# Patient Record
Sex: FEMALE | Race: WHITE | NOT HISPANIC OR LATINO | Employment: OTHER | URBAN - METROPOLITAN AREA
[De-identification: names, ages, dates, MRNs, and addresses within clinical notes are randomized per-mention and may not be internally consistent; named-entity substitution may affect disease eponyms.]

---

## 2017-01-19 ENCOUNTER — HOSPITAL ENCOUNTER (OUTPATIENT)
Dept: RADIOLOGY | Facility: MEDICAL CENTER | Age: 72
Discharge: HOME | End: 2017-01-19
Payer: MEDICARE

## 2017-01-19 ENCOUNTER — ALLSCRIPTS OFFICE VISIT (OUTPATIENT)
Dept: OTHER | Facility: OTHER | Age: 72
End: 2017-01-19

## 2017-01-19 ENCOUNTER — TRANSCRIBE ORDERS (OUTPATIENT)
Dept: ADMINISTRATIVE | Facility: HOSPITAL | Age: 72
End: 2017-01-19

## 2017-01-19 DIAGNOSIS — M25.519 PAIN IN SHOULDER: ICD-10-CM

## 2017-01-19 DIAGNOSIS — R20.2 PARESTHESIA: Primary | ICD-10-CM

## 2017-01-19 DIAGNOSIS — M25.511 PAIN IN RIGHT SHOULDER: ICD-10-CM

## 2017-01-19 DIAGNOSIS — R20.2 PARESTHESIA OF SKIN: ICD-10-CM

## 2017-01-19 PROCEDURE — 73030 X-RAY EXAM OF SHOULDER: CPT

## 2017-01-19 PROCEDURE — 72040 X-RAY EXAM NECK SPINE 2-3 VW: CPT

## 2017-01-24 ENCOUNTER — APPOINTMENT (OUTPATIENT)
Dept: PHYSICAL THERAPY | Facility: MEDICAL CENTER | Age: 72
End: 2017-01-24
Payer: MEDICARE

## 2017-01-24 DIAGNOSIS — R20.2 PARESTHESIA OF SKIN: ICD-10-CM

## 2017-01-24 DIAGNOSIS — M25.511 PAIN IN RIGHT SHOULDER: ICD-10-CM

## 2017-01-24 PROCEDURE — G8991 OTHER PT/OT GOAL STATUS: HCPCS

## 2017-01-24 PROCEDURE — 97110 THERAPEUTIC EXERCISES: CPT

## 2017-01-24 PROCEDURE — G8990 OTHER PT/OT CURRENT STATUS: HCPCS

## 2017-01-24 PROCEDURE — 97162 PT EVAL MOD COMPLEX 30 MIN: CPT

## 2017-01-30 ENCOUNTER — GENERIC CONVERSION - ENCOUNTER (OUTPATIENT)
Dept: OTHER | Facility: OTHER | Age: 72
End: 2017-01-30

## 2017-01-31 ENCOUNTER — APPOINTMENT (OUTPATIENT)
Dept: PHYSICAL THERAPY | Facility: MEDICAL CENTER | Age: 72
End: 2017-01-31
Payer: MEDICARE

## 2017-01-31 PROCEDURE — 97110 THERAPEUTIC EXERCISES: CPT

## 2017-01-31 PROCEDURE — 97140 MANUAL THERAPY 1/> REGIONS: CPT

## 2017-02-01 ENCOUNTER — OFFICE VISIT (OUTPATIENT)
Dept: RADIOLOGY | Facility: CLINIC | Age: 72
End: 2017-02-01
Payer: MEDICARE

## 2017-02-01 DIAGNOSIS — R20.2 PARESTHESIA: ICD-10-CM

## 2017-02-01 PROCEDURE — 95908 NRV CNDJ TST 3-4 STUDIES: CPT

## 2017-02-01 PROCEDURE — 95886 MUSC TEST DONE W/N TEST COMP: CPT

## 2017-02-02 ENCOUNTER — ALLSCRIPTS OFFICE VISIT (OUTPATIENT)
Dept: OTHER | Facility: OTHER | Age: 72
End: 2017-02-02

## 2017-02-03 ENCOUNTER — APPOINTMENT (OUTPATIENT)
Dept: PHYSICAL THERAPY | Facility: MEDICAL CENTER | Age: 72
End: 2017-02-03
Payer: MEDICARE

## 2017-02-03 PROCEDURE — 97110 THERAPEUTIC EXERCISES: CPT

## 2017-02-03 PROCEDURE — 97140 MANUAL THERAPY 1/> REGIONS: CPT

## 2017-02-06 ENCOUNTER — APPOINTMENT (OUTPATIENT)
Dept: PHYSICAL THERAPY | Facility: MEDICAL CENTER | Age: 72
End: 2017-02-06
Payer: MEDICARE

## 2017-02-06 PROCEDURE — 97140 MANUAL THERAPY 1/> REGIONS: CPT

## 2017-02-06 PROCEDURE — 97110 THERAPEUTIC EXERCISES: CPT

## 2017-02-08 ENCOUNTER — APPOINTMENT (OUTPATIENT)
Dept: PHYSICAL THERAPY | Facility: MEDICAL CENTER | Age: 72
End: 2017-02-08
Payer: MEDICARE

## 2017-02-08 PROCEDURE — 97110 THERAPEUTIC EXERCISES: CPT

## 2017-02-08 PROCEDURE — 97140 MANUAL THERAPY 1/> REGIONS: CPT

## 2017-02-14 ENCOUNTER — APPOINTMENT (OUTPATIENT)
Dept: PHYSICAL THERAPY | Facility: MEDICAL CENTER | Age: 72
End: 2017-02-14
Payer: MEDICARE

## 2017-02-14 ENCOUNTER — ALLSCRIPTS OFFICE VISIT (OUTPATIENT)
Dept: OTHER | Facility: OTHER | Age: 72
End: 2017-02-14

## 2017-02-14 PROCEDURE — 97110 THERAPEUTIC EXERCISES: CPT

## 2017-02-14 PROCEDURE — 97140 MANUAL THERAPY 1/> REGIONS: CPT

## 2017-02-17 ENCOUNTER — APPOINTMENT (OUTPATIENT)
Dept: PHYSICAL THERAPY | Facility: MEDICAL CENTER | Age: 72
End: 2017-02-17
Payer: MEDICARE

## 2017-02-20 ENCOUNTER — APPOINTMENT (OUTPATIENT)
Dept: PHYSICAL THERAPY | Facility: MEDICAL CENTER | Age: 72
End: 2017-02-20
Payer: MEDICARE

## 2017-02-20 PROCEDURE — 97110 THERAPEUTIC EXERCISES: CPT

## 2017-02-20 PROCEDURE — 97140 MANUAL THERAPY 1/> REGIONS: CPT

## 2017-02-20 PROCEDURE — G8991 OTHER PT/OT GOAL STATUS: HCPCS

## 2017-02-20 PROCEDURE — G8990 OTHER PT/OT CURRENT STATUS: HCPCS

## 2017-02-28 ENCOUNTER — APPOINTMENT (OUTPATIENT)
Dept: PHYSICAL THERAPY | Facility: MEDICAL CENTER | Age: 72
End: 2017-02-28
Payer: MEDICARE

## 2017-02-28 PROCEDURE — 97110 THERAPEUTIC EXERCISES: CPT

## 2017-02-28 PROCEDURE — 97140 MANUAL THERAPY 1/> REGIONS: CPT

## 2017-03-03 ENCOUNTER — APPOINTMENT (OUTPATIENT)
Dept: PHYSICAL THERAPY | Facility: MEDICAL CENTER | Age: 72
End: 2017-03-03
Payer: MEDICARE

## 2017-03-03 PROCEDURE — 97110 THERAPEUTIC EXERCISES: CPT

## 2017-03-03 PROCEDURE — 97140 MANUAL THERAPY 1/> REGIONS: CPT

## 2017-03-07 ENCOUNTER — APPOINTMENT (OUTPATIENT)
Dept: PHYSICAL THERAPY | Facility: MEDICAL CENTER | Age: 72
End: 2017-03-07
Payer: MEDICARE

## 2017-03-07 PROCEDURE — 97140 MANUAL THERAPY 1/> REGIONS: CPT

## 2017-03-07 PROCEDURE — 97110 THERAPEUTIC EXERCISES: CPT

## 2017-03-14 ENCOUNTER — APPOINTMENT (OUTPATIENT)
Dept: PHYSICAL THERAPY | Facility: MEDICAL CENTER | Age: 72
End: 2017-03-14
Payer: MEDICARE

## 2017-03-15 ENCOUNTER — APPOINTMENT (OUTPATIENT)
Dept: PHYSICAL THERAPY | Facility: MEDICAL CENTER | Age: 72
End: 2017-03-15
Payer: MEDICARE

## 2017-03-15 PROCEDURE — 97140 MANUAL THERAPY 1/> REGIONS: CPT

## 2017-03-15 PROCEDURE — 97110 THERAPEUTIC EXERCISES: CPT

## 2017-03-16 ENCOUNTER — APPOINTMENT (OUTPATIENT)
Dept: PHYSICAL THERAPY | Facility: MEDICAL CENTER | Age: 72
End: 2017-03-16
Payer: MEDICARE

## 2017-03-17 ENCOUNTER — APPOINTMENT (OUTPATIENT)
Dept: PHYSICAL THERAPY | Facility: MEDICAL CENTER | Age: 72
End: 2017-03-17
Payer: MEDICARE

## 2017-03-17 PROCEDURE — 97110 THERAPEUTIC EXERCISES: CPT

## 2017-03-17 PROCEDURE — 97140 MANUAL THERAPY 1/> REGIONS: CPT

## 2017-03-21 ENCOUNTER — APPOINTMENT (OUTPATIENT)
Dept: PHYSICAL THERAPY | Facility: MEDICAL CENTER | Age: 72
End: 2017-03-21
Payer: MEDICARE

## 2017-03-21 PROCEDURE — G8991 OTHER PT/OT GOAL STATUS: HCPCS

## 2017-03-21 PROCEDURE — 97140 MANUAL THERAPY 1/> REGIONS: CPT

## 2017-03-21 PROCEDURE — G8990 OTHER PT/OT CURRENT STATUS: HCPCS

## 2017-03-21 PROCEDURE — 97110 THERAPEUTIC EXERCISES: CPT

## 2017-03-22 ENCOUNTER — GENERIC CONVERSION - ENCOUNTER (OUTPATIENT)
Dept: OTHER | Facility: OTHER | Age: 72
End: 2017-03-22

## 2017-03-24 ENCOUNTER — APPOINTMENT (OUTPATIENT)
Dept: PHYSICAL THERAPY | Facility: MEDICAL CENTER | Age: 72
End: 2017-03-24
Payer: MEDICARE

## 2017-03-24 PROCEDURE — 97140 MANUAL THERAPY 1/> REGIONS: CPT

## 2017-03-24 PROCEDURE — 97110 THERAPEUTIC EXERCISES: CPT

## 2017-04-18 ENCOUNTER — ALLSCRIPTS OFFICE VISIT (OUTPATIENT)
Dept: OTHER | Facility: OTHER | Age: 72
End: 2017-04-18

## 2017-04-18 ENCOUNTER — HOSPITAL ENCOUNTER (OUTPATIENT)
Dept: RADIOLOGY | Facility: MEDICAL CENTER | Age: 72
Discharge: HOME | End: 2017-04-18
Payer: MEDICARE

## 2017-04-18 DIAGNOSIS — Z87.442 PERSONAL HISTORY OF URINARY CALCULI: ICD-10-CM

## 2017-04-18 DIAGNOSIS — R10.9 ABDOMINAL PAIN: ICD-10-CM

## 2017-04-18 DIAGNOSIS — R39.9 UNSPECIFIED SYMPTOMS AND SIGNS INVOLVING THE GENITOURINARY SYSTEM: ICD-10-CM

## 2017-04-18 LAB
BILIRUB UR QL STRIP: ABNORMAL
CLARITY UR: ABNORMAL
COLOR UR: YELLOW
GLUCOSE (HISTORICAL): ABNORMAL
HGB UR QL STRIP.AUTO: ABNORMAL
KETONES UR STRIP-MCNC: ABNORMAL MG/DL
LEUKOCYTE ESTERASE UR QL STRIP: ABNORMAL
NITRITE UR QL STRIP: ABNORMAL
PH UR STRIP.AUTO: 6.5 [PH]
PROT UR STRIP-MCNC: ABNORMAL MG/DL
SP GR UR STRIP.AUTO: 1.01
UROBILINOGEN UR QL STRIP.AUTO: 3.5

## 2017-04-18 PROCEDURE — 74176 CT ABD & PELVIS W/O CONTRAST: CPT

## 2017-05-03 ENCOUNTER — APPOINTMENT (OUTPATIENT)
Dept: LAB | Facility: MEDICAL CENTER | Age: 72
End: 2017-05-03
Payer: MEDICARE

## 2017-05-03 DIAGNOSIS — R39.9 UNSPECIFIED SYMPTOMS AND SIGNS INVOLVING THE GENITOURINARY SYSTEM: ICD-10-CM

## 2017-05-03 LAB
BACTERIA UR QL AUTO: NORMAL /HPF
BILIRUB UR QL STRIP: NEGATIVE
CLARITY UR: CLEAR
COLOR UR: YELLOW
GLUCOSE UR STRIP-MCNC: NEGATIVE MG/DL
HGB UR QL STRIP.AUTO: NEGATIVE
HYALINE CASTS #/AREA URNS LPF: NORMAL /LPF
KETONES UR STRIP-MCNC: NEGATIVE MG/DL
LEUKOCYTE ESTERASE UR QL STRIP: NEGATIVE
NITRITE UR QL STRIP: NEGATIVE
NON-SQ EPI CELLS URNS QL MICRO: NORMAL /HPF
PH UR STRIP.AUTO: 6.5 [PH] (ref 4.5–8)
PROT UR STRIP-MCNC: NEGATIVE MG/DL
RBC #/AREA URNS AUTO: NORMAL /HPF
SP GR UR STRIP.AUTO: 1.02 (ref 1–1.03)
UROBILINOGEN UR QL STRIP.AUTO: 0.2 E.U./DL
WBC #/AREA URNS AUTO: NORMAL /HPF

## 2017-05-03 PROCEDURE — 87086 URINE CULTURE/COLONY COUNT: CPT

## 2017-05-03 PROCEDURE — 81001 URINALYSIS AUTO W/SCOPE: CPT

## 2017-05-04 LAB — BACTERIA UR CULT: NORMAL

## 2017-06-21 ENCOUNTER — APPOINTMENT (OUTPATIENT)
Dept: RADIOLOGY | Facility: MEDICAL CENTER | Age: 72
End: 2017-06-21
Payer: MEDICARE

## 2017-06-21 ENCOUNTER — ALLSCRIPTS OFFICE VISIT (OUTPATIENT)
Dept: OTHER | Facility: OTHER | Age: 72
End: 2017-06-21

## 2017-06-21 DIAGNOSIS — M25.562 PAIN IN LEFT KNEE: ICD-10-CM

## 2017-06-21 PROCEDURE — 73560 X-RAY EXAM OF KNEE 1 OR 2: CPT

## 2017-07-02 ENCOUNTER — APPOINTMENT (EMERGENCY)
Dept: RADIOLOGY | Facility: HOSPITAL | Age: 72
End: 2017-07-02
Payer: MEDICARE

## 2017-07-02 ENCOUNTER — HOSPITAL ENCOUNTER (EMERGENCY)
Facility: HOSPITAL | Age: 72
Discharge: HOME | End: 2017-07-02
Attending: EMERGENCY MEDICINE | Admitting: EMERGENCY MEDICINE
Payer: MEDICARE

## 2017-07-02 ENCOUNTER — APPOINTMENT (EMERGENCY)
Dept: ULTRASOUND IMAGING | Facility: HOSPITAL | Age: 72
End: 2017-07-02
Payer: MEDICARE

## 2017-07-02 VITALS
OXYGEN SATURATION: 99 % | SYSTOLIC BLOOD PRESSURE: 166 MMHG | WEIGHT: 143 LBS | HEART RATE: 84 BPM | HEIGHT: 59 IN | TEMPERATURE: 98.1 F | DIASTOLIC BLOOD PRESSURE: 73 MMHG | BODY MASS INDEX: 28.83 KG/M2 | RESPIRATION RATE: 18 BRPM

## 2017-07-02 DIAGNOSIS — S83.90XA KNEE SPRAIN: Primary | ICD-10-CM

## 2017-07-02 PROCEDURE — 93971 EXTREMITY STUDY: CPT

## 2017-07-02 PROCEDURE — 99284 EMERGENCY DEPT VISIT MOD MDM: CPT

## 2017-07-02 PROCEDURE — 73564 X-RAY EXAM KNEE 4 OR MORE: CPT

## 2017-08-02 ENCOUNTER — ALLSCRIPTS OFFICE VISIT (OUTPATIENT)
Dept: OTHER | Facility: OTHER | Age: 72
End: 2017-08-02

## 2017-08-02 ENCOUNTER — TRANSCRIBE ORDERS (OUTPATIENT)
Dept: ADMINISTRATIVE | Facility: HOSPITAL | Age: 72
End: 2017-08-02

## 2017-08-02 DIAGNOSIS — M25.561 PAIN IN RIGHT KNEE: ICD-10-CM

## 2017-08-02 DIAGNOSIS — M25.561 RIGHT KNEE PAIN, UNSPECIFIED CHRONICITY: Primary | ICD-10-CM

## 2017-08-08 ENCOUNTER — HOSPITAL ENCOUNTER (OUTPATIENT)
Dept: RADIOLOGY | Age: 72
Discharge: HOME | End: 2017-08-08
Payer: MEDICARE

## 2017-08-08 DIAGNOSIS — M25.561 PAIN IN RIGHT KNEE: ICD-10-CM

## 2017-08-08 PROCEDURE — 73721 MRI JNT OF LWR EXTRE W/O DYE: CPT

## 2017-08-23 ENCOUNTER — GENERIC CONVERSION - ENCOUNTER (OUTPATIENT)
Dept: OTHER | Facility: OTHER | Age: 72
End: 2017-08-23

## 2017-10-10 ENCOUNTER — ALLSCRIPTS OFFICE VISIT (OUTPATIENT)
Dept: OTHER | Facility: OTHER | Age: 72
End: 2017-10-10

## 2017-10-10 DIAGNOSIS — G47.00 INSOMNIA: ICD-10-CM

## 2017-10-10 DIAGNOSIS — I10 ESSENTIAL (PRIMARY) HYPERTENSION: ICD-10-CM

## 2017-10-10 DIAGNOSIS — E55.9 VITAMIN D DEFICIENCY: ICD-10-CM

## 2017-10-10 DIAGNOSIS — M81.0 AGE-RELATED OSTEOPOROSIS WITHOUT CURRENT PATHOLOGICAL FRACTURE: ICD-10-CM

## 2017-10-11 NOTE — PROGRESS NOTES
Assessment  1  Benign essential hypertension (401 1) (I10)   2  Arthritis of knee (716 96) (M17 10)   3  Osteoporosis (733 00) (M81 0)    Plan  Benign essential hypertension    · (1) CBC/PLT/DIFF; Status:Active - Retrospective Authorization; Requested for:10Oct2017;    · (1) COMPREHENSIVE METABOLIC PANEL; Status:Active - Retrospective Authorization; Requested for:10Oct2017;    · (1) LIPID PANEL, FASTING; Status:Active - Retrospective Authorization; Requested  for:10Oct2017;   Benign essential hypertension, Insomnia    · (1) TSH; Status:Active - Retrospective Authorization; Requested for:10Oct2017;   Benign essential hypertension, Osteoporosis, Vitamin D deficiency    · (1) VITAMIN D 25-HYDROXY; Status:Active - Retrospective Authorization; Requested  for:10Oct2017;   Encounter for screening mammogram for malignant neoplasm of breast    · * MAMMO SCREENING BILATERAL W CAD; Status:Active - Retrospective By Protocol  Authorization; Requested for:10Oct2017; Health Maintenance    · Begin or continue regular aerobic exercise  Gradually work up to at least 3 sessions of  30 minutes of exercise a week ; Status:Complete;   Done: 37HYX1019    Check BW, mammogram  Recheck 3 months  Discussion/Summary    1  HTN-BP is normal at the visit today and she is getting mostly normal readings at home  She prefers not to treat with any meds at this time  May improve as her knee gets better and she is more active again  Seh will continue to monitor at home  Cuff wsa previously checked for accuracyMedial mensical tear right knee-improvingHM-needs BW, mammgram  Declines immunizations,  Osteoporosis-has declined treatment  Discussed adequate calcium supplementation  Chief Complaint  The patient is here today for a follow up  History of Present Illness  Eunice Mosquera has been having problems with her right knee  Has medial meniscal tear  Seeing Dr Tyrell Leach  her lisinopril in July  Says it was making her nauseated and lightheaded  Mae Sinks Tried different times of day  Symptoms resolved after stoppig it  Taking her BP at home and it is good  However it does go up with knee pain  Getting 130's /70's  Has gotten up to 150 with pain  her eye check up this year  2016 2016occ zolpidemis good  Eats a lot of broccoli, kale, spinach  Cheese, almond milk  Review of Systems    Preventive Quality 65 and Older: Falls Risk: The patient fell 0 times in the past 12 months  The patient is currently asymptomatic Symptoms Include: Active Problems  1  Abdominal pain (789 00) (R10 9)   2  Acute bronchitis (466 0) (J20 9)   3  Arthritis (716 90) (M19 90)   4  Arthritis of knee (716 96) (M17 10)   5  Benign essential hypertension (401 1) (I10)   6  Bilateral shoulder pain (719 41) (M25 511,M25 512)   7  Blood pressure elevated (401 9) (I10)   8  Carpal tunnel syndrome (354 0) (G56 00)   9  Carpal tunnel syndrome, right (354 0) (G56 01)   10  Chest pain (786 50) (R07 9)   11  Chest pressure (786 59) (R07 89)   12  Chest wall pain (786 52) (R07 89)   13  Chills (780 64) (R68 83)   14  Colon cancer screening (V76 51) (Z12 11)   15  Ear pain (388 70) (H92 09)   16  Encounter for gynecological examination with Papanicolaou smear of cervix (V72 31)    (Z01 419)   17  Encounter for screening for malignant neoplasm of colon (V76 51) (Z12 11)   18  Encounter for screening mammogram for malignant neoplasm of breast (V76 12)    (Z12 31)   19  Esophagitis, reflux (530 11) (K21 0)   20  Fatigue (780 79) (R53 83)   21  Generalized pain (780 96) (R52)   22  History of colon polyps (V12 72) (Z86 010)   23  Insomnia (780 52) (G47 00)   24  Left knee pain (719 46) (M25 562)   25  Lower back pain (724 2) (M54 5)   26  Nausea (787 02) (R11 0)   27  Oral thrush (112 0) (B37 0)   28  Osteoporosis (733 00) (M81 0)   29  Otitis media of left ear (382 9) (H68 92)   30  Pain in joint of right hip (719 45) (M21 341)   31  Pes anserine bursitis (726 61) (M93 50)   32   Right flank pain (789 09) (R10 9)   33  Right knee pain (719 46) (M25 561)   34  Screening breast examination (V76 10) (Z12 31)   35  Shoulder pain (719 41) (M25 519)   36  Shoulder pain, right (719 41) (M25 511)   37  Tingling of right upper extremity (782 0) (R20 2)   38  Urinary symptom or sign (788 99) (R39 9)   39  Vitamin D deficiency (268 9) (E55 9)    Past Medical History  1  Denied: History of Denial Of Any Significant Medical History   2  H/O renal calculi (V13 01) (Z87 442)   3  History of hypertension (V12 59) (Z86 79)   4  History of Screening for hyperlipidemia (V77 91) (Z13 220)   5  History of Screening for thyroid disorder (V77 0) (Z13 29)    Surgical History  1  History of Foot Surgery   2  History of Knee Surgery   3  History of Shoulder Surgery    Family History  Mother    1  Family history of cardiac disorder (V17 49) (Z82 49)   2  Family history of respiratory failure (V17 6) (Z83 6)  Father    3  Family history of respiratory failure (V17 6) (Z83 6)  Family History    4  Family history of Father  At Age 70   5  Family history of Mother  At Age [de-identified]    Social History   · Caffeine Use   · Never a smoker   · Never Drank Alcohol    Current Meds   1  CVS Acidophilus Probiotic TABS Recorded   2  Diclofenac Sodium 1 % Transdermal Gel; APPLY TO LOWER EXTREMITIES, 4 GM OF   GEL TO AFFECTED AREA 4 TIMES DAILY  DO NOT APPLY MORE THAN 16 GM   DAILY TO ANY ONE AFFECTED JOINT; Therapy: 42WDD4837 to (Last Rx:81Miu2742)  Requested for: 51Ofp6707 Ordered   3  Ibuprofen 800 MG Oral Tablet; TAKE 1 TABLET 3 TIMES DAILY WITH FOOD AS NEEDED; Therapy: 91Uzx1579 to (Evaluate:60Qaw1371)  Requested for: 56Dvf6588; Last   Rx:10Ujj1176 Ordered   4  [place] Gum; Therapy: (Recorded:37Oom4528) to Recorded   5  Probiotic CAPS; Therapy: (Recorded:76Hpl2495) to Recorded   6  Saccharomyces boulardii 250 MG CAPS; Therapy: (Darrell Price) to Recorded   7   Zolpidem Tartrate 10 MG Oral Tablet; take 1 tablet by mouth at bedtime if needed; Therapy: 70ZPH6334 to (23 819 )  Requested for: 37Mqb2998; Last   Rx:83Ovv2050 Ordered    Allergies  1  Ciprofloxacin-Ciproflox HCl ER TB24   2  Ancef SOLR   3  Aspirin Adult Low Strength TBEC   4  Penicillin V Potassium SOLR   5  PredniSONE (Cecilio) TABS   6  Vancomycin HCl SOLR    Vitals  Vital Signs    Recorded: 32ILP3461 12:51AM Recorded: 83RLZ7254 10:43AM   Heart Rate  68   Respiration  16   Systolic 659 mm Hg,  mm Hg   Diastolic 82 mm Hg, LUE 78 mm Hg   Weight  148 lb 6 oz   BMI Calculated  29 97 kg/m2   BSA Calculated  1 62 m2     Physical Exam    Constitutional   General appearance: No acute distress, well appearing and well nourished  Neck   Neck: Supple, symmetric, trachea midline, no masses  Thyroid: Normal, no thyromegaly  Pulmonary   Respiratory effort: No increased work of breathing or signs of respiratory distress  Auscultation of lungs: Clear to auscultation  Cardiovascular   Auscultation of heart: Normal rate and rhythm, normal S1 and S2, no murmurs  Carotid pulses: 2+ bilaterally  Abdominal aorta: Normal     Femoral pulses: 2+ bilaterally  Pedal pulses: 2+ bilaterally  Peripheral vascular exam: Normal     Examination of extremities for edema and/or varicosities: Normal     Abdomen   Abdomen: Non-tender, no masses  Liver and spleen: No hepatomegaly or splenomegaly  Lymphatic   Palpation of lymph nodes in neck: No lymphadenopathy  Palpation of lymph nodes in groin: No lymphadenopathy  Musculoskeletal   Gait and station: Normal     Psychiatric   Mood and affect: Normal        Health Management  History of colon polyps   COLONOSCOPY (GI, SURG); every 5 years; Last 28Jun2016; Next Due: 26OAY7013; Active    Future Appointments    Date/Time Provider Specialty Site   01/10/2018 11:30 AM HEMA Sullivan  Family Medicine [facility]   11/22/2017 10:20 AM HEMA Blair   Orthopedic Surgery [facility] [place]     Signatures   Electronically signed by : HEMA Corral ; Oct 11 2017 12:55AM EST                       (Author)

## 2017-10-27 ENCOUNTER — APPOINTMENT (OUTPATIENT)
Dept: LAB | Facility: MEDICAL CENTER | Age: 72
End: 2017-10-27
Payer: MEDICARE

## 2017-10-27 DIAGNOSIS — M81.0 AGE-RELATED OSTEOPOROSIS WITHOUT CURRENT PATHOLOGICAL FRACTURE: ICD-10-CM

## 2017-10-27 DIAGNOSIS — G47.00 INSOMNIA: ICD-10-CM

## 2017-10-27 DIAGNOSIS — E55.9 VITAMIN D DEFICIENCY: ICD-10-CM

## 2017-10-27 DIAGNOSIS — I10 ESSENTIAL (PRIMARY) HYPERTENSION: ICD-10-CM

## 2017-10-27 LAB
25(OH)D3 SERPL-MCNC: 26.6 NG/ML (ref 30–100)
ALBUMIN SERPL BCP-MCNC: 4.1 G/DL (ref 3.5–5)
ALP SERPL-CCNC: 85 U/L (ref 46–116)
ALT SERPL W P-5'-P-CCNC: 26 U/L (ref 12–78)
ANION GAP SERPL CALCULATED.3IONS-SCNC: 7 MMOL/L (ref 4–13)
AST SERPL W P-5'-P-CCNC: 21 U/L (ref 5–45)
BASOPHILS # BLD AUTO: 0.03 THOUSANDS/ΜL (ref 0–0.1)
BASOPHILS NFR BLD AUTO: 0 % (ref 0–1)
BILIRUB SERPL-MCNC: 1.06 MG/DL (ref 0.2–1)
BUN SERPL-MCNC: 13 MG/DL (ref 5–25)
CALCIUM SERPL-MCNC: 9.1 MG/DL (ref 8.3–10.1)
CHLORIDE SERPL-SCNC: 103 MMOL/L (ref 100–108)
CHOLEST SERPL-MCNC: 195 MG/DL (ref 50–200)
CO2 SERPL-SCNC: 27 MMOL/L (ref 21–32)
CREAT SERPL-MCNC: 0.66 MG/DL (ref 0.6–1.3)
EOSINOPHIL # BLD AUTO: 0.17 THOUSAND/ΜL (ref 0–0.61)
EOSINOPHIL NFR BLD AUTO: 3 % (ref 0–6)
ERYTHROCYTE [DISTWIDTH] IN BLOOD BY AUTOMATED COUNT: 13.5 % (ref 11.6–15.1)
GFR SERPL CREATININE-BSD FRML MDRD: 88 ML/MIN/1.73SQ M
GLUCOSE P FAST SERPL-MCNC: 96 MG/DL (ref 65–99)
HCT VFR BLD AUTO: 42.7 % (ref 34.8–46.1)
HDLC SERPL-MCNC: 66 MG/DL (ref 40–60)
HGB BLD-MCNC: 14.4 G/DL (ref 11.5–15.4)
LDLC SERPL CALC-MCNC: 106 MG/DL (ref 0–100)
LYMPHOCYTES # BLD AUTO: 1.35 THOUSANDS/ΜL (ref 0.6–4.47)
LYMPHOCYTES NFR BLD AUTO: 20 % (ref 14–44)
MCH RBC QN AUTO: 30 PG (ref 26.8–34.3)
MCHC RBC AUTO-ENTMCNC: 33.7 G/DL (ref 31.4–37.4)
MCV RBC AUTO: 89 FL (ref 82–98)
MONOCYTES # BLD AUTO: 0.51 THOUSAND/ΜL (ref 0.17–1.22)
MONOCYTES NFR BLD AUTO: 8 % (ref 4–12)
NEUTROPHILS # BLD AUTO: 4.73 THOUSANDS/ΜL (ref 1.85–7.62)
NEUTS SEG NFR BLD AUTO: 69 % (ref 43–75)
NRBC BLD AUTO-RTO: 0 /100 WBCS
PLATELET # BLD AUTO: 246 THOUSANDS/UL (ref 149–390)
PMV BLD AUTO: 12.2 FL (ref 8.9–12.7)
POTASSIUM SERPL-SCNC: 3.8 MMOL/L (ref 3.5–5.3)
PROT SERPL-MCNC: 8.4 G/DL (ref 6.4–8.2)
RBC # BLD AUTO: 4.8 MILLION/UL (ref 3.81–5.12)
SODIUM SERPL-SCNC: 137 MMOL/L (ref 136–145)
TRIGL SERPL-MCNC: 115 MG/DL
TSH SERPL DL<=0.05 MIU/L-ACNC: 1.72 UIU/ML (ref 0.36–3.74)
WBC # BLD AUTO: 6.81 THOUSAND/UL (ref 4.31–10.16)

## 2017-10-27 PROCEDURE — 85025 COMPLETE CBC W/AUTO DIFF WBC: CPT

## 2017-10-27 PROCEDURE — 80061 LIPID PANEL: CPT

## 2017-10-27 PROCEDURE — 82306 VITAMIN D 25 HYDROXY: CPT

## 2017-10-27 PROCEDURE — 80053 COMPREHEN METABOLIC PANEL: CPT

## 2017-10-27 PROCEDURE — 84443 ASSAY THYROID STIM HORMONE: CPT

## 2017-10-27 PROCEDURE — 36415 COLL VENOUS BLD VENIPUNCTURE: CPT

## 2017-11-22 ENCOUNTER — APPOINTMENT (OUTPATIENT)
Dept: RADIOLOGY | Facility: MEDICAL CENTER | Age: 72
End: 2017-11-22
Payer: MEDICARE

## 2017-11-22 ENCOUNTER — GENERIC CONVERSION - ENCOUNTER (OUTPATIENT)
Dept: OTHER | Facility: OTHER | Age: 72
End: 2017-11-22

## 2017-11-22 DIAGNOSIS — M17.10 PRIMARY OSTEOARTHRITIS OF ONE KNEE: ICD-10-CM

## 2017-11-22 PROCEDURE — 73562 X-RAY EXAM OF KNEE 3: CPT

## 2017-12-13 ENCOUNTER — ALLSCRIPTS OFFICE VISIT (OUTPATIENT)
Dept: OTHER | Facility: OTHER | Age: 72
End: 2017-12-13

## 2017-12-15 NOTE — CONSULTS
Assessment  Assessed    1  Left knee pain (389 46) (M25 562)    Plan   Complete risks and benefits of the procedure were reviewed including bleeding, infection, tissue reaction, allergic reaction and nerve injury with verbal and written consent being obtained  Discussion/Summary    This is a 67 y o female with right knee osteoarthritis  X-ray of the right knee demonstrates degenerative osteoarthritis  She states that she has done PT and continues to exercise weekly w/o significant pain relief  She recently had a right Synvisc injection which provided moderate pain relief  Patient reports occasional achiness along the medial and lateral aspect of the right knee  Patient is interested in a pain intervention  Today, I discussed with patient regarding a genicular nerve block trial Ã2  If this helps relieve her right knee pain temporarily, I will proceed forward and schedule her for a radiofrequency ablation  Risks include bleeding, infection, nerve injury and allergic reaction to medications  Patient verbalizes understanding  Consent was signed  Patient was given samples of Pennsaid 2% cream to be applied to her knee as it has helped her in the past    The patient has the current Goals: Continue exercises  The patent has the current Barriers: None  Patient is able to Self-Care  Possible side effects of new medications were reviewed with the patient/guardian today  The treatment plan was reviewed with the patient/guardian  The patient/guardian understands and agrees with the treatment plan   The patient was counseled regarding diagnostic results,-- instructions for management,-- risk factor reductions,-- patient and family education,-- impressions,-- risks and benefits of treatment options-- and-- importance of compliance with treatment  Chief Complaint  Chief Complaints    1  Pain    History of Present Illness  Mrs Ekta Cortez is a 67 y o female who presents today for initial consultation for management of ongoing right knee pain  She states that she has done Pt in the past which did not help much  She states thatâs he has been seeing Dr Kyrie Henriquez for her pain  She had a right knee synvisc injection which she states helped  She has had prior left knee replacement which helped  She states that she does not want any medicine for pain  She states that she would like other interventions  She was referred to me for left genicular knee Nerve block and RFA  Pain is rated 7/10  Patient walks 2 5 miles a week  She uses OTC analgesics prn  Referring physician is  Dr Linda Anderson presents with complaints of gradual onset of constant episodes of mild right knee pain, described as sharp, burning and throbbing  On a scale of 1 to 10, the patient rates the pain as 2  Review of Systems   Constitutional: recent weight gain, but-- no fever-- and-- no recent weight loss  Eyes: no double vision-- and-- no blurry vision  Cardiovascular: chest pain, but-- no palpitations-- and-- no lower extremity edema  Respiratory: no complaints of shortness of breath-- and-- no wheezing  Musculoskeletal: difficulty walking,-- joint swelling -- and-- pain in extremity , but-- no muscle weakness,-- no joint stiffness,-- no limb swelling-- and-- no decreased range of motion  Neurological: no dizziness,-- no difficulty swallowing,-- no memory loss,-- no loss of consciousness-- and-- no seizures  Gastrointestinal: no nausea,-- no vomiting,-- no constipation-- and-- no diarrhea  Genitourinary: no difficulty initiating urine stream,-- no genital pain-- and-- no frequent urination  Integumentary: no complaints of skin rash  Psychiatric: no depression  Endocrine: excessive thirst, but-- no adrenal disease,-- no hypothyroidism-- and-- no hyperthyroidism  Hematologic/Lymphatic: no tendency for easy bruising-- and-- no tendency for easy bleeding  ROS reviewed  Active Problems  Problems    1   Abdominal pain (789 00) (R10 9)   2  Acute bronchitis (466 0) (J20 9)   3  Arthritis (716 90) (M19 90)   4  Arthritis of knee (716 96) (M17 10)   5  Benign essential hypertension (401 1) (I10)   6  Bilateral shoulder pain (719 41) (M25 511,M25 512)   7  Blood pressure elevated (401 9) (I10)   8  Carpal tunnel syndrome (354 0) (G56 00)   9  Carpal tunnel syndrome, right (354 0) (G56 01)   10  Chest pain (786 50) (R07 9)   11  Chest pressure (786 59) (R07 89)   12  Chest wall pain (786 52) (R07 89)   13  Chills (780 64) (R68 83)   14  Colon cancer screening (V76 51) (Z12 11)   15  Ear pain (388 70) (H92 09)   16  Encounter for gynecological examination with Papanicolaou smear of cervix (V72 31)  (Z01 419)   17  Encounter for screening for malignant neoplasm of colon (V76 51) (Z12 11)   18  Encounter for screening mammogram for malignant neoplasm of breast (V76 12)  (Z12 31)   19  Esophagitis, reflux (530 11) (K21 0)   20  Fatigue (780 79) (R53 83)   21  Generalized pain (780 96) (R52)   22  History of colon polyps (V12 72) (Z86 010)   23  Insomnia (780 52) (G47 00)   24  Left knee pain (719 46) (M25 562)   25  Lower back pain (724 2) (M54 5)   26  Nausea (787 02) (R11 0)   27  Oral thrush (112 0) (B37 0)   28  Osteoporosis (733 00) (M81 0)   29  Otitis media of left ear (382 9) (H66 92)   30  Pain in joint of right hip (719 45) (M25 551)   31  Pes anserine bursitis (726 61) (M70 50)   32  Primary osteoarthritis of right knee (715 16) (M17 11)   33  Right flank pain (789 09) (R10 9)   34  Right knee pain (719 46) (M25 561)   35  Screening breast examination (V76 10) (Z12 31)   36  Shoulder pain (719 41) (M25 519)   37  Shoulder pain, right (719 41) (M25 511)   38  Tingling of right upper extremity (782 0) (R20 2)   39  Urinary symptom or sign (788 99) (R39 9)   40  Vitamin D deficiency (268 9) (E55 9)    Past Medical History  Problems    1  Denied: History of Denial Of Any Significant Medical History   2   H/O renal calculi (V13 01) (Z87 442)   3  History of hypertension (V12 59) (Z86 79)   4  History of Screening for hyperlipidemia (V77 91) (Z13 220)   5  History of Screening for thyroid disorder (V77 0) (Z13 29)    Surgical History  Problems    1  History of Foot Surgery   2  History of Knee Surgery   3  History of Shoulder Surgery    Family History  Mother    1  Family history of cardiac disorder (V17 49) (Z82 49)   2  Family history of respiratory failure (V17 6) (Z83 6)  Father    3  Family history of respiratory failure (V17 6) (Z83 6)  Family History    4  Family history of Father  At Age 70   5  Family history of Mother  At Age [de-identified]    Social History  Problems    · Caffeine Use   · Never a smoker   · Never Drank Alcohol  The social history was reviewed and updated today  Current Meds   1  CVS Acidophilus Probiotic TABS Recorded   2  Diclofenac Sodium 1 % Transdermal Gel; APPLY TO LOWER EXTREMITIES, 4 GM OF GEL TO AFFECTED AREA 4 TIMES DAILY  DO NOT APPLY MORE THAN 16 GM DAILY TO ANY ONE AFFECTED JOINT; Therapy: 68CBP2869 to (Last Rx:72Jdj7570)  Requested for: 03Hod9783 Ordered   3  Ibuprofen 800 MG Oral Tablet; TAKE 1 TABLET 3 TIMES DAILY WITH FOOD AS NEEDED; Therapy: 63Dux0038 to (Evaluate:39Vdn9365)  Requested for: 74Wyw7678; Last Rx:34Jhy0422 Ordered   4  [place] Gum; Therapy: (Recorded:66Tvu3677) to Recorded   5  Probiotic CAPS; Therapy: (Recorded:74Cit6044) to Recorded   6  Saccharomyces boulardii 250 MG CAPS; Therapy: (Es Shivers) to Recorded   7  Zolpidem Tartrate 10 MG Oral Tablet; take 1 tablet by mouth at bedtime if needed; Therapy: 71EXT2671 to ( 74 30 53)  Requested for: 11Dws5135; Last Rx:14Efm5228 Ordered    The medication list was reviewed and updated today  Allergies  Medication    1  Ciprofloxacin-Ciproflox HCl ER TB24   2  Ancef SOLR   3  Aspirin Adult Low Strength TBEC   4  Penicillin V Potassium SOLR   5  PredniSONE (Cecilio) TABS   6   Vancomycin HCl SOLR    Vitals  Vital Signs Recorded: 41Jnx4173 01:53PM   Temperature 98 6 F   Heart Rate 88   Systolic 356   Diastolic 88   Height 4 ft 11 in   Weight 145 lb    BMI Calculated 29 29   BSA Calculated 1 61   Pain Scale 2     Physical Exam   Constitutional  General appearance: Well developed, well nourished, alert, in no distress, non-toxic and no overt pain behavior  Eyes  Sclera: anicteric  HEENT  Hearing grossly intact  Neck  Neck: Supple, symmetric, trachea midline, no masses  Pulmonary  Respiratory effort: Even and unlabored  Cardiovascular  Examination of extremities: No edema or pitting edema present  Skin  Skin and subcutaneous tissue: Normal without rashes or lesions, well hydrated  Psychiatric  Mood and affect: Mood and affect appropriate  Neurologic  Cranial nerves: Cranial nerves II-XII grossly intact  the muscle tone was normal  Musculoskeletal Tandem Gait: Intact      Results/Data    Results    Radiology:  * XR KNEE 3 VW RIGHT NON INJURY FinalNo Documents AttachedTW Order Number: [id number] Performing Comments: rm 2-------------------------------------------------------------------------------- RIGHT KNEE INDICATION: Right knee pain  COMPARISON: 7/2/2017 VIEWS: 3 IMAGES: 3 FINDINGS: There is no acute fracture or dislocation  There is a very small joint effusion Right knee arthritis noted  The medial joint compartment is mildly to moderately narrowed, and there is also involvement of the patellofemoral joint  No lytic or blastic lesions are seen  There is a left knee prosthesis IMPRESSION: Right knee arthritis with very small joint effusion  No acute osseous abnormality Workstation performed: [id number] Signed by: Zeus Javier MD 11/28/17Ordered by: Ashlyn Ellison Collected/Examined: 48FVZ3198 10:43AMVerification Required Stage: Final Resulted: 00SFG1123 03:47PM Last Updated: 09IPT9758 03:48PM Accession: [id number]  MRI:   * MRI KNEE RIGHT WO CONTRAST FinalNo Documents AttachedTW Order Number: [id number] Performing Comments: R knee pain rule out insufficiency fracture, medial meniscus tear - Patient Instructions: NANDINI Kylah Driscoll [email or web address] This is a summary report  The complete report is available in the patient's medical record  If you cannot access the medical record, please contact the sending organization for a detailed fax or copy  MRI RIGHT KNEE INDICATION: M25 561: Pain in right knee  History taken directly from the electronic ordering system  Medial right knee pain  Rule out insufficiency fracture or medial meniscus tear  COMPARISON: Plain film dated 7/2/2017 TECHNIQUE: MR sequences were obtained of the right knee including: Localizer, axial T2 fat sat, coronal T1/T2 fat sat, sagittal PD/T2 fat sat  Images were acquired on a 1 5 Anel unit  Gadolinium was not used  FINDINGS: SUBCUTANEOUS TISSUES: Normal JOINT EFFUSION: Moderate effusion  BAKER'S CYST: Moderate lobulated Baker's cyst  MENISCI: Complex tear identified through the posterior horn of the medial meniscus exhibiting moderate extrusion without flipped fragment  The lateral meniscus remains intact  CRUCIATE LIGAMENTS: Intact  EXTENSOR APPARATUS: Intact  COLLATERAL LIGAMENTS: Intact  ARTICULAR SURFACES: Tricompartmental degenerative change with full-thickness lateral trochlear cartilage loss noted  There is also grade 3 cartilage loss at the lateral patellar facet  BONE MARROW: Normal  MUSCULATURE: Intact  IMPRESSION: 1  Complex tear through the posterior horn medial meniscus exhibiting moderate extrusion without flipped fragment  2  Degenerative change with cartilage loss as noted above and associated joint effusion   3  Baker's cyst  Workstation performed: [id number] Signed by: Melly Ambrocio MD 8/8/17Ordered by: Malia Salomon Collected/Examined: 68JUX5441 01:40PMVerification Required Stage: Final Resulted: 41DML4450 03:43PM Last Updated: 47LXE2928 03:44PM Accession: [id number]  Future Appointments    Date/Time Provider Specialty Site   12/28/2017 01:30 PM Tisha Jalloh MD Pain Management [address] MRI OUTPATIENT   01/10/2018 11:30 AM HEMA Vargas  [address] [facility]   03/07/2018 10:20 AM HEMA Jane   Orthopedic [address]     Signatures   Electronically signed by : Sangeeta Nolen MD; Dec 13 2017  2:51PM EST                       (Author)

## 2017-12-28 ENCOUNTER — HOSPITAL ENCOUNTER (OUTPATIENT)
Dept: RADIOLOGY | Facility: CLINIC | Age: 72
Discharge: HOME | End: 2018-01-03
Attending: ANESTHESIOLOGY | Admitting: ANESTHESIOLOGY
Payer: MEDICARE

## 2018-01-10 ENCOUNTER — ALLSCRIPTS OFFICE VISIT (OUTPATIENT)
Dept: OTHER | Facility: OTHER | Age: 73
End: 2018-01-10

## 2018-01-10 NOTE — RESULT NOTES
Message   Please inform the patient that one polyp was a tubular adenoma  No high-grade dysplasia and no cancer  The remaining polyps were hyperplastic polyps  She needs a repeat colonoscopy in 5 years  Please have her call with any questions or concerns  Verified Results  (1) TISSUE EXAM 28Jun2016 09:36AM J Luis Colindres     Test Name Result Flag Reference   LAB AP CASE REPORT (Report)     Surgical Pathology Report             Case: [id number]                   Authorizing Provider: Rakesh Cook MD      Collected:      06/28/2016 0936        Ordering Location:   [facility]    Received:      06/28/2016 [address] [place] [address] Endoscopy                               Pathologist:      Lamine Lane MD                                 Specimens:  A) - Large Intestine, Right/Ascending Colon, Bx ascending polyp                    B) - Rectum, BX rectal polyps   LAB AP FINAL DIAGNOSIS      A  Ascending colon polyp (biopsy):  - Tubular adenoma  - No high grade dysplasia     B  Rectum polyps (biopsy):  - Fragments of hyperplastic polyp(S)  - Negative for dysplasia   Electronically signed by Lamine Lane MD on 6/30/2016 at 2:10 PM   LAB AP SURGICAL ADDITIONAL INFORMATION (Report)     These tests were developed and their performance characteristics   determined by [facility] or [facility]  They may not be cleared or approved by the [place]  Food and   Drug Administration  The FDA has determined that such clearance or   approval is not necessary  These tests are used for clinical purposes  They should not be regarded as investigational or for research  This   laboratory has been approved by CLIA 88, designated as a high-complexity   laboratory and is qualified to perform these tests  LAB AP GROSS DESCRIPTION (Report)     A   The specimen is received in formalin, labeled with the patient's name   and medical record number, and is designated ascending colon polyp biopsy  The specimen consists of 2 tan-pink soft tissue fragments   measuring 0 2 cm and 0 3 cm in greatest dimension  Entirely submitted  One   cassette  B  The specimen is received in formalin, labeled with the patient's name   and medical record number, and is designated rectal polyps biopsy  The   specimen consists of 5 tan-pink soft tissue fragments ranging from 0 2 cm   to 0 4 cm in greatest dimension  Entirely submitted  One cassette  Note: The estimated total formalin fixation time based upon information   provided by the submitting clinician and the standard processing schedule   is 28 25 hours  MAS       Discussion/Summary   A tubular adenoma is a precancerous polyp  These polyps, if left alone, have a small risk of developing a cancer over 5-10 years  Your polyp has been completely removed  Hyperplastic polyps are benign polyps which have no risk of developing a cancer

## 2018-01-12 VITALS
BODY MASS INDEX: 28.83 KG/M2 | TEMPERATURE: 98.7 F | DIASTOLIC BLOOD PRESSURE: 80 MMHG | SYSTOLIC BLOOD PRESSURE: 160 MMHG | WEIGHT: 143 LBS | RESPIRATION RATE: 16 BRPM | HEART RATE: 70 BPM | HEIGHT: 59 IN

## 2018-01-12 VITALS
HEIGHT: 59 IN | HEART RATE: 81 BPM | BODY MASS INDEX: 28.63 KG/M2 | SYSTOLIC BLOOD PRESSURE: 154 MMHG | DIASTOLIC BLOOD PRESSURE: 80 MMHG | WEIGHT: 142 LBS

## 2018-01-12 NOTE — PROGRESS NOTES
Assessment   1  Acute upper respiratory infection (465 9) (J06 9)   2  Benign essential hypertension (401 1) (I10)    Plan   Acute upper respiratory infection    · GuaiFENesin -10 MG/5ML Oral Syrup; TAKE 5 - 10 ML EVERY 4 TO 6    HOURS AS NEEDED FOR COUGH      As above  REcheck 4 months  Discussion/Summary      URI-advsied nasal decongestants, cough med, warm compressees, humidifier    Needs to avoid oral decongestants due to HTN  HTN-declines meds  Will continue to monitor  D deficiency-previously advised supplement  Chief Complaint   The patient is here today for a follow up  History of Present Illness   Arnold Mejía has nasal congestion and cough for the past several days  Has a post nasal drip  Ears bothering her  Fever initially  She took  her BP at home  Getting 149/72, 148/63, 147/79  Active Problems   1  Abdominal pain (789 00) (R10 9)   2  Acute bronchitis (466 0) (J20 9)   3  Arthritis (716 90) (M19 90)   4  Arthritis of knee (716 96) (M17 10)   5  Benign essential hypertension (401 1) (I10)   6  Bilateral shoulder pain (719 41) (M25 511,M25 512)   7  Blood pressure elevated (401 9) (I10)   8  Carpal tunnel syndrome (354 0) (G56 00)   9  Carpal tunnel syndrome, right (354 0) (G56 01)   10  Chest pain (786 50) (R07 9)   11  Chest pressure (786 59) (R07 89)   12  Chest wall pain (786 52) (R07 89)   13  Chills (780 64) (R68 83)   14  Colon cancer screening (V76 51) (Z12 11)   15  Ear pain (388 70) (H92 09)   16  Encounter for gynecological examination with Papanicolaou smear of cervix (V72 31)      (Z01 419)   17  Encounter for screening for malignant neoplasm of colon (V76 51) (Z12 11)   18  Encounter for screening mammogram for malignant neoplasm of breast (V76 12)      (Z12 31)   19  Esophagitis, reflux (530 11) (K21 0)   20  Fatigue (780 79) (R53 83)   21  Generalized pain (780 96) (R52)   22  History of colon polyps (V12 72) (Z86 010)   23  Insomnia (780 52) (G47 00)   24   Left knee pain (719 46) (M25 562)   25  Lower back pain (724 2) (M54 5)   26  Nausea (787 02) (R11 0)   27  Oral thrush (112 0) (B37 0)   28  Osteoporosis (733 00) (M81 0)   29  Otitis media of left ear (382 9) (H66 92)   30  Pain in joint of right hip (719 45) (M25 551)   31  Pes anserine bursitis (726 61) (M70 50)   32  Primary osteoarthritis of right knee (715 16) (M17 11)   33  Right flank pain (789 09) (R10 9)   34  Right knee pain (719 46) (M25 561)   35  Screening breast examination (V76 10) (Z12 31)   36  Shoulder pain (719 41) (M25 519)   37  Shoulder pain, right (719 41) (M25 511)   38  Tingling of right upper extremity (782 0) (R20 2)   39  Urinary symptom or sign (788 99) (R39 9)   40  Vitamin D deficiency (268 9) (E55 9)    Past Medical History   1  Denied: History of Denial Of Any Significant Medical History   2  H/O renal calculi (V13 01) (Z87 442)   3  History of hypertension (V12 59) (Z86 79)   4  History of Screening for hyperlipidemia (V77 91) (Z13 220)   5  History of Screening for thyroid disorder (V77 0) (Z13 29)    Surgical History   1  History of Foot Surgery   2  History of Knee Surgery   3  History of Shoulder Surgery    Family History   Mother    1  Family history of cardiac disorder (V17 49) (Z82 49)   2  Family history of respiratory failure (V17 6) (Z83 6)  Father    3  Family history of respiratory failure (V17 6) (Z83 6)  Family History    4  Family history of Father  At Age 70   5  Family history of Mother  At Age [de-identified]    Social History    · Caffeine Use   · Never a smoker   · Never Drank Alcohol    Current Meds    1  CVS Acidophilus Probiotic TABS Recorded   2  Diclofenac Sodium 1 % Transdermal Gel; APPLY TO LOWER EXTREMITIES, 4 GM OF     GEL TO AFFECTED AREA 4 TIMES DAILY  DO NOT APPLY MORE THAN 16 GM     DAILY TO ANY ONE AFFECTED JOINT; Therapy: 59JTG9262 to (Last Rx:2017)  Requested for: 32Aru0620 Ordered   3   Ibuprofen 800 MG Oral Tablet; TAKE 1 TABLET 3 TIMES DAILY WITH FOOD AS NEEDED; Therapy: 02Aug2017 to (Evaluate:94Sbo0934)  Requested for: 04Poa0655; Last     Rx:02Aug2017 Ordered   4  Luzerne Gum; Therapy: (Recorded:27May2016) to Recorded   5  Probiotic CAPS; Therapy: (Recorded:27May2016) to Recorded   6  Saccharomyces boulardii 250 MG CAPS; Therapy: (Kita Gillis) to Recorded   7  Zolpidem Tartrate 10 MG Oral Tablet; take 1 tablet by mouth at bedtime if needed; Therapy: 57CER2419 to Marlise Pass)  Requested for: 67BTD5225; Last     Rx:01Jan2018 Ordered    Allergies   1  Ciprofloxacin-Ciproflox HCl ER TB24   2  Ancef SOLR   3  Aspirin Adult Low Strength TBEC   4  Penicillin V Potassium SOLR   5  PredniSONE (Cecilio) TABS   6  Vancomycin HCl SOLR    Vitals   Vital Signs    Recorded: 72MIK9616 11:18AM   Temperature 97 2 F   Heart Rate 80   Respiration 20   Systolic 679   Diastolic 64   Weight 892 lb 4 oz   BMI Calculated 29 74   BSA Calculated 1 62     Physical Exam        Constitutional      General appearance: No acute distress, well appearing and well nourished  Head and Face      Head and face: Normal        Palpation of the face and sinuses: No sinus tenderness  Eyes      Conjunctiva and lids: No swelling, erythema or discharge  Ears, Nose, Mouth, and Throat      Otoscopic examination: Tympanic membranes translucent with normal light reflex  Canals patent without erythema  -- Very cobgested turbinates  Oropharynx: Normal with no erythema, edema, exudate or lesions  Neck      Neck: Supple, symmetric, trachea midline, no masses  Thyroid: Normal, no thyromegaly  Pulmonary      Respiratory effort: No increased work of breathing or signs of respiratory distress  Auscultation of lungs: Clear to auscultation  Cardiovascular      Carotid pulses: 2+ bilaterally  Femoral pulses: 2+ bilaterally         Peripheral vascular exam: Normal        Examination of extremities for edema and/or varicosities: Normal        Lymphatic      Palpation of lymph nodes in neck: No lymphadenopathy  Results/Data   (1) CBC/PLT/DIFF 27Oct2017 09:11AM Janett Dietz Order Number: [id number]      Test Name Result Flag Reference   WBC COUNT 6 81 Thousand/uL  4 31-10 16   RBC COUNT 4 80 Million/uL  3 81-5 12   HEMOGLOBIN 14 4 g/dL  11 5-15 4   HEMATOCRIT 42 7 %  34 8-46  1   MCV 89 fL  82-98   MCH 30 0 pg  26 8-34 3   MCHC 33 7 g/dL  31 4-37 4   RDW 13 5 %  11 6-15 1   MPV 12 2 fL  8 9-12 7   PLATELET COUNT 192 Thousands/uL  149-390   nRBC AUTOMATED 0 /100 WBCs     NEUTROPHILS RELATIVE PERCENT 69 %  43-75   LYMPHOCYTES RELATIVE PERCENT 20 %  14-44   MONOCYTES RELATIVE PERCENT 8 %  4-12   EOSINOPHILS RELATIVE PERCENT 3 %  0-6   BASOPHILS RELATIVE PERCENT 0 %  0-1   NEUTROPHILS ABSOLUTE COUNT 4 73 Thousands/? ??L  1 85-7 62   LYMPHOCYTES ABSOLUTE COUNT 1 35 Thousands/? ??L  0 60-4 47   MONOCYTES ABSOLUTE COUNT 0 51 Thousand/? ??L  0 17-1 22   EOSINOPHILS ABSOLUTE COUNT 0 17 Thousand/? ??L  0 00-0 61   BASOPHILS ABSOLUTE COUNT 0 03 Thousands/? ??L  0 00-0 10      (1) COMPREHENSIVE METABOLIC PANEL 45ZJF7610 17:47JZ Janett Dietz Order Number: [id number]      Test Name Result Flag Reference   SODIUM 137 mmol/L  136-145   POTASSIUM 3 8 mmol/L  3 5-5 3   CHLORIDE 103 mmol/L  100-108   CARBON DIOXIDE 27 mmol/L  21-32   ANION GAP (CALC) 7 mmol/L  4-13   BLOOD UREA NITROGEN 13 mg/dL  5-25   CREATININE 0 66 mg/dL  0 60-1 30   Standardized to IDMS reference method   CALCIUM 9 1 mg/dL  8 3-10 1   BILI, TOTAL 1 06 mg/dL H 0 20-1 00   ALK PHOSPHATAS 85 U/L     ALT (SGPT) 26 U/L  12-78   Specimen collection should occur prior to Sulfasalazine and/or Sulfapyridine administration due to the potential for falsely depressed results  AST(SGOT) 21 U/L  5-45   Specimen collection should occur prior to Sulfasalazine administration due to the potential for falsely depressed results     ALBUMIN 4 1 g/dL 3 5-5 0   TOTAL PROTEIN 8 4 g/dL H 6 4-8 2   eGFR 88 ml/min/1 73sq m     National Kidney Disease Education Program recommendations are as follows:     GFR calculation is accurate only with a steady state creatinine     Chronic Kidney disease less than 60 ml/min/1 73 sq  meters     Kidney failure less than 15 ml/min/1 73 sq  meters  GLUCOSE FASTING 96 mg/dL  65-99   Specimen collection should occur prior to Sulfasalazine administration due to the potential for falsely depressed results  Specimen collection should occur prior to Sulfapyridine administration due to the potential for falsely elevated results  (1) LIPID PANEL, FASTING 53WFV9523 09:11AM [facility] Order Number: [id number]      Test Name Result Flag Reference   CHOLESTEROL 195 mg/dL     HDL,DIRECT 66 mg/dL H 40-60   Specimen collection should occur prior to Metamizole administration due to the potential for falsley depressed results  LDL CHOLESTEROL CALCULATED 106 mg/dL H 0-100   Triglyceride:           Normal <150 mg/dl      Borderline High 150-199 mg/dl      High 200-499 mg/dl      Very High >499 mg/dl               Cholesterol:          Desirable <200 mg/dl       Borderline High 200-239 mg/dl       High >239 mg/dl               HDL Cholesterol:          High>59 mg/dL       Low <41 mg/dL               This screening LDL is a calculated result  It does not have the accuracy of the Direct Measured LDL in the monitoring of patients with hyperlipidemia and/or statin therapy  Direct Measure LDL (EBZ816) must be ordered separately in these patients  TRIGLYCERIDES 115 mg/dL  <=150   Specimen collection should occur prior to N-Acetylcysteine or Metamizole administration due to the potential for falsely depressed results        (1) TSH 27Oct2017 09:11AM [facility] Order Number: [id number]      Test Name Result Flag Reference   TSH 1 720 uIU/mL  0 358-3 740   Patients undergoing fluorescein dye angiography may retain small amounts of fluorescein in the body for 48-72 hours post procedure  Samples containing fluorescein can produce falsely depressed TSH values  If the patient had this procedure,a specimen should be resubmitted post fluorescein clearance  The recommended reference ranges for TSH during pregnancy are as follows:     First trimester 0 1 to 2 5 uIU/mL     Second trimester  0 2 to 3 0 uIU/mL     Third trimester 0 3 to 3 0 uIU/m      (1) VITAMIN D 25-HYDROXY 27Oct2017 09:11AM Chaya Kanner Order Number: [id number]      Test Name Result Flag Reference   VIT D 25-HYDROX 26 6 ng/mL L 30 0-100 0   This assay is a certified procedure of the CDC Vitamin D Standardization Certification Program (VDSCP)           Deficiency <20ng/ml      Insufficiency 20-30ng/ml      Sufficient  ng/ml           *Patients undergoing fluorescein dye angiography may retain small amounts of fluorescein in the body for 48-72 hours post procedure  Samples containing fluorescein can produce falsely elevated Vitamin D values  If the patient had this procedure, a specimen should be resubmitted post fluorescein clearance  Health Management   History of colon polyps   COLONOSCOPY (GI, SURG); every 5 years; Last 28Jun2016; Next Due: 60MAF4529; Active    Future Appointments      Date/Time Provider Specialty Site   03/07/2018 10:20 AM HEMA Berger   [address] SURGICAL [facility]     Signatures    Electronically signed by : HEMA Evans ; Nahum 10 2018  9:34PM EST                       (Author)

## 2018-01-13 VITALS
WEIGHT: 144.5 LBS | SYSTOLIC BLOOD PRESSURE: 160 MMHG | DIASTOLIC BLOOD PRESSURE: 80 MMHG | BODY MASS INDEX: 29.13 KG/M2 | HEIGHT: 59 IN | HEART RATE: 84 BPM

## 2018-01-13 VITALS
TEMPERATURE: 97.8 F | SYSTOLIC BLOOD PRESSURE: 140 MMHG | DIASTOLIC BLOOD PRESSURE: 74 MMHG | BODY MASS INDEX: 29.19 KG/M2 | HEART RATE: 76 BPM | RESPIRATION RATE: 16 BRPM | WEIGHT: 144.5 LBS

## 2018-01-13 VITALS
BODY MASS INDEX: 29.39 KG/M2 | RESPIRATION RATE: 18 BRPM | DIASTOLIC BLOOD PRESSURE: 79 MMHG | WEIGHT: 145.5 LBS | HEART RATE: 81 BPM | SYSTOLIC BLOOD PRESSURE: 180 MMHG

## 2018-01-14 VITALS
DIASTOLIC BLOOD PRESSURE: 81 MMHG | RESPIRATION RATE: 18 BRPM | BODY MASS INDEX: 29.11 KG/M2 | SYSTOLIC BLOOD PRESSURE: 156 MMHG | WEIGHT: 144.13 LBS | HEART RATE: 83 BPM

## 2018-01-14 VITALS
RESPIRATION RATE: 16 BRPM | BODY MASS INDEX: 29.97 KG/M2 | HEART RATE: 68 BPM | DIASTOLIC BLOOD PRESSURE: 82 MMHG | WEIGHT: 148.38 LBS | SYSTOLIC BLOOD PRESSURE: 142 MMHG

## 2018-01-18 NOTE — RESULT NOTES
Message  The biopsies from you recent upper endoscopy were normal  No evidence of H pylori gastrtitis  Please follow up in the office for your reflux symptoms, Please call with any quesitons        Signatures   Electronically signed by : HEMA Chaney ; May 24 2016  2:15PM EST                       (Author)

## 2018-01-22 VITALS
DIASTOLIC BLOOD PRESSURE: 73 MMHG | BODY MASS INDEX: 29.36 KG/M2 | HEART RATE: 90 BPM | SYSTOLIC BLOOD PRESSURE: 183 MMHG | WEIGHT: 145.38 LBS | RESPIRATION RATE: 18 BRPM

## 2018-01-22 VITALS — HEART RATE: 75 BPM | RESPIRATION RATE: 18 BRPM | SYSTOLIC BLOOD PRESSURE: 150 MMHG | DIASTOLIC BLOOD PRESSURE: 79 MMHG

## 2018-01-22 VITALS
BODY MASS INDEX: 29.23 KG/M2 | TEMPERATURE: 98.6 F | SYSTOLIC BLOOD PRESSURE: 130 MMHG | HEIGHT: 59 IN | HEART RATE: 88 BPM | DIASTOLIC BLOOD PRESSURE: 88 MMHG | WEIGHT: 145 LBS

## 2018-01-23 VITALS
TEMPERATURE: 97.2 F | WEIGHT: 147.25 LBS | BODY MASS INDEX: 29.74 KG/M2 | DIASTOLIC BLOOD PRESSURE: 64 MMHG | HEART RATE: 80 BPM | SYSTOLIC BLOOD PRESSURE: 132 MMHG | RESPIRATION RATE: 20 BRPM

## 2018-01-23 NOTE — MISCELLANEOUS
Assessment   1  Benign essential hypertension (401 1) (I10)1   2  Esophagitis, reflux (530 11) (K21 0)1      1 Amended By: Rhiannon Robertson; May 10 2016 11:07 PM EST    Plan   AS above  Recheck 2 weeks  1       1 Amended By: Enoc Oseguera; May 10 2016 10:58 PM EST    Discussion/Summary  Discussion Summary:   1  HTNB-new onset-reviewed at length with son and Barbara importance of taking her med at least for now    Since she is showing a response will continue lisinopril 10 mg  She will call if BP elevated further  May need to increase dose  2  Reflux esophagitis-should confirm famotidine dose with GI    1        1 Amended By: Rhiannon Robertson; May 10 2016 11:08 PM EST    History of Present Illness  TCM Communication [facility]: The patient is being contacted for follow-up after hospitalization  She was hospitalized at and [address] [place] [address]  The date of admission: 5/3/16, date of discharge: 5/6/16  Diagnosis: Chest pressure, GERD, accelerated hypertension, and sleep deprivation  She was discharged to home  She scheduled a follow up appointment  Counseling was provided to patient's family  Son, Juancho Alvarado  Communication performed and completed by   HPI: Dea Habermann 1  is accompanied by son Juancho Alvarado today  Erin 2  was hospitalized for elevated BP and chest pain  She had not been feeling well for weeks prior with fatigue and pain in the back of her head and epigastric pain  She was placed on Zantac 150 twice daily  Saw Dr Balbuena  1  EGD done  Has hiatal hernia, duodenitis, esophagitis  She says she was put on Zantac but her prescriptin bottle she shows me today says famotidine  She talked to Dr Crista Bryant today tell him she was having side effects  He advised her to take 1/2 bid  2    1  She had a cardiac w/u in the hospital   She says she is feeling a little better  Her BP's are improving  2  1,3  However she does not understand why she has HTN since she says she has good diet ad exercise habits   She would prefer not to take medication  3        1 Amended By: Rachel Liu; May 09 2016 4:36 PM EST   2 Amended By: Rachel Liu; May 10 2016 10:58 PM EST   3 Amended By: Rachel Liu; May 10 2016 11:10 PM EST    Active Problems   1  Acute bronchitis (466 0) (J20 9)  2  Arthritis (716 90) (M19 90)  3  Bilateral shoulder pain (719 41) (M25 511,M25 512)  4  Blood pressure elevated (401 9) (I10)  5  Carpal tunnel syndrome (354 0) (G56 00)  6  Chest pain (786 50) (R07 9)  7  Chest pressure (786 59) (R07 89)  8  Chest wall pain (786 52) (R07 89)  9  Encounter for gynecological examination with Papanicolaou smear of cervix   (V72 31,V76 2) (Z01 419,Z12 4)  10  Encounter for screening for malignant neoplasm of colon (V76 51) (Z12 11)  11  Encounter for screening mammogram for malignant neoplasm of breast (V76 12)    (Z12 31)  12  Esophagitis, reflux (530 11) (K21 0)  13  Fatigue (780 79) (R53 83)  14  Generalized pain (780 96) (R52)  15  Insomnia (780 52) (G47 00)  16  Left knee pain (719 46) (M25 562)  17  Lower back pain (724 2) (M54 5)  18  Osteoporosis (733 00) (M81 0)  19  Pain in joint of right hip (719 45) (M25 551)  20  Shoulder pain (719 41) (M25 519)  21  Vitamin D deficiency (268 9) (E55 9)    Past Medical History   1  Denied: History of Denial Of Any Significant Medical History  2  History of hypertension (V12 59) (Z86 79)  3  History of Screening for hyperlipidemia (V77 91) (Z13 220)  4  History of Screening for thyroid disorder (V77 0) (Z13 29)    Surgical History   1  History of Foot Surgery  2  History of Knee Surgery  3  History of Shoulder Surgery    Family History  Mother   1  Family history of cardiac disorder (V17 49) (Z82 49)  2  Family history of respiratory failure (V17 6) (Z83 6)  Father   3  Family history of respiratory failure (V17 6) (Z83 6)  Family History   4  Family history of Father  At Age 68  8   Family history of Mother  At Age [de-identified]    Social History    · Caffeine Use   · Never A Smoker   · Never Drank Alcohol    Current Meds  1  Bioflex TABS; Take 1 tablet daily Recorded  2  CharcoCaps Homeopathic CAPS Recorded  3  CVS Acidophilus Probiotic TABS Recorded  4  Ibuprofen 600 MG Oral Tablet; Take one tablet every 8 hours as needed for pain; Therapy: 40QSK1818 to (Last Rx:24Nov2015)  Requested for: 12KLD6962 Ordered  5  Zolpidem Tartrate 10 MG Oral Tablet; TAKE 1 TABLET AT  BEDTIME AS NEEDED FOR   INSOMNIA; Last Rx:30Vmi9005 Ordered    Allergies   1  Ciprofloxacin-Ciproflox HCl ER TB24  2  Ancef SOLR  3  Aspirin Adult Low Strength TBEC  4  Penicillin V Potassium SOLR  5  PredniSONE (Cecilio) TABS  6  Vancomycin HCl SOLR    Physical Exam    Constitutional1    General appearance: No acute distress, well appearing and well nourished  1    Neck1    Neck: Supple, symmetric, trachea midline, no masses  1    Thyroid: Normal, no thyromegaly  1    Pulmonary   Respiratory effort: No increased work of breathing or signs of respiratory distress  1    Auscultation of lungs: Clear to auscultation  1    Cardiovascular1    Auscultation of heart: Normal rate and rhythm, normal S1 and S2, no murmurs  1    Carotid pulses: 2+ bilaterally  1    Abdominal aorta: Normal 1    Femoral pulses: 2+ bilaterally  1    Pedal pulses: 2+ bilaterally  1    Peripheral vascular exam: Normal 1    Examination of extremities for edema and/or varicosities: Normal 1    Abdomen1    Abdomen: Non-tender, no masses  1    Liver and spleen: No hepatomegaly or splenomegaly  1        1 Amended By: Dara Bolton; May 10 2016 11:03 PM EST    Message   Recorded as Task   Date: 05/06/2016 01:06 PM, Created By: System   Task Name: [facility]   Assigned To:  Tenisha Pinto   Regarding Patient: Gi Feritas, Status: Active   Comment:   System - 06 May 2016 1:06 PM    Patient discharged from hospital   Patient Name: Jasmina Virgen  Patient YOB: 1945  Discharge Date: 5/6/2016  [facility] App[facility]    Date/Time Provider Specialty Site 05/09/2016 03:45 PM HEMA Garvey  [address]   06/06/2016 01:45 PM HEMA Garvey   [address]     Signatures   Electronically signed by : HEMA Alejo ; May  8 2016  9:46AM EST                          Electronically signed by : HEMA Alejo ; May 10 2016  9:49PM EST                          Electronically signed by : HEMA Masterson ; May 10 2016 11:12PM EST                       (Author)

## 2018-01-25 ENCOUNTER — HOSPITAL ENCOUNTER (OUTPATIENT)
Dept: RADIOLOGY | Facility: CLINIC | Age: 73
Discharge: HOME | End: 2018-01-25
Attending: ANESTHESIOLOGY
Payer: MEDICARE

## 2018-01-25 VITALS
DIASTOLIC BLOOD PRESSURE: 83 MMHG | OXYGEN SATURATION: 96 % | SYSTOLIC BLOOD PRESSURE: 143 MMHG | HEART RATE: 76 BPM | TEMPERATURE: 97.6 F | RESPIRATION RATE: 18 BRPM

## 2018-01-25 DIAGNOSIS — M17.11 ARTHRITIS OF RIGHT KNEE: ICD-10-CM

## 2018-01-25 PROBLEM — M17.10 ARTHRITIS OF KNEE: Status: ACTIVE | Noted: 2018-01-25

## 2018-01-25 PROBLEM — M54.50 LOWER BACK PAIN: Status: ACTIVE | Noted: 2018-01-25

## 2018-01-25 PROCEDURE — 64450 NJX AA&/STRD OTHER PN/BRANCH: CPT | Performed by: ANESTHESIOLOGY

## 2018-01-25 RX ORDER — BUPIVACAINE HYDROCHLORIDE 2.5 MG/ML
10 INJECTION, SOLUTION EPIDURAL; INFILTRATION; INTRACAUDAL ONCE
Status: COMPLETED | OUTPATIENT
Start: 2018-01-25 | End: 2018-01-25

## 2018-01-25 RX ADMIN — BUPIVACAINE HYDROCHLORIDE 10 ML: 2.5 INJECTION, SOLUTION EPIDURAL; INFILTRATION; INTRACAUDAL; PERINEURAL at 13:36

## 2018-01-25 NOTE — DISCHARGE INSTRUCTIONS

## 2018-01-25 NOTE — H&P
History of Present Illness: The patient is a 67 y o  female who presents with complaints of Right knee pain  She is here for 2nd right genicular nerve block  The first injection provided her with greater than 1 week of pain relief  Pain score is 6/10  Patient Active Problem List   Diagnosis    Chest pressure    GERD (gastroesophageal reflux disease)    Sleep deprivation    Accelerated hypertension    Arthritis of knee    Left knee pain    Lower back pain    Primary osteoarthritis of right knee       Past Medical History:   Diagnosis Date    Arthritis     Carpal tunnel syndrome     GERD (gastroesophageal reflux disease)     Hiatal hernia     Hypertension     Insomnia     Sleep deprivation     chronically    Vitamin D deficiency     Vitamin D toxicity 2011    with leaky gut syndrome       Past Surgical History:   Procedure Laterality Date    BUNIONECTOMY Bilateral     CARPAL TUNNEL RELEASE Right 2015    ESOPHAGOGASTRODUODENOSCOPY N/A 5/5/2016    Procedure: ESOPHAGOGASTRODUODENOSCOPY (EGD); Surgeon: Anisa Adams MD;  [facility]; Service:     DC COLONOSCOPY FLX DX W/COLLJ SPEC WHEN PFRMD N/A 6/28/2016    Procedure: COLONOSCOPY;  Surgeon: Anisa Adams MD;  [facility]; Service: Gastroenterology    ROTATOR CUFF REPAIR Bilateral     TOE SURGERY Left     left 5th toe shaved down due to repeated fx    TOTAL KNEE ARTHROPLASTY Left          Current Outpatient Prescriptions:     Bioflavonoid Products (BIOFLEX) TABS, Take 3 tablets by mouth daily  FlexNow , Disp: , Rfl:     Charcoal Activated (CHARCOCAPS) 260 MG CAPS, Take 1 tablet by mouth daily  , Disp: , Rfl:     Lactobacillus (ACIDOPHILUS) 100 MG CAPS, Take 1 capsule by mouth daily  , Disp: , Rfl:     lisinopril (ZESTRIL) 5 mg tablet, Take 5 mg by mouth daily  , Disp: , Rfl:     zolpidem (AMBIEN) 10 mg tablet, Take 10 mg by mouth daily at bedtime as needed for sleep , Disp: , Rfl:     Allergies   Allergen Reactions    Ancef [Cefazolin] Hives and GI Intolerance    Aspirin GI Intolerance     Even low dose     Ciprofloxacin Hives    Other      STEROIDS- GI INTOLERANCE    Penicillins Hives    Prednisone Hives and GI Intolerance    Vancomycin Hives and GI Intolerance       Physical Exam:   Vitals:    01/25/18 1256   BP: 159/85   Pulse: 77   Resp: 18   Temp: 97 6 °F (36 4 °C)   SpO2: 96%     General: Awake, Alert, Oriented x 3, Mood and affect appropriate  Respiratory: Respirations even and unlabored  Cardiovascular: Peripheral pulses intact; no edema  Musculoskeletal Exam: Limited ROM of the right knee joint  ASA Score: 2    Assessment:   1  Arthritis of right knee        Plan: Right Knee Genicular Nerve Block #2

## 2018-02-08 ENCOUNTER — TELEPHONE (OUTPATIENT)
Dept: RADIOLOGY | Facility: CLINIC | Age: 73
End: 2018-02-08

## 2018-02-08 NOTE — TELEPHONE ENCOUNTER
S/p R genicular nerve block #2  Pain diary faxed but have not received yet  Pt states even now she is feeling the benefit from MBB  States pain was reduced at least 50% the day of procedure and if she does not overdo activity she still feels an improvement from preprocedure pain  Schedule RFA?

## 2018-03-01 ENCOUNTER — HOSPITAL ENCOUNTER (OUTPATIENT)
Dept: RADIOLOGY | Facility: CLINIC | Age: 73
Discharge: HOME | End: 2018-03-01
Attending: ANESTHESIOLOGY | Admitting: ANESTHESIOLOGY
Payer: MEDICARE

## 2018-03-01 VITALS
SYSTOLIC BLOOD PRESSURE: 155 MMHG | TEMPERATURE: 98.3 F | DIASTOLIC BLOOD PRESSURE: 78 MMHG | OXYGEN SATURATION: 97 % | RESPIRATION RATE: 18 BRPM | HEART RATE: 83 BPM

## 2018-03-01 DIAGNOSIS — M17.11 ARTHRITIS OF RIGHT KNEE: ICD-10-CM

## 2018-03-01 PROCEDURE — 64640 INJECTION TREATMENT OF NERVE: CPT | Performed by: ANESTHESIOLOGY

## 2018-03-01 RX ORDER — BUPIVACAINE HCL/PF 2.5 MG/ML
10 VIAL (ML) INJECTION ONCE
Status: COMPLETED | OUTPATIENT
Start: 2018-03-01 | End: 2018-03-01

## 2018-03-01 RX ORDER — LIDOCAINE HYDROCHLORIDE 10 MG/ML
30 INJECTION, SOLUTION EPIDURAL; INFILTRATION; INTRACAUDAL; PERINEURAL ONCE
Status: COMPLETED | OUTPATIENT
Start: 2018-03-01 | End: 2018-03-01

## 2018-03-01 RX ADMIN — LIDOCAINE HYDROCHLORIDE 30 ML: 10 INJECTION, SOLUTION EPIDURAL; INFILTRATION; INTRACAUDAL; PERINEURAL at 10:49

## 2018-03-01 RX ADMIN — BUPIVACAINE HYDROCHLORIDE 10 ML: 2.5 INJECTION, SOLUTION EPIDURAL; INFILTRATION; INTRACAUDAL at 11:03

## 2018-03-01 NOTE — DISCHARGE INSTRUCTIONS

## 2018-03-01 NOTE — H&P
History of Present Illness: The patient is a 67 y o  female who presents with complaints of Right Knee Pain  Patient is here for a right genicular nerve RFA  She had 2 prior genicular nerve blocks with greater than 50% pain relief  Pain score is 7/10  Patient Active Problem List   Diagnosis    Chest pressure    GERD (gastroesophageal reflux disease)    Sleep deprivation    Accelerated hypertension    Arthritis of knee    Left knee pain    Lower back pain    Primary osteoarthritis of right knee       Past Medical History:   Diagnosis Date    Arthritis     Carpal tunnel syndrome     GERD (gastroesophageal reflux disease)     Hiatal hernia     Hypertension     Insomnia     Sleep deprivation     chronically    Vitamin D deficiency     Vitamin D toxicity 2011    with leaky gut syndrome       Past Surgical History:   Procedure Laterality Date    BUNIONECTOMY Bilateral     CARPAL TUNNEL RELEASE Right 2015    ESOPHAGOGASTRODUODENOSCOPY N/A 5/5/2016    Procedure: ESOPHAGOGASTRODUODENOSCOPY (EGD); Surgeon: Carrol Mccarthy MD;  [facility]; Service:     [place] COLONOSCOPY FLX DX W/COLLJ SPEC WHEN PFRMD N/A 6/28/2016    Procedure: COLONOSCOPY;  Surgeon: Carrol Mccarthy MD;  [facility]; Service: Gastroenterology    ROTATOR CUFF REPAIR Bilateral     TOE SURGERY Left     left 5th toe shaved down due to repeated fx    TOTAL KNEE ARTHROPLASTY Left          Current Outpatient Prescriptions:     diclofenac sodium (VOLTAREN) 1 %, Place on the skin, Disp: , Rfl:     Bioflavonoid Products (BIOFLEX) TABS, Take 3 tablets by mouth daily  FlexNow , Disp: , Rfl:     Charcoal Activated (CHARCOCAPS) 260 MG CAPS, Take 1 tablet by mouth daily  , Disp: , Rfl:     Lactobacillus (ACIDOPHILUS) 100 MG CAPS, Take 1 capsule by mouth daily  , Disp: , Rfl:     lisinopril (ZESTRIL) 5 mg tablet, Take 5 mg by mouth daily  , Disp: , Rfl:     zolpidem (AMBIEN) 10 mg tablet, Take 10 mg by mouth daily at bedtime as needed for sleep , Disp: , Rfl:     Allergies   Allergen Reactions    Ancef [Cefazolin] Hives and GI Intolerance    Aspirin GI Intolerance     Even low dose     Ciprofloxacin Hives    Other      STEROIDS- GI INTOLERANCE    Penicillins Hives    Prednisone Hives and GI Intolerance    Vancomycin Hives and GI Intolerance       Physical Exam:   Vitals:    03/01/18 1014   BP: 161/79   Pulse: 79   Resp: 18   Temp: 98 3 °F (36 8 °C)   SpO2: 97%     General: Awake, Alert, Oriented x 3, Mood and affect appropriate  Respiratory: Respirations even and unlabored  Cardiovascular: Peripheral pulses intact; no edema  Musculoskeletal Exam: tenderness to palpation at the right knee; limitation with ROM    ASA Score: 2    Assessment:   1   Arthritis of right knee        Plan: RIGHT GENICULAR NERVE RFA

## 2018-03-02 ENCOUNTER — TELEPHONE (OUTPATIENT)
Dept: RADIOLOGY | Facility: CLINIC | Age: 73
End: 2018-03-02

## 2018-03-02 NOTE — TELEPHONE ENCOUNTER
Patient called and left message stating she was returning call from [place]   She can be reached at [phone]

## 2018-03-05 NOTE — TELEPHONE ENCOUNTER
S/w patient  States "doing good " Denies any s/s of infection or sunburn like sensation  Advised may take 4-6 weeks to get full benefit from procedure

## 2018-03-07 NOTE — PROCEDURES
Procedure      Preoperative Diagnosis: Knee osteoarthritis, mononeuritis  Postoperative Diagnosis: Knee osteoarthritis, mononeuritis  Type of Anesthesia: Local anesthetic  Physician: Arnaud Agrawal MD  Blood Loss: None    Procedure Note: Right Knee Genicular Nerve Block  After informed consent discussing all risks and benefits of the procedures as well as alternative to care, the patient was brought into the fluoroscopy suite and placed supine on the x-ray table  AP and lateral fluoroscopic visualization was utilized to identify the femoral and tibial epicondylar to shaft junction  Following this, sterile prep and drape was used, and a 27 gauge 1 5 inch needle was used to raise a skin wheal using total of 1cc 1% plain lidocaine over the fluoroscopic locations of the femoral shaft and epicondylar ridge both medially and laterally as well as the tibial shaft and medial tibial epicondylar ridge  A 25 gauge 3 5â spinal needle was then inserted deeper to the mid-body of the femoral and tibial shafts  This was noted to rest on the periosteum  The injectate at each level contained 0 5 cc of 2% lidocaine  A total of 1 5 cc's of 2% Lidocaine was injected in divided doses  Needles were removed and a band-aid was applied  The patient tolerated the procedure well and there were no complications  Immediate post-operative pain relief was significant, greater than 50% pain relief          Signatures   Electronically signed by : Arnaud Agrawal MD; Dec 28 2017  4:22PM EST                       (Author)

## 2018-03-08 ENCOUNTER — APPOINTMENT (OUTPATIENT)
Dept: RADIOLOGY | Facility: MEDICAL CENTER | Age: 73
End: 2018-03-08
Payer: MEDICARE

## 2018-03-08 ENCOUNTER — OFFICE VISIT (OUTPATIENT)
Dept: FAMILY MEDICINE CLINIC | Facility: MEDICAL CENTER | Age: 73
End: 2018-03-08
Payer: MEDICARE

## 2018-03-08 VITALS
SYSTOLIC BLOOD PRESSURE: 138 MMHG | BODY MASS INDEX: 30.46 KG/M2 | TEMPERATURE: 98.4 F | DIASTOLIC BLOOD PRESSURE: 78 MMHG | OXYGEN SATURATION: 98 % | WEIGHT: 150.8 LBS | HEART RATE: 80 BPM | RESPIRATION RATE: 20 BRPM

## 2018-03-08 DIAGNOSIS — R05.9 COUGH: Primary | ICD-10-CM

## 2018-03-08 DIAGNOSIS — R05.9 COUGH: ICD-10-CM

## 2018-03-08 PROCEDURE — 71046 X-RAY EXAM CHEST 2 VIEWS: CPT

## 2018-03-08 PROCEDURE — 99213 OFFICE O/P EST LOW 20 MIN: CPT | Performed by: FAMILY MEDICINE

## 2018-03-08 RX ORDER — CODEINE PHOSPHATE AND GUAIFENESIN 10; 100 MG/5ML; MG/5ML
SOLUTION ORAL
Refills: 0 | COMMUNITY
Start: 2018-01-10 | End: 2018-09-17 | Stop reason: ALTCHOICE

## 2018-03-08 RX ORDER — ALBUTEROL SULFATE 90 UG/1
2 AEROSOL, METERED RESPIRATORY (INHALATION) EVERY 6 HOURS PRN
Qty: 1 INHALER | Refills: 0 | Status: SHIPPED | OUTPATIENT
Start: 2018-03-08 | End: 2019-05-23

## 2018-03-08 RX ORDER — AZITHROMYCIN 250 MG/1
TABLET, FILM COATED ORAL
Qty: 6 TABLET | Refills: 0 | Status: SHIPPED | OUTPATIENT
Start: 2018-03-08 | End: 2018-03-12

## 2018-03-08 NOTE — PROGRESS NOTES
Diane Blocker started with a sore throat 5 day sago followed by cough  Cough is productive  Now with shortness of breath and chest tightness yesterday  Has chills  No fever  Left ear hurts  Cough bad at night  Got in the shower today which helped her cough  No prior history of asthma  O: /78 (BP Location: Left arm, Patient Position: Sitting, Cuff Size: Large)   Pulse 80   Temp 98 4 °F (36 9 °C) (Oral)   Resp 20   Wt 68 4 kg (150 lb 12 8 oz)   SpO2 98%   BMI 30 46 kg/m²   HEENT-sinuses nontender  TMs normal   Pharynx without erythema  Neck no adenopathy  Chest slightly decreased breath sounds but otherwise clear  Cardiac regular rate rhythm   CXR no acute disease    Assessment  Bronchitis    Plan  Rx for Zithromax, Albuterol inhaler  Will use her previous cough med

## 2018-03-27 NOTE — PROGRESS NOTES
Assessment:  1  Primary osteoarthritis of right knee     2  Arthritis of knee         Plan: This is a 67 y o female who present for follow up of right knee pain s/p right genicular nerve RFA  Patient is doing well  She will follow up prn or sooner if her pain worsens  Regarding right shoulder pain, she will continue using asper cream prn and if it gets worse, she should let us know  She will follow up with Dr Selwyn Centeno  My impressions and treatment recommendations were discussed in detail with the patient who verbalized understanding and had no further questions  Discharge instructions were provided  I personally saw and examined the patient and I agree with the above discussed plan of care  History of Present Illness:  Marya Holland is a 67 y o  female who presents for a follow up office visit in regards to right knee pain s/p right genicular nerve RFA  Patient states that she had greater than 90% pain relief  She states that she is doing a lot better and is moving better  She does not take any medications for pain  Pain score is 2/10  She endorses to mild right shoulder pain which is relieved with asper cream      I have personally reviewed and/or updated the patient's past medical history, past surgical history, family history, social history, current medications, allergies, and vital signs today  Review of Systems   Respiratory: Negative for shortness of breath  Cardiovascular: Negative for chest pain  Gastrointestinal: Negative for constipation, diarrhea, nausea and vomiting  Musculoskeletal: Positive for gait problem  Negative for arthralgias, joint swelling and myalgias  Skin: Negative for rash  Neurological: Negative for dizziness, seizures and weakness  All other systems reviewed and are negative        Patient Active Problem List   Diagnosis    Chest pressure    GERD (gastroesophageal reflux disease)    Sleep deprivation    Accelerated hypertension    Arthritis of knee    Left knee pain    Lower back pain    Primary osteoarthritis of right knee       Past Medical History:   Diagnosis Date    Arthritis     Carpal tunnel syndrome     GERD (gastroesophageal reflux disease)     Hiatal hernia     Hypertension     Insomnia     Sleep deprivation     chronically    Vitamin D deficiency     Vitamin D toxicity 2011    with leaky gut syndrome       Past Surgical History:   Procedure Laterality Date    BUNIONECTOMY Bilateral     CARPAL TUNNEL RELEASE Right 2015    ESOPHAGOGASTRODUODENOSCOPY N/A 5/5/2016    Procedure: ESOPHAGOGASTRODUODENOSCOPY (EGD); Surgeon: Kylee Rucker MD;  [facility]; Service:     [place] COLONOSCOPY FLX DX W/COLLJ SPEC WHEN PFRMD N/A 6/28/2016    Procedure: COLONOSCOPY;  Surgeon: Kylee Rucker MD;  [facility]; Service: Gastroenterology    ROTATOR CUFF REPAIR Bilateral     TOE SURGERY Left     left 5th toe shaved down due to repeated fx    TOTAL KNEE ARTHROPLASTY Left        Family History   Problem Relation Age of Onset    Heart failure Mother     Heart failure Father        Social History     Occupational History    Not on file  Social History Main Topics    Smoking status: Former Smoker    Smokeless tobacco: Never Used    Alcohol use No    Drug use: No    Sexual activity: Not on file       Current Outpatient Prescriptions on File Prior to Visit   Medication Sig    albuterol (PROVENTIL HFA,VENTOLIN HFA) 90 mcg/act inhaler Inhale 2 puffs every 6 (six) hours as needed for wheezing    Bioflavonoid Products (BIOFLEX) TABS Take 3 tablets by mouth daily  FlexNow     Charcoal Activated (CHARCOCAPS) 260 MG CAPS Take 1 tablet by mouth daily   diclofenac sodium (VOLTAREN) 1 % Place on the skin    guaiFENesin-codeine (ROBITUSSIN AC) 100-10 mg/5 mL oral solution take 5 to 10 milliliters by mouth every 4 to 6 hours if needed for cough    Lactobacillus (ACIDOPHILUS) 100 MG CAPS Take 1 capsule by mouth daily      lisinopril (ZESTRIL) 5 mg tablet Take 5 mg by mouth daily   zolpidem (AMBIEN) 10 mg tablet Take 10 mg by mouth daily at bedtime as needed for sleep  No current facility-administered medications on file prior to visit  Allergies   Allergen Reactions    Ancef [Cefazolin] Hives and GI Intolerance    Aspirin GI Intolerance     Even low dose     Ciprofloxacin Hives    Other      STEROIDS- GI INTOLERANCE    Penicillins Hives    Prednisone Hives and GI Intolerance    Vancomycin Hives and GI Intolerance       Physical Exam:    There were no vitals taken for this visit  Constitutional:normal, well developed, well nourished, alert, in no distress and non-toxic and no overt pain behavior    Eyes:anicteric  HEENT:grossly intact  Neck:supple, symmetric, trachea midline and no masses   Pulmonary:even and unlabored  Cardiovascular:No edema or pitting edema present  Skin:Normal without rashes or lesions and well hydrated  Psychiatric:Mood and affect appropriate  Neurologic:Cranial Nerves II-XII grossly intact  Musculoskeletal: limitation of right knee ROM; FROM Rt shoulder    Imaging

## 2018-03-28 ENCOUNTER — OFFICE VISIT (OUTPATIENT)
Dept: PAIN MEDICINE | Facility: CLINIC | Age: 73
End: 2018-03-28
Payer: MEDICARE

## 2018-03-28 VITALS
WEIGHT: 150 LBS | BODY MASS INDEX: 30.24 KG/M2 | DIASTOLIC BLOOD PRESSURE: 86 MMHG | HEART RATE: 82 BPM | RESPIRATION RATE: 16 BRPM | HEIGHT: 59 IN | SYSTOLIC BLOOD PRESSURE: 130 MMHG

## 2018-03-28 DIAGNOSIS — M17.11 PRIMARY OSTEOARTHRITIS OF RIGHT KNEE: Primary | ICD-10-CM

## 2018-03-28 DIAGNOSIS — M17.10 ARTHRITIS OF KNEE: ICD-10-CM

## 2018-03-28 PROCEDURE — 99213 OFFICE O/P EST LOW 20 MIN: CPT | Performed by: ANESTHESIOLOGY

## 2018-04-04 ENCOUNTER — OFFICE VISIT (OUTPATIENT)
Dept: OBGYN CLINIC | Facility: MEDICAL CENTER | Age: 73
End: 2018-04-04
Payer: MEDICARE

## 2018-04-04 VITALS
BODY MASS INDEX: 30.22 KG/M2 | RESPIRATION RATE: 18 BRPM | HEART RATE: 85 BPM | DIASTOLIC BLOOD PRESSURE: 80 MMHG | SYSTOLIC BLOOD PRESSURE: 158 MMHG | WEIGHT: 149.6 LBS

## 2018-04-04 DIAGNOSIS — M17.11 PRIMARY OSTEOARTHRITIS OF RIGHT KNEE: Primary | ICD-10-CM

## 2018-04-04 PROCEDURE — 99213 OFFICE O/P EST LOW 20 MIN: CPT | Performed by: ORTHOPAEDIC SURGERY

## 2018-04-04 NOTE — PROGRESS NOTES
72 y o female who presents at this time status post geniculate nerve blocks to her right knee  Patient states that she is 90% better  She does have some pain over the medial insertion site of the hamstrings  No numbness tingling fevers chills    Review of Systems  Review of systems negative unless otherwise specified in HPI    Past Medical History  Past Medical History:   Diagnosis Date    Arthritis     Carpal tunnel syndrome     GERD (gastroesophageal reflux disease)     Hiatal hernia     Hypertension     Insomnia     Sleep deprivation     chronically    Vitamin D deficiency     Vitamin D toxicity 2011    with leaky gut syndrome       Past Surgical History  Past Surgical History:   Procedure Laterality Date    BUNIONECTOMY Bilateral     CARPAL TUNNEL RELEASE Right 2015    ESOPHAGOGASTRODUODENOSCOPY N/A 5/5/2016    Procedure: ESOPHAGOGASTRODUODENOSCOPY (EGD); Surgeon: Cristofer Jauregui MD;  [facility]; Service:     [place] COLONOSCOPY FLX DX W/COLLJ SPEC WHEN PFRMD N/A 6/28/2016    Procedure: COLONOSCOPY;  Surgeon: Cristofer Jauregui MD;  [facility]; Service: Gastroenterology    ROTATOR CUFF REPAIR Bilateral     TOE SURGERY Left     left 5th toe shaved down due to repeated fx    TOTAL KNEE ARTHROPLASTY Left        Current Medications  Current Outpatient Prescriptions on File Prior to Visit   Medication Sig Dispense Refill    albuterol (PROVENTIL HFA,VENTOLIN HFA) 90 mcg/act inhaler Inhale 2 puffs every 6 (six) hours as needed for wheezing 1 Inhaler 0    Bioflavonoid Products (BIOFLEX) TABS Take 3 tablets by mouth daily  FlexNow       Charcoal Activated (CHARCOCAPS) 260 MG CAPS Take 1 tablet by mouth daily   diclofenac sodium (VOLTAREN) 1 % Place on the skin      guaiFENesin-codeine (ROBITUSSIN AC) 100-10 mg/5 mL oral solution take 5 to 10 milliliters by mouth every 4 to 6 hours if needed for cough  0    Lactobacillus (ACIDOPHILUS) 100 MG CAPS Take 1 capsule by mouth daily        lisinopril (ZESTRIL) 5 mg tablet Take 5 mg by mouth daily   zolpidem (AMBIEN) 10 mg tablet Take 10 mg by mouth daily at bedtime as needed for sleep  No current facility-administered medications on file prior to visit  Recent Labs [facility])    0  Lab Value Date/Time   HCT 42 7 10/27/2017 0911   HCT 42 6 09/18/2015 0803   HGB 14 4 10/27/2017 0911   HGB 14 2 09/18/2015 0803   WBC 6 81 10/27/2017 0911   WBC 6 42 09/18/2015 0803   INR 0 99 05/03/2016 2017   ESR 14 05/03/2016 1932   GLUCOSE 86 09/13/2016 0801   GLUCOSE 84 09/18/2015 0803   HGBA1C 5 6 05/03/2016 2224         Physical exam  General:  Alert and oriented x3  HEENT:  Atraumatic  Chest:  No audible wheezing  CV:  S1-S2  Right knee:  Mild tenderness palpation over the insertion of the hamstrings but I can not and this same symptoms with flexion of knee against resistance  The minimal medial lateral joint line tenderness  Neurologically and vascularly intact distally    Imaging      Procedure      Assessment/Plan:   67 y  o female with right knee DJD  Patient would like to refrain from any surgical intervention therefore geniculate nerve blocks were conducted  These were successful  Plan is as follows:    Weightbearing as tolerated    Hamstring stretching exercises as well as warm compress and was sized to affected region    Follow-up p r n  Discussed treatment plan with patient and she is in agreement treatment plan    Thank you

## 2018-05-21 ENCOUNTER — OFFICE VISIT (OUTPATIENT)
Dept: FAMILY MEDICINE CLINIC | Facility: MEDICAL CENTER | Age: 73
End: 2018-05-21
Payer: MEDICARE

## 2018-05-21 VITALS
SYSTOLIC BLOOD PRESSURE: 140 MMHG | RESPIRATION RATE: 18 BRPM | DIASTOLIC BLOOD PRESSURE: 84 MMHG | WEIGHT: 152.2 LBS | HEART RATE: 76 BPM | BODY MASS INDEX: 30.74 KG/M2

## 2018-05-21 DIAGNOSIS — K21.9 GASTROESOPHAGEAL REFLUX DISEASE, ESOPHAGITIS PRESENCE NOT SPECIFIED: Primary | Chronic | ICD-10-CM

## 2018-05-21 DIAGNOSIS — R09.89 LABILE BLOOD PRESSURE: ICD-10-CM

## 2018-05-21 DIAGNOSIS — J30.9 ALLERGIC RHINITIS, UNSPECIFIED SEASONALITY, UNSPECIFIED TRIGGER: ICD-10-CM

## 2018-05-21 PROCEDURE — 99214 OFFICE O/P EST MOD 30 MIN: CPT | Performed by: FAMILY MEDICINE

## 2018-05-21 NOTE — PROGRESS NOTES
Parris Cortes says she has had a persistent throat symptoms  No javi ges in her voice  clears her throat a lot  Thinks she ahs allergies  No chest tightness  Hear No facial  Pain  Left ear hurts  Can be bad in the morning  Otherwise doing well  BP has  been good  Takes at home 136/72  She  has a history of reflux  Controls with diet and bed elevation  No other complaints    O:/84 (BP Location: Left arm, Patient Position: Sitting, Cuff Size: Adult)   Pulse 76   Resp 18   Wt 69 kg (152 lb 3 2 oz)   BMI 30 74 kg/m²   BP by me 138/78  ENT-TMs normal   Pharynx without erythema  Some posterior cobblestoning  Eyes clear  Neck no adenopathy thyromegaly  Chest clear  Cardiac regular rate without murmur    Assessment  1  Elevated blood pressure-has been normal without treatment  She prefers to continue to monitor at home  2   Postnasal drip-probably rhinitis  Suggested saline nasal rinses and a daily Claritin  3  History of gastroesophageal reflux-managed conservatively  May be contributing to the above  Plan  As above  Recheck 6 months   Prefers blood work ordered at that time

## 2018-07-23 ENCOUNTER — OFFICE VISIT (OUTPATIENT)
Dept: FAMILY MEDICINE CLINIC | Facility: MEDICAL CENTER | Age: 73
End: 2018-07-23
Payer: MEDICARE

## 2018-07-23 VITALS
RESPIRATION RATE: 16 BRPM | SYSTOLIC BLOOD PRESSURE: 160 MMHG | HEART RATE: 80 BPM | WEIGHT: 153.2 LBS | DIASTOLIC BLOOD PRESSURE: 80 MMHG | BODY MASS INDEX: 30.94 KG/M2

## 2018-07-23 DIAGNOSIS — H68.102 OBSTRUCTION OF LEFT EUSTACHIAN TUBE: Primary | ICD-10-CM

## 2018-07-23 DIAGNOSIS — L98.9 FACIAL LESION: ICD-10-CM

## 2018-07-23 DIAGNOSIS — E66.3 OVERWEIGHT: ICD-10-CM

## 2018-07-23 PROCEDURE — 99214 OFFICE O/P EST MOD 30 MIN: CPT | Performed by: FAMILY MEDICINE

## 2018-07-23 RX ORDER — IPRATROPIUM BROMIDE 42 UG/1
2 SPRAY, METERED NASAL 4 TIMES DAILY
Qty: 15 ML | Refills: 0 | OUTPATIENT
Start: 2018-07-23 | End: 2019-05-23

## 2018-07-23 RX ORDER — AZITHROMYCIN 250 MG/1
TABLET, FILM COATED ORAL
Qty: 6 TABLET | Refills: 0 | OUTPATIENT
Start: 2018-07-23 | End: 2018-07-27

## 2018-07-23 NOTE — PROGRESS NOTES
See notes in may visit  Bethany Belcher says she  still has a postnasal drip  Clears her throat a lot  Some soreness  No facial pressure  Occ cough  No runny nose  No hoarseness or voice changes  Left ear discomfort  Hearing seems ok  She occasionally hears some wheeziness in her throat  She walks 3-4 times a week  Walks about 2 miles  She tries to watch her diet  She can understand why she can't lose weight  O: /80 (BP Location: Left arm, Patient Position: Sitting, Cuff Size: Adult)   Pulse 80   Resp 16   Wt 69 5 kg (153 lb 3 2 oz)   BMI 30 94 kg/m²   HEENT-sinuses nontender  Pharynx reddened posteriorly  Right TM normal   Left TM is slightly retracted  Neck no adenopathy  Chest is clear  Cardiac regular rate without murmur  Skin below the right eye there is a dark tiny papular lesion but it does appear to have an umbilicated center    Tympanogram-normal on the right  On the left there is widening with reduced amplitude    Assessment  1  Rhinitis  2  Middle ear effusion  3  Facial lesion  4  Overweight-discussed diet and exercise    Plan  Advised daily antihistamine and nasal spray  Will use Atrovent in light of her prednisone allergy  Rx for Augmentin  Derm referral   Phone call follow-up in 2-3 weeks    May need ENT referral

## 2018-07-24 ENCOUNTER — TELEPHONE (OUTPATIENT)
Dept: FAMILY MEDICINE CLINIC | Facility: MEDICAL CENTER | Age: 73
End: 2018-07-24

## 2018-07-24 NOTE — TELEPHONE ENCOUNTER
----- Message from Jose Perez MD sent at 7/23/2018  9:36 PM EDT -----  Need to  get patient to Dermatology  Has a lesion on her right cheek    May need to call [facility]

## 2018-07-24 NOTE — TELEPHONE ENCOUNTER
I spoke with [facility], they were able to squeeze her in on 8/30 at 9:30am  [place] office at [address] [place] [address] in [place], patient aware and agreeable to that appt

## 2018-08-21 DIAGNOSIS — F51.01 PRIMARY INSOMNIA: Primary | ICD-10-CM

## 2018-08-22 RX ORDER — ZOLPIDEM TARTRATE 10 MG/1
10 TABLET ORAL
Qty: 30 TABLET | Refills: 1 | OUTPATIENT
Start: 2018-08-22 | End: 2019-04-09 | Stop reason: SDUPTHER

## 2018-09-11 ENCOUNTER — OFFICE VISIT (OUTPATIENT)
Dept: GASTROENTEROLOGY | Facility: AMBULARY SURGERY CENTER | Age: 73
End: 2018-09-11
Payer: MEDICARE

## 2018-09-11 VITALS
SYSTOLIC BLOOD PRESSURE: 120 MMHG | TEMPERATURE: 99.1 F | RESPIRATION RATE: 18 BRPM | HEIGHT: 59 IN | BODY MASS INDEX: 30.6 KG/M2 | WEIGHT: 151.8 LBS | DIASTOLIC BLOOD PRESSURE: 80 MMHG | HEART RATE: 75 BPM

## 2018-09-11 DIAGNOSIS — R10.11 RUQ PAIN: ICD-10-CM

## 2018-09-11 DIAGNOSIS — K21.9 GASTROESOPHAGEAL REFLUX DISEASE WITHOUT ESOPHAGITIS: Primary | Chronic | ICD-10-CM

## 2018-09-11 DIAGNOSIS — R09.89 GLOBUS SENSATION: ICD-10-CM

## 2018-09-11 PROBLEM — R09.A2 GLOBUS SENSATION: Status: ACTIVE | Noted: 2018-09-11

## 2018-09-11 PROCEDURE — 99213 OFFICE O/P EST LOW 20 MIN: CPT | Performed by: INTERNAL MEDICINE

## 2018-09-11 RX ORDER — RANITIDINE 300 MG/1
300 CAPSULE ORAL EVERY EVENING
Qty: 30 CAPSULE | Refills: 2 | Status: SHIPPED | OUTPATIENT
Start: 2018-09-11 | End: 2018-10-23

## 2018-09-11 RX ORDER — PANTOPRAZOLE SODIUM 40 MG/1
40 TABLET, DELAYED RELEASE ORAL DAILY
Qty: 30 TABLET | Refills: 0 | Status: SHIPPED | OUTPATIENT
Start: 2018-09-11 | End: 2019-02-25

## 2018-09-11 NOTE — LETTER
September 11, 2018     Riki Flores MD  [address], [address] [facility],Third [address]    Patient: Adarsh Jaramillo   YOB: 1945   Date of Visit: 9/11/2018       Dear Dr Zara Koyanagi: Thank you for referring Adarsh Jaramillo to me for evaluation  Below are my notes for this consultation  If you have questions, please do not hesitate to call me  I look forward to following your patient along with you  Sincerely,        Selvin Cabrera MD        CC: No Recipients  Selvin Cabrera MD  9/11/2018  3:37 PM  Sign at close encounter  [address] [facility]  Adarsh Jaramillo 67 y o  female MRN: [id number]            Assessment & Plan:    Pleasant 34-year-old female with about 4 months of retrosternal discomfort, globus sensation, persisting clearing of her throat usually occurring at night  Also postprandial epigastric and right upper quadrant abdominal pain  1   Retrosternal chest discomfort and globus sensation:  Symptoms are consistent with reflux given her history  -we will start her empirically on PPI therapy in the morning, H2 blockers in the evening  -we will proceed with an upper endoscopy to re-evaluate given that it has been several years since her last endoscopy as well as given her postprandial epigastric abdominal pain    2  Postprandial epigastric right upper quadrant abdominal pain:  -proceed with an upper endoscopy to evaluate for peptic ulcer disease  -we will also check a right upper quadrant ultrasound    Would recommend possible cardiac evaluation if symptoms persist or do not improve       _____________________________________________________________        CC: Atypical chest discomfort, globus, reflux, epigastric and right upper quadrant abdominal pain    HPI:  Adarsh Jaramillo is a 67 y  o female who is here for nocturnal reflux and chest discomfort  This is a pleasant 34-year-old female, she has a history of reflux esophagitis in the past, has not been on PPI therapy    She reports that over the past 4 months she has had increasing retrosternal discomfort usually when limit lying down at night with persistent cough, clearing her throat, globus sensation  She has been tolerating her food, no nausea vomiting  Her weight has been stable, bowel movements have been regular, 2 to 3 formed stools daily which occur postprandial     She also notes that recently she has had postprandial epigastric right upper quadrant discomfort, not associated with any nausea or vomiting  Denies any melena or rectal bleeding  She has tried some anti-gas measures without any improvement in her symptoms  She had a colonoscopy and upper endoscopy 2016  She reports some increasing shortness of breath with activity as well  ROS:  The remainder of the ROS was negative except for the pertinent positives mentioned in HPI  Allergies: Ancef [cefazolin]; Aspirin; Ciprofloxacin; Other; Penicillins; Prednisone; and Vancomycin    Medications:   Current Outpatient Prescriptions:     Bioflavonoid Products (BIOFLEX) TABS, Take 3 tablets by mouth daily  FlexNow , Disp: , Rfl:     Charcoal Activated (CHARCOCAPS) 260 MG CAPS, Take 1 tablet by mouth daily  , Disp: , Rfl:     diclofenac sodium (VOLTAREN) 1 %, Place on the skin, Disp: , Rfl:     ipratropium (ATROVENT) 0 06 % nasal spray, 2 sprays into each nostril 4 (four) times a day, Disp: 15 mL, Rfl: 0    Lactobacillus (ACIDOPHILUS) 100 MG CAPS, Take 1 capsule by mouth daily  , Disp: , Rfl:     zolpidem (AMBIEN) 10 mg tablet, Take 1 tablet (10 mg total) by mouth daily at bedtime as needed for sleep, Disp: 30 tablet, Rfl: 1    albuterol (PROVENTIL HFA,VENTOLIN HFA) 90 mcg/act inhaler, Inhale 2 puffs every 6 (six) hours as needed for wheezing (Patient not taking: Reported on 9/11/2018 ), Disp: 1 Inhaler, Rfl: 0    guaiFENesin-codeine (ROBITUSSIN AC) 100-10 mg/5 mL oral solution, take 5 to 10 milliliters by mouth every 4 to 6 hours if needed for cough, Disp: , Rfl: 0    lisinopril (ZESTRIL) 5 mg tablet, Take 5 mg by mouth daily  , Disp: , Rfl:     pantoprazole (PROTONIX) 40 mg tablet, Take 1 tablet (40 mg total) by mouth daily, Disp: 30 tablet, Rfl: 0    ranitidine (ZANTAC) 300 MG capsule, Take 1 capsule (300 mg total) by mouth every evening, Disp: 30 capsule, Rfl: 2    Past Medical History:   Diagnosis Date    Arthritis     Carpal tunnel syndrome     GERD (gastroesophageal reflux disease)     Hiatal hernia     Hypertension     Insomnia     Renal calculi     Sleep deprivation     chronically    Vitamin D deficiency     Vitamin D toxicity 2011    with leaky gut syndrome       Past Surgical History:   Procedure Laterality Date    BUNIONECTOMY Bilateral     CARPAL TUNNEL RELEASE Right 2015    ESOPHAGOGASTRODUODENOSCOPY N/A 5/5/2016    Procedure: ESOPHAGOGASTRODUODENOSCOPY (EGD); Surgeon: Chinyere Curry MD;  [facility]; Service:     [place] COLONOSCOPY FLX DX W/COLLJ SPEC WHEN PFRMD N/A 6/28/2016    Procedure: COLONOSCOPY;  Surgeon: Chinyere Curry MD;  [facility]; Service: Gastroenterology    ROTATOR CUFF REPAIR Bilateral     TOE SURGERY Left     left 5th toe shaved down due to repeated fx    TOTAL KNEE ARTHROPLASTY Left        Family History   Problem Relation Age of Onset    Heart failure Mother     Other Mother         respiratory failure    Heart failure Father         respiratory failure        reports that she has quit smoking  She has never used smokeless tobacco  She reports that she does not drink alcohol or use drugs        Physical Exam:    /80 (BP Location: Right arm, Patient Position: Sitting, Cuff Size: Standard)   Pulse 75   Temp 99 1 °F (37 3 °C) (Tympanic)   Resp 18   Ht 4' 11" (1 499 m)   Wt 68 9 kg (151 lb 12 8 oz)   BMI 30 66 kg/m²      Gen: wn/wd, NAD  HEENT: anicteric, MMM, no cervical LAD  CVS: RRR, no m/r/g  CHEST: CTA b/l  ABD: +BS, soft, reproducible epigastric tenderness, no rebound or guarding, no hepatosplenomegaly  EXT:  Trace edema  NEURO: aaox3  SKIN: NO rashes

## 2018-09-11 NOTE — PROGRESS NOTES
[facility]) [facility]  Eligha Cheadle 67 y o  female MRN: [id number]            Assessment & Plan:    Pleasant 69-year-old female with about 4 months of retrosternal discomfort, globus sensation, persisting clearing of her throat usually occurring at night  Also postprandial epigastric and right upper quadrant abdominal pain  1   Retrosternal chest discomfort and globus sensation:  Symptoms are consistent with reflux given her history  -we will start her empirically on PPI therapy in the morning, H2 blockers in the evening  -we will proceed with an upper endoscopy to re-evaluate given that it has been several years since her last endoscopy as well as given her postprandial epigastric abdominal pain    2  Postprandial epigastric right upper quadrant abdominal pain:  -proceed with an upper endoscopy to evaluate for peptic ulcer disease  -we will also check a right upper quadrant ultrasound    Would recommend possible cardiac evaluation if symptoms persist or do not improve       _____________________________________________________________        CC: Atypical chest discomfort, globus, reflux, epigastric and right upper quadrant abdominal pain    HPI:  Eligha Cheadle is a 67 y  o female who is here for nocturnal reflux and chest discomfort  This is a pleasant 69-year-old female, she has a history of reflux esophagitis in the past, has not been on PPI therapy  She reports that over the past 4 months she has had increasing retrosternal discomfort usually when limit lying down at night with persistent cough, clearing her throat, globus sensation  She has been tolerating her food, no nausea vomiting  Her weight has been stable, bowel movements have been regular, 2 to 3 formed stools daily which occur postprandial     She also notes that recently she has had postprandial epigastric right upper quadrant discomfort, not associated with any nausea or vomiting  Denies any melena or rectal bleeding    She has tried some anti-gas measures without any improvement in her symptoms  She had a colonoscopy and upper endoscopy 2016  She reports some increasing shortness of breath with activity as well  ROS:  The remainder of the ROS was negative except for the pertinent positives mentioned in HPI  Allergies: Ancef [cefazolin]; Aspirin; Ciprofloxacin; Other; Penicillins; Prednisone; and Vancomycin    Medications:   Current Outpatient Prescriptions:     Bioflavonoid Products (BIOFLEX) TABS, Take 3 tablets by mouth daily  FlexNow , Disp: , Rfl:     Charcoal Activated (CHARCOCAPS) 260 MG CAPS, Take 1 tablet by mouth daily  , Disp: , Rfl:     diclofenac sodium (VOLTAREN) 1 %, Place on the skin, Disp: , Rfl:     ipratropium (ATROVENT) 0 06 % nasal spray, 2 sprays into each nostril 4 (four) times a day, Disp: 15 mL, Rfl: 0    Lactobacillus (ACIDOPHILUS) 100 MG CAPS, Take 1 capsule by mouth daily  , Disp: , Rfl:     zolpidem (AMBIEN) 10 mg tablet, Take 1 tablet (10 mg total) by mouth daily at bedtime as needed for sleep, Disp: 30 tablet, Rfl: 1    albuterol (PROVENTIL HFA,VENTOLIN HFA) 90 mcg/act inhaler, Inhale 2 puffs every 6 (six) hours as needed for wheezing (Patient not taking: Reported on 9/11/2018 ), Disp: 1 Inhaler, Rfl: 0    guaiFENesin-codeine (ROBITUSSIN AC) 100-10 mg/5 mL oral solution, take 5 to 10 milliliters by mouth every 4 to 6 hours if needed for cough, Disp: , Rfl: 0    lisinopril (ZESTRIL) 5 mg tablet, Take 5 mg by mouth daily  , Disp: , Rfl:     pantoprazole (PROTONIX) 40 mg tablet, Take 1 tablet (40 mg total) by mouth daily, Disp: 30 tablet, Rfl: 0    ranitidine (ZANTAC) 300 MG capsule, Take 1 capsule (300 mg total) by mouth every evening, Disp: 30 capsule, Rfl: 2    Past Medical History:   Diagnosis Date    Arthritis     Carpal tunnel syndrome     GERD (gastroesophageal reflux disease)     Hiatal hernia     Hypertension     Insomnia     Renal calculi     Sleep deprivation     chronically  Vitamin D deficiency     Vitamin D toxicity 2011    with leaky gut syndrome       Past Surgical History:   Procedure Laterality Date    BUNIONECTOMY Bilateral     CARPAL TUNNEL RELEASE Right 2015    ESOPHAGOGASTRODUODENOSCOPY N/A 5/5/2016    Procedure: ESOPHAGOGASTRODUODENOSCOPY (EGD); Surgeon: Stanton Watts MD;  [facility]; Service:     NM COLONOSCOPY FLX DX W/COLLJ SPEC WHEN PFRMD N/A 6/28/2016    Procedure: COLONOSCOPY;  Surgeon: Stanton Watts MD;  [facility]; Service: Gastroenterology    ROTATOR CUFF REPAIR Bilateral     TOE SURGERY Left     left 5th toe shaved down due to repeated fx    TOTAL KNEE ARTHROPLASTY Left        Family History   Problem Relation Age of Onset    Heart failure Mother     Other Mother         respiratory failure    Heart failure Father         respiratory failure        reports that she has quit smoking  She has never used smokeless tobacco  She reports that she does not drink alcohol or use drugs        Physical Exam:    /80 (BP Location: Right arm, Patient Position: Sitting, Cuff Size: Standard)   Pulse 75   Temp 99 1 °F (37 3 °C) (Tympanic)   Resp 18   Ht 4' 11" (1 499 m)   Wt 68 9 kg (151 lb 12 8 oz)   BMI 30 66 kg/m²     Gen: wn/wd, NAD  HEENT: anicteric, MMM, no cervical LAD  CVS: RRR, no m/r/g  CHEST: CTA b/l  ABD: +BS, soft, reproducible epigastric tenderness, no rebound or guarding, no hepatosplenomegaly  EXT:  Trace edema  NEURO: aaox3  SKIN: NO rashes

## 2018-09-17 ENCOUNTER — OFFICE VISIT (OUTPATIENT)
Dept: FAMILY MEDICINE CLINIC | Facility: MEDICAL CENTER | Age: 73
End: 2018-09-17
Payer: MEDICARE

## 2018-09-17 VITALS
WEIGHT: 151.6 LBS | SYSTOLIC BLOOD PRESSURE: 142 MMHG | DIASTOLIC BLOOD PRESSURE: 84 MMHG | HEART RATE: 86 BPM | BODY MASS INDEX: 30.62 KG/M2 | OXYGEN SATURATION: 99 % | RESPIRATION RATE: 16 BRPM

## 2018-09-17 DIAGNOSIS — K21.9 GASTROESOPHAGEAL REFLUX DISEASE, ESOPHAGITIS PRESENCE NOT SPECIFIED: ICD-10-CM

## 2018-09-17 DIAGNOSIS — R07.9 CHEST PAIN, UNSPECIFIED TYPE: Primary | ICD-10-CM

## 2018-09-17 DIAGNOSIS — I10 ESSENTIAL HYPERTENSION: ICD-10-CM

## 2018-09-17 PROCEDURE — 93000 ELECTROCARDIOGRAM COMPLETE: CPT | Performed by: FAMILY MEDICINE

## 2018-09-17 PROCEDURE — 99214 OFFICE O/P EST MOD 30 MIN: CPT | Performed by: FAMILY MEDICINE

## 2018-09-17 NOTE — PROGRESS NOTES
Leah Schofield is here for evaluation of chest pain as requested by her gastroenterologist Dr Sree Schofield says she "feels like an elephant sitting on my chest"  Occurs at rest but also occurs up and down stairs  Pain go go to her back but not neck, arms  Feels better if she lays down  Symptoms don't wake from sleep  Took Motrin or Advil which relieved the pain  Associated lightheaded  Not sshortness  of breath  Started about 4 months ago  No pain with walking 1 mile and a half  Can get wheezing sound at night  In her neck and chest   Feels something stuck in her throat  Clearing throat  She saw GI who feels that her symptoms are likely secondary to esophageal reflux  She is scheduled for an EGD in a few weeks  He started her on a PPI and H2 blocker  This was recently  She is not sure if it is helping it  She has no known history of heart disease  She never had a cardiac workup  O: /84 (BP Location: Left arm, Patient Position: Sitting, Cuff Size: Adult)   Pulse 86   Resp 16   Wt 68 8 kg (151 lb 9 6 oz)   SpO2 99%   BMI 30 62 kg/m²   No distress  Neck no adenopathy thyromegaly bruits  Chest clear with good breath sounds  Chest wall does show tenderness over the costochondral junction bilaterally especially on the left  Cardiac regular rate without murmur  Abdomen nontender    EKG no acute changes; appears normal    Assessment  Chest pain-likely due to esophageal reflux and/or chest wall pain  However in light of exertional nature do agree she should have a cardiac workup  Patient prefers referral to Cardiology  Plan  Refer Cardiology as above

## 2018-09-18 ENCOUNTER — TELEPHONE (OUTPATIENT)
Dept: FAMILY MEDICINE CLINIC | Facility: MEDICAL CENTER | Age: 73
End: 2018-09-18

## 2018-09-18 NOTE — TELEPHONE ENCOUNTER
----- Message from Tomasz Abarca MD sent at 9/17/2018 10:16 PM EDT -----  Need to refer patient to Cardiology for chest pain  I printed out a referral for Belle Saez but it can be any of the cardiologists at the [facility] office

## 2018-09-19 ENCOUNTER — HOSPITAL ENCOUNTER (OUTPATIENT)
Dept: RADIOLOGY | Facility: MEDICAL CENTER | Age: 73
Discharge: HOME | End: 2018-09-19
Payer: MEDICARE

## 2018-09-19 ENCOUNTER — OFFICE VISIT (OUTPATIENT)
Dept: OBGYN CLINIC | Facility: MEDICAL CENTER | Age: 73
End: 2018-09-19
Payer: MEDICARE

## 2018-09-19 VITALS
BODY MASS INDEX: 30.18 KG/M2 | HEART RATE: 80 BPM | WEIGHT: 149.4 LBS | SYSTOLIC BLOOD PRESSURE: 155 MMHG | DIASTOLIC BLOOD PRESSURE: 82 MMHG

## 2018-09-19 DIAGNOSIS — R10.11 RUQ PAIN: ICD-10-CM

## 2018-09-19 DIAGNOSIS — M17.11 PRIMARY OSTEOARTHRITIS OF RIGHT KNEE: Primary | ICD-10-CM

## 2018-09-19 PROCEDURE — 99213 OFFICE O/P EST LOW 20 MIN: CPT | Performed by: ORTHOPAEDIC SURGERY

## 2018-09-19 PROCEDURE — 76705 ECHO EXAM OF ABDOMEN: CPT

## 2018-09-19 NOTE — PROGRESS NOTES
Assessment/Plan:   Assessment/Plan   Problem List Items Addressed This Visit     Primary osteoarthritis of right knee - Primary      Right knee osteoarthritis with improvements after the geniculate nerve blocks  Right hamstring strain  Right knee instability    · WB as tolerated   · Advised patient to wear compression stockings for the swelling  · Provided patient for hamstring and quadriceps strengthening exercises  · Follow up PRN  · Discussed treatment plan with patient she is in agreement treatment plan  Thank you    Subjective:   Patient ID: Barth Apley is a 67 y o  female  HPI    Patient in today for follow up right knee  Patient states she is having swelling in the right knee and right ankle  She is also having right medial knee constant ache  She also notes she had 2 episodes of right knee buckling  Pain is worse when going up steps  Patient is taking Tylenol prn for pain  Denies any numbness or tingling  The following portions of the patient's history were reviewed and updated as appropriate: allergies and current medications  Review of Systems   Constitutional: Negative for chills, fever and unexpected weight change  HENT: Negative for hearing loss, nosebleeds and postnasal drip  Eyes: Negative for pain, redness and visual disturbance  Respiratory: Negative for cough, shortness of breath and wheezing  Cardiovascular: Negative for chest pain, palpitations and leg swelling  Gastrointestinal: Negative for abdominal pain, nausea and vomiting  Endocrine: Negative for polydipsia and polyuria  Genitourinary: Negative for dysuria  Musculoskeletal: Positive for arthralgias (Right knee )  Negative for joint swelling and myalgias  Skin: Negative for rash and wound  Neurological: Negative for dizziness, numbness and headaches  Psychiatric/Behavioral: Negative for decreased concentration and suicidal ideas  The patient is not nervous/anxious          Objective:  Past Medical History: Diagnosis Date    Arthritis     Carpal tunnel syndrome     GERD (gastroesophageal reflux disease)     Hiatal hernia     Hypertension     Insomnia     Renal calculi     Sleep deprivation     chronically    Vitamin D deficiency     Vitamin D toxicity 2011    with leaky gut syndrome     Past Surgical History:   Procedure Laterality Date    BUNIONECTOMY Bilateral     CARPAL TUNNEL RELEASE Right 2015    ESOPHAGOGASTRODUODENOSCOPY N/A 5/5/2016    Procedure: ESOPHAGOGASTRODUODENOSCOPY (EGD); Surgeon: Yoni Alarcon MD;  [facility]; Service:     [place] COLONOSCOPY FLX DX W/COLLJ SPEC WHEN PFRMD N/A 6/28/2016    Procedure: COLONOSCOPY;  Surgeon: Yoni Alarcon MD;  [facility]; Service: Gastroenterology    ROTATOR CUFF REPAIR Bilateral     TOE SURGERY Left     left 5th toe shaved down due to repeated fx    TOTAL KNEE ARTHROPLASTY Left      Family History   Problem Relation Age of Onset    Heart failure Mother     Other Mother         respiratory failure    Heart failure Father         respiratory failure     Social History     Social History    Marital status: Single     Spouse name: N/A    Number of children: N/A    Years of education: N/A     Social History Main Topics    Smoking status: Former Smoker    Smokeless tobacco: Never Used    Alcohol use No    Drug use: No    Sexual activity: Not Asked     Other Topics Concern    None     Social History Narrative    None     Current Outpatient Prescriptions:     albuterol (PROVENTIL HFA,VENTOLIN HFA) 90 mcg/act inhaler, Inhale 2 puffs every 6 (six) hours as needed for wheezing, Disp: 1 Inhaler, Rfl: 0    Bioflavonoid Products (BIOFLEX) TABS, Take 3 tablets by mouth daily  FlexNow , Disp: , Rfl:     Charcoal Activated (CHARCOCAPS) 260 MG CAPS, Take 1 tablet by mouth daily  , Disp: , Rfl:     diclofenac sodium (VOLTAREN) 1 %, Place on the skin, Disp: , Rfl:     ipratropium (ATROVENT) 0 06 % nasal spray, 2 sprays into each nostril 4 (four) times a day, Disp: 15 mL, Rfl: 0    Lactobacillus (ACIDOPHILUS) 100 MG CAPS, Take 1 capsule by mouth daily  , Disp: , Rfl:     lisinopril (ZESTRIL) 5 mg tablet, Take 5 mg by mouth daily  , Disp: , Rfl:     pantoprazole (PROTONIX) 40 mg tablet, Take 1 tablet (40 mg total) by mouth daily, Disp: 30 tablet, Rfl: 0    ranitidine (ZANTAC) 300 MG capsule, Take 1 capsule (300 mg total) by mouth every evening, Disp: 30 capsule, Rfl: 2    zolpidem (AMBIEN) 10 mg tablet, Take 1 tablet (10 mg total) by mouth daily at bedtime as needed for sleep, Disp: 30 tablet, Rfl: 1   Allergies   Allergen Reactions    Ancef [Cefazolin] Hives and GI Intolerance    Aspirin GI Intolerance     Even low dose     Ciprofloxacin Hives    Other      STEROIDS- GI INTOLERANCE    Penicillins Hives    Prednisone Hives and GI Intolerance    Vancomycin Hives and GI Intolerance     Ortho Exam   Right knee:  · No lacerations,no abrasions  · Tenderness to palpation medial knee recreate with passive extension  · Decrease tenderness to palpation over medial lateral joint line  · Quadriceps 4+/5 strength  · NVID    Physical Exam   Constitutional: She is oriented to person, place, and time  She appears well-developed and well-nourished  HENT:   Right Ear: External ear normal    Left Ear: External ear normal    Eyes: Conjunctivae and EOM are normal  Pupils are equal, round, and reactive to light  Neck: Normal range of motion  Cardiovascular: Normal rate and regular rhythm  Pulmonary/Chest: Effort normal    Neurological: She is alert and oriented to person, place, and time  Skin: Skin is warm and dry  Psychiatric: She has a normal mood and affect   Her behavior is normal  Thought content normal            Scribe Attestation    I,:   Jose Joshi am acting as a scribe while in the presence of the attending physician :        I,:   Luiza Fatiam MD personally performed the services described in this documentation    as scribed in my presence :

## 2018-09-20 ENCOUNTER — OFFICE VISIT (OUTPATIENT)
Dept: OBGYN CLINIC | Facility: MEDICAL CENTER | Age: 73
End: 2018-09-20
Payer: MEDICARE

## 2018-09-20 ENCOUNTER — APPOINTMENT (OUTPATIENT)
Dept: RADIOLOGY | Facility: MEDICAL CENTER | Age: 73
End: 2018-09-20
Payer: MEDICARE

## 2018-09-20 VITALS — HEIGHT: 59 IN | BODY MASS INDEX: 30.04 KG/M2 | WEIGHT: 149 LBS

## 2018-09-20 DIAGNOSIS — M25.512 LEFT SHOULDER PAIN, UNSPECIFIED CHRONICITY: Primary | ICD-10-CM

## 2018-09-20 DIAGNOSIS — M25.512 LEFT SHOULDER PAIN, UNSPECIFIED CHRONICITY: ICD-10-CM

## 2018-09-20 DIAGNOSIS — M25.511 RIGHT SHOULDER PAIN, UNSPECIFIED CHRONICITY: ICD-10-CM

## 2018-09-20 DIAGNOSIS — M67.912 TENDINOPATHY OF LEFT ROTATOR CUFF: ICD-10-CM

## 2018-09-20 DIAGNOSIS — M67.911 TENDINOPATHY OF RIGHT ROTATOR CUFF: ICD-10-CM

## 2018-09-20 PROCEDURE — 73030 X-RAY EXAM OF SHOULDER: CPT

## 2018-09-20 PROCEDURE — 99214 OFFICE O/P EST MOD 30 MIN: CPT | Performed by: ORTHOPAEDIC SURGERY

## 2018-09-20 NOTE — PROGRESS NOTES
Assessment/Plan:  1  Left shoulder pain, unspecified chronicity  XR shoulder 2+ vw left    Ambulatory referral to Physical Therapy    diclofenac sodium (VOLTAREN) 1 %    Ambulatory referral to Pain Management   2  Right shoulder pain, unspecified chronicity  Ambulatory referral to Physical Therapy    diclofenac sodium (VOLTAREN) 1 %    Ambulatory referral to Pain Management     Patient Active Problem List   Diagnosis    Chest pressure    GERD (gastroesophageal reflux disease)    Sleep deprivation    Accelerated hypertension    Arthritis of knee    Left knee pain    Lower back pain    Primary osteoarthritis of right knee    Globus sensation    RUQ pain    Gastroesophageal reflux disease without esophagitis       67year old female with bilateral shoulder pain secondary to rotator cuff tendiopathy  Advised avoiding over activity particularly at or above shoulder height  As pt reports she cannot take oral corticosteroids, NSAIDs, or have cortisone injections  Unable to take oral NSAIDs due to risk of GI ulcers, only alternative for anti-inflammatory pain relief is topical NSAID, so voltaren will be prescribed instead  She will also be referred to pain management for consideration of peripheral nerve ablation for the shoulders  F/u in 4-6 weeks, if still having significant pain will consider MRI and based on results, due to her history of past rotator cuff repair in bilateral shoulders the only surgical treatment option may be total shoulder arthroplasty  The patient was seen and examined by Dr Karly Blanco  The assessment and plan were formulated by Dr Karly Blanco and I assisted in carrying it out  Subjective:   Patient ID: Cameron Richardson is a 67 y o  female   HPI      Patient presents to office  Complaining of left shoulder pain and weakness    She reports  As she did have rotator cuff  Repair with Dr Karly Blanco in the bilateral shoulders in the past    She has been having pain and weakness in the shoulder for over 10 months at least, the past 2 months has been worsening  She reports sharp pain that is constant, 410 at best, 10 in 10 at worst, in arm and posterior shoulder primarily  Denies any radiation of the pain down past the elbow or radiation of pain from the neck  Pain is worse with reaching behind the back as well as use of the arm in flexion and overhead activity  It is slightly better with rest   She denies any numbness or tingling in the shoulder or arm, some in the left hand that is chronic and not associated with this  Denies any injuries that were associated with onset or worsening of this current pain  She has noted some ecchymosis in the left arm after vacuuming and heavy use of the arm  Reports that she is unable to take  NSAIDs and is also unable to have cortisone injections  The following portions of the patient's history were reviewed and updated as appropriate: allergies, current medications, past family history, past social history, past surgical history and problem list     Social History     Social History    Marital status: Single     Spouse name: N/A    Number of children: N/A    Years of education: N/A     Occupational History    Not on file       Social History Main Topics    Smoking status: Former Smoker    Smokeless tobacco: Never Used    Alcohol use No    Drug use: No    Sexual activity: Not on file     Other Topics Concern    Not on file     Social History Narrative    No narrative on file     Past Medical History:   Diagnosis Date    Arthritis     Carpal tunnel syndrome     GERD (gastroesophageal reflux disease)     Hiatal hernia     Hypertension     Insomnia     Renal calculi     Sleep deprivation     chronically    Vitamin D deficiency     Vitamin D toxicity 2011    with leaky gut syndrome     Past Surgical History:   Procedure Laterality Date    BUNIONECTOMY Bilateral     CARPAL TUNNEL RELEASE Right 2015    ESOPHAGOGASTRODUODENOSCOPY N/A 5/5/2016    Procedure: ESOPHAGOGASTRODUODENOSCOPY (EGD); Surgeon: Rakesh Cook MD;  [facility]; Service:     [place] COLONOSCOPY FLX DX W/COLLJ SPEC WHEN PFRMD N/A 6/28/2016    Procedure: COLONOSCOPY;  Surgeon: Rakesh Cook MD;  [facility]; Service: Gastroenterology    ROTATOR CUFF REPAIR Bilateral     TOE SURGERY Left     left 5th toe shaved down due to repeated fx    TOTAL KNEE ARTHROPLASTY Left      Allergies   Allergen Reactions    Ancef [Cefazolin] Hives and GI Intolerance    Aspirin GI Intolerance     Even low dose     Ciprofloxacin Hives    Other      STEROIDS- GI INTOLERANCE    Penicillins Hives    Prednisone Hives and GI Intolerance    Vancomycin Hives and GI Intolerance     Current Outpatient Prescriptions on File Prior to Visit   Medication Sig Dispense Refill    albuterol (PROVENTIL HFA,VENTOLIN HFA) 90 mcg/act inhaler Inhale 2 puffs every 6 (six) hours as needed for wheezing 1 Inhaler 0    Bioflavonoid Products (BIOFLEX) TABS Take 3 tablets by mouth daily  FlexNow       Charcoal Activated (CHARCOCAPS) 260 MG CAPS Take 1 tablet by mouth daily   diclofenac sodium (VOLTAREN) 1 % Place on the skin      ipratropium (ATROVENT) 0 06 % nasal spray 2 sprays into each nostril 4 (four) times a day 15 mL 0    Lactobacillus (ACIDOPHILUS) 100 MG CAPS Take 1 capsule by mouth daily   lisinopril (ZESTRIL) 5 mg tablet Take 5 mg by mouth daily   pantoprazole (PROTONIX) 40 mg tablet Take 1 tablet (40 mg total) by mouth daily 30 tablet 0    ranitidine (ZANTAC) 300 MG capsule Take 1 capsule (300 mg total) by mouth every evening 30 capsule 2    zolpidem (AMBIEN) 10 mg tablet Take 1 tablet (10 mg total) by mouth daily at bedtime as needed for sleep 30 tablet 1     No current facility-administered medications on file prior to visit  Review of Systems   Constitutional: Negative for chills, fever and unexpected weight change  HENT: Negative for hearing loss, nosebleeds and sore throat      Eyes: Negative for pain, redness and visual disturbance  Respiratory: Positive for cough  Negative for shortness of breath and wheezing  Cardiovascular: Negative for chest pain, palpitations and leg swelling  Gastrointestinal: Positive for abdominal pain (none currently)  Negative for nausea and vomiting  Endocrine: Negative for polydipsia and polyuria  Genitourinary: Negative for dysuria and hematuria  Musculoskeletal:         As noted in HPI   Skin: Negative for rash and wound  Neurological: Positive for numbness  Negative for dizziness and headaches  Psychiatric/Behavioral: Negative for decreased concentration, dysphoric mood and suicidal ideas  The patient is not nervous/anxious  Objective: There were no vitals filed for this visit  Physical Exam   Constitutional: She is oriented to person, place, and time  She appears well-developed and well-nourished  HENT:   Head: Normocephalic and atraumatic  Eyes: Conjunctivae are normal  No scleral icterus  Neck: Neck supple  Pulmonary/Chest: Effort normal  No stridor  No respiratory distress  Abdominal: She exhibits no distension  Neurological: She is alert and oriented to person, place, and time  Skin: Skin is warm and dry  No erythema  Psychiatric: She has a normal mood and affect  Her behavior is normal        Right Shoulder Exam     Muscle Strength   Abduction: 3/5   Internal Rotation: 4/5   External Rotation: 4/5   Supraspinatus: 3/5   Subscapularis: 4/5   Biceps: 4/5     Tests   Drop Arm: positive    Comments:  Positive empty can      Left Shoulder Exam     Tenderness   The patient is experiencing tenderness in the acromioclavicular joint and biceps tendon      Range of Motion   Active Abduction:  100 abnormal   Passive Abduction:  120 (limited due to pain) abnormal   Extension: normal   Forward Flexion:  160 abnormal   External Rotation:  80 abnormal   Internal Rotation 90 degrees:  70 abnormal     Muscle Strength Abduction: 3/5   Internal Rotation: 4/5   External Rotation: 4/5   Supraspinatus: 4/5   Subscapularis: 3/5   Biceps: 4/5     Tests   Cross Arm: positive  Drop Arm: positive  Hawkin's test: positive  Impingement: positive  Sulcus: absent    Other   Erythema: absent  Scars: absent  Sensation: normal  Pulse: present     Comments:    Positive speed's test   Positive empty can  positive [place]'s test with pain worsen supination and pronation    No ecchymosis the around the proximal arm or shoulder            I have personally reviewed pertinent films in PACS and my interpretation is  x-ray of left shoulder demonstrates mild glenohumeral arthritis, anchor consistent with history of rotator cuff repair is present, also noted is distal segment clavicle absent consistent with history of claviculectomy  Procedures  No Procedures performed today    Portions of the record may have been created with voice recognition software   Occasional wrong word or "sound a like" substitutions may have occurred due to the inherent limitations of voice recognition software   Read the chart carefully and recognize, using context, where substitutions have occurred

## 2018-10-01 ENCOUNTER — EVALUATION (OUTPATIENT)
Dept: PHYSICAL THERAPY | Facility: MEDICAL CENTER | Age: 73
End: 2018-10-01
Payer: MEDICARE

## 2018-10-01 DIAGNOSIS — M25.511 RIGHT SHOULDER PAIN, UNSPECIFIED CHRONICITY: ICD-10-CM

## 2018-10-01 DIAGNOSIS — M25.512 LEFT SHOULDER PAIN, UNSPECIFIED CHRONICITY: ICD-10-CM

## 2018-10-01 PROCEDURE — 97110 THERAPEUTIC EXERCISES: CPT | Performed by: PHYSICAL THERAPIST

## 2018-10-01 PROCEDURE — G8990 OTHER PT/OT CURRENT STATUS: HCPCS | Performed by: PHYSICAL THERAPIST

## 2018-10-01 PROCEDURE — 97161 PT EVAL LOW COMPLEX 20 MIN: CPT | Performed by: PHYSICAL THERAPIST

## 2018-10-01 PROCEDURE — G8991 OTHER PT/OT GOAL STATUS: HCPCS | Performed by: PHYSICAL THERAPIST

## 2018-10-01 NOTE — PROGRESS NOTES
PT Evaluation     Today's date: 10/1/2018  Patient name: Michelle Mohan  : 1945  MRN: [id number]  Referring provider: Kelli Mosley  Dx:   Encounter Diagnosis     ICD-10-CM    1  Left shoulder pain, unspecified chronicity M25 512 Ambulatory referral to Physical Therapy   2  Right shoulder pain, unspecified chronicity M25 511 Ambulatory referral to Physical Therapy                  Assessment  Impairments: abnormal or restricted ROM, abnormal movement, activity intolerance, impaired physical strength, lacks appropriate home exercise program and pain with function    Assessment details: Michelle Mohan is a 75 y o  female who presents with Left shoulder pain, unspecified chronicity  Right shoulder pain, unspecified chronicity  Patient presents alert and oriented with the above impairments  Jason Overcast will benefit from PT to addres deficits in order to maximize and return to prior level of function  No further referral appears necessary at this time based upon examination results  Prognosis is good given HEP compliance  Please contact me if you have any questions or recommendations  Understanding of Dx/Px/POC: good   Prognosis: good    Goals  Short Term Goals:   1  Pain decreased 2 ratings in 4 weeks  2  ROM increased 10* in 4 weeks  3  Strength increased 1/2 grade in 4 weeks    Long Term Goals:   1  ADLS/IADLS in related activities improved to maximal level in 8 weeks  2  Reaching is improved to maximal level in 8 weeks  3   Recreational activities are improved to maximal level in 8 weeks  4   [place] with HEP in 8 weeks      Plan  Patient would benefit from: skilled physical therapy  Planned modality interventions: cryotherapy  Planned therapy interventions: flexibility, therapeutic exercise, stretching, strengthening, patient education, postural training, neuromuscular re-education, manual therapy, home exercise program and functional ROM exercises  Frequency: 2x week  Duration in weeks: 8  Treatment plan discussed with: patient        Subjective Evaluation    History of Present Illness  Mechanism of injury: Pt reports history of bilateral shoulder surgery several years ago  She reports onset of B upper arm pain 8-9 months ago, L worse than R  She made an appt w/ Dr Jozef Wood and was sent for xrays which revealed mild arthritis  She was referred to pain management as well as PT  She presents today w/ reports of constant pain in bilateral shoulders  Pain is exacerbated w/ lifting, vacuuming, cleaning, reaching overhead, reaching behind body  She does c/o sleep disturbance  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 [employer]  Pain  At best pain ratin  At worst pain ratin  Location: B shoulders    Patient Goals  Patient goals for therapy: decreased pain, increased motion, increased strength and independence with ADLs/IADLs          Objective     Static Posture     Comments  Seated slouched posture; intermittent UT elevation noted w/ movement      Active Range of Motion   Left Shoulder   Flexion: 135 degrees   Abduction: 115 degrees     Right Shoulder   Flexion: 145 degrees   Abduction: 120 degrees     Passive Range of Motion   Left Shoulder   Flexion: 150 degrees   Abduction: 145 degrees   External rotation 45°: 75 degrees   Internal rotation 45°: 60 degrees     Right Shoulder   Flexion: 150 degrees   Abduction: 160 degrees   External rotation 45°: 85 degrees   Internal rotation 45°: 45 degrees     Strength/Myotome Testing     Left Shoulder     Planes of Motion   Flexion: 3-   Abduction: 3-   External rotation at 90°: 4+   Internal rotation at 90°: 4+     Right Shoulder     Planes of Motion   Flexion: 3-   Abduction: 3-   External rotation at 90°: 4+   Internal rotation at 90°: 4+       Flowsheet Rows      Most Recent Value   PT/OT G-Codes   Current Score  57   Projected Score  63   FOTO information reviewed  Yes   Assessment Type  Evaluation   G code set  Other PT/OT Primary   Other PT Primary Current Status ()  CK   Other PT Primary Goal Status ()  CJ          Precautions: GERD, hypertension, asthma    Daily Treatment Diary     Manual                                                                                   Exercise Diary  10/1            Table slides flex 10"x 10            Table slides scaption 10"x 10            Table slides ER 10" x10            pulleys nv            Wand flexion nv            Wall slides nv            Wand scaption nv            Wand IR nv            scap squeezes nv                                                                                                                                                               Modalities

## 2018-10-08 ENCOUNTER — TELEPHONE (OUTPATIENT)
Dept: CARDIOLOGY CLINIC | Facility: MEDICAL CENTER | Age: 73
End: 2018-10-08

## 2018-10-09 ENCOUNTER — OFFICE VISIT (OUTPATIENT)
Dept: PHYSICAL THERAPY | Facility: MEDICAL CENTER | Age: 73
End: 2018-10-09
Payer: MEDICARE

## 2018-10-09 ENCOUNTER — ANESTHESIA EVENT (OUTPATIENT)
Dept: PERIOP | Facility: AMBULARY SURGERY CENTER | Age: 73
End: 2018-10-09
Payer: MEDICARE

## 2018-10-09 DIAGNOSIS — M25.512 LEFT SHOULDER PAIN, UNSPECIFIED CHRONICITY: ICD-10-CM

## 2018-10-09 DIAGNOSIS — M25.511 RIGHT SHOULDER PAIN, UNSPECIFIED CHRONICITY: Primary | ICD-10-CM

## 2018-10-09 PROCEDURE — 97110 THERAPEUTIC EXERCISES: CPT

## 2018-10-09 NOTE — PROGRESS NOTES
Daily Note     Today's date: 10/9/2018  Patient name: Reji Quintero  : 1945  MRN: [id number]  Referring provider: Jacquie Abrams  Dx:   Encounter Diagnosis     ICD-10-CM    1  Right shoulder pain, unspecified chronicity M25 511    2  Left shoulder pain, unspecified chronicity M25 512                   Subjective: Pt reports Saturday she had pain in R UE after doing her exercises  She states she felt her R arm "cramp up" and "spasm" and she has sig pain (11/10) for 45 mins and residual soreness that lasted about 3 days after  She complained of pain in R side of neck that shot down her arm into 2-4th digits and causes numbness  Objective: See treatment diary below    Precautions: GERD, hypertension, asthma    Daily Treatment Diary     Manual                                                                                   Exercise Diary  10/1 10/9           Table slides flex 10"x 10 10"x10           Table slides scaption 10"x 10 10"x10           Table slides ER 10" x10 10"x10           pulleys nv 5 mins           Wand flexion nv NP           Wall slides nv 10"x10           Wand scaption nv 10"x10 (np on R)           Wand IR nv 10"x10           scap squeezes nv 5"x20           RUT Stretch  30"x3                                                                                                                                                 Modalities  10/9            CP post 10 min                                          Assessment: Tolerated treatment fair  Patient demonstrated fatigue post treatment and would benefit from continued PT   Pt required constant cues to avoid UT compensation w/ ex's  She was unable to perform wand flexion due to pain in R UT region  Instructed pt on UT stretch and educated pt on being aware of UT compensation while at home w/ ADL's and HEP  Plan: Continue per plan of care

## 2018-10-12 ENCOUNTER — OFFICE VISIT (OUTPATIENT)
Dept: PHYSICAL THERAPY | Facility: MEDICAL CENTER | Age: 73
End: 2018-10-12
Payer: MEDICARE

## 2018-10-12 DIAGNOSIS — M25.512 LEFT SHOULDER PAIN, UNSPECIFIED CHRONICITY: ICD-10-CM

## 2018-10-12 DIAGNOSIS — M25.511 RIGHT SHOULDER PAIN, UNSPECIFIED CHRONICITY: Primary | ICD-10-CM

## 2018-10-12 PROCEDURE — 97110 THERAPEUTIC EXERCISES: CPT

## 2018-10-12 NOTE — PROGRESS NOTES
Daily Note     Today's date: 10/12/2018  Patient name: Lenora Bray  : 1945  MRN: [id number]  Referring provider: Tino Eastman  Dx:   Encounter Diagnosis     ICD-10-CM    1  Right shoulder pain, unspecified chronicity M25 511    2  Left shoulder pain, unspecified chronicity M25 512                   Subjective: Pt reports that she is experiencing pain in her right shoulder on the outside 5/10  Notes that she has been performing her exercises at home daily  Objective: See treatment diary below    Precautions: GERD, hypertension, asthma    Daily Treatment Diary     Manual                                                                                   Exercise Diary  10/1 10/9 10/12          Table slides flex 10"x 10 10"x10 10" x10          Table slides scaption 10"x 10 10"x10 10"x 10          Table slides ER 10" x10 10"x10 10"x 10          pulleys nv 5 mins 5 min          Wand flexion nv NP NP          Wall slides nv 10"x10 10" x10          Wand scaption nv 10"x10 (np on R) 10"x 10          Wand IR nv 10"x10 10"x10          scap squeezes nv 5"x20 5"x20          RUT Stretch  30"x3 30"x3                                                                                                                                                Modalities  10/9            CP post 10 min                                          Assessment: Tolerated treatment fair having no increased pain throughout session showing improved range post AAROM exercises  Pt was competent and compliant with all TE performed today noting good functional form  Patient demonstrated fatigue post treatment and would benefit from continued PT   Plan: Continue per plan of care

## 2018-10-15 ENCOUNTER — APPOINTMENT (OUTPATIENT)
Dept: PHYSICAL THERAPY | Facility: MEDICAL CENTER | Age: 73
End: 2018-10-15
Payer: MEDICARE

## 2018-10-18 ENCOUNTER — APPOINTMENT (OUTPATIENT)
Dept: PHYSICAL THERAPY | Facility: MEDICAL CENTER | Age: 73
End: 2018-10-18
Payer: MEDICARE

## 2018-10-19 ENCOUNTER — ANESTHESIA (OUTPATIENT)
Dept: PERIOP | Facility: AMBULARY SURGERY CENTER | Age: 73
End: 2018-10-19
Payer: MEDICARE

## 2018-10-19 ENCOUNTER — HOSPITAL ENCOUNTER (OUTPATIENT)
Facility: AMBULARY SURGERY CENTER | Age: 73
Setting detail: OUTPATIENT SURGERY
Discharge: HOME | End: 2018-10-19
Attending: INTERNAL MEDICINE | Admitting: INTERNAL MEDICINE
Payer: MEDICARE

## 2018-10-19 VITALS
SYSTOLIC BLOOD PRESSURE: 155 MMHG | OXYGEN SATURATION: 99 % | HEIGHT: 59 IN | DIASTOLIC BLOOD PRESSURE: 68 MMHG | RESPIRATION RATE: 16 BRPM | WEIGHT: 148 LBS | TEMPERATURE: 98.2 F | HEART RATE: 68 BPM | BODY MASS INDEX: 29.84 KG/M2

## 2018-10-19 DIAGNOSIS — R09.89 GLOBUS SENSATION: ICD-10-CM

## 2018-10-19 DIAGNOSIS — R10.11 RUQ PAIN: ICD-10-CM

## 2018-10-19 DIAGNOSIS — K21.9 GASTROESOPHAGEAL REFLUX DISEASE WITHOUT ESOPHAGITIS: ICD-10-CM

## 2018-10-19 PROCEDURE — 88305 TISSUE EXAM BY PATHOLOGIST: CPT | Performed by: PATHOLOGY

## 2018-10-19 PROCEDURE — 88342 IMHCHEM/IMCYTCHM 1ST ANTB: CPT | Performed by: PATHOLOGY

## 2018-10-19 PROCEDURE — 43239 EGD BIOPSY SINGLE/MULTIPLE: CPT | Performed by: INTERNAL MEDICINE

## 2018-10-19 PROCEDURE — 88341 IMHCHEM/IMCYTCHM EA ADD ANTB: CPT | Performed by: PATHOLOGY

## 2018-10-19 RX ORDER — PROPOFOL 10 MG/ML
INJECTION, EMULSION INTRAVENOUS AS NEEDED
Status: DISCONTINUED | OUTPATIENT
Start: 2018-10-19 | End: 2018-10-19 | Stop reason: SURG

## 2018-10-19 RX ORDER — SODIUM CHLORIDE 9 MG/ML
100 INJECTION, SOLUTION INTRAVENOUS CONTINUOUS
Status: DISCONTINUED | OUTPATIENT
Start: 2018-10-19 | End: 2018-10-19 | Stop reason: HOSPADM

## 2018-10-19 RX ORDER — LIDOCAINE HYDROCHLORIDE 10 MG/ML
INJECTION, SOLUTION INFILTRATION; PERINEURAL AS NEEDED
Status: DISCONTINUED | OUTPATIENT
Start: 2018-10-19 | End: 2018-10-19 | Stop reason: SURG

## 2018-10-19 RX ADMIN — PROPOFOL 100 MG: 10 INJECTION, EMULSION INTRAVENOUS at 10:32

## 2018-10-19 RX ADMIN — SODIUM CHLORIDE 100 ML/HR: 0.9 INJECTION, SOLUTION INTRAVENOUS at 09:33

## 2018-10-19 RX ADMIN — PROPOFOL 50 MG: 10 INJECTION, EMULSION INTRAVENOUS at 10:34

## 2018-10-19 RX ADMIN — SODIUM CHLORIDE: 0.9 INJECTION, SOLUTION INTRAVENOUS at 10:08

## 2018-10-19 RX ADMIN — LIDOCAINE HYDROCHLORIDE 50 MG: 10 INJECTION, SOLUTION INFILTRATION; PERINEURAL at 10:32

## 2018-10-19 NOTE — ANESTHESIA POSTPROCEDURE EVALUATION
Post-Op Assessment Note      CV Status:  Stable    Mental Status:  Alert and awake    Hydration Status:  Euvolemic    PONV Controlled:  Controlled    Airway Patency:  Patent    Post Op Vitals Reviewed: Yes          Staff: CRNA           /70 (10/19/18 1037)    Temp     Pulse 87 (10/19/18 1037)   Resp 17 (10/19/18 1037)    SpO2 96 % (10/19/18 1037)

## 2018-10-19 NOTE — DISCHARGE INSTR - AVS FIRST PAGE
ESOPHAGOGASTRODUODENOSCOPY    PROCEDURE: EGD    SEDATION: Monitored anesthesia care, check anesthesia records    ASA Class: 2    INDICATIONS:  GERD and globus sensation    CONSENT:  Informed consent was obtained for the procedure, including sedation after explaining the risks and benefits of the procedure  Risks including but not limited to bleeding, perforation, infection, and missed lesion  PREPARATION:   Telemetry, pulse oximetry, blood pressure were monitored throughout the procedure  Patient was identified by myself both verbally and by visual inspection of ID band  DESCRIPTION:   Patient was placed in the left lateral decubitus position and was sedated with the above medication  The gastroscope was introduced in to the oropharynx and the esophagus was intubated under direct visualization  Scope was passed down the esophagus up to 2nd part of the duodenum  A careful inspection was made as the gastroscope was withdrawn, including a retroflexed view of the stomach; findings and interventions are described below  FINDINGS:    #1  Esophagus- grade a esophagitis    #2  Stomach- gastritis, biopsies taken for H pylori  Small hiatal hernia on retroflexion    #3  Duodenum- normal duodenum         IMPRESSIONS:      Normal duodenum  Mild gastritis biopsied  Small hiatal hernia retroflexion  Mild esophagitis    RECOMMENDATIONS:     Discharge home  Resume regular diet  Ranitidine 150 mg twice daily, call if any worsening symptoms, given side effects with pantoprazole can try alternative PPI therapy  Follow up biopsy results  Call with any abdominal pain, bleeding, fevers    COMPLICATIONS:  None; patient tolerated the procedure well            DISPOSITION: PACU           CONDITION: Stable

## 2018-10-19 NOTE — ANESTHESIA PREPROCEDURE EVALUATION
Review of Systems/Medical History  Patient summary reviewed  Chart reviewed  History of anesthetic complications (reports PONV with both her endoscopies with propofol, also received zofran last time   ) PONV    Cardiovascular  Exercise comment: Exercises 20 min daily on treadmill Hypertension (no meds) ,    Pulmonary  Negative pulmonary ROS        GI/Hepatic    GERD ,  Hiatal hernia,   Comment: Reports episodes of chest discomfort that have prompted GI evaluation; will also be seeing cardiology     Kidney stones,        Endo/Other  Negative endo/other ROS      GYN  Negative gynecology ROS          Hematology  Negative hematology ROS      Musculoskeletal    Arthritis     Neurology  Negative neurology ROS      Psychology   Negative psychology ROS            Physical Exam    Airway    Mallampati score: II  TM Distance: >3 FB  Neck ROM: full     Dental   No notable dental hx     Cardiovascular      Pulmonary      Other Findings      Anesthesia Plan  ASA Score- 2     Anesthesia Type- IV sedation with anesthesia with ASA Monitors  Additional Monitors:   Airway Plan:     Comment: Will give zofran today  Plan Factors-    Induction- intravenous  Postoperative Plan-     Informed Consent- Anesthetic plan and risks discussed with patient  I personally reviewed this patient with the CRNA  Discussed and agreed on the Anesthesia Plan with the CRNA  Dara Soulier

## 2018-10-19 NOTE — H&P
History and Physical - [facility]  Nela Gold 75 y o  female MRN: [id number]    HPI: Nela Gold is a 75y o  year old female who presents with globus, chronic GERD  Review of Systems    Historical Information   Past Medical History:   Diagnosis Date    Arthritis     Carpal tunnel syndrome     GERD (gastroesophageal reflux disease)     Hiatal hernia     Hypertension     Insomnia     Pneumonia     Renal calculi     Sleep deprivation     chronically    Vitamin D deficiency     Vitamin D toxicity 2011    with leaky gut syndrome     Past Surgical History:   Procedure Laterality Date    BUNIONECTOMY Bilateral     CARPAL TUNNEL RELEASE Right 2015    ESOPHAGOGASTRODUODENOSCOPY N/A 5/5/2016    Procedure: ESOPHAGOGASTRODUODENOSCOPY (EGD); Surgeon: Cody Cerrato MD;  [facility]; Service:     TX COLONOSCOPY FLX DX W/COLLJ SPEC WHEN PFRMD N/A 6/28/2016    Procedure: COLONOSCOPY;  Surgeon: Cody Cerrato MD;  [facility];   Service: Gastroenterology    ROTATOR CUFF REPAIR Bilateral     TOE SURGERY Left     left 5th toe shaved down due to repeated fx    TOTAL KNEE ARTHROPLASTY Left      Social History   History   Alcohol Use No     History   Drug Use No     History   Smoking Status    Former Smoker   Smokeless Tobacco    Never Used     Family History   Problem Relation Age of Onset    Heart failure Mother     Other Mother         respiratory failure    Heart failure Father         respiratory failure       Meds/Allergies     Prescriptions Prior to Admission   Medication    Bioflavonoid Products (BIOFLEX) TABS    Charcoal Activated (CHARCOCAPS) 260 MG CAPS    ipratropium (ATROVENT) 0 06 % nasal spray    Lactobacillus (ACIDOPHILUS) 100 MG CAPS    zolpidem (AMBIEN) 10 mg tablet    albuterol (PROVENTIL HFA,VENTOLIN HFA) 90 mcg/act inhaler    diclofenac sodium (VOLTAREN) 1 %    lisinopril (ZESTRIL) 5 mg tablet    pantoprazole (PROTONIX) 40 mg tablet    ranitidine (ZANTAC) 300 MG capsule       Allergies   Allergen Reactions    Ancef [Cefazolin] Hives and GI Intolerance    Aspirin GI Intolerance     Even low dose     Ciprofloxacin Hives    Other      STEROIDS- GI INTOLERANCE    Penicillins Hives    Prednisone Hives and GI Intolerance    Vancomycin Hives and GI Intolerance       Objective     Blood pressure 155/72, pulse 77, temperature (!) 97 4 °F (36 3 °C), temperature source Temporal, resp  rate 18, height 4' 11" (1 499 m), weight 67 1 kg (148 lb), SpO2 99 %  PHYSICAL EXAM    Gen: NAD  CV: RRR  CHEST: Clear  ABD: soft, NT/ND  EXT: no edema  Neuro: AAO      ASSESSMENT/PLAN:  This is a 75y o  year old female here for globus, chronic GERD       PLAN:   Procedure: Caity Haley

## 2018-10-22 ENCOUNTER — TELEPHONE (OUTPATIENT)
Dept: GASTROENTEROLOGY | Facility: AMBULARY SURGERY CENTER | Age: 73
End: 2018-10-22

## 2018-10-22 ENCOUNTER — APPOINTMENT (OUTPATIENT)
Dept: PHYSICAL THERAPY | Facility: MEDICAL CENTER | Age: 73
End: 2018-10-22
Payer: MEDICARE

## 2018-10-22 NOTE — TELEPHONE ENCOUNTER
Dr Balbuena's pt called stating that she was prescribed ranitidine (ZANTAC) 300 MG and the doctor had advised her to cut it in half however it is a capsule and she is unable to do so   Please call pt to discuss

## 2018-10-23 ENCOUNTER — OFFICE VISIT (OUTPATIENT)
Dept: CARDIOLOGY CLINIC | Facility: MEDICAL CENTER | Age: 73
End: 2018-10-23
Payer: MEDICARE

## 2018-10-23 VITALS
SYSTOLIC BLOOD PRESSURE: 124 MMHG | DIASTOLIC BLOOD PRESSURE: 70 MMHG | OXYGEN SATURATION: 95 % | HEART RATE: 76 BPM | WEIGHT: 150.3 LBS | BODY MASS INDEX: 29.51 KG/M2 | HEIGHT: 60 IN

## 2018-10-23 DIAGNOSIS — R07.89 CHEST PRESSURE: ICD-10-CM

## 2018-10-23 DIAGNOSIS — K21.9 GASTROESOPHAGEAL REFLUX DISEASE WITHOUT ESOPHAGITIS: Primary | ICD-10-CM

## 2018-10-23 DIAGNOSIS — R07.9 CHEST PAIN, UNSPECIFIED TYPE: ICD-10-CM

## 2018-10-23 DIAGNOSIS — I10 ESSENTIAL HYPERTENSION: Primary | ICD-10-CM

## 2018-10-23 PROCEDURE — 93000 ELECTROCARDIOGRAM COMPLETE: CPT | Performed by: INTERNAL MEDICINE

## 2018-10-23 PROCEDURE — 99204 OFFICE O/P NEW MOD 45 MIN: CPT | Performed by: INTERNAL MEDICINE

## 2018-10-23 RX ORDER — RANITIDINE 150 MG/1
150 TABLET ORAL 2 TIMES DAILY
Qty: 60 TABLET | Refills: 5 | Status: SHIPPED | OUTPATIENT
Start: 2018-10-23 | End: 2019-02-25

## 2018-10-23 NOTE — PROGRESS NOTES
Consultation - Cardiology     [facility] Session 75 y o  female MRN: [id number]    Assessment/Plan:    1  CP  Will order stress EKG for risk stratification, as well as echo to assess LV function  EKG in office 10/23/18 within normal limits  Symptoms somewhat atypical, more likely to occur at night, but also sometimes occur with activity  2  HTN  Prescribed Lisinopril 5, but reports she is not taking it  She reports she does not like taking medications because she has a lot of side effects  Only likes taking probiotics  1  Essential hypertension  POCT ECG    Stress test only, exercise    Echo complete with contrast if indicated   2  Chest pressure  POCT ECG    Stress test only, exercise    Echo complete with contrast if indicated   3  Chest pain, unspecified type  Ambulatory referral to Cardiology    POCT ECG    Stress test only, exercise       HPI: 75 y o  female with a history of HTN who is referred by Dr Jaida Andersen for evaluation of chest pain  She reports she has been having chest pain since around 5/18, worse at night, when she is lying down, she reports she uses 2 pillows to sleep, but also limited by back pain as well  She does also occasionally get chest pain with walking around  She tries walking on treadmill for about 20 minute at a time about 3 times a week, but has issues with arthritis in her knees  No orthopnea, but feels somewhat wheezy when she lies down  No LE edema  No palpitations  EGD showed small hiatal hernia and esophagitis  She was told by Dr Harini Bermudez that she has silent reflux  She is prescribed Zantac, has difficulty taking this because it makes her lightheaded  She was also prescribed Pantoprazole, but stopped taking it due to nausea  She reports she has had PNA/bronchitis about 4 times since 2010  She was admitted to [facility] 2016 with elevated BP       Review of Systems:    Review of Systems   Constitution: Negative for chills, decreased appetite, diaphoresis, fever, weakness, malaise/fatigue, night sweats, weight gain and weight loss  HENT: Negative for ear pain, hearing loss, hoarse voice, nosebleeds, sore throat and tinnitus  Eyes: Negative for blurred vision and pain  Cardiovascular: Positive for chest pain  Negative for claudication, cyanosis, dyspnea on exertion, irregular heartbeat, leg swelling, near-syncope, orthopnea, palpitations, paroxysmal nocturnal dyspnea and syncope  Respiratory: Negative for cough, hemoptysis, shortness of breath, sleep disturbances due to breathing, snoring, sputum production and wheezing  Hematologic/Lymphatic: Negative for adenopathy and bleeding problem  Does not bruise/bleed easily  Skin: Negative for color change, dry skin, flushing, itching, poor wound healing and rash  Musculoskeletal: Positive for arthritis and joint pain  Negative for back pain, falls, muscle cramps, muscle weakness, myalgias and neck pain  Gastrointestinal: Positive for nausea  Negative for abdominal pain, constipation, diarrhea, dysphagia, heartburn, hematemesis, hematochezia, melena and vomiting  Genitourinary: Negative for dysuria, frequency, hematuria, hesitancy, non-menstrual bleeding and urgency  Neurological: Positive for headaches and light-headedness  Negative for excessive daytime sleepiness, dizziness, focal weakness, loss of balance, numbness, paresthesias, tremors and vertigo  Psychiatric/Behavioral: Negative for altered mental status, depression and memory loss  The patient has insomnia  The patient is not nervous/anxious  Allergic/Immunologic: Negative for environmental allergies and persistent infections       Active Problems:     Patient Active Problem List   Diagnosis    Chest pressure    GERD (gastroesophageal reflux disease)    Sleep deprivation    Essential hypertension    Arthritis of knee    Left knee pain    Lower back pain    Primary osteoarthritis of right knee    Globus sensation    RUQ pain    Gastroesophageal reflux disease without esophagitis    Left shoulder pain    Right shoulder pain    Tendinopathy of left rotator cuff    Tendinopathy of right rotator cuff       Historical Information   Past Medical History:   Diagnosis Date    Arthritis     Carpal tunnel syndrome     GERD (gastroesophageal reflux disease)     Hiatal hernia     Hypertension     Insomnia     Pneumonia     Renal calculi     Sleep deprivation     chronically    Vitamin D deficiency     Vitamin D toxicity 2011    with leaky gut syndrome     Past Surgical History:   Procedure Laterality Date    BUNIONECTOMY Bilateral     CARPAL TUNNEL RELEASE Right 2015    ESOPHAGOGASTRODUODENOSCOPY N/A 5/5/2016    Procedure: ESOPHAGOGASTRODUODENOSCOPY (EGD); Surgeon: Charmaine Edwards MD;  [facility]; Service:     NERVE BLOCK       R Knee    CO COLONOSCOPY FLX DX W/COLLJ SPEC WHEN PFRMD N/A 6/28/2016    Procedure: COLONOSCOPY;  Surgeon: Charmaine Edwards MD;  [facility]; Service: Gastroenterology    CO ESOPHAGOGASTRODUODENOSCOPY TRANSORAL DIAGNOSTIC N/A 10/19/2018    Procedure: ESOPHAGOGASTRODUODENOSCOPY (EGD); Surgeon: Charmaine Edwards MD;  [facility];   Service: Gastroenterology    ROTATOR CUFF REPAIR Bilateral     TOE SURGERY Left     left 5th toe shaved down due to repeated fx    TOTAL KNEE ARTHROPLASTY Left      History   Alcohol Use No     History   Drug Use No     History   Smoking Status    Former Smoker   Smokeless Tobacco    Never Used       Family History:   Family History   Problem Relation Age of Onset    Heart failure Mother     Other Mother         respiratory failure    Heart failure Father         respiratory failure    Hypertension Daughter     Arrhythmia Neg Hx     Clotting disorder Neg Hx     Fainting Neg Hx     Anuerysm Neg Hx     Stroke Neg Hx     Hyperlipidemia Neg Hx     Asthma Neg Hx        Allergies   Allergen Reactions    Ancef [Cefazolin] Hives and GI Intolerance    Aspirin GI Intolerance     Even low dose     Ciprofloxacin Hives    Other      STEROIDS- GI INTOLERANCE    Penicillins Hives    Prednisone Hives and GI Intolerance    Vancomycin Hives and GI Intolerance         Current Outpatient Prescriptions:     albuterol (PROVENTIL HFA,VENTOLIN HFA) 90 mcg/act inhaler, Inhale 2 puffs every 6 (six) hours as needed for wheezing, Disp: 1 Inhaler, Rfl: 0    Bioflavonoid Products (BIOFLEX) TABS, Take 3 tablets by mouth daily  FlexNow , Disp: , Rfl:     Charcoal Activated (CHARCOCAPS) 260 MG CAPS, Take 1 tablet by mouth daily  , Disp: , Rfl:     diclofenac sodium (VOLTAREN) 1 %, Place on the skin, Disp: , Rfl:     ipratropium (ATROVENT) 0 06 % nasal spray, 2 sprays into each nostril 4 (four) times a day, Disp: 15 mL, Rfl: 0    Lactobacillus (ACIDOPHILUS) 100 MG CAPS, Take 1 capsule by mouth daily  , Disp: , Rfl:     lisinopril (ZESTRIL) 5 mg tablet, Take 5 mg by mouth daily  , Disp: , Rfl:     ranitidine (ZANTAC) 150 mg tablet, Take 1 tablet (150 mg total) by mouth 2 (two) times a day, Disp: 60 tablet, Rfl: 5    zolpidem (AMBIEN) 10 mg tablet, Take 1 tablet (10 mg total) by mouth daily at bedtime as needed for sleep, Disp: 30 tablet, Rfl: 1    pantoprazole (PROTONIX) 40 mg tablet, Take 1 tablet (40 mg total) by mouth daily (Patient not taking: Reported on 10/23/2018 ), Disp: 30 tablet, Rfl: 0    Objective   Vitals:   Vitals:    10/23/18 1432   BP: 124/70   BP Location: Left arm   Patient Position: Sitting   Cuff Size: Large   Pulse: 76   SpO2: 95%   Weight: 68 2 kg (150 lb 4 8 oz)   Height: 4' 11 5" (1 511 m)          Physical Exam:     GEN: Alert and oriented x 3, in no acute distress  Well appearing and well nourished  HEENT: Sclera anicteric, conjunctivae pink, mucous membranes moist  Oropharynx clear  NECK: Supple, no carotid bruits, no significant JVD  Trachea midline, no thyromegaly  HEART: Regular rhythm, normal S1 and S2, no murmurs, clicks, gallops or rubs   PMI nondisplaced, no thrills  LUNGS: Clear to auscultation bilaterally; no wheezes, rales, or rhonchi  No increased work of breathing or signs of respiratory distress  ABDOMEN: Soft, nontender, nondistended, normoactive bowel sounds  EXTREMITIES: Skin warm and well perfused, no clubbing, cyanosis, or edema  NEURO: No focal findings  Normal gait  Normal speech  Mood and affect normal    SKIN: Normal without suspicious lesions on exposed skin      Lab Results:     Lab Results   Component Value Date    HGBA1C 5 6 05/03/2016     Lab Results   Component Value Date    CHOL 193 09/18/2015    CHOL 179 08/11/2014    CHOL 191 08/30/2013     Lab Results   Component Value Date    HDL 66 (H) 10/27/2017    HDL 54 09/13/2016    HDL 56 05/04/2016     Lab Results   Component Value Date    LDLCALC 106 (H) 10/27/2017    LDLCALC 107 (H) 09/13/2016    LDLCALC 99 05/04/2016     Lab Results   Component Value Date    TRIG 115 10/27/2017    TRIG 174 (H) 09/13/2016    TRIG 106 05/04/2016     No components found for: CHOLHDL

## 2018-10-23 NOTE — TELEPHONE ENCOUNTER
Called and notified pt  Pt stated she is trying to find a new pharmacy, not to send it to anywhere yet  She will update us on the pharmacy when she finds it

## 2018-10-23 NOTE — TELEPHONE ENCOUNTER
Please advise patient that she can use the 300 mg tablets once daily to complete the 1 month script she just picked up (either in the morning OR in the evening)  I figured she would not want to waste the new script she just picked up  I will reorder the medication in tablet form in 150 mg twice daily strength to her pharmacy so that she can start this once the 30 days of 300 mg runs out

## 2018-10-24 ENCOUNTER — OFFICE VISIT (OUTPATIENT)
Dept: PAIN MEDICINE | Facility: CLINIC | Age: 73
End: 2018-10-24
Payer: MEDICARE

## 2018-10-24 VITALS
WEIGHT: 150 LBS | HEART RATE: 78 BPM | HEIGHT: 60 IN | BODY MASS INDEX: 29.45 KG/M2 | SYSTOLIC BLOOD PRESSURE: 126 MMHG | DIASTOLIC BLOOD PRESSURE: 74 MMHG | RESPIRATION RATE: 16 BRPM

## 2018-10-24 DIAGNOSIS — M25.511 RIGHT SHOULDER PAIN, UNSPECIFIED CHRONICITY: ICD-10-CM

## 2018-10-24 DIAGNOSIS — M54.12 CERVICAL RADICULITIS: Primary | ICD-10-CM

## 2018-10-24 DIAGNOSIS — M25.512 LEFT SHOULDER PAIN, UNSPECIFIED CHRONICITY: ICD-10-CM

## 2018-10-24 PROCEDURE — 99213 OFFICE O/P EST LOW 20 MIN: CPT | Performed by: ANESTHESIOLOGY

## 2018-10-24 NOTE — PROGRESS NOTES
Assessment:  1  Cervical radiculitis  MRI cervical spine without contrast    EMG 1 Limb   2  Left shoulder pain, unspecified chronicity  Ambulatory referral to Pain Management   3  Right shoulder pain, unspecified chronicity  Ambulatory referral to Pain Management       Plan: This is a 78-year-old female who presents today for follow-up office visit for management of bilateral shoulder arthritis and neck pain with right UE radicular symptoms  Patient continues to use topical cream for her shoulder pain  She does home exercises for her neck pain and has completed PT for her shoulder  Regarding her neck pain and right arm pain, I will obtain an MRI of the C-spine w/o contrast  Upon completion, I will call her with the results and we will consider a pain intervention  I will also order an EMG of the right UE - r/o c-radiculitis vs carpal; tunnel syndrome  She has a h/o Right Carpal tunnel release in the past      Patient verbalized understanding  My impressions and treatment recommendations were discussed in detail with the patient who verbalized understanding and had no further questions  Discharge instructions were provided  I personally saw and examined the patient and I agree with the above discussed plan of care  History of Present Illness:  Jesse James is a 75 y o  female who presents for a follow up office visit in regards to Shoulder Pain and Arm Pain  The patients current symptoms include bilateral shoulder pain  Patient continues to use topical Aspercreme p r n  Gerry Castro Patient reports her pain remains unchanged from prior  She doses to intermittent pain  She reports numbness in her right finger tips and the right  She reports recent fall which aggravated her symptoms  She continues to perform home exercises and has completed PT for her neck and bilateral shoulder  Pain score is rated 6/10  She follows up with Dr Theresa Green       Right knee pain has subsided significantly following right genicular nerve RFA  I have personally reviewed and/or updated the patient's past medical history, past surgical history, family history, social history, current medications, allergies, and vital signs today  Review of Systems   Respiratory: Positive for shortness of breath  Cardiovascular: Positive for chest pain  Gastrointestinal: Negative for constipation, diarrhea, nausea and vomiting  Musculoskeletal: Negative for arthralgias, gait problem, joint swelling and myalgias  Decreased ROM, Joint stiffness   Skin: Negative for rash  Neurological: Positive for dizziness and weakness  Negative for seizures  All other systems reviewed and are negative  Patient Active Problem List   Diagnosis    Chest pressure    GERD (gastroesophageal reflux disease)    Sleep deprivation    Essential hypertension    Arthritis of knee    Left knee pain    Lower back pain    Primary osteoarthritis of right knee    Globus sensation    RUQ pain    Gastroesophageal reflux disease without esophagitis    Left shoulder pain    Right shoulder pain    Tendinopathy of left rotator cuff    Tendinopathy of right rotator cuff       Past Medical History:   Diagnosis Date    Arthritis     Carpal tunnel syndrome     GERD (gastroesophageal reflux disease)     Hiatal hernia     Hypertension     Insomnia     Pneumonia     Renal calculi     Sleep deprivation     chronically    Vitamin D deficiency     Vitamin D toxicity 2011    with leaky gut syndrome       Past Surgical History:   Procedure Laterality Date    BUNIONECTOMY Bilateral     CARPAL TUNNEL RELEASE Right 2015    ESOPHAGOGASTRODUODENOSCOPY N/A 5/5/2016    Procedure: ESOPHAGOGASTRODUODENOSCOPY (EGD); Surgeon: Lucinda Stark MD;  [facility]; Service:     NERVE BLOCK       R Knee    MI COLONOSCOPY FLX DX W/COLLJ SPEC WHEN PFRMD N/A 6/28/2016    Procedure: COLONOSCOPY;  Surgeon: Lucinda Stark MD;  [facility];   Service: Gastroenterology    OH ESOPHAGOGASTRODUODENOSCOPY TRANSORAL DIAGNOSTIC N/A 10/19/2018    Procedure: ESOPHAGOGASTRODUODENOSCOPY (EGD); Surgeon: Mariella Fontanez MD;  [facility]; Service: Gastroenterology    ROTATOR CUFF REPAIR Bilateral     TOE SURGERY Left     left 5th toe shaved down due to repeated fx    TOTAL KNEE ARTHROPLASTY Left        Family History   Problem Relation Age of Onset    Heart failure Mother     Other Mother         respiratory failure    Heart failure Father         respiratory failure    Hypertension Daughter     Arrhythmia Neg Hx     Clotting disorder Neg Hx     Fainting Neg Hx     Anuerysm Neg Hx     Stroke Neg Hx     Hyperlipidemia Neg Hx     Asthma Neg Hx        Social History     Occupational History    Not on file  Social History Main Topics    Smoking status: Former Smoker    Smokeless tobacco: Never Used    Alcohol use No    Drug use: No    Sexual activity: Not on file       Current Outpatient Prescriptions on File Prior to Visit   Medication Sig    albuterol (PROVENTIL HFA,VENTOLIN HFA) 90 mcg/act inhaler Inhale 2 puffs every 6 (six) hours as needed for wheezing    Bioflavonoid Products (BIOFLEX) TABS Take 3 tablets by mouth daily  FlexNow     Charcoal Activated (CHARCOCAPS) 260 MG CAPS Take 1 tablet by mouth daily   diclofenac sodium (VOLTAREN) 1 % Place on the skin    ipratropium (ATROVENT) 0 06 % nasal spray 2 sprays into each nostril 4 (four) times a day    Lactobacillus (ACIDOPHILUS) 100 MG CAPS Take 1 capsule by mouth daily   lisinopril (ZESTRIL) 5 mg tablet Take 5 mg by mouth daily      pantoprazole (PROTONIX) 40 mg tablet Take 1 tablet (40 mg total) by mouth daily    ranitidine (ZANTAC) 150 mg tablet Take 1 tablet (150 mg total) by mouth 2 (two) times a day    zolpidem (AMBIEN) 10 mg tablet Take 1 tablet (10 mg total) by mouth daily at bedtime as needed for sleep     No current facility-administered medications on file prior to visit  Allergies   Allergen Reactions    Ancef [Cefazolin] Hives and GI Intolerance    Aspirin GI Intolerance     Even low dose     Ciprofloxacin Hives    Other      STEROIDS- GI INTOLERANCE    Penicillins Hives    Prednisone Hives and GI Intolerance    Vancomycin Hives and GI Intolerance       Physical Exam:    /74   Pulse 78   Resp 16   Ht 4' 11 5" (1 511 m)   Wt 68 kg (150 lb)   BMI 29 79 kg/m²     Constitutional:normal, well developed, well nourished, alert, in no distress and non-toxic and no overt pain behavior    Eyes:anicteric  HEENT:grossly intact  Neck:supple, symmetric, trachea midline and no masses   Pulmonary:even and unlabored  Cardiovascular:No edema or pitting edema present  Skin:Normal without rashes or lesions and well hydrated  Psychiatric:Mood and affect appropriate  Neurologic:Cranial Nerves II-XII grossly intact  Musculoskeletal:normal    Cervical Spine Exam    Appearance:  Normal lordosis  Palpation/Tenderness:  left cervical paraspinal tenderness  right cervical paraspinal tenderness  Sensory:  diminished pin prick sensation, location: right lateral forearm  Range of Motion:  Extension:  Minimally limited  with pain  Motor Strength:  Left    4/5  Right   5/5  Reflexes:  Left Triceps:  2+   Right Triceps:  2+     Imaging

## 2018-10-25 ENCOUNTER — APPOINTMENT (OUTPATIENT)
Dept: PHYSICAL THERAPY | Facility: MEDICAL CENTER | Age: 73
End: 2018-10-25
Payer: MEDICARE

## 2018-10-25 ENCOUNTER — TELEPHONE (OUTPATIENT)
Dept: GASTROENTEROLOGY | Facility: AMBULARY SURGERY CENTER | Age: 73
End: 2018-10-25

## 2018-10-25 NOTE — TELEPHONE ENCOUNTER
veronica    Spoke with patient and gave results  Recall set for 3 month follow up  Patient stated that the zantac is giving her diarrhea, however she will continue to take it for now because she is changing pharmacies as of January 1 and if you send the new medication over then she will not have to pay a copay    So please wait to change the medication until then

## 2018-10-25 NOTE — TELEPHONE ENCOUNTER
----- Message from Mandy Beard MD sent at 10/25/2018 10:44 AM EDT -----  Please inform the patient that biopsies from her upper endoscopy were negative for H pylori, no evidence of cancer  Please have her follow up with the PA is in 2 to 3 months in the office, have her call with any questions or concerns  If the ranitidine is not work we can try an alternative PPI medication given that she had side effects with original prescription

## 2018-10-29 ENCOUNTER — APPOINTMENT (OUTPATIENT)
Dept: PHYSICAL THERAPY | Facility: MEDICAL CENTER | Age: 73
End: 2018-10-29
Payer: MEDICARE

## 2018-11-01 ENCOUNTER — OFFICE VISIT (OUTPATIENT)
Dept: OBGYN CLINIC | Facility: MEDICAL CENTER | Age: 73
End: 2018-11-01
Payer: MEDICARE

## 2018-11-01 VITALS
WEIGHT: 150 LBS | HEART RATE: 71 BPM | HEIGHT: 60 IN | BODY MASS INDEX: 29.45 KG/M2 | SYSTOLIC BLOOD PRESSURE: 152 MMHG | DIASTOLIC BLOOD PRESSURE: 79 MMHG

## 2018-11-01 DIAGNOSIS — M67.912 TENDINOPATHY OF LEFT ROTATOR CUFF: Primary | ICD-10-CM

## 2018-11-01 DIAGNOSIS — M67.911 TENDINOPATHY OF RIGHT ROTATOR CUFF: ICD-10-CM

## 2018-11-01 PROCEDURE — 99212 OFFICE O/P EST SF 10 MIN: CPT | Performed by: ORTHOPAEDIC SURGERY

## 2018-11-01 NOTE — PROGRESS NOTES
Assessment:  1  Tendinopathy of left rotator cuff     2  Tendinopathy of right rotator cuff       Patient Active Problem List   Diagnosis    Chest pressure    GERD (gastroesophageal reflux disease)    Sleep deprivation    Essential hypertension    Arthritis of knee    Left knee pain    Lower back pain    Primary osteoarthritis of right knee    Globus sensation    RUQ pain    Gastroesophageal reflux disease without esophagitis    Left shoulder pain    Right shoulder pain    Tendinopathy of left rotator cuff    Tendinopathy of right rotator cuff    Cervical radiculitis           Plan    Activity as tolerated  Follow up on a p r n  basis  Subjective:     Patient ID:    Chief Abby Vides 75 y o  female      HPI    Patient comes in for routine follow-up regarding bilateral shoulder pain  She was sent to pain management because she cannot have cortisone injections  She has rotator cuff repairs bilateral shoulders but was sent to pain management for managing her pain  An EMG and MRI has been ordered for her upper extremities as well as cervical spine  The following portions of the patient's history were reviewed and updated as appropriate: allergies, current medications, past family history, past social history, past surgical history and problem list     All organ systems normal    Social History     Social History    Marital status: Single     Spouse name: N/A    Number of children: N/A    Years of education: N/A     Occupational History    Not on file       Social History Main Topics    Smoking status: Former Smoker    Smokeless tobacco: Never Used    Alcohol use No    Drug use: No    Sexual activity: Not on file     Other Topics Concern    Not on file     Social History Narrative    No narrative on file     Past Medical History:   Diagnosis Date    Arthritis     Carpal tunnel syndrome     GERD (gastroesophageal reflux disease)     Hiatal hernia     Hypertension     Insomnia     Pneumonia     Renal calculi     Sleep deprivation     chronically    Vitamin D deficiency     Vitamin D toxicity 2011    with leaky gut syndrome     Past Surgical History:   Procedure Laterality Date    BUNIONECTOMY Bilateral     CARPAL TUNNEL RELEASE Right 2015    ESOPHAGOGASTRODUODENOSCOPY N/A 5/5/2016    Procedure: ESOPHAGOGASTRODUODENOSCOPY (EGD); Surgeon: Shawn Rapp MD;  [facility]; Service:     NERVE BLOCK       R Knee    MD COLONOSCOPY FLX DX W/COLLJ SPEC WHEN PFRMD N/A 6/28/2016    Procedure: COLONOSCOPY;  Surgeon: Shawn Rapp MD;  [facility]; Service: Gastroenterology    MD ESOPHAGOGASTRODUODENOSCOPY TRANSORAL DIAGNOSTIC N/A 10/19/2018    Procedure: ESOPHAGOGASTRODUODENOSCOPY (EGD); Surgeon: Shawn Rapp MD;  [facility]; Service: Gastroenterology    ROTATOR CUFF REPAIR Bilateral     TOE SURGERY Left     left 5th toe shaved down due to repeated fx    TOTAL KNEE ARTHROPLASTY Left      Allergies   Allergen Reactions    Ancef [Cefazolin] Hives and GI Intolerance    Aspirin GI Intolerance     Even low dose     Ciprofloxacin Hives    Other      STEROIDS- GI INTOLERANCE    Penicillins Hives    Prednisone Hives and GI Intolerance    Vancomycin Hives and GI Intolerance     Current Outpatient Prescriptions on File Prior to Visit   Medication Sig Dispense Refill    albuterol (PROVENTIL HFA,VENTOLIN HFA) 90 mcg/act inhaler Inhale 2 puffs every 6 (six) hours as needed for wheezing 1 Inhaler 0    Bioflavonoid Products (BIOFLEX) TABS Take 3 tablets by mouth daily  FlexNow       Charcoal Activated (CHARCOCAPS) 260 MG CAPS Take 1 tablet by mouth daily   diclofenac sodium (VOLTAREN) 1 % Place on the skin      ipratropium (ATROVENT) 0 06 % nasal spray 2 sprays into each nostril 4 (four) times a day 15 mL 0    Lactobacillus (ACIDOPHILUS) 100 MG CAPS Take 1 capsule by mouth daily        lisinopril (ZESTRIL) 5 mg tablet Take 5 mg by mouth daily   pantoprazole (PROTONIX) 40 mg tablet Take 1 tablet (40 mg total) by mouth daily 30 tablet 0    ranitidine (ZANTAC) 150 mg tablet Take 1 tablet (150 mg total) by mouth 2 (two) times a day 60 tablet 5    zolpidem (AMBIEN) 10 mg tablet Take 1 tablet (10 mg total) by mouth daily at bedtime as needed for sleep 30 tablet 1     No current facility-administered medications on file prior to visit  Objective:        Ortho Exam  No change in exam    Portions of the record may have been created with voice recognition software   Occasional wrong word or "sound a like" substitutions may have occurred due to the inherent limitations of voice recognition software   Read the chart carefully and recognize, using context, where substitutions have occurred

## 2018-11-06 ENCOUNTER — OFFICE VISIT (OUTPATIENT)
Dept: FAMILY MEDICINE CLINIC | Facility: MEDICAL CENTER | Age: 73
End: 2018-11-06
Payer: MEDICARE

## 2018-11-06 VITALS
RESPIRATION RATE: 18 BRPM | DIASTOLIC BLOOD PRESSURE: 80 MMHG | WEIGHT: 151.6 LBS | SYSTOLIC BLOOD PRESSURE: 146 MMHG | HEART RATE: 78 BPM | BODY MASS INDEX: 30.11 KG/M2

## 2018-11-06 DIAGNOSIS — E66.3 OVERWEIGHT: ICD-10-CM

## 2018-11-06 DIAGNOSIS — Z12.39 SCREENING FOR BREAST CANCER: ICD-10-CM

## 2018-11-06 DIAGNOSIS — R09.89 LABILE BLOOD PRESSURE: ICD-10-CM

## 2018-11-06 DIAGNOSIS — K21.9 GASTROESOPHAGEAL REFLUX DISEASE WITHOUT ESOPHAGITIS: Primary | Chronic | ICD-10-CM

## 2018-11-06 PROCEDURE — 99214 OFFICE O/P EST MOD 30 MIN: CPT | Performed by: FAMILY MEDICINE

## 2018-11-06 NOTE — PROGRESS NOTES
Jewelindy Jeanrani is here for follow-up of her blood pressure  Cassidy Sinha is taking her BP at home  Getting 142/72  Systolic is under 407  Diastolic moslty 92'C  She saw Cardiology recently for evaluation of her chest pain  She saw Dr Daily Cooper who recommended stress test, echocardiogram    She still gets the pressure and pain in her upper chest     She saw Orthopedics who feels that she may also have a pinched nerve  She said she is getting an MRI of the cervical spine from pain management     She otherwise feels well    O: /80 (BP Location: Left arm, Patient Position: Sitting, Cuff Size: Adult)   Pulse 78   Resp 18   Wt 68 8 kg (151 lb 9 6 oz)   BMI 30 11 kg/m²   BP by me 130/82  Neck no adenopathy, thyromegaly, bruits  Chest clear  Cardiac regular rate without murmur    Assessment  1  Hypertension-patient declines medication  We did discuss diet and weight loss  Advised to continue to monitor at home  2  Health maintenance-declines immunizations  Due for mammogram     Plan  Check blood work  Mammogram   As above  Recheck 3 months

## 2018-11-13 ENCOUNTER — CLINICAL SUPPORT (OUTPATIENT)
Dept: FAMILY MEDICINE CLINIC | Facility: MEDICAL CENTER | Age: 73
End: 2018-11-13
Payer: MEDICARE

## 2018-11-13 ENCOUNTER — APPOINTMENT (OUTPATIENT)
Dept: LAB | Facility: MEDICAL CENTER | Age: 73
End: 2018-11-13
Payer: MEDICARE

## 2018-11-13 VITALS — DIASTOLIC BLOOD PRESSURE: 82 MMHG | HEART RATE: 76 BPM | SYSTOLIC BLOOD PRESSURE: 146 MMHG

## 2018-11-13 DIAGNOSIS — I10 ESSENTIAL HYPERTENSION: Primary | ICD-10-CM

## 2018-11-13 DIAGNOSIS — E66.3 OVERWEIGHT: ICD-10-CM

## 2018-11-13 DIAGNOSIS — K21.9 GASTROESOPHAGEAL REFLUX DISEASE WITHOUT ESOPHAGITIS: Chronic | ICD-10-CM

## 2018-11-13 DIAGNOSIS — Z12.39 SCREENING FOR BREAST CANCER: ICD-10-CM

## 2018-11-13 DIAGNOSIS — R09.89 LABILE BLOOD PRESSURE: ICD-10-CM

## 2018-11-13 LAB
ALBUMIN SERPL BCP-MCNC: 3.8 G/DL (ref 3.5–5)
ALP SERPL-CCNC: 85 U/L (ref 46–116)
ALT SERPL W P-5'-P-CCNC: 26 U/L (ref 12–78)
ANION GAP SERPL CALCULATED.3IONS-SCNC: 7 MMOL/L (ref 4–13)
AST SERPL W P-5'-P-CCNC: 21 U/L (ref 5–45)
BILIRUB SERPL-MCNC: 0.83 MG/DL (ref 0.2–1)
BUN SERPL-MCNC: 17 MG/DL (ref 5–25)
CALCIUM SERPL-MCNC: 8.4 MG/DL (ref 8.3–10.1)
CHLORIDE SERPL-SCNC: 102 MMOL/L (ref 100–108)
CHOLEST SERPL-MCNC: 171 MG/DL (ref 50–200)
CO2 SERPL-SCNC: 26 MMOL/L (ref 21–32)
CREAT SERPL-MCNC: 0.75 MG/DL (ref 0.6–1.3)
ERYTHROCYTE [DISTWIDTH] IN BLOOD BY AUTOMATED COUNT: 13.4 % (ref 11.6–15.1)
EST. AVERAGE GLUCOSE BLD GHB EST-MCNC: 120 MG/DL
GFR SERPL CREATININE-BSD FRML MDRD: 79 ML/MIN/1.73SQ M
GLUCOSE P FAST SERPL-MCNC: 84 MG/DL (ref 65–99)
HBA1C MFR BLD: 5.8 % (ref 4.2–6.3)
HCT VFR BLD AUTO: 42.4 % (ref 34.8–46.1)
HDLC SERPL-MCNC: 54 MG/DL (ref 40–60)
HGB BLD-MCNC: 13.5 G/DL (ref 11.5–15.4)
LDLC SERPL CALC-MCNC: 89 MG/DL (ref 0–100)
MCH RBC QN AUTO: 30 PG (ref 26.8–34.3)
MCHC RBC AUTO-ENTMCNC: 31.8 G/DL (ref 31.4–37.4)
MCV RBC AUTO: 94 FL (ref 82–98)
NONHDLC SERPL-MCNC: 117 MG/DL
PLATELET # BLD AUTO: 218 THOUSANDS/UL (ref 149–390)
PMV BLD AUTO: 12.4 FL (ref 8.9–12.7)
POTASSIUM SERPL-SCNC: 3.9 MMOL/L (ref 3.5–5.3)
PROT SERPL-MCNC: 7.4 G/DL (ref 6.4–8.2)
RBC # BLD AUTO: 4.5 MILLION/UL (ref 3.81–5.12)
SODIUM SERPL-SCNC: 135 MMOL/L (ref 136–145)
TRIGL SERPL-MCNC: 142 MG/DL
TSH SERPL DL<=0.05 MIU/L-ACNC: 1.73 UIU/ML (ref 0.36–3.74)
WBC # BLD AUTO: 6.01 THOUSAND/UL (ref 4.31–10.16)

## 2018-11-13 PROCEDURE — 80061 LIPID PANEL: CPT

## 2018-11-13 PROCEDURE — 84443 ASSAY THYROID STIM HORMONE: CPT

## 2018-11-13 PROCEDURE — 36415 COLL VENOUS BLD VENIPUNCTURE: CPT

## 2018-11-13 PROCEDURE — 80053 COMPREHEN METABOLIC PANEL: CPT

## 2018-11-13 PROCEDURE — 85027 COMPLETE CBC AUTOMATED: CPT

## 2018-11-13 PROCEDURE — 83036 HEMOGLOBIN GLYCOSYLATED A1C: CPT

## 2018-11-13 PROCEDURE — 99211 OFF/OP EST MAY X REQ PHY/QHP: CPT

## 2018-11-19 ENCOUNTER — TELEPHONE (OUTPATIENT)
Dept: FAMILY MEDICINE CLINIC | Facility: MEDICAL CENTER | Age: 73
End: 2018-11-19

## 2018-11-19 NOTE — TELEPHONE ENCOUNTER
----- Message from Lotus Mar MD sent at 11/18/2018 10:27 PM EST -----  Regarding: RE: NV  She needs to continue to monitor her blood pressure at home  Regina is less than 140 over 80  Advised she continue to exercise and lose some weight which may help this happen   ----- Message -----  From: Luisa Miranda RN  Sent: 11/13/2018  10:14 AM  To: Lotus Mar MD  Subject: NV                                               FYI: Nina Jefferson came in for a NV to check her BP cuff  It appears quite accurate  BP by me was 146/82 and 10 mins  later her cuff registered 142/86  She is feeling fine

## 2018-11-27 ENCOUNTER — HOSPITAL ENCOUNTER (OUTPATIENT)
Dept: MRI IMAGING | Facility: HOSPITAL | Age: 73
Discharge: HOME | End: 2018-11-27
Attending: ANESTHESIOLOGY
Payer: MEDICARE

## 2018-11-27 DIAGNOSIS — M54.12 CERVICAL RADICULITIS: ICD-10-CM

## 2018-11-27 PROCEDURE — 72141 MRI NECK SPINE W/O DYE: CPT

## 2018-11-27 NOTE — PROGRESS NOTES
I Called patient and reviewed her CT scan results with her in detail  She states that she is doing home exercises and it is helping  She states that she would like to continue with this for now   She will call us if she gets worse

## 2018-11-29 ENCOUNTER — HOSPITAL ENCOUNTER (OUTPATIENT)
Dept: NON INVASIVE DIAGNOSTICS | Facility: CLINIC | Age: 73
Discharge: HOME | End: 2018-11-29
Payer: MEDICARE

## 2018-11-29 DIAGNOSIS — I10 ESSENTIAL HYPERTENSION: ICD-10-CM

## 2018-11-29 DIAGNOSIS — R07.9 CHEST PAIN, UNSPECIFIED TYPE: ICD-10-CM

## 2018-11-29 DIAGNOSIS — R07.89 CHEST PRESSURE: ICD-10-CM

## 2018-11-29 LAB
MAX DIASTOLIC BP: 82 MMHG
MAX HEART RATE: 134 BPM
MAX PREDICTED HEART RATE: 146 BPM
MAX. SYSTOLIC BP: 190 MMHG
PROTOCOL NAME: NORMAL
TARGET HR FORMULA: NORMAL
TEST INDICATION: NORMAL
TIME IN EXERCISE PHASE: NORMAL

## 2018-11-29 PROCEDURE — 93018 CV STRESS TEST I&R ONLY: CPT | Performed by: INTERNAL MEDICINE

## 2018-11-29 PROCEDURE — 93017 CV STRESS TEST TRACING ONLY: CPT

## 2018-11-29 PROCEDURE — 93306 TTE W/DOPPLER COMPLETE: CPT | Performed by: INTERNAL MEDICINE

## 2018-11-29 PROCEDURE — 93016 CV STRESS TEST SUPVJ ONLY: CPT | Performed by: INTERNAL MEDICINE

## 2018-11-29 PROCEDURE — 93306 TTE W/DOPPLER COMPLETE: CPT

## 2018-12-05 NOTE — PROGRESS NOTES
Office Progress Note - Cardiology     Michelle Mohan 75 y o  female MRN: [id number]    Assessment/Plan:    1  CP  Symptoms somewhat atypical, more likely to occur at night  Echo 11/18 showed normal LV size and function, EF 60%, no RWMA, grade I diastolic dysfunction  Normal RV size and function  Trace MR  She underwent stress EKG 11/29/18, she was able to exercise for 6 minutes and 16 seconds of the Bret protocol, achieving 92% of MPHR  Stress EKG was negative for inducible ischemia  Isolated PVCs were seen  She reported some chest pain on inspiration 5 minutes into recovery, but not while exercising  Likely chest pain noncardiac, she does report being told of a pinched nerve in her neck which may be contributing to her chest discomfort  2  HTN  Prescribed Lisinopril 5, but reports she is not taking it  She reports she does not like taking medications because she has a lot of side effects  Only likes taking probiotics  BP in office today currently controlled without Lisinopril  1  Essential hypertension     2  Chest pressure         HPI: 75 y o  female with a history of HTN who is here for evaluation of chest pain  She reports she has been having chest pain since around 5/18, worse at night, when she is lying down, she reports she uses 2 pillows to sleep, but also limited by back pain as well  She does also occasionally get chest pain with walking around  She tries walking on treadmill for about 20 minute at a time about 3 times a week, but has issues with arthritis in her knees  No orthopnea, but feels somewhat wheezy when she lies down  No LE edema  No palpitations  EGD showed small hiatal hernia and esophagitis  She was told by Dr Macy Torrez that she has silent reflux  She is prescribed Zantac, has difficulty taking this because it makes her lightheaded  She was also prescribed Pantoprazole, but stopped taking it due to nausea  She reports she has had PNA/bronchitis about 4 times since 2010   She was admitted to [facility] 2016 with elevated BP  Echo 11/18 showed normal LV size and function, EF 60%, no RWMA, grade I diastolic dysfunction  Normal RV size and function  Trace MR  She underwent stress EKG 11/29/18, she was able to exercise for 6 minutes and 16 seconds of the Bret protocol, achieving 92% of MPHR  Stress EKG was negative for inducible ischemia  Isolated PVCs were seen  She reported some chest pain on inspiration 5 minutes into recovery, but not while exercising  She reports she was told she has a pinched nerve in her neck, which may be causing the pain in her chest      Review of Systems:    Review of Systems   Constitution: Negative for chills, decreased appetite, diaphoresis, fever, weakness, malaise/fatigue, night sweats, weight gain and weight loss  HENT: Negative for ear pain, hearing loss, hoarse voice, nosebleeds, sore throat and tinnitus  Eyes: Negative for blurred vision and pain  Cardiovascular: Positive for chest pain  Negative for claudication, cyanosis, dyspnea on exertion, irregular heartbeat, leg swelling, near-syncope, orthopnea, palpitations, paroxysmal nocturnal dyspnea and syncope  Respiratory: Positive for wheezing  Negative for cough, hemoptysis, shortness of breath, sleep disturbances due to breathing, snoring and sputum production  Hematologic/Lymphatic: Negative for adenopathy and bleeding problem  Does not bruise/bleed easily  Skin: Negative for color change, dry skin, flushing, itching, poor wound healing and rash  Musculoskeletal: Positive for arthritis and joint pain  Negative for back pain, falls, muscle cramps, muscle weakness, myalgias and neck pain  Gastrointestinal: Positive for nausea  Negative for abdominal pain, constipation, diarrhea, dysphagia, heartburn, hematemesis, hematochezia, melena and vomiting  Genitourinary: Negative for dysuria, frequency, hematuria, hesitancy, non-menstrual bleeding and urgency     Neurological: Positive for headaches and light-headedness  Negative for excessive daytime sleepiness, dizziness, focal weakness, loss of balance, numbness, paresthesias, tremors and vertigo  Psychiatric/Behavioral: Negative for altered mental status, depression and memory loss  The patient has insomnia  The patient is not nervous/anxious  Allergic/Immunologic: Negative for environmental allergies and persistent infections  Active Problems:     Patient Active Problem List   Diagnosis    Chest pressure    GERD (gastroesophageal reflux disease)    Sleep deprivation    Essential hypertension    Arthritis of knee    Left knee pain    Lower back pain    Primary osteoarthritis of right knee    Globus sensation    RUQ pain    Gastroesophageal reflux disease without esophagitis    Left shoulder pain    Right shoulder pain    Tendinopathy of left rotator cuff    Tendinopathy of right rotator cuff    Cervical radiculitis       Historical Information   Past Medical History:   Diagnosis Date    Arthritis     Carpal tunnel syndrome     GERD (gastroesophageal reflux disease)     Hiatal hernia     Hypertension     Insomnia     Pneumonia     Renal calculi     Sleep deprivation     chronically    Vitamin D deficiency     Vitamin D toxicity 2011    with leaky gut syndrome     Past Surgical History:   Procedure Laterality Date    BUNIONECTOMY Bilateral     CARPAL TUNNEL RELEASE Right 2015    ESOPHAGOGASTRODUODENOSCOPY N/A 5/5/2016    Procedure: ESOPHAGOGASTRODUODENOSCOPY (EGD); Surgeon: Orlando Ly MD;  [facility]; Service:     NERVE BLOCK       R Knee    MN COLONOSCOPY FLX DX W/COLLJ SPEC WHEN PFRMD N/A 6/28/2016    Procedure: COLONOSCOPY;  Surgeon: Orlando Ly MD;  [facility]; Service: Gastroenterology    MN ESOPHAGOGASTRODUODENOSCOPY TRANSORAL DIAGNOSTIC N/A 10/19/2018    Procedure: ESOPHAGOGASTRODUODENOSCOPY (EGD); Surgeon: Orlando Ly MD;  [facility];   Service: Gastroenterology    ROTATOR CUFF REPAIR Bilateral     TOE SURGERY Left     left 5th toe shaved down due to repeated fx    TOTAL KNEE ARTHROPLASTY Left      History   Alcohol Use No     History   Drug Use No     History   Smoking Status    Former Smoker   Smokeless Tobacco    Never Used       Family History:   Family History   Problem Relation Age of Onset    Heart failure Mother     Other Mother         respiratory failure    Heart failure Father         respiratory failure    Hypertension Daughter     Arrhythmia Neg Hx     Clotting disorder Neg Hx     Fainting Neg Hx     Anuerysm Neg Hx     Stroke Neg Hx     Hyperlipidemia Neg Hx     Asthma Neg Hx        Allergies   Allergen Reactions    Ancef [Cefazolin] Hives and GI Intolerance    Aspirin GI Intolerance     Even low dose     Ciprofloxacin Hives    Other      STEROIDS- GI INTOLERANCE    Penicillins Hives    Prednisone Hives and GI Intolerance    Vancomycin Hives and GI Intolerance         Current Outpatient Prescriptions:     albuterol (PROVENTIL HFA,VENTOLIN HFA) 90 mcg/act inhaler, Inhale 2 puffs every 6 (six) hours as needed for wheezing, Disp: 1 Inhaler, Rfl: 0    Charcoal Activated (CHARCOCAPS) 260 MG CAPS, Take 1 tablet by mouth daily  , Disp: , Rfl:     diclofenac sodium (VOLTAREN) 1 %, Place on the skin, Disp: , Rfl:     ipratropium (ATROVENT) 0 06 % nasal spray, 2 sprays into each nostril 4 (four) times a day, Disp: 15 mL, Rfl: 0    Lactobacillus (ACIDOPHILUS) 100 MG CAPS, Take 1 capsule by mouth daily  , Disp: , Rfl:     lisinopril (ZESTRIL) 5 mg tablet, Take 5 mg by mouth daily  , Disp: , Rfl:     pantoprazole (PROTONIX) 40 mg tablet, Take 1 tablet (40 mg total) by mouth daily, Disp: 30 tablet, Rfl: 0    zolpidem (AMBIEN) 10 mg tablet, Take 1 tablet (10 mg total) by mouth daily at bedtime as needed for sleep, Disp: 30 tablet, Rfl: 1    ranitidine (ZANTAC) 150 mg tablet, Take 1 tablet (150 mg total) by mouth 2 (two) times a day (Patient not taking: Reported on 12/6/2018 ), Disp: 60 tablet, Rfl: 5    Objective   Vitals:   Vitals:    12/06/18 1454   BP: 120/60   BP Location: Left arm   Patient Position: Sitting   Cuff Size: Large   Pulse: 89   SpO2: 98%   Weight: 68 kg (150 lb)   Height: 4' 11" (1 499 m)          Physical Exam:     GEN: Alert and oriented x 3, in no acute distress  Well appearing and well nourished  HEENT: Sclera anicteric, conjunctivae pink, mucous membranes moist  Oropharynx clear  NECK: Supple, no carotid bruits, no significant JVD  Trachea midline, no thyromegaly  HEART: Regular rhythm, normal S1 and S2, no murmurs, clicks, gallops or rubs  PMI nondisplaced, no thrills  LUNGS: Clear to auscultation bilaterally; no wheezes, rales, or rhonchi  No increased work of breathing or signs of respiratory distress  ABDOMEN: Soft, nontender, nondistended, normoactive bowel sounds  EXTREMITIES: Skin warm and well perfused, no clubbing, cyanosis, or edema  NEURO: No focal findings  Normal gait  Normal speech  Mood and affect normal    SKIN: Normal without suspicious lesions on exposed skin      Lab Results:     Lab Results   Component Value Date    HGBA1C 5 8 11/13/2018    HGBA1C 5 6 05/03/2016     Lab Results   Component Value Date    CHOL 193 09/18/2015    CHOL 179 08/11/2014    CHOL 191 08/30/2013     Lab Results   Component Value Date    HDL 54 11/13/2018    HDL 66 (H) 10/27/2017    HDL 54 09/13/2016     Lab Results   Component Value Date    LDLCALC 89 11/13/2018    LDLCALC 106 (H) 10/27/2017    LDLCALC 107 (H) 09/13/2016     Lab Results   Component Value Date    TRIG 142 11/13/2018    TRIG 115 10/27/2017    TRIG 174 (H) 09/13/2016     No results found for: CHOLHDL

## 2018-12-06 ENCOUNTER — OFFICE VISIT (OUTPATIENT)
Dept: CARDIOLOGY CLINIC | Facility: MEDICAL CENTER | Age: 73
End: 2018-12-06
Payer: MEDICARE

## 2018-12-06 VITALS
DIASTOLIC BLOOD PRESSURE: 60 MMHG | HEIGHT: 59 IN | SYSTOLIC BLOOD PRESSURE: 120 MMHG | HEART RATE: 89 BPM | WEIGHT: 150 LBS | BODY MASS INDEX: 30.24 KG/M2 | OXYGEN SATURATION: 98 %

## 2018-12-06 DIAGNOSIS — I10 ESSENTIAL HYPERTENSION: Primary | ICD-10-CM

## 2018-12-06 DIAGNOSIS — R07.89 CHEST PRESSURE: ICD-10-CM

## 2018-12-06 PROCEDURE — 99214 OFFICE O/P EST MOD 30 MIN: CPT | Performed by: INTERNAL MEDICINE

## 2019-01-25 ENCOUNTER — TELEPHONE (OUTPATIENT)
Dept: PAIN MEDICINE | Facility: CLINIC | Age: 74
End: 2019-01-25

## 2019-01-25 NOTE — TELEPHONE ENCOUNTER
Patient  [phone]  Dr Luciano Lesch    Patient is calling in to ask if she needs to make a follow up appt with you  She is going in for the Neuro EMG test that you are sending her for on 1/31/19  Please have someone reach out to patient thank you

## 2019-01-31 ENCOUNTER — HOSPITAL ENCOUNTER (OUTPATIENT)
Dept: NEUROLOGY | Facility: AMBULATORY SURGERY CENTER | Age: 74
Discharge: HOME | End: 2019-01-31
Payer: MEDICARE

## 2019-01-31 DIAGNOSIS — M54.12 CERVICAL RADICULITIS: ICD-10-CM

## 2019-01-31 PROCEDURE — 95909 NRV CNDJ TST 5-6 STUDIES: CPT | Performed by: PSYCHIATRY & NEUROLOGY

## 2019-01-31 PROCEDURE — 95886 MUSC TEST DONE W/N TEST COMP: CPT | Performed by: PSYCHIATRY & NEUROLOGY

## 2019-02-11 ENCOUNTER — OFFICE VISIT (OUTPATIENT)
Dept: FAMILY MEDICINE CLINIC | Facility: MEDICAL CENTER | Age: 74
End: 2019-02-11
Payer: MEDICARE

## 2019-02-11 ENCOUNTER — TELEPHONE (OUTPATIENT)
Dept: FAMILY MEDICINE CLINIC | Facility: MEDICAL CENTER | Age: 74
End: 2019-02-11

## 2019-02-11 VITALS — DIASTOLIC BLOOD PRESSURE: 70 MMHG | HEART RATE: 84 BPM | SYSTOLIC BLOOD PRESSURE: 110 MMHG | TEMPERATURE: 98.4 F

## 2019-02-11 DIAGNOSIS — R31.0 GROSS HEMATURIA: ICD-10-CM

## 2019-02-11 DIAGNOSIS — R39.9 URINARY SYMPTOM OR SIGN: Primary | ICD-10-CM

## 2019-02-11 LAB
SL AMB  POCT GLUCOSE, UA: ABNORMAL
SL AMB LEUKOCYTE ESTERASE,UA: ABNORMAL
SL AMB POCT BILIRUBIN,UA: ABNORMAL
SL AMB POCT BLOOD,UA: ABNORMAL
SL AMB POCT CLARITY,UA: ABNORMAL
SL AMB POCT COLOR,UA: YELLOW
SL AMB POCT KETONES,UA: ABNORMAL
SL AMB POCT NITRITE,UA: ABNORMAL
SL AMB POCT PH,UA: 6
SL AMB POCT SPECIFIC GRAVITY,UA: 1.01
SL AMB POCT URINE PROTEIN: ABNORMAL
SL AMB POCT UROBILINOGEN: 0.2

## 2019-02-11 PROCEDURE — 87186 SC STD MICRODIL/AGAR DIL: CPT | Performed by: FAMILY MEDICINE

## 2019-02-11 PROCEDURE — 81002 URINALYSIS NONAUTO W/O SCOPE: CPT | Performed by: FAMILY MEDICINE

## 2019-02-11 PROCEDURE — 99213 OFFICE O/P EST LOW 20 MIN: CPT | Performed by: FAMILY MEDICINE

## 2019-02-11 PROCEDURE — 87086 URINE CULTURE/COLONY COUNT: CPT | Performed by: FAMILY MEDICINE

## 2019-02-11 PROCEDURE — 87077 CULTURE AEROBIC IDENTIFY: CPT | Performed by: FAMILY MEDICINE

## 2019-02-11 RX ORDER — SULFAMETHOXAZOLE AND TRIMETHOPRIM 800; 160 MG/1; MG/1
1 TABLET ORAL EVERY 12 HOURS SCHEDULED
Qty: 10 TABLET | Refills: 0 | Status: SHIPPED | OUTPATIENT
Start: 2019-02-11 | End: 2019-02-16

## 2019-02-11 RX ORDER — HYDROCODONE BITARTRATE AND ACETAMINOPHEN 5; 325 MG/1; MG/1
1 TABLET ORAL EVERY 6 HOURS PRN
Qty: 6 TABLET | Refills: 0 | Status: SHIPPED | OUTPATIENT
Start: 2019-02-11 | End: 2019-02-19 | Stop reason: ALTCHOICE

## 2019-02-11 NOTE — TELEPHONE ENCOUNTER
China Jackson started with low back and abdominal pain on Friday  Was voiding and noted blood in her urine  She has had kidney stones in the past and this episode felt the same  This has continued since then but she no longer sees blood in her urine  She has pain with voiding and her lower back as yet today  Why she waited until the end of the day to call I am not sure she didn't have an answer when I inquired  She is aware she will probably need to go to the ED for evaluation this evening  Due to bad weather tomorrow I didn't want to place he in the schedule or make her suffer any longer  Aware I will call her back with instructions  Please address?

## 2019-02-11 NOTE — PROGRESS NOTES
Moises Damian started with lower abdominal pain 3 days agol  Right lower abdomen and back  Started with blood in the urine then next day  None since  Gets burning  with urination   Has urinary Frequency  Urinates less amounts  No fever or chills  No diarrhea, vomiting or loss of appetite  She had a similar episode in April 2017  CT scan did not show any stones and urine culture was positive  Symptoms resolved  O: /70 (Cuff Size: Large)   Pulse 84   Temp 98 4 °F (36 9 °C)   Appears only mildly uncomfortable  No CVA tenderness  Slight right lower quadrant and suprapubic tenderness    UA positive for RBCs and WBCs    Assessment  Urinary symptoms-likely UTI but also need to consider stones    Plan  Check urine C&S  Rx for Bactrim  Push fluids  Rx for Vicodin if needed  She will call to call with progress  If still with pain will get a CT scan

## 2019-02-13 ENCOUNTER — APPOINTMENT (EMERGENCY)
Dept: CT IMAGING | Facility: HOSPITAL | Age: 74
End: 2019-02-13
Payer: MEDICARE

## 2019-02-13 ENCOUNTER — TELEPHONE (OUTPATIENT)
Dept: FAMILY MEDICINE CLINIC | Facility: MEDICAL CENTER | Age: 74
End: 2019-02-13

## 2019-02-13 ENCOUNTER — HOSPITAL ENCOUNTER (EMERGENCY)
Facility: HOSPITAL | Age: 74
Discharge: HOME | End: 2019-02-13
Attending: EMERGENCY MEDICINE
Payer: MEDICARE

## 2019-02-13 VITALS
SYSTOLIC BLOOD PRESSURE: 150 MMHG | HEART RATE: 76 BPM | OXYGEN SATURATION: 96 % | RESPIRATION RATE: 17 BRPM | DIASTOLIC BLOOD PRESSURE: 72 MMHG | BODY MASS INDEX: 30.7 KG/M2 | WEIGHT: 152 LBS | TEMPERATURE: 97.9 F

## 2019-02-13 DIAGNOSIS — M54.9 MID BACK PAIN ON RIGHT SIDE: Primary | ICD-10-CM

## 2019-02-13 LAB
ALBUMIN SERPL BCP-MCNC: 4 G/DL (ref 3.5–5)
ALP SERPL-CCNC: 94 U/L (ref 46–116)
ALT SERPL W P-5'-P-CCNC: 27 U/L (ref 12–78)
ANION GAP SERPL CALCULATED.3IONS-SCNC: 12 MMOL/L (ref 4–13)
AST SERPL W P-5'-P-CCNC: 33 U/L (ref 5–45)
BASOPHILS # BLD AUTO: 0.04 THOUSANDS/ΜL (ref 0–0.1)
BASOPHILS NFR BLD AUTO: 1 % (ref 0–1)
BILIRUB SERPL-MCNC: 0.5 MG/DL (ref 0.2–1)
BILIRUB UR QL STRIP: NEGATIVE
BUN SERPL-MCNC: 15 MG/DL (ref 5–25)
CALCIUM SERPL-MCNC: 9.3 MG/DL (ref 8.3–10.1)
CHLORIDE SERPL-SCNC: 103 MMOL/L (ref 100–108)
CLARITY UR: CLEAR
CO2 SERPL-SCNC: 23 MMOL/L (ref 21–32)
COLOR UR: YELLOW
CREAT SERPL-MCNC: 0.91 MG/DL (ref 0.6–1.3)
EOSINOPHIL # BLD AUTO: 0.11 THOUSAND/ΜL (ref 0–0.61)
EOSINOPHIL NFR BLD AUTO: 1 % (ref 0–6)
ERYTHROCYTE [DISTWIDTH] IN BLOOD BY AUTOMATED COUNT: 13.4 % (ref 11.6–15.1)
GFR SERPL CREATININE-BSD FRML MDRD: 62 ML/MIN/1.73SQ M
GLUCOSE SERPL-MCNC: 83 MG/DL (ref 65–140)
GLUCOSE UR STRIP-MCNC: NEGATIVE MG/DL
HCT VFR BLD AUTO: 43.5 % (ref 34.8–46.1)
HGB BLD-MCNC: 14.2 G/DL (ref 11.5–15.4)
HGB UR QL STRIP.AUTO: NEGATIVE
IMM GRANULOCYTES # BLD AUTO: 0.04 THOUSAND/UL (ref 0–0.2)
IMM GRANULOCYTES NFR BLD AUTO: 1 % (ref 0–2)
KETONES UR STRIP-MCNC: NEGATIVE MG/DL
LEUKOCYTE ESTERASE UR QL STRIP: NEGATIVE
LIPASE SERPL-CCNC: 87 U/L (ref 73–393)
LYMPHOCYTES # BLD AUTO: 1.79 THOUSANDS/ΜL (ref 0.6–4.47)
LYMPHOCYTES NFR BLD AUTO: 22 % (ref 14–44)
MCH RBC QN AUTO: 29.9 PG (ref 26.8–34.3)
MCHC RBC AUTO-ENTMCNC: 32.6 G/DL (ref 31.4–37.4)
MCV RBC AUTO: 92 FL (ref 82–98)
MONOCYTES # BLD AUTO: 0.48 THOUSAND/ΜL (ref 0.17–1.22)
MONOCYTES NFR BLD AUTO: 6 % (ref 4–12)
NEUTROPHILS # BLD AUTO: 5.62 THOUSANDS/ΜL (ref 1.85–7.62)
NEUTS SEG NFR BLD AUTO: 69 % (ref 43–75)
NITRITE UR QL STRIP: NEGATIVE
NRBC BLD AUTO-RTO: 0 /100 WBCS
PH UR STRIP.AUTO: 7 [PH] (ref 4.5–8)
PLATELET # BLD AUTO: 232 THOUSANDS/UL (ref 149–390)
PMV BLD AUTO: 11.6 FL (ref 8.9–12.7)
POTASSIUM SERPL-SCNC: 4.7 MMOL/L (ref 3.5–5.3)
PROT SERPL-MCNC: 8.1 G/DL (ref 6.4–8.2)
PROT UR STRIP-MCNC: NEGATIVE MG/DL
RBC # BLD AUTO: 4.75 MILLION/UL (ref 3.81–5.12)
SODIUM SERPL-SCNC: 138 MMOL/L (ref 136–145)
SP GR UR STRIP.AUTO: 1.01 (ref 1–1.03)
UROBILINOGEN UR QL STRIP.AUTO: 0.2 E.U./DL
WBC # BLD AUTO: 8.08 THOUSAND/UL (ref 4.31–10.16)

## 2019-02-13 PROCEDURE — 80053 COMPREHEN METABOLIC PANEL: CPT | Performed by: EMERGENCY MEDICINE

## 2019-02-13 PROCEDURE — 74176 CT ABD & PELVIS W/O CONTRAST: CPT

## 2019-02-13 PROCEDURE — 81003 URINALYSIS AUTO W/O SCOPE: CPT

## 2019-02-13 PROCEDURE — 36415 COLL VENOUS BLD VENIPUNCTURE: CPT | Performed by: EMERGENCY MEDICINE

## 2019-02-13 PROCEDURE — 99284 EMERGENCY DEPT VISIT MOD MDM: CPT

## 2019-02-13 PROCEDURE — 85025 COMPLETE CBC W/AUTO DIFF WBC: CPT | Performed by: EMERGENCY MEDICINE

## 2019-02-13 PROCEDURE — 83690 ASSAY OF LIPASE: CPT | Performed by: EMERGENCY MEDICINE

## 2019-02-13 NOTE — TELEPHONE ENCOUNTER
Moises Bring called today with complaints of worsening back pain, she said it is not tolerable  Worse than when she was in the office  Does not want to take the pain medication because it makes her nauseous  I advised that if she is in that much pain, she should be seen at the ER  She said her son is home and can bring her to the ER as she cannot drive with the pain   EMILIANO

## 2019-02-13 NOTE — TELEPHONE ENCOUNTER
Patient called the office just got back home from the ED and was advised to give our office a call  Patient wants to speak with a nurse about the visit

## 2019-02-13 NOTE — ED PROVIDER NOTES
History  Chief Complaint   Patient presents with    Back Pain     Pt is having right sided back pain  Pt states that she was just diagnosed with a UTI and blood in her urine on Monday  Pt is still having a lot of back pain  75 yr female-- states earlier in week had mild dysuria/ hematuria- dx with uti - placed on bactrim - pain meds- recently with r mid back pain - no fevers- no n /v/d- no gyn comps- no rash in area- no lumbar radiculopathy type pain       History provided by:  Relative and patient   used: No        Prior to Admission Medications   Prescriptions Last Dose Informant Patient Reported? Taking? Charcoal Activated (CHARCOCAPS) 260 MG CAPS  Self Yes No   Sig: Take 1 tablet by mouth daily  HYDROcodone-acetaminophen (NORCO) 5-325 mg per tablet   No No   Sig: Take 1 tablet by mouth every 6 (six) hours as needed for painMax Daily Amount: 4 tablets   Lactobacillus (ACIDOPHILUS) 100 MG CAPS  Self Yes No   Sig: Take 1 capsule by mouth daily  albuterol (PROVENTIL HFA,VENTOLIN HFA) 90 mcg/act inhaler  Self No No   Sig: Inhale 2 puffs every 6 (six) hours as needed for wheezing   diclofenac sodium (VOLTAREN) 1 %  Self Yes No   Sig: Place on the skin   ipratropium (ATROVENT) 0 06 % nasal spray  Self No No   Si sprays into each nostril 4 (four) times a day   lisinopril (ZESTRIL) 5 mg tablet  Self Yes No   Sig: Take 5 mg by mouth daily     pantoprazole (PROTONIX) 40 mg tablet  Self No No   Sig: Take 1 tablet (40 mg total) by mouth daily   ranitidine (ZANTAC) 150 mg tablet  Self No No   Sig: Take 1 tablet (150 mg total) by mouth 2 (two) times a day   sulfamethoxazole-trimethoprim (BACTRIM DS) 800-160 mg per tablet   No No   Sig: Take 1 tablet by mouth every 12 (twelve) hours for 5 days   zolpidem (AMBIEN) 10 mg tablet  Self No No   Sig: Take 1 tablet (10 mg total) by mouth daily at bedtime as needed for sleep      Facility-Administered Medications: None       Past Medical History: Diagnosis Date    Arthritis     Carpal tunnel syndrome     GERD (gastroesophageal reflux disease)     Hiatal hernia     Hypertension     Insomnia     Pneumonia     Renal calculi     Sleep deprivation     chronically    Vitamin D deficiency     Vitamin D toxicity 2011    with leaky gut syndrome       Past Surgical History:   Procedure Laterality Date    BUNIONECTOMY Bilateral     CARPAL TUNNEL RELEASE Right 2015    ESOPHAGOGASTRODUODENOSCOPY N/A 5/5/2016    Procedure: ESOPHAGOGASTRODUODENOSCOPY (EGD); Surgeon: Vonda Campbell MD;  [facility]; Service:     NERVE BLOCK       R Knee    IN COLONOSCOPY FLX DX W/COLLJ SPEC WHEN PFRMD N/A 6/28/2016    Procedure: COLONOSCOPY;  Surgeon: Vonda Campbell MD;  [facility]; Service: Gastroenterology    IN ESOPHAGOGASTRODUODENOSCOPY TRANSORAL DIAGNOSTIC N/A 10/19/2018    Procedure: ESOPHAGOGASTRODUODENOSCOPY (EGD); Surgeon: Vonda Campbell MD;  [facility]; Service: Gastroenterology    ROTATOR CUFF REPAIR Bilateral     TOE SURGERY Left     left 5th toe shaved down due to repeated fx    TOTAL KNEE ARTHROPLASTY Left        Family History   Problem Relation Age of Onset    Heart failure Mother     Other Mother         respiratory failure    Heart failure Father         respiratory failure    Hypertension Daughter     Arrhythmia Neg Hx     Clotting disorder Neg Hx     Fainting Neg Hx     Anuerysm Neg Hx     Stroke Neg Hx     Hyperlipidemia Neg Hx     Asthma Neg Hx      I have reviewed and agree with the history as documented  Social History     Tobacco Use    Smoking status: Former Smoker    Smokeless tobacco: Never Used   Substance Use Topics    Alcohol use: No    Drug use: No        Review of Systems   Constitutional: Negative  HENT: Negative  Eyes: Negative  Respiratory: Negative  Cardiovascular: Negative  Gastrointestinal: Negative  Endocrine: Negative      Genitourinary: Positive for flank pain, frequency and hematuria  Negative for decreased urine volume, difficulty urinating, dyspareunia, dysuria, enuresis, genital sores, menstrual problem, pelvic pain, urgency, vaginal bleeding, vaginal discharge and vaginal pain  Musculoskeletal: Positive for back pain  Negative for arthralgias, gait problem, joint swelling, myalgias and neck pain  Skin: Negative  Allergic/Immunologic: Negative  Neurological: Negative  Hematological: Negative  Psychiatric/Behavioral: Negative  Physical Exam  Physical Exam   Constitutional: She is oriented to person, place, and time  She appears well-developed and well-nourished  No distress  avss-- pulse ox 96 % on ra- interpretation is normal- no intervention    HENT:   Head: Normocephalic and atraumatic  Eyes: Pupils are equal, round, and reactive to light  Conjunctivae and EOM are normal  Right eye exhibits no discharge  Left eye exhibits no discharge  No scleral icterus  Neck: Normal range of motion  Neck supple  No JVD present  No tracheal deviation present  No thyromegaly present  Cardiovascular: Normal rate, regular rhythm, normal heart sounds and intact distal pulses  Exam reveals no gallop and no friction rub  No murmur heard  Pulmonary/Chest: Effort normal and breath sounds normal  No stridor  No respiratory distress  She has no wheezes  She has no rales  She exhibits no tenderness  Abdominal: Soft  Bowel sounds are normal  She exhibits no distension and no mass  There is tenderness  There is no rebound and no guarding  No hernia  Mild r cva/ r mid flank tenderness to palpation -- no peritoneal signs-- no pulsatiel abd mass/bruit   Musculoskeletal: Normal range of motion  She exhibits no edema, tenderness or deformity  Equal bilateral radial/dp pulses- no ble edema/calf tenderness/assym/ erythema-- no pmt c/t/l/s spine- neg r slrt    Lymphadenopathy:     She has no cervical adenopathy     Neurological: She is alert and oriented to person, place, and time  No cranial nerve deficit or sensory deficit  She exhibits normal muscle tone  Coordination normal    Skin: Skin is warm  Capillary refill takes less than 2 seconds  No rash noted  She is not diaphoretic  No erythema  No pallor  Psychiatric: She has a normal mood and affect  Her behavior is normal  Judgment and thought content normal    Nursing note and vitals reviewed  Vital Signs  ED Triage Vitals [02/13/19 1142]   Temperature Pulse Respirations Blood Pressure SpO2   97 9 °F (36 6 °C) 76 16 (!) 198/81 99 %      Temp Source Heart Rate Source Patient Position - Orthostatic VS BP Location FiO2 (%)   Oral Monitor Sitting Left arm --      Pain Score       7           Vitals:    02/13/19 1142 02/13/19 1421 02/13/19 1515   BP: (!) 198/81 146/68 150/72   Pulse: 76 79 76   Patient Position - Orthostatic VS: Sitting  Lying       Visual Acuity      ED Medications  Medications - No data to display    Diagnostic Studies  Results Reviewed     Procedure Component Value Units Date/Time    Comprehensive metabolic panel [[id number]] Collected:  02/13/19 1427    Lab Status:  Final result Specimen:  Blood from Arm, Left Updated:  02/13/19 1511     Sodium 138 mmol/L      Potassium 4 7 mmol/L      Chloride 103 mmol/L      CO2 23 mmol/L      ANION GAP 12 mmol/L      BUN 15 mg/dL      Creatinine 0 91 mg/dL      Glucose 83 mg/dL      Calcium 9 3 mg/dL      AST 33 U/L      ALT 27 U/L      Alkaline Phosphatase 94 U/L      Total Protein 8 1 g/dL      Albumin 4 0 g/dL      Total Bilirubin 0 50 mg/dL      eGFR 62 ml/min/1 73sq m     Narrative:       National Kidney Disease Education Program recommendations are as follows:  GFR calculation is accurate only with a steady state creatinine  Chronic Kidney disease less than 60 ml/min/1 73 sq  meters  Kidney failure less than 15 ml/min/1 73 sq  meters      Lipase [[id number]]  (Normal) Collected:  02/13/19 1427    Lab Status:  Final result Specimen:  Blood from Arm, Left Updated: 02/13/19 1510     Lipase 87 u/L     CBC and differential [[id number]] Collected:  02/13/19 1427    Lab Status:  Final result Specimen:  Blood from Arm, Left Updated:  02/13/19 1436     WBC 8 08 Thousand/uL      RBC 4 75 Million/uL      Hemoglobin 14 2 g/dL      Hematocrit 43 5 %      MCV 92 fL      MCH 29 9 pg      MCHC 32 6 g/dL      RDW 13 4 %      MPV 11 6 fL      Platelets 944 Thousands/uL      nRBC 0 /100 WBCs      Neutrophils Relative 69 %      Immat GRANS % 1 %      Lymphocytes Relative 22 %      Monocytes Relative 6 %      Eosinophils Relative 1 %      Basophils Relative 1 %      Neutrophils Absolute 5 62 Thousands/µL      Immature Grans Absolute 0 04 Thousand/uL      Lymphocytes Absolute 1 79 Thousands/µL      Monocytes Absolute 0 48 Thousand/µL      Eosinophils Absolute 0 11 Thousand/µL      Basophils Absolute 0 04 Thousands/µL     ED Urine Macroscopic [[id number]] Collected:  02/13/19 1241    Lab Status:  Final result Specimen:  Urine Updated:  02/13/19 1239     Color, UA Yellow     Clarity, UA Clear     pH, UA 7 0     Leukocytes, UA Negative     Nitrite, UA Negative     Protein, UA Negative mg/dl      Glucose, UA Negative mg/dl      Ketones, UA Negative mg/dl      Urobilinogen, UA 0 2 E U /dl      Bilirubin, UA Negative     Blood, UA Negative     Specific Gravity, UA 1 015    Narrative:       [facility] RESULT                 CT renal stone study abdomen pelvis without contrast   Final Result by Caridad Kraft MD (02/13 1502)      There is no evidence of urinary tract stone disease  Colonic diverticulosis without diverticulitis  Fatty liver change               Workstation performed: [id number]                    Procedures  Procedures       Phone Contacts  ED Phone Contact    ED Course  ED Course as of Feb 13 1540 Wed Feb 13, 2019   1406 - er md note- pt offered pain medication- refuses at this point      1409 Er md note- 2/11/19 uc report reviewed by er md -- 9/18 ruq u/s report reviewed by er md                                   MDM    Disposition  Final diagnoses:   Mid back pain on right side     Time reflects when diagnosis was documented in both MDM as applicable and the Disposition within this note     Time User Action Codes Description Comment    2/13/2019  3:27 PM Gilles Frederick Add [M54 9] Mid back pain on right side       ED Disposition     ED Disposition Condition Date/Time Comment    Discharge Stable Wed Feb 13, 2019  3:26 PM Fox Zafar discharge to home/self care  Follow-up Information    None         Patient's Medications   Discharge Prescriptions    No medications on file     No discharge procedures on file      ED Provider  Electronically Signed by           Wing Roberta MD  02/19/19 7236

## 2019-02-13 NOTE — DISCHARGE INSTRUCTIONS
Diagnosis ; right mid back pain -- possible right kidney infection- pyelonephritis     - activity as tolerated     - please finish  the antibiotics and use your pain medication  as needed     - please call your primary doctor tomorrow to schedule an appointment for a recheck within 1 week --     - your urine did grow out bacteria      - please return to  the er for any fevers- temp > 100 4/ any worsening/ intractable pain / any persistent vomiting  or any new/ worsening/concerning symptoms to you

## 2019-02-14 LAB — BACTERIA UR CULT: ABNORMAL

## 2019-02-15 ENCOUNTER — TELEPHONE (OUTPATIENT)
Dept: FAMILY MEDICINE CLINIC | Facility: MEDICAL CENTER | Age: 74
End: 2019-02-15

## 2019-02-15 NOTE — TELEPHONE ENCOUNTER
----- Message from Aracelis Chino MD sent at 2/15/2019 12:44 AM EST -----  Notify urine culture was positive  She should be finishing out 5 days of  the sulfa antibiotic I gave her  How is she feeling? ED report showed no stone  Did that advised her anything else?

## 2019-02-15 NOTE — TELEPHONE ENCOUNTER
Pt aware - feeling better today, flank pain is at a 3 where Wednesday was a 9   Finishes ABT tomorrow

## 2019-02-19 ENCOUNTER — OFFICE VISIT (OUTPATIENT)
Dept: FAMILY MEDICINE CLINIC | Facility: MEDICAL CENTER | Age: 74
End: 2019-02-19
Payer: MEDICARE

## 2019-02-19 VITALS
SYSTOLIC BLOOD PRESSURE: 122 MMHG | RESPIRATION RATE: 16 BRPM | BODY MASS INDEX: 29.91 KG/M2 | TEMPERATURE: 97 F | WEIGHT: 148.38 LBS | HEIGHT: 59 IN | DIASTOLIC BLOOD PRESSURE: 76 MMHG | HEART RATE: 70 BPM

## 2019-02-19 DIAGNOSIS — R39.9 URINARY SYMPTOM OR SIGN: Primary | ICD-10-CM

## 2019-02-19 LAB
SL AMB  POCT GLUCOSE, UA: NORMAL
SL AMB LEUKOCYTE ESTERASE,UA: NORMAL
SL AMB POCT BILIRUBIN,UA: NORMAL
SL AMB POCT BLOOD,UA: NORMAL
SL AMB POCT CLARITY,UA: CLEAR
SL AMB POCT COLOR,UA: YELLOW
SL AMB POCT KETONES,UA: NORMAL
SL AMB POCT NITRITE,UA: NORMAL
SL AMB POCT PH,UA: 6
SL AMB POCT SPECIFIC GRAVITY,UA: 1.01
SL AMB POCT URINE PROTEIN: NORMAL
SL AMB POCT UROBILINOGEN: NORMAL

## 2019-02-19 PROCEDURE — 99213 OFFICE O/P EST LOW 20 MIN: CPT | Performed by: FAMILY MEDICINE

## 2019-02-19 PROCEDURE — 81002 URINALYSIS NONAUTO W/O SCOPE: CPT | Performed by: FAMILY MEDICINE

## 2019-02-20 NOTE — PROGRESS NOTES
Arsh sweet was seen here last week for a possible kidney stone  She had right-sided low back pain along with dysuria  Urinalysis was  positive for blood  She was started on antibiotics  Urine culture was positive for E coli and she was advised to continue her treatment with Bactrim  However her pain got worse over the next several days and she was seen in the emergency department  CT scan of the abdomen pelvis was unremarkable  No stones seen  Patient finished her Bactrim for 5 days  She notes that the pain is now about a 3/10 is on the his right mid back as before  In addition she continues to have urinary discomfort especially had the end of urination  She otherwise feels fine with no fever or chills  No hematuria  O: /76   Pulse 70   Temp (!) 97 °F (36 1 °C)   Resp 16   Ht 4' 11" (1 499 m)   Wt 67 3 kg (148 lb 6 oz)   BMI 29 97 kg/m²   Some tenderness is noted over the right low back  No CVA tenderness  Abdomen benign with no suprapubic tenderness  Urine culture   02/11/2019 50,0000 -[id number] colonies of E coli    Assessment    Urinary tract infection-has finished antibiotics  However still symptomatic with dysuria  No evidence for stones on imaging  Plan  Will refer to Urology  Encouraged hydration

## 2019-02-25 ENCOUNTER — TELEPHONE (OUTPATIENT)
Dept: UROLOGY | Facility: CLINIC | Age: 74
End: 2019-02-25

## 2019-02-25 ENCOUNTER — OFFICE VISIT (OUTPATIENT)
Dept: UROLOGY | Facility: CLINIC | Age: 74
End: 2019-02-25
Payer: MEDICARE

## 2019-02-25 VITALS
SYSTOLIC BLOOD PRESSURE: 148 MMHG | DIASTOLIC BLOOD PRESSURE: 100 MMHG | HEART RATE: 70 BPM | HEIGHT: 59 IN | BODY MASS INDEX: 30.08 KG/M2 | WEIGHT: 149.2 LBS

## 2019-02-25 DIAGNOSIS — R39.9 URINARY SYMPTOM OR SIGN: ICD-10-CM

## 2019-02-25 DIAGNOSIS — R10.11 RUQ PAIN: ICD-10-CM

## 2019-02-25 DIAGNOSIS — R31.0 GROSS HEMATURIA: Primary | ICD-10-CM

## 2019-02-25 PROCEDURE — 99204 OFFICE O/P NEW MOD 45 MIN: CPT | Performed by: UROLOGY

## 2019-02-25 PROCEDURE — 51798 US URINE CAPACITY MEASURE: CPT | Performed by: UROLOGY

## 2019-02-25 NOTE — LETTER
February 25, 2019     Beena Coburn MD  [facility] [address]    Patient: Long Crenshaw   YOB: 1945   Date of Visit: 2/25/2019       Dear Dr Maria Isabel Asher: Thank you for referring Long Crenshaw to me for evaluation  Below are my notes for this consultation  If you have questions, please do not hesitate to call me  I look forward to following your patient along with you  Sincerely,        Janel Baptiste MD        CC: No Recipients  Janel Baptiste MD  2/25/2019 11:13 AM  Sign at close encounter       Problem List Items Addressed This Visit        Genitourinary    Gross hematuria - Primary    Relevant Orders    Basic metabolic panel    CT abdomen pelvis w wo contrast    Cystoscopy    Cytology, urine       Other    RUQ pain      Other Visit Diagnoses     Urinary symptom or sign                Discussion:  I talked with the patient about the differential diagnosis of gross hematuria and her remote smoking history  It does appear that clinically the differential diagnosis includes recently passed kidney stone given her history of nephrolithiasis (despite negative imaging workup at the time of max symptoms), pyelonephritis, urinary tract infection, or potential ureteral lesion  We will proceed to cystoscopy and contrast enhanced imaging by way of CT urogram   I have also ordered a urine cytology  PVR today is 35 milliliters indicating complete bladder emptying  Assessment and plan:       Please see problem oriented charting for the assessment plan of today's urological complaints    Janel Baptiste MD      Chief Complaint     Chief Complaint   Patient presents with    Urinary Frequency    Gross hematuria      History of Present Illness     Long Crenshaw is a 75 y o  Detailed Urologic History     - please refer to HPI    Review of Systems     Review of Systems   Constitutional: Negative  HENT: Negative  Eyes: Negative  Respiratory: Negative  Cardiovascular: Negative  Gastrointestinal: Positive for abdominal pain  Endocrine: Negative  Genitourinary: Positive for flank pain  Skin: Negative  Allergic/Immunologic: Negative  Neurological: Negative  Hematological: Negative  Psychiatric/Behavioral: Negative  Allergies     Allergies   Allergen Reactions    Ancef [Cefazolin] Hives and GI Intolerance    Aspirin GI Intolerance     Even low dose     Ciprofloxacin Hives    Other      STEROIDS- GI INTOLERANCE    Penicillins Hives    Prednisone Hives and GI Intolerance    Vancomycin Hives and GI Intolerance       Physical Exam     Physical Exam   Constitutional: She is oriented to person, place, and time  She appears well-developed and well-nourished  No distress  Pleasant disposition, appears nontoxic   HENT:   Head: Normocephalic and atraumatic  Right Ear: External ear normal    Left Ear: External ear normal    Nose: Nose normal    Eyes: Pupils are equal, round, and reactive to light  Conjunctivae and EOM are normal  Right eye exhibits no discharge  Left eye exhibits no discharge  No scleral icterus  Neck: Normal range of motion  Neck supple  No tracheal deviation present  No thyromegaly present  Cardiovascular: Normal rate, regular rhythm and intact distal pulses  Pulmonary/Chest: Effort normal  No stridor  No respiratory distress  She has no wheezes  Abdominal: Soft  Bowel sounds are normal  She exhibits no distension and no mass  There is no tenderness  There is no rebound and no guarding  No hernia  No suprapubic or CVA tenderness   Genitourinary:   Genitourinary Comments: Bladder is not palpable, PVR is 35 milliliters   Musculoskeletal: She exhibits no edema, tenderness or deformity  Neurological: She is alert and oriented to person, place, and time  No cranial nerve deficit  Coordination normal    Skin: Skin is warm and dry  No rash noted  She is not diaphoretic  No erythema  No pallor     Psychiatric: She has a normal mood and affect  Her behavior is normal  Judgment and thought content normal    Nursing note and vitals reviewed  Vital Signs  Vitals:    02/25/19 1018   BP: 148/100   BP Location: Left arm   Patient Position: Sitting   Cuff Size: Adult   Pulse: 70   Weight: 67 7 kg (149 lb 3 2 oz)   Height: 4' 11" (1 499 m)         Current Medications       Current Outpatient Medications:     albuterol (PROVENTIL HFA,VENTOLIN HFA) 90 mcg/act inhaler, Inhale 2 puffs every 6 (six) hours as needed for wheezing, Disp: 1 Inhaler, Rfl: 0    Charcoal Activated (CHARCOCAPS) 260 MG CAPS, Take 1 tablet by mouth daily  , Disp: , Rfl:     ipratropium (ATROVENT) 0 06 % nasal spray, 2 sprays into each nostril 4 (four) times a day, Disp: 15 mL, Rfl: 0    zolpidem (AMBIEN) 10 mg tablet, Take 1 tablet (10 mg total) by mouth daily at bedtime as needed for sleep, Disp: 30 tablet, Rfl: 1    Lactobacillus (ACIDOPHILUS) 100 MG CAPS, Take 1 capsule by mouth daily  , Disp: , Rfl:     lisinopril (ZESTRIL) 5 mg tablet, Take 5 mg by mouth daily  , Disp: , Rfl:       Active Problems     Patient Active Problem List   Diagnosis    Chest pressure    GERD (gastroesophageal reflux disease)    Sleep deprivation    Essential hypertension    Arthritis of knee    Left knee pain    Lower back pain    Primary osteoarthritis of right knee    Globus sensation    RUQ pain    Gastroesophageal reflux disease without esophagitis    Left shoulder pain    Right shoulder pain    Tendinopathy of left rotator cuff    Tendinopathy of right rotator cuff    Cervical radiculitis    Carpal tunnel syndrome of right wrist    Gross hematuria         Past Medical History     Past Medical History:   Diagnosis Date    Arthritis     Carpal tunnel syndrome     GERD (gastroesophageal reflux disease)     Hiatal hernia     Hypertension     Insomnia     Pneumonia     Renal calculi     Sleep deprivation     chronically    Vitamin D deficiency  Vitamin D toxicity 2011    with leaky gut syndrome         Surgical History     Past Surgical History:   Procedure Laterality Date    BUNIONECTOMY Bilateral     CARPAL TUNNEL RELEASE Right 2015    ESOPHAGOGASTRODUODENOSCOPY N/A 5/5/2016    Procedure: ESOPHAGOGASTRODUODENOSCOPY (EGD); Surgeon: Ofelia Leslie MD;  [facility]; Service:     NERVE BLOCK       R Knee    RI COLONOSCOPY FLX DX W/COLLJ SPEC WHEN PFRMD N/A 6/28/2016    Procedure: COLONOSCOPY;  Surgeon: Ofelia Leslie MD;  [facility]; Service: Gastroenterology    RI ESOPHAGOGASTRODUODENOSCOPY TRANSORAL DIAGNOSTIC N/A 10/19/2018    Procedure: ESOPHAGOGASTRODUODENOSCOPY (EGD); Surgeon: Ofelia Leslie MD;  [facility]; Service: Gastroenterology    ROTATOR CUFF REPAIR Bilateral     TOE SURGERY Left     left 5th toe shaved down due to repeated fx    TOTAL KNEE ARTHROPLASTY Left          Family History     Family History   Problem Relation Age of Onset    Heart failure Mother     Other Mother         respiratory failure    Heart failure Father         respiratory failure    Hypertension Daughter     Arrhythmia Neg Hx     Clotting disorder Neg Hx     Fainting Neg Hx     Anuerysm Neg Hx     Stroke Neg Hx     Hyperlipidemia Neg Hx     Asthma Neg Hx          Social History     Social History     Social History     Tobacco Use   Smoking Status Former Smoker   Smokeless Tobacco Never Used         Pertinent Lab Values     Lab Results   Component Value Date    CREATININE 0 91 02/13/2019               Pertinent Imaging       The patient's images were reviewed by me personally and also in real time with them in the examination room using our PACS imaging system  The imaging findings are significant for no stone disease in the right kidney, there is some christina ureteral inflammation on the noncontrast images      I did discuss with the patient the need for contrast enhanced imaging given gross hematuria and she agrees to this imaging workup

## 2019-02-25 NOTE — PATIENT INSTRUCTIONS
Urinary Traction Infection in Older Adults   WHAT YOU NEED TO KNOW:   What is a urinary tract infection? A urinary tract infection (UTI) is caused by bacteria that get inside your urinary tract  Your urinary tract includes your kidneys, ureters, bladder, and urethra  Urine is made in your kidneys, and it flows from the ureters to the bladder  Urine leaves the bladder through the urethra  A UTI is more common in your lower urinary tract, which includes your bladder and urethra  What increases my risk for a UTI? · A UTI within the last 3 months    · Menopause in women    · Enlarged prostate in men    · Medicines that affect urination    · Urinary incontinence (you cannot control your bladder)     · Sexual intercourse    · Medical conditions, such as diabetes or obesity    · Urinary tract problems, such as a narrowing, kidney stones, or inability to empty your bladder completely  What are the signs and symptoms of a UTI? · Fever and chills    · Pain or burning when you urinate    · Urine that smells bad or looks cloudy, or blood in your urine    · Urinating more often or waking from sleep to urinate    · Sudden, strong need to urinate    · Pain or pressure in your lower abdomen     · Leaking urine    · Confusion or agitation    · Fatigue, shakiness, and weakness  How is a UTI diagnosed? Your healthcare provider will ask about your symptoms  He or she may also examine you  You may need any of the following:  · A urinalysis  will give information about your urinary tract and overall health  · A urine culture  may show the type of germ causing the infection  How is a UTI treated? Medicines treat the bacterial infection or decrease pain and burning when you urinate  You may also need medicines to decrease the urge to urinate often  Your healthcare provider may recommend cranberry juice or cranberry supplements to help decrease your symptoms  How can I manage my symptoms? · Urinate when you feel the urge  Do not hold your urine because bacteria can grow in the bladder if urine stays in the bladder too long  It may be helpful to urinate at least every 3 to 4 hours  · Drink liquids as directed  Liquids can help flush bacteria from your urinary tract  Ask how much liquid to drink each day and which liquids are best for you  You may need to drink more liquids than usual to help flush out the bacteria  Do not drink alcohol, caffeine, and citrus juices  These can irritate your bladder and increase your symptoms  · Apply heat  on your abdomen for 20 to 30 minutes every 2 hours for as many days as directed  Heat helps decrease discomfort and pressure in your bladder  How can I help prevent a UTI? · Women should wipe front to back  after urinating or having a bowel movement  This may prevent germs from getting into the urinary tract  · Urinate after you have sex  to flush away bacteria that can enter your urinary tract during sex  · Wear cotton underwear and clothes that fit loose  Tight pants and nylon underwear can trap moisture and cause bacteria to grow  When should I seek immediate care? · You are urinating very little or not at all  · You are vomiting  · You have a high fever with shaking chills  · You have side or back pain that gets worse  When should I contact my healthcare provider? · You have a fever  · You are a woman and you have increased white or yellow discharge from your vagina  · You do not feel better after 2 days of taking antibiotics  · You have questions or concerns about your condition or care  CARE AGREEMENT:   You have the right to help plan your care  Learn about your health condition and how it may be treated  Discuss treatment options with your caregivers to decide what care you want to receive  You always have the right to refuse treatment  The above information is an  only   It is not intended as medical advice for individual conditions or treatments  Talk to your doctor, nurse or pharmacist before following any medical regimen to see if it is safe and effective for you  © 2017 [employer] Information is for End User's use only and may not be sold, redistributed or otherwise used for commercial purposes  All illustrations and images included in CareNotes® are the copyrighted property of [employer]  or [employer]

## 2019-02-25 NOTE — PROGRESS NOTES
Problem List Items Addressed This Visit        Genitourinary    Gross hematuria - Primary    Relevant Orders    Basic metabolic panel    CT abdomen pelvis w wo contrast    Cystoscopy    Cytology, urine       Other    RUQ pain      Other Visit Diagnoses     Urinary symptom or sign                Discussion:  I talked with the patient about the differential diagnosis of gross hematuria and her remote smoking history  It does appear that clinically the differential diagnosis includes recently passed kidney stone given her history of nephrolithiasis (despite negative imaging workup at the time of max symptoms), pyelonephritis, urinary tract infection, or potential ureteral lesion  We will proceed to cystoscopy and contrast enhanced imaging by way of CT urogram   I have also ordered a urine cytology  PVR today is 35 milliliters indicating complete bladder emptying  Assessment and plan:       Please see problem oriented charting for the assessment plan of today's urological complaints    Regis Sun MD      Chief Complaint     Chief Complaint   Patient presents with    Urinary Frequency    Gross hematuria      History of Present Illness     Fox Zafar is a 75 y o  Detailed Urologic History     - please refer to HPI    Review of Systems     Review of Systems   Constitutional: Negative  HENT: Negative  Eyes: Negative  Respiratory: Negative  Cardiovascular: Negative  Gastrointestinal: Positive for abdominal pain  Endocrine: Negative  Genitourinary: Positive for flank pain  Skin: Negative  Allergic/Immunologic: Negative  Neurological: Negative  Hematological: Negative  Psychiatric/Behavioral: Negative                Allergies     Allergies   Allergen Reactions    Ancef [Cefazolin] Hives and GI Intolerance    Aspirin GI Intolerance     Even low dose     Ciprofloxacin Hives    Other      STEROIDS- GI INTOLERANCE    Penicillins Hives    Prednisone Hives and GI Intolerance    Vancomycin Hives and GI Intolerance       Physical Exam     Physical Exam   Constitutional: She is oriented to person, place, and time  She appears well-developed and well-nourished  No distress  Pleasant disposition, appears nontoxic   HENT:   Head: Normocephalic and atraumatic  Right Ear: External ear normal    Left Ear: External ear normal    Nose: Nose normal    Eyes: Pupils are equal, round, and reactive to light  Conjunctivae and EOM are normal  Right eye exhibits no discharge  Left eye exhibits no discharge  No scleral icterus  Neck: Normal range of motion  Neck supple  No tracheal deviation present  No thyromegaly present  Cardiovascular: Normal rate, regular rhythm and intact distal pulses  Pulmonary/Chest: Effort normal  No stridor  No respiratory distress  She has no wheezes  Abdominal: Soft  Bowel sounds are normal  She exhibits no distension and no mass  There is no tenderness  There is no rebound and no guarding  No hernia  No suprapubic or CVA tenderness   Genitourinary:   Genitourinary Comments: Bladder is not palpable, PVR is 35 milliliters   Musculoskeletal: She exhibits no edema, tenderness or deformity  Neurological: She is alert and oriented to person, place, and time  No cranial nerve deficit  Coordination normal    Skin: Skin is warm and dry  No rash noted  She is not diaphoretic  No erythema  No pallor  Psychiatric: She has a normal mood and affect  Her behavior is normal  Judgment and thought content normal    Nursing note and vitals reviewed            Vital Signs  Vitals:    02/25/19 1018   BP: 148/100   BP Location: Left arm   Patient Position: Sitting   Cuff Size: Adult   Pulse: 70   Weight: 67 7 kg (149 lb 3 2 oz)   Height: 4' 11" (1 499 m)         Current Medications       Current Outpatient Medications:     albuterol (PROVENTIL HFA,VENTOLIN HFA) 90 mcg/act inhaler, Inhale 2 puffs every 6 (six) hours as needed for wheezing, Disp: 1 Inhaler, Rfl: 0   Charcoal Activated (CHARCOCAPS) 260 MG CAPS, Take 1 tablet by mouth daily  , Disp: , Rfl:     ipratropium (ATROVENT) 0 06 % nasal spray, 2 sprays into each nostril 4 (four) times a day, Disp: 15 mL, Rfl: 0    zolpidem (AMBIEN) 10 mg tablet, Take 1 tablet (10 mg total) by mouth daily at bedtime as needed for sleep, Disp: 30 tablet, Rfl: 1    Lactobacillus (ACIDOPHILUS) 100 MG CAPS, Take 1 capsule by mouth daily  , Disp: , Rfl:     lisinopril (ZESTRIL) 5 mg tablet, Take 5 mg by mouth daily  , Disp: , Rfl:       Active Problems     Patient Active Problem List   Diagnosis    Chest pressure    GERD (gastroesophageal reflux disease)    Sleep deprivation    Essential hypertension    Arthritis of knee    Left knee pain    Lower back pain    Primary osteoarthritis of right knee    Globus sensation    RUQ pain    Gastroesophageal reflux disease without esophagitis    Left shoulder pain    Right shoulder pain    Tendinopathy of left rotator cuff    Tendinopathy of right rotator cuff    Cervical radiculitis    Carpal tunnel syndrome of right wrist    Gross hematuria         Past Medical History     Past Medical History:   Diagnosis Date    Arthritis     Carpal tunnel syndrome     GERD (gastroesophageal reflux disease)     Hiatal hernia     Hypertension     Insomnia     Pneumonia     Renal calculi     Sleep deprivation     chronically    Vitamin D deficiency     Vitamin D toxicity 2011    with leaky gut syndrome         Surgical History     Past Surgical History:   Procedure Laterality Date    BUNIONECTOMY Bilateral     CARPAL TUNNEL RELEASE Right 2015    ESOPHAGOGASTRODUODENOSCOPY N/A 5/5/2016    Procedure: ESOPHAGOGASTRODUODENOSCOPY (EGD); Surgeon: Mandy Beard MD;  [facility]; Service:     NERVE BLOCK       R Knee    RI COLONOSCOPY FLX DX W/COLLJ SPEC WHEN PFRMD N/A 6/28/2016    Procedure: COLONOSCOPY;  Surgeon: Mandy Beard MD;  [facility];   Service: Gastroenterology   Kimmy AR ESOPHAGOGASTRODUODENOSCOPY TRANSORAL DIAGNOSTIC N/A 10/19/2018    Procedure: ESOPHAGOGASTRODUODENOSCOPY (EGD); Surgeon: Yue Agrawal MD;  [facility]; Service: Gastroenterology    ROTATOR CUFF REPAIR Bilateral     TOE SURGERY Left     left 5th toe shaved down due to repeated fx    TOTAL KNEE ARTHROPLASTY Left          Family History     Family History   Problem Relation Age of Onset    Heart failure Mother     Other Mother         respiratory failure    Heart failure Father         respiratory failure    Hypertension Daughter     Arrhythmia Neg Hx     Clotting disorder Neg Hx     Fainting Neg Hx     Anuerysm Neg Hx     Stroke Neg Hx     Hyperlipidemia Neg Hx     Asthma Neg Hx          Social History     Social History     Social History     Tobacco Use   Smoking Status Former Smoker   Smokeless Tobacco Never Used         Pertinent Lab Values     Lab Results   Component Value Date    CREATININE 0 91 02/13/2019               Pertinent Imaging       The patient's images were reviewed by me personally and also in real time with them in the examination room using our PACS imaging system  The imaging findings are significant for no stone disease in the right kidney, there is some christina ureteral inflammation on the noncontrast images      I did discuss with the patient the need for contrast enhanced imaging given gross hematuria and she agrees to this imaging workup

## 2019-02-25 NOTE — TELEPHONE ENCOUNTER
Per Dr Enedina Lugo, Return in about 1 month (around 3/25/2019) for for cystoscopy after CT urogram at [facility]  Pt has Ct scheduled for 3/4  Please assist with scheduling

## 2019-02-27 ENCOUNTER — OFFICE VISIT (OUTPATIENT)
Dept: OBGYN CLINIC | Facility: MEDICAL CENTER | Age: 74
End: 2019-02-27
Payer: MEDICARE

## 2019-02-27 ENCOUNTER — APPOINTMENT (OUTPATIENT)
Dept: LAB | Facility: MEDICAL CENTER | Age: 74
End: 2019-02-27
Payer: MEDICARE

## 2019-02-27 VITALS
HEART RATE: 69 BPM | SYSTOLIC BLOOD PRESSURE: 156 MMHG | WEIGHT: 147.4 LBS | DIASTOLIC BLOOD PRESSURE: 83 MMHG | BODY MASS INDEX: 29.77 KG/M2

## 2019-02-27 DIAGNOSIS — M70.50 PES ANSERINE BURSITIS: Primary | ICD-10-CM

## 2019-02-27 DIAGNOSIS — R31.0 GROSS HEMATURIA: ICD-10-CM

## 2019-02-27 DIAGNOSIS — M17.11 PRIMARY OSTEOARTHRITIS OF RIGHT KNEE: ICD-10-CM

## 2019-02-27 LAB
ANION GAP SERPL CALCULATED.3IONS-SCNC: 4 MMOL/L (ref 4–13)
BUN SERPL-MCNC: 18 MG/DL (ref 5–25)
CALCIUM SERPL-MCNC: 8.8 MG/DL (ref 8.3–10.1)
CHLORIDE SERPL-SCNC: 106 MMOL/L (ref 100–108)
CO2 SERPL-SCNC: 28 MMOL/L (ref 21–32)
CREAT SERPL-MCNC: 0.68 MG/DL (ref 0.6–1.3)
GFR SERPL CREATININE-BSD FRML MDRD: 86 ML/MIN/1.73SQ M
GLUCOSE P FAST SERPL-MCNC: 89 MG/DL (ref 65–99)
POTASSIUM SERPL-SCNC: 4 MMOL/L (ref 3.5–5.3)
SODIUM SERPL-SCNC: 138 MMOL/L (ref 136–145)

## 2019-02-27 PROCEDURE — 36415 COLL VENOUS BLD VENIPUNCTURE: CPT

## 2019-02-27 PROCEDURE — 20610 DRAIN/INJ JOINT/BURSA W/O US: CPT | Performed by: ORTHOPAEDIC SURGERY

## 2019-02-27 PROCEDURE — 99213 OFFICE O/P EST LOW 20 MIN: CPT | Performed by: ORTHOPAEDIC SURGERY

## 2019-02-27 PROCEDURE — 80048 BASIC METABOLIC PNL TOTAL CA: CPT

## 2019-02-27 PROCEDURE — 88112 CYTOPATH CELL ENHANCE TECH: CPT | Performed by: PATHOLOGY

## 2019-02-27 RX ORDER — BUPIVACAINE HYDROCHLORIDE 2.5 MG/ML
2 INJECTION, SOLUTION INFILTRATION; PERINEURAL
Status: COMPLETED | OUTPATIENT
Start: 2019-02-27 | End: 2019-02-27

## 2019-02-27 RX ADMIN — BUPIVACAINE HYDROCHLORIDE 2 ML: 2.5 INJECTION, SOLUTION INFILTRATION; PERINEURAL at 11:28

## 2019-02-27 NOTE — PROGRESS NOTES
Assessment:  1  Pes anserine bursitis  Large joint arthrocentesis: R pes anserine bursa   2  Primary osteoarthritis of right knee         Plan:     Weight Bearing  as Tolerated   Patient received right pes anserine bursitis injection with Toradol    Advised patient to massage the area with heat    Follow up 3 months           The above stated was discussed in layman's terms and the patient expressed understanding  All questions were answered to the patient's satisfaction  Subjective:   Sandee Cooks is a 75 y o  female who presents right knee pain  Patient states she is having inswelling in the lower medial knee and radiating pain to the mid shin area  She notes she is also having right lower leg cramping for about three months  Pain is worse at night  Some discomfort with walking and going up and down steps  She is taking Tylenol prn for pain   Review of systems negative unless otherwise specified in HPI    Past Medical History:   Diagnosis Date    Arthritis     Carpal tunnel syndrome     GERD (gastroesophageal reflux disease)     Hiatal hernia     Hypertension     Insomnia     Pneumonia     Renal calculi     Sleep deprivation     chronically    Vitamin D deficiency     Vitamin D toxicity 2011    with leaky gut syndrome       Past Surgical History:   Procedure Laterality Date    BUNIONECTOMY Bilateral     CARPAL TUNNEL RELEASE Right 2015    ESOPHAGOGASTRODUODENOSCOPY N/A 5/5/2016    Procedure: ESOPHAGOGASTRODUODENOSCOPY (EGD); Surgeon: Michelle Flores MD;  [facility]; Service:     NERVE BLOCK       R Knee    MN COLONOSCOPY FLX DX W/COLLJ SPEC WHEN PFRMD N/A 6/28/2016    Procedure: COLONOSCOPY;  Surgeon: Michelle Flores MD;  [facility]; Service: Gastroenterology    MN ESOPHAGOGASTRODUODENOSCOPY TRANSORAL DIAGNOSTIC N/A 10/19/2018    Procedure: ESOPHAGOGASTRODUODENOSCOPY (EGD); Surgeon: Michelle Flores MD;  [facility];   Service: Gastroenterology   Chikis Blackwell CUFF REPAIR Bilateral     TOE SURGERY Left     left 5th toe shaved down due to repeated fx    TOTAL KNEE ARTHROPLASTY Left        Family History   Problem Relation Age of Onset    Heart failure Mother     Other Mother         respiratory failure    Heart failure Father         respiratory failure    Hypertension Daughter     Arrhythmia Neg Hx     Clotting disorder Neg Hx     Fainting Neg Hx     Anuerysm Neg Hx     Stroke Neg Hx     Hyperlipidemia Neg Hx     Asthma Neg Hx        Social History     Occupational History    Not on file   Tobacco Use    Smoking status: Former Smoker    Smokeless tobacco: Never Used   Substance and Sexual Activity    Alcohol use: No    Drug use: No    Sexual activity: Not on file         Current Outpatient Medications:     albuterol (PROVENTIL HFA,VENTOLIN HFA) 90 mcg/act inhaler, Inhale 2 puffs every 6 (six) hours as needed for wheezing, Disp: 1 Inhaler, Rfl: 0    Charcoal Activated (CHARCOCAPS) 260 MG CAPS, Take 1 tablet by mouth daily  , Disp: , Rfl:     ipratropium (ATROVENT) 0 06 % nasal spray, 2 sprays into each nostril 4 (four) times a day, Disp: 15 mL, Rfl: 0    Lactobacillus (ACIDOPHILUS) 100 MG CAPS, Take 1 capsule by mouth daily  , Disp: , Rfl:     lisinopril (ZESTRIL) 5 mg tablet, Take 5 mg by mouth daily  , Disp: , Rfl:     zolpidem (AMBIEN) 10 mg tablet, Take 1 tablet (10 mg total) by mouth daily at bedtime as needed for sleep, Disp: 30 tablet, Rfl: 1    Allergies   Allergen Reactions    Ancef [Cefazolin] Hives and GI Intolerance    Aspirin GI Intolerance     Even low dose     Ciprofloxacin Hives    Other      STEROIDS- GI INTOLERANCE    Penicillins Hives    Prednisone Hives and GI Intolerance    Vancomycin Hives and GI Intolerance            Vitals:    02/27/19 1053   BP: 156/83   Pulse: 69       Objective:            Physical Exam  · General: Awake, Alert, Oriented  · Eyes: Pupils equal, round and reactive to light  · Heart: regular rate and rhythm  · Lungs: No audible wheezing  · Abdomen: soft                    Ortho Exam    Right knee    No lacerations, no open wounds, no abrasions  Varicose vein dispersed throughout the right lower extremity  Tenderness to palpation over the pes anserine bursa  Tenderness to palpation over the medial joint line  Neurovascularly Intact Distally     Diagnostics, reviewed and taken today if performed as documented:    None performed      Procedures, if performed today:    Large joint arthrocentesis: R pes anserine bursa  Date/Time: 2/27/2019 11:28 AM  Consent given by: patient  Site marked: site marked  Timeout: Immediately prior to procedure a time out was called to verify the correct patient, procedure, equipment, support staff and site/side marked as required   Supporting Documentation  Indications: pain   Procedure Details  Location: knee - R pes anserine bursa  Preparation: Patient was prepped and draped in the usual sterile fashion  Needle size: 22 G  Approach: anterolateral  Medications administered: 2 mL bupivacaine 0 25 % (2 mL  Ketorolac Tromethamine )    Patient tolerance: patient tolerated the procedure well with no immediate complications  Dressing:  Sterile dressing applied              Scribe Attestation    I,:   Geo Lutz am acting as a scribe while in the presence of the attending physician :        I,:   Smith Rincon MD personally performed the services described in this documentation    as scribed in my presence :              Portions of the record may have been created with voice recognition software  Occasional wrong word or "sound a like" substitutions may have occurred due to the inherent limitations of voice recognition software  Read the chart carefully and recognize, using context, where substitutions have occurred

## 2019-03-04 ENCOUNTER — HOSPITAL ENCOUNTER (OUTPATIENT)
Dept: RADIOLOGY | Facility: MEDICAL CENTER | Age: 74
Discharge: HOME | End: 2019-03-04
Payer: MEDICARE

## 2019-03-04 DIAGNOSIS — R31.0 GROSS HEMATURIA: ICD-10-CM

## 2019-03-04 PROCEDURE — 74178 CT ABD&PLV WO CNTR FLWD CNTR: CPT

## 2019-03-04 RX ADMIN — IOHEXOL 100 ML: 350 INJECTION, SOLUTION INTRAVENOUS at 10:38

## 2019-03-06 ENCOUNTER — TELEPHONE (OUTPATIENT)
Dept: UROLOGY | Facility: CLINIC | Age: 74
End: 2019-03-06

## 2019-03-06 ENCOUNTER — TELEPHONE (OUTPATIENT)
Dept: UROLOGY | Facility: MEDICAL CENTER | Age: 74
End: 2019-03-06

## 2019-03-06 NOTE — TELEPHONE ENCOUNTER
Patient's films reviewed, no urologic issues, she does appear to have an endometrial polyp or growth, as such we will refer her to her GYN physician for further workup and potential endometrial biopsy if deemed appropriate after their evaluation

## 2019-03-07 NOTE — TELEPHONE ENCOUNTER
I called and spoke to patient, informed patient that Dr Cata Barrera reviewed her CT scan results and that there was thickening of her endometrium which will need further evaluation by OB/GYN and recommend she schedule an appointment for evaluation and that DR Salas will review her CT scan results in detail at her next appointment  Patient states she is not established with an OB/GYN and she will call to schedule with Julian Krabbe  Advised her that her imaging results are in the [employer] computer  Verbal understanding given

## 2019-03-12 ENCOUNTER — APPOINTMENT (OUTPATIENT)
Dept: RADIOLOGY | Facility: MEDICAL CENTER | Age: 74
End: 2019-03-12
Payer: MEDICARE

## 2019-03-12 ENCOUNTER — OFFICE VISIT (OUTPATIENT)
Dept: FAMILY MEDICINE CLINIC | Facility: MEDICAL CENTER | Age: 74
End: 2019-03-12
Payer: MEDICARE

## 2019-03-12 VITALS
WEIGHT: 149.5 LBS | BODY MASS INDEX: 30.14 KG/M2 | HEART RATE: 80 BPM | SYSTOLIC BLOOD PRESSURE: 168 MMHG | RESPIRATION RATE: 16 BRPM | HEIGHT: 59 IN | DIASTOLIC BLOOD PRESSURE: 86 MMHG

## 2019-03-12 DIAGNOSIS — R10.9 ACUTE RIGHT FLANK PAIN: Primary | ICD-10-CM

## 2019-03-12 DIAGNOSIS — I10 ESSENTIAL HYPERTENSION: ICD-10-CM

## 2019-03-12 DIAGNOSIS — R10.9 ACUTE RIGHT FLANK PAIN: ICD-10-CM

## 2019-03-12 DIAGNOSIS — R31.0 GROSS HEMATURIA: ICD-10-CM

## 2019-03-12 PROCEDURE — 71101 X-RAY EXAM UNILAT RIBS/CHEST: CPT

## 2019-03-12 PROCEDURE — 72072 X-RAY EXAM THORAC SPINE 3VWS: CPT

## 2019-03-12 PROCEDURE — 99214 OFFICE O/P EST MOD 30 MIN: CPT | Performed by: FAMILY MEDICINE

## 2019-03-12 RX ORDER — AMLODIPINE BESYLATE 5 MG/1
5 TABLET ORAL DAILY
Qty: 30 TABLET | Refills: 5 | Status: SHIPPED | OUTPATIENT
Start: 2019-03-12 | End: 2019-03-22 | Stop reason: ALTCHOICE

## 2019-03-12 NOTE — PROGRESS NOTES
Alec Knife is here for f/u  She saw Urology for evaluation of her hematuria and flank pain  CT the abdomen and pelvis showed some dilation of the right renal pelvis  No stones  It also showed endometrial thickening in as well as a polypoid lesion in the uterus  She was referred to gyn and has an appointment soon  She will be getting further evaluation by Urology with cystoscopy soon  She says she still has pain in her right side  Constant dull ache  Not pleuritic   No cough or shortness of breath  Fatigued  Wakes from sleep  No back pain  No history of  disc disease  She stopped her lisinopril year ago due to nausea  Her blood pressure had been well controlled  O: /86 (BP Location: Left arm, Patient Position: Sitting, Cuff Size: Standard)   Pulse 80   Resp 16   Ht 4' 11" (1 499 m)   Wt 67 8 kg (149 lb 8 oz)   BMI 30 20 kg/m²   BP by me 160/86  Physical Exam   Constitutional: She appears well-developed and well-nourished  No distress  Neck: Carotid bruit is not present  No thyromegaly present  Cardiovascular: Normal rate, regular rhythm, normal heart sounds and intact distal pulses  Pulmonary/Chest: Effort normal and breath sounds normal    Abdominal: Soft  Bowel sounds are normal  She exhibits no mass  There is no hepatomegaly  There is no tenderness  Musculoskeletal: She exhibits no edema  Lymphadenopathy:     She has no cervical adenopathy  Does show significant tenderness over the right far lateral ribs   and slightly at the far lateral costal margin    Assessment  1  Hematuria/flank pain-further urology workup  2  Right flank pain-due to point tenderness will check x-rays of the ribs  3  Endometrial thickening/uterine lesion-follow up with gyn  4  Hypertension-should be back on antihypertensive  Due to side effects with lisinopril will start amlodipine  Side effects reviewed  Plan  Rx for amlodipine 5 mg  X-rays right ribs  Follow-up 1 month

## 2019-03-18 ENCOUNTER — TELEPHONE (OUTPATIENT)
Dept: FAMILY MEDICINE CLINIC | Facility: MEDICAL CENTER | Age: 74
End: 2019-03-18

## 2019-03-18 NOTE — TELEPHONE ENCOUNTER
----- Message from Beena Coburn MD sent at 3/17/2019  8:39 PM EDT -----  Notify patient that thoracic spine x-ray does show degenerative changes with arthritis and probably disc disease in the thoracic spine  She also has a curve of the spine to the right  This could be the cause of her pain  Will await her urologic workup and then if this is not contributing to the pain and then may need to refer her to Orthopedics  She should call me when she is done with Urology

## 2019-03-21 ENCOUNTER — TELEPHONE (OUTPATIENT)
Dept: FAMILY MEDICINE CLINIC | Facility: MEDICAL CENTER | Age: 74
End: 2019-03-21

## 2019-03-21 NOTE — TELEPHONE ENCOUNTER
Pt doesn't like her new bp med, she said it has her getting up every 2 hours at night to urinate  She's wondering if you can change it?   Pt uses [facility]

## 2019-03-21 NOTE — TELEPHONE ENCOUNTER
Would recommend we try losartan  Diuretic another option but if urinating at night may want to avoid  If ok will send to her pharmacy

## 2019-03-22 DIAGNOSIS — I10 ESSENTIAL HYPERTENSION: Primary | ICD-10-CM

## 2019-03-22 RX ORDER — LOSARTAN POTASSIUM 25 MG/1
25 TABLET ORAL DAILY
Qty: 30 TABLET | Refills: 5 | Status: SHIPPED | OUTPATIENT
Start: 2019-03-22 | End: 2019-09-06 | Stop reason: SDUPTHER

## 2019-03-22 NOTE — TELEPHONE ENCOUNTER
Rj Delgado is agreeable to the change  Send to the [facility]   The other medication had her awake every 2 hours during the night

## 2019-03-25 ENCOUNTER — OFFICE VISIT (OUTPATIENT)
Dept: OBGYN CLINIC | Facility: MEDICAL CENTER | Age: 74
End: 2019-03-25
Payer: MEDICARE

## 2019-03-25 VITALS
WEIGHT: 145 LBS | SYSTOLIC BLOOD PRESSURE: 130 MMHG | HEIGHT: 57 IN | BODY MASS INDEX: 31.28 KG/M2 | DIASTOLIC BLOOD PRESSURE: 70 MMHG

## 2019-03-25 DIAGNOSIS — R93.89 THICKENED ENDOMETRIUM: Primary | ICD-10-CM

## 2019-03-25 PROCEDURE — 99202 OFFICE O/P NEW SF 15 MIN: CPT | Performed by: PHYSICIAN ASSISTANT

## 2019-03-25 PROCEDURE — 88305 TISSUE EXAM BY PATHOLOGIST: CPT | Performed by: PATHOLOGY

## 2019-03-25 PROCEDURE — 58100 BIOPSY OF UTERUS LINING: CPT | Performed by: PHYSICIAN ASSISTANT

## 2019-03-25 NOTE — PROGRESS NOTES
Assessment/Plan  Problem List Items Addressed This Visit        Other    Thickened endometrium - Primary     Endometrial biopsy performed today, may have some bleeding and/or cramping  Will call patient with results  rx pelvic US to access lining and possible polyp better  Call or RTO if any heavy bleeding, severe abdominal pain or fever > 101         Relevant Orders    Tissue Exam    US pelvis complete w transvaginal        Subjective   Neeru Hernandez is a 75 y o  female who presents to our office as a new patient referred for thickened endometrium  Patient was having hematuria from kidney stone and had a CT abdomen/pelvis which incidentally found a endometrial thickness of 14mm on CT appeared to be polypoidal   She denies any postmenopausal bleeding  Patient states she had a endometrial biopsy "years ago in [facility] due to pelvic pain" per patient it was normal     Menstrual History:  OB History        3    Para   3    Term                AB        Living   2       SAB        TAB        Ectopic        Multiple        Live Births   2                No LMP recorded  Patient is postmenopausal        Past Medical History:   Diagnosis Date    Arthritis     Carpal tunnel syndrome     GERD (gastroesophageal reflux disease)     Hiatal hernia     Hypertension     Insomnia     Pneumonia     Renal calculi     Sleep deprivation     chronically    Vitamin D deficiency     Vitamin D toxicity     with leaky gut syndrome     Past Surgical History:   Procedure Laterality Date    BUNIONECTOMY Bilateral     CARPAL TUNNEL RELEASE Right     ESOPHAGOGASTRODUODENOSCOPY N/A 2016    Procedure: ESOPHAGOGASTRODUODENOSCOPY (EGD); Surgeon: Charmaine Edwards MD;  [facility]; Service:     NERVE BLOCK       R Knee    OOPHORECTOMY Left     NH COLONOSCOPY FLX DX W/COLLJ SPEC WHEN PFRMD N/A 2016    Procedure: COLONOSCOPY;  Surgeon: Charmaine Edwards MD;  [facility];   Service: Gastroenterology  AR ESOPHAGOGASTRODUODENOSCOPY TRANSORAL DIAGNOSTIC N/A 10/19/2018    Procedure: ESOPHAGOGASTRODUODENOSCOPY (EGD); Surgeon: Mabel eJwell MD;  [facility]; Service: Gastroenterology    ROTATOR CUFF REPAIR Bilateral     TOE SURGERY Left     left 5th toe shaved down due to repeated fx    TOTAL KNEE ARTHROPLASTY Left      Family History   Problem Relation Age of Onset    Heart failure Mother     Other Mother         respiratory failure    Heart failure Father         respiratory failure    Hypertension Daughter     Breast cancer Sister     Kidney failure Brother     Alcohol abuse Brother     No Known Problems Sister     No Known Problems Brother     Diabetes Brother     Arrhythmia Neg Hx     Clotting disorder Neg Hx     Fainting Neg Hx     Anuerysm Neg Hx     Stroke Neg Hx     Hyperlipidemia Neg Hx     Asthma Neg Hx        Review of Systems  Review of Systems   Constitutional: Negative  Gastrointestinal: Negative  Genitourinary: Negative for vaginal bleeding, vaginal discharge and vaginal pain  Objective   /70 (BP Location: Left arm, Patient Position: Sitting, Cuff Size: Large)   Ht 4' 9" (1 448 m)   Wt 65 8 kg (145 lb)   BMI 31 38 kg/m²       Physical Exam   Constitutional: She is oriented to person, place, and time  She appears well-developed and well-nourished  No distress  Abdominal: Soft  She exhibits no distension  There is no tenderness  Neurological: She is alert and oriented to person, place, and time  Psychiatric: She has a normal mood and affect       Endometrial biopsy  Date/Time: 3/25/2019 1:26 PM  Performed by: Maggie Valles PA-C  Authorized by: Maggie Valles PA-C     Consent:     Consent obtained:  Written    Consent given by:  Patient    Procedural risks discussed:  Bleeding, damage to other organs, infection and repeat procedure    Patient questions answered: yes      Patient agrees, verbalizes understanding, and wants to proceed: yes Instructions and paperwork completed: yes    Indication:     Indications comment:   Thickened endometrium on CT  Procedure:     A bivalve speculum was placed in the vagina: yes      Cervix cleaned and prepped: yes      Uterus sounded: yes      Uterus sound depth (cm):  7    Curettes used:  1    Specimen collected: specimen collected and sent to pathology      Patient tolerated procedure well with no complications: yes

## 2019-03-25 NOTE — ASSESSMENT & PLAN NOTE
Endometrial biopsy performed today, may have some bleeding and/or cramping    Will call patient with results  rx pelvic US to access lining and possible polyp better  Call or RTO if any heavy bleeding, severe abdominal pain or fever > 101

## 2019-03-27 ENCOUNTER — OFFICE VISIT (OUTPATIENT)
Dept: PAIN MEDICINE | Facility: CLINIC | Age: 74
End: 2019-03-27
Payer: MEDICARE

## 2019-03-27 VITALS
WEIGHT: 145 LBS | RESPIRATION RATE: 16 BRPM | DIASTOLIC BLOOD PRESSURE: 84 MMHG | SYSTOLIC BLOOD PRESSURE: 124 MMHG | HEART RATE: 78 BPM | HEIGHT: 57 IN | BODY MASS INDEX: 31.28 KG/M2

## 2019-03-27 DIAGNOSIS — G56.01 CARPAL TUNNEL SYNDROME OF RIGHT WRIST: ICD-10-CM

## 2019-03-27 DIAGNOSIS — M54.12 CERVICAL RADICULITIS: ICD-10-CM

## 2019-03-27 DIAGNOSIS — M17.11 PRIMARY OSTEOARTHRITIS OF RIGHT KNEE: Primary | ICD-10-CM

## 2019-03-27 PROCEDURE — 99213 OFFICE O/P EST LOW 20 MIN: CPT | Performed by: ANESTHESIOLOGY

## 2019-03-27 NOTE — PROGRESS NOTES
Assessment:  1  Primary osteoarthritis of right knee  Ambulatory referral to Physical Therapy   2  Cervical radiculitis     3  Carpal tunnel syndrome of right wrist       Plan: This is a 50-year-old female who presents with right upper extremity and arm pain and right knee pain  Patient had prior right knee radiofrequency ablation with ongoing pain relief  She reports numbness in her fingertips which is resolving  EMG was positive for carpal tunnel  MRI of the cervical spine demonstrates mild degenerative changes  Continue home exercises and will send her for PT for her right knee  Follow-up as needed  My impressions and treatment recommendations were discussed in detail with the patient who verbalized understanding and had no further questions  Discharge instructions were provided  I personally saw and examined the patient and I agree with the above discussed plan of care  History of Present Illness:  Sridhar Pal is a 75 y o  female who presents for a follow up office visit in regards to Knee Pain  The patients current symptoms include intermittent dull achy right knee pain  Patient has had a genicular nerve radiofrequency ablation in the past   She reports ongoing pain relief  She reports numbness in her finger tips  Patient had an EMG of the right upper extremity which was positive for carpal tunnel syndrome  Pain score is rated 4/10  No recent changes in health  I have personally reviewed and/or updated the patient's past medical history, past surgical history, family history, social history, current medications, allergies, and vital signs today  Review of Systems   Respiratory: Negative for shortness of breath  Cardiovascular: Negative for chest pain  Gastrointestinal: Negative for constipation, diarrhea, nausea and vomiting  Musculoskeletal: Positive for gait problem  Negative for arthralgias, joint swelling and myalgias  Skin: Negative for rash     Neurological: Negative for dizziness, seizures and weakness  All other systems reviewed and are negative  Patient Active Problem List   Diagnosis    Chest pressure    GERD (gastroesophageal reflux disease)    Sleep deprivation    Essential hypertension    Arthritis of knee    Left knee pain    Lower back pain    Primary osteoarthritis of right knee    Globus sensation    RUQ pain    Gastroesophageal reflux disease without esophagitis    Left shoulder pain    Right shoulder pain    Tendinopathy of left rotator cuff    Tendinopathy of right rotator cuff    Cervical radiculitis    Carpal tunnel syndrome of right wrist    Gross hematuria    Thickened endometrium       Past Medical History:   Diagnosis Date    Arthritis     Carpal tunnel syndrome     GERD (gastroesophageal reflux disease)     Hiatal hernia     Hypertension     Insomnia     Pneumonia     Renal calculi     Sleep deprivation     chronically    Vitamin D deficiency     Vitamin D toxicity 2011    with leaky gut syndrome       Past Surgical History:   Procedure Laterality Date    BUNIONECTOMY Bilateral     CARPAL TUNNEL RELEASE Right 2015    ESOPHAGOGASTRODUODENOSCOPY N/A 5/5/2016    Procedure: ESOPHAGOGASTRODUODENOSCOPY (EGD); Surgeon: Antonio Zamudio MD;  [facility]; Service:     NERVE BLOCK       R Knee    OOPHORECTOMY Left     NH COLONOSCOPY FLX DX W/COLLJ SPEC WHEN PFRMD N/A 6/28/2016    Procedure: COLONOSCOPY;  Surgeon: Antonio Zamudio MD;  [facility]; Service: Gastroenterology    NH ESOPHAGOGASTRODUODENOSCOPY TRANSORAL DIAGNOSTIC N/A 10/19/2018    Procedure: ESOPHAGOGASTRODUODENOSCOPY (EGD); Surgeon: Antonio Zamudio MD;  [facility];   Service: Gastroenterology    ROTATOR CUFF REPAIR Bilateral     TOE SURGERY Left     left 5th toe shaved down due to repeated fx    TOTAL KNEE ARTHROPLASTY Left        Family History   Problem Relation Age of Onset    Heart failure Mother    [facility] Other Mother respiratory failure    Heart failure Father         respiratory failure    Hypertension Daughter     Breast cancer Sister     Kidney failure Brother     Alcohol abuse Brother     No Known Problems Sister     No Known Problems Brother     Diabetes Brother     Arrhythmia Neg Hx     Clotting disorder Neg Hx     Fainting Neg Hx     Anuerysm Neg Hx     Stroke Neg Hx     Hyperlipidemia Neg Hx     Asthma Neg Hx        Social History     Occupational History    Not on file   Tobacco Use    Smoking status: Former Smoker    Smokeless tobacco: Never Used    Tobacco comment: Quit   Substance and Sexual Activity    Alcohol use: No    Drug use: No    Sexual activity: Not Currently     Partners: Male     Birth control/protection: Post-menopausal     Comment: no partner       Current Outpatient Medications on File Prior to Visit   Medication Sig    Acetaminophen 500 MG Take 650 mg by mouth every 6 (six) hours as needed for mild pain    albuterol (PROVENTIL HFA,VENTOLIN HFA) 90 mcg/act inhaler Inhale 2 puffs every 6 (six) hours as needed for wheezing    Charcoal Activated (CHARCOCAPS) 260 MG CAPS Take 1 tablet by mouth daily   ipratropium (ATROVENT) 0 06 % nasal spray 2 sprays into each nostril 4 (four) times a day    Lactobacillus (ACIDOPHILUS) 100 MG CAPS Take 1 capsule by mouth daily   losartan (COZAAR) 25 mg tablet Take 1 tablet (25 mg total) by mouth daily    zolpidem (AMBIEN) 10 mg tablet Take 1 tablet (10 mg total) by mouth daily at bedtime as needed for sleep     No current facility-administered medications on file prior to visit          Allergies   Allergen Reactions    Ancef [Cefazolin] Hives and GI Intolerance    Aspirin GI Intolerance     Even low dose     Ciprofloxacin Hives    Cortisone Syncope    Other      STEROIDS- GI INTOLERANCE    Penicillins Hives    Prednisone Hives and GI Intolerance    Vancomycin Hives and GI Intolerance    Oxycodone Rash and Vomiting       Physical Exam:    /84   Pulse 78   Resp 16   Ht 4' 9" (1 448 m)   Wt 65 8 kg (145 lb)   BMI 31 38 kg/m²     Constitutional:normal, well developed, well nourished, alert, in no distress and non-toxic and no overt pain behavior    Eyes:anicteric  HEENT:grossly intact  Neck:supple, symmetric, trachea midline and no masses   Pulmonary:even and unlabored  Cardiovascular:No edema or pitting edema present  Skin:Normal without rashes or lesions and well hydrated  Psychiatric:Mood and affect appropriate  Neurologic:Cranial Nerves II-XII grossly intact  Musculoskeletal:normal    Imaging

## 2019-03-28 ENCOUNTER — TELEPHONE (OUTPATIENT)
Dept: OBGYN CLINIC | Facility: CLINIC | Age: 74
End: 2019-03-28

## 2019-04-01 ENCOUNTER — TELEPHONE (OUTPATIENT)
Dept: FAMILY MEDICINE CLINIC | Facility: MEDICAL CENTER | Age: 74
End: 2019-04-01

## 2019-04-02 NOTE — TELEPHONE ENCOUNTER
Spoke with patient benign biopsy but scant will see if US consistent with polyp or if lining still thick, may need D&C

## 2019-04-03 ENCOUNTER — PROCEDURE VISIT (OUTPATIENT)
Dept: UROLOGY | Facility: CLINIC | Age: 74
End: 2019-04-03
Payer: MEDICARE

## 2019-04-03 VITALS
SYSTOLIC BLOOD PRESSURE: 150 MMHG | HEART RATE: 82 BPM | WEIGHT: 146 LBS | HEIGHT: 57 IN | DIASTOLIC BLOOD PRESSURE: 80 MMHG | BODY MASS INDEX: 31.5 KG/M2

## 2019-04-03 DIAGNOSIS — R31.0 GROSS HEMATURIA: Primary | ICD-10-CM

## 2019-04-03 DIAGNOSIS — R93.89 THICKENED ENDOMETRIUM: ICD-10-CM

## 2019-04-03 LAB
SL AMB LEUKOCYTE ESTERASE,UA: NORMAL
SL AMB POCT BILIRUBIN,UA: NORMAL
SL AMB POCT BLOOD,UA: NORMAL
SL AMB POCT CLARITY,UA: CLEAR
SL AMB POCT COLOR,UA: NORMAL
SL AMB POCT KETONES,UA: NORMAL
SL AMB POCT NITRITE,UA: NORMAL
SL AMB POCT PH,UA: 5
SL AMB POCT SPECIFIC GRAVITY,UA: 1
SL AMB POCT URINE PROTEIN: NORMAL
SL AMB POCT UROBILINOGEN: NORMAL

## 2019-04-03 PROCEDURE — 99214 OFFICE O/P EST MOD 30 MIN: CPT | Performed by: UROLOGY

## 2019-04-03 PROCEDURE — 52000 CYSTOURETHROSCOPY: CPT | Performed by: UROLOGY

## 2019-04-03 PROCEDURE — 81002 URINALYSIS NONAUTO W/O SCOPE: CPT | Performed by: UROLOGY

## 2019-04-09 ENCOUNTER — OFFICE VISIT (OUTPATIENT)
Dept: FAMILY MEDICINE CLINIC | Facility: MEDICAL CENTER | Age: 74
End: 2019-04-09
Payer: MEDICARE

## 2019-04-09 VITALS
DIASTOLIC BLOOD PRESSURE: 82 MMHG | WEIGHT: 143 LBS | BODY MASS INDEX: 30.94 KG/M2 | HEART RATE: 64 BPM | SYSTOLIC BLOOD PRESSURE: 138 MMHG | TEMPERATURE: 97 F | RESPIRATION RATE: 16 BRPM

## 2019-04-09 DIAGNOSIS — R31.0 GROSS HEMATURIA: ICD-10-CM

## 2019-04-09 DIAGNOSIS — J30.2 SEASONAL ALLERGIC RHINITIS, UNSPECIFIED TRIGGER: Primary | ICD-10-CM

## 2019-04-09 DIAGNOSIS — R05.9 COUGH: ICD-10-CM

## 2019-04-09 DIAGNOSIS — M54.14 THORACIC RADICULOPATHY: ICD-10-CM

## 2019-04-09 DIAGNOSIS — I10 ESSENTIAL HYPERTENSION: ICD-10-CM

## 2019-04-09 DIAGNOSIS — F51.01 PRIMARY INSOMNIA: ICD-10-CM

## 2019-04-09 PROCEDURE — 99214 OFFICE O/P EST MOD 30 MIN: CPT | Performed by: FAMILY MEDICINE

## 2019-04-09 RX ORDER — GUAIFENESIN AND CODEINE PHOSPHATE 100; 10 MG/5ML; MG/5ML
5-10 SOLUTION ORAL 4 TIMES DAILY PRN
Qty: 120 ML | Refills: 0 | Status: SHIPPED | OUTPATIENT
Start: 2019-04-09 | End: 2019-05-23

## 2019-04-09 RX ORDER — ZOLPIDEM TARTRATE 10 MG/1
5 TABLET ORAL
Qty: 30 TABLET | Refills: 0 | Status: SHIPPED | OUTPATIENT
Start: 2019-04-09 | End: 2019-07-18 | Stop reason: SDUPTHER

## 2019-04-09 RX ORDER — LIDOCAINE 50 MG/G
1 PATCH TOPICAL DAILY
Qty: 30 PATCH | Refills: 0 | Status: SHIPPED | OUTPATIENT
Start: 2019-04-09 | End: 2019-05-23

## 2019-04-10 ENCOUNTER — HOSPITAL ENCOUNTER (OUTPATIENT)
Dept: RADIOLOGY | Facility: MEDICAL CENTER | Age: 74
Discharge: HOME | End: 2019-04-10
Payer: MEDICARE

## 2019-04-10 DIAGNOSIS — R93.89 THICKENED ENDOMETRIUM: ICD-10-CM

## 2019-04-10 PROCEDURE — 76856 US EXAM PELVIC COMPLETE: CPT

## 2019-04-10 PROCEDURE — 76830 TRANSVAGINAL US NON-OB: CPT

## 2019-04-11 ENCOUNTER — TELEPHONE (OUTPATIENT)
Dept: OBGYN CLINIC | Facility: CLINIC | Age: 74
End: 2019-04-11

## 2019-04-26 ENCOUNTER — EVALUATION (OUTPATIENT)
Dept: PHYSICAL THERAPY | Facility: MEDICAL CENTER | Age: 74
End: 2019-04-26
Payer: MEDICARE

## 2019-04-26 DIAGNOSIS — M17.11 PRIMARY OSTEOARTHRITIS OF RIGHT KNEE: ICD-10-CM

## 2019-04-26 PROCEDURE — G8991 OTHER PT/OT GOAL STATUS: HCPCS | Performed by: PHYSICAL THERAPIST

## 2019-04-26 PROCEDURE — 97110 THERAPEUTIC EXERCISES: CPT | Performed by: PHYSICAL THERAPIST

## 2019-04-26 PROCEDURE — 97161 PT EVAL LOW COMPLEX 20 MIN: CPT | Performed by: PHYSICAL THERAPIST

## 2019-04-26 PROCEDURE — G8990 OTHER PT/OT CURRENT STATUS: HCPCS | Performed by: PHYSICAL THERAPIST

## 2019-04-29 ENCOUNTER — CONSULT (OUTPATIENT)
Dept: GYNECOLOGIC ONCOLOGY | Facility: CLINIC | Age: 74
End: 2019-04-29
Payer: MEDICARE

## 2019-04-29 VITALS
BODY MASS INDEX: 30.83 KG/M2 | SYSTOLIC BLOOD PRESSURE: 144 MMHG | WEIGHT: 146.9 LBS | HEART RATE: 80 BPM | RESPIRATION RATE: 16 BRPM | HEIGHT: 58 IN | DIASTOLIC BLOOD PRESSURE: 78 MMHG

## 2019-04-29 DIAGNOSIS — N95.0 PMB (POSTMENOPAUSAL BLEEDING): Primary | ICD-10-CM

## 2019-04-29 DIAGNOSIS — R93.89 THICKENED ENDOMETRIUM: ICD-10-CM

## 2019-04-29 DIAGNOSIS — Z12.39 BREAST CANCER SCREENING: ICD-10-CM

## 2019-04-29 PROCEDURE — 99205 OFFICE O/P NEW HI 60 MIN: CPT | Performed by: OBSTETRICS & GYNECOLOGY

## 2019-04-29 PROCEDURE — 1123F ACP DISCUSS/DSCN MKR DOCD: CPT | Performed by: PHYSICIAN ASSISTANT

## 2019-04-29 RX ORDER — CLINDAMYCIN PHOSPHATE 900 MG/50ML
900 INJECTION INTRAVENOUS ONCE
Status: CANCELLED | OUTPATIENT
Start: 2019-06-06 | End: 2019-04-29

## 2019-04-29 RX ORDER — GABAPENTIN 100 MG/1
100 CAPSULE ORAL ONCE
Status: CANCELLED | OUTPATIENT
Start: 2019-04-29 | End: 2019-04-29

## 2019-04-29 RX ORDER — ACETAMINOPHEN 325 MG/1
975 TABLET ORAL ONCE
Status: CANCELLED | OUTPATIENT
Start: 2019-04-29 | End: 2019-04-29

## 2019-04-29 RX ORDER — PANTOPRAZOLE SODIUM 40 MG/1
40 TABLET, DELAYED RELEASE ORAL DAILY
COMMUNITY
End: 2019-05-14 | Stop reason: SDUPTHER

## 2019-04-29 RX ORDER — AMLODIPINE BESYLATE 5 MG/1
5 TABLET ORAL DAILY
Refills: 5 | COMMUNITY
Start: 2019-04-09 | End: 2019-05-23

## 2019-04-29 RX ORDER — HEPARIN SODIUM 5000 [USP'U]/ML
5000 INJECTION, SOLUTION INTRAVENOUS; SUBCUTANEOUS
Status: CANCELLED | OUTPATIENT
Start: 2019-04-29 | End: 2019-04-30

## 2019-04-30 ENCOUNTER — HOSPITAL ENCOUNTER (OUTPATIENT)
Dept: RADIOLOGY | Facility: MEDICAL CENTER | Age: 74
Discharge: HOME | End: 2019-04-30
Payer: MEDICARE

## 2019-04-30 VITALS — HEIGHT: 58 IN | WEIGHT: 146 LBS | BODY MASS INDEX: 30.64 KG/M2

## 2019-04-30 DIAGNOSIS — Z12.39 BREAST CANCER SCREENING: ICD-10-CM

## 2019-04-30 DIAGNOSIS — N95.0 PMB (POSTMENOPAUSAL BLEEDING): ICD-10-CM

## 2019-04-30 PROCEDURE — 77067 SCR MAMMO BI INCL CAD: CPT

## 2019-05-01 ENCOUNTER — TELEPHONE (OUTPATIENT)
Dept: PREADMISSION TESTING | Facility: HOSPITAL | Age: 74
End: 2019-05-01

## 2019-05-02 ENCOUNTER — OFFICE VISIT (OUTPATIENT)
Dept: PHYSICAL THERAPY | Facility: MEDICAL CENTER | Age: 74
End: 2019-05-02
Payer: MEDICARE

## 2019-05-02 DIAGNOSIS — M17.11 PRIMARY OSTEOARTHRITIS OF RIGHT KNEE: Primary | ICD-10-CM

## 2019-05-02 PROCEDURE — 97110 THERAPEUTIC EXERCISES: CPT

## 2019-05-06 ENCOUNTER — OFFICE VISIT (OUTPATIENT)
Dept: PHYSICAL THERAPY | Facility: MEDICAL CENTER | Age: 74
End: 2019-05-06
Payer: MEDICARE

## 2019-05-06 DIAGNOSIS — M17.11 PRIMARY OSTEOARTHRITIS OF RIGHT KNEE: Primary | ICD-10-CM

## 2019-05-06 PROCEDURE — 97110 THERAPEUTIC EXERCISES: CPT

## 2019-05-08 ENCOUNTER — OFFICE VISIT (OUTPATIENT)
Dept: PHYSICAL THERAPY | Facility: MEDICAL CENTER | Age: 74
End: 2019-05-08
Payer: MEDICARE

## 2019-05-08 DIAGNOSIS — M17.11 PRIMARY OSTEOARTHRITIS OF RIGHT KNEE: Primary | ICD-10-CM

## 2019-05-08 PROCEDURE — 97110 THERAPEUTIC EXERCISES: CPT

## 2019-05-09 ENCOUNTER — APPOINTMENT (OUTPATIENT)
Dept: PHYSICAL THERAPY | Facility: MEDICAL CENTER | Age: 74
End: 2019-05-09
Payer: MEDICARE

## 2019-05-14 ENCOUNTER — OFFICE VISIT (OUTPATIENT)
Dept: GASTROENTEROLOGY | Facility: AMBULARY SURGERY CENTER | Age: 74
End: 2019-05-14
Payer: MEDICARE

## 2019-05-14 VITALS
WEIGHT: 149 LBS | HEIGHT: 58 IN | BODY MASS INDEX: 31.28 KG/M2 | SYSTOLIC BLOOD PRESSURE: 122 MMHG | HEART RATE: 80 BPM | RESPIRATION RATE: 16 BRPM | DIASTOLIC BLOOD PRESSURE: 68 MMHG | TEMPERATURE: 97.8 F

## 2019-05-14 DIAGNOSIS — K21.9 GASTROESOPHAGEAL REFLUX DISEASE WITHOUT ESOPHAGITIS: Primary | ICD-10-CM

## 2019-05-14 PROCEDURE — 99213 OFFICE O/P EST LOW 20 MIN: CPT | Performed by: INTERNAL MEDICINE

## 2019-05-14 RX ORDER — PANTOPRAZOLE SODIUM 40 MG/1
40 TABLET, DELAYED RELEASE ORAL DAILY
Qty: 30 TABLET | Refills: 3 | Status: SHIPPED | OUTPATIENT
Start: 2019-05-14 | End: 2019-07-31 | Stop reason: CLARIF

## 2019-05-15 ENCOUNTER — OFFICE VISIT (OUTPATIENT)
Dept: PHYSICAL THERAPY | Facility: MEDICAL CENTER | Age: 74
End: 2019-05-15
Payer: MEDICARE

## 2019-05-15 DIAGNOSIS — M17.11 PRIMARY OSTEOARTHRITIS OF RIGHT KNEE: Primary | ICD-10-CM

## 2019-05-15 PROCEDURE — 97110 THERAPEUTIC EXERCISES: CPT

## 2019-05-15 PROCEDURE — 97140 MANUAL THERAPY 1/> REGIONS: CPT

## 2019-05-17 ENCOUNTER — APPOINTMENT (OUTPATIENT)
Dept: PHYSICAL THERAPY | Facility: MEDICAL CENTER | Age: 74
End: 2019-05-17
Payer: MEDICARE

## 2019-05-20 ENCOUNTER — APPOINTMENT (OUTPATIENT)
Dept: RADIOLOGY | Facility: MEDICAL CENTER | Age: 74
End: 2019-05-20
Payer: MEDICARE

## 2019-05-20 ENCOUNTER — APPOINTMENT (OUTPATIENT)
Dept: URGENT CARE | Facility: MEDICAL CENTER | Age: 74
End: 2019-05-20
Payer: MEDICARE

## 2019-05-20 ENCOUNTER — OFFICE VISIT (OUTPATIENT)
Dept: PHYSICAL THERAPY | Facility: MEDICAL CENTER | Age: 74
End: 2019-05-20
Payer: MEDICARE

## 2019-05-20 ENCOUNTER — APPOINTMENT (OUTPATIENT)
Dept: LAB | Facility: MEDICAL CENTER | Age: 74
End: 2019-05-20
Payer: MEDICARE

## 2019-05-20 DIAGNOSIS — R93.89 THICKENED ENDOMETRIUM: ICD-10-CM

## 2019-05-20 DIAGNOSIS — N95.0 PMB (POSTMENOPAUSAL BLEEDING): ICD-10-CM

## 2019-05-20 DIAGNOSIS — M17.11 PRIMARY OSTEOARTHRITIS OF RIGHT KNEE: Primary | ICD-10-CM

## 2019-05-20 LAB
ABO GROUP BLD: NORMAL
ATRIAL RATE: 65 BPM
BLD GP AB SCN SERPL QL: NEGATIVE
ERYTHROCYTE [DISTWIDTH] IN BLOOD BY AUTOMATED COUNT: 13.6 % (ref 11.6–15.1)
EST. AVERAGE GLUCOSE BLD GHB EST-MCNC: 120 MG/DL
HBA1C MFR BLD: 5.8 % (ref 4.2–6.3)
HCT VFR BLD AUTO: 41.7 % (ref 34.8–46.1)
HGB BLD-MCNC: 13.3 G/DL (ref 11.5–15.4)
MCH RBC QN AUTO: 30.4 PG (ref 26.8–34.3)
MCHC RBC AUTO-ENTMCNC: 31.9 G/DL (ref 31.4–37.4)
MCV RBC AUTO: 95 FL (ref 82–98)
P AXIS: 41 DEGREES
PLATELET # BLD AUTO: 204 THOUSANDS/UL (ref 149–390)
PMV BLD AUTO: 12.4 FL (ref 8.9–12.7)
PR INTERVAL: 168 MS
QRS AXIS: -5 DEGREES
QRSD INTERVAL: 90 MS
QT INTERVAL: 446 MS
QTC INTERVAL: 463 MS
RBC # BLD AUTO: 4.38 MILLION/UL (ref 3.81–5.12)
RH BLD: NEGATIVE
SPECIMEN EXPIRATION DATE: NORMAL
T WAVE AXIS: 29 DEGREES
VENTRICULAR RATE: 65 BPM
WBC # BLD AUTO: 5.46 THOUSAND/UL (ref 4.31–10.16)

## 2019-05-20 PROCEDURE — 97110 THERAPEUTIC EXERCISES: CPT

## 2019-05-20 PROCEDURE — 71046 X-RAY EXAM CHEST 2 VIEWS: CPT

## 2019-05-20 PROCEDURE — 86900 BLOOD TYPING SEROLOGIC ABO: CPT

## 2019-05-20 PROCEDURE — 93010 ELECTROCARDIOGRAM REPORT: CPT | Performed by: INTERNAL MEDICINE

## 2019-05-20 PROCEDURE — 85027 COMPLETE CBC AUTOMATED: CPT

## 2019-05-20 PROCEDURE — 83036 HEMOGLOBIN GLYCOSYLATED A1C: CPT

## 2019-05-20 PROCEDURE — 86901 BLOOD TYPING SEROLOGIC RH(D): CPT

## 2019-05-20 PROCEDURE — 36415 COLL VENOUS BLD VENIPUNCTURE: CPT

## 2019-05-20 PROCEDURE — 86850 RBC ANTIBODY SCREEN: CPT

## 2019-05-22 ENCOUNTER — OFFICE VISIT (OUTPATIENT)
Dept: PHYSICAL THERAPY | Facility: MEDICAL CENTER | Age: 74
End: 2019-05-22
Payer: MEDICARE

## 2019-05-22 DIAGNOSIS — M17.11 PRIMARY OSTEOARTHRITIS OF RIGHT KNEE: Primary | ICD-10-CM

## 2019-05-22 PROCEDURE — 97110 THERAPEUTIC EXERCISES: CPT

## 2019-05-23 ENCOUNTER — ANESTHESIA EVENT (OUTPATIENT)
Dept: PERIOP | Facility: HOSPITAL | Age: 74
End: 2019-05-23
Payer: MEDICARE

## 2019-05-23 RX ORDER — SACCHAROMYCES BOULARDII 250 MG
250 CAPSULE ORAL 2 TIMES DAILY
COMMUNITY

## 2019-05-28 ENCOUNTER — OFFICE VISIT (OUTPATIENT)
Dept: PHYSICAL THERAPY | Facility: MEDICAL CENTER | Age: 74
End: 2019-05-28
Payer: MEDICARE

## 2019-05-28 DIAGNOSIS — M17.11 PRIMARY OSTEOARTHRITIS OF RIGHT KNEE: Primary | ICD-10-CM

## 2019-05-28 PROCEDURE — 97110 THERAPEUTIC EXERCISES: CPT

## 2019-05-29 ENCOUNTER — OFFICE VISIT (OUTPATIENT)
Dept: OBGYN CLINIC | Facility: MEDICAL CENTER | Age: 74
End: 2019-05-29
Payer: MEDICARE

## 2019-05-29 VITALS
BODY MASS INDEX: 31.43 KG/M2 | HEART RATE: 87 BPM | SYSTOLIC BLOOD PRESSURE: 137 MMHG | WEIGHT: 147.8 LBS | DIASTOLIC BLOOD PRESSURE: 76 MMHG

## 2019-05-29 DIAGNOSIS — M17.11 PRIMARY OSTEOARTHRITIS OF RIGHT KNEE: Primary | ICD-10-CM

## 2019-05-29 PROCEDURE — 99213 OFFICE O/P EST LOW 20 MIN: CPT | Performed by: ORTHOPAEDIC SURGERY

## 2019-05-29 PROCEDURE — 20610 DRAIN/INJ JOINT/BURSA W/O US: CPT | Performed by: PHYSICIAN ASSISTANT

## 2019-05-29 RX ORDER — BUPIVACAINE HYDROCHLORIDE 2.5 MG/ML
2 INJECTION, SOLUTION INFILTRATION; PERINEURAL
Status: COMPLETED | OUTPATIENT
Start: 2019-05-29 | End: 2019-05-29

## 2019-05-29 RX ADMIN — BUPIVACAINE HYDROCHLORIDE 2 ML: 2.5 INJECTION, SOLUTION INFILTRATION; PERINEURAL at 10:00

## 2019-05-30 ENCOUNTER — OFFICE VISIT (OUTPATIENT)
Dept: PHYSICAL THERAPY | Facility: MEDICAL CENTER | Age: 74
End: 2019-05-30
Payer: MEDICARE

## 2019-05-30 DIAGNOSIS — M17.11 PRIMARY OSTEOARTHRITIS OF RIGHT KNEE: Primary | ICD-10-CM

## 2019-05-30 PROCEDURE — 97110 THERAPEUTIC EXERCISES: CPT | Performed by: PHYSICAL THERAPIST

## 2019-06-03 ENCOUNTER — EVALUATION (OUTPATIENT)
Dept: PHYSICAL THERAPY | Facility: MEDICAL CENTER | Age: 74
End: 2019-06-03
Payer: MEDICARE

## 2019-06-03 DIAGNOSIS — M17.11 PRIMARY OSTEOARTHRITIS OF RIGHT KNEE: Primary | ICD-10-CM

## 2019-06-03 PROCEDURE — 97110 THERAPEUTIC EXERCISES: CPT | Performed by: PHYSICAL THERAPIST

## 2019-06-03 PROCEDURE — G8991 OTHER PT/OT GOAL STATUS: HCPCS | Performed by: PHYSICAL THERAPIST

## 2019-06-03 PROCEDURE — G8990 OTHER PT/OT CURRENT STATUS: HCPCS | Performed by: PHYSICAL THERAPIST

## 2019-06-06 ENCOUNTER — HOSPITAL ENCOUNTER (OUTPATIENT)
Facility: HOSPITAL | Age: 74
Setting detail: OUTPATIENT SURGERY
Discharge: HOME | End: 2019-06-06
Attending: OBSTETRICS & GYNECOLOGY | Admitting: OBSTETRICS & GYNECOLOGY
Payer: MEDICARE

## 2019-06-06 ENCOUNTER — ANESTHESIA (OUTPATIENT)
Dept: PERIOP | Facility: HOSPITAL | Age: 74
End: 2019-06-06
Payer: MEDICARE

## 2019-06-06 VITALS
OXYGEN SATURATION: 97 % | SYSTOLIC BLOOD PRESSURE: 160 MMHG | DIASTOLIC BLOOD PRESSURE: 67 MMHG | HEART RATE: 85 BPM | WEIGHT: 146 LBS | TEMPERATURE: 100.5 F | BODY MASS INDEX: 29.43 KG/M2 | RESPIRATION RATE: 16 BRPM | HEIGHT: 59 IN

## 2019-06-06 DIAGNOSIS — G89.18 POSTOPERATIVE PAIN: Primary | ICD-10-CM

## 2019-06-06 DIAGNOSIS — R93.89 THICKENED ENDOMETRIUM: ICD-10-CM

## 2019-06-06 DIAGNOSIS — N95.0 PMB (POSTMENOPAUSAL BLEEDING): ICD-10-CM

## 2019-06-06 LAB
ABO GROUP BLD: NORMAL
ANION GAP SERPL CALCULATED.3IONS-SCNC: 5 MMOL/L (ref 4–13)
BLD GP AB SCN SERPL QL: NEGATIVE
BUN SERPL-MCNC: 20 MG/DL (ref 5–25)
CALCIUM SERPL-MCNC: 8.4 MG/DL (ref 8.3–10.1)
CHLORIDE SERPL-SCNC: 107 MMOL/L (ref 100–108)
CO2 SERPL-SCNC: 26 MMOL/L (ref 21–32)
CREAT SERPL-MCNC: 0.77 MG/DL (ref 0.6–1.3)
GFR SERPL CREATININE-BSD FRML MDRD: 76 ML/MIN/1.73SQ M
GLUCOSE SERPL-MCNC: 125 MG/DL (ref 65–140)
GLUCOSE SERPL-MCNC: 162 MG/DL (ref 65–140)
POTASSIUM SERPL-SCNC: 3.6 MMOL/L (ref 3.5–5.3)
RH BLD: NEGATIVE
SODIUM SERPL-SCNC: 138 MMOL/L (ref 136–145)
SPECIMEN EXPIRATION DATE: NORMAL

## 2019-06-06 PROCEDURE — 80048 BASIC METABOLIC PNL TOTAL CA: CPT | Performed by: ANESTHESIOLOGY

## 2019-06-06 PROCEDURE — 86850 RBC ANTIBODY SCREEN: CPT | Performed by: OBSTETRICS & GYNECOLOGY

## 2019-06-06 PROCEDURE — 82948 REAGENT STRIP/BLOOD GLUCOSE: CPT

## 2019-06-06 PROCEDURE — 88307 TISSUE EXAM BY PATHOLOGIST: CPT | Performed by: PATHOLOGY

## 2019-06-06 PROCEDURE — NC001 PR NO CHARGE: Performed by: PHYSICIAN ASSISTANT

## 2019-06-06 PROCEDURE — 58571 TLH W/T/O 250 G OR LESS: CPT | Performed by: OBSTETRICS & GYNECOLOGY

## 2019-06-06 PROCEDURE — 86900 BLOOD TYPING SEROLOGIC ABO: CPT | Performed by: OBSTETRICS & GYNECOLOGY

## 2019-06-06 PROCEDURE — NC001 PR NO CHARGE: Performed by: OBSTETRICS & GYNECOLOGY

## 2019-06-06 PROCEDURE — 86901 BLOOD TYPING SEROLOGIC RH(D): CPT | Performed by: OBSTETRICS & GYNECOLOGY

## 2019-06-06 RX ORDER — ACETAMINOPHEN 325 MG/1
975 TABLET ORAL ONCE
Status: DISCONTINUED | OUTPATIENT
Start: 2019-06-06 | End: 2019-06-06 | Stop reason: SDUPTHER

## 2019-06-06 RX ORDER — DIPHENHYDRAMINE HYDROCHLORIDE 50 MG/ML
12.5 INJECTION INTRAMUSCULAR; INTRAVENOUS ONCE AS NEEDED
Status: DISCONTINUED | OUTPATIENT
Start: 2019-06-06 | End: 2019-06-06 | Stop reason: HOSPADM

## 2019-06-06 RX ORDER — GLYCOPYRROLATE 0.2 MG/ML
INJECTION INTRAMUSCULAR; INTRAVENOUS AS NEEDED
Status: DISCONTINUED | OUTPATIENT
Start: 2019-06-06 | End: 2019-06-06 | Stop reason: SURG

## 2019-06-06 RX ORDER — ONDANSETRON 2 MG/ML
4 INJECTION INTRAMUSCULAR; INTRAVENOUS ONCE AS NEEDED
Status: DISCONTINUED | OUTPATIENT
Start: 2019-06-06 | End: 2019-06-06 | Stop reason: HOSPADM

## 2019-06-06 RX ORDER — MAGNESIUM HYDROXIDE 1200 MG/15ML
LIQUID ORAL AS NEEDED
Status: DISCONTINUED | OUTPATIENT
Start: 2019-06-06 | End: 2019-06-06 | Stop reason: HOSPADM

## 2019-06-06 RX ORDER — METOCLOPRAMIDE HYDROCHLORIDE 5 MG/ML
10 INJECTION INTRAMUSCULAR; INTRAVENOUS ONCE AS NEEDED
Status: DISCONTINUED | OUTPATIENT
Start: 2019-06-06 | End: 2019-06-06 | Stop reason: HOSPADM

## 2019-06-06 RX ORDER — PROPOFOL 10 MG/ML
INJECTION, EMULSION INTRAVENOUS AS NEEDED
Status: DISCONTINUED | OUTPATIENT
Start: 2019-06-06 | End: 2019-06-06 | Stop reason: SURG

## 2019-06-06 RX ORDER — NEOSTIGMINE METHYLSULFATE 1 MG/ML
INJECTION INTRAVENOUS AS NEEDED
Status: DISCONTINUED | OUTPATIENT
Start: 2019-06-06 | End: 2019-06-06 | Stop reason: SURG

## 2019-06-06 RX ORDER — HYDROCODONE BITARTRATE AND ACETAMINOPHEN 5; 325 MG/1; MG/1
1 TABLET ORAL EVERY 6 HOURS PRN
Status: DISCONTINUED | OUTPATIENT
Start: 2019-06-06 | End: 2019-06-06 | Stop reason: HOSPADM

## 2019-06-06 RX ORDER — HEPARIN SODIUM 5000 [USP'U]/ML
5000 INJECTION, SOLUTION INTRAVENOUS; SUBCUTANEOUS
Status: COMPLETED | OUTPATIENT
Start: 2019-06-06 | End: 2019-06-06

## 2019-06-06 RX ORDER — HYDROMORPHONE HYDROCHLORIDE 2 MG/1
2 TABLET ORAL ONCE
Status: COMPLETED | OUTPATIENT
Start: 2019-06-06 | End: 2019-06-06

## 2019-06-06 RX ORDER — GABAPENTIN 100 MG/1
100 CAPSULE ORAL ONCE
Status: DISCONTINUED | OUTPATIENT
Start: 2019-06-06 | End: 2019-06-06 | Stop reason: SDUPTHER

## 2019-06-06 RX ORDER — GENTAMICIN SULFATE 40 MG/ML
INJECTION, SOLUTION INTRAMUSCULAR; INTRAVENOUS AS NEEDED
Status: DISCONTINUED | OUTPATIENT
Start: 2019-06-06 | End: 2019-06-06 | Stop reason: SURG

## 2019-06-06 RX ORDER — CLINDAMYCIN PHOSPHATE 900 MG/50ML
900 INJECTION INTRAVENOUS ONCE
Status: DISCONTINUED | OUTPATIENT
Start: 2019-06-06 | End: 2019-06-06 | Stop reason: HOSPADM

## 2019-06-06 RX ORDER — SODIUM CHLORIDE, SODIUM LACTATE, POTASSIUM CHLORIDE, CALCIUM CHLORIDE 600; 310; 30; 20 MG/100ML; MG/100ML; MG/100ML; MG/100ML
125 INJECTION, SOLUTION INTRAVENOUS CONTINUOUS
Status: DISCONTINUED | OUTPATIENT
Start: 2019-06-06 | End: 2019-06-06 | Stop reason: HOSPADM

## 2019-06-06 RX ORDER — METOCLOPRAMIDE HYDROCHLORIDE 5 MG/ML
INJECTION INTRAMUSCULAR; INTRAVENOUS AS NEEDED
Status: DISCONTINUED | OUTPATIENT
Start: 2019-06-06 | End: 2019-06-06 | Stop reason: SURG

## 2019-06-06 RX ORDER — LIDOCAINE HYDROCHLORIDE 10 MG/ML
0.5 INJECTION, SOLUTION EPIDURAL; INFILTRATION; INTRACAUDAL; PERINEURAL ONCE AS NEEDED
Status: DISCONTINUED | OUTPATIENT
Start: 2019-06-06 | End: 2019-06-06 | Stop reason: HOSPADM

## 2019-06-06 RX ORDER — ROCURONIUM BROMIDE 10 MG/ML
INJECTION, SOLUTION INTRAVENOUS AS NEEDED
Status: DISCONTINUED | OUTPATIENT
Start: 2019-06-06 | End: 2019-06-06 | Stop reason: SURG

## 2019-06-06 RX ORDER — IBUPROFEN 600 MG/1
600 TABLET ORAL EVERY 6 HOURS PRN
Status: DISCONTINUED | OUTPATIENT
Start: 2019-06-06 | End: 2019-06-06 | Stop reason: HOSPADM

## 2019-06-06 RX ORDER — SODIUM CHLORIDE 9 MG/ML
INJECTION, SOLUTION INTRAVENOUS CONTINUOUS PRN
Status: DISCONTINUED | OUTPATIENT
Start: 2019-06-06 | End: 2019-06-06 | Stop reason: SURG

## 2019-06-06 RX ORDER — FENTANYL CITRATE 50 UG/ML
INJECTION, SOLUTION INTRAMUSCULAR; INTRAVENOUS AS NEEDED
Status: DISCONTINUED | OUTPATIENT
Start: 2019-06-06 | End: 2019-06-06 | Stop reason: SURG

## 2019-06-06 RX ORDER — ONDANSETRON 2 MG/ML
4 INJECTION INTRAMUSCULAR; INTRAVENOUS EVERY 6 HOURS PRN
Status: DISCONTINUED | OUTPATIENT
Start: 2019-06-06 | End: 2019-06-06 | Stop reason: HOSPADM

## 2019-06-06 RX ORDER — GABAPENTIN 300 MG/1
300 CAPSULE ORAL ONCE
Status: COMPLETED | OUTPATIENT
Start: 2019-06-06 | End: 2019-06-06

## 2019-06-06 RX ORDER — FENTANYL CITRATE/PF 50 MCG/ML
50 SYRINGE (ML) INJECTION
Status: DISCONTINUED | OUTPATIENT
Start: 2019-06-06 | End: 2019-06-06 | Stop reason: HOSPADM

## 2019-06-06 RX ORDER — ACETAMINOPHEN 325 MG/1
975 TABLET ORAL ONCE
Status: COMPLETED | OUTPATIENT
Start: 2019-06-06 | End: 2019-06-06

## 2019-06-06 RX ORDER — SODIUM CHLORIDE, SODIUM LACTATE, POTASSIUM CHLORIDE, CALCIUM CHLORIDE 600; 310; 30; 20 MG/100ML; MG/100ML; MG/100ML; MG/100ML
100 INJECTION, SOLUTION INTRAVENOUS CONTINUOUS
Status: DISCONTINUED | OUTPATIENT
Start: 2019-06-06 | End: 2019-06-06 | Stop reason: HOSPADM

## 2019-06-06 RX ORDER — HYDROCODONE BITARTRATE AND ACETAMINOPHEN 5; 325 MG/1; MG/1
1 TABLET ORAL EVERY 6 HOURS PRN
Qty: 12 TABLET | Refills: 0 | Status: SHIPPED | OUTPATIENT
Start: 2019-06-06 | End: 2019-06-16

## 2019-06-06 RX ORDER — HYDROMORPHONE HYDROCHLORIDE 2 MG/ML
INJECTION, SOLUTION INTRAMUSCULAR; INTRAVENOUS; SUBCUTANEOUS AS NEEDED
Status: DISCONTINUED | OUTPATIENT
Start: 2019-06-06 | End: 2019-06-06 | Stop reason: SURG

## 2019-06-06 RX ORDER — MIDAZOLAM HYDROCHLORIDE 1 MG/ML
INJECTION INTRAMUSCULAR; INTRAVENOUS AS NEEDED
Status: DISCONTINUED | OUTPATIENT
Start: 2019-06-06 | End: 2019-06-06 | Stop reason: SURG

## 2019-06-06 RX ORDER — CLINDAMYCIN PHOSPHATE 900 MG/50ML
INJECTION INTRAVENOUS AS NEEDED
Status: DISCONTINUED | OUTPATIENT
Start: 2019-06-06 | End: 2019-06-06 | Stop reason: SURG

## 2019-06-06 RX ORDER — SODIUM CHLORIDE, SODIUM LACTATE, POTASSIUM CHLORIDE, CALCIUM CHLORIDE 600; 310; 30; 20 MG/100ML; MG/100ML; MG/100ML; MG/100ML
INJECTION, SOLUTION INTRAVENOUS CONTINUOUS PRN
Status: DISCONTINUED | OUTPATIENT
Start: 2019-06-06 | End: 2019-06-06

## 2019-06-06 RX ORDER — HYDROCODONE BITARTRATE AND ACETAMINOPHEN 5; 325 MG/1; MG/1
2 TABLET ORAL EVERY 6 HOURS PRN
Status: DISCONTINUED | OUTPATIENT
Start: 2019-06-06 | End: 2019-06-06 | Stop reason: HOSPADM

## 2019-06-06 RX ORDER — BUPIVACAINE HYDROCHLORIDE 2.5 MG/ML
INJECTION, SOLUTION INFILTRATION; PERINEURAL AS NEEDED
Status: DISCONTINUED | OUTPATIENT
Start: 2019-06-06 | End: 2019-06-06 | Stop reason: HOSPADM

## 2019-06-06 RX ORDER — SCOLOPAMINE TRANSDERMAL SYSTEM 1 MG/1
1 PATCH, EXTENDED RELEASE TRANSDERMAL
Status: DISCONTINUED | OUTPATIENT
Start: 2019-06-06 | End: 2019-06-06 | Stop reason: HOSPADM

## 2019-06-06 RX ORDER — ONDANSETRON 2 MG/ML
INJECTION INTRAMUSCULAR; INTRAVENOUS AS NEEDED
Status: DISCONTINUED | OUTPATIENT
Start: 2019-06-06 | End: 2019-06-06 | Stop reason: SURG

## 2019-06-06 RX ORDER — HYDROMORPHONE HCL/PF 1 MG/ML
0.5 SYRINGE (ML) INJECTION
Status: DISCONTINUED | OUTPATIENT
Start: 2019-06-06 | End: 2019-06-06 | Stop reason: HOSPADM

## 2019-06-06 RX ORDER — HYDROMORPHONE HCL/PF 1 MG/ML
0.2 SYRINGE (ML) INJECTION
Status: DISCONTINUED | OUTPATIENT
Start: 2019-06-06 | End: 2019-06-06 | Stop reason: HOSPADM

## 2019-06-06 RX ADMIN — PROPOFOL 150 MG: 10 INJECTION, EMULSION INTRAVENOUS at 07:55

## 2019-06-06 RX ADMIN — SODIUM CHLORIDE, SODIUM LACTATE, POTASSIUM CHLORIDE, AND CALCIUM CHLORIDE: .6; .31; .03; .02 INJECTION, SOLUTION INTRAVENOUS at 07:18

## 2019-06-06 RX ADMIN — GLYCOPYRROLATE 0.4 MG: 0.2 INJECTION, SOLUTION INTRAMUSCULAR; INTRAVENOUS at 09:59

## 2019-06-06 RX ADMIN — ROCURONIUM BROMIDE 50 MG: 10 INJECTION, SOLUTION INTRAVENOUS at 07:55

## 2019-06-06 RX ADMIN — HYDROMORPHONE HYDROCHLORIDE 0.5 MG: 2 INJECTION, SOLUTION INTRAMUSCULAR; INTRAVENOUS; SUBCUTANEOUS at 08:37

## 2019-06-06 RX ADMIN — GABAPENTIN 300 MG: 300 CAPSULE ORAL at 06:36

## 2019-06-06 RX ADMIN — PHENYLEPHRINE HYDROCHLORIDE 100 MCG: 10 INJECTION INTRAVENOUS at 08:58

## 2019-06-06 RX ADMIN — IBUPROFEN 600 MG: 600 TABLET ORAL at 15:26

## 2019-06-06 RX ADMIN — FENTANYL CITRATE 100 MCG: 50 INJECTION, SOLUTION INTRAMUSCULAR; INTRAVENOUS at 07:55

## 2019-06-06 RX ADMIN — PHENYLEPHRINE HYDROCHLORIDE 100 MCG: 10 INJECTION INTRAVENOUS at 08:05

## 2019-06-06 RX ADMIN — HEPARIN SODIUM 5000 UNITS: 5000 INJECTION INTRAVENOUS; SUBCUTANEOUS at 07:19

## 2019-06-06 RX ADMIN — SCOPALAMINE 1 PATCH: 1 PATCH, EXTENDED RELEASE TRANSDERMAL at 06:35

## 2019-06-06 RX ADMIN — PHENYLEPHRINE HYDROCHLORIDE 100 MCG: 10 INJECTION INTRAVENOUS at 08:31

## 2019-06-06 RX ADMIN — FENTANYL CITRATE 50 MCG: 50 INJECTION, SOLUTION INTRAMUSCULAR; INTRAVENOUS at 08:26

## 2019-06-06 RX ADMIN — PHENYLEPHRINE HYDROCHLORIDE 20 MCG/MIN: 10 INJECTION INTRAVENOUS at 08:50

## 2019-06-06 RX ADMIN — HYDROMORPHONE HYDROCHLORIDE 2 MG: 2 TABLET ORAL at 06:39

## 2019-06-06 RX ADMIN — NEOSTIGMINE METHYLSULFATE 3 MG: 1 INJECTION, SOLUTION INTRAVENOUS at 09:59

## 2019-06-06 RX ADMIN — ACETAMINOPHEN 975 MG: 325 TABLET ORAL at 06:35

## 2019-06-06 RX ADMIN — SODIUM CHLORIDE: 9 INJECTION, SOLUTION INTRAVENOUS at 08:04

## 2019-06-06 RX ADMIN — METOCLOPRAMIDE 10 MG: 5 INJECTION, SOLUTION INTRAMUSCULAR; INTRAVENOUS at 07:52

## 2019-06-06 RX ADMIN — PHENYLEPHRINE HYDROCHLORIDE 100 MCG: 10 INJECTION INTRAVENOUS at 08:54

## 2019-06-06 RX ADMIN — ONDANSETRON 4 MG: 2 INJECTION INTRAMUSCULAR; INTRAVENOUS at 07:33

## 2019-06-06 RX ADMIN — CLINDAMYCIN PHOSPHATE 900 MG: 900 INJECTION, SOLUTION INTRAVENOUS at 07:51

## 2019-06-06 RX ADMIN — MIDAZOLAM 2 MG: 1 INJECTION INTRAMUSCULAR; INTRAVENOUS at 07:47

## 2019-06-06 RX ADMIN — SUGAMMADEX 200 MG: 100 INJECTION, SOLUTION INTRAVENOUS at 10:18

## 2019-06-06 RX ADMIN — SODIUM CHLORIDE, SODIUM LACTATE, POTASSIUM CHLORIDE, AND CALCIUM CHLORIDE: .6; .31; .03; .02 INJECTION, SOLUTION INTRAVENOUS at 08:32

## 2019-06-06 RX ADMIN — PHENYLEPHRINE HYDROCHLORIDE 100 MCG: 10 INJECTION INTRAVENOUS at 08:49

## 2019-06-06 RX ADMIN — ROCURONIUM BROMIDE 20 MG: 10 INJECTION, SOLUTION INTRAVENOUS at 08:45

## 2019-06-06 RX ADMIN — GENTAMICIN SULFATE 70 MG: 40 INJECTION, SOLUTION INTRAMUSCULAR; INTRAVENOUS at 08:01

## 2019-06-10 ENCOUNTER — TELEPHONE (OUTPATIENT)
Dept: GYNECOLOGIC ONCOLOGY | Facility: CLINIC | Age: 74
End: 2019-06-10

## 2019-07-02 ENCOUNTER — TELEPHONE (OUTPATIENT)
Dept: GASTROENTEROLOGY | Facility: AMBULARY SURGERY CENTER | Age: 74
End: 2019-07-02

## 2019-07-02 DIAGNOSIS — K21.00 GASTROESOPHAGEAL REFLUX DISEASE WITH ESOPHAGITIS: Primary | ICD-10-CM

## 2019-07-02 RX ORDER — RANITIDINE 150 MG/1
150 TABLET ORAL DAILY PRN
Qty: 30 TABLET | Refills: 5 | Status: SHIPPED | OUTPATIENT
Start: 2019-07-02 | End: 2019-11-27 | Stop reason: ALTCHOICE

## 2019-07-02 NOTE — TELEPHONE ENCOUNTER
yusra patient          The pantoprazole is making her sick, nausea, pain, upset stomach---would like advise        Call back #   [phone]

## 2019-07-02 NOTE — TELEPHONE ENCOUNTER
If she is asymptomatic she can take zantac PRN   Please let me know whether she will get this OTC or would like a script, thanks

## 2019-07-02 NOTE — TELEPHONE ENCOUNTER
Patient has hx of GERD, chronic cough  EGD showed mild esophagitis  She called to report that she stopped her pantoprazole 40 mg daily x 5 days ago due to nausea  Since stopping, nausea has resolved  She denies GERD symptoms/chronic cough  Do you want her to try pantoprazole 20 mg or ranitidine? Thank you

## 2019-07-03 ENCOUNTER — OFFICE VISIT (OUTPATIENT)
Dept: GYNECOLOGIC ONCOLOGY | Facility: CLINIC | Age: 74
End: 2019-07-03

## 2019-07-03 VITALS
SYSTOLIC BLOOD PRESSURE: 146 MMHG | TEMPERATURE: 97.9 F | BODY MASS INDEX: 29.84 KG/M2 | WEIGHT: 148 LBS | DIASTOLIC BLOOD PRESSURE: 84 MMHG | HEART RATE: 83 BPM | HEIGHT: 59 IN

## 2019-07-03 DIAGNOSIS — Z90.710 STATUS POST HYSTERECTOMY: Primary | ICD-10-CM

## 2019-07-03 PROBLEM — R93.89 THICKENED ENDOMETRIUM: Status: RESOLVED | Noted: 2019-03-25 | Resolved: 2019-07-03

## 2019-07-03 PROBLEM — N95.0 PMB (POSTMENOPAUSAL BLEEDING): Status: RESOLVED | Noted: 2019-02-25 | Resolved: 2019-07-03

## 2019-07-03 PROCEDURE — 99024 POSTOP FOLLOW-UP VISIT: CPT | Performed by: OBSTETRICS & GYNECOLOGY

## 2019-07-03 NOTE — PROGRESS NOTES
Assessment/Plan:    Problem List Items Addressed This Visit        Other    Status post hysterectomy - Primary     Patient has recovered well from surgery  Final pathology demonstrated endometrial polyps, no evidence of hyperplasia or malignancy  Her vaginal cuff is appropriately healed so far  She is not sexually active  I encouraged her to continue with strict pelvic rest and no heavy lifting  She will contact us only if she has any bleeding beyond 6 weeks or if she develops any new symptoms  Otherwise, she can resume routine healthcare maintenance  CHIEF COMPLAINT:     Postoperative follow-up  Patient ID: Monique Llamas is a 75 y o  female  HPI  Here for postoperative follow-up  On June 6th underwent robotic hysterectomy, salpingo-oophorectomy, extensive adhesiolysis for thickened endometrium and postmenopausal bleeding  Remote contralateral salpingo-oophorectomy  Final pathology from recent operation benign  She has recovered uneventfully  Denies vaginal bleeding, drainage or discharge  Reports normal bowel and bladder function  Denies pain  Incisions healing well  The following portions of the patient's history were reviewed and updated as appropriate: allergies, current medications, past family history, past medical history, past social history, past surgical history and problem list     Review of Systems  As above  Twelve point review of systems is unremarkable      Current Outpatient Medications:     Acetaminophen 500 MG, Take 650 mg by mouth every 6 (six) hours as needed for mild pain, Disp: , Rfl:     losartan (COZAAR) 25 mg tablet, Take 1 tablet (25 mg total) by mouth daily, Disp: 30 tablet, Rfl: 5    Nattokinase 100 MG CAPS, Take by mouth, Disp: , Rfl:     pantoprazole (PROTONIX) 40 mg tablet, Take 1 tablet (40 mg total) by mouth daily (Patient taking differently: Take 40 mg by mouth as needed ), Disp: 30 tablet, Rfl: 3    Probiotic Product (MVW COMPLETE PROBIOTIC PO), Take by mouth, Disp: , Rfl:     ranitidine (ZANTAC) 150 mg tablet, Take 1 tablet (150 mg total) by mouth daily as needed for heartburn, Disp: 30 tablet, Rfl: 5    saccharomyces boulardii (FLORASTOR) 250 mg capsule, Take 250 mg by mouth 2 (two) times a day, Disp: , Rfl:     zolpidem (AMBIEN) 10 mg tablet, Take 0 5 tablets (5 mg total) by mouth daily at bedtime as needed for sleep, Disp: 30 tablet, Rfl: 0    Objective:    Blood pressure 146/84, pulse 83, temperature 97 9 °F (36 6 °C), temperature source Oral, height 4' 11" (1 499 m), weight 67 1 kg (148 lb)  Body mass index is 29 89 kg/m²  Body surface area is 1 62 meters squared  Physical Exam    Abdomen:  Incisions well-healed  No dehiscence, no hernias  Pelvic:  Normal external genitalia  Vulva vagina with no lesions  Vaginal cuff well healed, no granulation tissue, no blood, no dehiscence  Case Report   Surgical Pathology Report                         Case: [id number]                                    Authorizing Provider: Darris Sandhoff, MD      Collected:           06/06/2019 0931               Ordering Location:     [facility]      Received:            06/06/2019 [facility]                                      [facility] Operating Room                                                       Pathologist:           Charmaine Hinojosa MD                                                               Specimen:    Uterus, Uterus, cervix, Right tube and Right Ovary                                         Final Diagnosis   A  Uterus, Uterus, cervix, Right tube and Right Ovary, Hysterectomy with right salpingo-oophorectomy:    Uterus              (32 gm)  Cervix:                mild chronic cervicitis  Nabothian cysts    Endometrium:     endometrial polyp, 1 8 x 1 8 x 0 9 cm                             atrophic pattern endometrium:   Myometrium:      single intramural leiomyoma, 0 3 cm                             superficial adenomyosis  Serosal surface:  unremarkable  Right ovary:         Serous cyst  Right fallopian tube: unremarkable  No evidence of malignancy seen          Electronically signed by Michael Vargas MD on 6/10/2019 at 12:00 PM   Note    Interpretation performed at [facility], [address] [facility] [address]   Additional Information    All controls performed with the immunohistochemical stains reported above reacted appropriately  These tests were developed and their performance characteristics determined by [facility] or [facility]  They may not be cleared or approved by the [place]  Food and Drug Administration  The FDA has determined that such clearance or approval is not necessary  These tests are used for clinical purposes  They should not be regarded as investigational or for research  This laboratory has been approved by [place] 88, designated as a high-complexity laboratory and is qualified to perform these tests       Gross Description    A  The specimen is received in formalin, labeled with the patient's name and hospital number, and is designated "uterus, cervix, right tube and right ovary  The specimen consists of a 32 g uterus with attached right adnexa  The posterior serosa is shaggy and ragged, and the anterior serosa is tan to tan-pink purple and smooth  The ectocervix is white tan-pink to focally hemorrhagic and smooth  A patent slit shape cervical os is identified measuring 0 5 cm in greatest dimension  The uterus measures 5 6 cm superior to inferior by 2 2 cm cornu to cornu by 3 0 cm anterior to posterior  Photographs are taken  The anterior half the specimen is inked blue, the posterior half the specimen is inked black  Due to the ragged nature of the posterior serosa, definitive smooth parametria is not identified  The specimen is opened revealing an unremarkable cervix    The endometrium exhibits a triangular shaped cavity, which averages 0 2 cm in thickness, measures 2 2 cm in width at fundus, and is distorted by a white tan-pink polypoid lesion which measures 1 8 x 1 8 x 0 9 cm  This polypoid lesion comes within 1 4 cm of nearest parametrial margin of resection, 2 2 cm from nearest cervical margin of resection, and appears to be confined to the endometrium  The myometrium exhibits 1 white tan palpable dense intramural nodule measuring 0 3 cm in greatest dimension  This nodule has a homogeneous, white tan, whorled cut surface, with no identifiable area of necrosis or hemorrhage  The right ovary exhibits a white tan smooth outer surface, and measures 1 8 x 1 5 x 0 6 cm and is bisected revealing unremarkable cut surfaces  The right fallopian tube with partial fimbriated end has a length of 5 3 cm and ranges from 0 3 cm to 0 4 cm in diameter and is unremarkable  Representative sections  Fourteen cassettes, including entire polyp, entire posterior endometrium, entire nodule, and entire right ovary       1:  Anterior cervix/lower uterine segment  2:  Posterior cervix/lower uterine segment    3-5:  Anterior endomyometrium with serosa, complete section in each cassette, with entire nodule bisected in cassettes 3-4  5:  Additional section of anterior endometrium  6-9:  Posterior endomyometrium with serosa and polypoid lesion, complete section in cassettes 6, 9, and complete section in cassettes 7-8  10:  Remaining polypoid lesion  11:  Posterior endomyometrium, (remaining endometrium), with serosa  12, 13:  Right ovary, entire  14:  Right fallopian tube     Note: The estimated total formalin fixation time based upon information provided by the submitting clinician and the standard processing schedule is under 72 hours    MSequino          Clinical Information    (Note for Pathologist - the Specimen was cut into, by Dr Abhijit Walker in the OR, prior to sending to Pathology )     Juan Miguel Pablo MD, [facility] 132  7/3/2019  3:48 PM

## 2019-07-03 NOTE — ASSESSMENT & PLAN NOTE
Patient has recovered well from surgery  Final pathology demonstrated endometrial polyps, no evidence of hyperplasia or malignancy  Her vaginal cuff is appropriately healed so far  She is not sexually active  I encouraged her to continue with strict pelvic rest and no heavy lifting  She will contact us only if she has any bleeding beyond 6 weeks or if she develops any new symptoms  Otherwise, she can resume routine healthcare maintenance

## 2019-07-03 NOTE — LETTER
July 3, 2019     Marielos Ayala PA-C  [address] [facility] [address]    Patient: Hero Herron   YOB: 1945   Date of Visit: 7/3/2019       Dear Dr Marei Melendez:    Thank you for referring Hero Herron to me for evaluation  Below are my notes for this consultation  If you have questions, please do not hesitate to call me  I look forward to following your patient along with you  Sincerely,        Maryan Grullon MD        CC: MD Maryan Martins MD  7/3/2019  3:48 PM  Sign at close encounter    Assessment/Plan:    Problem List Items Addressed This Visit        Other    Status post hysterectomy - Primary     Patient has recovered well from surgery  Final pathology demonstrated endometrial polyps, no evidence of hyperplasia or malignancy  Her vaginal cuff is appropriately healed so far  She is not sexually active  I encouraged her to continue with strict pelvic rest and no heavy lifting  She will contact us only if she has any bleeding beyond 6 weeks or if she develops any new symptoms  Otherwise, she can resume routine healthcare maintenance  CHIEF COMPLAINT:     Postoperative follow-up  Patient ID: Hero Herron is a 75 y o  female  HPI  Here for postoperative follow-up  On June 6th underwent robotic hysterectomy, salpingo-oophorectomy, extensive adhesiolysis for thickened endometrium and postmenopausal bleeding  Remote contralateral salpingo-oophorectomy  Final pathology from recent operation benign  She has recovered uneventfully  Denies vaginal bleeding, drainage or discharge  Reports normal bowel and bladder function  Denies pain  Incisions healing well  The following portions of the patient's history were reviewed and updated as appropriate: allergies, current medications, past family history, past medical history, past social history, past surgical history and problem list     Review of Systems  As above    Twelve point review of systems is unremarkable  Current Outpatient Medications:     Acetaminophen 500 MG, Take 650 mg by mouth every 6 (six) hours as needed for mild pain, Disp: , Rfl:     losartan (COZAAR) 25 mg tablet, Take 1 tablet (25 mg total) by mouth daily, Disp: 30 tablet, Rfl: 5    Nattokinase 100 MG CAPS, Take by mouth, Disp: , Rfl:     pantoprazole (PROTONIX) 40 mg tablet, Take 1 tablet (40 mg total) by mouth daily (Patient taking differently: Take 40 mg by mouth as needed ), Disp: 30 tablet, Rfl: 3    Probiotic Product (MVW COMPLETE PROBIOTIC PO), Take by mouth, Disp: , Rfl:     ranitidine (ZANTAC) 150 mg tablet, Take 1 tablet (150 mg total) by mouth daily as needed for heartburn, Disp: 30 tablet, Rfl: 5    saccharomyces boulardii (FLORASTOR) 250 mg capsule, Take 250 mg by mouth 2 (two) times a day, Disp: , Rfl:     zolpidem (AMBIEN) 10 mg tablet, Take 0 5 tablets (5 mg total) by mouth daily at bedtime as needed for sleep, Disp: 30 tablet, Rfl: 0    Objective:    Blood pressure 146/84, pulse 83, temperature 97 9 °F (36 6 °C), temperature source Oral, height 4' 11" (1 499 m), weight 67 1 kg (148 lb)  Body mass index is 29 89 kg/m²  Body surface area is 1 62 meters squared  Physical Exam    Abdomen:  Incisions well-healed  No dehiscence, no hernias  Pelvic:  Normal external genitalia  Vulva vagina with no lesions  Vaginal cuff well healed, no granulation tissue, no blood, no dehiscence      Case Report   Surgical Pathology Report                         Case: [id number]                                    Authorizing Provider: Cliff Bae MD      Collected:           06/06/2019 0931               Ordering Location:     [address]      Received:            06/06/2019 [address] [place] [address] Operating Room                                                       Pathologist:           Yolette Ly MD                                                               Specimen:    Uterus, Uterus, cervix, Right tube and Right Ovary                                         Final Diagnosis   A  Uterus, Uterus, cervix, Right tube and Right Ovary, Hysterectomy with right salpingo-oophorectomy:    Uterus              (32 gm)  Cervix:                mild chronic cervicitis  Nabothian cysts  Endometrium:     endometrial polyp, 1 8 x 1 8 x 0 9 cm                             atrophic pattern endometrium:   Myometrium:      single intramural leiomyoma, 0 3 cm                             superficial adenomyosis  Serosal surface:  unremarkable  Right ovary:         Serous cyst  Right fallopian tube: unremarkable  No evidence of malignancy seen          Electronically signed by Jordin Salmeron MD on 6/10/2019 at 12:00 PM   Note    Interpretation performed at [facility], [address] [facility] [address]   Additional Information    All controls performed with the immunohistochemical stains reported above reacted appropriately  These tests were developed and their performance characteristics determined by [facility] or [facility]  They may not be cleared or approved by the [place]  Food and Drug Administration  The FDA has determined that such clearance or approval is not necessary  These tests are used for clinical purposes  They should not be regarded as investigational or for research  This laboratory has been approved by CLIA 88, designated as a high-complexity laboratory and is qualified to perform these tests       Gross Description    A  The specimen is received in formalin, labeled with the patient's name and hospital number, and is designated "uterus, cervix, right tube and right ovary  The specimen consists of a 32 g uterus with attached right adnexa  The posterior serosa is shaggy and ragged, and the anterior serosa is tan to tan-pink purple and smooth  The ectocervix is white tan-pink to focally hemorrhagic and smooth    A patent slit shape cervical os is identified measuring 0 5 cm in greatest dimension  The uterus measures 5 6 cm superior to inferior by 2 2 cm cornu to cornu by 3 0 cm anterior to posterior  Photographs are taken  The anterior half the specimen is inked blue, the posterior half the specimen is inked black  Due to the ragged nature of the posterior serosa, definitive smooth parametria is not identified  The specimen is opened revealing an unremarkable cervix  The endometrium exhibits a triangular shaped cavity, which averages 0 2 cm in thickness, measures 2 2 cm in width at fundus, and is distorted by a white tan-pink polypoid lesion which measures 1 8 x 1 8 x 0 9 cm  This polypoid lesion comes within 1 4 cm of nearest parametrial margin of resection, 2 2 cm from nearest cervical margin of resection, and appears to be confined to the endometrium  The myometrium exhibits 1 white tan palpable dense intramural nodule measuring 0 3 cm in greatest dimension  This nodule has a homogeneous, white tan, whorled cut surface, with no identifiable area of necrosis or hemorrhage  The right ovary exhibits a white tan smooth outer surface, and measures 1 8 x 1 5 x 0 6 cm and is bisected revealing unremarkable cut surfaces  The right fallopian tube with partial fimbriated end has a length of 5 3 cm and ranges from 0 3 cm to 0 4 cm in diameter and is unremarkable  Representative sections   Fourteen cassettes, including entire polyp, entire posterior endometrium, entire nodule, and entire right ovary       1:  Anterior cervix/lower uterine segment  2:  Posterior cervix/lower uterine segment    3-5:  Anterior endomyometrium with serosa, complete section in each cassette, with entire nodule bisected in cassettes 3-4  5:  Additional section of anterior endometrium  6-9:  Posterior endomyometrium with serosa and polypoid lesion, complete section in cassettes 6, 9, and complete section in cassettes 7-8  10:  Remaining polypoid lesion  11: Posterior endomyometrium, (remaining endometrium), with serosa  12, 13:  Right ovary, entire  14:  Right fallopian tube     Note: The estimated total formalin fixation time based upon information provided by the submitting clinician and the standard processing schedule is under 72 hours    MSequino          Clinical Information    (Note for Pathologist - the Specimen was cut into, by Dr Gardenia Simms in the OR, prior to sending to Pathology )     Jay Junior MD, [facility]  7/3/2019  3:48 PM

## 2019-07-18 DIAGNOSIS — F51.01 PRIMARY INSOMNIA: ICD-10-CM

## 2019-07-18 NOTE — TELEPHONE ENCOUNTER
Pt is at her daughters, and needs a refill of her Ambien to [facility] in [facility], [address]

## 2019-07-19 RX ORDER — ZOLPIDEM TARTRATE 10 MG/1
5 TABLET ORAL
Qty: 30 TABLET | Refills: 0 | OUTPATIENT
Start: 2019-07-19 | End: 2019-10-26 | Stop reason: SDUPTHER

## 2019-07-19 NOTE — TELEPHONE ENCOUNTER
Pt called to say Rx was not rec'd by the [facility], [address]  Please resend - phone number is in original message  Thanks!

## 2019-07-29 ENCOUNTER — TELEPHONE (OUTPATIENT)
Dept: FAMILY MEDICINE CLINIC | Facility: MEDICAL CENTER | Age: 74
End: 2019-07-29

## 2019-07-29 NOTE — TELEPHONE ENCOUNTER
EMILIANO: Spoke with Nancy Bishop and advised warm compresses three times a day and tylenol for the discomfort and inflammation  She says it is red and painful  Has not had a boil in the past  She is in the schedule with you on Wednesday 7/31

## 2019-07-29 NOTE — TELEPHONE ENCOUNTER
Patient stated for 3 days noticed a cyst/boil on her chest and is requesting to see Dr Elfreda Boeck today  Routed to Clinical to advise

## 2019-07-31 ENCOUNTER — OFFICE VISIT (OUTPATIENT)
Dept: FAMILY MEDICINE CLINIC | Facility: MEDICAL CENTER | Age: 74
End: 2019-07-31
Payer: MEDICARE

## 2019-07-31 VITALS
TEMPERATURE: 98.3 F | RESPIRATION RATE: 16 BRPM | SYSTOLIC BLOOD PRESSURE: 148 MMHG | DIASTOLIC BLOOD PRESSURE: 82 MMHG | BODY MASS INDEX: 30.24 KG/M2 | HEIGHT: 59 IN | HEART RATE: 70 BPM | WEIGHT: 150 LBS

## 2019-07-31 DIAGNOSIS — L72.3 INFECTED SEBACEOUS CYST OF SKIN: Primary | ICD-10-CM

## 2019-07-31 DIAGNOSIS — L08.9 INFECTED SEBACEOUS CYST OF SKIN: Primary | ICD-10-CM

## 2019-07-31 PROCEDURE — 99213 OFFICE O/P EST LOW 20 MIN: CPT | Performed by: FAMILY MEDICINE

## 2019-07-31 PROCEDURE — 1123F ACP DISCUSS/DSCN MKR DOCD: CPT | Performed by: FAMILY MEDICINE

## 2019-07-31 NOTE — PROGRESS NOTES
Patient states she has had a boil on her chest for the last several days  She was advised to use warm soaks however it is not helped  Getting painful  No fever or chills  She does not recall a lesion in the area previously  O: /82   Pulse 70   Temp 98 3 °F (36 8 °C) (Oral)   Resp 16   Ht 4' 11" (1 499 m)   Wt 68 kg (150 lb)   BMI 30 30 kg/m²   2 5 cm area of erythema upper left chest with central tender 1 cm mass  No drainage  Assessment  Abscess-suspect infected sebaceous cyst    Plan  Surgery will see her today

## 2019-08-07 ENCOUNTER — OFFICE VISIT (OUTPATIENT)
Dept: FAMILY MEDICINE CLINIC | Facility: MEDICAL CENTER | Age: 74
End: 2019-08-07
Payer: MEDICARE

## 2019-08-07 VITALS
OXYGEN SATURATION: 100 % | WEIGHT: 149 LBS | BODY MASS INDEX: 30.09 KG/M2 | DIASTOLIC BLOOD PRESSURE: 90 MMHG | SYSTOLIC BLOOD PRESSURE: 132 MMHG | HEART RATE: 65 BPM

## 2019-08-07 DIAGNOSIS — I10 ESSENTIAL HYPERTENSION: ICD-10-CM

## 2019-08-07 DIAGNOSIS — K21.9 GASTROESOPHAGEAL REFLUX DISEASE WITHOUT ESOPHAGITIS: Primary | ICD-10-CM

## 2019-08-07 PROCEDURE — 99214 OFFICE O/P EST MOD 30 MIN: CPT | Performed by: FAMILY MEDICINE

## 2019-08-07 NOTE — PROGRESS NOTES
Jenna Issa is here for follow-up  She has been undergoing evaluation for gross hematuria  Urology evaluation with cystoscopy and imaging did not reveal any source  Has been no further bleeding  She was evaluated by gyn for abnormal endometrial stripe which was found during the above workup  She recently had a hysterectomy which showed no pathology  She also saw general surgery Dr Ledesma for evaluation of abscess on her chest   This was incised and drained it is healing well  However she has had rash due to the adhesive from the bandage  She is otherwise doing well with no complaints    O: /90 (BP Location: Left arm, Patient Position: Sitting, Cuff Size: Adult)   Pulse 65   Wt 67 6 kg (149 lb)   SpO2 100%   BMI 30 09 kg/m²   BP by me 130/86 patient states she did not take her losartan today  Neck no adenopathy thyromegaly bruits  Chest clear with good breath sounds  Healing wound center chest  Cardiac regular without murmur    Assessment  1  Status post hysterectomy for abnormal endometrial stripe-doing well  2  Hematuria-thorough workup completed  3  Abscess left chest wall-healing  Advised not to cover with dressing a; this time due to allergic reaction to the bandage; does not require coverage anyway    Plan  Check blood work and recheck in 3 months

## 2019-09-06 DIAGNOSIS — I10 ESSENTIAL HYPERTENSION: ICD-10-CM

## 2019-09-09 RX ORDER — LOSARTAN POTASSIUM 25 MG/1
TABLET ORAL
Qty: 30 TABLET | Refills: 5 | Status: SHIPPED | OUTPATIENT
Start: 2019-09-09 | End: 2020-01-07

## 2019-09-10 ENCOUNTER — APPOINTMENT (OUTPATIENT)
Dept: LAB | Facility: MEDICAL CENTER | Age: 74
End: 2019-09-10
Payer: MEDICARE

## 2019-09-10 ENCOUNTER — TELEPHONE (OUTPATIENT)
Dept: FAMILY MEDICINE CLINIC | Facility: MEDICAL CENTER | Age: 74
End: 2019-09-10

## 2019-09-10 DIAGNOSIS — R30.0 DYSURIA: Primary | ICD-10-CM

## 2019-09-10 DIAGNOSIS — R30.0 DYSURIA: ICD-10-CM

## 2019-09-10 PROCEDURE — 87086 URINE CULTURE/COLONY COUNT: CPT

## 2019-09-10 RX ORDER — NITROFURANTOIN 25; 75 MG/1; MG/1
100 CAPSULE ORAL 2 TIMES DAILY
Qty: 10 CAPSULE | Refills: 0 | Status: SHIPPED | OUTPATIENT
Start: 2019-09-10 | End: 2019-09-15

## 2019-09-10 NOTE — TELEPHONE ENCOUNTER
Pt called asking to speak with a nurse  At first she said it was regarding a medication, but then she said "a situation" she was having      Routed to Clinical

## 2019-09-10 NOTE — TELEPHONE ENCOUNTER
S/w pt  C/o UTI symptoms , started AZO yesterday  Didn't take it this morning  Advised to not take the AZO this morning and go to the [facility] , needs to be assessed and treated   Aware Dr Ad Light is out of the office this week and Dr Gerard Owen is completely booked    Understands instructions

## 2019-09-11 ENCOUNTER — TELEPHONE (OUTPATIENT)
Dept: GYNECOLOGIC ONCOLOGY | Facility: CLINIC | Age: 74
End: 2019-09-11

## 2019-09-11 LAB — BACTERIA UR CULT: NORMAL

## 2019-09-11 NOTE — TELEPHONE ENCOUNTER
Patient informed that urine culture showed no growth after 24 hours  Instructed to stop Macrobid  Reports improved but still present pain with urination   Recommended patient make an appt with her urologist

## 2019-09-13 NOTE — DISCHARGE INSTRUCTIONS
Discharge home  Resume regular diet  Ranitidine 150 mg twice daily, call if any worsening symptoms, given side effects with pantoprazole can try alternative PPI therapy  Follow up biopsy results  Call with any abdominal pain, bleeding, fevers detailed exam

## 2019-10-11 ENCOUNTER — OFFICE VISIT (OUTPATIENT)
Dept: OBGYN CLINIC | Facility: CLINIC | Age: 74
End: 2019-10-11
Payer: MEDICARE

## 2019-10-11 VITALS — BODY MASS INDEX: 29.45 KG/M2 | WEIGHT: 150 LBS | HEIGHT: 60 IN

## 2019-10-11 DIAGNOSIS — M67.911 TENDINOPATHY OF RIGHT ROTATOR CUFF: Primary | ICD-10-CM

## 2019-10-11 PROCEDURE — 99213 OFFICE O/P EST LOW 20 MIN: CPT | Performed by: PHYSICIAN ASSISTANT

## 2019-10-11 PROCEDURE — 20610 DRAIN/INJ JOINT/BURSA W/O US: CPT | Performed by: PHYSICIAN ASSISTANT

## 2019-10-11 RX ORDER — KETOROLAC TROMETHAMINE 30 MG/ML
60 INJECTION, SOLUTION INTRAMUSCULAR; INTRAVENOUS
Status: COMPLETED | OUTPATIENT
Start: 2019-10-11 | End: 2019-10-11

## 2019-10-11 RX ORDER — LIDOCAINE HYDROCHLORIDE 10 MG/ML
2 INJECTION, SOLUTION INFILTRATION; PERINEURAL
Status: COMPLETED | OUTPATIENT
Start: 2019-10-11 | End: 2019-10-11

## 2019-10-11 RX ADMIN — LIDOCAINE HYDROCHLORIDE 2 ML: 10 INJECTION, SOLUTION INFILTRATION; PERINEURAL at 09:24

## 2019-10-11 RX ADMIN — KETOROLAC TROMETHAMINE 60 MG: 30 INJECTION, SOLUTION INTRAMUSCULAR; INTRAVENOUS at 09:24

## 2019-10-11 NOTE — PROGRESS NOTES
Patient Name:  Tiburcio Santoyo  MRN:  [id number]    Assessment & Plan     Right shoulder rotator cuff tendinitis/bursitis  1  Toradol injection performed today into the subacromial space  Patient cannot receive his cortisone injections  2  Activities as tolerated modification to avoid pain  3  Continue follow-up with pain management with regards to cervical spine issues  4  Follow-up as needed  Briefly discussed obtaining MRI right shoulder pain persists after injection  Subjective     65-year-old female returns to the office today for follow-up regarding her right shoulder  She notes worsening pain over the past three months  She denies any injury or trauma  She states the pain is localized to the lateral aspect of the shoulder  Pain is worse with lifting and overhead activities  She notes weakness secondary to pain  She also notes pain in the cervical spine and trapezius with radiates distally to the digits with associated numbness and tingling  She does see pain management for this issue  She denies fevers or chills  General ROS:  Negative for fever or chills  Neurological ROS:  Negative for numbness or tingling  Objective     Ht 4' 11 5" (1 511 m)   Wt 68 kg (150 lb)   BMI 29 79 kg/m²     Right shoulder:  No gross deformity  No tenderness to palpation  Passive range of motion includes 160° of forward flexion, 90° of external rotation-abduction, 60° of internal rotation-abduction  Positive Santana impingement test   Pain but no weakness with empty can test   Positive speed's test   Negative cross-body adduction test   Sensation intact axillary nerve  Sensation intact but diminished in the median ulnar and radial nerves  2+ radial pulse  Psychiatric: Mood and affect are appropriate        Large joint arthrocentesis: R subacromial bursa  Date/Time: 10/11/2019 9:24 AM  Procedure Details  Location: shoulder - R subacromial bursa  Needle size: 22 G  Ultrasound guidance: no  Approach: posterior  Medications administered: 2 mL lidocaine 1 %; 60 mg ketorolac 60 mg/2 mL    Patient tolerance: patient tolerated the procedure well with no immediate complications  Dressing:  Sterile dressing applied              Social History     Tobacco Use    Smoking status: Former Smoker    Smokeless tobacco: Never Used    Tobacco comment: Quit > 30 years ago    Substance Use Topics    Alcohol use: No    Drug use: No       Scribe Attestation    I,:   Gita Mccarthy PA-C am acting as a scribe while in the presence of the attending physician :        I,:   Cameron Lui DO personally performed the services described in this documentation    as scribed in my presence :

## 2019-10-22 DIAGNOSIS — F51.01 PRIMARY INSOMNIA: ICD-10-CM

## 2019-10-23 ENCOUNTER — OFFICE VISIT (OUTPATIENT)
Dept: OBGYN CLINIC | Facility: MEDICAL CENTER | Age: 74
End: 2019-10-23
Payer: MEDICARE

## 2019-10-23 ENCOUNTER — TELEPHONE (OUTPATIENT)
Dept: PAIN MEDICINE | Facility: MEDICAL CENTER | Age: 74
End: 2019-10-23

## 2019-10-23 VITALS
DIASTOLIC BLOOD PRESSURE: 74 MMHG | BODY MASS INDEX: 29.45 KG/M2 | WEIGHT: 150 LBS | HEIGHT: 60 IN | SYSTOLIC BLOOD PRESSURE: 160 MMHG | HEART RATE: 91 BPM

## 2019-10-23 DIAGNOSIS — M17.11 PRIMARY OSTEOARTHRITIS OF RIGHT KNEE: Primary | ICD-10-CM

## 2019-10-23 PROCEDURE — 99213 OFFICE O/P EST LOW 20 MIN: CPT | Performed by: ORTHOPAEDIC SURGERY

## 2019-10-23 NOTE — TELEPHONE ENCOUNTER
Pt called stating that she would like to have a nerve block on the right knee  Pt states she would like to see if something can be done for her right arm as well   Pt states she has gone to PT    Pt states she went to doctor Cristian today and had a Toradol injection on her right knee     Pt can be reached at [phone]

## 2019-10-23 NOTE — PROGRESS NOTES
Assessment:  1  Primary osteoarthritis of right knee         Plan:     Weight Bearing  as Tolerated   Patient had severe Right knee OA    Patient would like to continue with conservative treatments which include geniculate nerve blocks which have helped patient's symptoms   Patient had Right knee Toradol injection in the office    Patient will make an appointment with Dr Campos Davis for right knee genicular RFA   Continue with ROM exercises    Follow up PRN           The above stated was discussed in layman's terms and the patient expressed understanding  All questions were answered to the patient's satisfaction  Subjective:   Susana Angel is a 75 y o  female who presents right knee OA  Patient states she has right knee Toradol injection on and had relief for almost 5 months  She notes she started to get pain a month ago  She notes pain over anteromedial Right knee  Pain worse with going up and down steps and walking  Patient is taking Tylenol prn pain  Review of systems negative unless otherwise specified in HPI    Past Medical History:   Diagnosis Date    Arthritis     Carpal tunnel syndrome     GERD (gastroesophageal reflux disease)     Hiatal hernia     Hypertension     Insomnia     Pneumonia     PONV (postoperative nausea and vomiting)     Renal calculi     Sleep deprivation     chronically    Vitamin D deficiency     Vitamin D toxicity 2011    with leaky gut syndrome       Past Surgical History:   Procedure Laterality Date    BUNIONECTOMY Bilateral     CARPAL TUNNEL RELEASE Right 2015    COLONOSCOPY      CYSTOSCOPY N/A 6/6/2019    Procedure: CYSTOSCOPY;  Surgeon: Catalina Goyal MD;  [facility];  Service: Gynecology Oncology    ESOPHAGOGASTRODUODENOSCOPY N/A 5/5/2016    Procedure: ESOPHAGOGASTRODUODENOSCOPY (EGD); Surgeon: Jasen Liu MD;  [facility];   Service:     FRACTURE SURGERY      L arm     NERVE BLOCK       R Knee    OOPHORECTOMY Left     [place] COLONOSCOPY FLX DX W/COLLJ SPEC WHEN PFRMD N/A 6/28/2016    Procedure: COLONOSCOPY;  Surgeon: Zackery Gonzáles MD;  [facility]; Service: Gastroenterology    AZ ESOPHAGOGASTRODUODENOSCOPY TRANSORAL DIAGNOSTIC N/A 10/19/2018    Procedure: ESOPHAGOGASTRODUODENOSCOPY (EGD); Surgeon: Zackery Gonzáles MD;  [facility];   Service: Gastroenterology    [place] LAPAROSCOPY W TOT HYSTERECTUTERUS <=[address] TUBE/OVARY N/A 6/6/2019    Procedure: ROBOTIC TOTAL LAPAROSCOPIC HYSTERECTOMY, RIGHT SALPINGO-OOPHORECTOMY, EXTENSIVE ADHESIOLYSIS, APPROXIMATELY 39 MIN;  Surgeon: Marcy Lunsford MD;  [facility];  Service: Gynecology Oncology    ROTATOR CUFF REPAIR Bilateral     TOE SURGERY Left     left 5th toe shaved down due to repeated fx    TOTAL KNEE ARTHROPLASTY Left        Family History   Problem Relation Age of Onset    Heart failure Mother     Other Mother         respiratory failure    Heart failure Father         respiratory failure    Hypertension Daughter     Breast cancer Sister     Kidney failure Brother     Alcohol abuse Brother     No Known Problems Sister     No Known Problems Brother     Diabetes Brother     Arrhythmia Neg Hx     Clotting disorder Neg Hx     Fainting Neg Hx     Anuerysm Neg Hx     Stroke Neg Hx     Hyperlipidemia Neg Hx     Asthma Neg Hx        Social History     Occupational History    Not on file   Tobacco Use    Smoking status: Former Smoker    Smokeless tobacco: Never Used    Tobacco comment: Quit > 30 years ago    Substance and Sexual Activity    Alcohol use: No    Drug use: No    Sexual activity: Not Currently     Partners: Male     Birth control/protection: Post-menopausal     Comment: no partner         Current Outpatient Medications:     Acetaminophen 500 MG, Take 650 mg by mouth every 6 (six) hours as needed for mild pain, Disp: , Rfl:     losartan (COZAAR) 25 mg tablet, TAKE 1 TABLET BY MOUTH EVERY DAY, Disp: 30 tablet, Rfl: 5   Nattokinase 100 MG CAPS, Take by mouth, Disp: , Rfl:     Probiotic Product (MVW COMPLETE PROBIOTIC PO), Take by mouth, Disp: , Rfl:     ranitidine (ZANTAC) 150 mg tablet, Take 1 tablet (150 mg total) by mouth daily as needed for heartburn, Disp: 30 tablet, Rfl: 5    saccharomyces boulardii (FLORASTOR) 250 mg capsule, Take 250 mg by mouth 2 (two) times a day, Disp: , Rfl:     zolpidem (AMBIEN) 10 mg tablet, Take 0 5 tablets (5 mg total) by mouth daily at bedtime as needed for sleep, Disp: 30 tablet, Rfl: 0    Allergies   Allergen Reactions    Ancef [Cefazolin] Hives and GI Intolerance    Aspirin GI Intolerance     Even low dose     Penicillins Hives    Prednisone Hives and GI Intolerance    Vancomycin Hives and GI Intolerance    Adhesive [Medical Tape]     Ciprofloxacin Hives    Cortisone Syncope    Other      STEROIDS- GI INTOLERANCE    Oxycodone Rash and Vomiting            Vitals:    10/23/19 1001   BP: 160/74   Pulse: 91       Objective:            Physical Exam  · General: Awake, Alert, Oriented  · Eyes: Pupils equal, round and reactive to light  · Heart: regular rate and rhythm  · Lungs: No audible wheezing  · Abdomen: soft                    Ortho Exam  Right knee  No lacerations,  no abrasions, no open wound  No erythema   No swelling   TTP over medial joint line   No TTP lateral joint line   Stable to valgus and varus stress  Neurovascularly Intact Distally     Diagnostics, reviewed and taken today if performed as documented:    None performed       Procedures, if performed today:    Procedures    None performed      Scribe Attestation    I,:    am acting as a scribe while in the presence of the attending physician :        I,:    personally performed the services described in this documentation    as scribed in my presence :              Portions of the record may have been created with voice recognition software    Occasional wrong word or "sound a like" substitutions may have occurred due to the inherent limitations of voice recognition software  Read the chart carefully and recognize, using context, where substitutions have occurred

## 2019-10-26 DIAGNOSIS — F51.01 PRIMARY INSOMNIA: ICD-10-CM

## 2019-10-26 RX ORDER — ZOLPIDEM TARTRATE 10 MG/1
TABLET ORAL
Qty: 30 TABLET | Refills: 0 | OUTPATIENT
Start: 2019-10-26 | End: 2019-10-26 | Stop reason: SDUPTHER

## 2019-10-26 RX ORDER — ZOLPIDEM TARTRATE 10 MG/1
TABLET ORAL
Qty: 30 TABLET | Refills: 0 | Status: SHIPPED | OUTPATIENT
Start: 2019-10-26 | End: 2020-04-02 | Stop reason: SDUPTHER

## 2019-10-28 RX ORDER — ZOLPIDEM TARTRATE 10 MG/1
TABLET ORAL
Qty: 30 TABLET | Refills: 0 | OUTPATIENT
Start: 2019-10-28

## 2019-11-08 ENCOUNTER — LAB (OUTPATIENT)
Dept: LAB | Facility: MEDICAL CENTER | Age: 74
End: 2019-11-08
Payer: MEDICARE

## 2019-11-08 DIAGNOSIS — K21.9 GASTROESOPHAGEAL REFLUX DISEASE WITHOUT ESOPHAGITIS: ICD-10-CM

## 2019-11-08 DIAGNOSIS — I10 ESSENTIAL HYPERTENSION: ICD-10-CM

## 2019-11-08 LAB
ALBUMIN SERPL BCP-MCNC: 4.5 G/DL (ref 3.5–5)
ALP SERPL-CCNC: 100 U/L (ref 46–116)
ALT SERPL W P-5'-P-CCNC: 27 U/L (ref 12–78)
ANION GAP SERPL CALCULATED.3IONS-SCNC: 8 MMOL/L (ref 4–13)
AST SERPL W P-5'-P-CCNC: 20 U/L (ref 5–45)
BASOPHILS # BLD AUTO: 0.03 THOUSANDS/ΜL (ref 0–0.1)
BASOPHILS NFR BLD AUTO: 0 % (ref 0–1)
BILIRUB SERPL-MCNC: 0.66 MG/DL (ref 0.2–1)
BUN SERPL-MCNC: 13 MG/DL (ref 5–25)
CALCIUM SERPL-MCNC: 9.4 MG/DL (ref 8.3–10.1)
CHLORIDE SERPL-SCNC: 106 MMOL/L (ref 100–108)
CHOLEST SERPL-MCNC: 195 MG/DL (ref 50–200)
CO2 SERPL-SCNC: 25 MMOL/L (ref 21–32)
CREAT SERPL-MCNC: 0.69 MG/DL (ref 0.6–1.3)
EOSINOPHIL # BLD AUTO: 0.12 THOUSAND/ΜL (ref 0–0.61)
EOSINOPHIL NFR BLD AUTO: 2 % (ref 0–6)
ERYTHROCYTE [DISTWIDTH] IN BLOOD BY AUTOMATED COUNT: 13.2 % (ref 11.6–15.1)
EST. AVERAGE GLUCOSE BLD GHB EST-MCNC: 114 MG/DL
GFR SERPL CREATININE-BSD FRML MDRD: 85 ML/MIN/1.73SQ M
GLUCOSE P FAST SERPL-MCNC: 86 MG/DL (ref 65–99)
HBA1C MFR BLD: 5.6 % (ref 4.2–6.3)
HCT VFR BLD AUTO: 43.8 % (ref 34.8–46.1)
HDLC SERPL-MCNC: 54 MG/DL
HGB BLD-MCNC: 13.8 G/DL (ref 11.5–15.4)
IMM GRANULOCYTES # BLD AUTO: 0.02 THOUSAND/UL (ref 0–0.2)
IMM GRANULOCYTES NFR BLD AUTO: 0 % (ref 0–2)
LDLC SERPL CALC-MCNC: 104 MG/DL (ref 0–100)
LYMPHOCYTES # BLD AUTO: 1.19 THOUSANDS/ΜL (ref 0.6–4.47)
LYMPHOCYTES NFR BLD AUTO: 17 % (ref 14–44)
MCH RBC QN AUTO: 29.8 PG (ref 26.8–34.3)
MCHC RBC AUTO-ENTMCNC: 31.5 G/DL (ref 31.4–37.4)
MCV RBC AUTO: 95 FL (ref 82–98)
MONOCYTES # BLD AUTO: 0.46 THOUSAND/ΜL (ref 0.17–1.22)
MONOCYTES NFR BLD AUTO: 7 % (ref 4–12)
NEUTROPHILS # BLD AUTO: 5.18 THOUSANDS/ΜL (ref 1.85–7.62)
NEUTS SEG NFR BLD AUTO: 74 % (ref 43–75)
NONHDLC SERPL-MCNC: 141 MG/DL
NRBC BLD AUTO-RTO: 0 /100 WBCS
PLATELET # BLD AUTO: 240 THOUSANDS/UL (ref 149–390)
PMV BLD AUTO: 12.5 FL (ref 8.9–12.7)
POTASSIUM SERPL-SCNC: 4.2 MMOL/L (ref 3.5–5.3)
PROT SERPL-MCNC: 8.3 G/DL (ref 6.4–8.2)
RBC # BLD AUTO: 4.63 MILLION/UL (ref 3.81–5.12)
SODIUM SERPL-SCNC: 139 MMOL/L (ref 136–145)
TRIGL SERPL-MCNC: 186 MG/DL
TSH SERPL DL<=0.05 MIU/L-ACNC: 1.37 UIU/ML (ref 0.36–3.74)
WBC # BLD AUTO: 7 THOUSAND/UL (ref 4.31–10.16)

## 2019-11-08 PROCEDURE — 83036 HEMOGLOBIN GLYCOSYLATED A1C: CPT

## 2019-11-08 PROCEDURE — 80061 LIPID PANEL: CPT

## 2019-11-08 PROCEDURE — 80053 COMPREHEN METABOLIC PANEL: CPT

## 2019-11-08 PROCEDURE — 84443 ASSAY THYROID STIM HORMONE: CPT

## 2019-11-08 PROCEDURE — 85025 COMPLETE CBC W/AUTO DIFF WBC: CPT

## 2019-11-08 PROCEDURE — 36415 COLL VENOUS BLD VENIPUNCTURE: CPT

## 2019-11-14 ENCOUNTER — TELEPHONE (OUTPATIENT)
Dept: OBGYN CLINIC | Facility: HOSPITAL | Age: 74
End: 2019-11-14

## 2019-11-14 ENCOUNTER — HOSPITAL ENCOUNTER (EMERGENCY)
Facility: HOSPITAL | Age: 74
Discharge: HOME | End: 2019-11-14
Attending: EMERGENCY MEDICINE | Admitting: EMERGENCY MEDICINE
Payer: MEDICARE

## 2019-11-14 ENCOUNTER — APPOINTMENT (EMERGENCY)
Dept: RADIOLOGY | Facility: HOSPITAL | Age: 74
End: 2019-11-14
Payer: MEDICARE

## 2019-11-14 VITALS
OXYGEN SATURATION: 99 % | BODY MASS INDEX: 29.29 KG/M2 | DIASTOLIC BLOOD PRESSURE: 84 MMHG | SYSTOLIC BLOOD PRESSURE: 193 MMHG | RESPIRATION RATE: 18 BRPM | HEART RATE: 85 BPM | WEIGHT: 147.49 LBS | TEMPERATURE: 97.5 F

## 2019-11-14 DIAGNOSIS — G89.29 CHRONIC KNEE PAIN: Primary | ICD-10-CM

## 2019-11-14 DIAGNOSIS — M54.9 BACK PAIN: ICD-10-CM

## 2019-11-14 DIAGNOSIS — R07.9 CHEST PAIN: ICD-10-CM

## 2019-11-14 DIAGNOSIS — M25.569 CHRONIC KNEE PAIN: Primary | ICD-10-CM

## 2019-11-14 LAB
ANION GAP SERPL CALCULATED.3IONS-SCNC: 10 MMOL/L (ref 4–13)
APTT PPP: 36 SECONDS (ref 23–37)
BASOPHILS # BLD AUTO: 0.02 THOUSANDS/ΜL (ref 0–0.1)
BASOPHILS NFR BLD AUTO: 0 % (ref 0–1)
BUN SERPL-MCNC: 14 MG/DL (ref 5–25)
CALCIUM SERPL-MCNC: 9 MG/DL (ref 8.3–10.1)
CHLORIDE SERPL-SCNC: 104 MMOL/L (ref 100–108)
CO2 SERPL-SCNC: 26 MMOL/L (ref 21–32)
CREAT SERPL-MCNC: 0.61 MG/DL (ref 0.6–1.3)
EOSINOPHIL # BLD AUTO: 0.11 THOUSAND/ΜL (ref 0–0.61)
EOSINOPHIL NFR BLD AUTO: 2 % (ref 0–6)
ERYTHROCYTE [DISTWIDTH] IN BLOOD BY AUTOMATED COUNT: 13.2 % (ref 11.6–15.1)
GFR SERPL CREATININE-BSD FRML MDRD: 89 ML/MIN/1.73SQ M
GLUCOSE SERPL-MCNC: 96 MG/DL (ref 65–140)
HCT VFR BLD AUTO: 41.2 % (ref 34.8–46.1)
HGB BLD-MCNC: 13.2 G/DL (ref 11.5–15.4)
IMM GRANULOCYTES # BLD AUTO: 0.03 THOUSAND/UL (ref 0–0.2)
IMM GRANULOCYTES NFR BLD AUTO: 0 % (ref 0–2)
INR PPP: 0.93 (ref 0.84–1.19)
LYMPHOCYTES # BLD AUTO: 1.27 THOUSANDS/ΜL (ref 0.6–4.47)
LYMPHOCYTES NFR BLD AUTO: 17 % (ref 14–44)
MCH RBC QN AUTO: 30.1 PG (ref 26.8–34.3)
MCHC RBC AUTO-ENTMCNC: 32 G/DL (ref 31.4–37.4)
MCV RBC AUTO: 94 FL (ref 82–98)
MONOCYTES # BLD AUTO: 0.51 THOUSAND/ΜL (ref 0.17–1.22)
MONOCYTES NFR BLD AUTO: 7 % (ref 4–12)
NEUTROPHILS # BLD AUTO: 5.61 THOUSANDS/ΜL (ref 1.85–7.62)
NEUTS SEG NFR BLD AUTO: 74 % (ref 43–75)
NRBC BLD AUTO-RTO: 0 /100 WBCS
PLATELET # BLD AUTO: 232 THOUSANDS/UL (ref 149–390)
PMV BLD AUTO: 11.4 FL (ref 8.9–12.7)
POTASSIUM SERPL-SCNC: 4.1 MMOL/L (ref 3.5–5.3)
PROTHROMBIN TIME: 11.9 SECONDS (ref 11.6–14.5)
RBC # BLD AUTO: 4.38 MILLION/UL (ref 3.81–5.12)
SODIUM SERPL-SCNC: 140 MMOL/L (ref 136–145)
TROPONIN I SERPL-MCNC: <0.02 NG/ML
WBC # BLD AUTO: 7.55 THOUSAND/UL (ref 4.31–10.16)

## 2019-11-14 PROCEDURE — 99284 EMERGENCY DEPT VISIT MOD MDM: CPT

## 2019-11-14 PROCEDURE — 85610 PROTHROMBIN TIME: CPT | Performed by: EMERGENCY MEDICINE

## 2019-11-14 PROCEDURE — 85730 THROMBOPLASTIN TIME PARTIAL: CPT | Performed by: EMERGENCY MEDICINE

## 2019-11-14 PROCEDURE — 36415 COLL VENOUS BLD VENIPUNCTURE: CPT | Performed by: EMERGENCY MEDICINE

## 2019-11-14 PROCEDURE — 80048 BASIC METABOLIC PNL TOTAL CA: CPT | Performed by: EMERGENCY MEDICINE

## 2019-11-14 PROCEDURE — 84484 ASSAY OF TROPONIN QUANT: CPT | Performed by: EMERGENCY MEDICINE

## 2019-11-14 PROCEDURE — 72100 X-RAY EXAM L-S SPINE 2/3 VWS: CPT

## 2019-11-14 PROCEDURE — 73502 X-RAY EXAM HIP UNI 2-3 VIEWS: CPT

## 2019-11-14 PROCEDURE — 93005 ELECTROCARDIOGRAM TRACING: CPT

## 2019-11-14 PROCEDURE — 85025 COMPLETE CBC W/AUTO DIFF WBC: CPT | Performed by: EMERGENCY MEDICINE

## 2019-11-14 PROCEDURE — 99284 EMERGENCY DEPT VISIT MOD MDM: CPT | Performed by: EMERGENCY MEDICINE

## 2019-11-14 PROCEDURE — 73564 X-RAY EXAM KNEE 4 OR MORE: CPT

## 2019-11-14 RX ORDER — CYCLOBENZAPRINE HCL 10 MG
10 TABLET ORAL 3 TIMES DAILY
Qty: 30 TABLET | Refills: 0 | Status: SHIPPED | OUTPATIENT
Start: 2019-11-14 | End: 2019-11-27 | Stop reason: ALTCHOICE

## 2019-11-14 RX ORDER — ACETAMINOPHEN 325 MG/1
975 TABLET ORAL ONCE
Status: COMPLETED | OUTPATIENT
Start: 2019-11-14 | End: 2019-11-14

## 2019-11-14 RX ORDER — CYCLOBENZAPRINE HCL 10 MG
10 TABLET ORAL ONCE
Status: COMPLETED | OUTPATIENT
Start: 2019-11-14 | End: 2019-11-14

## 2019-11-14 RX ADMIN — ACETAMINOPHEN 975 MG: 325 TABLET, FILM COATED ORAL at 11:10

## 2019-11-14 RX ADMIN — CYCLOBENZAPRINE HYDROCHLORIDE 10 MG: 10 TABLET, FILM COATED ORAL at 11:53

## 2019-11-14 NOTE — ED NOTES
Pt rang call bell and informed RN starting with chest pain  Mid chest denies radiating pain  6/10 "feel like I have an elephant sitting on my chest" described as tightness       Hua Lucas RN  11/14/19 1716

## 2019-11-14 NOTE — TELEPHONE ENCOUNTER
Patient may be put on schedule today if she would like a toradol injection, it looks like we sent her to pain management regarding this, does she have an appointment with them?

## 2019-11-14 NOTE — ED PROVIDER NOTES
History  Chief Complaint   Patient presents with    Leg Pain     Pt  reports right knee pain that radiates to right back/buttock/hip area  Started Sunday     Patient presents to the emergency department for evaluation of right knee pain that is chronic in nature with radiation to her hip and back  This is been going on for the past week  She is having trouble ambulating and weight-bearing although she was able to walk from her house to the car  While in the emergency department she states she developed chest pain  She thinks it is from the stress of the knee pain  She denies fall or trauma  Denies numbness in the extremity  Denies fever chills or rash  Denies abdominal pain or urinary symptoms  Denies bowel or bladder incontinence or saddle anesthesia  Prior to Admission Medications   Prescriptions Last Dose Informant Patient Reported? Taking?    Acetaminophen 500 MG  Self Yes No   Sig: Take 650 mg by mouth every 6 (six) hours as needed for mild pain   Nattokinase 100 MG CAPS   Yes No   Sig: Take by mouth   Probiotic Product (MVW COMPLETE PROBIOTIC PO)   Yes No   Sig: Take by mouth   losartan (COZAAR) 25 mg tablet   No No   Sig: TAKE 1 TABLET BY MOUTH EVERY DAY   ranitidine (ZANTAC) 150 mg tablet   No No   Sig: Take 1 tablet (150 mg total) by mouth daily as needed for heartburn   saccharomyces boulardii (FLORASTOR) 250 mg capsule   Yes No   Sig: Take 250 mg by mouth 2 (two) times a day   zolpidem (AMBIEN) 10 mg tablet   No No   Sig: Take 1/2 tab at hs prn      Facility-Administered Medications: None       Past Medical History:   Diagnosis Date    Arthritis     Carpal tunnel syndrome     GERD (gastroesophageal reflux disease)     Hiatal hernia     Hypertension     Insomnia     Pneumonia     PONV (postoperative nausea and vomiting)     Renal calculi     Sleep deprivation     chronically    Vitamin D deficiency     Vitamin D toxicity 2011    with leaky gut syndrome       Past Surgical History:   Procedure Laterality Date    BUNIONECTOMY Bilateral     CARPAL TUNNEL RELEASE Right 2015    COLONOSCOPY      CYSTOSCOPY N/A 6/6/2019    Procedure: CYSTOSCOPY;  Surgeon: Krissy Doan MD;  [facility];  Service: Gynecology Oncology    ESOPHAGOGASTRODUODENOSCOPY N/A 5/5/2016    Procedure: ESOPHAGOGASTRODUODENOSCOPY (EGD); Surgeon: Karthik Brown MD;  [facility]; Service:     FRACTURE SURGERY      L arm     NERVE BLOCK       R Knee    OOPHORECTOMY Left     AR COLONOSCOPY FLX DX W/COLLJ SPEC WHEN PFRMD N/A 6/28/2016    Procedure: COLONOSCOPY;  Surgeon: Karthik Brown MD;  [facility]; Service: Gastroenterology    AR ESOPHAGOGASTRODUODENOSCOPY TRANSORAL DIAGNOSTIC N/A 10/19/2018    Procedure: ESOPHAGOGASTRODUODENOSCOPY (EGD); Surgeon: Karthik Brown MD;  [facility]; Service: Gastroenterology    AR LAPAROSCOPY W TOT HYSTERECTUTERUS <=250 GRAM  W TUBE/OVARY N/A 6/6/2019    Procedure: ROBOTIC TOTAL LAPAROSCOPIC HYSTERECTOMY, RIGHT SALPINGO-OOPHORECTOMY, EXTENSIVE ADHESIOLYSIS, APPROXIMATELY 39 MIN;  Surgeon: Krissy Doan MD;  [facility];  Service: Gynecology Oncology    ROTATOR CUFF REPAIR Bilateral     TOE SURGERY Left     left 5th toe shaved down due to repeated fx    TOTAL KNEE ARTHROPLASTY Left        Family History   Problem Relation Age of Onset    Heart failure Mother     Other Mother         respiratory failure    Heart failure Father         respiratory failure    Hypertension Daughter     Breast cancer Sister     Kidney failure Brother     Alcohol abuse Brother     No Known Problems Sister     No Known Problems Brother     Diabetes Brother     Arrhythmia Neg Hx     Clotting disorder Neg Hx     Fainting Neg Hx     Anuerysm Neg Hx     Stroke Neg Hx     Hyperlipidemia Neg Hx     Asthma Neg Hx      I have reviewed and agree with the history as documented      Social History     Tobacco Use    Smoking status: Former Smoker    Smokeless tobacco: Never Used    Tobacco comment: Quit > 30 years ago    Substance Use Topics    Alcohol use: No    Drug use: No        Review of Systems   Constitutional: Negative  Negative for activity change, appetite change, chills, diaphoresis, fatigue and fever  HENT: Negative  Negative for congestion, drooling, rhinorrhea, sinus pain, sore throat, tinnitus and voice change  Eyes: Negative  Negative for photophobia and visual disturbance  Respiratory: Negative  Negative for cough, chest tightness, shortness of breath, wheezing and stridor  Cardiovascular: Positive for chest pain  Negative for palpitations and leg swelling  Gastrointestinal: Negative  Negative for abdominal distention, abdominal pain, blood in stool, diarrhea, nausea and vomiting  Endocrine: Negative  Genitourinary: Negative  Negative for difficulty urinating, dysuria, flank pain, frequency, urgency, vaginal bleeding, vaginal discharge and vaginal pain  Musculoskeletal: Positive for arthralgias, back pain and gait problem  Negative for neck pain and neck stiffness  Skin: Negative  Negative for rash and wound  Allergic/Immunologic: Negative  Neurological: Negative for dizziness, tremors, seizures, syncope, facial asymmetry, speech difficulty, weakness, light-headedness, numbness and headaches  Hematological: Negative  Does not bruise/bleed easily  Psychiatric/Behavioral: Negative  Negative for confusion  Physical Exam  Physical Exam   Constitutional: She is oriented to person, place, and time  She appears well-developed and well-nourished  No distress  Nontoxic appearance  Anxious affect  No respiratory distress  Patient does not appear to be in pain  HENT:   Head: Normocephalic and atraumatic  Right Ear: External ear normal    Left Ear: External ear normal    Mouth/Throat: Oropharynx is clear and moist    Eyes: Pupils are equal, round, and reactive to light   Conjunctivae and EOM are normal    Neck: Normal range of motion  Neck supple  Cardiovascular: Normal rate, regular rhythm, normal heart sounds and intact distal pulses  Pulmonary/Chest: Effort normal and breath sounds normal  No stridor  No respiratory distress  She has no wheezes  She has no rales  She exhibits no tenderness  Abdominal: Soft  Bowel sounds are normal  She exhibits no distension and no mass  There is no tenderness  There is no rebound and no guarding  No hernia  Musculoskeletal: Normal range of motion  She exhibits no edema, tenderness or deformity  Neurological: She is alert and oriented to person, place, and time  She has normal reflexes  She displays normal reflexes  No cranial nerve deficit or sensory deficit  She exhibits normal muscle tone  Coordination normal    Skin: Skin is warm and dry  She is not diaphoretic  Psychiatric: Her behavior is normal  Judgment and thought content normal    Anxious affect   Nursing note and vitals reviewed        Vital Signs  ED Triage Vitals   Temperature Pulse Respirations Blood Pressure SpO2   11/14/19 0954 11/14/19 0953 11/14/19 0953 11/14/19 0953 11/14/19 0953   97 5 °F (36 4 °C) 85 18 (!) 193/84 99 %      Temp Source Heart Rate Source Patient Position - Orthostatic VS BP Location FiO2 (%)   11/14/19 0953 11/14/19 0953 11/14/19 0953 11/14/19 0953 --   Oral Monitor Lying Right arm       Pain Score       11/14/19 0950       8           Vitals:    11/14/19 0953   BP: (!) 193/84   Pulse: 85   Patient Position - Orthostatic VS: Lying         Visual Acuity      ED Medications  Medications   cyclobenzaprine (FLEXERIL) tablet 10 mg (has no administration in time range)   acetaminophen (TYLENOL) tablet 975 mg (975 mg Oral Given 11/14/19 1110)       Diagnostic Studies  Results Reviewed     Procedure Component Value Units Date/Time    Troponin I [[id number]]  (Normal) Collected:  11/14/19 1101    Lab Status:  Final result Specimen:  Blood from Arm, Right Updated:  11/14/19 1126     Troponin I <0 02 ng/mL     Basic metabolic panel [[id number]] Collected:  11/14/19 1101    Lab Status:  Final result Specimen:  Blood from Arm, Right Updated:  11/14/19 1118     Sodium 140 mmol/L      Potassium 4 1 mmol/L      Chloride 104 mmol/L      CO2 26 mmol/L      ANION GAP 10 mmol/L      BUN 14 mg/dL      Creatinine 0 61 mg/dL      Glucose 96 mg/dL      Calcium 9 0 mg/dL      eGFR 89 ml/min/1 73sq m     Narrative:       [place] guidelines for Chronic Kidney Disease (CKD):     Stage 1 with normal or high GFR (GFR > 90 mL/min/1 73 square meters)    Stage 2 Mild CKD (GFR = 60-89 mL/min/1 73 square meters)    Stage 3A Moderate CKD (GFR = 45-59 mL/min/1 73 square meters)    Stage 3B Moderate CKD (GFR = 30-44 mL/min/1 73 square meters)    Stage 4 Severe CKD (GFR = 15-29 mL/min/1 73 square meters)    Stage 5 End Stage CKD (GFR <15 mL/min/1 73 square meters)  Note: GFR calculation is accurate only with a steady state creatinine    Protime-INR [[id number]]  (Normal) Collected:  11/14/19 1101    Lab Status:  Final result Specimen:  Blood from Arm, Right Updated:  11/14/19 1116     Protime 11 9 seconds      INR 0 93    APTT [[id number]]  (Normal) Collected:  11/14/19 1101    Lab Status:  Final result Specimen:  Blood from Arm, Right Updated:  11/14/19 1116     PTT 36 seconds     CBC and differential [[id number]] Collected:  11/14/19 1101    Lab Status:  Final result Specimen:  Blood from Arm, Right Updated:  11/14/19 1108     WBC 7 55 Thousand/uL      RBC 4 38 Million/uL      Hemoglobin 13 2 g/dL      Hematocrit 41 2 %      MCV 94 fL      MCH 30 1 pg      MCHC 32 0 g/dL      RDW 13 2 %      MPV 11 4 fL      Platelets 528 Thousands/uL      nRBC 0 /100 WBCs      Neutrophils Relative 74 %      Immat GRANS % 0 %      Lymphocytes Relative 17 %      Monocytes Relative 7 %      Eosinophils Relative 2 %      Basophils Relative 0 %      Neutrophils Absolute 5 61 Thousands/µL      Immature Grans Absolute 0 03 Thousand/uL      Lymphocytes Absolute 1 27 Thousands/µL      Monocytes Absolute 0 51 Thousand/µL      Eosinophils Absolute 0 11 Thousand/µL      Basophils Absolute 0 02 Thousands/µL                  XR hip/pelv 2-3 vws right   ED Interpretation by Hossein Lockhart MD (11/14 1132)   No acute disease      XR lumbar spine 2 or 3 views   ED Interpretation by Hossein Lockhart MD (11/14 1132)   No acute disease      XR knee 4+ vw right injury   ED Interpretation by Hossein Lockhart MD (11/14 1131)   No acute disease                 Procedures  ECG 12 Lead Documentation Only  Date/Time: 11/14/2019 10:51 AM  Performed by: Hossein Lockhart MD  Authorized by: Hossein Lockhart MD     ECG reviewed by me, the ED Provider: yes    Patient location:  ED  Previous ECG:     Previous ECG:  Compared to current    Similarity:  No change  Interpretation:     Interpretation: abnormal    Rate:     ECG rate:  75    ECG rate assessment: normal    Rhythm:     Rhythm: sinus rhythm    Ectopy:     Ectopy: none    QRS:     QRS axis:  Normal    QRS intervals:  Normal  Conduction:     Conduction: normal    ST segments:     ST segments:  Non-specific  T waves:     T waves: non-specific             ED Course  ED Course as of Nov 14 1147   Thu Nov 14, 2019   1144 Patient is stable for discharge  Labs EKG unremarkable along with unremarkable x-rays  Tylenol and Flexeril given    Patient will follow pain management and her private orthopedist             HEART Risk Score      Most Recent Value   History  0 Filed at: 11/14/2019 1144   ECG  1 Filed at: 11/14/2019 1144   Age  2 Filed at: 11/14/2019 1144   Risk Factors  1 Filed at: 11/14/2019 1144   Troponin  0 Filed at: 11/14/2019 1144   Heart Score Risk Calculator   History  0 Filed at: 11/14/2019 1144   ECG  1 Filed at: 11/14/2019 1144   Age  2 Filed at: 11/14/2019 1144   Risk Factors  1 Filed at: 11/14/2019 1144   Troponin  0 Filed at: 11/14/2019 1144   HEART Score  4 Filed at: 11/14/2019 1144   HEART Score  4 Filed at: 11/14/2019 1144                            MDM    Disposition  Final diagnoses:   Chronic knee pain   Back pain   Chest pain     Time reflects when diagnosis was documented in both MDM as applicable and the Disposition within this note     Time User Action Codes Description Comment    11/14/2019 11:45 AM Fredonia Punch Add [M25 569,  G89 29] Chronic knee pain     11/14/2019 11:45 AM [place] Punch Add [M54 9] Back pain     11/14/2019 11:45 AM Luiza Punch Add [R07 9] Chest pain       ED Disposition     ED Disposition Condition Date/Time Comment    Discharge Stable Thu Nov 14, 2019 11:45 AM Ludwin Lewis discharge to home/self care  Follow-up Information     Follow up With Specialties Details Why Contact Info    Gino Freire MD Family Medicine  Also follow up private orthopedist and pain management physician [address] [place] [address] Close  [phone]            Patient's Medications   Discharge Prescriptions    CYCLOBENZAPRINE (FLEXERIL) 10 MG TABLET    Take 1 tablet (10 mg total) by mouth 3 (three) times a day       Start Date: 11/14/2019End Date: --       Order Dose: 10 mg       Quantity: 30 tablet    Refills: 0     No discharge procedures on file      ED Provider  Electronically Signed by           Graham Pruitt MD  11/14/19 Saeid Edouard MD  11/14/19 6932

## 2019-11-14 NOTE — TELEPHONE ENCOUNTER
Patient is calling stating that her knee is bothering her a lot and she is in a lot of pain  She was instructed to go to the er if she is in that much pain because the doctor had nothing available today  Patient said that she would want to hear that from the doctor

## 2019-11-15 ENCOUNTER — TELEPHONE (OUTPATIENT)
Dept: PAIN MEDICINE | Facility: MEDICAL CENTER | Age: 74
End: 2019-11-15

## 2019-11-15 ENCOUNTER — TELEPHONE (OUTPATIENT)
Dept: FAMILY MEDICINE CLINIC | Facility: MEDICAL CENTER | Age: 74
End: 2019-11-15

## 2019-11-15 LAB
ATRIAL RATE: 75 BPM
P AXIS: 65 DEGREES
PR INTERVAL: 174 MS
QRS AXIS: 0 DEGREES
QRSD INTERVAL: 84 MS
QT INTERVAL: 386 MS
QTC INTERVAL: 431 MS
T WAVE AXIS: 33 DEGREES
VENTRICULAR RATE: 75 BPM

## 2019-11-15 PROCEDURE — 93010 ELECTROCARDIOGRAM REPORT: CPT | Performed by: INTERNAL MEDICINE

## 2019-11-15 NOTE — TELEPHONE ENCOUNTER
Pt called to ask if Dr Ruddy Cortes would be able to call in something for pain for her  She was in the ER yesterday for knee pain  They gave her 900 mg Tylenol and something for the inflammation while she was there, but no Rx for later  She has appt with ortho, but not until 11/20/19  She needs something for pain until then  OTC Tylenol is not helping  If Dr Ruddy Cortes would send in something for pain, but NO codeine she said, please send to [facility] in [place]  She also asked Dr Kiki Gonzalez office for something and will let us know if he calls something in for her       Routed to Clinical to make aware in case Dr Ruddy Cortes touches base today

## 2019-11-15 NOTE — TELEPHONE ENCOUNTER
mary  S/w patient - reviewed ER note- she was prescribed flexeril  She hasn't picked it up yet   Also she is being treated by ortho for her knee pain  Has a call out to them  Told her she needs to address her pain with them  Has an appt with them Wednesday as well  States the tylenol does help  Suggest she try the flexeril also

## 2019-11-15 NOTE — TELEPHONE ENCOUNTER
Pt called stating that she went to the ED and they gave her a stronger dose on tylenol, pt states that she would like to know if Erin Abrams would be able to give her a strict for a stronger tylenol that is not codeine     Pt can be reached at [phone]

## 2019-11-15 NOTE — TELEPHONE ENCOUNTER
S/w pt and advised SI out of office til Monday  Per ED notes, pt was given 925mg tylenol and she states it was effective for her and would like a script  Advised pt can take up to 3000mg a day and may take OTC tylenol  Pt would still like a script    Please advise

## 2019-11-18 DIAGNOSIS — M54.12 CERVICAL RADICULITIS: Primary | ICD-10-CM

## 2019-11-20 ENCOUNTER — TELEPHONE (OUTPATIENT)
Dept: OBGYN CLINIC | Facility: HOSPITAL | Age: 74
End: 2019-11-20

## 2019-11-20 ENCOUNTER — OFFICE VISIT (OUTPATIENT)
Dept: OBGYN CLINIC | Facility: MEDICAL CENTER | Age: 74
End: 2019-11-20
Payer: MEDICARE

## 2019-11-20 VITALS
SYSTOLIC BLOOD PRESSURE: 129 MMHG | HEART RATE: 96 BPM | BODY MASS INDEX: 29.39 KG/M2 | WEIGHT: 148 LBS | DIASTOLIC BLOOD PRESSURE: 77 MMHG

## 2019-11-20 DIAGNOSIS — M25.561 ACUTE PAIN OF RIGHT KNEE: ICD-10-CM

## 2019-11-20 DIAGNOSIS — M17.11 PRIMARY OSTEOARTHRITIS OF RIGHT KNEE: Primary | ICD-10-CM

## 2019-11-20 DIAGNOSIS — M76.31 IT BAND SYNDROME, RIGHT: Primary | ICD-10-CM

## 2019-11-20 PROCEDURE — 99213 OFFICE O/P EST LOW 20 MIN: CPT | Performed by: ORTHOPAEDIC SURGERY

## 2019-11-20 RX ORDER — MELOXICAM 15 MG/1
7.5 TABLET ORAL DAILY
Qty: 30 TABLET | Refills: 0 | Status: SHIPPED | OUTPATIENT
Start: 2019-11-20 | End: 2019-11-27 | Stop reason: ALTCHOICE

## 2019-11-20 NOTE — TELEPHONE ENCOUNTER
Dr Foster   #: [phone]           Patient is calling in stating she would like medication to be sent to [facility] pharmacy in [address]  Please advise, thank you         Address: [address] [place], [address] [place] [address] Avenue

## 2019-11-20 NOTE — PROGRESS NOTES
75 y o female presenting for evaluation of acute on chronic right knee pain  Patient was seen in the office few weeks ago where she was offered and administered a Toradol injection of the knee  Previous Toradol injections have been successful  This injection provided a few weeks of pain relief, but then wore off  She reports a week ago she had a busy day on her feet and developed worsening right knee pain  Over the next few days did not get better so she presented to the emergency department  There they did new x-rays which did not reveal any acute fracture or dislocation  She continues to have pain, so she presents today for repeat examination  She says the pain is worse in the medial side of the knee, and sometimes radiates up to the hip/back, no radiation into the toes  No numbness or tingling  She is able to bear weight using a cane, but this makes the pain worse  Pain is alleviated with rest   She has previously been referred to Pain Management for genicular block, which she is scheduled for next week  Review of Systems  Review of systems negative unless otherwise specified in HPI    Past Medical History  Past Medical History:   Diagnosis Date    Arthritis     Carpal tunnel syndrome     GERD (gastroesophageal reflux disease)     Hiatal hernia     Hypertension     Insomnia     Pneumonia     PONV (postoperative nausea and vomiting)     Renal calculi     Sleep deprivation     chronically    Vitamin D deficiency     Vitamin D toxicity 2011    with leaky gut syndrome       Past Surgical History  Past Surgical History:   Procedure Laterality Date    BUNIONECTOMY Bilateral     CARPAL TUNNEL RELEASE Right 2015    COLONOSCOPY      CYSTOSCOPY N/A 6/6/2019    Procedure: CYSTOSCOPY;  Surgeon: Janneth Parker MD;  [facility];  Service: Gynecology Oncology    ESOPHAGOGASTRODUODENOSCOPY N/A 5/5/2016    Procedure: ESOPHAGOGASTRODUODENOSCOPY (EGD);   Surgeon: Aura Almonte MD; [facility]; Service:     FRACTURE SURGERY      L arm     NERVE BLOCK       R Knee    OOPHORECTOMY Left     CT COLONOSCOPY FLX DX W/COLLJ SPEC WHEN PFRMD N/A 6/28/2016    Procedure: COLONOSCOPY;  Surgeon: Sammi Diaz MD;  [facility]; Service: Gastroenterology    CT ESOPHAGOGASTRODUODENOSCOPY TRANSORAL DIAGNOSTIC N/A 10/19/2018    Procedure: ESOPHAGOGASTRODUODENOSCOPY (EGD); Surgeon: Sammi Diaz MD;  [facility]; Service: Gastroenterology    CT LAPAROSCOPY W TOT HYSTERECTUTERUS <=250 Brittany Peek TUBE/OVARY N/A 6/6/2019    Procedure: ROBOTIC TOTAL LAPAROSCOPIC HYSTERECTOMY, RIGHT SALPINGO-OOPHORECTOMY, EXTENSIVE ADHESIOLYSIS, APPROXIMATELY 39 MIN;  Surgeon: Dominique Varghese MD;  [facility];  Service: Gynecology Oncology    ROTATOR CUFF REPAIR Bilateral     TOE SURGERY Left     left 5th toe shaved down due to repeated fx    TOTAL KNEE ARTHROPLASTY Left        Current Medications  Current Outpatient Medications on File Prior to Visit   Medication Sig Dispense Refill    Acetaminophen 500 MG TAKE 2 CAPSULES TWICE A  capsule 3    cyclobenzaprine (FLEXERIL) 10 mg tablet Take 1 tablet (10 mg total) by mouth 3 (three) times a day 30 tablet 0    losartan (COZAAR) 25 mg tablet TAKE 1 TABLET BY MOUTH EVERY DAY 30 tablet 5    Nattokinase 100 MG CAPS Take by mouth      Probiotic Product (MVW COMPLETE PROBIOTIC PO) Take by mouth      ranitidine (ZANTAC) 150 mg tablet Take 1 tablet (150 mg total) by mouth daily as needed for heartburn 30 tablet 5    saccharomyces boulardii (FLORASTOR) 250 mg capsule Take 250 mg by mouth 2 (two) times a day      zolpidem (AMBIEN) 10 mg tablet Take 1/2 tab at hs prn 30 tablet 0     No current facility-administered medications on file prior to visit          Recent Labs [facility])  0   Lab Value Date/Time    HCT 41 2 11/14/2019 1101    HCT 42 6 09/18/2015 0803    HGB 13 2 11/14/2019 1101    HGB 14 2 09/18/2015 0803 WBC 7 55 11/14/2019 1101    WBC 6 42 09/18/2015 0803    INR 0 93 11/14/2019 1101    ESR 14 05/03/2016 1932    GLUCOSE 84 09/18/2015 0803    HGBA1C 5 6 11/08/2019 6217         Physical exam  · General: Awake, Alert, Oriented  · Eyes: Pupils equal, round and reactive to light  · Heart: regular rate and rhythm  · Lungs: No audible wheezing  · Abdomen: soft  MSK right knee  -skin intact over the knee, no erythema or ecchymosis, no swelling  -trace effusion  -range of motion 0-100 degrees  -stable to valgus/varus, Lachman's, posterior drawer  -motor intact 5/5 hip flexion, knee flexion/extension, ankle dorsiflexion/plantar flexion, EHL/FHL bilaterally  -sensation intact L3-S1  -foot warm and well perfused    Imaging  Imaging reviewed with the patient  No acute fracture or dislocation    Procedure  Non indicated    Assessment/Plan:   75 y  o female with strain to the right knee, as well as IT band syndrome  Would recommend that she follows up with pain management as previously scheduled for the geniculate block  Additionally we will prescribe the patient with a course of physical therapy with goals of strengthening of the musculature around the knee, and in particular the IT band  Discussed plan with patient and patient's son and all in agreement treatment plan    Thank you

## 2019-11-27 ENCOUNTER — OFFICE VISIT (OUTPATIENT)
Dept: FAMILY MEDICINE CLINIC | Facility: MEDICAL CENTER | Age: 74
End: 2019-11-27
Payer: MEDICARE

## 2019-11-27 ENCOUNTER — OFFICE VISIT (OUTPATIENT)
Dept: PAIN MEDICINE | Facility: CLINIC | Age: 74
End: 2019-11-27
Payer: MEDICARE

## 2019-11-27 VITALS
RESPIRATION RATE: 16 BRPM | HEART RATE: 88 BPM | BODY MASS INDEX: 29.71 KG/M2 | SYSTOLIC BLOOD PRESSURE: 160 MMHG | DIASTOLIC BLOOD PRESSURE: 82 MMHG | WEIGHT: 149.6 LBS

## 2019-11-27 VITALS
SYSTOLIC BLOOD PRESSURE: 166 MMHG | DIASTOLIC BLOOD PRESSURE: 87 MMHG | HEART RATE: 89 BPM | WEIGHT: 146.6 LBS | RESPIRATION RATE: 20 BRPM | BODY MASS INDEX: 28.78 KG/M2 | HEIGHT: 60 IN

## 2019-11-27 DIAGNOSIS — Z00.00 MEDICARE ANNUAL WELLNESS VISIT, SUBSEQUENT: ICD-10-CM

## 2019-11-27 DIAGNOSIS — R09.89 LABILE BLOOD PRESSURE: ICD-10-CM

## 2019-11-27 DIAGNOSIS — M17.11 PRIMARY OSTEOARTHRITIS OF RIGHT KNEE: ICD-10-CM

## 2019-11-27 DIAGNOSIS — I10 ESSENTIAL HYPERTENSION: ICD-10-CM

## 2019-11-27 DIAGNOSIS — M17.11 PRIMARY OSTEOARTHRITIS OF RIGHT KNEE: Primary | ICD-10-CM

## 2019-11-27 DIAGNOSIS — M81.0 OSTEOPOROSIS, UNSPECIFIED OSTEOPOROSIS TYPE, UNSPECIFIED PATHOLOGICAL FRACTURE PRESENCE: Primary | ICD-10-CM

## 2019-11-27 DIAGNOSIS — K21.9 GASTROESOPHAGEAL REFLUX DISEASE WITHOUT ESOPHAGITIS: ICD-10-CM

## 2019-11-27 DIAGNOSIS — J30.2 SEASONAL ALLERGIC RHINITIS, UNSPECIFIED TRIGGER: ICD-10-CM

## 2019-11-27 DIAGNOSIS — G89.4 CHRONIC PAIN SYNDROME: ICD-10-CM

## 2019-11-27 PROCEDURE — 99214 OFFICE O/P EST MOD 30 MIN: CPT | Performed by: FAMILY MEDICINE

## 2019-11-27 PROCEDURE — 99214 OFFICE O/P EST MOD 30 MIN: CPT | Performed by: ANESTHESIOLOGY

## 2019-11-27 PROCEDURE — G0439 PPPS, SUBSEQ VISIT: HCPCS | Performed by: FAMILY MEDICINE

## 2019-11-27 RX ORDER — TRAMADOL HYDROCHLORIDE 50 MG/1
50 TABLET ORAL EVERY 8 HOURS PRN
Qty: 15 TABLET | Refills: 0 | Status: SHIPPED | OUTPATIENT
Start: 2019-11-27 | End: 2019-12-02

## 2019-11-27 NOTE — PROGRESS NOTES
Assessment:  1  Primary osteoarthritis of right knee  FL spine and pain procedure   2  Chronic pain syndrome - Right  traMADol (ULTRAM) 50 mg tablet       Plan:  67-year-old female with right knee osteoarthritis which is moderate to severe in nature  Patient is status post right genicular nerve radiofrequency ablation with excellent pain relief for over a year  Right knee pain has gradually returned  She would like to schedule repeat injection  Complete risks and benefits including bleeding, infection, tissue reaction, nerve injury and allergic reaction were discussed  The approach was demonstrated using models and literature was provided  Verbal and written consent was obtained  I will sent to her pharmacy a short-term prescription for tramadol 50 mg p o  T i d  X5 days supply  My impressions and treatment recommendations were discussed in detail with the patient who verbalized understanding and had no further questions  Discharge instructions were provided  I personally saw and examined the patient and I agree with the above discussed plan of care  History of Present Illness:  Shelby Abdullahi is a 75 y o  female who presents for a follow up office visit in regards to right knee pain  The patients current symptoms include sharp dull achy right knee pain  Patient states that she had right knee toradol injection with the orthopedic doctor w/o any relief  In the past, she has had right genicular nerve block and radiofrequency ablation with excellent pain relief  Right knee pain has gradually returned over time  Her pain is rated 10/10  I have personally reviewed and/or updated the patient's past medical history, past surgical history, family history, social history, current medications, allergies, and vital signs today  Review of Systems   Respiratory: Negative for shortness of breath  Cardiovascular: Negative for chest pain     Gastrointestinal: Negative for constipation, diarrhea, nausea and vomiting  Musculoskeletal: Positive for gait problem and joint swelling  Negative for arthralgias and myalgias  Skin: Negative for rash  Neurological: Negative for dizziness, seizures and weakness  All other systems reviewed and are negative  Patient Active Problem List   Diagnosis    Chest pressure    GERD (gastroesophageal reflux disease)    Sleep deprivation    Essential hypertension    Arthritis of knee    Left knee pain    Lower back pain    Primary osteoarthritis of right knee    Globus sensation    RUQ pain    Gastroesophageal reflux disease without esophagitis    Left shoulder pain    Right shoulder pain    Tendinopathy of left rotator cuff    Tendinopathy of right rotator cuff    Cervical radiculitis    Carpal tunnel syndrome of right wrist    Breast cancer screening    Status post hysterectomy    Osteoporosis       Past Medical History:   Diagnosis Date    Arthritis     Carpal tunnel syndrome     GERD (gastroesophageal reflux disease)     Hiatal hernia     Hypertension     Insomnia     Pneumonia     PONV (postoperative nausea and vomiting)     Renal calculi     Sleep deprivation     chronically    Vitamin D deficiency     Vitamin D toxicity 2011    with leaky gut syndrome       Past Surgical History:   Procedure Laterality Date    BUNIONECTOMY Bilateral     CARPAL TUNNEL RELEASE Right 2015    COLONOSCOPY      CYSTOSCOPY N/A 6/6/2019    Procedure: CYSTOSCOPY;  Surgeon: Nakia Delgado MD;  [facility];  Service: Gynecology Oncology    ESOPHAGOGASTRODUODENOSCOPY N/A 5/5/2016    Procedure: ESOPHAGOGASTRODUODENOSCOPY (EGD); Surgeon: Cj Olivarez MD;  [facility]; Service:     FRACTURE SURGERY      L arm     NERVE BLOCK       R Knee    OOPHORECTOMY Left     IA COLONOSCOPY FLX DX W/COLLJ SPEC WHEN PFRMD N/A 6/28/2016    Procedure: COLONOSCOPY;  Surgeon: Cj Olivarez MD;  [facility];   Service: Gastroenterology    IA ESOPHAGOGASTRODUODENOSCOPY TRANSORAL DIAGNOSTIC N/A 10/19/2018    Procedure: ESOPHAGOGASTRODUODENOSCOPY (EGD); Surgeon: Christiana Carmona MD;  [facility];   Service: Gastroenterology    LA LAPAROSCOPY W TOT HYSTERECTUTERUS <=250 Lita Prayer TUBE/OVARY N/A 6/6/2019    Procedure: ROBOTIC TOTAL LAPAROSCOPIC HYSTERECTOMY, RIGHT SALPINGO-OOPHORECTOMY, EXTENSIVE ADHESIOLYSIS, APPROXIMATELY 39 MIN;  Surgeon: Vilma Emanuel MD;  Location:  MAIN OR;  Service: Gynecology Oncology    ROTATOR CUFF REPAIR Bilateral     TOE SURGERY Left     left 5th toe shaved down due to repeated fx    TOTAL KNEE ARTHROPLASTY Left        Family History   Problem Relation Age of Onset    Heart failure Mother     Other Mother         respiratory failure    Heart failure Father         respiratory failure    Hypertension Daughter     Breast cancer Sister     Kidney failure Brother     Alcohol abuse Brother     No Known Problems Sister     No Known Problems Brother     Diabetes Brother     Arrhythmia Neg Hx     Clotting disorder Neg Hx     Fainting Neg Hx     Anuerysm Neg Hx     Stroke Neg Hx     Hyperlipidemia Neg Hx     Asthma Neg Hx        Social History     Occupational History    Not on file   Tobacco Use    Smoking status: Former Smoker    Smokeless tobacco: Never Used    Tobacco comment: Quit > 30 years ago    Substance and Sexual Activity    Alcohol use: No    Drug use: No    Sexual activity: Not on file     Comment: no partner       Current Outpatient Medications on File Prior to Visit   Medication Sig    Acetaminophen 500 MG TAKE 2 CAPSULES TWICE A DAY    cyclobenzaprine (FLEXERIL) 10 mg tablet Take 1 tablet (10 mg total) by mouth 3 (three) times a day    losartan (COZAAR) 25 mg tablet TAKE 1 TABLET BY MOUTH EVERY DAY    meloxicam (MOBIC) 15 mg tablet Take 0 5 tablets (7 5 mg total) by mouth daily    Nattokinase 100 MG CAPS Take by mouth    Probiotic Product (MVW COMPLETE PROBIOTIC PO) Take by mouth    ranitidine (ZANTAC) 150 mg tablet Take 1 tablet (150 mg total) by mouth daily as needed for heartburn    saccharomyces boulardii (FLORASTOR) 250 mg capsule Take 250 mg by mouth 2 (two) times a day    zolpidem (AMBIEN) 10 mg tablet Take 1/2 tab at hs prn     No current facility-administered medications on file prior to visit  Allergies   Allergen Reactions    Ancef [Cefazolin] Hives and GI Intolerance    Aspirin GI Intolerance     Even low dose     Penicillins Hives    Prednisone Hives and GI Intolerance    Vancomycin Hives and GI Intolerance    Adhesive [Medical Tape]     Ciprofloxacin Hives    Cortisone Syncope    Other      STEROIDS- GI INTOLERANCE    Oxycodone Rash and Vomiting       Physical Exam:    There were no vitals taken for this visit  Constitutional:normal, well developed, well nourished, alert, in no distress and non-toxic and no overt pain behavior  Eyes:anicteric  HEENT:grossly intact  Neck:supple, symmetric, trachea midline and no masses   Pulmonary:even and unlabored  Cardiovascular:No edema or pitting edema present  Skin:Normal without rashes or lesions and well hydrated  Psychiatric:Mood and affect appropriate  Neurologic:Cranial Nerves II-XII grossly intact  Musculoskeletal:normal    Imaging  LUMBAR SPINE     INDICATION:   Pain      COMPARISON:  8/29/2013     VIEWS:  XR SPINE LUMBAR 2 OR 3 VIEWS INJURY        FINDINGS:     There are 5 non rib bearing lumbar vertebral bodies       There is no evidence of acute fracture or destructive osseous lesion      There is grade 1 L5 on S1 anterolisthesis secondary to facet arthropathy       Lower lumbar degenerative changes noted      The pedicles appear intact      There are atherosclerotic calcifications  Soft tissues are otherwise unremarkable      IMPRESSION:  Lower lumbar degenerative change and grade 1 L5 on S1 anterolisthesis secondary to facet arthropathy  No acute findings      RIGHT HIP     INDICATION: Pain      COMPARISON:  None     VIEWS:  XR HIP/PELV 2-3 VWS RIGHT W PELVIS IF PERFORMED         FINDINGS:     There is no acute fracture or dislocation      Mild right hip osteoarthritis is present      No lytic or blastic osseous lesions      Soft tissues are unremarkable      The visualized lumbar spine is unremarkable      IMPRESSION:     No acute osseous abnormality  RIGHT KNEE     INDICATION:   Chronic pain      COMPARISON:  11/22/2017     VIEWS:  XR KNEE 4+ VW RIGHT INJURY         FINDINGS:     There is no acute fracture or dislocation      There is no joint effusion      Degenerative change including medial compartment joint space narrowing and productive spurring      No lytic or blastic lesions are seen      Soft tissues are unremarkable      IMPRESSION:  Degenerative change with medial compartment joint space narrowing and productive spurring

## 2019-11-27 NOTE — PROGRESS NOTES
Truddie Primrose is here for follow-up  She is also here for Medicare wellness; see other note  She is accompanied by her son and her son-in-law today  She is being seen by Orthopedics as well as pain management for pain in her right knee  She got a right geniculate her nerve radiofrequency ablation with good pain response in the past   She is going to reschedule another injection  In the meantime a prescription for tramadol was given to her  She has been seen by Orthopedics for right shoulder rotator cuff tendinitis  She had an injection of Toradol with some relief  She is going for physical therapy  She has been taking losartan 25 mg daily for her blood pressure  She has not taken her blood pressure at home recently  She complains of postnasal drip and a thickness in her throat and some pressure behind her eyes     She started Flonase yesterday  O: /96 (Cuff Size: Large)   Pulse 88   Resp 16   Wt 67 9 kg (149 lb 9 6 oz)   BMI 29 71 kg/m²    BP by me 160/82  Physical Exam   Constitutional: She is oriented to person, place, and time  She appears well-developed and well-nourished  HENT:   Head: Normocephalic  Right Ear: External ear normal    Left Ear: External ear normal    Nose: Nose normal    Mouth/Throat: Oropharynx is clear and moist    Eyes: Pupils are equal, round, and reactive to light  Conjunctivae and EOM are normal  No scleral icterus  Neck: Normal range of motion  Neck supple  Carotid bruit is not present  No thyroid mass and no thyromegaly present  Cardiovascular: Normal rate, regular rhythm, normal heart sounds and intact distal pulses  Pulses:       Femoral pulses are 2+ on the right side, and 2+ on the left side  Popliteal pulses are 2+ on the right side, and 2+ on the left side  Dorsalis pedis pulses are 2+ on the right side, and 2+ on the left side  Posterior tibial pulses are 2+ on the right side, and 2+ on the left side     Pulmonary/Chest: Effort normal and breath sounds normal  No respiratory distress  She has no wheezes  She has no rales  Abdominal: Soft  Normal aorta and bowel sounds are normal  She exhibits no distension and no mass  There is no hepatosplenomegaly  There is no tenderness  Musculoskeletal: Normal range of motion  She exhibits no edema  Lymphadenopathy:        Head (right side): No submandibular and no occipital adenopathy present  Head (left side): No submandibular and no occipital adenopathy present  She has no cervical adenopathy  Right: No inguinal and no supraclavicular adenopathy present  Left: No inguinal and no supraclavicular adenopathy present  Neurological: She is oriented to person, place, and time  Skin: No lesion and no rash noted  Psychiatric: She has a normal mood and affect  Her behavior is normal      Blood work 11/8/19   CBC CMP lipid profile TSH A1c within normal limits    Assessment  1  Hypertension-blood pressure is elevated today although may be due to recent stress and pain  I have asked her to take her blood pressure at home several times over the next few weeks and to call us with readings  May need to increase her losartan  Blood pressure cuff has been checked here for accuracy  2  Rhinitis-will continue Flonase  3  Esophageal reflux-doing better since on a gluten free diet  Will continue  She will use Tums or Pepcid as needed  4   Osteoarthritis of the knee-will use tramadol for now as needed  Will be scheduling injection with Dr Nancy Bailey  5   Health maintenance-see  wellness note  Plan  Check DEXA scan  As above  Recheck 3 months      Plan

## 2019-11-27 NOTE — PROGRESS NOTES
Assessment and Plan:     Problem List Items Addressed This Visit     None        BMI Counseling: Body mass index is 29 71 kg/m²  The BMI is above normal  Nutrition recommendations include encouraging healthy choices of fruits and vegetables and moderation in carbohydrate intake  No pharmacotherapy was ordered  BMI Counseling: Body mass index is 29 71 kg/m²  Follow-up plan was not completed due to elderly patient (71 years old) where weight reduction/weight gain would complicate underlying health condition such as: illness or physical disability  Preventive health issues were discussed with patient, and age appropriate screening tests were ordered as noted in patient's After Visit Summary  Personalized health advice and appropriate referrals for health education or preventive services given if needed, as noted in patient's After Visit Summary       History of Present Illness:     Patient presents for Medicare Annual Wellness visit    Patient Care Team:  Ángel Navarro MD as PCP - Devoria Bury, MD Jori Sparks, MD Nereida Cogan, MD Luvenia Hight, MD Nereida Cogan, MD as Endoscopist     Problem List:     Patient Active Problem List   Diagnosis    Chest pressure    GERD (gastroesophageal reflux disease)    Sleep deprivation    Essential hypertension    Arthritis of knee    Left knee pain    Lower back pain    Primary osteoarthritis of right knee    Globus sensation    RUQ pain    Gastroesophageal reflux disease without esophagitis    Left shoulder pain    Right shoulder pain    Tendinopathy of left rotator cuff    Tendinopathy of right rotator cuff    Cervical radiculitis    Carpal tunnel syndrome of right wrist    Breast cancer screening    Status post hysterectomy    Osteoporosis    Chronic pain syndrome - Right      Past Medical and Surgical History:     Past Medical History:   Diagnosis Date    Arthritis     Carpal tunnel syndrome     GERD (gastroesophageal reflux disease)     Hiatal hernia     Hypertension     Insomnia     Pneumonia     PONV (postoperative nausea and vomiting)     Renal calculi     Sleep deprivation     chronically    Vitamin D deficiency     Vitamin D toxicity 2011    with leaky gut syndrome     Past Surgical History:   Procedure Laterality Date    BUNIONECTOMY Bilateral     CARPAL TUNNEL RELEASE Right 2015    COLONOSCOPY      CYSTOSCOPY N/A 6/6/2019    Procedure: CYSTOSCOPY;  Surgeon: Stalin Nava MD;  [facility];  Service: Gynecology Oncology    ESOPHAGOGASTRODUODENOSCOPY N/A 5/5/2016    Procedure: ESOPHAGOGASTRODUODENOSCOPY (EGD); Surgeon: Sosa Stark MD;  [facility]; Service:     FRACTURE SURGERY      L arm     NERVE BLOCK       R Knee    OOPHORECTOMY Left     SC COLONOSCOPY FLX DX W/COLLJ SPEC WHEN PFRMD N/A 6/28/2016    Procedure: COLONOSCOPY;  Surgeon: Sosa Stark MD;  [facility]; Service: Gastroenterology    SC ESOPHAGOGASTRODUODENOSCOPY TRANSORAL DIAGNOSTIC N/A 10/19/2018    Procedure: ESOPHAGOGASTRODUODENOSCOPY (EGD); Surgeon: Sosa Stark MD;  [facility];   Service: Gastroenterology    SC LAPAROSCOPY W TOT HYSTERECTUTERUS <=250 GRAM  W TUBE/OVARY N/A 6/6/2019    Procedure: ROBOTIC TOTAL LAPAROSCOPIC HYSTERECTOMY, RIGHT SALPINGO-OOPHORECTOMY, EXTENSIVE ADHESIOLYSIS, APPROXIMATELY 39 MIN;  Surgeon: Stalin Nava MD;  [facility];  Service: Gynecology Oncology    ROTATOR CUFF REPAIR Bilateral     TOE SURGERY Left     left 5th toe shaved down due to repeated fx    TOTAL KNEE ARTHROPLASTY Left       Family History:     Family History   Problem Relation Age of Onset    Heart failure Mother     Other Mother         respiratory failure    Heart failure Father         respiratory failure    Hypertension Daughter     Breast cancer Sister     Kidney failure Brother     Alcohol abuse Brother     No Known Problems Sister     No Known Problems Brother     Diabetes Brother     Arrhythmia Neg Hx     Clotting disorder Neg Hx     Fainting Neg Hx     Anuerysm Neg Hx     Stroke Neg Hx     Hyperlipidemia Neg Hx     Asthma Neg Hx       Social History:     Social History     Socioeconomic History    Marital status: Single     Spouse name: Not on file    Number of children: Not on file    Years of education: Not on file    Highest education level: Not on file   Occupational History    Not on file   Social Needs    Financial resource strain: Not on file    Food insecurity:     Worry: Not on file     Inability: Not on file    Transportation needs:     Medical: Not on file     Non-medical: Not on file   Tobacco Use    Smoking status: Former Smoker    Smokeless tobacco: Never Used    Tobacco comment: Quit > 30 years ago    Substance and Sexual Activity    Alcohol use: No    Drug use: No    Sexual activity: Not on file     Comment: no partner   Lifestyle    Physical activity:     Days per week: Not on file     Minutes per session: Not on file    Stress: Not on file   Relationships    Social connections:     Talks on phone: Not on file     Gets together: Not on file     Attends [employer] service: Not on file     Active member of club or organization: Not on file     Attends meetings of clubs or organizations: Not on file     Relationship status: Not on file    Intimate partner violence:     Fear of current or ex partner: Not on file     Emotionally abused: Not on file     Physically abused: Not on file     Forced sexual activity: Not on file   Other Topics Concern    Not on file   Social History Narrative    Not on file       Medications and Allergies:     Current Outpatient Medications   Medication Sig Dispense Refill    Acetaminophen 500 MG TAKE 2 CAPSULES TWICE A  capsule 3    losartan (COZAAR) 25 mg tablet TAKE 1 TABLET BY MOUTH EVERY DAY 30 tablet 5    Nattokinase 100 MG CAPS Take by mouth      Probiotic Product (MVW COMPLETE PROBIOTIC PO) Take by mouth      saccharomyces boulardii (FLORASTOR) 250 mg capsule Take 250 mg by mouth 2 (two) times a day      traMADol (ULTRAM) 50 mg tablet Take 1 tablet (50 mg total) by mouth every 8 (eight) hours as needed for moderate pain for up to 5 days 15 tablet 0    zolpidem (AMBIEN) 10 mg tablet Take 1/2 tab at hs prn 30 tablet 0     No current facility-administered medications for this visit  Allergies   Allergen Reactions    Ancef [Cefazolin] Hives and GI Intolerance    Aspirin GI Intolerance     Even low dose     Penicillins Hives    Prednisone Hives and GI Intolerance    Vancomycin Hives and GI Intolerance    Adhesive [Medical Tape]     Ciprofloxacin Hives    Cortisone Syncope    Other      STEROIDS- GI INTOLERANCE    Oxycodone Rash and Vomiting      Immunizations:     Immunization History   Administered Date(s) Administered    Tdap 11/25/2013      Health Maintenance:         Topic Date Due    DXA SCAN  11/23/2020    CRC Screening: Colonoscopy  06/28/2026         Topic Date Due    Pneumococcal Vaccine: 65+ Years (1 of 2 - PCV13) 09/27/2009    Influenza Vaccine  07/01/2019      Medicare Health Risk Assessment:     /96 (Cuff Size: Large)   Pulse 88   Resp 16   Wt 67 9 kg (149 lb 9 6 oz)   BMI 29 71 kg/m²      Evelyn Phan is here for her Initial Wellness visit  Health Risk Assessment:   Patient rates overall health as good  Patient feels that their physical health rating is same  Eyesight was rated as same  Hearing was rated as same  Patient feels that their emotional and mental health rating is much better  Pain experienced in the last 7 days has been a lot  Patient's pain rating has been 10/10  Patient states that she has experienced no weight loss or gain in last 6 months  Depression Screening:   PHQ-2 Score: 0      Fall Risk Screening:    In the past year, patient has experienced: no history of falling in past year      Home Safety:  Patient has trouble with stairs inside or outside of their home  Patient has working smoke alarms and has working carbon monoxide detector  Home safety hazards include: none  Nutrition:   Current diet is Other (please comment)  Gluten free    Medications:   Patient is currently taking over-the-counter supplements  OTC medications include: see medication list  Patient is able to manage medications  Activities of Daily Living (ADLs)/Instrumental Activities of Daily Living (IADLs):   Walk and transfer into and out of bed and chair?: Yes  Dress and groom yourself?: Yes    Bathe or shower yourself?: Yes    Feed yourself? Yes  Do your laundry/housekeeping?: Yes  Manage your money, pay your bills and track your expenses?: Yes  Make your own meals?: Yes    Do your own shopping?: Yes    Previous Hospitalizations:   Any hospitalizations or ED visits within the last 12 months?: No      Advance Care Planning:   Living will: Yes    Durable POA for healthcare: Yes    Advanced directive: Yes      Comments: POA Corby Thomas     Cognitive Screening:   Provider or family/friend/caregiver concerned regarding cognition?: No    PREVENTIVE SCREENINGS      Cardiovascular Screening:    General: Screening Current      Diabetes Screening:     General: Screening Current      Colorectal Cancer Screening:     General: Screening Current      Breast Cancer Screening:     General: Screening Current      Cervical Cancer Screening:    General: Screening Not Indicated      Osteoporosis Screening:    General: Screening Not Indicated and History Osteoporosis      Abdominal Aortic Aneurysm (AAA) Screening:        General: Screening Not Indicated      Lung Cancer Screening:     General: Screening Not Indicated      Hepatitis C Screening:    General: Screening Not Indicated      Preventive Screening Comments: Eye checkup  Dr Sharyle Molly   Declines  immunizations  Other Counseling Topics:   Calcium and vitamin D intake and regular weightbearing exercise  Fall prevention discussed    Advised regular exercise although this is difficult due to her current orthopedic problems  O: /96 (Cuff Size: Large)   Pulse 88   Resp 16   Wt 67 9 kg (149 lb 9 6 oz)   BMI 29 71 kg/m²   BP by me 160/82  Physical Exam   Constitutional: She is oriented to person, place, and time  She appears well-developed and well-nourished  HENT:   Head: Normocephalic  Right Ear: External ear normal    Left Ear: External ear normal    Nose: Nose normal    Mouth/Throat: Oropharynx is clear and moist    Eyes: Pupils are equal, round, and reactive to light  Conjunctivae and EOM are normal  No scleral icterus  Neck: Normal range of motion  Neck supple  Carotid bruit is not present  No thyroid mass and no thyromegaly present  Cardiovascular: Normal rate, regular rhythm, normal heart sounds and intact distal pulses  Pulses:       Femoral pulses are 2+ on the right side, and 2+ on the left side  Popliteal pulses are 2+ on the right side, and 2+ on the left side  Dorsalis pedis pulses are 2+ on the right side, and 2+ on the left side  Posterior tibial pulses are 2+ on the right side, and 2+ on the left side  Pulmonary/Chest: Effort normal and breath sounds normal  No respiratory distress  She has no wheezes  She has no rales  Abdominal: Soft  Normal aorta and bowel sounds are normal  She exhibits no distension and no mass  There is no hepatosplenomegaly  There is no tenderness  Musculoskeletal: Normal range of motion  She exhibits no edema  Lymphadenopathy:        Head (right side): No submandibular and no occipital adenopathy present  Head (left side): No submandibular and no occipital adenopathy present  She has no cervical adenopathy  Right: No inguinal and no supraclavicular adenopathy present  Left: No inguinal and no supraclavicular adenopathy present  Neurological: She is oriented to person, place, and time  Skin: No lesion and no rash noted     Psychiatric: She has a normal mood and affect  Her behavior is normal       Assessment  Annual Medicare wellness    Plan  DEXA scan    Recheck 6 months  Marcelo Scanlon MD

## 2019-11-27 NOTE — PATIENT INSTRUCTIONS
Fall Prevention   WHAT YOU NEED TO KNOW:   Fall prevention includes ways to make your home and other areas safer  It also includes ways you can move more carefully to prevent a fall  Health conditions that cause changes in your blood pressure, vision, or muscle strength and coordination may increase your risk for falls  Medicines may also increase your risk for falls if they make you dizzy, weak, or sleepy  DISCHARGE INSTRUCTIONS:   Call 911 or have someone else call if:   · You have fallen and are unconscious  · You have fallen and cannot move part of your body  Contact your healthcare provider if:   · You have fallen and have pain or a headache  · You have questions or concerns about your condition or care  Fall prevention tips:   · Stand or sit up slowly  This may help you keep your balance and prevent falls  · Use assistive devices as directed  Your healthcare provider may suggest that you use a cane or walker to help you keep your balance  You may need to have grab bars put in your bathroom near the toilet or in the shower  · Wear shoes that fit well and have soles that   Wear shoes both inside and outside  Use slippers with good   Do not wear shoes with high heels  · Wear a personal alarm  This is a device that allows you to call 911 if you fall and need help  Ask your healthcare provider for more information  · Stay active  Exercise can help strengthen your muscles and improve your balance  Your healthcare provider may recommend water aerobics or walking  He or she may also recommend physical therapy to improve your coordination  Never start an exercise program without talking to your healthcare provider first      · Manage your medical conditions  Keep all appointments with your healthcare providers  Visit your eye doctor as directed  Home safety tips:   · Add items to prevent falls in the bathroom    Put nonslip strips on your bath or shower floor to prevent you from slipping  Use a bath mat if you do not have carpet in the bathroom  This will prevent you from falling when you step out of the bath or shower  Use a shower seat so you do not need to stand while you shower  Sit on the toilet or a chair in your bathroom to dry yourself and put on clothing  This will prevent you from losing your balance from drying or dressing yourself while you are standing  · Keep paths clear  Remove books, shoes, and other objects from walkways and stairs  Place cords for telephones and lamps out of the way so that you do not need to walk over them  Tape them down if you cannot move them  Remove small rugs  If you cannot remove a rug, secure it with double-sided tape  This will prevent you from tripping  · Install bright lights in your home  Use night lights to help light paths to the bathroom or kitchen  Always turn on the light before you start walking  · Keep items you use often on shelves within reach  Do not use a step stool to help you reach an item  · Paint or place reflective tape on the edges of your stairs  This will help you see the stairs better  Follow up with your healthcare provider as directed:  Write down your questions so you remember to ask them during your visits  © 2017 [employer] Information is for End User's use only and may not be sold, redistributed or otherwise used for commercial purposes  All illustrations and images included in CareNotes® are the copyrighted property of [employer]  or Dimit[employer] Ivy  The above information is an  only  It is not intended as medical advice for individual conditions or treatments  Talk to your doctor, nurse or pharmacist before following any medical regimen to see if it is safe and effective for you

## 2019-12-18 ENCOUNTER — TELEPHONE (OUTPATIENT)
Dept: PAIN MEDICINE | Facility: CLINIC | Age: 74
End: 2019-12-18

## 2019-12-18 NOTE — TELEPHONE ENCOUNTER
Patient called requesting to speak to a nurse about any preparations pertaining to her procedure scheduled tomorrow morning   Please advise, bronson    Call back# [phone]

## 2019-12-19 ENCOUNTER — TELEPHONE (OUTPATIENT)
Dept: RADIOLOGY | Facility: CLINIC | Age: 74
End: 2019-12-19

## 2019-12-19 ENCOUNTER — HOSPITAL ENCOUNTER (OUTPATIENT)
Dept: RADIOLOGY | Facility: CLINIC | Age: 74
Discharge: HOME | End: 2019-12-19
Attending: ANESTHESIOLOGY | Admitting: ANESTHESIOLOGY
Payer: MEDICARE

## 2019-12-19 VITALS
SYSTOLIC BLOOD PRESSURE: 165 MMHG | HEART RATE: 78 BPM | DIASTOLIC BLOOD PRESSURE: 81 MMHG | OXYGEN SATURATION: 99 % | RESPIRATION RATE: 18 BRPM | TEMPERATURE: 98.3 F

## 2019-12-19 DIAGNOSIS — M17.11 PRIMARY OSTEOARTHRITIS OF RIGHT KNEE: ICD-10-CM

## 2019-12-19 PROCEDURE — 64640 INJECTION TREATMENT OF NERVE: CPT | Performed by: ANESTHESIOLOGY

## 2019-12-19 RX ORDER — BUPIVACAINE HCL/PF 2.5 MG/ML
5 VIAL (ML) INJECTION ONCE
Status: COMPLETED | OUTPATIENT
Start: 2019-12-19 | End: 2019-12-19

## 2019-12-19 RX ORDER — LIDOCAINE HYDROCHLORIDE 10 MG/ML
5 INJECTION, SOLUTION EPIDURAL; INFILTRATION; INTRACAUDAL; PERINEURAL ONCE
Status: COMPLETED | OUTPATIENT
Start: 2019-12-19 | End: 2019-12-19

## 2019-12-19 RX ADMIN — Medication 5 ML: at 10:00

## 2019-12-19 RX ADMIN — LIDOCAINE HYDROCHLORIDE 5 ML: 10 INJECTION, SOLUTION EPIDURAL; INFILTRATION; INTRACAUDAL; PERINEURAL at 10:00

## 2019-12-19 NOTE — DISCHARGE INSTR - LAB

## 2019-12-20 NOTE — TELEPHONE ENCOUNTER
FYI    S/w pt  States doing well but still has some pain to knee   Advised it may take 4-6 weeks for full benefit of procedure and increased pain 24-48 hours post procedure is normal  Pt will cb if she needs anything prior to 2/19 f/u ov

## 2019-12-27 ENCOUNTER — EVALUATION (OUTPATIENT)
Dept: PHYSICAL THERAPY | Facility: MEDICAL CENTER | Age: 74
End: 2019-12-27
Payer: MEDICARE

## 2019-12-27 DIAGNOSIS — M25.561 ACUTE PAIN OF RIGHT KNEE: Primary | ICD-10-CM

## 2019-12-27 DIAGNOSIS — M76.31 ILIOTIBIAL BAND SYNDROME OF RIGHT SIDE: ICD-10-CM

## 2019-12-27 DIAGNOSIS — M25.562 ACUTE PAIN OF LEFT KNEE: ICD-10-CM

## 2019-12-27 PROCEDURE — G8991 OTHER PT/OT GOAL STATUS: HCPCS | Performed by: PHYSICAL THERAPIST

## 2019-12-27 PROCEDURE — 97162 PT EVAL MOD COMPLEX 30 MIN: CPT | Performed by: PHYSICAL THERAPIST

## 2019-12-27 PROCEDURE — 97110 THERAPEUTIC EXERCISES: CPT | Performed by: PHYSICAL THERAPIST

## 2019-12-27 PROCEDURE — G8990 OTHER PT/OT CURRENT STATUS: HCPCS | Performed by: PHYSICAL THERAPIST

## 2019-12-27 NOTE — TELEPHONE ENCOUNTER
S/w pt, she said she is still having pain in her knee  RN reminded pt that it takes 4-6 weeks for the full effects of this procedure  RN suggested ice/heat and OTC pain meds  Pt said she started PT today  Pt will give it time

## 2019-12-27 NOTE — TELEPHONE ENCOUNTER
Pt states that he is still not able to stand for a long time  and pain is a 5/10       Pt can be reached at [phone]

## 2019-12-27 NOTE — PROGRESS NOTES
PT Evaluation     Today's date: 2019  Patient name: Mehreen Shetty  : 1945  MRN: [id number]  Referring provider: Chris Henriquez MD  Dx:   Encounter Diagnosis     ICD-10-CM    1  Acute pain of right knee M25 561    2  Iliotibial band syndrome of right side M76 31    3  Acute pain of left knee M25 562                   Assessment  Assessment details: Mehreen Shetty is a 75 y o  female who presents with Acute pain of right knee  (primary encounter diagnosis)  Iliotibial band syndrome of right side  Patient presents alert and oriented with the above impairments  Jay Hilario will benefit from PT to addres deficits in order to maximize and return to prior level of function  No further referral appears necessary at this time based upon examination results  Prognosis is good given HEP compliance  Please contact me if you have any questions or recommendations  Impairments: abnormal gait, abnormal or restricted ROM, abnormal movement, activity intolerance, impaired physical strength, lacks appropriate home exercise program, pain with function and weight-bearing intolerance  Understanding of Dx/Px/POC: good   Prognosis: good    Goals  Short Term Goals:   1  Pain decreased 2 ratings in 4 weeks  2  ROM increased 10* in 4 weeks  3  Strength increased 1/2 grade in 4 weeks    Long Term Goals:   1  ADLS/IADLS in related activities improved to maximal level in 8 weeks  2  Walking tolerance is improved to maximal level in 8 weeks  4  [place] with HEP in 8 weeks      Plan  Patient would benefit from: skilled physical therapy  Planned modality interventions: cryotherapy  Planned therapy interventions: patient education, neuromuscular re-education, manual therapy, balance, therapeutic exercise, stretching, strengthening, flexibility, functional ROM exercises and home exercise program  Frequency: 2x week  Duration in weeks: 8  Treatment plan discussed with: patient        Subjective Evaluation    History of Present Illness  Mechanism of injury: Pt reports in the fall of this year she began to experience R knee pain after raking leaves  She went to ER and underwent imaging that was unremarkable  Upon d/c she scheduled appt w/ Dr Vishnu Villagomez and was diagnosed w/ ITB syndrome and given script for PT  She was also advised to f/u w/ Dr Telma Wagoner who she saw on Dec 19 and had nerve block  Her pain has been somewhat alleviated since then, but she remains challenged w/ stairs  About 3 weeks ago she began to experience L knee pain that is also felt in hip region  She is limited w/ weight bearing due to R thigh pain and L knee pain  Weight bearing tolerance about 10 minutes  She does live alone in split level home  She has been staying w/ her daughter for a few months and just returned home yesterday  She feels very limited w/ daily activity, cooking, and household chores  L knee pain at worst 5/10  Pain  At best pain ratin  At worst pain ratin    Patient Goals  Patient goals for therapy: decreased pain, increased motion, increased strength and independence with ADLs/IADLs          Objective     Palpation     Additional Palpation Details  Tenderness over medial and lateral joint lines bilaterally  Tenderness over B ITB and piriformis    Active Range of Motion   Left Hip   Flexion: 95 degrees   Extension: 5 degrees   Abduction: 35 degrees     Right Hip   Flexion: 95 degrees   Extension: 8 degrees   Abduction: 38 degrees   Left Knee   Flexion: 95 degrees   Extension: 0 degrees     Right Knee   Flexion: 100 degrees   Extension: 0 degrees     Strength/Myotome Testing     Left Hip   Planes of Motion   Flexion: 4-  Extension: 3+  Abduction: 4-    Right Hip   Planes of Motion   Flexion: 4-  Extension: 3+  Abduction: 4    Left Knee   Prone flexion: 4+  Extension: 4+    Right Knee   Prone flexion: 4+  Extension: 4+    Ambulation     Comments   No sig deviations  ; intermittently demonstrates decreased RLE stance time         Flowsheet Rows      Most Recent Value   PT/OT G-Codes   Current Score  41   Projected Score  55   FOTO information reviewed  Yes   Assessment Type  Evaluation   G code set  Other PT/OT Primary   Other PT Primary Current Status ()  CK   Other PT Primary Goal Status ()  CK             Precautions: L TKR, hypertension      Manual                                                                                   Exercise Diary  12/27            Recumbent bike nv            Hamstring strap stretch 30"x3            ITB strap stretch 30"x3            Piriformis stretch 30"x3            Quad sets nv            Hip adductor squeezes nv            Hip abd w/ tband nv                                                                                                                                                                                         Modalities

## 2019-12-31 ENCOUNTER — OFFICE VISIT (OUTPATIENT)
Dept: PHYSICAL THERAPY | Facility: MEDICAL CENTER | Age: 74
End: 2019-12-31
Payer: MEDICARE

## 2019-12-31 DIAGNOSIS — M25.561 ACUTE PAIN OF RIGHT KNEE: Primary | ICD-10-CM

## 2019-12-31 DIAGNOSIS — M76.31 ILIOTIBIAL BAND SYNDROME OF RIGHT SIDE: ICD-10-CM

## 2019-12-31 DIAGNOSIS — M25.562 ACUTE PAIN OF LEFT KNEE: ICD-10-CM

## 2019-12-31 PROCEDURE — 97112 NEUROMUSCULAR REEDUCATION: CPT | Performed by: PHYSICAL THERAPIST

## 2019-12-31 PROCEDURE — 97110 THERAPEUTIC EXERCISES: CPT | Performed by: PHYSICAL THERAPIST

## 2020-01-03 ENCOUNTER — OFFICE VISIT (OUTPATIENT)
Dept: PHYSICAL THERAPY | Facility: MEDICAL CENTER | Age: 75
End: 2020-01-03
Payer: MEDICARE

## 2020-01-03 DIAGNOSIS — M76.31 ILIOTIBIAL BAND SYNDROME OF RIGHT SIDE: ICD-10-CM

## 2020-01-03 DIAGNOSIS — M25.561 ACUTE PAIN OF RIGHT KNEE: Primary | ICD-10-CM

## 2020-01-03 DIAGNOSIS — M25.562 ACUTE PAIN OF LEFT KNEE: ICD-10-CM

## 2020-01-03 PROCEDURE — 97110 THERAPEUTIC EXERCISES: CPT | Performed by: PHYSICAL THERAPIST

## 2020-01-03 PROCEDURE — 97112 NEUROMUSCULAR REEDUCATION: CPT | Performed by: PHYSICAL THERAPIST

## 2020-01-03 NOTE — PROGRESS NOTES
Daily Note     Today's date: 1/3/2020  Patient name: Steve Atkinson  : 1945  MRN: [id number]  Referring provider: Jeyson Blancas MD  Dx:   Encounter Diagnosis     ICD-10-CM    1  Acute pain of right knee M25 561    2  Iliotibial band syndrome of right side M76 31    3  Acute pain of left knee M25 562                   Subjective: Pt reports falling last night in [employer] resulting in soreness along L lateral thigh and R knee  Objective: See treatment diary below  Precautions: L TKR, hypertension      Manual                                                                                   Exercise Diary   1/3          Recumbent bike nv 5 min 5 min          Hamstring strap stretch 30"x3 30"x3 30"x3          ITB strap stretch 30"x3 30"x3 30"x3          Piriformis stretch 30"x3 30"x3 30"x3          Quad sets nv 5" x20 5"x20          Hip adductor squeezes nv 5"x20 5"x20          Hip abd w/ tband nv RTB 5"x20 GTB 5"x20                                                                                                                                                                                       Modalities                                                                Assessment: Tolerated treatment well  Patient demonstrated fatigue post treatment, exhibited good technique with therapeutic exercises and would benefit from continued PT   No increased pain throughout treatment  Plan: Continue per plan of care

## 2020-01-05 DIAGNOSIS — I10 ESSENTIAL HYPERTENSION: ICD-10-CM

## 2020-01-06 ENCOUNTER — OFFICE VISIT (OUTPATIENT)
Dept: PHYSICAL THERAPY | Facility: MEDICAL CENTER | Age: 75
End: 2020-01-06
Payer: MEDICARE

## 2020-01-06 DIAGNOSIS — M25.562 ACUTE PAIN OF LEFT KNEE: ICD-10-CM

## 2020-01-06 DIAGNOSIS — M25.561 ACUTE PAIN OF RIGHT KNEE: Primary | ICD-10-CM

## 2020-01-06 DIAGNOSIS — M76.31 ILIOTIBIAL BAND SYNDROME OF RIGHT SIDE: ICD-10-CM

## 2020-01-06 PROCEDURE — 97112 NEUROMUSCULAR REEDUCATION: CPT | Performed by: PHYSICAL THERAPIST

## 2020-01-06 PROCEDURE — 97110 THERAPEUTIC EXERCISES: CPT | Performed by: PHYSICAL THERAPIST

## 2020-01-06 NOTE — PROGRESS NOTES
Daily Note     Today's date: 2020  Patient name: Helena Driver  : 1945  MRN: [id number]  Referring provider: Traci Page MD  Dx:   Encounter Diagnosis     ICD-10-CM    1  Acute pain of right knee M25 561    2  Iliotibial band syndrome of right side M76 31    3  Acute pain of left knee M25 562                   Subjective: Pt reports decreased soreness compared to last week after fall  Objective: See treatment diary below  Precautions: L TKR, hypertension      Manual                                                                                   Exercise Diary  12/27 12/31 1/3 1/         Recumbent bike nv 5 min 5 min 6 min         Hamstring strap stretch 30"x3 30"x3 30"x3 30  x3         ITB strap stretch 30"x3 30"x3 30"x3 30"x3         Piriformis stretch 30"x3 30"x3 30"x3 30"x3         Quad sets nv 5" x20 5"x20 5"x20         Hip adductor squeezes nv 5"x20 5"x20 5"x20         Hip abd w/ tband nv RTB 5"x20 GTB 5"x20 GTB 5"x20         SLR flex     2x10         SLR abd    x10                                                                                                                                                            Modalities                                                                Assessment: Tolerated treatment well  Patient demonstrated fatigue post treatment, exhibited good technique with therapeutic exercises and would benefit from continued PT   Tolerated addition of SLR flex and abd w/ reports of muscle fatigue only  Plan: Continue per plan of care

## 2020-01-07 RX ORDER — LOSARTAN POTASSIUM 25 MG/1
TABLET ORAL
Qty: 90 TABLET | Refills: 3 | Status: SHIPPED | OUTPATIENT
Start: 2020-01-07 | End: 2022-07-06

## 2020-01-08 ENCOUNTER — OFFICE VISIT (OUTPATIENT)
Dept: PHYSICAL THERAPY | Facility: MEDICAL CENTER | Age: 75
End: 2020-01-08
Payer: MEDICARE

## 2020-01-08 DIAGNOSIS — M76.31 ILIOTIBIAL BAND SYNDROME OF RIGHT SIDE: ICD-10-CM

## 2020-01-08 DIAGNOSIS — M25.562 ACUTE PAIN OF LEFT KNEE: ICD-10-CM

## 2020-01-08 DIAGNOSIS — M25.561 ACUTE PAIN OF RIGHT KNEE: Primary | ICD-10-CM

## 2020-01-08 PROCEDURE — 97112 NEUROMUSCULAR REEDUCATION: CPT | Performed by: PHYSICAL THERAPIST

## 2020-01-08 PROCEDURE — 97110 THERAPEUTIC EXERCISES: CPT | Performed by: PHYSICAL THERAPIST

## 2020-01-08 NOTE — PROGRESS NOTES
Daily Note     Today's date: 2020  Patient name: Cordell Roche  : 1945  MRN: [id number]  Referring provider: Salvador Anne MD  Dx:   Encounter Diagnosis     ICD-10-CM    1  Acute pain of right knee M25 561    2  Iliotibial band syndrome of right side M76 31    3  Acute pain of left knee M25 562                   Subjective: Pt reports tightness in leg muscles this morning  Objective: See treatment diary below  Precautions: L TKR, hypertension      Manual                                                                                   Exercise Diary  12/27 12/31 1/3 1/6 1/8        Recumbent bike nv 5 min 5 min 6 min 10 min        Hamstring strap stretch 30"x3 30"x3 30"x3 30  x3 30"x3        ITB strap stretch 30"x3 30"x3 30"x3 30"x3 30"x3        Piriformis stretch 30"x3 30"x3 30"x3 30"x3 30"x3        Quad sets nv 5" x20 5"x20 5"x20 5"x20        Hip adductor squeezes nv 5"x20 5"x20 5"x20 5"x20        Hip abd w/ tband nv RTB 5"x20 GTB 5"x20 GTB 5"x20 GTB 5"x20        SLR flex     2x10 2x10 ea        SLR abd    x10 x10 ea                                                                                                                                                           Modalities                                                                Assessment: Tolerated treatment well  Patient demonstrated fatigue post treatment, exhibited good technique with therapeutic exercises and would benefit from continued PT   Most challenged w/ SLR Abd  Decreased tightness following warm up on bike  Plan: Continue per plan of care

## 2020-01-13 ENCOUNTER — OFFICE VISIT (OUTPATIENT)
Dept: PHYSICAL THERAPY | Facility: MEDICAL CENTER | Age: 75
End: 2020-01-13
Payer: MEDICARE

## 2020-01-13 DIAGNOSIS — M25.562 ACUTE PAIN OF LEFT KNEE: ICD-10-CM

## 2020-01-13 DIAGNOSIS — M25.561 ACUTE PAIN OF RIGHT KNEE: Primary | ICD-10-CM

## 2020-01-13 DIAGNOSIS — M76.31 ILIOTIBIAL BAND SYNDROME OF RIGHT SIDE: ICD-10-CM

## 2020-01-13 PROCEDURE — 97110 THERAPEUTIC EXERCISES: CPT | Performed by: PHYSICAL THERAPIST

## 2020-01-13 PROCEDURE — 97112 NEUROMUSCULAR REEDUCATION: CPT | Performed by: PHYSICAL THERAPIST

## 2020-01-13 NOTE — PROGRESS NOTES
Daily Note     Today's date: 2020  Patient name: Amelie Braden  : 1945  MRN: [id number]  Referring provider: Micah Downs MD  Dx:   Encounter Diagnosis     ICD-10-CM    1  Acute pain of right knee M25 561    2  Iliotibial band syndrome of right side M76 31    3  Acute pain of left knee M25 562                   Subjective:  Pt reports most pain is present in R knee  She is gradually improving  Objective: See treatment diary below  Precautions: L TKR, hypertension      Manual                                                                                   Exercise Diary  12/27 12/31 1/3 1/6 1/8 1/13       Recumbent bike nv 5 min 5 min 6 min 10 min 10 min       Hamstring strap stretch 30"x3 30"x3 30"x3 30  x3 30"x3 30"x3       ITB strap stretch 30"x3 30"x3 30"x3 30"x3 30"x3 30"x3       Piriformis stretch 30"x3 30"x3 30"x3 30"x3 30"x3 30"x3       Quad sets nv 5" x20 5"x20 5"x20 5"x20 5"x20       Hip adductor squeezes nv 5"x20 5"x20 5"x20 5"x20 5"x20       Hip abd w/ tband nv RTB 5"x20 GTB 5"x20 GTB 5"x20 GTB 5"x20 GTB 5"x29       SLR flex     2x10 2x10 ea 2x10       SLR abd    x10 x10 ea x10 ea       Mini squats      x10       Step ups      4in x10 B                                                                                                                                Modalities                                                                Assessment: Tolerated treatment well  Patient demonstrated fatigue post treatment, exhibited good technique with therapeutic exercises and would benefit from continued PT   Added mini squats and step ups w/ good tolerance  Plan: Continue per plan of care

## 2020-01-15 ENCOUNTER — OFFICE VISIT (OUTPATIENT)
Dept: PHYSICAL THERAPY | Facility: MEDICAL CENTER | Age: 75
End: 2020-01-15
Payer: MEDICARE

## 2020-01-15 DIAGNOSIS — M25.562 ACUTE PAIN OF LEFT KNEE: ICD-10-CM

## 2020-01-15 DIAGNOSIS — M25.561 ACUTE PAIN OF RIGHT KNEE: Primary | ICD-10-CM

## 2020-01-15 DIAGNOSIS — M76.31 ILIOTIBIAL BAND SYNDROME OF RIGHT SIDE: ICD-10-CM

## 2020-01-15 PROCEDURE — 97112 NEUROMUSCULAR REEDUCATION: CPT | Performed by: PHYSICAL THERAPIST

## 2020-01-15 PROCEDURE — 97110 THERAPEUTIC EXERCISES: CPT | Performed by: PHYSICAL THERAPIST

## 2020-01-15 NOTE — PROGRESS NOTES
Daily Note     Today's date: 1/15/2020  Patient name: Cordell Roche  : 1945  MRN: [id number]  Referring provider: Salvador Anne MD  Dx:   Encounter Diagnosis     ICD-10-CM    1  Acute pain of right knee M25 561    2  Acute pain of left knee M25 562    3  Iliotibial band syndrome of right side M76 31                   Subjective:  Pt reports continued improvement  Able to tolerate standing for longer periods of time to cook  Pain most significant w/ descending stairs  Objective: See treatment diary below  Precautions: L TKR, hypertension      Manual                                                                                   Exercise Diary  12/27 12/31 1/3 1/6 1/8 1/13 1/15      Recumbent bike nv 5 min 5 min 6 min 10 min 10 min 10 min      Hamstring strap stretch 30"x3 30"x3 30"x3 30  x3 30"x3 30"x3 30"x3      ITB strap stretch 30"x3 30"x3 30"x3 30"x3 30"x3 30"x3 30"x3      Piriformis stretch 30"x3 30"x3 30"x3 30"x3 30"x3 30"x3 30"x3      Quad sets nv 5" x20 5"x20 5"x20 5"x20 5"x20 5"x20      Hip adductor squeezes nv 5"x20 5"x20 5"x20 5"x20 5"x20 5"x20      Hip abd w/ tband nv RTB 5"x20 GTB 5"x20 GTB 5"x20 GTB 5"x20 GTB 5"x29 GTB 5"x20      SLR flex     2x10 2x10 ea 2x10 2x10      SLR abd    x10 x10 ea x10 ea x10 ea      Mini squats      x10 x10      Step ups      4in x10 B 4in x10 B                                                                                                                               Modalities                                                                Assessment: Tolerated treatment well  Patient demonstrated fatigue post treatment, exhibited good technique with therapeutic exercises and would benefit from continued PT   No complaints throughout treatment  Plan: Continue per plan of care

## 2020-01-20 ENCOUNTER — OFFICE VISIT (OUTPATIENT)
Dept: PHYSICAL THERAPY | Facility: MEDICAL CENTER | Age: 75
End: 2020-01-20
Payer: MEDICARE

## 2020-01-20 DIAGNOSIS — M25.562 ACUTE PAIN OF LEFT KNEE: ICD-10-CM

## 2020-01-20 DIAGNOSIS — M76.31 ILIOTIBIAL BAND SYNDROME OF RIGHT SIDE: ICD-10-CM

## 2020-01-20 DIAGNOSIS — M25.561 ACUTE PAIN OF RIGHT KNEE: Primary | ICD-10-CM

## 2020-01-20 PROCEDURE — 97112 NEUROMUSCULAR REEDUCATION: CPT | Performed by: PHYSICAL THERAPIST

## 2020-01-20 PROCEDURE — 97110 THERAPEUTIC EXERCISES: CPT | Performed by: PHYSICAL THERAPIST

## 2020-01-20 NOTE — PROGRESS NOTES
Daily Note     Today's date: 2020  Patient name: Floyd Ferreira  : 1945  MRN: [id number]  Referring provider: Daniela Osei MD  Dx:   Encounter Diagnosis     ICD-10-CM    1  Acute pain of right knee M25 561    2  Acute pain of left knee M25 562    3  Iliotibial band syndrome of right side M76 31                   Subjective:  Pt reports some increased pain today in R knee and L buttock; possibly due to cleaning over the weekend  Feeling stronger going up stairs at home  Objective: See treatment diary below  Precautions: L TKR, hypertension      Manual                                                                                   Exercise Diary   1/3 1/6 1/8 1/13 1/15 1/20     Recumbent bike nv 5 min 5 min 6 min 10 min 10 min 10 min 10 min     Hamstring strap stretch 30"x3 30"x3 30"x3 30  x3 30"x3 30"x3 30"x3 30"x3     ITB strap stretch 30"x3 30"x3 30"x3 30"x3 30"x3 30"x3 30"x3 30"x3     Piriformis stretch 30"x3 30"x3 30"x3 30"x3 30"x3 30"x3 30"x3 30"x2     Quad sets nv 5" x20 5"x20 5"x20 5"x20 5"x20 5"x20 5"x20     Hip adductor squeezes nv 5"x20 5"x20 5"x20 5"x20 5"x20 5"x20 5"x20     Hip abd w/ tband nv RTB 5"x20 GTB 5"x20 GTB 5"x20 GTB 5"x20 GTB 5"x29 GTB 5"x20 GTB 5"x20     SLR flex     2x10 2x10 ea 2x10 2x10 2x10     SLR abd    x10 x10 ea x10 ea x10 ea x10 ea     Mini squats      x10 x10 x10     Step ups      4in x10 B 4in x10 B 4in x10 B                                                                                                                              Modalities                                                                Assessment: Tolerated treatment well  Patient demonstrated fatigue post treatment, exhibited good technique with therapeutic exercises and would benefit from continued PT       Plan: Continue per plan of care

## 2020-01-22 ENCOUNTER — OFFICE VISIT (OUTPATIENT)
Dept: PHYSICAL THERAPY | Facility: MEDICAL CENTER | Age: 75
End: 2020-01-22
Payer: MEDICARE

## 2020-01-22 DIAGNOSIS — M25.561 ACUTE PAIN OF RIGHT KNEE: Primary | ICD-10-CM

## 2020-01-22 DIAGNOSIS — M76.31 ILIOTIBIAL BAND SYNDROME OF RIGHT SIDE: ICD-10-CM

## 2020-01-22 DIAGNOSIS — M25.562 ACUTE PAIN OF LEFT KNEE: ICD-10-CM

## 2020-01-22 PROCEDURE — 97110 THERAPEUTIC EXERCISES: CPT | Performed by: PHYSICAL THERAPIST

## 2020-01-22 PROCEDURE — 97112 NEUROMUSCULAR REEDUCATION: CPT | Performed by: PHYSICAL THERAPIST

## 2020-01-22 PROCEDURE — 97140 MANUAL THERAPY 1/> REGIONS: CPT | Performed by: PHYSICAL THERAPIST

## 2020-01-22 NOTE — PROGRESS NOTES
Daily Note     Today's date: 2020  Patient name: Dale Zamarripa  : 1945  MRN: [id number]  Referring provider: Esvin Jauregui MD  Dx:   Encounter Diagnosis     ICD-10-CM    1  Acute pain of right knee M25 561    2  Acute pain of left knee M25 562    3  Iliotibial band syndrome of right side M76 31                   Subjective:  Pt reports persistent R knee pain and L hip pain  Left hip/buttock pain most severe w/ ascending stairs  Objective: See treatment diary below  Precautions: L TKR, hypertension      Manual              TPR L piriformis/glute         10 min                                                            Exercise Diary  12/27 12/31 1/3 1/6 1/8 1/13 1/15 1/20 1/22    Recumbent bike nv 5 min 5 min 6 min 10 min 10 min 10 min 10 min 10 min    Hamstring strap stretch 30"x3 30"x3 30"x3 30  x3 30"x3 30"x3 30"x3 30"x3 30"x3    ITB strap stretch 30"x3 30"x3 30"x3 30"x3 30"x3 30"x3 30"x3 30"x3 30"x3    Piriformis stretch 30"x3 30"x3 30"x3 30"x3 30"x3 30"x3 30"x3 30"x3 30"x3    Quad sets nv 5" x20 5"x20 5"x20 5"x20 5"x20 5"x20 5"x20 5"x20    Hip adductor squeezes nv 5"x20 5"x20 5"x20 5"x20 5"x20 5"x20 5"x20 5"x20    Hip abd w/ tband nv RTB 5"x20 GTB 5"x20 GTB 5"x20 GTB 5"x20 GTB 5"x29 GTB 5"x20 GTB 5"x20 GTB 5"x20    SLR flex     2x10 2x10 ea 2x10 2x10 2x10 x10 ea    SLR abd    x10 x10 ea x10 ea x10 ea x10 ea x10 ea    Mini squats      x10 x10 x10 x10     Step ups      4in x10 B 4in x10 B 4in x10 B 4in x10 ea                                                                                                                             Modalities                                                                Assessment: Tolerated treatment well  Patient demonstrated fatigue post treatment, exhibited good technique with therapeutic exercises and would benefit from continued PT   Initiated TPR as charted;  Palpable tightness noted  She remains compliant w/ self stretches at home         Plan: Continue per plan of care

## 2020-01-27 ENCOUNTER — EVALUATION (OUTPATIENT)
Dept: PHYSICAL THERAPY | Facility: MEDICAL CENTER | Age: 75
End: 2020-01-27
Payer: MEDICARE

## 2020-01-27 DIAGNOSIS — M25.561 ACUTE PAIN OF RIGHT KNEE: Primary | ICD-10-CM

## 2020-01-27 DIAGNOSIS — M25.562 ACUTE PAIN OF LEFT KNEE: ICD-10-CM

## 2020-01-27 DIAGNOSIS — M76.31 ILIOTIBIAL BAND SYNDROME OF RIGHT SIDE: ICD-10-CM

## 2020-01-27 PROCEDURE — 97112 NEUROMUSCULAR REEDUCATION: CPT | Performed by: PHYSICAL THERAPIST

## 2020-01-27 PROCEDURE — 97110 THERAPEUTIC EXERCISES: CPT | Performed by: PHYSICAL THERAPIST

## 2020-01-27 NOTE — PROGRESS NOTES
PT Re-Evaluation     Today's date: 2020  Patient name: Shelby Abdullahi  : 1945  MRN: [id number]  Referring provider: Uri Anders MD  Dx:   Encounter Diagnosis     ICD-10-CM    1  Acute pain of right knee M25 561    2  Acute pain of left knee M25 562    3  Iliotibial band syndrome of right side M76 31                   Assessment  Assessment details: Shelby Abdullahi has attended 10 visits since initiating PT and has demonstrated overall improvement  Mobility and strength has improved with decreased reports of pain  Shelby Abdullahi has demonstrated an improvement in function as well  Currently, she has made steady progress towards her goals, but continue to remain limited with prolonged weight bearing or activity, descending stairs  At this time, continued physical therapy is recommended in order to address their remaining impairments in effort to further improve function  Impairments: abnormal gait, abnormal or restricted ROM, abnormal movement, activity intolerance, impaired physical strength, lacks appropriate home exercise program, pain with function and weight-bearing intolerance  Understanding of Dx/Px/POC: good   Prognosis: good    Goals  Short Term Goals:   1  Pain decreased 2 ratings in 4 weeks PARTIALLY MET  2   ROM increased 10* in 4 weeks PARTIALLY MET  3  Strength increased 1/2 grade in 4 weeks  MET    Long Term Goals:   1  ADLS/IADLS in related activities improved to maximal level in 8 weeks PARTIALLY MET  2   Walking tolerance is improved to maximal level in 8 weeks PARTIALLY MET  4  [place] with HEP in 8 weeks   MET    Plan  Patient would benefit from: skilled physical therapy  Planned modality interventions: cryotherapy  Planned therapy interventions: patient education, neuromuscular re-education, manual therapy, balance, therapeutic exercise, stretching, strengthening, flexibility, functional ROM exercises and home exercise program  Frequency: 2x week  Duration in weeks: 8  Treatment plan discussed with: patient        Subjective Evaluation    History of Present Illness  Mechanism of injury: Pt reports improvement since beginning PT  Weight bearing tolerance now about 20 minutes  Less pain and difficulty w/ ascending stairs; Remains challenged descending  She has resumed most housework; at a slower pace taking rest breaks  L knee pain at worst 2/10    Pain  At best pain ratin  At worst pain ratin    Patient Goals  Patient goals for therapy: decreased pain, increased motion, increased strength and independence with ADLs/IADLs          Objective     Palpation     Additional Palpation Details  Tenderness over medial and lateral joint lines on R knee  Tenderness over B ITB and piriformis    Active Range of Motion   Left Hip   Flexion: 95 degrees   Extension: 15 degrees   Abduction: 38 degrees     Right Hip   Flexion: 105 degrees   Extension: 8 degrees   Abduction: 40 degrees   Left Knee   Flexion: 97 degrees   Extension: 0 degrees     Right Knee   Flexion: 104 degrees   Extension: 0 degrees     Strength/Myotome Testing     Left Hip   Planes of Motion   Flexion: 4+  Extension: 4  Abduction: 4+    Right Hip   Planes of Motion   Flexion: 4  Extension: 4  Abduction: 4+    Left Knee   Prone flexion: 4+  Extension: 5    Right Knee   Prone flexion: 4+  Extension: 5    Ambulation     Comments   No sig deviations  ; intermittently demonstrates decreased RLE stance time                Precautions: L TKR, hypertension      Manual                                                                                   Exercise Diary              Recumbent bike 10 MIN            Hamstring strap stretch 30"x3            ITB strap stretch 30"x3            Piriformis stretch 30"x3            Quad sets 5"X20            Hip adductor squeezes 5"X20            Hip abd w/ tband GTB 5"x20            SLR flex x10 ea            SLR abd x10 ea            Mini squats x10            Step ups 4 in x10 ea                                                                                                                                     Modalities

## 2020-01-29 ENCOUNTER — OFFICE VISIT (OUTPATIENT)
Dept: PHYSICAL THERAPY | Facility: MEDICAL CENTER | Age: 75
End: 2020-01-29
Payer: MEDICARE

## 2020-01-29 DIAGNOSIS — M76.31 ILIOTIBIAL BAND SYNDROME OF RIGHT SIDE: ICD-10-CM

## 2020-01-29 DIAGNOSIS — M25.561 ACUTE PAIN OF RIGHT KNEE: Primary | ICD-10-CM

## 2020-01-29 DIAGNOSIS — M25.562 ACUTE PAIN OF LEFT KNEE: ICD-10-CM

## 2020-01-29 PROCEDURE — 97110 THERAPEUTIC EXERCISES: CPT | Performed by: PHYSICAL THERAPIST

## 2020-01-29 PROCEDURE — 97112 NEUROMUSCULAR REEDUCATION: CPT | Performed by: PHYSICAL THERAPIST

## 2020-01-29 NOTE — PROGRESS NOTES
Daily Note     Today's date: 2020  Patient name: Nita Christy  : 1945  MRN: [id number]  Referring provider: Adrián Caldera MD  Dx:   Encounter Diagnosis     ICD-10-CM    1  Acute pain of right knee M25 561    2  Acute pain of left knee M25 562    3  Iliotibial band syndrome of right side M76 31                   Subjective: Pt offers no new complaints today  Objective: See treatment diary below  Precautions: L TKR, hypertension      Manual                                                                                   Exercise Diary             Recumbent bike 10 MIN 10 min           Hamstring strap stretch 30"x3 30"x3           ITB strap stretch 30"x3 30"x3           Piriformis stretch 30"x3 30"x3           Quad sets 5"X20 5"x20           Hip adductor squeezes 5"X20 5"x20           Hip abd w/ tband GTB 5"x20 GTB 5"x20           SLR flex x10 ea x10 ea           SLR abd x10 ea x10 ea           Mini squats x10 x10           Step ups 4 in x10 ea 6in x10 ea                                                                                                                                    Modalities                                                              Assessment: Tolerated treatment well  Patient demonstrated fatigue post treatment and exhibited good technique with therapeutic exercises   Increased step height w/ good tolerance  Plan: Continue per plan of care

## 2020-02-03 ENCOUNTER — OFFICE VISIT (OUTPATIENT)
Dept: PHYSICAL THERAPY | Facility: MEDICAL CENTER | Age: 75
End: 2020-02-03
Payer: MEDICARE

## 2020-02-03 DIAGNOSIS — M25.562 ACUTE PAIN OF LEFT KNEE: ICD-10-CM

## 2020-02-03 DIAGNOSIS — M25.561 ACUTE PAIN OF RIGHT KNEE: Primary | ICD-10-CM

## 2020-02-03 DIAGNOSIS — M76.31 ILIOTIBIAL BAND SYNDROME OF RIGHT SIDE: ICD-10-CM

## 2020-02-03 PROCEDURE — 97110 THERAPEUTIC EXERCISES: CPT | Performed by: PHYSICAL THERAPIST

## 2020-02-03 PROCEDURE — 97112 NEUROMUSCULAR REEDUCATION: CPT | Performed by: PHYSICAL THERAPIST

## 2020-02-03 NOTE — PROGRESS NOTES
Daily Note     Today's date: 2/3/2020  Patient name: Marshall Haro  : 1945  MRN: [id number]  Referring provider: Melissa Bergeron MD  Dx:   Encounter Diagnosis     ICD-10-CM    1  Acute pain of right knee M25 561    2  Acute pain of left knee M25 562    3  Iliotibial band syndrome of right side M76 31                   Subjective: Pt reports feeling much better over the weekend  No reports of significant pain today  Objective: See treatment diary below  Precautions: L TKR, hypertension      Manual                                                                                   Exercise Diary   2/3          Recumbent bike 10 MIN 10 min 10 min          Hamstring strap stretch 30"x3 30"x3 30"x3          ITB strap stretch 30"x3 30"x3 30"x3          Piriformis stretch 30"x3 30"x3 30"x3          Quad sets 5"X20 5"x20 5"x20          Hip adductor squeezes 5"X20 5"x20 5"x20          Hip abd w/ tband GTB 5"x20 GTB 5"x20 GTB 5"x20          SLR flex x10 ea x10 ea x10          SLR abd x10 ea x10 ea x10          Mini squats x10 x10 x10          Step ups 4 in x10 ea 6in x10 ea 6in x10 ea                                                                                                                                   Modalities                                                              Assessment: Tolerated treatment well  Patient demonstrated fatigue post treatment and exhibited good technique with therapeutic exercises   Mild pain reported w/ mini squats  Plan: Continue per plan of care

## 2020-02-05 ENCOUNTER — OFFICE VISIT (OUTPATIENT)
Dept: PHYSICAL THERAPY | Facility: MEDICAL CENTER | Age: 75
End: 2020-02-05
Payer: MEDICARE

## 2020-02-05 DIAGNOSIS — M76.31 ILIOTIBIAL BAND SYNDROME OF RIGHT SIDE: ICD-10-CM

## 2020-02-05 DIAGNOSIS — M25.562 ACUTE PAIN OF LEFT KNEE: ICD-10-CM

## 2020-02-05 DIAGNOSIS — M25.561 ACUTE PAIN OF RIGHT KNEE: Primary | ICD-10-CM

## 2020-02-05 PROCEDURE — 97110 THERAPEUTIC EXERCISES: CPT | Performed by: PHYSICAL THERAPIST

## 2020-02-05 PROCEDURE — 97112 NEUROMUSCULAR REEDUCATION: CPT | Performed by: PHYSICAL THERAPIST

## 2020-02-05 NOTE — PROGRESS NOTES
Daily Note     Today's date: 2020  Patient name: Tiburcio Santoyo  : 1945  MRN: [id number]  Referring provider: Kelly De Luna MD  Dx:   Encounter Diagnosis     ICD-10-CM    1  Acute pain of right knee M25 561    2  Acute pain of left knee M25 562    3  Iliotibial band syndrome of right side M76 31                   Subjective: Pt continues to report improvement  Objective: See treatment diary below  Precautions: L TKR, hypertension      Manual                                                                                   Exercise Diary  1/27 1/29 2/3 2/5         Recumbent bike 10 MIN 10 min 10 min 10 min         Hamstring strap stretch 30"x3 30"x3 30"x3 30"x3         ITB strap stretch 30"x3 30"x3 30"x3 30"x3         Piriformis stretch 30"x3 30"x3 30"x3 30"x3         Quad sets 5"X20 5"x20 5"x20 5"X20         Hip adductor squeezes 5"X20 5"x20 5"x20 5"x20         Hip abd w/ tband GTB 5"x20 GTB 5"x20 GTB 5"x20 GTB 5"x20         SLR flex x10 ea x10 ea x10 x10          SLR abd x10 ea x10 ea x10 x10         Mini squats x10 x10 x10 x10          Step ups 4 in x10 ea 6in x10 ea 6in x10 ea 6in x10 ea                                                                                                                                  Modalities                                                              Assessment: Tolerated treatment well  Patient demonstrated fatigue post treatment and exhibited good technique with therapeutic exercises   No complaints throughout treatment  Plan: Continue per plan of care

## 2020-02-08 ENCOUNTER — OFFICE VISIT (OUTPATIENT)
Dept: URGENT CARE | Facility: MEDICAL CENTER | Age: 75
End: 2020-02-08
Payer: MEDICARE

## 2020-02-08 VITALS
SYSTOLIC BLOOD PRESSURE: 180 MMHG | BODY MASS INDEX: 29.64 KG/M2 | HEART RATE: 78 BPM | DIASTOLIC BLOOD PRESSURE: 84 MMHG | OXYGEN SATURATION: 99 % | RESPIRATION RATE: 18 BRPM | WEIGHT: 151 LBS | HEIGHT: 60 IN | TEMPERATURE: 98.5 F
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
## 2020-02-08 PROCEDURE — 90471 IMMUNIZATION ADMIN: CPT | Performed by: PHYSICIAN ASSISTANT

## 2020-02-08 PROCEDURE — 90715 TDAP VACCINE 7 YRS/> IM: CPT

## 2020-02-08 PROCEDURE — 12005 RPR S/N/A/GEN/TRK12.6-20.0CM: CPT | Performed by: PHYSICIAN ASSISTANT

## 2020-02-08 RX ORDER — SULFAMETHOXAZOLE AND TRIMETHOPRIM 800; 160 MG/1; MG/1
1 TABLET ORAL EVERY 12 HOURS SCHEDULED
Qty: 14 TABLET | Refills: 0 | Status: SHIPPED | OUTPATIENT
Start: 2020-02-08 | End: 2020-02-15

## 2020-02-08 RX ORDER — ACETAMINOPHEN 325 MG/1
650 TABLET ORAL ONCE
Status: COMPLETED | OUTPATIENT
Start: 2020-02-08 | End: 2020-02-08

## 2020-02-08 RX ADMIN — ACETAMINOPHEN 650 MG: 325 TABLET ORAL at 19:52

## 2020-02-09 NOTE — PATIENT INSTRUCTIONS
Dog bite left leg, left hand  Keep wound clean and dry, change dressing twice daily  Follow up with PCP in 3-5 days  Proceed to  ER if symptoms worsen  Animal Bite   WHAT YOU NEED TO KNOW:   Animal bite injuries range from shallow cuts to deep, life-threatening wounds  An animal can cut or puncture the skin when it bites  Your skin may be torn from your body  Your skin may swell or bruise even if the bite does not break the skin  Animal bites occur more often on the hands, arms, legs, and face  Bites from dogs and cats are the most common injuries  DISCHARGE INSTRUCTIONS:   Return to the emergency department if:   · You have a fever  · Your wound is red, swollen, and draining pus  · You see red streaks on the skin around the wound  · You can no longer move the bitten area  · Your heartbeat and breathing are much faster than usual     · You feel dizzy and confused  Contact your healthcare provider if:   · Your pain does not get better, even after you take pain medicine  · You have nightmares or flashbacks about the animal bite  · You have questions or concerns about your condition or care  Medicines: You may need any of the following:  · Antibiotics  prevent or treat a bacterial infection  · Prescription pain medicine  may be given  Ask how to take this medicine safely  · A tetanus vaccine  may be needed to prevent tetanus  Tetanus is a life-threatening bacterial infection that affects the nerves and muscles  The bacteria can be spread through animal bites  · A rabies vaccine  may be needed to prevent rabies  Rabies is a life-threatening viral infection  The virus can be spread through animal bites  · Take your medicine as directed  Contact your healthcare provider if you think your medicine is not helping or if you have side effects  Tell him of her if you are allergic to any medicine  Keep a list of the medicines, vitamins, and herbs you take   Include the amounts, and when and why you take them  Bring the list or the pill bottles to follow-up visits  Carry your medicine list with you in case of an emergency  Follow up with your healthcare provider in 1 to 2 days: You may need to return to have your stitches removed  Write down your questions so you remember to ask them during your visits  Self-care:   · Apply antibiotic ointment as directed  This helps prevent infection in minor skin wounds  It is available without a doctor's order  · Keep the wound clean and covered  Wash the wound every day with soap and water or germ-killing cleanser  Ask your healthcare provider about the kinds of bandages to use  · Apply ice on your wound  Ice helps decrease swelling and pain  Ice may also help prevent tissue damage  Use an ice pack, or put crushed ice in a plastic bag  Cover it with a towel and place it on your wound for 15 to 20 minutes every hour or as directed  · Elevate the wound area  Raise your wound above the level of your heart as often as you can  This will help decrease swelling and pain  Prop your wound on pillows or blankets to keep it elevated comfortably  Prevent another animal bite:   · Learn to recognize the signs of a scared or angry pet  Avoid quick, sudden movements  · Do not step between animals that are fighting  · Do not leave a pet alone with a young child  · Do not disturb an animal while it eats, sleeps, or cares for its young  · Do not approach an animal you do not know, especially one that is tied up or caged  · Stay away from animals that seem sick or act strangely  · Do not feed or capture wild animals  © 2017 [employer]  Information is for End User's use only and may not be sold, redistributed or otherwise used for commercial purposes  All illustrations and images included in CareNotes® are the copyrighted property of [employer] , [employer]  or [employer]  The above information is an  only  It is not intended as medical advice for individual conditions or treatments  Talk to your doctor, nurse or pharmacist before following any medical regimen to see if it is safe and effective for you

## 2020-02-09 NOTE — PROGRESS NOTES
[employer]        NAME: Charlie Polanco is a 75 y o  female  : 1945    MRN: [id number]  DATE: 2020  TIME: 7:45 PM    Assessment and Plan   Dog bite, initial encounter [W54  0XXA]  1  Dog bite, initial encounter  TDAP Vaccine greater than or equal to 6yo    sulfamethoxazole-trimethoprim (BACTRIM DS) 800-160 mg per tablet         Patient Instructions     Dog bite left leg, left hand  Keep wound clean and dry, change dressing twice daily  Follow up with PCP in 3-5 days  Proceed to  ER if symptoms worsen  Chief Complaint     Chief Complaint   Patient presents with    Dog Bite     Pt  with puncture to her posterior left lower extremity left ring finger, and right palm  History of Present Illness       76 y/o female s/p dog bite to left leg and left hand x 1 day  States the dog is known and all vaccinations are up to date  Last tetanus 8 years ago      Review of Systems   Review of Systems   Constitutional: Negative  HENT: Negative  Eyes: Negative  Respiratory: Negative  Negative for cough, chest tightness, shortness of breath, wheezing and stridor  Cardiovascular: Negative  Negative for chest pain, palpitations and leg swelling  Skin: Positive for wound           Current Medications       Current Outpatient Medications:     Acetaminophen 500 MG, TAKE 2 CAPSULES TWICE A DAY, Disp: 120 capsule, Rfl: 3    losartan (COZAAR) 25 mg tablet, TAKE 1 TABLET BY MOUTH EVERY DAY, Disp: 90 tablet, Rfl: 3    Nattokinase 100 MG CAPS, Take by mouth, Disp: , Rfl:     Probiotic Product (MVW COMPLETE PROBIOTIC PO), Take by mouth, Disp: , Rfl:     saccharomyces boulardii (FLORASTOR) 250 mg capsule, Take 250 mg by mouth 2 (two) times a day, Disp: , Rfl:     zolpidem (AMBIEN) 10 mg tablet, Take 1/2 tab at hs prn, Disp: 30 tablet, Rfl: 0    sulfamethoxazole-trimethoprim (BACTRIM DS) 800-160 mg per tablet, Take 1 tablet by mouth every 12 (twelve) hours for 7 days, Disp: 14 tablet, Rfl: 0    Current Allergies     Allergies as of 02/08/2020 - Reviewed 02/08/2020   Allergen Reaction Noted    Ancef [cefazolin] Hives and GI Intolerance 05/03/2016    Aspirin GI Intolerance 05/03/2016    Penicillins Hives 05/03/2016    Prednisone Hives and GI Intolerance 05/03/2016    Vancomycin Hives and GI Intolerance 05/03/2016    Adhesive [medical tape]  08/07/2019    Ciprofloxacin Hives 05/03/2016    Cortisone Syncope 03/25/2019    Other  07/02/2017    Oxycodone Rash and Vomiting 03/25/2019            The following portions of the patient's history were reviewed and updated as appropriate: allergies, current medications, past family history, past medical history, past social history, past surgical history and problem list      Past Medical History:   Diagnosis Date    Arthritis     Carpal tunnel syndrome     GERD (gastroesophageal reflux disease)     Hiatal hernia     Hypertension     Insomnia     Pneumonia     PONV (postoperative nausea and vomiting)     Renal calculi     Sleep deprivation     chronically    Vitamin D deficiency     Vitamin D toxicity 2011    with leaky gut syndrome       Past Surgical History:   Procedure Laterality Date    BUNIONECTOMY Bilateral     CARPAL TUNNEL RELEASE Right 2015    COLONOSCOPY      CYSTOSCOPY N/A 6/6/2019    Procedure: CYSTOSCOPY;  Surgeon: Vilma Emanuel MD;  [facility];  Service: Gynecology Oncology    ESOPHAGOGASTRODUODENOSCOPY N/A 5/5/2016    Procedure: ESOPHAGOGASTRODUODENOSCOPY (EGD); Surgeon: Christiana Carmona MD;  [facility]; Service:     FRACTURE SURGERY      L arm     NERVE BLOCK       R Knee    OOPHORECTOMY Left     VT COLONOSCOPY FLX DX W/COLLJ SPEC WHEN PFRMD N/A 6/28/2016    Procedure: COLONOSCOPY;  Surgeon: Christiana Carmona MD;  [facility]; Service: Gastroenterology    VT ESOPHAGOGASTRODUODENOSCOPY TRANSORAL DIAGNOSTIC N/A 10/19/2018    Procedure: ESOPHAGOGASTRODUODENOSCOPY (EGD);   Surgeon: Alicia Villagran Nia Felder MD;  [facility]; Service: Gastroenterology    FL LAPAROSCOPY W TOT HYSTERECTUTERUS <=250 GRAM  W TUBE/OVARY N/A 6/6/2019    Procedure: ROBOTIC TOTAL LAPAROSCOPIC HYSTERECTOMY, RIGHT SALPINGO-OOPHORECTOMY, EXTENSIVE ADHESIOLYSIS, APPROXIMATELY 39 MIN;  Surgeon: Devi Reddy MD;  Location:  MAIN OR;  Service: Gynecology Oncology    ROTATOR CUFF REPAIR Bilateral     TOE SURGERY Left     left 5th toe shaved down due to repeated fx    TOTAL KNEE ARTHROPLASTY Left        Family History   Problem Relation Age of Onset    Heart failure Mother     Other Mother         respiratory failure    Heart failure Father         respiratory failure    Hypertension Daughter     Breast cancer Sister     Kidney failure Brother     Alcohol abuse Brother     No Known Problems Sister     No Known Problems Brother     Diabetes Brother     Arrhythmia Neg Hx     Clotting disorder Neg Hx     Fainting Neg Hx     Anuerysm Neg Hx     Stroke Neg Hx     Hyperlipidemia Neg Hx     Asthma Neg Hx          Medications have been verified  Objective   BP (!) 180/84   Pulse 78   Temp 98 5 °F (36 9 °C) (Temporal)   Resp 18   Ht 4' 11 5" (1 511 m)   Wt 68 5 kg (151 lb)   SpO2 99%   BMI 29 99 kg/m²        Physical Exam     Physical Exam   Constitutional: She appears well-developed and well-nourished  No distress  Cardiovascular: Normal rate and regular rhythm  Pulmonary/Chest: Effort normal and breath sounds normal  No stridor  No respiratory distress  She has no wheezes  She has no rales  Musculoskeletal:        Hands:       Legs:  Skin: She is not diaphoretic  Laceration repair  Date/Time: 2/8/2020 7:48 PM  Performed by: Orinaa Mills PA-C  Authorized by: Oriana Mills PA-C   Consent: Verbal consent obtained    Risks and benefits: risks, benefits and alternatives were discussed  Consent given by: patient  Patient understanding: patient states understanding of the procedure being performed  Patient identity confirmed: verbally with patient  Time out: Immediately prior to procedure a "time out" was called to verify the correct patient, procedure, equipment, support staff and site/side marked as required    Body area: lower extremity  Location details: left upper leg  Laceration length: 15 cm  Foreign bodies: no foreign bodies  Tendon involvement: none  Nerve involvement: none  Vascular damage: no  Anesthesia: local infiltration    Anesthesia:  Local Anesthetic: lidocaine 1% without epinephrine  Anesthetic total: 4 mL    Sedation:  Patient sedated: no      Wound Dehiscence:  Superficial Wound Dehiscence: simple closure      Procedure Details:  Irrigation solution: saline  Irrigation method: jet lavage  Amount of cleaning: standard  Debridement: none  Degree of undermining: none  Skin closure: 4-0 nylon  Number of sutures: 11  Approximation: close  Approximation difficulty: simple  Dressing: 4x4 sterile gauze and antibiotic ointment  Patient tolerance: Patient tolerated the procedure well with no immediate complications

## 2020-02-10 ENCOUNTER — OFFICE VISIT (OUTPATIENT)
Dept: FAMILY MEDICINE CLINIC | Facility: MEDICAL CENTER | Age: 75
End: 2020-02-10
Payer: MEDICARE

## 2020-02-10 ENCOUNTER — APPOINTMENT (OUTPATIENT)
Dept: PHYSICAL THERAPY | Facility: MEDICAL CENTER | Age: 75
End: 2020-02-10
Payer: MEDICARE

## 2020-02-10 VITALS
HEIGHT: 60 IN | RESPIRATION RATE: 16 BRPM | HEART RATE: 76 BPM | SYSTOLIC BLOOD PRESSURE: 142 MMHG | WEIGHT: 152 LBS | TEMPERATURE: 97.9 F | DIASTOLIC BLOOD PRESSURE: 78 MMHG | BODY MASS INDEX: 29.84 KG/M2

## 2020-02-10 DIAGNOSIS — S61.452D: Primary | ICD-10-CM

## 2020-02-10 PROCEDURE — 99213 OFFICE O/P EST LOW 20 MIN: CPT | Performed by: FAMILY MEDICINE

## 2020-02-10 PROCEDURE — 1036F TOBACCO NON-USER: CPT | Performed by: FAMILY MEDICINE

## 2020-02-10 PROCEDURE — 3008F BODY MASS INDEX DOCD: CPT | Performed by: FAMILY MEDICINE

## 2020-02-10 PROCEDURE — 3078F DIAST BP <80 MM HG: CPT | Performed by: FAMILY MEDICINE

## 2020-02-10 PROCEDURE — 3077F SYST BP >= 140 MM HG: CPT | Performed by: FAMILY MEDICINE

## 2020-02-10 NOTE — PROGRESS NOTES
Jeniferantonio Arredondo is here for f/u of dog bites  She was bitten by her friends dog on 02/08  Apparently the animal was a rescue dog and attacked her while she was visiting her friend  She was bitten on the back of her legs as well as on her hands  See notes from urgent care 2/8   2 Lacerations on the posterior left leg were sutured  See their office note  She said that she feels well  The areas are a little bit sore but she has not had any further pain  She thinks she might had a little fever last night  O: Physical Exam     Looks well afebrile  The left hand has a small puncture wound between the 3rd and 4th digits  The right hand shows some bruising of the palms   Neither is warm or swollen red  Back of the left shot thigh has 2 lacerations with sutures with no erythema swelling discharge or tenderness  Scattered abrasions around  Assessment  Multiple dog wounds-appear to be healing well    Plan  Immunizations were updated; she received Tdap  As above    Recheck 3 days for suture removal

## 2020-02-12 ENCOUNTER — APPOINTMENT (OUTPATIENT)
Dept: PHYSICAL THERAPY | Facility: MEDICAL CENTER | Age: 75
End: 2020-02-12
Payer: MEDICARE

## 2020-02-13 ENCOUNTER — OFFICE VISIT (OUTPATIENT)
Dept: FAMILY MEDICINE CLINIC | Facility: MEDICAL CENTER | Age: 75
End: 2020-02-13
Payer: MEDICARE

## 2020-02-13 VITALS
TEMPERATURE: 98.4 F | DIASTOLIC BLOOD PRESSURE: 80 MMHG | RESPIRATION RATE: 16 BRPM | HEIGHT: 60 IN | BODY MASS INDEX: 29.64 KG/M2 | HEART RATE: 68 BPM | WEIGHT: 151 LBS | SYSTOLIC BLOOD PRESSURE: 130 MMHG

## 2020-02-13 DIAGNOSIS — S81.812S LEG LACERATION, LEFT, SEQUELA: Primary | ICD-10-CM

## 2020-02-13 PROCEDURE — 99212 OFFICE O/P EST SF 10 MIN: CPT | Performed by: FAMILY MEDICINE

## 2020-02-13 NOTE — PROGRESS NOTES
Jaclyn Jones is here for suture removal for 2 lacerations  She was evaluated here on 2/10 for wounds that she sustained from dog bites  She said it is feeling okay  No pain  O: /80 (Cuff Size: Standard)   Pulse 68   Temp 98 4 °F (36 9 °C)   Resp 16   Ht 4' 11 5" (1 511 m)   Wt 68 5 kg (151 lb)   BMI 29 99 kg/m²    Posterior left leg has 2 horizontal lacerations posterior knee  The superior laceration had 10 sutures which were removed without difficulty  The lower laceration had 4 sutures were removed also without difficulty  Wounds appear well healed  Above this her areas of abrasions and bruising all which show healing    Assessment  S/P leg laceration secondary to dog bite; sutures removed without difficulty  Patient tolerated procedure well    Plan  Dry sterile dressing with antibiotic ointment applied due to patient allergy to adhesive  Recheck as needed

## 2020-02-18 ENCOUNTER — APPOINTMENT (OUTPATIENT)
Dept: PHYSICAL THERAPY | Facility: MEDICAL CENTER | Age: 75
End: 2020-02-18
Payer: MEDICARE

## 2020-02-19 ENCOUNTER — OFFICE VISIT (OUTPATIENT)
Dept: PAIN MEDICINE | Facility: CLINIC | Age: 75
End: 2020-02-19
Payer: MEDICARE

## 2020-02-19 VITALS
BODY MASS INDEX: 29.57 KG/M2 | HEIGHT: 60 IN | SYSTOLIC BLOOD PRESSURE: 169 MMHG | DIASTOLIC BLOOD PRESSURE: 81 MMHG | RESPIRATION RATE: 18 BRPM | WEIGHT: 150.6 LBS | HEART RATE: 86 BPM

## 2020-02-19 DIAGNOSIS — M17.11 PRIMARY OSTEOARTHRITIS OF RIGHT KNEE: Primary | ICD-10-CM

## 2020-02-19 PROCEDURE — 3079F DIAST BP 80-89 MM HG: CPT | Performed by: ANESTHESIOLOGY

## 2020-02-19 PROCEDURE — 3077F SYST BP >= 140 MM HG: CPT | Performed by: ANESTHESIOLOGY

## 2020-02-19 PROCEDURE — 1160F RVW MEDS BY RX/DR IN RCRD: CPT | Performed by: ANESTHESIOLOGY

## 2020-02-19 PROCEDURE — 1036F TOBACCO NON-USER: CPT | Performed by: ANESTHESIOLOGY

## 2020-02-19 PROCEDURE — 99213 OFFICE O/P EST LOW 20 MIN: CPT | Performed by: ANESTHESIOLOGY

## 2020-02-19 PROCEDURE — 3008F BODY MASS INDEX DOCD: CPT | Performed by: ANESTHESIOLOGY

## 2020-02-19 NOTE — PROGRESS NOTES
Assessment:  1  Primary osteoarthritis of right knee         Plan: This is a 75year old female who presents today for followup management of right knee pain status post right knee genicular nerve radiofrequency ablation approximately 8 weeks ago  She reports ongoing pain relief  Pain relief is greater than 80%  Recent upright aggravated her symptoms  Other than that, patient is doing well  She continues with therapy as well as stretching exercises  My impressions and treatment recommendations were discussed in detail with the patient who verbalized understanding and had no further questions  Discharge instructions were provided  I personally saw and examined the patient and I agree with the above discussed plan of care  History of Present Illness:  Marshall Haro is a 75 y o  female who presents for a follow up office visit in regards to Knee Pain (RIGHT)  The patients current symptoms include dull achy right knee pain s/p right knee RFA 8 weeks ago  She states that she is doing better with PT  She states that she go bit by a dog which aggravated her symptoms  Pain is rated 0/10  I have personally reviewed and/or updated the patient's past medical history, past surgical history, family history, social history, current medications, allergies, and vital signs today  Review of Systems   Musculoskeletal: Positive for gait problem          Pain in extremity (right knee)       Patient Active Problem List   Diagnosis    Chest pressure    GERD (gastroesophageal reflux disease)    Sleep deprivation    Essential hypertension    Arthritis of knee    Left knee pain    Lower back pain    Primary osteoarthritis of right knee    Globus sensation    RUQ pain    Gastroesophageal reflux disease without esophagitis    Left shoulder pain    Right shoulder pain    Tendinopathy of left rotator cuff    Tendinopathy of right rotator cuff    Cervical radiculitis    Carpal tunnel syndrome of right wrist    Breast cancer screening    Status post hysterectomy    Osteoporosis    Chronic pain syndrome - Right       Past Medical History:   Diagnosis Date    Arthritis     Carpal tunnel syndrome     GERD (gastroesophageal reflux disease)     Hiatal hernia     Hypertension     Insomnia     Pneumonia     PONV (postoperative nausea and vomiting)     Renal calculi     Sleep deprivation     chronically    Vitamin D deficiency     Vitamin D toxicity 2011    with leaky gut syndrome       Past Surgical History:   Procedure Laterality Date    BUNIONECTOMY Bilateral     CARPAL TUNNEL RELEASE Right 2015    COLONOSCOPY      CYSTOSCOPY N/A 6/6/2019    Procedure: CYSTOSCOPY;  Surgeon: Nancy Flanagan MD;  [facility];  Service: Gynecology Oncology    ESOPHAGOGASTRODUODENOSCOPY N/A 5/5/2016    Procedure: ESOPHAGOGASTRODUODENOSCOPY (EGD); Surgeon: Sergio Villagomez MD;  [facility]; Service:     FRACTURE SURGERY      L arm     NERVE BLOCK       R Knee    OOPHORECTOMY Left     KS COLONOSCOPY FLX DX W/COLLJ SPEC WHEN PFRMD N/A 6/28/2016    Procedure: COLONOSCOPY;  Surgeon: Sergio Villagomez MD;  [facility]; Service: Gastroenterology    KS ESOPHAGOGASTRODUODENOSCOPY TRANSORAL DIAGNOSTIC N/A 10/19/2018    Procedure: ESOPHAGOGASTRODUODENOSCOPY (EGD); Surgeon: Sergio Villagomez MD;  [facility];   Service: Gastroenterology    KS LAPAROSCOPY W TOT HYSTERECTUTERUS <=250 GRAM  W TUBE/OVARY N/A 6/6/2019    Procedure: ROBOTIC TOTAL LAPAROSCOPIC HYSTERECTOMY, RIGHT SALPINGO-OOPHORECTOMY, EXTENSIVE ADHESIOLYSIS, APPROXIMATELY 39 MIN;  Surgeon: Nancy Flanagan MD;  [facility];  Service: Gynecology Oncology    ROTATOR CUFF REPAIR Bilateral     TOE SURGERY Left     left 5th toe shaved down due to repeated fx    TOTAL KNEE ARTHROPLASTY Left        Family History   Problem Relation Age of Onset    Heart failure Mother     Other Mother         respiratory failure    Heart failure Father         respiratory failure    Hypertension Daughter     Breast cancer Sister     Kidney failure Brother     Alcohol abuse Brother     No Known Problems Sister     No Known Problems Brother     Diabetes Brother     Arrhythmia Neg Hx     Clotting disorder Neg Hx     Fainting Neg Hx     Anuerysm Neg Hx     Stroke Neg Hx     Hyperlipidemia Neg Hx     Asthma Neg Hx        Social History     Occupational History    Not on file   Tobacco Use    Smoking status: Former Smoker    Smokeless tobacco: Never Used    Tobacco comment: Quit > 30 years ago    Substance and Sexual Activity    Alcohol use: No    Drug use: No    Sexual activity: Not on file     Comment: no partner       Current Outpatient Medications on File Prior to Visit   Medication Sig    Acetaminophen 500 MG TAKE 2 CAPSULES TWICE A DAY    losartan (COZAAR) 25 mg tablet TAKE 1 TABLET BY MOUTH EVERY DAY    Nattokinase 100 MG CAPS Take by mouth    Probiotic Product (MVW COMPLETE PROBIOTIC PO) Take by mouth    saccharomyces boulardii (FLORASTOR) 250 mg capsule Take 250 mg by mouth 2 (two) times a day    zolpidem (AMBIEN) 10 mg tablet Take 1/2 tab at hs prn     No current facility-administered medications on file prior to visit  Allergies   Allergen Reactions    Ancef [Cefazolin] Hives and GI Intolerance    Aspirin GI Intolerance     Even low dose     Penicillins Hives    Prednisone Hives and GI Intolerance    Vancomycin Hives and GI Intolerance    Adhesive [Medical Tape]     Ciprofloxacin Hives    Cortisone Syncope    Other      STEROIDS- GI INTOLERANCE    Oxycodone Rash and Vomiting       Physical Exam:    /81   Pulse 86   Resp 18   Ht 4' 11 5" (1 511 m)   Wt 68 3 kg (150 lb 9 6 oz)   BMI 29 91 kg/m²     Constitutional:normal, well developed, well nourished, alert, in no distress and non-toxic and no overt pain behavior    Eyes:anicteric  HEENT:grossly intact  Neck:supple, symmetric, trachea midline and no masses   Pulmonary:even and unlabored  Cardiovascular:No edema or pitting edema present  Skin:Normal without rashes or lesions and well hydrated  Psychiatric:Mood and affect appropriate  Neurologic:Cranial Nerves II-XII grossly intact  Musculoskeletal:normal    Imaging

## 2020-02-21 ENCOUNTER — OFFICE VISIT (OUTPATIENT)
Dept: PHYSICAL THERAPY | Facility: MEDICAL CENTER | Age: 75
End: 2020-02-21
Payer: MEDICARE

## 2020-02-21 DIAGNOSIS — M25.562 ACUTE PAIN OF LEFT KNEE: Primary | ICD-10-CM

## 2020-02-21 DIAGNOSIS — M25.561 ACUTE PAIN OF RIGHT KNEE: ICD-10-CM

## 2020-02-21 DIAGNOSIS — M76.31 ILIOTIBIAL BAND SYNDROME OF RIGHT SIDE: ICD-10-CM

## 2020-02-21 PROCEDURE — 97112 NEUROMUSCULAR REEDUCATION: CPT | Performed by: PHYSICAL THERAPIST

## 2020-02-21 PROCEDURE — 97110 THERAPEUTIC EXERCISES: CPT | Performed by: PHYSICAL THERAPIST

## 2020-02-21 NOTE — PROGRESS NOTES
Daily Note     Today's date: 2020  Patient name: Papito Anna  : 1945  MRN: [id number]  Referring provider: Lisa Mendez MD  Dx:   Encounter Diagnosis     ICD-10-CM    1  Acute pain of left knee M25 562    2  Acute pain of right knee M25 561    3  Iliotibial band syndrome of right side M76 31                   Subjective: Pt has progressed very well w/ PT; despite setback for the past few weeks after being bit by dog  She did have f/u w/ Dr Cipriano Sandifer this week and reports he is pleased w/ her progress to date  Objective: See treatment diary below  Precautions: L TKR, hypertension      Manual                                                                                   Exercise Diary  1/27 1/29 2/3 2/5 2/21        Recumbent bike 10 MIN 10 min 10 min 10 min 10 min        Hamstring strap stretch 30"x3 30"x3 30"x3 30"x3 30"x3        ITB strap stretch 30"x3 30"x3 30"x3 30"x3 30"x3        Piriformis stretch 30"x3 30"x3 30"x3 30"x3 30"x3        Quad sets 5"X20 5"x20 5"x20 5"X20 5"x20        Hip adductor squeezes 5"X20 5"x20 5"x20 5"x20 5"x20        Hip abd w/ tband GTB 5"x20 GTB 5"x20 GTB 5"x20 GTB 5"x20 GTB 5"x20        SLR flex x10 ea x10 ea x10 x10  x10        SLR abd x10 ea x10 ea x10 x10 x10         Mini squats x10 x10 x10 x10  x10        Step ups 4 in x10 ea 6in x10 ea 6in x10 ea 6in x10 ea 6in x10                                                                                                                                 Modalities                                                              Assessment: Tolerated treatment well  Patient demonstrated fatigue post treatment and exhibited good technique with therapeutic exercises   No complaints throughout treatment  Plan: Continue per plan of care

## 2020-02-24 ENCOUNTER — APPOINTMENT (OUTPATIENT)
Dept: PHYSICAL THERAPY | Facility: MEDICAL CENTER | Age: 75
End: 2020-02-24
Payer: MEDICARE

## 2020-02-25 ENCOUNTER — OFFICE VISIT (OUTPATIENT)
Dept: PHYSICAL THERAPY | Facility: MEDICAL CENTER | Age: 75
End: 2020-02-25
Payer: MEDICARE

## 2020-02-25 DIAGNOSIS — M25.562 ACUTE PAIN OF LEFT KNEE: Primary | ICD-10-CM

## 2020-02-25 DIAGNOSIS — M76.31 ILIOTIBIAL BAND SYNDROME OF RIGHT SIDE: ICD-10-CM

## 2020-02-25 DIAGNOSIS — M25.561 ACUTE PAIN OF RIGHT KNEE: ICD-10-CM

## 2020-02-25 PROCEDURE — 97110 THERAPEUTIC EXERCISES: CPT

## 2020-02-25 NOTE — PROGRESS NOTES
Daily Note     Today's date: 2020  Patient name: Ulanda Simmonds  : 1945  MRN: [id number]  Referring provider: Kimo Vaca MD  Dx:   Encounter Diagnosis     ICD-10-CM    1  Acute pain of left knee M25 562    2  Acute pain of right knee M25 561    3  Iliotibial band syndrome of right side M76 31                   Subjective: Patient is reporting higher levels of pain today, thinks it is due to the weather  Objective: See treatment diary below      Assessment: Tolerated treatment well  Patient requires minimal cueing during exercises, demonstrates good understanding of exercise program  Pt is motivated to improve strength and tolerance to activity  Patient demonstrated fatigue post treatment  Pt would benefit from continued skilled intervention to enhance strength, mobility and overall tolerance to functional activity          Plan: Continue per plan of care          Manual                                                                                                                                                      Exercise Diary  2/3 2/5 2/21  2/25           Recumbent bike 10 min 10 min 10 min  10 min           Hamstring strap stretch 30"x3 30"x3 30"x3  30"x3            ITB strap stretch 30"x3 30"x3 30"x3  30"x3           Piriformis stretch 30"x3 30"x3 30"x3  30"x3           Quad sets 5"x20 5"X20 5"x20 5"x20           Hip adductor squeezes 5"x20 5"x20 5"x20  5"x20           Hip abd w/ tband GTB 5"x20 GTB 5"x20 GTB 5"x20 GTB 5"x20           SLR flex x10 x10  x10  x10           SLR abd x10 x10 x10   x10           Mini squats x10 x10  x10  x10           Step ups 6in x10 ea 6in x10 ea 6in x10  6in x 10                                                                                                                                                                                                     Modalities

## 2020-02-28 ENCOUNTER — OFFICE VISIT (OUTPATIENT)
Dept: PHYSICAL THERAPY | Facility: MEDICAL CENTER | Age: 75
End: 2020-02-28
Payer: MEDICARE

## 2020-02-28 DIAGNOSIS — M25.561 ACUTE PAIN OF RIGHT KNEE: ICD-10-CM

## 2020-02-28 DIAGNOSIS — M25.562 ACUTE PAIN OF LEFT KNEE: Primary | ICD-10-CM

## 2020-02-28 DIAGNOSIS — M76.31 ILIOTIBIAL BAND SYNDROME OF RIGHT SIDE: ICD-10-CM

## 2020-02-28 PROCEDURE — 97110 THERAPEUTIC EXERCISES: CPT | Performed by: PHYSICAL THERAPIST

## 2020-02-28 PROCEDURE — 97112 NEUROMUSCULAR REEDUCATION: CPT | Performed by: PHYSICAL THERAPIST

## 2020-02-28 NOTE — PROGRESS NOTES
Daily Note     Today's date: 2020  Patient name: Floyd Ferreira  : 1945  MRN: [id number]  Referring provider: Daniela Osei MD  Dx:   Encounter Diagnosis     ICD-10-CM    1  Acute pain of left knee M25 562    2  Acute pain of right knee M25 561    3  Iliotibial band syndrome of right side M76 31                   Subjective: Patient c/o R medial knee pain today  Most pain occurring descending stairs  Objective: See treatment diary below     precautions: L TKR, hypertension    Manual                                                                                                                                                      Exercise Diary  2/3 2/5 2/21  2/25  2/28         Recumbent bike 10 min 10 min 10 min  10 min  10  min         Hamstring strap stretch 30"x3 30"x3 30"x3  30"x3   30"x3         ITB strap stretch 30"x3 30"x3 30"x3  30"x3  30"x3         Piriformis stretch 30"x3 30"x3 30"x3  30"x3  30"x3         Quad sets 5"x20 5"X20 5"x20 5"x20  5"x 20         Hip adductor squeezes 5"x20 5"x20 5"x20  5"x20  5"x 20         Hip abd w/ tband GTB 5"x20 GTB 5"x20 GTB 5"x20 GTB 5"x20 GTB 5" x20         SLR flex x10 x10  x10  x10  x10         SLR abd x10 x10 x10   x10  x10         Mini squats x10 x10  x10  x10  x10         Step ups 6in x10 ea 6in x10 ea 6in x10  6in x 10  6in x10                                                                                                                                                                                                   Modalities                         CP post           10 min                                                                       Assessment: Tolerated treatment well  Patient demonstrated fatigue post treatment  Pt would benefit from continued skilled intervention to enhance strength, mobility and overall tolerance to functional activity  Plan: Continue per plan of care

## 2020-03-04 ENCOUNTER — EVALUATION (OUTPATIENT)
Dept: PHYSICAL THERAPY | Facility: MEDICAL CENTER | Age: 75
End: 2020-03-04
Payer: MEDICARE

## 2020-03-04 DIAGNOSIS — M25.561 ACUTE PAIN OF RIGHT KNEE: ICD-10-CM

## 2020-03-04 DIAGNOSIS — M76.31 ILIOTIBIAL BAND SYNDROME OF RIGHT SIDE: ICD-10-CM

## 2020-03-04 DIAGNOSIS — M25.562 ACUTE PAIN OF LEFT KNEE: Primary | ICD-10-CM

## 2020-03-04 PROCEDURE — 97110 THERAPEUTIC EXERCISES: CPT | Performed by: PHYSICAL THERAPIST

## 2020-03-04 PROCEDURE — 97112 NEUROMUSCULAR REEDUCATION: CPT | Performed by: PHYSICAL THERAPIST

## 2020-03-04 NOTE — PROGRESS NOTES
PT Re-Evaluation  and PT Discharge    Today's date: 3/4/2020  Patient name: Marshall Haro  : 1945  MRN: [id number]  Referring provider: Melissa Bergeron MD  Dx:   Encounter Diagnosis     ICD-10-CM    1  Acute pain of left knee M25 562    2  Acute pain of right knee M25 561    3  Iliotibial band syndrome of right side M76 31                   Assessment  Assessment details: Marshall Haro has attended 17 visits since initiating PT and has demonstrated overall improvement  Mobility and strength has improved with decreased reports of pain  Marshall Haro has demonstrated an improvement in function as well  Currently, she has made steady progress towards her goals, but continue to remain limited at times w/ stairs  She will be d/c today w/ updated HEP to continue w/ independently  Impairments: activity intolerance, pain with function and weight-bearing intolerance  Understanding of Dx/Px/POC: good   Prognosis: good    Goals  Short Term Goals:   1  Pain decreased 2 ratings in 4 weeks  MET  2   ROM increased 10* in 4 weeks  MET  3  Strength increased 1/2 grade in 4 weeks  MET    Long Term Goals:   1  ADLS/IADLS in related activities improved to maximal level in 8 weeks PARTIALLY MET  2   Walking tolerance is improved to maximal level in 8 weeks  MET  4  [place] with HEP in 8 weeks  MET    Plan  Patient would benefit from: skilled physical therapy  Planned modality interventions: cryotherapy  Planned therapy interventions: patient education, neuromuscular re-education, manual therapy, balance, therapeutic exercise, stretching, strengthening, flexibility, functional ROM exercises and home exercise program  Frequency: 2x week  Duration in weeks: 1  Treatment plan discussed with: patient        Subjective Evaluation    History of Present Illness  Mechanism of injury: Pt reports improvement since beginning PT  Weight bearing tolerance now about 1 hour  Most pain persisting w/ stairs; mostly descending    She has been able to resume most household chores; taking rest breaks as needed  She does have some increase in soreness recently due to packing up house in preparation for moving  She is compliant w/ HEP  L knee pain abolished    Pain  At best pain ratin  At worst pain ratin    Patient Goals  Patient goals for therapy: decreased pain, increased motion, increased strength and independence with ADLs/IADLs          Objective     Palpation     Additional Palpation Details  Tenderness over medial and lateral joint lines on R knee      Active Range of Motion   Left Hip   Normal active range of motion    Right Hip   Normal active range of motion  Left Knee   Flexion: 100 degrees   Extension: 0 degrees     Right Knee   Flexion: 104 degrees   Extension: 0 degrees     Strength/Myotome Testing     Left Hip   Planes of Motion   Flexion: 5  Extension: 5  Abduction: 5    Right Hip   Planes of Motion   Flexion: 5  Extension: 5  Abduction: 5    Left Knee   Prone flexion: 5  Extension: 5    Right Knee   Prone flexion: 5  Extension: 5    Ambulation     Comments   No sig deviations  ; intermittently demonstrates decreased RLE stance time         Flowsheet Rows      Most Recent Value   PT/OT G-Codes   Current Score  56   Projected Score  54   FOTO information reviewed  Yes   Assessment Type  Re-evaluation   G code set  Other PT/OT Primary   Other PT Primary Current Status ()  CK   Other PT Primary Goal Status ()  CK   Other PT Primary Discharge Status ()  CK             Precautions: L TKR, hypertension      Manual                                                                                   Exercise Diary  3/4            Recumbent bike 10 MIN            Hamstring strap stretch 30"x3            ITB strap stretch 30"x3            Piriformis stretch 30"x3            Quad sets 5"X20            Hip adductor squeezes 5"X20            Hip abd w/ tband GTB 5"x20            SLR flex x20 ea            SLR abd x20 ea Mini squats x10            Step ups 6 in x10 ea                                                                                                                                     Modalities

## 2020-03-06 ENCOUNTER — APPOINTMENT (OUTPATIENT)
Dept: PHYSICAL THERAPY | Facility: MEDICAL CENTER | Age: 75
End: 2020-03-06
Payer: MEDICARE

## 2020-03-11 ENCOUNTER — TELEPHONE (OUTPATIENT)
Dept: FAMILY MEDICINE CLINIC | Facility: MEDICAL CENTER | Age: 75
End: 2020-03-11

## 2020-03-11 NOTE — TELEPHONE ENCOUNTER
Patient called with complaints of laryngitis, cough, sore throat since this morning  NO fever, abdominal issues  No travels or exposures    She feels could be allergies from cleaning, dust

## 2020-03-11 NOTE — TELEPHONE ENCOUNTER
Triaged- viral precautions given   Denies fever, SOB, chest pain etc  Advised tylenol, sore throat lozenges, warm fluids etc  Call id develops any above symptoms

## 2020-03-16 ENCOUNTER — OFFICE VISIT (OUTPATIENT)
Dept: FAMILY MEDICINE CLINIC | Facility: MEDICAL CENTER | Age: 75
End: 2020-03-16
Payer: MEDICARE

## 2020-03-16 ENCOUNTER — NURSE TRIAGE (OUTPATIENT)
Dept: OTHER | Facility: OTHER | Age: 75
End: 2020-03-16

## 2020-03-16 VITALS
SYSTOLIC BLOOD PRESSURE: 140 MMHG | WEIGHT: 149.5 LBS | RESPIRATION RATE: 18 BRPM | DIASTOLIC BLOOD PRESSURE: 76 MMHG | HEIGHT: 60 IN | HEART RATE: 100 BPM | TEMPERATURE: 97.6 F | BODY MASS INDEX: 29.35 KG/M2

## 2020-03-16 DIAGNOSIS — J01.10 ACUTE NON-RECURRENT FRONTAL SINUSITIS: ICD-10-CM

## 2020-03-16 DIAGNOSIS — J45.20 ASTHMA IN ADULT, MILD INTERMITTENT, UNCOMPLICATED: Primary | ICD-10-CM

## 2020-03-16 PROCEDURE — 3008F BODY MASS INDEX DOCD: CPT | Performed by: FAMILY MEDICINE

## 2020-03-16 PROCEDURE — 3077F SYST BP >= 140 MM HG: CPT | Performed by: FAMILY MEDICINE

## 2020-03-16 PROCEDURE — 1036F TOBACCO NON-USER: CPT | Performed by: FAMILY MEDICINE

## 2020-03-16 PROCEDURE — 3078F DIAST BP <80 MM HG: CPT | Performed by: FAMILY MEDICINE

## 2020-03-16 PROCEDURE — 1160F RVW MEDS BY RX/DR IN RCRD: CPT | Performed by: FAMILY MEDICINE

## 2020-03-16 PROCEDURE — 99213 OFFICE O/P EST LOW 20 MIN: CPT | Performed by: FAMILY MEDICINE

## 2020-03-16 RX ORDER — AZITHROMYCIN 250 MG/1
TABLET, FILM COATED ORAL
Qty: 6 TABLET | Refills: 0 | Status: SHIPPED | OUTPATIENT
Start: 2020-03-16 | End: 2020-03-20

## 2020-03-16 RX ORDER — ALBUTEROL SULFATE 90 UG/1
2 AEROSOL, METERED RESPIRATORY (INHALATION) EVERY 6 HOURS PRN
Qty: 1 INHALER | Refills: 5 | Status: SHIPPED | OUTPATIENT
Start: 2020-03-16 | End: 2020-08-05 | Stop reason: SDUPTHER

## 2020-03-16 NOTE — TELEPHONE ENCOUNTER
Looks like an acute appt was scheduled- called patient to see what her symptoms are   She will call me back

## 2020-03-16 NOTE — TELEPHONE ENCOUNTER
Regarding: fever, sore throat, chest congestion  ----- Message from Genet Roberts sent at 3/15/2020  9:00 PM EDT -----  "I have a fever, sore throat, chest congestion "

## 2020-03-16 NOTE — TELEPHONE ENCOUNTER
Reason for Disposition   [1] Continuous (nonstop) coughing interferes with work or school AND [2] no improvement using cough treatment per Care Advice    Answer Assessment - Initial Assessment Questions  1  ONSET: "When did the cough begin?"       Few days   2  SEVERITY: "How bad is the cough today?"       Bad   3  RESPIRATORY DISTRESS: "Describe your breathing "        Okay   4  FEVER: "Do you have a fever?" If so, ask: "What is your temperature, how was it measured, and when did it start?"      97 8 / 100 1  5  SPUTUM: "Describe the color of your sputum" (clear, white, yellow, green)      Green   6  HEMOPTYSIS: "Are you coughing up any blood?" If so ask: "How much?" (flecks, streaks, tablespoons, etc )      No  7  CARDIAC HISTORY: "Do you have any history of heart disease?" (e g , heart attack, congestive heart failure)       No   8  LUNG HISTORY: "Do you have any history of lung disease?"  (e g , pulmonary embolus, asthma, emphysema)      No   9  PE RISK FACTORS: "Do you have a history of blood clots?" (or: recent major surgery, recent prolonged travel, bedridden)      No  10  OTHER SYMPTOMS: "Do you have any other symptoms?" (e g , runny nose, wheezing, chest pain)        Sore throat , chest congestion  11  PREGNANCY: "Is there any chance you are pregnant?" "When was your last menstrual period?"        N/A  12   TRAVEL: "Have you traveled out of the country in the last month?" (e g , travel history, exposures)        No    Protocols used: COUGH - ACUTE PRODUCTIVE-ADULT-AH

## 2020-03-16 NOTE — TELEPHONE ENCOUNTER
fyi- on call nurse scheduled Erin  She is c/o low grade temp of 100 1, sore throat, cough  Called last week with laryngitis   Has been cleaning her house , selling it  Thinks it agravated her asthma  An appt was schedule for this afternoon by the on call service  Is this Ok or should Dr Krissy Diaz screen her?  She has not traveled

## 2020-03-16 NOTE — PROGRESS NOTES
Derrek Petejolene started with laryngitis last week  Then she  got a sore throat "feels like it is on fire"  Then she got post nasal drip  Clear nasal mucous  Frontal headache  Low grade fever  100 1  Has a cough but due to post nasal drip  Yvonne Gonzalezato Her ears feel blocked  No new SOB  She usually gets some shortness of breath when she climbs stairs  She was given an albuterol inhaler in the past but did not like it  She  was cleaning out sons apt last week  Thinks it was related to her   She took Tylenol, tea with lemon, hydrating,     O:S /76   Pulse 100   Temp 97 6 °F (36 4 °C)   Resp 18   Ht 4' 11 5" (1 511 m)   Wt 67 8 kg (149 lb 8 oz)   BMI 29 69 kg/m²   Looks well  HEENT-both TMs retracted right greater than left  Pharynx with erythema no exudate  Eyes clear but slight slight swelling below the eyes  Sinuses nontender  Neck no cervical adenopathy  Chest is clear with good breath sounds  Cardiac regular rate without murmur    Assessment  1  Sinusitis  2  Allergic rhinitis    Plan  Rx for Zithromax  Advised daily Claritin  Will also send L reviewed all inhaler for her to try again in the future

## 2020-03-17 ENCOUNTER — TELEPHONE (OUTPATIENT)
Dept: FAMILY MEDICINE CLINIC | Facility: MEDICAL CENTER | Age: 75
End: 2020-03-17

## 2020-03-28 ENCOUNTER — NURSE TRIAGE (OUTPATIENT)
Dept: OTHER | Facility: OTHER | Age: 75
End: 2020-03-28

## 2020-03-28 NOTE — TELEPHONE ENCOUNTER
Reason for Disposition   [1] Fever > 101 F (38 3 C) AND [2] age > 61    Answer Assessment - Initial Assessment Questions  1  ONSET: "When did the cough begin?"       Started 03/27/2020  2  SEVERITY: "How bad is the cough today?"       "No too bad  It's from the post nasal drip "  3  RESPIRATORY DISTRESS: "Describe your breathing "       Denies  4  FEVER: "Do you have a fever?" If so, ask: "What is your temperature, how was it measured, and when did it start?"     102 / oral  5  HEMOPTYSIS: "Are you coughing up any blood?" If so ask: "How much?" (flecks, streaks, tablespoons, etc )      None  6  TREATMENT: "What have you done so far to treat the cough?" (e g , meds, fluids, humidifier)     None  7  OTHER SYMPTOMS: "Do you have any other symptoms? (e g , runny nose, wheezing, chest pain)        Runny nose, post nasal drip, sore throat, and right ear pain  11  PREGNANCY: "Is there any chance you are pregnant?" "When was your last menstrual period?"        NA  12  TRAVEL: "Have you traveled out of the country in the last month?" (e g , travel history, exposures)       Pt is currently in [place] at [facility]'s house      Protocols used: COUGH - ACUTE NON-PRODUCTIVE-ADULT-AH

## 2020-03-28 NOTE — TELEPHONE ENCOUNTER
Regarding: fever  ----- Message from [facility] sent at 3/28/2020  8:24 AM EDT -----  " I am having temperature of 102 with coughing"

## 2020-03-30 ENCOUNTER — TELEMEDICINE (OUTPATIENT)
Dept: FAMILY MEDICINE CLINIC | Facility: MEDICAL CENTER | Age: 75
End: 2020-03-30
Payer: MEDICARE

## 2020-03-30 ENCOUNTER — TELEPHONE (OUTPATIENT)
Dept: FAMILY MEDICINE CLINIC | Facility: MEDICAL CENTER | Age: 75
End: 2020-03-30

## 2020-03-30 DIAGNOSIS — J06.9 UPPER RESPIRATORY TRACT INFECTION, UNSPECIFIED TYPE: Primary | ICD-10-CM

## 2020-03-30 PROCEDURE — 99213 OFFICE O/P EST LOW 20 MIN: CPT | Performed by: FAMILY MEDICINE

## 2020-03-30 NOTE — PROGRESS NOTES
Virtual Regular Visit    Problem List Items Addressed This Visit     None               Reason for visit is fever and cough  As below  Encounter provider Taye iVllalobos MD    Provider located at [address] [place] [phone]      Recent Visits  No visits were found meeting these conditions  Showing recent visits within past 7 days and meeting all other requirements     Today's Visits  Date Type Provider Dept   03/30/20 Telephone Nick Ramos MA [facility]   Showing today's visits and meeting all other requirements     Future Appointments  Date Type Provider Dept   03/30/20 Telephone Nick Ramos MA [facility]   Showing future appointments within next 150 days and meeting all other requirements        The patient was identified by name and date of birth  Marlen Sun was informed that this is a telemedicine visit and that the visit is being conducted through [facility] [place] and patient was informed that this is not a secure, HIPAA-complaint platform  she agrees to proceed     My office door was closed  No one else was in the room  She acknowledged consent and understanding of privacy and security of the video platform  The patient has agreed to participate and understands they can discontinue the visit at any time  Patient is aware this is a billable service  Subjective  Marlen Sun is a 75 y o  female  who complains of respiratory symptoms for the last several days  She said that she has a sore throat, cough, postnasal drip  She had a fever of 102 3  days ago  She is feeling better  She had no cough this morning  She denies  shortness of breath  She is presently living in [place] with her family  They were all sick with respiratory infections  She was seen at urgent care in [place] on Saturday  They gave her a Z-Cecilio and told her to herself  No testing was recommended     She was seen here for similar symptoms on March 16th and given a prescription for Z-Cecilio and symptoms did resolve  Past Medical History:   Diagnosis Date    Arthritis     Carpal tunnel syndrome     GERD (gastroesophageal reflux disease)     Hiatal hernia     Hypertension     Insomnia     Pneumonia     PONV (postoperative nausea and vomiting)     Renal calculi     Sleep deprivation     chronically    Vitamin D deficiency     Vitamin D toxicity 2011    with leaky gut syndrome       Past Surgical History:   Procedure Laterality Date    BUNIONECTOMY Bilateral     CARPAL TUNNEL RELEASE Right 2015    COLONOSCOPY      CYSTOSCOPY N/A 6/6/2019    Procedure: CYSTOSCOPY;  Surgeon: MD López;  [facility];  Service: Gynecology Oncology    ESOPHAGOGASTRODUODENOSCOPY N/A 5/5/2016    Procedure: ESOPHAGOGASTRODUODENOSCOPY (EGD); Surgeon: Sosa Stark MD;  [facility]; Service:     FRACTURE SURGERY      L arm     NERVE BLOCK       R Knee    OOPHORECTOMY Left     RI COLONOSCOPY FLX DX W/COLLJ SPEC WHEN PFRMD N/A 6/28/2016    Procedure: COLONOSCOPY;  Surgeon: Sosa Stark MD;  [facility]; Service: Gastroenterology    RI ESOPHAGOGASTRODUODENOSCOPY TRANSORAL DIAGNOSTIC N/A 10/19/2018    Procedure: ESOPHAGOGASTRODUODENOSCOPY (EGD); Surgeon: Sosa Stark MD;  [facility];   Service: Gastroenterology    RI LAPAROSCOPY W TOT HYSTERECTUTERUS <=250 GRAM  W TUBE/OVARY N/A 6/6/2019    Procedure: ROBOTIC TOTAL LAPAROSCOPIC HYSTERECTOMY, RIGHT SALPINGO-OOPHORECTOMY, EXTENSIVE ADHESIOLYSIS, APPROXIMATELY 39 MIN;  Surgeon: MD López;  [facility];  Service: Gynecology Oncology    ROTATOR CUFF REPAIR Bilateral     TOE SURGERY Left     left 5th toe shaved down due to repeated fx    TOTAL KNEE ARTHROPLASTY Left        Current Outpatient Medications   Medication Sig Dispense Refill    Acetaminophen 500 MG TAKE 2 CAPSULES TWICE A  capsule 3    albuterol (PROVENTIL HFA,VENTOLIN HFA) 90 mcg/act inhaler Inhale 2 puffs every 6 (six) hours as needed for wheezing or shortness of breath 1 Inhaler 5    losartan (COZAAR) 25 mg tablet TAKE 1 TABLET BY MOUTH EVERY DAY 90 tablet 3    Nattokinase 100 MG CAPS Take by mouth      Probiotic Product (MVW COMPLETE PROBIOTIC PO) Take by mouth      saccharomyces boulardii (FLORASTOR) 250 mg capsule Take 250 mg by mouth 2 (two) times a day      zolpidem (AMBIEN) 10 mg tablet Take 1/2 tab at hs prn 30 tablet 0     No current facility-administered medications for this visit  Allergies   Allergen Reactions    Ancef [Cefazolin] Hives and GI Intolerance    Aspirin GI Intolerance     Even low dose     Penicillins Hives    Prednisone Hives and GI Intolerance    Vancomycin Hives and GI Intolerance    Adhesive [Medical Tape]     Ciprofloxacin Hives    Cortisone Syncope    Other      STEROIDS- GI INTOLERANCE    Oxycodone Rash and Vomiting       Review of Systems   Respiratory: Negative for shortness of breath  Physical Exam   Constitutional: No distress  Pulmonary/Chest: No respiratory distress  Assessment  Upper respiratory infection-does appear to be resolving  I have asked her take her temperature and call us back later today  I have also asked her to find out where she can go for COVID testing in her area if we decide it is  necessary  I spent 15 minutes with the patient during this visit

## 2020-04-02 DIAGNOSIS — F51.01 PRIMARY INSOMNIA: ICD-10-CM

## 2020-04-02 RX ORDER — ZOLPIDEM TARTRATE 10 MG/1
TABLET ORAL
Qty: 30 TABLET | Refills: 0 | Status: SHIPPED | OUTPATIENT
Start: 2020-04-02 | End: 2020-08-05 | Stop reason: SDUPTHER

## 2020-06-25 ENCOUNTER — OFFICE VISIT (OUTPATIENT)
Dept: FAMILY MEDICINE CLINIC | Facility: MEDICAL CENTER | Age: 75
End: 2020-06-25
Payer: MEDICARE

## 2020-06-25 VITALS
HEART RATE: 92 BPM | RESPIRATION RATE: 16 BRPM | TEMPERATURE: 98.9 F | DIASTOLIC BLOOD PRESSURE: 60 MMHG | WEIGHT: 151.8 LBS | HEIGHT: 59 IN | SYSTOLIC BLOOD PRESSURE: 160 MMHG | BODY MASS INDEX: 30.6 KG/M2

## 2020-06-25 DIAGNOSIS — F51.01 PRIMARY INSOMNIA: ICD-10-CM

## 2020-06-25 DIAGNOSIS — I10 ESSENTIAL HYPERTENSION: ICD-10-CM

## 2020-06-25 DIAGNOSIS — J45.20 ASTHMA IN ADULT, MILD INTERMITTENT, UNCOMPLICATED: ICD-10-CM

## 2020-06-25 DIAGNOSIS — R06.02 SOB (SHORTNESS OF BREATH): Primary | ICD-10-CM

## 2020-06-25 PROCEDURE — 99214 OFFICE O/P EST MOD 30 MIN: CPT | Performed by: FAMILY MEDICINE

## 2020-06-25 PROCEDURE — 1160F RVW MEDS BY RX/DR IN RCRD: CPT | Performed by: FAMILY MEDICINE

## 2020-06-25 PROCEDURE — 3077F SYST BP >= 140 MM HG: CPT | Performed by: FAMILY MEDICINE

## 2020-06-25 PROCEDURE — 1036F TOBACCO NON-USER: CPT | Performed by: FAMILY MEDICINE

## 2020-06-25 PROCEDURE — 3008F BODY MASS INDEX DOCD: CPT | Performed by: FAMILY MEDICINE

## 2020-06-25 PROCEDURE — 3078F DIAST BP <80 MM HG: CPT | Performed by: FAMILY MEDICINE

## 2020-07-01 ENCOUNTER — TELEPHONE (OUTPATIENT)
Dept: FAMILY MEDICINE CLINIC | Facility: MEDICAL CENTER | Age: 75
End: 2020-07-01

## 2020-07-01 DIAGNOSIS — Z20.822 ENCOUNTER FOR LABORATORY TESTING FOR COVID-19 VIRUS: Primary | ICD-10-CM

## 2020-07-01 NOTE — TELEPHONE ENCOUNTER
Mikhail scheduled for July 30th at [facility]  She needs a COVID test done after July 23rd  Can you order it for her?

## 2020-07-06 ENCOUNTER — TELEPHONE (OUTPATIENT)
Dept: FAMILY MEDICINE CLINIC | Facility: MEDICAL CENTER | Age: 75
End: 2020-07-06

## 2020-07-06 NOTE — TELEPHONE ENCOUNTER
Pt faxed [address] [facility] for for Dr Gardenia Essex to complete for her  Attached note says to email back to her as soon as possible    Paperwork is in Dr [employer]

## 2020-07-14 NOTE — TELEPHONE ENCOUNTER
Pt called to ask if her form had been sent back to her yet    She doesn't understand why it is taking so long

## 2020-07-22 DIAGNOSIS — Z20.822 ENCOUNTER FOR LABORATORY TESTING FOR COVID-19 VIRUS: ICD-10-CM

## 2020-07-22 PROCEDURE — U0003 INFECTIOUS AGENT DETECTION BY NUCLEIC ACID (DNA OR RNA); SEVERE ACUTE RESPIRATORY SYNDROME CORONAVIRUS 2 (SARS-COV-2) (CORONAVIRUS DISEASE [COVID-19]), AMPLIFIED PROBE TECHNIQUE, MAKING USE OF HIGH THROUGHPUT TECHNOLOGIES AS DESCRIBED BY CMS-2020-01-R: HCPCS | Performed by: OBSTETRICS & GYNECOLOGY

## 2020-07-23 ENCOUNTER — TELEPHONE (OUTPATIENT)
Dept: FAMILY MEDICINE CLINIC | Facility: MEDICAL CENTER | Age: 75
End: 2020-07-23

## 2020-07-23 LAB — SARS-COV-2 RNA SPEC QL NAA+PROBE: NOT DETECTED

## 2020-07-30 ENCOUNTER — HOSPITAL ENCOUNTER (OUTPATIENT)
Dept: PULMONOLOGY | Facility: HOSPITAL | Age: 75
Discharge: HOME | End: 2020-07-30
Payer: MEDICARE

## 2020-07-30 DIAGNOSIS — R06.02 SOB (SHORTNESS OF BREATH): ICD-10-CM

## 2020-07-30 PROCEDURE — 94726 PLETHYSMOGRAPHY LUNG VOLUMES: CPT

## 2020-07-30 PROCEDURE — 94010 BREATHING CAPACITY TEST: CPT

## 2020-07-30 PROCEDURE — 94729 DIFFUSING CAPACITY: CPT | Performed by: INTERNAL MEDICINE

## 2020-07-30 PROCEDURE — 94729 DIFFUSING CAPACITY: CPT

## 2020-07-30 PROCEDURE — 94010 BREATHING CAPACITY TEST: CPT | Performed by: INTERNAL MEDICINE

## 2020-07-30 PROCEDURE — 94726 PLETHYSMOGRAPHY LUNG VOLUMES: CPT | Performed by: INTERNAL MEDICINE

## 2020-07-30 PROCEDURE — 94760 N-INVAS EAR/PLS OXIMETRY 1: CPT

## 2020-08-03 DIAGNOSIS — F51.01 PRIMARY INSOMNIA: ICD-10-CM

## 2020-08-03 DIAGNOSIS — J45.20 ASTHMA IN ADULT, MILD INTERMITTENT, UNCOMPLICATED: ICD-10-CM

## 2020-08-05 NOTE — TELEPHONE ENCOUNTER
Let her know her PFTs look pretty good  How are her symptoms? She described  shortness of breath with going up stairs  Ask if there is anything new? Is she still using her albuterol inhaler?

## 2020-08-05 NOTE — TELEPHONE ENCOUNTER
Pt aware, she said that her sx are about the same  Just gets winded going up the stairs  No new symptoms  She does need refills on the inhaler and also her ambien   Sent to the [facility] on file in [place], [place]

## 2020-08-07 RX ORDER — ALBUTEROL SULFATE 90 UG/1
2 AEROSOL, METERED RESPIRATORY (INHALATION) EVERY 6 HOURS PRN
Qty: 1 INHALER | Refills: 5 | Status: SHIPPED | OUTPATIENT
Start: 2020-08-07

## 2020-08-07 RX ORDER — ZOLPIDEM TARTRATE 10 MG/1
TABLET ORAL
Qty: 30 TABLET | Refills: 0 | Status: SHIPPED | OUTPATIENT
Start: 2020-08-07

## 2020-08-10 ENCOUNTER — TELEPHONE (OUTPATIENT)
Dept: FAMILY MEDICINE CLINIC | Facility: MEDICAL CENTER | Age: 75
End: 2020-08-10

## 2020-08-10 NOTE — TELEPHONE ENCOUNTER
Pt wants to speak with you in regards to they phone call she received about her results    She said she doesn't want to speak with anyone but you when it comes to results, pt said, "you are her doctor, and she wants to speak with you, not office staff "

## 2020-08-11 NOTE — TELEPHONE ENCOUNTER
Left mess to return call   Pt called to make sure Dr Kirby Hoffman got her note from yesterday    She would like Dr Kirby Hoffman to call her

## 2020-08-13 NOTE — TELEPHONE ENCOUNTER
Discussed with patient on 8/12  Notified spirometry is normal with only slight restrictive effect  She said she still gets winded when she climbs stairs or when she walks 2 blocks to her family's  Albuterol inhaler helps only somewhat  She feels a chest tightness and shortness of breath  She can also feel this chest discomfort when she is lying down however  She saw cardiology in 2018 with normal stress test and echo  Was advised follow-up as needed  I advised her to follow-up with cardiology  Need to contact patient with phone number so she can make an appointment  Previously saw Dr Suraj Ba  I told her to call us if she is not able to get an appt soon

## 2020-08-17 ENCOUNTER — TELEPHONE (OUTPATIENT)
Dept: FAMILY MEDICINE CLINIC | Facility: MEDICAL CENTER | Age: 75
End: 2020-08-17

## 2020-08-17 NOTE — TELEPHONE ENCOUNTER
S/w patient   Gave her the SL number    She also may make an appt with her son's cardiologist  aware to call if unable to secure an appt fairly soon

## 2020-08-17 NOTE — TELEPHONE ENCOUNTER
Will be seeing Dr Brittany Pruett in [place] September 2nd  [phone]   Asking to have records sent   Did you want us to forward them or have them reach out to [address] [place] [address]

## 2020-08-25 NOTE — TELEPHONE ENCOUNTER
Send the office notes since last November  Also send any cardiology consult EKGs any cardiac testing  She had PFTs need to send those    Can you also send a snapshot

## 2020-09-14 ENCOUNTER — TELEPHONE (OUTPATIENT)
Dept: FAMILY MEDICINE CLINIC | Facility: MEDICAL CENTER | Age: 75
End: 2020-09-14

## 2020-09-14 NOTE — TELEPHONE ENCOUNTER
----- Message from Daisy Dallas MD sent at 9/12/2020  8:56 PM EDT -----  I did not order this echo  I think it was ordered by the cardiologist she saw in [place]    Make sure it gets to them

## 2020-09-14 NOTE — TELEPHONE ENCOUNTER
Called the facility where the ECHO was done  Told to call the cardiologist office   They are the ones who put the order In the system   Tried the office number , praveen [phone]/ will try again

## 2020-09-14 NOTE — TELEPHONE ENCOUNTER
Called Dr Charli Barraza office  They are the ones who sent it to you   Says you are the " referring doctor" so a copy goes to the PCP

## 2020-10-02 ENCOUNTER — TELEPHONE (OUTPATIENT)
Dept: GASTROENTEROLOGY | Facility: AMBULARY SURGERY CENTER | Age: 75
End: 2020-10-02

## 2020-10-21 ENCOUNTER — TELEPHONE (OUTPATIENT)
Dept: FAMILY MEDICINE CLINIC | Facility: MEDICAL CENTER | Age: 75
End: 2020-10-21

## 2020-10-28 ENCOUNTER — APPOINTMENT (OUTPATIENT)
Dept: RADIOLOGY | Facility: MEDICAL CENTER | Age: 75
End: 2020-10-28
Payer: MEDICARE

## 2020-10-28 ENCOUNTER — OFFICE VISIT (OUTPATIENT)
Dept: OBGYN CLINIC | Facility: MEDICAL CENTER | Age: 75
End: 2020-10-28
Payer: MEDICARE

## 2020-10-28 VITALS
DIASTOLIC BLOOD PRESSURE: 76 MMHG | HEART RATE: 94 BPM | BODY MASS INDEX: 31.19 KG/M2 | WEIGHT: 154.4 LBS | SYSTOLIC BLOOD PRESSURE: 149 MMHG | TEMPERATURE: 96.6 F

## 2020-10-28 DIAGNOSIS — M25.562 ACUTE PAIN OF BOTH KNEES: Primary | ICD-10-CM

## 2020-10-28 DIAGNOSIS — M17.11 PRIMARY OSTEOARTHRITIS OF RIGHT KNEE: ICD-10-CM

## 2020-10-28 DIAGNOSIS — M25.561 ACUTE PAIN OF BOTH KNEES: Primary | ICD-10-CM

## 2020-10-28 DIAGNOSIS — M25.561 ACUTE PAIN OF BOTH KNEES: ICD-10-CM

## 2020-10-28 DIAGNOSIS — M25.562 ACUTE PAIN OF BOTH KNEES: ICD-10-CM

## 2020-10-28 DIAGNOSIS — Z01.818 PREOP TESTING: ICD-10-CM

## 2020-10-28 PROCEDURE — 73562 X-RAY EXAM OF KNEE 3: CPT

## 2020-10-28 PROCEDURE — 99214 OFFICE O/P EST MOD 30 MIN: CPT | Performed by: ORTHOPAEDIC SURGERY

## 2020-10-28 PROCEDURE — 20610 DRAIN/INJ JOINT/BURSA W/O US: CPT | Performed by: ORTHOPAEDIC SURGERY

## 2020-10-28 RX ORDER — CEFAZOLIN SODIUM 2 G/50ML
2000 SOLUTION INTRAVENOUS ONCE
Status: CANCELLED | OUTPATIENT
Start: 2020-10-28 | End: 2020-10-28

## 2020-10-28 RX ORDER — ACETAMINOPHEN 325 MG/1
975 TABLET ORAL ONCE
Status: CANCELLED | OUTPATIENT
Start: 2020-10-28 | End: 2020-10-28

## 2020-10-28 RX ORDER — KETOROLAC TROMETHAMINE 30 MG/ML
60 INJECTION, SOLUTION INTRAMUSCULAR; INTRAVENOUS
Status: COMPLETED | OUTPATIENT
Start: 2020-10-28 | End: 2020-10-28

## 2020-10-28 RX ORDER — CHLORHEXIDINE GLUCONATE 0.12 MG/ML
15 RINSE ORAL ONCE
Status: CANCELLED | OUTPATIENT
Start: 2020-10-28 | End: 2020-10-28

## 2020-10-28 RX ORDER — SODIUM CHLORIDE 9 MG/ML
125 INJECTION, SOLUTION INTRAVENOUS CONTINUOUS
Status: CANCELLED | OUTPATIENT
Start: 2020-10-28

## 2020-10-28 RX ORDER — TRAMADOL HYDROCHLORIDE 50 MG/1
50 TABLET ORAL EVERY 6 HOURS PRN
COMMUNITY

## 2020-10-28 RX ORDER — SACCHAROMYCES BOULARDII 250 MG
250 CAPSULE ORAL
COMMUNITY

## 2020-10-28 RX ORDER — CHLORHEXIDINE GLUCONATE 4 G/100ML
SOLUTION TOPICAL DAILY PRN
Status: CANCELLED | OUTPATIENT
Start: 2020-10-28

## 2020-10-28 RX ORDER — BUPIVACAINE HYDROCHLORIDE 2.5 MG/ML
2 INJECTION, SOLUTION INFILTRATION; PERINEURAL
Status: COMPLETED | OUTPATIENT
Start: 2020-10-28 | End: 2020-10-28

## 2020-10-28 RX ADMIN — KETOROLAC TROMETHAMINE 60 MG: 30 INJECTION, SOLUTION INTRAMUSCULAR; INTRAVENOUS at 16:22

## 2020-10-28 RX ADMIN — BUPIVACAINE HYDROCHLORIDE 2 ML: 2.5 INJECTION, SOLUTION INFILTRATION; PERINEURAL at 16:22

## 2021-01-25 ENCOUNTER — TELEPHONE (OUTPATIENT)
Dept: OBGYN CLINIC | Facility: HOSPITAL | Age: 76
End: 2021-01-25

## 2021-01-25 NOTE — TELEPHONE ENCOUNTER
Patient called stating her son will be picking up the form for her to sign and dropping it off in the office when it's completed

## 2021-01-25 NOTE — TELEPHONE ENCOUNTER
Patient Sees Dr Foster  Patient would please like a medical release form sent to her, she is seeing a new Dr, in [facility] - [phone]

## 2021-02-12 DIAGNOSIS — Z23 ENCOUNTER FOR IMMUNIZATION: ICD-10-CM

## 2021-03-28 DIAGNOSIS — I10 ESSENTIAL HYPERTENSION: ICD-10-CM

## 2021-05-02 RX ORDER — LOSARTAN POTASSIUM 25 MG/1
TABLET ORAL
Qty: 90 TABLET | Refills: 2 | OUTPATIENT
Start: 2021-05-02

## 2021-08-17 ENCOUNTER — TELEPHONE (OUTPATIENT)
Dept: PAIN MEDICINE | Facility: CLINIC | Age: 76
End: 2021-08-17

## 2022-05-27 ENCOUNTER — OFFICE VISIT (OUTPATIENT)
Dept: OBGYN CLINIC | Facility: CLINIC | Age: 77
End: 2022-05-27
Payer: MEDICARE

## 2022-05-27 ENCOUNTER — APPOINTMENT (OUTPATIENT)
Dept: RADIOLOGY | Facility: AMBULARY SURGERY CENTER | Age: 77
End: 2022-05-27
Attending: ORTHOPAEDIC SURGERY
Payer: MEDICARE

## 2022-05-27 VITALS — BODY MASS INDEX: 30 KG/M2 | HEIGHT: 59 IN | WEIGHT: 148.8 LBS

## 2022-05-27 DIAGNOSIS — M25.562 LEFT KNEE PAIN, UNSPECIFIED CHRONICITY: ICD-10-CM

## 2022-05-27 DIAGNOSIS — Z96.652 HISTORY OF REVISION OF TOTAL REPLACEMENT OF LEFT KNEE JOINT: Primary | ICD-10-CM

## 2022-05-27 PROCEDURE — 73562 X-RAY EXAM OF KNEE 3: CPT

## 2022-05-27 PROCEDURE — 99213 OFFICE O/P EST LOW 20 MIN: CPT | Performed by: ORTHOPAEDIC SURGERY

## 2022-05-27 NOTE — PROGRESS NOTES
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  Z96 652 Ambulatory Referral to Physical Therapy   2  Left knee pain, unspecified chronicity  [id number] XR knee 3 vw left non injury       Plan:  · 69-year-old female with right knee osteoarthritis and left revision total knee arthroplasty performed in March of 2021 with an outside provider  · Patient continues have significant relief from her geniculate nerve block of the right knee and will continue new to monitor her right knee for increasing pain  · In regards to her left knee her x-rays do demonstrate stable alignment of prosthesis with no signs of failure  · We discussed that the quadriceps weakness is not uncommon after such a large surgery and I would recommend she performed physical therapy for quadriceps strengthening  · Patient was in good understanding of this and agreeable to the plan  All her questions were answered today in the office  · I will see her back in 3 months for re-evaluation        To do next visit:  Return in about 3 months (around 8/27/2022)  The above stated was discussed in layman's terms and the patient expressed understanding  All questions were answered to the patient's satisfaction  Scribe Attestation    I,:  Sylvia Perdomo am acting as a scribe while in the presence of the attending physician :       I,:  Swati Faria MD personally performed the services described in this documentation    as scribed in my presence :             Subjective:   Alize Flores is a 67 y o  female who presents to the office today for evaluation of her left knee  Patient had a revision total knee arthroplasty in March 2021 performed with an outside provider in [place]  She states her initial surgery was in 2000 with a no other surgeon in [place]  She did require revision due to significant pain she was having about the left knee    She is now recently moved back to the area and is looking to follow-up with Orthopedics closer to her home  Today she denies any significant pain about the left knee  She does have a bit of quadriceps weakness  She does have the feeling of the left knee giving way at times  She has previously done physical therapy and states that this was very helpful to her and she would like to reinitiate physical therapy closer to her new home  Regards to her right knee she has previously been seen by our practice for the right knee osteoarthritis  She states this is overall doing well  She did have a genicular nerve block performed in [place] which seems to be providing her with relief  She is wearing a brace with relief  She denies any new injuries to bilateral knees  She does have a bit of lateral incision numbness on the left knee  Review of systems negative unless otherwise specified in HPI  Review of Systems   Constitutional: Negative for chills, fever and unexpected weight change  HENT: Negative for hearing loss, nosebleeds and sore throat  Eyes: Negative for pain, redness and visual disturbance  Respiratory: Negative for cough, shortness of breath and wheezing  Cardiovascular: Negative for chest pain, palpitations and leg swelling  Gastrointestinal: Negative for abdominal pain, nausea and vomiting  Endocrine: Negative for polydipsia and polyuria  Genitourinary: Negative for dyspareunia and hematuria  Musculoskeletal: Negative for arthralgias, joint swelling and myalgias  Skin: Negative for rash and wound  Neurological: Negative for dizziness, numbness and headaches  Psychiatric/Behavioral: Negative for decreased concentration and suicidal ideas  The patient is not nervous/anxious          Past Medical History:   Diagnosis Date    Arthritis     Carpal tunnel syndrome     GERD (gastroesophageal reflux disease)     Hiatal hernia     Hypertension     Insomnia     Pneumonia     PONV (postoperative nausea and vomiting)     Renal calculi     Sleep deprivation     chronically    Vitamin D deficiency     Vitamin D toxicity 2011    with leaky gut syndrome       Past Surgical History:   Procedure Laterality Date    BUNIONECTOMY Bilateral     CARPAL TUNNEL RELEASE Right 2015    COLONOSCOPY      CYSTOSCOPY N/A 6/6/2019    Procedure: CYSTOSCOPY;  Surgeon: Janneth Pakrer MD;  [facility];  Service: Gynecology Oncology    ESOPHAGOGASTRODUODENOSCOPY N/A 5/5/2016    Procedure: ESOPHAGOGASTRODUODENOSCOPY (EGD); Surgeon: Aura Almonte MD;  [facility]; Service:     FRACTURE SURGERY      L arm     NERVE BLOCK       R Knee    OOPHORECTOMY Left     SC COLONOSCOPY FLX DX W/COLLJ SPEC WHEN PFRMD N/A 6/28/2016    Procedure: COLONOSCOPY;  Surgeon: Aura Almonte MD;  [facility]; Service: Gastroenterology    SC ESOPHAGOGASTRODUODENOSCOPY TRANSORAL DIAGNOSTIC N/A 10/19/2018    Procedure: ESOPHAGOGASTRODUODENOSCOPY (EGD); Surgeon: Aura Almonte MD;  [facility];   Service: Gastroenterology    SC LAPAROSCOPY W TOT HYSTERECTUTERUS <=250 GRAM  W TUBE/OVARY N/A 6/6/2019    Procedure: ROBOTIC TOTAL LAPAROSCOPIC HYSTERECTOMY, RIGHT SALPINGO-OOPHORECTOMY, EXTENSIVE ADHESIOLYSIS, APPROXIMATELY 39 MIN;  Surgeon: Janneth Parker MD;  [facility];  Service: Gynecology Oncology    ROTATOR CUFF REPAIR Bilateral     TOE SURGERY Left     left 5th toe shaved down due to repeated fx    TOTAL KNEE ARTHROPLASTY Left        Family History   Problem Relation Age of Onset    Heart failure Mother     Other Mother         respiratory failure    Heart failure Father         respiratory failure    Hypertension Daughter     Breast cancer Sister     Kidney failure Brother     Alcohol abuse Brother     No Known Problems Sister     No Known Problems Brother     Diabetes Brother     Arrhythmia Neg Hx     Clotting disorder Neg Hx     Fainting Neg Hx     Anuerysm Neg Hx     Stroke Neg Hx     Hyperlipidemia Neg Hx     Asthma Neg Hx        Social History     Occupational History    Not on file   Tobacco Use    Smoking status: Former Smoker    Smokeless tobacco: Never Used    Tobacco comment: Quit > 30 years ago    Vaping Use    Vaping Use: Never used   Substance and Sexual Activity    Alcohol use: No    Drug use: No    Sexual activity: Not on file     Comment: no partner         Current Outpatient Medications:     Acetaminophen 500 MG, TAKE 2 CAPSULES TWICE A DAY, Disp: 120 capsule, Rfl: 3    albuterol (PROVENTIL HFA,VENTOLIN HFA) 90 mcg/act inhaler, Inhale 2 puffs every 6 (six) hours as needed for wheezing or shortness of breath, Disp: 1 Inhaler, Rfl: 5    losartan (COZAAR) 25 mg tablet, TAKE 1 TABLET BY MOUTH EVERY DAY (Patient taking differently: 50 mg), Disp: 90 tablet, Rfl: 3    Nattokinase 100 MG CAPS, Take by mouth, Disp: , Rfl:     Probiotic Product (MVW COMPLETE PROBIOTIC PO), Take by mouth, Disp: , Rfl:     saccharomyces boulardii (FLORASTOR) 250 mg capsule, Take 250 mg by mouth 2 (two) times a day, Disp: , Rfl:     saccharomyces boulardii (FLORASTOR) 250 mg capsule, Take 250 mg by mouth, Disp: , Rfl:     traMADol (ULTRAM) 50 mg tablet, Take 50 mg by mouth every 6 (six) hours as needed, Disp: , Rfl:     zolpidem (AMBIEN) 10 mg tablet, Take 1/2 tab at hs prn, Disp: 30 tablet, Rfl: 0    Allergies   Allergen Reactions    Ancef [Cefazolin] Hives and GI Intolerance    Aspirin GI Intolerance     Even low dose     Penicillins Hives    Prednisone Hives and GI Intolerance    Vancomycin Hives and GI Intolerance    Adhesive [Medical Tape]     Ciprofloxacin Hives    Cortisone Syncope    Other      STEROIDS- GI INTOLERANCE    Oxycodone Rash and Vomiting            Vitals:       Objective:                    Left Knee Exam     Tenderness   The patient is experiencing no tenderness       Range of Motion   Extension: 0   Flexion: 110     Tests   Varus: negative Valgus: negative    Other   Erythema: absent  Scars: present  Pulse: present  Swelling: none  Effusion: no effusion present    Comments:  Knee is stable to stress  Lateral incision numbness present   Quadriceps strength 4/5            Diagnostics, reviewed and taken today if performed as documented: The attending physician has personally reviewed the pertinent films in PACS and interpretation is as follows:    X-rays left knee obtained on 05/27/2020 demonstrate stable alignment of revision prosthesis with no signs of failure    Procedures, if performed today:    Procedures    None performed      Portions of the record may have been created with voice recognition software  Occasional wrong word or "sound a like" substitutions may have occurred due to the inherent limitations of voice recognition software  Read the chart carefully and recognize, using context, where substitutions have occurred

## 2022-06-07 ENCOUNTER — TELEPHONE (OUTPATIENT)
Dept: OBGYN CLINIC | Facility: HOSPITAL | Age: 77
End: 2022-06-07

## 2022-06-07 DIAGNOSIS — Z96.652 HISTORY OF REVISION OF TOTAL REPLACEMENT OF LEFT KNEE JOINT: ICD-10-CM

## 2022-06-07 DIAGNOSIS — M17.11 PRIMARY OSTEOARTHRITIS OF RIGHT KNEE: Primary | ICD-10-CM

## 2022-06-07 RX ORDER — CLINDAMYCIN HYDROCHLORIDE 300 MG/1
CAPSULE ORAL
Qty: 2 CAPSULE | Refills: 2 | Status: SHIPPED | OUTPATIENT
Start: 2022-06-07 | End: 2022-06-08

## 2022-06-08 ENCOUNTER — TELEPHONE (OUTPATIENT)
Dept: OBGYN CLINIC | Facility: HOSPITAL | Age: 77
End: 2022-06-08

## 2022-06-08 ENCOUNTER — EVALUATION (OUTPATIENT)
Dept: PHYSICAL THERAPY | Facility: MEDICAL CENTER | Age: 77
End: 2022-06-08
Payer: MEDICARE

## 2022-06-08 DIAGNOSIS — Z96.652 HISTORY OF REVISION OF TOTAL REPLACEMENT OF LEFT KNEE JOINT: Primary | ICD-10-CM

## 2022-06-08 PROCEDURE — 97110 THERAPEUTIC EXERCISES: CPT | Performed by: PHYSICAL THERAPIST

## 2022-06-08 PROCEDURE — 97161 PT EVAL LOW COMPLEX 20 MIN: CPT | Performed by: PHYSICAL THERAPIST

## 2022-06-08 RX ORDER — AZITHROMYCIN 250 MG/1
TABLET, FILM COATED ORAL
Qty: 2 TABLET | Refills: 2 | Status: SHIPPED | OUTPATIENT
Start: 2022-06-08 | End: 2022-06-09

## 2022-06-08 NOTE — PROGRESS NOTES
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  Z96 652 Ambulatory Referral to Physical Therapy                  Assessment  Assessment details: Patient is a 68 y/o female who presents with complaints of quadriceps weakness following a left total knee revision in 2021  No further referral appears necessary at this time based upon examination results  Patient presents with the following impairments: decreased range of motion, decreased strength and decreased ability to perform functional tasks such as ambulation and stair climbing  Prognosis is good given HEP compliance and PT 2x/wk tapering to 1x/wk over the next 4-6 weeks  Positive prognostic indicators include positive attitude toward recovery  Please contact me if you have any questions or recommendations  Thank you for the opportunity to share in Erin's care  Impairments: abnormal or restricted ROM, activity intolerance, impaired balance, impaired physical strength and lacks appropriate home exercise program    Goals  STG  Decrease pain by 50% in 4 weeks  Increase range of motion by 10 degrees in 4 weeks  Increase strength by half a grade in 4 weeks  LTG  Patient will be independent in hep in 4 weeks  Patient will be able to perform ambulation at plof by D/C  Patient will be able to perform stair climbing at plof by D/C      Plan  Patient would benefit from: skilled physical therapy  Referral necessary: No  Planned therapy interventions: manual therapy, massage, neuromuscular re-education, patient education, strengthening, stretching, therapeutic activities, therapeutic exercise, flexibility, functional ROM exercises, home exercise program and balance  Frequency: 2x week  Duration in weeks: 6  Plan of Care beginning date: 2022  Plan of Care expiration date: 7/20/2022  Treatment plan discussed with: patient        Subjective Evaluation    History of Present Illness  Mechanism of injury: surgery  Mechanism of injury: Patient had left knee revision in March of 2021  Patient had therapy starting after the surgery in [place]- home therapy and then outpatient  Patient continued with outpatient physical therapy until moving back here in March of 2022  Patient saw orthopedic here who recommended coming back to physical therapy here  Patient is primarily complaining of weakness in her left quad  This makes it difficult to walk longer distances and going up/down the stairs  She is also complaining of some numbness/cramping first thing in the morning when getting out of bed  The lateral part of her leg feels like it's asleep for about 5 minutes after waking     Pain  No pain reported  Progression: improved    Patient Goals  Patient goals for therapy: increased strength, independence with ADLs/IADLs and decreased pain  Patient goal: walking normally/ stair climbing normally        Objective     Active Range of Motion   Left Knee   Flexion: 90 degrees   Extension: 0 degrees     Right Knee   Flexion: 110 degrees   Extension: 0 degrees     Strength/Myotome Testing     Left Hip   Planes of Motion   Flexion: 4-  Extension: 4-  Abduction: 4-  Adduction: 4    Right Hip   Planes of Motion   Flexion: 4-  Extension: 4-  Abduction: 4-  Adduction: 4    Left Knee   Flexion: 4-  Extension: 4-    Right Knee   Flexion: 4  Extension: 4    Left Ankle/Foot   Dorsiflexion: 4  Plantar flexion: 4  Inversion: 4  Eversion: 4    Right Ankle/Foot   Dorsiflexion: 4+  Plantar flexion: 4  Inversion: 4  Eversion: 4    Ambulation     Ambulation: Stairs   Pattern: non-reciprocal  Railings: two rails  Pattern: non-reciprocal  Railings: two rails                Precautions L total revision, HTN, osteoporosis       Manuals 6/8                                       Neuro Re-Ed         SAQ LAQ 5"10x       bridge                                        Ther Ex        bike        Heel slides 10"10x       SLR flx/abd 10x               Mini squat                                Ther Activity        Step up        Lat step up        Gait Training                        Modalities

## 2022-06-08 NOTE — TELEPHONE ENCOUNTER
Meghann from [facility] is calling stating that they have an allergy on file for the pt stating that she has an allergy to clindamycin     # [phone]

## 2022-06-08 NOTE — TELEPHONE ENCOUNTER
Spoke to patient  She stated she did get diarrhea and a rash from taking this medication in the past  Pharmacist aware of the reaction as well  Please advise if you'd like to send something else in for her

## 2022-06-13 ENCOUNTER — OFFICE VISIT (OUTPATIENT)
Dept: PHYSICAL THERAPY | Facility: MEDICAL CENTER | Age: 77
End: 2022-06-13
Payer: MEDICARE

## 2022-06-13 DIAGNOSIS — Z96.652 HISTORY OF REVISION OF TOTAL REPLACEMENT OF LEFT KNEE JOINT: Primary | ICD-10-CM

## 2022-06-13 PROCEDURE — 97140 MANUAL THERAPY 1/> REGIONS: CPT | Performed by: PHYSICAL THERAPIST

## 2022-06-13 PROCEDURE — 97112 NEUROMUSCULAR REEDUCATION: CPT | Performed by: PHYSICAL THERAPIST

## 2022-06-13 PROCEDURE — 97110 THERAPEUTIC EXERCISES: CPT | Performed by: PHYSICAL THERAPIST

## 2022-06-13 NOTE — PROGRESS NOTES
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  Z96 652                   Subjective: Patient reports she is having a hard time completing her entire old HEP  Doing 1/2 in morning and 1/2 in afternoon  Also notes SLR flexion painful for her hip  Objective: See treatment diary below      Assessment: Tolerated treatment fair  Patient demonstrated fatigue post treatment, exhibited good technique with therapeutic exercises and would benefit from continued PT      Plan: Continue per plan of care  Progress treatment as tolerated         Precautions L total revision, HTN, osteoporosis       Manuals       PROM flx  AK                              Neuro Re-Ed         SAQ  5" 20x      LAQ 5"10x 5" 2x10      bridge  2x10                                      Ther Ex        bike  6 min      Heel slides 10"10x 10"10x      SL abd 10x 2x10      HSS  30" 4x      Mini squat  2x10                              Ther Activity        Step up        Lat step up        Gait Training                        Modalities

## 2022-06-15 ENCOUNTER — OFFICE VISIT (OUTPATIENT)
Dept: PHYSICAL THERAPY | Facility: MEDICAL CENTER | Age: 77
End: 2022-06-15
Payer: MEDICARE

## 2022-06-15 DIAGNOSIS — Z96.652 HISTORY OF REVISION OF TOTAL REPLACEMENT OF LEFT KNEE JOINT: Primary | ICD-10-CM

## 2022-06-15 PROCEDURE — 97112 NEUROMUSCULAR REEDUCATION: CPT | Performed by: PHYSICAL THERAPIST

## 2022-06-15 PROCEDURE — 97140 MANUAL THERAPY 1/> REGIONS: CPT | Performed by: PHYSICAL THERAPIST

## 2022-06-15 PROCEDURE — 97110 THERAPEUTIC EXERCISES: CPT | Performed by: PHYSICAL THERAPIST

## 2022-06-15 NOTE — PROGRESS NOTES
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  Z96 652                   Subjective: Patient reports she is having less cramping and was able to walk 1 5 miles  Objective: See treatment diary below      Assessment: Tolerated treatment well  Patient demonstrated fatigue post treatment, exhibited good technique with therapeutic exercises and would benefit from continued PT      Plan: Continue per plan of care  Progress treatment as tolerated         Precautions L total revision, HTN, osteoporosis       Manuals 6/8 6/13 6/15     PROM flx  AK AK                             Neuro Re-Ed         SAQ  5" 20x 2 5# 5" 20x     LAQ 5"10x 5" 2x10 2 5# 5" 2x10     bridge  2x10 2x10                                     Ther Ex        bike  6 min 6 min     Heel slides 10"10x 10"10x 10"10x     SL abd 10x 2x10 2 5# 2x10     HSS  30" 4x      Mini squat  2x10 2x10                             Ther Activity        Step up        Lat step up   2x10 ea     Gait Training                        Modalities

## 2022-06-20 ENCOUNTER — OFFICE VISIT (OUTPATIENT)
Dept: PHYSICAL THERAPY | Facility: MEDICAL CENTER | Age: 77
End: 2022-06-20
Payer: MEDICARE

## 2022-06-20 DIAGNOSIS — Z96.652 HISTORY OF REVISION OF TOTAL REPLACEMENT OF LEFT KNEE JOINT: Primary | ICD-10-CM

## 2022-06-20 PROCEDURE — 97110 THERAPEUTIC EXERCISES: CPT | Performed by: PHYSICAL THERAPIST

## 2022-06-20 NOTE — PROGRESS NOTES
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  Z96 652                   Subjective: Patient reports continued improvement  She feels like she is gaining more endurance in her legs  Objective: See treatment diary below      Assessment: Tolerated treatment well  Patient demonstrated fatigue post treatment, exhibited good technique with therapeutic exercises and would benefit from continued PT      Plan: Continue per plan of care  Progress treatment as tolerated         Precautions L total revision, HTN, osteoporosis       Manuals 6/8 6/13 6/15 6/20    PROM flx  AK AK AK                            Neuro Re-Ed         SAQ  5" 20x 2 5# 5" 20x 2 5# 5"20x    LAQ 5"10x 5" 2x10 2 5# 5" 2x10 2 5# 5"20x    bridge  2x10 2x10 2x10                                    Ther Ex        bike  6 min 6 min 7 min    Heel slides 10"10x 10"10x 10"10x 10"10x    SL abd 10x 2x10 2 5# 2x10 2 5# 2x10    HSS  30" 4x      Mini squat  2x10 2x10 2x10                            Ther Activity        Step up        Lat step up   2x10 ea 2x10 ea    Gait Training                        Modalities

## 2022-06-23 ENCOUNTER — OFFICE VISIT (OUTPATIENT)
Dept: PHYSICAL THERAPY | Facility: MEDICAL CENTER | Age: 77
End: 2022-06-23
Payer: MEDICARE

## 2022-06-23 DIAGNOSIS — Z96.652 HISTORY OF REVISION OF TOTAL REPLACEMENT OF LEFT KNEE JOINT: Primary | ICD-10-CM

## 2022-06-23 PROCEDURE — 97112 NEUROMUSCULAR REEDUCATION: CPT | Performed by: PHYSICAL THERAPIST

## 2022-06-23 PROCEDURE — 97110 THERAPEUTIC EXERCISES: CPT | Performed by: PHYSICAL THERAPIST

## 2022-06-23 PROCEDURE — 97530 THERAPEUTIC ACTIVITIES: CPT | Performed by: PHYSICAL THERAPIST

## 2022-06-23 NOTE — PROGRESS NOTES
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  Z96 652                   Subjective: Patient offers no new complaints  Objective: See treatment diary below      Assessment: Tolerated treatment well  Patient demonstrated fatigue post treatment, exhibited good technique with therapeutic exercises and would benefit from continued PT  Patient requiring less rest in between exercises  Plan: Continue per plan of care  Progress treatment as tolerated  Patient will be back in 2 weeks due to being out of town next week       Precautions L total revision, HTN, osteoporosis       Manuals 6/8 6/13 6/15 6/20 6/23   PROM flx  AK AK AK AK                           Neuro Re-Ed         SAQ  5" 20x 2 5# 5" 20x 2 5# 5"20x 2 5# 5"20x   LAQ 5"10x 5" 2x10 2 5# 5" 2x10 2 5# 5"20x 2 5# 5"20x   bridge  2x10 2x10 2x10 2x10                                   Ther Ex        bike  6 min 6 min 7 min 8 min   Heel slides 10"10x 10"10x 10"10x 10"10x 10"10x   SL abd 10x 2x10 2 5# 2x10 2 5# 2x10    HSS  30" 4x      Mini squat  2x10 2x10 2x10 2x10                           Ther Activity        Step up     2x10   Lat step up   2x10 ea 2x10 ea 2x10   Gait Training                        Modalities

## 2022-06-27 ENCOUNTER — APPOINTMENT (OUTPATIENT)
Dept: PHYSICAL THERAPY | Facility: MEDICAL CENTER | Age: 77
End: 2022-06-27
Payer: MEDICARE

## 2022-06-30 ENCOUNTER — APPOINTMENT (OUTPATIENT)
Dept: PHYSICAL THERAPY | Facility: MEDICAL CENTER | Age: 77
End: 2022-06-30
Payer: MEDICARE

## 2022-07-05 ENCOUNTER — OFFICE VISIT (OUTPATIENT)
Dept: PHYSICAL THERAPY | Facility: MEDICAL CENTER | Age: 77
End: 2022-07-05
Payer: MEDICARE

## 2022-07-05 DIAGNOSIS — Z96.652 HISTORY OF REVISION OF TOTAL REPLACEMENT OF LEFT KNEE JOINT: Primary | ICD-10-CM

## 2022-07-05 PROCEDURE — 97530 THERAPEUTIC ACTIVITIES: CPT

## 2022-07-05 PROCEDURE — 97112 NEUROMUSCULAR REEDUCATION: CPT

## 2022-07-05 NOTE — PROGRESS NOTES
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  Z96 652        Start Time: 915  Stop Time: 1000  Total time in clinic (min): 45 minutes    Subjective: Pt denies significant change in status      Objective: See treatment diary below      Assessment: Tolerated treatment well  Patient demonstrated fatigue post treatment, exhibited good technique with therapeutic exercises and would benefit from continued PT      Plan: Continue per plan of care  Progress treatment as tolerated         Precautions L total revision, HTN, osteoporosis     PT 1:1 [id number]  Manuals 2022   6/13 6/15 6/20 6/23   PROM flx CC AK AK AK AK                           Neuro Re-Ed         SAQ 2 5# 5"20x 5" 20x 2 5# 5" 20x 2 5# 5"20x 2 5# 5"20x   LAQ 2 5# 5"20x 5" 2x10 2 5# 5" 2x10 2 5# 5"20x 2 5# 5"20x   bridge 2x10 2x10 2x10 2x10 2x10                                   Ther Ex        bike  6 min 6 min 7 min 8 min   Heel slides 10"10x 10"10x 10"10x 10"10x 10"10x   SL abd  2x10 2 5# 2x10 2 5# 2x10    HSS  30" 4x      Mini squat 2x10 2x10 2x10 2x10 2x10   Stair KF Stretch 10" 10x                       Ther Activity        Step up 2x10    2x10   Lat step up 2x10  2x10 ea 2x10 ea 2x10   Gait Training                        Modalities

## 2022-07-06 ENCOUNTER — OFFICE VISIT (OUTPATIENT)
Dept: FAMILY MEDICINE CLINIC | Facility: CLINIC | Age: 77
End: 2022-07-06
Payer: MEDICARE

## 2022-07-06 VITALS
BODY MASS INDEX: 31.4 KG/M2 | TEMPERATURE: 97.9 F | HEART RATE: 95 BPM | OXYGEN SATURATION: 97 % | HEIGHT: 58 IN | WEIGHT: 149.6 LBS | DIASTOLIC BLOOD PRESSURE: 72 MMHG | SYSTOLIC BLOOD PRESSURE: 146 MMHG

## 2022-07-06 DIAGNOSIS — R53.83 FATIGUE, UNSPECIFIED TYPE: ICD-10-CM

## 2022-07-06 DIAGNOSIS — Z13.220 SCREENING CHOLESTEROL LEVEL: ICD-10-CM

## 2022-07-06 DIAGNOSIS — D64.9 ANEMIA, UNSPECIFIED TYPE: ICD-10-CM

## 2022-07-06 DIAGNOSIS — I10 ESSENTIAL HYPERTENSION: ICD-10-CM

## 2022-07-06 DIAGNOSIS — Z00.00 MEDICARE ANNUAL WELLNESS VISIT, SUBSEQUENT: Primary | ICD-10-CM

## 2022-07-06 DIAGNOSIS — Z13.29 SCREENING FOR THYROID DISORDER: ICD-10-CM

## 2022-07-06 PROBLEM — F11.20 CONTINUOUS OPIOID DEPENDENCE (HCC): Status: ACTIVE | Noted: 2022-07-06

## 2022-07-06 PROCEDURE — 99213 OFFICE O/P EST LOW 20 MIN: CPT | Performed by: INTERNAL MEDICINE

## 2022-07-06 PROCEDURE — G0439 PPPS, SUBSEQ VISIT: HCPCS | Performed by: INTERNAL MEDICINE

## 2022-07-06 PROCEDURE — 1123F ACP DISCUSS/DSCN MKR DOCD: CPT | Performed by: INTERNAL MEDICINE

## 2022-07-06 RX ORDER — LOSARTAN POTASSIUM 50 MG/1
50 TABLET ORAL DAILY
Qty: 90 TABLET | Refills: 1
Start: 2022-07-06 | End: 2022-08-26 | Stop reason: SDUPTHER

## 2022-07-06 NOTE — PROGRESS NOTES
Assessment and Plan:     Problem List Items Addressed This Visit        Cardiovascular and Mediastinum    Essential hypertension    Relevant Medications    losartan (COZAAR) 50 mg tablet       Other    Continuous opioid dependence ([address] )      Other Visit Diagnoses     Medicare annual wellness visit, subsequent    -  Primary    Abnormal thyroid screen (blood)        Anemia, unspecified type        Relevant Orders    CBC    Screening cholesterol level        Relevant Orders    Comprehensive metabolic panel    Lipid panel    Screening for thyroid disorder        Relevant Orders    TSH, 3rd generation with Free T4 reflex    Fatigue, unspecified type        Relevant Orders    TSH, 3rd generation with Free T4 reflex        BMI Counseling: Body mass index is 31 54 kg/m²  The BMI is above normal  Nutrition recommendations include decreasing portion sizes and limiting drinks that contain sugar  Exercise recommendations include moderate physical activity 150 minutes/week and exercising 3-5 times per week  No pharmacotherapy was ordered  Patient referred to PCP  Rationale for BMI follow-up plan is due to patient being overweight or obese  Depression Screening and Follow-up Plan: Patient was screened for depression during today's encounter  They screened negative with a PHQ-2 score of 0  Preventive health issues were discussed with patient, and age appropriate screening tests were ordered as noted in patient's After Visit Summary  Personalized health advice and appropriate referrals for health education or preventive services given if needed, as noted in patient's After Visit Summary  History of Present Illness:     Patient presents for a Medicare Wellness Visit    Pt in for medicare well  Wants to change drs    No new complaints     Patient Care Team:  Cecil Florian DO as PCP - General (Internal Medicine)  MD Jayne Germain Client, MD Jayne Archer Client, MD as Endoscopist     Review of Systems:     Review of Systems   Constitutional: Negative for chills and fever  HENT: Negative  Respiratory: Negative  Cardiovascular: Negative  Problem List:     Patient Active Problem List   Diagnosis    Chest pressure    GERD (gastroesophageal reflux disease)    Sleep deprivation    Essential hypertension    Arthritis of knee    Left knee pain    Lower back pain    Primary osteoarthritis of right knee    Globus sensation    RUQ pain    Gastroesophageal reflux disease without esophagitis    Left shoulder pain    Right shoulder pain    Tendinopathy of left rotator cuff    Tendinopathy of right rotator cuff    Cervical radiculitis    Carpal tunnel syndrome of right wrist    Breast cancer screening    Status post hysterectomy    Osteoporosis    Chronic pain syndrome - Right    Continuous opioid dependence ([address] )      Past Medical and Surgical History:     Past Medical History:   Diagnosis Date    Arthritis     Carpal tunnel syndrome     GERD (gastroesophageal reflux disease)     Hiatal hernia     Hypertension     Insomnia     Pneumonia     PONV (postoperative nausea and vomiting)     Renal calculi     Sleep deprivation     chronically    Vitamin D deficiency     Vitamin D toxicity 2011    with leaky gut syndrome     Past Surgical History:   Procedure Laterality Date    BUNIONECTOMY Bilateral     CARPAL TUNNEL RELEASE Right 2015    COLONOSCOPY      CYSTOSCOPY N/A 6/6/2019    Procedure: CYSTOSCOPY;  Surgeon: Ruthy Crenshaw MD;  [facility];  Service: Gynecology Oncology    ESOPHAGOGASTRODUODENOSCOPY N/A 5/5/2016    Procedure: ESOPHAGOGASTRODUODENOSCOPY (EGD); Surgeon: Arnoldo Mendieta MD;  [facility]; Service:     FRACTURE SURGERY      L arm     NERVE BLOCK       R Knee    OOPHORECTOMY Left     MD COLONOSCOPY FLX DX W/COLLJ SPEC WHEN PFRMD N/A 6/28/2016    Procedure: COLONOSCOPY;  Surgeon: Arnoldo Mendieta MD;  [facility];   Service: Gastroenterology    WA ESOPHAGOGASTRODUODENOSCOPY TRANSORAL DIAGNOSTIC N/A 10/19/2018    Procedure: ESOPHAGOGASTRODUODENOSCOPY (EGD); Surgeon: Maura Vieyra MD;  [facility];   Service: Gastroenterology    WA LAPAROSCOPY W TOT HYSTERECTUTERUS <=250 GRAM  W TUBE/OVARY N/A 6/6/2019    Procedure: ROBOTIC TOTAL LAPAROSCOPIC HYSTERECTOMY, RIGHT SALPINGO-OOPHORECTOMY, EXTENSIVE ADHESIOLYSIS, APPROXIMATELY 39 MIN;  Surgeon: Irene Millard MD;  Location: [facility] OR;  Service: Gynecology Oncology    ROTATOR CUFF REPAIR Bilateral     TOE SURGERY Left     left 5th toe shaved down due to repeated fx    TOTAL KNEE ARTHROPLASTY Left       Family History:     Family History   Problem Relation Age of Onset    Heart failure Mother     Other Mother         respiratory failure    Heart failure Father         respiratory failure    Hypertension Daughter     Breast cancer Sister     Kidney failure Brother     Alcohol abuse Brother     No Known Problems Sister     No Known Problems Brother     Diabetes Brother     Arrhythmia Neg Hx     Clotting disorder Neg Hx     Fainting Neg Hx     Anuerysm Neg Hx     Stroke Neg Hx     Hyperlipidemia Neg Hx     Asthma Neg Hx       Social History:     Social History     Socioeconomic History    Marital status: Single     Spouse name: None    Number of children: None    Years of education: None    Highest education level: None   Occupational History    None   Tobacco Use    Smoking status: Former Smoker    Smokeless tobacco: Never Used    Tobacco comment: Quit > 30 years ago    Vaping Use    Vaping Use: Never used   Substance and Sexual Activity    Alcohol use: No    Drug use: No    Sexual activity: None     Comment: no partner   Other Topics Concern    None   Social History Narrative    None     Social Determinants of Health     Financial Resource Strain: Not on file   Food Insecurity: Not on file   Transportation Needs: Not on file   Physical Activity: Not on file   Stress: Not on file   Social Connections: Not on file   Intimate Partner Violence: Not on file   Housing Stability: Not on file      Medications and Allergies:     Current Outpatient Medications   Medication Sig Dispense Refill    albuterol (PROVENTIL HFA,VENTOLIN HFA) 90 mcg/act inhaler Inhale 2 puffs every 6 (six) hours as needed for wheezing or shortness of breath 1 Inhaler 5    losartan (COZAAR) 50 mg tablet Take 1 tablet (50 mg total) by mouth daily 90 tablet 1    Nattokinase 100 MG CAPS Take by mouth As needed      Probiotic Product (MVW COMPLETE PROBIOTIC PO) Take by mouth      saccharomyces boulardii (FLORASTOR) 250 mg capsule Take 250 mg by mouth 2 (two) times a day      saccharomyces boulardii (FLORASTOR) 250 mg capsule Take 250 mg by mouth      zolpidem (AMBIEN) 10 mg tablet Take 1/2 tab at hs prn 30 tablet 0    Acetaminophen 500 MG TAKE 2 CAPSULES TWICE A DAY (Patient not taking: Reported on 7/6/2022) 120 capsule 3    traMADol (ULTRAM) 50 mg tablet Take 50 mg by mouth every 6 (six) hours as needed (Patient not taking: Reported on 7/6/2022)       No current facility-administered medications for this visit       Allergies   Allergen Reactions    Ancef [Cefazolin] Hives and GI Intolerance    Aspirin GI Intolerance     Even low dose     Penicillins Hives    Prednisone Hives and GI Intolerance    Vancomycin Hives and GI Intolerance    Adhesive [Medical Tape]     Ciprofloxacin Hives    Cortisone Syncope    Other      STEROIDS- GI INTOLERANCE    Oxycodone Rash and Vomiting      Immunizations:     Immunization History   Administered Date(s) Administered    Tdap 11/25/2013, 02/08/2020      Health Maintenance:         Topic Date Due    Hepatitis C Screening  Never done    DXA SCAN  11/23/2020         Topic Date Due    COVID-19 Vaccine (1) Never done    Pneumococcal Vaccine: 65+ Years (1 - PCV) Never done    Influenza Vaccine (1) 09/01/2022      Medicare Screening Tests and Risk Assessments:     Rowan Faustin is here for her Subsequent Wellness visit  Health Risk Assessment:   Patient rates overall health as fair  Patient feels that their physical health rating is same  Patient is satisfied with their life  Eyesight was rated as slightly worse  Hearing was rated as same  Patient feels that their emotional and mental health rating is same  Patients states they are never, rarely angry  Patient states they are sometimes unusually tired/fatigued  Pain experienced in the last 7 days has been some  Patient's pain rating has been 5/10  Patient states that she has experienced no weight loss or gain in last 6 months  Relates needed new glasses    Depression Screening:   PHQ-2 Score: 0      Fall Risk Screening: In the past year, patient has experienced: no history of falling in past year      Urinary Incontinence Screening:   Patient has not leaked urine accidently in the last six months  Home Safety:  Patient does not have trouble with stairs inside or outside of their home  Patient has working smoke alarms and has working carbon monoxide detector  Home safety hazards include: none  Nutrition:   Current diet is Other (please comment)  Gluten free    Medications:   Patient is currently taking over-the-counter supplements  OTC medications include: see medication list  Patient is able to manage medications  Activities of Daily Living (ADLs)/Instrumental Activities of Daily Living (IADLs):   Walk and transfer into and out of bed and chair?: Yes  Dress and groom yourself?: Yes    Bathe or shower yourself?: Yes    Feed yourself? Yes  Do your laundry/housekeeping?: Yes  Manage your money, pay your bills and track your expenses?: Yes  Make your own meals?: Yes    Do your own shopping?: Yes    Advance Care Planning:   Living will: Yes    Durable POA for healthcare:  Yes    Advanced directive: Yes      Comments: Daniel Jennings is her son Brennan Haile 3rd  #  [phone]    Cognitive Screening: Provider or family/friend/caregiver concerned regarding cognition?: No    PREVENTIVE SCREENINGS      Cardiovascular Screening:    General: Screening Current      Cervical Cancer Screening:    General: Screening Not Indicated      Osteoporosis Screening:    General: Screening Not Indicated and History Osteoporosis      Lung Cancer Screening:     General: Screening Not Indicated    Screening, Brief Intervention, and Referral to Treatment (SBIRT)    Screening  Typical number of drinks in a day: 0  Typical number of drinks in a week: 0  Interpretation: Low risk drinking behavior  Single Item Drug Screening:  How often have you used an illegal drug (including marijuana) or a prescription medication for non-medical reasons in the past year? never    Single Item Drug Screen Score: 0  Interpretation: Negative screen for possible drug use disorder    Brief Intervention  Alcohol & drug use screenings were reviewed  No concerns regarding substance use disorder identified  No exam data present     Physical Exam:     /72 (BP Location: Right arm, Patient Position: Sitting, Cuff Size: Standard)   Pulse 95   Temp 97 9 °F (36 6 °C) (Temporal)   Ht 4' 9 75" (1 467 m)   Wt 67 9 kg (149 lb 9 6 oz)   SpO2 97%   BMI 31 54 kg/m²     Physical Exam  Constitutional:       Appearance: She is obese  HENT:      Head: Normocephalic and atraumatic  Right Ear: Tympanic membrane and ear canal normal       Left Ear: Tympanic membrane and ear canal normal    Cardiovascular:      Rate and Rhythm: Normal rate and regular rhythm  Pulmonary:      Effort: Pulmonary effort is normal       Breath sounds: Normal breath sounds  Musculoskeletal:      Cervical back: Neck supple  Lymphadenopathy:      Cervical: No cervical adenopathy  Neurological:      Mental Status: She is alert            Serafin Michelle DO

## 2022-07-06 NOTE — PATIENT INSTRUCTIONS
Medicare Preventive Visit Patient Instructions  Thank you for completing your Welcome to Medicare Visit or Medicare Annual Wellness Visit today  Your next wellness visit will be due in one year (7/7/2023)  The screening/preventive services that you may require over the next 5-10 years are detailed below  Some tests may not apply to you based off risk factors and/or age  Screening tests ordered at today's visit but not completed yet may show as past due  Also, please note that scanned in results may not display below  Preventive Screenings:  Service Recommendations Previous Testing/Comments   Colorectal Cancer Screening  * Colonoscopy    * Fecal Occult Blood Test (FOBT)/Fecal Immunochemical Test (FIT)  * Fecal DNA/Cologuard Test  * Flexible Sigmoidoscopy Age: 54-65 years old   Colonoscopy: every 10 years (may be performed more frequently if at higher risk)  OR  FOBT/FIT: every 1 year  OR  Cologuard: every 3 years  OR  Sigmoidoscopy: every 5 years  Screening may be recommended earlier than age 48 if at higher risk for colorectal cancer  Also, an individualized decision between you and your healthcare provider will decide whether screening between the ages of 74-80 would be appropriate  Colonoscopy: 06/28/2016  FOBT/FIT: Not on file  Cologuard: Not on file  Sigmoidoscopy: Not on file          Breast Cancer Screening Age: 36 years old  Frequency: every 1-2 years  Not required if history of left and right mastectomy Mammogram: 04/30/2019        Cervical Cancer Screening Between the ages of 21-29, pap smear recommended once every 3 years  Between the ages of 33-67, can perform pap smear with HPV co-testing every 5 years     Recommendations may differ for women with a history of total hysterectomy, cervical cancer, or abnormal pap smears in past  Pap Smear: Not on file    Screening Not Indicated   Hepatitis C Screening Once for adults born between [facility]  More frequently in patients at high risk for Hepatitis C Hep C Antibody: Not on file        Diabetes Screening 1-2 times per year if you're at risk for diabetes or have pre-diabetes Fasting glucose: 86 mg/dL   A1C: 5 6 %        Cholesterol Screening Once every 5 years if you don't have a lipid disorder  May order more often based on risk factors  Lipid panel: 11/08/2019    Screening Current     Other Preventive Screenings Covered by Medicare:  1  Abdominal Aortic Aneurysm (AAA) Screening: covered once if your at risk  You're considered to be at risk if you have a family history of AAA  2  Lung Cancer Screening: covers low dose CT scan once per year if you meet all of the following conditions: (1) Age 50-69; (2) No signs or symptoms of lung cancer; (3) Current smoker or have quit smoking within the last 15 years; (4) You have a tobacco smoking history of at least 30 pack years (packs per day multiplied by number of years you smoked); (5) You get a written order from a healthcare provider  3  Glaucoma Screening: covered annually if you're considered high risk: (1) You have diabetes OR (2) Family history of glaucoma OR (3)  aged 48 and older OR (3)  American aged 72 and older  3  Osteoporosis Screening: covered every 2 years if you meet one of the following conditions: (1) You're estrogen deficient and at risk for osteoporosis based off medical history and other findings; (2) Have a vertebral abnormality; (3) On glucocorticoid therapy for more than 3 months; (4) Have primary hyperparathyroidism; (5) On osteoporosis medications and need to assess response to drug therapy  · Last bone density test (DXA Scan): 11/23/2015  5  HIV Screening: covered annually if you're between the age of 12-76  Also covered annually if you are younger than 13 and older than 72 with risk factors for HIV infection  For pregnant patients, it is covered up to 3 times per pregnancy      Immunizations:  Immunization Recommendations   Influenza Vaccine Annual influenza vaccination during flu season is recommended for all persons aged >= 6 months who do not have contraindications   Pneumococcal Vaccine (Prevnar and Pneumovax)  * Prevnar = PCV13  * Pneumovax = PPSV23   Adults 25-60 years old: 1-3 doses may be recommended based on certain risk factors  Adults 72 years old: Prevnar (PCV13) vaccine recommended followed by Pneumovax (PPSV23) vaccine  If already received PPSV23 since turning 65, then PCV13 recommended at least one year after PPSV23 dose  Hepatitis B Vaccine 3 dose series if at intermediate or high risk (ex: diabetes, end stage renal disease, liver disease)   Tetanus (Td) Vaccine - COST NOT COVERED BY MEDICARE PART B Following completion of primary series, a booster dose should be given every 10 years to maintain immunity against tetanus  Td may also be given as tetanus wound prophylaxis  Tdap Vaccine - COST NOT COVERED BY MEDICARE PART B Recommended at least once for all adults  For pregnant patients, recommended with each pregnancy  Shingles Vaccine (Shingrix) - COST NOT COVERED BY MEDICARE PART B  2 shot series recommended in those aged 48 and above     Health Maintenance Due:      Topic Date Due    Hepatitis C Screening  Never done    DXA SCAN  11/23/2020     Immunizations Due:      Topic Date Due    COVID-19 Vaccine (1) Never done    Pneumococcal Vaccine: 65+ Years (1 - PCV) Never done    Influenza Vaccine (1) 09/01/2022     Advance Directives   What are advance directives? Advance directives are legal documents that state your wishes and plans for medical care  These plans are made ahead of time in case you lose your ability to make decisions for yourself  Advance directives can apply to any medical decision, such as the treatments you want, and if you want to donate organs  What are the types of advance directives? There are many types of advance directives, and each state has rules about how to use them   You may choose a combination of any of the following:  · Living will: This is a written record of the treatment you want  You can also choose which treatments you do not want, which to limit, and which to stop at a certain time  This includes surgery, medicine, IV fluid, and tube feedings  · Durable power of  for healthcare [facility]): This is a written record that states who you want to make healthcare choices for you when you are unable to make them for yourself  This person, called a proxy, is usually a family member or a friend  You may choose more than 1 proxy  · Do not resuscitate (DNR) order:  A DNR order is used in case your heart stops beating or you stop breathing  It is a request not to have certain forms of treatment, such as CPR  A DNR order may be included in other types of advance directives  · Medical directive: This covers the care that you want if you are in a coma, near death, or unable to make decisions for yourself  You can list the treatments you want for each condition  Treatment may include pain medicine, surgery, blood transfusions, dialysis, IV or tube feedings, and a ventilator (breathing machine)  · Values history: This document has questions about your views, beliefs, and how you feel and think about life  This information can help others choose the care that you would choose  Why are advance directives important? An advance directive helps you control your care  Although spoken wishes may be used, it is better to have your wishes written down  Spoken wishes can be misunderstood, or not followed  Treatments may be given even if you do not want them  An advance directive may make it easier for your family to make difficult choices about your care  Weight Management   Why it is important to manage your weight:  Being overweight increases your risk of health conditions such as heart disease, high blood pressure, type 2 diabetes, and certain types of cancer   It can also increase your risk for osteoarthritis, sleep apnea, and other respiratory problems  Aim for a slow, steady weight loss  Even a small amount of weight loss can lower your risk of health problems  How to lose weight safely:  A safe and healthy way to lose weight is to eat fewer calories and get regular exercise  You can lose up about 1 pound a week by decreasing the number of calories you eat by 500 calories each day  Healthy meal plan for weight management:  A healthy meal plan includes a variety of foods, contains fewer calories, and helps you stay healthy  A healthy meal plan includes the following:  · Eat whole-grain foods more often  A healthy meal plan should contain fiber  Fiber is the part of grains, fruits, and vegetables that is not broken down by your body  Whole-grain foods are healthy and provide extra fiber in your diet  Some examples of whole-grain foods are whole-wheat breads and pastas, oatmeal, brown rice, and bulgur  · Eat a variety of vegetables every day  Include dark, leafy greens such as spinach, kale, shaw greens, and mustard greens  Eat yellow and orange vegetables such as carrots, sweet potatoes, and winter squash  · Eat a variety of fruits every day  Choose fresh or canned fruit (canned in its own juice or light syrup) instead of juice  Fruit juice has very little or no fiber  · Eat low-fat dairy foods  Drink fat-free (skim) milk or 1% milk  Eat fat-free yogurt and low-fat cottage cheese  Try low-fat cheeses such as mozzarella and other reduced-fat cheeses  · Choose meat and other protein foods that are low in fat  Choose beans or other legumes such as split peas or lentils  Choose fish, skinless poultry (chicken or turkey), or lean cuts of red meat (beef or pork)  Before you cook meat or poultry, cut off any visible fat  · Use less fat and oil  Try baking foods instead of frying them  Add less fat, such as margarine, sour cream, regular salad dressing and mayonnaise to foods  Eat fewer high-fat foods   Some examples of high-fat foods include french fries, doughnuts, ice cream, and cakes  · Eat fewer sweets  Limit foods and drinks that are high in sugar  This includes candy, cookies, regular soda, and sweetened drinks  Exercise:  Exercise at least 30 minutes per day on most days of the week  Some examples of exercise include walking, biking, dancing, and swimming  You can also fit in more physical activity by taking the stairs instead of the elevator or parking farther away from stores  Ask your healthcare provider about the best exercise plan for you  © Copyright [employer] 2018 Information is for End User's use only and may not be sold, redistributed or otherwise used for commercial purposes   All illustrations and images included in CareNotes® are the copyrighted property of [employer]  or [address] [place], [place] [address]cheryl be

## 2022-07-07 ENCOUNTER — OFFICE VISIT (OUTPATIENT)
Dept: PHYSICAL THERAPY | Facility: MEDICAL CENTER | Age: 77
End: 2022-07-07
Payer: MEDICARE

## 2022-07-07 DIAGNOSIS — Z96.652 HISTORY OF REVISION OF TOTAL REPLACEMENT OF LEFT KNEE JOINT: Primary | ICD-10-CM

## 2022-07-07 PROCEDURE — 97530 THERAPEUTIC ACTIVITIES: CPT | Performed by: PHYSICAL THERAPIST

## 2022-07-07 PROCEDURE — 97112 NEUROMUSCULAR REEDUCATION: CPT | Performed by: PHYSICAL THERAPIST

## 2022-07-07 PROCEDURE — 97110 THERAPEUTIC EXERCISES: CPT | Performed by: PHYSICAL THERAPIST

## 2022-07-07 NOTE — PROGRESS NOTES
Depression Screening and Follow-up Plan: Patient was screened for depression during today's encounter  They screened negative with a PHQ-2 score of 0  Assessment/Plan:    No problem-specific Assessment & Plan notes found for this encounter  Diagnoses and all orders for this visit:    Medicare annual wellness visit, subsequent    Essential hypertension  -     losartan (COZAAR) 50 mg tablet; Take 1 tablet (50 mg total) by mouth daily    Anemia, unspecified type  -     CBC; Future    Screening cholesterol level  -     Comprehensive metabolic panel; Future  -     Lipid panel; Future    Screening for thyroid disorder  -     TSH, 3rd generation with Free T4 reflex; Future    Fatigue, unspecified type  Comments:  check lab          Subjective:      Patient ID: Janny Cazares is a 67 y o  female  Pt in for medicare well and needs lab  Feeling fatigued  The following portions of the patient's history were reviewed and updated as appropriate: She  has a past medical history of Arthritis, Carpal tunnel syndrome, GERD (gastroesophageal reflux disease), Hiatal hernia, Hypertension, Insomnia, Pneumonia, PONV (postoperative nausea and vomiting), Renal calculi, Sleep deprivation, Vitamin D deficiency, and Vitamin D toxicity (2011)    She   Patient Active Problem List    Diagnosis Date Noted    Continuous opioid dependence ([facility] [address] ) 07/06/2022    Chronic pain syndrome - Right 11/27/2019    Status post hysterectomy 07/03/2019    Breast cancer screening 04/29/2019    Carpal tunnel syndrome of right wrist     Cervical radiculitis 10/24/2018    Left shoulder pain 09/20/2018    Right shoulder pain 09/20/2018    Tendinopathy of left rotator cuff 09/20/2018    Tendinopathy of right rotator cuff 09/20/2018    Globus sensation 09/11/2018    RUQ pain 09/11/2018    Gastroesophageal reflux disease without esophagitis 09/11/2018    Arthritis of knee 01/25/2018    Lower back pain 01/25/2018    Primary osteoarthritis of right knee 11/22/2017    Chest pressure 05/03/2016    GERD (gastroesophageal reflux disease) 05/03/2016    Sleep deprivation 05/03/2016    Essential hypertension 05/03/2016    Left knee pain 12/16/2015    Osteoporosis 05/01/2012     She  has a past surgical history that includes Total knee arthroplasty (Left); Bunionectomy (Bilateral); Toe Surgery (Left); Carpal tunnel release (Right, 2015); pr colonoscopy flx dx w/collj spec when pfrmd (N/A, 6/28/2016); Esophagogastroduodenoscopy (N/A, 5/5/2016); Rotator cuff repair (Bilateral); pr esophagogastroduodenoscopy transoral diagnostic (N/A, 10/19/2018); NERVE BLOCK; Oophorectomy (Left); Colonoscopy; Fracture surgery; pr laparoscopy w tot hysterectuterus <=250 gram  w tube/ovary (N/A, 6/6/2019); and CYSTOSCOPY (N/A, 6/6/2019)  Her family history includes Alcohol abuse in her brother; Breast cancer in her sister; Diabetes in her brother; Heart failure in her father and mother; Hypertension in her daughter; Kidney failure in her brother; No Known Problems in her brother and sister; Other in her mother  She  reports that she has quit smoking  She has never used smokeless tobacco  She reports that she does not drink alcohol and does not use drugs    Current Outpatient Medications   Medication Sig Dispense Refill    albuterol (PROVENTIL HFA,VENTOLIN HFA) 90 mcg/act inhaler Inhale 2 puffs every 6 (six) hours as needed for wheezing or shortness of breath 1 Inhaler 5    losartan (COZAAR) 50 mg tablet Take 1 tablet (50 mg total) by mouth daily 90 tablet 1    Nattokinase 100 MG CAPS Take by mouth As needed      Probiotic Product (MVW COMPLETE PROBIOTIC PO) Take by mouth      saccharomyces boulardii (FLORASTOR) 250 mg capsule Take 250 mg by mouth 2 (two) times a day      saccharomyces boulardii (FLORASTOR) 250 mg capsule Take 250 mg by mouth      zolpidem (AMBIEN) 10 mg tablet Take 1/2 tab at hs prn 30 tablet 0    Acetaminophen 500 MG TAKE 2 CAPSULES TWICE A DAY (Patient not taking: Reported on 7/6/2022) 120 capsule 3    traMADol (ULTRAM) 50 mg tablet Take 50 mg by mouth every 6 (six) hours as needed (Patient not taking: Reported on 7/6/2022)       No current facility-administered medications for this visit  Current Outpatient Medications on File Prior to Visit   Medication Sig    albuterol (PROVENTIL HFA,VENTOLIN HFA) 90 mcg/act inhaler Inhale 2 puffs every 6 (six) hours as needed for wheezing or shortness of breath    Nattokinase 100 MG CAPS Take by mouth As needed    Probiotic Product (MVW COMPLETE PROBIOTIC PO) Take by mouth    saccharomyces boulardii (FLORASTOR) 250 mg capsule Take 250 mg by mouth 2 (two) times a day    saccharomyces boulardii (FLORASTOR) 250 mg capsule Take 250 mg by mouth    zolpidem (AMBIEN) 10 mg tablet Take 1/2 tab at hs prn    Acetaminophen 500 MG TAKE 2 CAPSULES TWICE A DAY (Patient not taking: Reported on 7/6/2022)    traMADol (ULTRAM) 50 mg tablet Take 50 mg by mouth every 6 (six) hours as needed (Patient not taking: Reported on 7/6/2022)     No current facility-administered medications on file prior to visit  She is allergic to ancef [cefazolin], aspirin, penicillins, prednisone, vancomycin, adhesive [medical tape], ciprofloxacin, cortisone, other, and oxycodone       Review of Systems   Constitutional: Positive for fatigue  HENT: Negative  Respiratory: Negative  Cardiovascular: Negative  Objective:      /72 (BP Location: Right arm, Patient Position: Sitting, Cuff Size: Standard)   Pulse 95   Temp 97 9 °F (36 6 °C) (Temporal)   Ht 4' 9 75" (1 467 m)   Wt 67 9 kg (149 lb 9 6 oz)   SpO2 97%   BMI 31 54 kg/m²          Physical Exam  Constitutional:       Appearance: She is obese  HENT:      Head: Normocephalic and atraumatic        Right Ear: Tympanic membrane and ear canal normal       Left Ear: Tympanic membrane and ear canal normal       Mouth/Throat:      Pharynx: No posterior oropharyngeal erythema  Cardiovascular:      Rate and Rhythm: Normal rate and regular rhythm  Pulmonary:      Effort: Pulmonary effort is normal       Breath sounds: Normal breath sounds  Musculoskeletal:      Cervical back: Neck supple  Lymphadenopathy:      Cervical: No cervical adenopathy  Neurological:      Mental Status: She is alert

## 2022-07-07 NOTE — PROGRESS NOTES
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  Z96 652                   Assessment  Assessment details: Patient has made progress towards short and long term goals since beginning physical therapy  Patient has decreased pain, increased range of motion and increased strength  Patient also able to perform functional activities such as ambulation better than initially  Patient will  benefit from continued physical therapy in order to continue to address impairments and to maximize function  Impairments: abnormal or restricted ROM, activity intolerance, impaired balance, impaired physical strength and lacks appropriate home exercise program    Goals  STG  Decrease pain by 50% in 4 weeks  M  Increase range of motion by 10 degrees in 4 weeks  PM  Increase strength by half a grade in 4 weeks  PM  LTG  Patient will be independent in hep in 4 weeks  PM  Patient will be able to perform ambulation at plof by D/C  PM  Patient will be able to perform stair climbing at plof by D/C  PM    Plan  Patient would benefit from: skilled physical therapy  Referral necessary: No  Planned therapy interventions: manual therapy, massage, neuromuscular re-education, patient education, strengthening, stretching, therapeutic activities, therapeutic exercise, flexibility, functional ROM exercises, home exercise program and balance  Frequency: 2x week  Duration in weeks: 6  Plan of Care beginning date: 2022  Plan of Care expiration date: 2022  Treatment plan discussed with: patient        Subjective Evaluation    History of Present Illness  Mechanism of injury: surgery  Mechanism of injury: Patient reports her walking has improved greatly since beginning physical therapy   Notes she still has weakness especially in her left hip flexor  The quad does feel stronger though  Patient wants to continue working on strength and endurance    Pain  No pain reported  Progression: improved    Patient Goals  Patient goals for therapy: increased strength, independence with ADLs/IADLs and decreased pain  Patient goal: walking normally/ stair climbing normally        Objective     Active Range of Motion   Left Knee   Flexion: 95 degrees   Extension: 0 degrees     Right Knee   Flexion: 110 degrees   Extension: 0 degrees     Strength/Myotome Testing     Left Hip   Planes of Motion   Flexion: 4-  Extension: 4  Abduction: 4-  Adduction: 4+    Right Hip   Planes of Motion   Flexion: 4  Extension: 4  Abduction: 4  Adduction: 4+    Left Knee   Flexion: 4-  Extension: 4    Right Knee   Flexion: 4  Extension: 4+    Left Ankle/Foot   Dorsiflexion: 4+  Plantar flexion: 4+  Inversion: 4  Eversion: 4    Right Ankle/Foot   Dorsiflexion: 4+  Plantar flexion: 4+  Inversion: 4  Eversion: 4    Ambulation     Ambulation: Stairs   Pattern: non-reciprocal  Railings: two rails  Pattern: non-reciprocal  Railings: two rails                Precautions L total revision, HTN, osteoporosis       Manuals 7/7/2022         PROM flx AK                               Neuro Re-Ed         SAQ 2 5# 5"20x       LAQ 2 5# 5"20x       bridge 2x10                                       Ther Ex        bike  6 min       Heel slides 10"10x       SL abd        HSS        Mini squat 2x10       Stair KF Stretch 20" 5x                       Ther Activity        Step up 2x10       Lat step up 15x ea       Stair training 1x               Gait Training                        Modalities

## 2022-07-11 ENCOUNTER — OFFICE VISIT (OUTPATIENT)
Dept: PHYSICAL THERAPY | Facility: MEDICAL CENTER | Age: 77
End: 2022-07-11
Payer: MEDICARE

## 2022-07-11 DIAGNOSIS — Z96.652 HISTORY OF REVISION OF TOTAL REPLACEMENT OF LEFT KNEE JOINT: Primary | ICD-10-CM

## 2022-07-11 PROCEDURE — 97140 MANUAL THERAPY 1/> REGIONS: CPT | Performed by: PHYSICAL THERAPIST

## 2022-07-11 PROCEDURE — 97112 NEUROMUSCULAR REEDUCATION: CPT | Performed by: PHYSICAL THERAPIST

## 2022-07-11 NOTE — PROGRESS NOTES
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  Z96 652                   Subjective: Pt reports buckling of LLE a few times yesterday; denies falls  Objective: See treatment diary below    Precautions L total revision, HTN, osteoporosis        Manuals 2022         PROM flx AK  10 min                                                   Neuro Re-Ed              SAQ 2 5# 5"20x  2 5# 5"x20         LAQ 2 5# 5"20x  2 5# 5"x20         bridge 2x10  5"x20                                                                 Ther Ex             bike  6 min  6 min         Heel slides 10"10x  np         SL abd   x20         HSS             Mini squat 2x10  x20         Stair KF Stretch 20" 5x                                       Ther Activity             Step up 2x10  6in x20         Lat step up 15x ea  6in x20         Stair training 1x                         Gait Training                                         Modalities                                                  Assessment: Tolerated treatment well  Patient demonstrated fatigue post treatment, exhibited good technique with therapeutic exercises and would benefit from continued PT   Muscle fatigue w/ SLR abd  Knee flexion wfl  Plan: Continue per plan of care

## 2022-07-14 ENCOUNTER — OFFICE VISIT (OUTPATIENT)
Dept: PHYSICAL THERAPY | Facility: MEDICAL CENTER | Age: 77
End: 2022-07-14
Payer: MEDICARE

## 2022-07-14 DIAGNOSIS — Z96.652 HISTORY OF REVISION OF TOTAL REPLACEMENT OF LEFT KNEE JOINT: Primary | ICD-10-CM

## 2022-07-14 PROCEDURE — 97140 MANUAL THERAPY 1/> REGIONS: CPT | Performed by: PHYSICAL THERAPIST

## 2022-07-14 PROCEDURE — 97110 THERAPEUTIC EXERCISES: CPT | Performed by: PHYSICAL THERAPIST

## 2022-07-14 PROCEDURE — 97112 NEUROMUSCULAR REEDUCATION: CPT | Performed by: PHYSICAL THERAPIST

## 2022-07-14 NOTE — PROGRESS NOTES
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  Z96 652                   Subjective: Pt reports doing okay  Objective: See treatment diary below    Precautions L total revision, HTN, osteoporosis        Manuals 2022       PROM flx AK  10 min  AK                                                 Neuro Re-Ed              SAQ 2 5# 5"20x  2 5# 5"x20  2 5# 5"20x       LAQ 2 5# 5"20x  2 5# 5"x20  2 5# 5"20x       bridge 2x10  5"x20  5" 20x                                                               Ther Ex             bike  6 min  6 min  6 min       Heel slides 10"10x  np         SL abd   x20  20x       HSS             Mini squat 2x10  x20  20x       Stair KF Stretch 20" 5x                                       Ther Activity             Step up 2x10  6in x20  6" 10x       Lat step up 15x ea  6in x20  6" 20x       Stair training 1x    4x                     Gait Training                                         Modalities                                                  Assessment: Tolerated treatment well  Patient demonstrated fatigue post treatment, exhibited good technique with therapeutic exercises and would benefit from continued PT  No complaints during session  Plan: Continue per plan of care

## 2022-07-21 ENCOUNTER — OFFICE VISIT (OUTPATIENT)
Dept: URGENT CARE | Facility: MEDICAL CENTER | Age: 77
End: 2022-07-21
Payer: MEDICARE

## 2022-07-21 ENCOUNTER — OFFICE VISIT (OUTPATIENT)
Dept: PHYSICAL THERAPY | Facility: MEDICAL CENTER | Age: 77
End: 2022-07-21
Payer: MEDICARE

## 2022-07-21 ENCOUNTER — APPOINTMENT (OUTPATIENT)
Dept: RADIOLOGY | Facility: MEDICAL CENTER | Age: 77
End: 2022-07-21
Payer: MEDICARE

## 2022-07-21 VITALS
OXYGEN SATURATION: 97 % | HEART RATE: 85 BPM | BODY MASS INDEX: 31.41 KG/M2 | DIASTOLIC BLOOD PRESSURE: 80 MMHG | TEMPERATURE: 97 F | SYSTOLIC BLOOD PRESSURE: 196 MMHG | WEIGHT: 149 LBS | RESPIRATION RATE: 20 BRPM

## 2022-07-21 DIAGNOSIS — S63.502A LEFT WRIST SPRAIN, INITIAL ENCOUNTER: ICD-10-CM

## 2022-07-21 DIAGNOSIS — S70.02XA CONTUSION OF LEFT HIP, INITIAL ENCOUNTER: ICD-10-CM

## 2022-07-21 DIAGNOSIS — Z96.652 HISTORY OF REVISION OF TOTAL REPLACEMENT OF LEFT KNEE JOINT: Primary | ICD-10-CM

## 2022-07-21 DIAGNOSIS — S63.92XA HAND SPRAIN, LEFT, INITIAL ENCOUNTER: Primary | ICD-10-CM

## 2022-07-21 DIAGNOSIS — S63.92XA HAND SPRAIN, LEFT, INITIAL ENCOUNTER: ICD-10-CM

## 2022-07-21 PROCEDURE — G0463 HOSPITAL OUTPT CLINIC VISIT: HCPCS | Performed by: PHYSICIAN ASSISTANT

## 2022-07-21 PROCEDURE — 73130 X-RAY EXAM OF HAND: CPT

## 2022-07-21 PROCEDURE — 97110 THERAPEUTIC EXERCISES: CPT | Performed by: PHYSICAL THERAPIST

## 2022-07-21 PROCEDURE — 73110 X-RAY EXAM OF WRIST: CPT

## 2022-07-21 PROCEDURE — 99213 OFFICE O/P EST LOW 20 MIN: CPT | Performed by: PHYSICIAN ASSISTANT

## 2022-07-21 PROCEDURE — 97112 NEUROMUSCULAR REEDUCATION: CPT | Performed by: PHYSICAL THERAPIST

## 2022-07-21 PROCEDURE — 73502 X-RAY EXAM HIP UNI 2-3 VIEWS: CPT

## 2022-07-21 NOTE — PROGRESS NOTES
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  Z96 652                   Subjective: Pt reports falling over the weekend  Overall hand/wrist is bothering her the most  Planned on going to urgent care today  Objective: See treatment diary below    Precautions L total revision, HTN, osteoporosis        Manuals 2022     PROM flx AK  10 min  AK                                                 Neuro Re-Ed              SAQ 2 5# 5"20x  2 5# 5"x20  2 5# 5"20x  2 5# 5"20x     LAQ 2 5# 5"20x  2 5# 5"x20  2 5# 5"20x  2 5# 5"20x     bridge 2x10  5"x20  5" 20x  5" 20x                                                             Ther Ex             bike  6 min  6 min  6 min  6 min     Heel slides 10"10x  np         SL abd   x20  20x  20x     HSS             Mini squat 2x10  x20  20x  20x     Stair KF Stretch 20" 5x                                       Ther Activity             Step up 2x10  6in x20  6" 10x  6" 20x     Lat step up 15x ea  6in x20  6" 20x  6" 20x     Stair training 1x    4x  4x                   Gait Training                                         Modalities                                                  Assessment: Tolerated treatment well  Patient demonstrated fatigue post treatment, exhibited good technique with therapeutic exercises and would benefit from continued PT  No complaints throughout session  Plan: Continue per plan of care

## 2022-07-21 NOTE — PROGRESS NOTES
[address]        NAME: Griffin Alexis is a 67 y o  female  : 1945    MRN: [id number]  DATE: 2022  TIME: 2:10 PM    Assessment and Plan   Hand sprain, left, initial encounter [S63 [id number]]  1  Hand sprain, left, initial encounter  XR hand 3+ vw left   2  Left wrist sprain, initial encounter  XR wrist 3+ vw left   3  Contusion of left hip, initial encounter  XR hip/pelv 2-3 vws left if performed         Patient Instructions     1  Apply ice compresses to the injured area 3-4 times daily for 20-30 minutes for the next 3-4 days then convert to heat in the same regimen until symptoms resolve  2  Perform range-of-motion/stretches as demonstrated 4 times daily, 5-6 reps  3  Go to the ER if your symptoms significantly worsen  4  Follow-up with orthopedics in 7-10 days for any persistent symptoms  Chief Complaint     Chief Complaint   Patient presents with    Fall     Pt fell on Tuesday on left side  Wrist and Hip causing pain         History of Present Illness       65-year-old female patient with a 2 day history of left hand, wrist, hip pain after tripping and falling outside while taking her garbage out 2 evenings ago  Patient indicates the hand pain in the 3rd and 4th finger, the wrist pain in the ulnar aspect, and the left hip pain in the lateral and posterior aspect  Patient notes that due to past surgery she is unable to move her left wrist much at all normally  She has been able to weightbear on the left leg normally with a slight minor pain with ambulation  She denies any head, neck, back injury  Review of Systems   Review of Systems   Constitutional: Negative for chills and fever  HENT: Negative for ear pain and sore throat  Eyes: Negative for pain and visual disturbance  Respiratory: Negative for cough and shortness of breath  Cardiovascular: Negative for chest pain and palpitations  Gastrointestinal: Negative for abdominal pain and vomiting  Genitourinary: Negative for dysuria and hematuria  Musculoskeletal: Positive for arthralgias  Negative for back pain  Skin: Negative for color change and rash  Neurological: Negative for seizures and syncope  All other systems reviewed and are negative          Current Medications       Current Outpatient Medications:     albuterol (PROVENTIL HFA,VENTOLIN HFA) 90 mcg/act inhaler, Inhale 2 puffs every 6 (six) hours as needed for wheezing or shortness of breath, Disp: 1 Inhaler, Rfl: 5    losartan (COZAAR) 50 mg tablet, Take 1 tablet (50 mg total) by mouth daily, Disp: 90 tablet, Rfl: 1    Nattokinase 100 MG CAPS, Take by mouth As needed, Disp: , Rfl:     Probiotic Product (MVW COMPLETE PROBIOTIC PO), Take by mouth, Disp: , Rfl:     saccharomyces boulardii (FLORASTOR) 250 mg capsule, Take 250 mg by mouth 2 (two) times a day, Disp: , Rfl:     saccharomyces boulardii (FLORASTOR) 250 mg capsule, Take 250 mg by mouth, Disp: , Rfl:     zolpidem (AMBIEN) 10 mg tablet, Take 1/2 tab at hs prn, Disp: 30 tablet, Rfl: 0    Acetaminophen 500 MG, TAKE 2 CAPSULES TWICE A DAY (Patient not taking: No sig reported), Disp: 120 capsule, Rfl: 3    traMADol (ULTRAM) 50 mg tablet, Take 50 mg by mouth every 6 (six) hours as needed (Patient not taking: No sig reported), Disp: , Rfl:     Current Allergies     Allergies as of 07/21/2022 - Reviewed 07/21/2022   Allergen Reaction Noted    Ancef [cefazolin] Hives and GI Intolerance 05/03/2016    Aspirin GI Intolerance 05/03/2016    Penicillins Hives 05/03/2016    Prednisone Hives and GI Intolerance 05/03/2016    Vancomycin Hives and GI Intolerance 05/03/2016    Adhesive [medical tape]  08/07/2019    Ciprofloxacin Hives 05/03/2016    Cortisone Syncope 03/25/2019    Other  07/02/2017    Oxycodone Rash and Vomiting 03/25/2019            The following portions of the patient's history were reviewed and updated as appropriate: allergies, current medications, past family history, past medical history, past social history, past surgical history and problem list      Past Medical History:   Diagnosis Date    Arthritis     Carpal tunnel syndrome     GERD (gastroesophageal reflux disease)     Hiatal hernia     Hypertension     Insomnia     Pneumonia     PONV (postoperative nausea and vomiting)     Renal calculi     Sleep deprivation     chronically    Vitamin D deficiency     Vitamin D toxicity 2011    with leaky gut syndrome       Past Surgical History:   Procedure Laterality Date    BUNIONECTOMY Bilateral     CARPAL TUNNEL RELEASE Right 2015    COLONOSCOPY      CYSTOSCOPY N/A 6/6/2019    Procedure: CYSTOSCOPY;  Surgeon: Crista Evans MD;  [facility];  Service: Gynecology Oncology    ESOPHAGOGASTRODUODENOSCOPY N/A 5/5/2016    Procedure: ESOPHAGOGASTRODUODENOSCOPY (EGD); Surgeon: Anisa Adams MD;  [facility]: [facility]; Service:     FRACTURE SURGERY      L arm     NERVE BLOCK       R Knee    OOPHORECTOMY Left     IA COLONOSCOPY FLX DX W/COLLJ SPEC WHEN PFRMD N/A 6/28/2016    Procedure: COLONOSCOPY;  Surgeon: Anisa Adams MD;  [facility]; Service: Gastroenterology    IA ESOPHAGOGASTRODUODENOSCOPY TRANSORAL DIAGNOSTIC N/A 10/19/2018    Procedure: ESOPHAGOGASTRODUODENOSCOPY (EGD); Surgeon: Anisa Adams MD;  [facility];   Service: Gastroenterology    IA LAPAROSCOPY W TOT HYSTERECTUTERUS <=250 GRAM  W TUBE/OVARY N/A 6/6/2019    Procedure: ROBOTIC TOTAL LAPAROSCOPIC HYSTERECTOMY, RIGHT SALPINGO-OOPHORECTOMY, EXTENSIVE ADHESIOLYSIS, APPROXIMATELY 39 MIN;  Surgeon: Crista Evans MD;  [facility];  Service: Gynecology Oncology    ROTATOR CUFF REPAIR Bilateral     TOE SURGERY Left     left 5th toe shaved down due to repeated fx    TOTAL KNEE ARTHROPLASTY Left        Family History   Problem Relation Age of Onset    Heart failure Mother     Other Mother         respiratory failure    Heart failure Father respiratory failure    Hypertension Daughter     Breast cancer Sister     Kidney failure Brother     Alcohol abuse Brother     No Known Problems Sister     No Known Problems Brother     Diabetes Brother     Arrhythmia Neg Hx     Clotting disorder Neg Hx     Fainting Neg Hx     Anuerysm Neg Hx     Stroke Neg Hx     Hyperlipidemia Neg Hx     Asthma Neg Hx          Medications have been verified  Objective   BP (!) 196/80   Pulse 85   Temp (!) 97 °F (36 1 °C)   Resp 20   Wt 67 6 kg (149 lb)   SpO2 97%   BMI 31 41 kg/m²        Physical Exam     Physical Exam  Vitals and nursing note reviewed  Constitutional:       Appearance: Normal appearance  HENT:      Head: Normocephalic  Nose: Nose normal    Eyes:      Conjunctiva/sclera: Conjunctivae normal       Pupils: Pupils are equal, round, and reactive to light  Cardiovascular:      Rate and Rhythm: Normal rate  Pulmonary:      Effort: Pulmonary effort is normal    Musculoskeletal:      Cervical back: Normal range of motion  Comments: Left 3rd and 4th fingers generally tender  Slight swelling noted to the proximal 4th finger with decreased range of motion of the PIP joint  No gross deformities in either  Distal finger neurovascular status normal intact on exam   Left wrist is tender over the distal ulna with baseline (per the patient) range-of-motion albeit with mild pain  No swelling or skin changes noted  Left hip tender minimally posteriorly  Full range of motion of the hip is noted  Patient is able to weightbear and put her full weight on the left leg without difficulty  Skin:     General: Skin is warm and dry  Capillary Refill: Capillary refill takes less than 2 seconds  Neurological:      General: No focal deficit present  Mental Status: She is alert and oriented to person, place, and time     Psychiatric:         Mood and Affect: Mood normal          Behavior: Behavior normal            Preliminary reading of left hand, wrist, hip x-rays:   Postsurgical changes of the left wrist and hand, no acute abnormality seen  No acute abnormality seen of the left hip x-ray

## 2022-07-25 ENCOUNTER — OFFICE VISIT (OUTPATIENT)
Dept: PHYSICAL THERAPY | Facility: MEDICAL CENTER | Age: 77
End: 2022-07-25
Payer: MEDICARE

## 2022-07-25 DIAGNOSIS — Z96.652 HISTORY OF REVISION OF TOTAL REPLACEMENT OF LEFT KNEE JOINT: Primary | ICD-10-CM

## 2022-07-25 PROCEDURE — 97112 NEUROMUSCULAR REEDUCATION: CPT | Performed by: PHYSICAL THERAPIST

## 2022-07-25 PROCEDURE — 97110 THERAPEUTIC EXERCISES: CPT | Performed by: PHYSICAL THERAPIST

## 2022-07-25 NOTE — PROGRESS NOTES
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  Z96 652                   Subjective: Pt reports no findings with x-rays  Notes she is still very sore today  Objective: See treatment diary below    Precautions L total revision, HTN, osteoporosis        Manuals 2022   PROM flx AK  10 min  AK                                                 Neuro Re-Ed              SAQ 2 5# 5"20x  2 5# 5"x20  2 5# 5"20x  2 5# 5"20x  2 5# 5"20x   LAQ 2 5# 5"20x  2 5# 5"x20  2 5# 5"20x  2 5# 5"20x  2 5# 5"20x   bridge 2x10  5"x20  5" 20x  5" 20x  5" 20x                                                           Ther Ex             bike  6 min  6 min  6 min  6 min  6 min   Heel slides 10"10x  np         SL abd   x20  20x  20x  20x   HSS             Mini squat 2x10  x20  20x  20x  20x   Stair KF Stretch 20" 5x            standing hip flx          2 5# 2x10 ea                 Ther Activity             Step up 2x10  6in x20  6" 10x  6" 20x  6" 20x   Lat step up 15x ea  6in x20  6" 20x  6" 20x  6" 20x   Stair training 1x    4x  4x  5x                 Gait Training                                         Modalities                                                  Assessment: Tolerated treatment well  Patient demonstrated fatigue post treatment, exhibited good technique with therapeutic exercises and would benefit from continued PT  Some discomfort with new SLR exercise  Plan: Continue per plan of care

## 2022-07-26 ENCOUNTER — APPOINTMENT (OUTPATIENT)
Dept: LAB | Facility: MEDICAL CENTER | Age: 77
End: 2022-07-26
Payer: MEDICARE

## 2022-07-26 DIAGNOSIS — D64.9 ANEMIA, UNSPECIFIED TYPE: ICD-10-CM

## 2022-07-26 DIAGNOSIS — Z13.220 SCREENING CHOLESTEROL LEVEL: ICD-10-CM

## 2022-07-26 DIAGNOSIS — Z13.29 SCREENING FOR THYROID DISORDER: ICD-10-CM

## 2022-07-26 LAB
ALBUMIN SERPL BCP-MCNC: 4.2 G/DL (ref 3.5–5)
ALP SERPL-CCNC: 91 U/L (ref 46–116)
ALT SERPL W P-5'-P-CCNC: 27 U/L (ref 12–78)
ANION GAP SERPL CALCULATED.3IONS-SCNC: 7 MMOL/L (ref 4–13)
AST SERPL W P-5'-P-CCNC: 17 U/L (ref 5–45)
BILIRUB SERPL-MCNC: 0.69 MG/DL (ref 0.2–1)
BUN SERPL-MCNC: 20 MG/DL (ref 5–25)
CALCIUM SERPL-MCNC: 9.2 MG/DL (ref 8.3–10.1)
CHLORIDE SERPL-SCNC: 106 MMOL/L (ref 96–108)
CHOLEST SERPL-MCNC: 191 MG/DL
CO2 SERPL-SCNC: 26 MMOL/L (ref 21–32)
CREAT SERPL-MCNC: 0.75 MG/DL (ref 0.6–1.3)
ERYTHROCYTE [DISTWIDTH] IN BLOOD BY AUTOMATED COUNT: 13.1 % (ref 11.6–15.1)
GFR SERPL CREATININE-BSD FRML MDRD: 77 ML/MIN/1.73SQ M
GLUCOSE P FAST SERPL-MCNC: 89 MG/DL (ref 65–99)
HCT VFR BLD AUTO: 40 % (ref 34.8–46.1)
HDLC SERPL-MCNC: 52 MG/DL
HGB BLD-MCNC: 12.7 G/DL (ref 11.5–15.4)
LDLC SERPL CALC-MCNC: 105 MG/DL (ref 0–100)
MCH RBC QN AUTO: 30 PG (ref 26.8–34.3)
MCHC RBC AUTO-ENTMCNC: 31.8 G/DL (ref 31.4–37.4)
MCV RBC AUTO: 94 FL (ref 82–98)
NONHDLC SERPL-MCNC: 139 MG/DL
PLATELET # BLD AUTO: 248 THOUSANDS/UL (ref 149–390)
PMV BLD AUTO: 12.4 FL (ref 8.9–12.7)
POTASSIUM SERPL-SCNC: 3.9 MMOL/L (ref 3.5–5.3)
PROT SERPL-MCNC: 8.3 G/DL (ref 6.4–8.4)
RBC # BLD AUTO: 4.24 MILLION/UL (ref 3.81–5.12)
SODIUM SERPL-SCNC: 139 MMOL/L (ref 135–147)
TRIGL SERPL-MCNC: 171 MG/DL
TSH SERPL DL<=0.05 MIU/L-ACNC: 1.36 UIU/ML (ref 0.45–4.5)
WBC # BLD AUTO: 6.4 THOUSAND/UL (ref 4.31–10.16)

## 2022-07-26 PROCEDURE — 85027 COMPLETE CBC AUTOMATED: CPT

## 2022-07-26 PROCEDURE — 36415 COLL VENOUS BLD VENIPUNCTURE: CPT

## 2022-07-26 PROCEDURE — 80061 LIPID PANEL: CPT

## 2022-07-26 PROCEDURE — 84443 ASSAY THYROID STIM HORMONE: CPT

## 2022-07-26 PROCEDURE — 80053 COMPREHEN METABOLIC PANEL: CPT

## 2022-07-28 ENCOUNTER — OFFICE VISIT (OUTPATIENT)
Dept: PHYSICAL THERAPY | Facility: MEDICAL CENTER | Age: 77
End: 2022-07-28
Payer: MEDICARE

## 2022-07-28 DIAGNOSIS — Z96.652 HISTORY OF REVISION OF TOTAL REPLACEMENT OF LEFT KNEE JOINT: Primary | ICD-10-CM

## 2022-07-28 PROCEDURE — 97140 MANUAL THERAPY 1/> REGIONS: CPT | Performed by: PHYSICAL THERAPIST

## 2022-07-28 PROCEDURE — 97112 NEUROMUSCULAR REEDUCATION: CPT | Performed by: PHYSICAL THERAPIST

## 2022-07-28 NOTE — PROGRESS NOTES
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  Z96 652                   Subjective: Pt reports numbness and pain in lateral lower leg last night resulting in sleep disturbance  Objective: See treatment diary below    Precautions L total revision, HTN, osteoporosis        Manuals    Graston L peroneals 10 min                                                  Neuro Re-Ed             SAQ 2 5# 5"20x     2 5# 5"20x   LAQ 2 5# 5"20x     2 5# 5"20x   bridge 2x10     5" 20x                                                           Ther Ex            bike  6 min     6 min             SL abd  x20     20x                Mini squat NP     20x   Stair KF Stretch NP            standing hip flx  2 5# 2x10 ea        2 5# 2x10 ea                 Ther Activity           Step up NP     6" 20x   Lat step up NP     6" 20x   Stair training NP     5x                 Gait Training                                         Modalities                                                  Assessment: Tolerated treatment well  Patient demonstrated fatigue post treatment, exhibited good technique with therapeutic exercises and would benefit from continued PT    C/o intermittent lower leg pain w/ exercise  Initiated aric today over peroneals w/ sig restriction noted distally  Assess response next visit  Plan: Continue per plan of care

## 2022-08-01 ENCOUNTER — OFFICE VISIT (OUTPATIENT)
Dept: PHYSICAL THERAPY | Facility: MEDICAL CENTER | Age: 77
End: 2022-08-01
Payer: MEDICARE

## 2022-08-01 DIAGNOSIS — Z96.652 HISTORY OF REVISION OF TOTAL REPLACEMENT OF LEFT KNEE JOINT: Primary | ICD-10-CM

## 2022-08-01 PROCEDURE — 97110 THERAPEUTIC EXERCISES: CPT | Performed by: PHYSICAL THERAPIST

## 2022-08-01 PROCEDURE — 97112 NEUROMUSCULAR REEDUCATION: CPT | Performed by: PHYSICAL THERAPIST

## 2022-08-01 PROCEDURE — 97140 MANUAL THERAPY 1/> REGIONS: CPT | Performed by: PHYSICAL THERAPIST

## 2022-08-01 NOTE — PROGRESS NOTES
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  Z96 652                   Subjective: Pt reports increased pain in left knee and leg since fall  Objective: See treatment diary below    Precautions L total revision, HTN, osteoporosis        Manuals    Graston L peroneals 10 min  AK        ITB stretch    AK          ITB roller    AK                       Neuro Re-Ed             SAQ 2 5# 5"20x 2 5# 5"20x    2 5# 5"20x   LAQ 2 5# 5"20x 2 5# 5"20x    2 5# 5"20x   bridge 2x10  2x10    5" 20x                                                           Ther Ex            bike  6 min 7 min    6 min             SL abd  x20 Stand 2 5# 15x ea    20x                Mini squat NP     20x   Stair KF Stretch NP            standing hip flx  2 5# 2x10 ea  2 5# 15x ea      2 5# 2x10 ea                 Ther Activity           Step up NP     6" 20x   Lat step up NP     6" 20x   Stair training NP     5x                 Gait Training                                         Modalities                                                  Assessment: Tolerated treatment well  Patient demonstrated fatigue post treatment, exhibited good technique with therapeutic exercises and would benefit from continued PT  Able to decrease pain almost completely post manuals today  Continue to assess response  Plan: Continue per plan of care

## 2022-08-04 ENCOUNTER — EVALUATION (OUTPATIENT)
Dept: PHYSICAL THERAPY | Facility: MEDICAL CENTER | Age: 77
End: 2022-08-04
Payer: MEDICARE

## 2022-08-04 DIAGNOSIS — Z96.652 HISTORY OF REVISION OF TOTAL REPLACEMENT OF LEFT KNEE JOINT: Primary | ICD-10-CM

## 2022-08-04 PROCEDURE — 97112 NEUROMUSCULAR REEDUCATION: CPT | Performed by: PHYSICAL THERAPIST

## 2022-08-04 PROCEDURE — 97110 THERAPEUTIC EXERCISES: CPT | Performed by: PHYSICAL THERAPIST

## 2022-08-04 PROCEDURE — 97140 MANUAL THERAPY 1/> REGIONS: CPT | Performed by: PHYSICAL THERAPIST

## 2022-08-04 NOTE — PROGRESS NOTES
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  Z96 652                   Assessment  Assessment details: Patient has regressed some since last assessment due to having a fall 2 weeks ago  Patient has increased pain, similar strength and an increase in range of motion  Patient will  benefit from continued physical therapy in order to continue to address impairments and to maximize function  Impairments: abnormal or restricted ROM, activity intolerance, impaired balance, impaired physical strength and lacks appropriate home exercise program    Goals  STG  Decrease pain by 50% in 4 weeks  NM  Increase range of motion by 10 degrees in 4 weeks  PM  Increase strength by half a grade in 4 weeks  PM  LTG  Patient will be independent in hep in 4 weeks  PM  Patient will be able to perform ambulation at plof by D/C  PM  Patient will be able to perform stair climbing at plof by D/C  PM    Plan  Patient would benefit from: skilled physical therapy  Referral necessary: No  Planned therapy interventions: manual therapy, massage, neuromuscular re-education, patient education, strengthening, stretching, therapeutic activities, therapeutic exercise, flexibility, functional ROM exercises, home exercise program and balance  Frequency: 2x week  Duration in weeks: 6  Plan of Care beginning date: 2022  Plan of Care expiration date: 9/15/2022  Treatment plan discussed with: patient        Subjective Evaluation    History of Present Illness  Mechanism of injury: surgery  Mechanism of injury: Patient had been improving since beginning physical therapy but she fell about 2 weeks ago on her left side  Since then she has had increased knee pain, left hip and lateral thigh pain and numbness/tingling in her foot       Walking and standing cause the most pain for patient  Patient is trying to get in to see orthopedic to check everything out     Pain  Current pain ratin  At best pain ratin  At worst pain ratin  Relieving factors: rest  Aggravating factors: walking and standing  Progression: worsening    Patient Goals  Patient goals for therapy: increased strength, independence with ADLs/IADLs and decreased pain  Patient goal: walking normally/ stair climbing normally        Objective     Active Range of Motion   Left Knee   Flexion: 100 degrees   Extension: 0 degrees     Right Knee   Flexion: 110 degrees   Extension: 0 degrees     Strength/Myotome Testing     Left Hip   Planes of Motion   Flexion: 4  Extension: 4+  Abduction: 4  Adduction: 4+    Right Hip   Planes of Motion   Flexion: 4+  Extension: 4+  Abduction: 4+  Adduction: 4+    Left Knee   Flexion: 4-  Extension: 4+    Right Knee   Flexion: 4+  Extension: 5    Left Ankle/Foot   Dorsiflexion: 4+  Plantar flexion: 4+  Inversion: 4  Eversion: 4    Right Ankle/Foot   Dorsiflexion: 4+  Plantar flexion: 4+  Inversion: 4  Eversion: 4    Ambulation     Ambulation: Stairs   Pattern: reciprocal  Railings: two rails  Pattern: reciprocal  Railings: two rails                Precautions L total revision, HTN, osteoporosis        Manuals    Graston L peroneals 10 min  AK        ITB stretch    AK          ITB roller    AK                       Neuro Re-Ed             SAQ 2 5# 5"20x 2 5# 5"20x    2 5# 5"20x   LAQ 2 5# 5"20x 2 5# 5"20x    2 5# 5"20x   bridge 2x10  2x10    5" 20x                                                           Ther Ex            bike  6 min 7 min    6 min             SL abd  x20 Stand 2 5# 15x ea    20x                Mini squat NP     20x   Stair KF Stretch NP            standing hip flx  2 5# 2x10 ea  2 5# 15x ea      2 5# 2x10 ea                 Ther Activity           Step up NP     6" 20x   Lat step up NP     6" 20x   Stair training NP     5x                 Gait Training                                         Modalities

## 2022-08-08 ENCOUNTER — OFFICE VISIT (OUTPATIENT)
Dept: PHYSICAL THERAPY | Facility: MEDICAL CENTER | Age: 77
End: 2022-08-08
Payer: MEDICARE

## 2022-08-08 DIAGNOSIS — Z96.652 HISTORY OF REVISION OF TOTAL REPLACEMENT OF LEFT KNEE JOINT: Primary | ICD-10-CM

## 2022-08-08 PROCEDURE — 97112 NEUROMUSCULAR REEDUCATION: CPT | Performed by: PHYSICAL THERAPIST

## 2022-08-08 PROCEDURE — 97110 THERAPEUTIC EXERCISES: CPT | Performed by: PHYSICAL THERAPIST

## 2022-08-08 PROCEDURE — 97140 MANUAL THERAPY 1/> REGIONS: CPT | Performed by: PHYSICAL THERAPIST

## 2022-08-08 NOTE — PROGRESS NOTES
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  Z96 652                   Subjective: Patient reports increased soreness in anterior thigh/ medial thigh  Objective: See treatment diary below      Assessment: Tolerated treatment fair  Patient demonstrated fatigue post treatment, exhibited good technique with therapeutic exercises and would benefit from continued PT  Less pain at end of session  Plan: Continue per plan of care  Progress treatment as tolerated         Precautions L total revision, HTN, osteoporosis        Manuals      Graston L peroneals 10 min  AK AK      ITB stretch    AK  AK       ITB roller    AK                     Neuro Re-Ed            SAQ 2 5# 5"20x 2 5# 5"20x  2 5# 5"20x     LAQ 2 5# 5"20x 2 5# 5"20x 2 5# 5"20x     bridge 2x10  2x10 2x10                   hip add      5" 20x                                Ther Ex           bike  6 min 7 min  5 min              SL abd  x20 Stand 2 5# 15x ea Stand 2 5# 15x ea                 Mini squat NP   2x10     Stair KF Stretch NP           standing hip flx  2 5# 2x10 ea  2 5# 15x ea  2 5# 15x ea                   Ther Activity          Step up NP       Lat step up NP       Stair training NP                     Gait Training                                         Modalities

## 2022-08-11 ENCOUNTER — APPOINTMENT (OUTPATIENT)
Dept: PHYSICAL THERAPY | Facility: MEDICAL CENTER | Age: 77
End: 2022-08-11
Payer: MEDICARE

## 2022-08-15 ENCOUNTER — APPOINTMENT (OUTPATIENT)
Dept: PHYSICAL THERAPY | Facility: MEDICAL CENTER | Age: 77
End: 2022-08-15
Payer: MEDICARE

## 2022-08-16 NOTE — PATIENT INSTRUCTIONS
1  Apply ice compresses to the injured area 3-4 times daily for 20-30 minutes for the next 3-4 days then convert to heat in the same regimen until symptoms resolve  2  Perform range-of-motion/stretches as demonstrated 4 times daily, 5-6 reps  3  Go to the ER if your symptoms significantly worsen  4  Follow-up with orthopedics in 7-10 days for any persistent symptoms  Patient

## 2022-08-18 ENCOUNTER — APPOINTMENT (OUTPATIENT)
Dept: PHYSICAL THERAPY | Facility: MEDICAL CENTER | Age: 77
End: 2022-08-18
Payer: MEDICARE

## 2022-08-22 ENCOUNTER — APPOINTMENT (OUTPATIENT)
Dept: PHYSICAL THERAPY | Facility: MEDICAL CENTER | Age: 77
End: 2022-08-22
Payer: MEDICARE

## 2022-08-25 ENCOUNTER — APPOINTMENT (OUTPATIENT)
Dept: PHYSICAL THERAPY | Facility: MEDICAL CENTER | Age: 77
End: 2022-08-25
Payer: MEDICARE

## 2022-08-26 ENCOUNTER — APPOINTMENT (OUTPATIENT)
Dept: RADIOLOGY | Facility: AMBULARY SURGERY CENTER | Age: 77
End: 2022-08-26
Attending: ORTHOPAEDIC SURGERY
Payer: MEDICARE

## 2022-08-26 ENCOUNTER — OFFICE VISIT (OUTPATIENT)
Dept: OBGYN CLINIC | Facility: CLINIC | Age: 77
End: 2022-08-26
Payer: MEDICARE

## 2022-08-26 VITALS
SYSTOLIC BLOOD PRESSURE: 133 MMHG | BODY MASS INDEX: 31.61 KG/M2 | HEART RATE: 78 BPM | WEIGHT: 150.6 LBS | DIASTOLIC BLOOD PRESSURE: 73 MMHG | HEIGHT: 58 IN

## 2022-08-26 DIAGNOSIS — M16.12 PRIMARY OSTEOARTHRITIS OF ONE HIP, LEFT: Primary | ICD-10-CM

## 2022-08-26 DIAGNOSIS — I10 ESSENTIAL HYPERTENSION: ICD-10-CM

## 2022-08-26 DIAGNOSIS — M70.62 GREATER TROCHANTERIC BURSITIS OF LEFT HIP: ICD-10-CM

## 2022-08-26 DIAGNOSIS — Z96.652 HISTORY OF REVISION OF TOTAL REPLACEMENT OF LEFT KNEE JOINT: ICD-10-CM

## 2022-08-26 PROCEDURE — 73562 X-RAY EXAM OF KNEE 3: CPT

## 2022-08-26 PROCEDURE — 99213 OFFICE O/P EST LOW 20 MIN: CPT | Performed by: ORTHOPAEDIC SURGERY

## 2022-08-26 RX ORDER — LOSARTAN POTASSIUM 100 MG/1
100 TABLET ORAL DAILY
Qty: 90 TABLET | Refills: 1 | Status: SHIPPED | OUTPATIENT
Start: 2022-08-26 | End: 2022-08-30

## 2022-08-26 RX ORDER — TRAMADOL HYDROCHLORIDE 50 MG/1
50 TABLET ORAL EVERY 8 HOURS PRN
Qty: 20 TABLET | Refills: 0 | Status: ON HOLD | OUTPATIENT
Start: 2022-08-26 | End: 2022-09-16

## 2022-08-26 NOTE — TELEPHONE ENCOUNTER
Patient stated that during her July appointment it was made known that she was taking 100mg of Losartan prescribed by her previous doctor in [place]  Her script is still currently written for 50mg but she is taking the 100mg  Please review and send script for the 100mg to [facility] in [place]    Patient has about a week of pills left

## 2022-08-26 NOTE — PROGRESS NOTES
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  XR knee 3 vw left non injury   3  Greater trochanteric bursitis of left hip         Plan:    · Patient has revision left  total knee arthroplasty and March 2021 performed with the outside provider [place], severe left hip osteoarthritis greater trochanteric bursitis  · Weight-bearing as tolerated left lower extremity   · Will be ordering a left hip steroid injection with Dr Ludwin Mccloud  · Provided patient with greater troch stretching exercises  · Prescribe patient tramadol for pain relief  · Follow-up in three months        The above stated was discussed in layman's terms and the patient expressed understanding  All questions were answered to the patient's satisfaction  Subjective:   Santo Gutiérrez is a 67 y o  female who presents  follow-up for left knee due to revision total knee arthroplasty and March 2021 performed with the outside provider [id number] [facility]  She states she had a fall on 07/20/2022  She states she fell on her left side  Went to urgent care the next day had x-rays taken left which showed severe osteoarthritis in no acute fractures or dislocations  She states her left leg feels unstable  She did go to physical therapy for quadriceps strengthening but did help she states  She has pain in the posterior aspect of the left knee  She states she is also having groin and lateral left hip  She has been taking Tylenol as needed for pain relief        Review of systems negative unless otherwise specified in HPI    Past Medical History:   Diagnosis Date    Arthritis     Carpal tunnel syndrome     GERD (gastroesophageal reflux disease)     Hiatal hernia     Hypertension     Insomnia     Pneumonia     PONV (postoperative nausea and vomiting)     Renal calculi     Sleep deprivation     chronically    Vitamin D deficiency     Vitamin D toxicity 2011    with leaky gut syndrome       Past Surgical History:   Procedure Laterality Date    BUNIONECTOMY Bilateral     CARPAL TUNNEL RELEASE Right 2015    COLONOSCOPY      CYSTOSCOPY N/A 6/6/2019    Procedure: CYSTOSCOPY;  Surgeon: Laisha Collins MD;  [facility];  Service: Gynecology Oncology    ESOPHAGOGASTRODUODENOSCOPY N/A 5/5/2016    Procedure: ESOPHAGOGASTRODUODENOSCOPY (EGD); Surgeon: Ashlie Jones MD;  [facility]; Service:     FRACTURE SURGERY      L arm     NERVE BLOCK       R Knee    OOPHORECTOMY Left     TX COLONOSCOPY FLX DX W/COLLJ SPEC WHEN PFRMD N/A 6/28/2016    Procedure: COLONOSCOPY;  Surgeon: Ashlie Jones MD;  [facility]; Service: Gastroenterology    TX ESOPHAGOGASTRODUODENOSCOPY TRANSORAL DIAGNOSTIC N/A 10/19/2018    Procedure: ESOPHAGOGASTRODUODENOSCOPY (EGD); Surgeon: Ashlie Jones MD;  [facility];   Service: Gastroenterology    TX LAPAROSCOPY W TOT HYSTERECTUTERUS <=250 GRAM  W TUBE/OVARY N/A 6/6/2019    Procedure: ROBOTIC TOTAL LAPAROSCOPIC HYSTERECTOMY, RIGHT SALPINGO-OOPHORECTOMY, EXTENSIVE ADHESIOLYSIS, APPROXIMATELY 39 MIN;  Surgeon: Laisha Collins MD;  [facility];  Service: Gynecology Oncology    ROTATOR CUFF REPAIR Bilateral     TOE SURGERY Left     left 5th toe shaved down due to repeated fx    TOTAL KNEE ARTHROPLASTY Left        Family History   Problem Relation Age of Onset    Heart failure Mother     Other Mother         respiratory failure    Heart failure Father         respiratory failure    Hypertension Daughter     Breast cancer Sister     Kidney failure Brother     Alcohol abuse Brother     No Known Problems Sister     No Known Problems Brother     Diabetes Brother     Arrhythmia Neg Hx     Clotting disorder Neg Hx     Fainting Neg Hx     Anuerysm Neg Hx     Stroke Neg Hx     Hyperlipidemia Neg Hx     Asthma Neg Hx        Social History     Occupational History    Not on file   Tobacco Use    Smoking status: Former Smoker    Smokeless tobacco: Never Used    Tobacco comment: Quit > 30 years ago    Vaping Use    Vaping Use: Never used   Substance and Sexual Activity    Alcohol use: No    Drug use: No    Sexual activity: Not on file     Comment: no partner         Current Outpatient Medications:     albuterol (PROVENTIL HFA,VENTOLIN HFA) 90 mcg/act inhaler, Inhale 2 puffs every 6 (six) hours as needed for wheezing or shortness of breath, Disp: 1 Inhaler, Rfl: 5    losartan (COZAAR) 100 MG tablet, Take 1 tablet (100 mg total) by mouth daily, Disp: 90 tablet, Rfl: 1    Nattokinase 100 MG CAPS, Take by mouth As needed, Disp: , Rfl:     Probiotic Product (MVW COMPLETE PROBIOTIC PO), Take by mouth, Disp: , Rfl:     saccharomyces boulardii (FLORASTOR) 250 mg capsule, Take 250 mg by mouth 2 (two) times a day, Disp: , Rfl:     saccharomyces boulardii (FLORASTOR) 250 mg capsule, Take 250 mg by mouth, Disp: , Rfl:     zolpidem (AMBIEN) 10 mg tablet, Take 1/2 tab at hs prn, Disp: 30 tablet, Rfl: 0    Acetaminophen 500 MG, TAKE 2 CAPSULES TWICE A DAY (Patient not taking: No sig reported), Disp: 120 capsule, Rfl: 3    traMADol (ULTRAM) 50 mg tablet, Take 50 mg by mouth every 6 (six) hours as needed (Patient not taking: No sig reported), Disp: , Rfl:     Allergies   Allergen Reactions    Ancef [Cefazolin] Hives and GI Intolerance    Aspirin GI Intolerance     Even low dose     Penicillins Hives    Prednisone Hives and GI Intolerance    Vancomycin Hives and GI Intolerance    Adhesive [Medical Tape]     Ciprofloxacin Hives    Cortisone Syncope    Other      STEROIDS- GI INTOLERANCE    Oxycodone Rash and Vomiting            Vitals:    08/26/22 1010   BP: 133/73   Pulse: 78       Objective:            Physical Exam  Physical Exam:      General Appearance:    Alert, cooperative, no distress, appears stated age   Head:    Normocephalic, without obvious abnormality, atraumatic Eyes:    conjunctiva/corneas clear, both eyes         Nose:   Nares normal, septum midline, no drainage    Throat:   Lips normal; teeth and gums normal   Neck:    symmetrical, trachea midline, ;     thyroid:  no enlargement/   Back:     Symmetric, no curvature, ROM normal   Lungs:   No audible wheezing or labored breathing   Chest Wall:    No tenderness or deformity    Heart:    Regular rate and rhythm                         Pulses:   2+ and symmetric all extremities   Skin:   Skin color, texture, turgor normal, no rashes or lesions   Neurologic:   normal strength, sensation and reflexes     throughout                       Ortho Exam   Left hip   Skin intact  No erythema   TTP greater troch bursa   Positive Stinchfield  Pain with flexion and internal rotation  Good dorsiflexion and plantar flexion strength  Neurovascularly Intact Distally     Diagnostics, reviewed and taken today if performed as documented: The attending physician has personally reviewed the pertinent films in PACS and interpretation is as follows:  X-ray left hip demonstrates severe joint space narrowing with osteophyte formation    X-ray left knee demonstrates status post TKA revision adequate alignment of implant sign loosening, no acute changes      Procedures, if performed today:    Procedures    None performed      Scribe Attestation    I,:  Andrei Ratliff am acting as a scribe while in the presence of the attending physician :       I,:  Anita Wynn MD personally performed the services described in this documentation    as scribed in my presence :             Portions of the record may have been created with voice recognition software  Occasional wrong word or "sound a like" substitutions may have occurred due to the inherent limitations of voice recognition software  Read the chart carefully and recognize, using context, where substitutions have occurred

## 2022-08-29 ENCOUNTER — APPOINTMENT (OUTPATIENT)
Dept: PHYSICAL THERAPY | Facility: MEDICAL CENTER | Age: 77
End: 2022-08-29
Payer: MEDICARE

## 2022-08-29 ENCOUNTER — TELEPHONE (OUTPATIENT)
Dept: FAMILY MEDICINE CLINIC | Facility: CLINIC | Age: 77
End: 2022-08-29

## 2022-08-29 NOTE — TELEPHONE ENCOUNTER
Patient came in with a bottle from her old PCP  Losartan HCTZ 100-25mg tab  She stated she doesn't feel the difference between the 2 meds- just makes her pee more but no swelling in her legs with either med  Please advise on refill    [facility] Dr Willis Carroll aware Dr Janet Templeton is out of the office this week, but another provider is covering for her

## 2022-08-30 DIAGNOSIS — I10 ESSENTIAL HYPERTENSION: Primary | ICD-10-CM

## 2022-08-30 RX ORDER — LOSARTAN POTASSIUM AND HYDROCHLOROTHIAZIDE 25; 100 MG/1; MG/1
1 TABLET ORAL DAILY
Qty: 30 TABLET | Refills: 1 | Status: SHIPPED | OUTPATIENT
Start: 2022-08-30

## 2022-08-31 ENCOUNTER — TELEPHONE (OUTPATIENT)
Dept: FAMILY MEDICINE CLINIC | Facility: CLINIC | Age: 77
End: 2022-08-31

## 2022-08-31 NOTE — TELEPHONE ENCOUNTER
Patient called and said that she spoke with you in regards to her medication  She said that you mentioned you would see her at the end of September but she is not scheduled for an appointment until January    Patient is asking if you need to see her sooner

## 2022-09-09 ENCOUNTER — APPOINTMENT (OUTPATIENT)
Dept: LAB | Facility: MEDICAL CENTER | Age: 77
End: 2022-09-09
Payer: MEDICARE

## 2022-09-09 ENCOUNTER — OFFICE VISIT (OUTPATIENT)
Dept: FAMILY MEDICINE CLINIC | Facility: CLINIC | Age: 77
End: 2022-09-09
Payer: MEDICARE

## 2022-09-09 VITALS
WEIGHT: 151 LBS | HEIGHT: 58 IN | TEMPERATURE: 97.6 F | DIASTOLIC BLOOD PRESSURE: 90 MMHG | OXYGEN SATURATION: 98 % | HEART RATE: 84 BPM | BODY MASS INDEX: 31.7 KG/M2 | SYSTOLIC BLOOD PRESSURE: 140 MMHG

## 2022-09-09 DIAGNOSIS — R07.9 CHEST PAIN, UNSPECIFIED TYPE: ICD-10-CM

## 2022-09-09 DIAGNOSIS — R53.83 DECREASED ENERGY: Primary | ICD-10-CM

## 2022-09-09 LAB — 25(OH)D3 SERPL-MCNC: 23.8 NG/ML (ref 30–100)

## 2022-09-09 PROCEDURE — 82306 VITAMIN D 25 HYDROXY: CPT

## 2022-09-09 PROCEDURE — 99214 OFFICE O/P EST MOD 30 MIN: CPT | Performed by: FAMILY MEDICINE

## 2022-09-09 PROCEDURE — 36415 COLL VENOUS BLD VENIPUNCTURE: CPT

## 2022-09-09 NOTE — ASSESSMENT & PLAN NOTE
Patient reporting 2 week history of decreased energy level  Differential diagnosis is include post COVID symptoms, vitamin-D deficiency, fatigue due to pain, sleep depravation    Patient reports her COVID test is negative, pain is controlled with Tylenol  She is reluctant to use her Ambien states she feels well after 5 hours sleep at night    Obtain vitamin-D for evaluation  Encourage patient to use her sleep aid at night consistently for the next 2 weeks  If her symptoms persist, obtain echocardiogram for evaluation for decreased exercise tolerance

## 2022-09-09 NOTE — PROGRESS NOTES
Assessment/Plan:    Decreased energy  Patient reporting 2 week history of decreased energy level  Differential diagnosis is include post COVID symptoms, vitamin-D deficiency, fatigue due to pain, sleep depravation    Patient reports her COVID test is negative, pain is controlled with Tylenol  She is reluctant to use her Ambien states she feels well after 5 hours sleep at night    Obtain vitamin-D for evaluation  Encourage patient to use her sleep aid at night consistently for the next 2 weeks  If her symptoms persist, obtain echocardiogram for evaluation for decreased exercise tolerance      Subjective:      Patient ID: Neri Galan is a 67 y o  female  HPI    79-year-old female patient presents for evaluation of fatigue and lightheadedness  Patient reports symptoms started around Wednesday of last week, 08/31/2022  Patient denies any history of similar symptoms in the past   She did have some congestion together with her other symptoms however, she was tested for COVID on Sunday and was negative  Denies any significant sore throat, cough, rhinorrhea  Denies any significant change in her appetite/hydrating appropriately  Patient is mostly concerned about how out of breath she gets with simple exercises for example walking from the parking lot to the office  Patient reports yesterday she fell more energy in the morning however, after going food shopping she feels exhausted  She does have baseline knee pain for which she use Tylenol to help control the pain  Has not taking any other medication  Patient does use albuterol, only as needed  Review of Systems   Constitutional: Positive for fatigue  Negative for fever  HENT: Positive for congestion  Negative for rhinorrhea and sore throat  Respiratory: Negative for cough and shortness of breath  Decreased exercise tolerance   Cardiovascular: Positive for chest pain (With exertion)  Gastrointestinal: Negative for abdominal pain  Musculoskeletal: Positive for arthralgias (have not used any pain medication)  Neurological: Positive for light-headedness  Psychiatric/Behavioral: Positive for sleep disturbance  Objective:    /90 (BP Location: Left arm, Patient Position: Sitting, Cuff Size: Standard)   Pulse 84   Temp 97 6 °F (36 4 °C) (Temporal)   Ht 4' 9 75" (1 467 m)   Wt 68 5 kg (151 lb)   SpO2 98%   BMI 31 83 kg/m²       Physical Exam  Vitals reviewed  Constitutional:       General: She is not in acute distress  Appearance: Normal appearance  She is obese  She is not ill-appearing, toxic-appearing or diaphoretic  Cardiovascular:      Rate and Rhythm: Normal rate and regular rhythm  Pulses: Normal pulses  Heart sounds: Normal heart sounds  No murmur heard  Pulmonary:      Effort: Pulmonary effort is normal  No respiratory distress  Breath sounds: Normal breath sounds  Abdominal:      General: Abdomen is flat  Bowel sounds are normal  There is no distension  Palpations: Abdomen is soft  Musculoskeletal:         General: No swelling, tenderness, deformity or signs of injury  Normal range of motion  Skin:     General: Skin is warm and dry  Capillary Refill: Capillary refill takes less than 2 seconds  Coloration: Skin is not jaundiced  Neurological:      General: No focal deficit present  Mental Status: She is alert  Psychiatric:         Mood and Affect: Mood normal           HEMA Cruz  Family Medicine    Please excuse any "sound-alike" errors that may have ocurred during the process of dictation  Parts of this note have been dictated and there may be errors present in the transcription process  Thank you

## 2022-09-13 ENCOUNTER — HOSPITAL ENCOUNTER (OUTPATIENT)
Dept: NON INVASIVE DIAGNOSTICS | Facility: CLINIC | Age: 77
Discharge: HOME | DRG: 305 | End: 2022-09-13
Payer: MEDICARE

## 2022-09-13 VITALS
BODY MASS INDEX: 31.7 KG/M2 | SYSTOLIC BLOOD PRESSURE: 140 MMHG | WEIGHT: 151 LBS | DIASTOLIC BLOOD PRESSURE: 90 MMHG | HEART RATE: 84 BPM | HEIGHT: 58 IN

## 2022-09-13 DIAGNOSIS — R07.9 CHEST PAIN, UNSPECIFIED TYPE: ICD-10-CM

## 2022-09-13 DIAGNOSIS — R53.83 DECREASED ENERGY: ICD-10-CM

## 2022-09-13 LAB
AORTIC ROOT: 2.6 CM
AORTIC VALVE MEAN VELOCITY: 12.4 M/S
APICAL FOUR CHAMBER EJECTION FRACTION: 52 %
ASCENDING AORTA: 2.8 CM
AV AREA BY CONTINUOUS VTI: 2.4 CM2
AV AREA PEAK VELOCITY: 2.6 CM2
AV LVOT MEAN GRADIENT: 7 MMHG
AV LVOT PEAK GRADIENT: 11 MMHG
AV MEAN GRADIENT: 7 MMHG
AV PEAK GRADIENT: 11 MMHG
AV VALVE AREA: 2.37 CM2
AV VELOCITY RATIO: 1.02
DOP CALC AO PEAK VEL: 1.65 M/S
DOP CALC AO VTI: 37.68 CM
DOP CALC LVOT AREA: 2.54 CM2
DOP CALC LVOT DIAMETER: 1.8 CM
DOP CALC LVOT PEAK VEL VTI: 35.14 CM
DOP CALC LVOT PEAK VEL: 1.68 M/S
DOP CALC LVOT STROKE INDEX: 54.7 ML/M2
DOP CALC LVOT STROKE VOLUME: 89.38
DOP CALC MV VTI: 37.29 CM
E WAVE DECELERATION TIME: 291 MS
FRACTIONAL SHORTENING: 30 (ref 28–44)
INTERVENTRICULAR SEPTUM IN DIASTOLE (PARASTERNAL SHORT AXIS VIEW): 1.1 CM
INTERVENTRICULAR SEPTUM: 1.1 CM (ref 0.6–1.1)
LAAS-AP2: 17.6 CM2
LAAS-AP4: 13.6 CM2
LEFT ATRIUM SIZE: 3.6 CM
LEFT INTERNAL DIMENSION IN SYSTOLE: 2.3 CM (ref 2.1–4)
LEFT VENTRICULAR INTERNAL DIMENSION IN DIASTOLE: 3.3 CM (ref 3.5–6)
LEFT VENTRICULAR POSTERIOR WALL IN END DIASTOLE: 1.1 CM
LEFT VENTRICULAR STROKE VOLUME: 28 ML
LVSV (TEICH): 28 ML
MV E'TISSUE VEL-SEP: 7 CM/S
MV MEAN GRADIENT: 3 MMHG
MV PEAK A VEL: 1.47 M/S
MV PEAK E VEL: 109 CM/S
MV PEAK GRADIENT: 10 MMHG
MV STENOSIS PRESSURE HALF TIME: 85 MS
MV VALVE AREA BY CONTINUITY EQUATION: 2.4 CM2
MV VALVE AREA P 1/2 METHOD: 2.59
RIGHT ATRIUM AREA SYSTOLE A4C: 12.9 CM2
RIGHT VENTRICLE ID DIMENSION: 4 CM
SL CV LEFT ATRIUM LENGTH A2C: 5.7 CM
SL CV LV EF: 60
SL CV PED ECHO LEFT VENTRICLE DIASTOLIC VOLUME (MOD BIPLANE) 2D: 46 ML
SL CV PED ECHO LEFT VENTRICLE SYSTOLIC VOLUME (MOD BIPLANE) 2D: 17 ML
TR MAX PG: 27 MMHG
TR PEAK VELOCITY: 2.6 M/S
TRICUSPID VALVE PEAK REGURGITATION VELOCITY: 2.6 M/S

## 2022-09-13 PROCEDURE — 93306 TTE W/DOPPLER COMPLETE: CPT | Performed by: INTERNAL MEDICINE

## 2022-09-13 PROCEDURE — 93306 TTE W/DOPPLER COMPLETE: CPT

## 2022-09-14 ENCOUNTER — APPOINTMENT (OUTPATIENT)
Dept: RADIOLOGY | Facility: HOSPITAL | Age: 77
DRG: 305 | End: 2022-09-14
Payer: MEDICARE

## 2022-09-14 ENCOUNTER — HOSPITAL ENCOUNTER (INPATIENT)
Facility: HOSPITAL | Age: 77
LOS: 3 days | Discharge: HOME HEALTH | DRG: 305 | End: 2022-09-18
Attending: EMERGENCY MEDICINE | Admitting: INTERNAL MEDICINE
Payer: MEDICARE

## 2022-09-14 ENCOUNTER — TELEPHONE (OUTPATIENT)
Dept: FAMILY MEDICINE CLINIC | Facility: CLINIC | Age: 77
End: 2022-09-14

## 2022-09-14 DIAGNOSIS — R06.02 SOB (SHORTNESS OF BREATH): ICD-10-CM

## 2022-09-14 DIAGNOSIS — N30.01 ACUTE CYSTITIS WITH HEMATURIA: ICD-10-CM

## 2022-09-14 DIAGNOSIS — R07.9 CHEST PAIN, UNSPECIFIED TYPE: Primary | ICD-10-CM

## 2022-09-14 DIAGNOSIS — R07.9 CHEST PAIN WITH MODERATE RISK OF ACUTE CORONARY SYNDROME: ICD-10-CM

## 2022-09-14 DIAGNOSIS — E78.5 HYPERLIPIDEMIA: ICD-10-CM

## 2022-09-14 DIAGNOSIS — I20.0 UNSTABLE ANGINA (HCC): Primary | ICD-10-CM

## 2022-09-14 PROBLEM — G47.9 SLEEP DIFFICULTIES: Status: ACTIVE | Noted: 2022-09-14

## 2022-09-14 LAB
2HR DELTA HS TROPONIN: 0 NG/L
4HR DELTA HS TROPONIN: <0 NG/L
ALBUMIN SERPL BCP-MCNC: 4.5 G/DL (ref 3.5–5)
ALP SERPL-CCNC: 77 U/L (ref 34–104)
ALT SERPL W P-5'-P-CCNC: 15 U/L (ref 7–52)
ANION GAP SERPL CALCULATED.3IONS-SCNC: 10 MMOL/L (ref 4–13)
AST SERPL W P-5'-P-CCNC: 22 U/L (ref 13–39)
ATRIAL RATE: 80 BPM
BASOPHILS # BLD AUTO: 0.03 THOUSANDS/ΜL (ref 0–0.1)
BASOPHILS NFR BLD AUTO: 0 % (ref 0–1)
BILIRUB SERPL-MCNC: 0.61 MG/DL (ref 0.2–1)
BUN SERPL-MCNC: 17 MG/DL (ref 5–25)
CALCIUM SERPL-MCNC: 9.7 MG/DL (ref 8.4–10.2)
CARDIAC TROPONIN I PNL SERPL HS: 2 NG/L
CARDIAC TROPONIN I PNL SERPL HS: 2 NG/L
CARDIAC TROPONIN I PNL SERPL HS: <2 NG/L
CHLORIDE SERPL-SCNC: 103 MMOL/L (ref 96–108)
CO2 SERPL-SCNC: 22 MMOL/L (ref 21–32)
CREAT SERPL-MCNC: 0.71 MG/DL (ref 0.6–1.3)
EOSINOPHIL # BLD AUTO: 0.14 THOUSAND/ΜL (ref 0–0.61)
EOSINOPHIL NFR BLD AUTO: 2 % (ref 0–6)
ERYTHROCYTE [DISTWIDTH] IN BLOOD BY AUTOMATED COUNT: 13.1 % (ref 11.6–15.1)
GFR SERPL CREATININE-BSD FRML MDRD: 82 ML/MIN/1.73SQ M
GLUCOSE SERPL-MCNC: 93 MG/DL (ref 65–140)
HCT VFR BLD AUTO: 37.8 % (ref 34.8–46.1)
HGB BLD-MCNC: 12.6 G/DL (ref 11.5–15.4)
IMM GRANULOCYTES # BLD AUTO: 0.03 THOUSAND/UL (ref 0–0.2)
IMM GRANULOCYTES NFR BLD AUTO: 0 % (ref 0–2)
LYMPHOCYTES # BLD AUTO: 1.91 THOUSANDS/ΜL (ref 0.6–4.47)
LYMPHOCYTES NFR BLD AUTO: 22 % (ref 14–44)
MCH RBC QN AUTO: 30.4 PG (ref 26.8–34.3)
MCHC RBC AUTO-ENTMCNC: 33.3 G/DL (ref 31.4–37.4)
MCV RBC AUTO: 91 FL (ref 82–98)
MONOCYTES # BLD AUTO: 0.58 THOUSAND/ΜL (ref 0.17–1.22)
MONOCYTES NFR BLD AUTO: 7 % (ref 4–12)
NEUTROPHILS # BLD AUTO: 5.89 THOUSANDS/ΜL (ref 1.85–7.62)
NEUTS SEG NFR BLD AUTO: 69 % (ref 43–75)
NRBC BLD AUTO-RTO: 0 /100 WBCS
P AXIS: 59 DEGREES
PLATELET # BLD AUTO: 253 THOUSANDS/UL (ref 149–390)
PMV BLD AUTO: 11.3 FL (ref 8.9–12.7)
POTASSIUM SERPL-SCNC: 4.3 MMOL/L (ref 3.5–5.3)
PR INTERVAL: 190 MS
PROT SERPL-MCNC: 8 G/DL (ref 6.4–8.4)
QRS AXIS: 16 DEGREES
QRSD INTERVAL: 74 MS
QT INTERVAL: 408 MS
QTC INTERVAL: 470 MS
RBC # BLD AUTO: 4.15 MILLION/UL (ref 3.81–5.12)
SODIUM SERPL-SCNC: 135 MMOL/L (ref 135–147)
T WAVE AXIS: 47 DEGREES
VENTRICULAR RATE: 80 BPM
WBC # BLD AUTO: 8.58 THOUSAND/UL (ref 4.31–10.16)

## 2022-09-14 PROCEDURE — 99285 EMERGENCY DEPT VISIT HI MDM: CPT

## 2022-09-14 PROCEDURE — 36415 COLL VENOUS BLD VENIPUNCTURE: CPT

## 2022-09-14 PROCEDURE — 99220 PR INITIAL OBSERVATION CARE/DAY 70 MINUTES: CPT | Performed by: INTERNAL MEDICINE

## 2022-09-14 PROCEDURE — 84484 ASSAY OF TROPONIN QUANT: CPT | Performed by: EMERGENCY MEDICINE

## 2022-09-14 PROCEDURE — 93010 ELECTROCARDIOGRAM REPORT: CPT | Performed by: INTERNAL MEDICINE

## 2022-09-14 PROCEDURE — 99284 EMERGENCY DEPT VISIT MOD MDM: CPT | Performed by: EMERGENCY MEDICINE

## 2022-09-14 PROCEDURE — 80053 COMPREHEN METABOLIC PANEL: CPT | Performed by: EMERGENCY MEDICINE

## 2022-09-14 PROCEDURE — 71045 X-RAY EXAM CHEST 1 VIEW: CPT

## 2022-09-14 PROCEDURE — 93005 ELECTROCARDIOGRAM TRACING: CPT

## 2022-09-14 PROCEDURE — 85025 COMPLETE CBC W/AUTO DIFF WBC: CPT | Performed by: EMERGENCY MEDICINE

## 2022-09-14 RX ORDER — ERGOCALCIFEROL 1.25 MG/1
50000 CAPSULE ORAL WEEKLY
Status: DISCONTINUED | OUTPATIENT
Start: 2022-09-14 | End: 2022-09-18 | Stop reason: HOSPADM

## 2022-09-14 RX ORDER — LANOLIN ALCOHOL/MO/W.PET/CERES
3 CREAM (GRAM) TOPICAL
Status: DISCONTINUED | OUTPATIENT
Start: 2022-09-14 | End: 2022-09-18 | Stop reason: HOSPADM

## 2022-09-14 RX ORDER — ACETAMINOPHEN 325 MG/1
650 TABLET ORAL ONCE
Status: COMPLETED | OUTPATIENT
Start: 2022-09-14 | End: 2022-09-14

## 2022-09-14 RX ORDER — ONDANSETRON 2 MG/ML
4 INJECTION INTRAMUSCULAR; INTRAVENOUS EVERY 6 HOURS PRN
Status: DISCONTINUED | OUTPATIENT
Start: 2022-09-14 | End: 2022-09-18 | Stop reason: HOSPADM

## 2022-09-14 RX ORDER — ATORVASTATIN CALCIUM 40 MG/1
40 TABLET, FILM COATED ORAL EVERY EVENING
Status: DISCONTINUED | OUTPATIENT
Start: 2022-09-14 | End: 2022-09-18 | Stop reason: HOSPADM

## 2022-09-14 RX ORDER — CYCLOBENZAPRINE HCL 10 MG
5 TABLET ORAL 3 TIMES DAILY
Status: COMPLETED | OUTPATIENT
Start: 2022-09-14 | End: 2022-09-16

## 2022-09-14 RX ORDER — ENOXAPARIN SODIUM 100 MG/ML
40 INJECTION SUBCUTANEOUS DAILY
Status: DISCONTINUED | OUTPATIENT
Start: 2022-09-15 | End: 2022-09-17

## 2022-09-14 RX ORDER — ACETAMINOPHEN 325 MG/1
975 TABLET ORAL EVERY 6 HOURS SCHEDULED
Status: DISCONTINUED | OUTPATIENT
Start: 2022-09-14 | End: 2022-09-17

## 2022-09-14 RX ADMIN — ACETAMINOPHEN 975 MG: 325 TABLET, FILM COATED ORAL at 23:54

## 2022-09-14 RX ADMIN — ERGOCALCIFEROL 50000 UNITS: 1.25 CAPSULE ORAL at 21:04

## 2022-09-14 RX ADMIN — METOPROLOL TARTRATE 25 MG: 25 TABLET, FILM COATED ORAL at 20:30

## 2022-09-14 RX ADMIN — ACETAMINOPHEN 650 MG: 325 TABLET, FILM COATED ORAL at 17:19

## 2022-09-14 RX ADMIN — ATORVASTATIN CALCIUM 40 MG: 40 TABLET, FILM COATED ORAL at 20:30

## 2022-09-14 RX ADMIN — CYCLOBENZAPRINE HYDROCHLORIDE 5 MG: 10 TABLET, FILM COATED ORAL at 20:19

## 2022-09-14 NOTE — TELEPHONE ENCOUNTER
Discussed patient's echocardiogram results over the phone  Explained to patient that there is mild narrowing of the mitral valve as well as some age-related change such as the stiffening of the heart walls causing diastolic, filling defect  However grade 1 diastolic dysfunction is very common in elderly patients  Patient reports she is still very concerned about symptoms, have chest pain shortness breast lightheadedness even at rest  Symptom has been ongoing for the last week  Patient reports the chest pain is reproducible  Explained to patient that her symptoms although unlikely may represent a heart attack  Encourage patient to report to the nearest ER for evaluation

## 2022-09-14 NOTE — ASSESSMENT & PLAN NOTE
Her chest pain is constant and is not relieved or exacerbated by anything, and it has been associated with new fatigue, lightheadedness, occasional shortness of breath, and nausea this morning     It was able to be elicited by left arm movement and palpation on the left chest   EKG was unchanged from prior  Troponins were negative  No electrolyte abnormalities  Chest x-ray was not read by the radiologist at the time of writing, however appeared the without cardiopulmonary abnormalities to my eye  Overall low suspicion for acute coronary syndrome or unstable angina, more likely to be musculoskeletal     She had an echocardiogram as an outpatient yesterday (9/14/22) with an ejection fraction of 60%, also showing mild diastolic dysfunction and mild mitral stenosis  Plan:  -holding aspirin for reported aspirin allergy  -nuclear stress test ordered for tomorrow morning (SPECT)  -on telemetry for 24 hours  -hypertension management as below  -orthostatics vital signs were ordered for reported of lightheadedness  -pain control:  Tylenol q6h and cyclobenzaprine TID  -nausea control:  Zofran p r n   -follow-up CMP tomorrow

## 2022-09-14 NOTE — ASSESSMENT & PLAN NOTE
Patient reports difficulty sleeping at home, prefers not to take her prescribed Ambien  Plan:  -initiated melatonin qHS

## 2022-09-14 NOTE — ED NOTES
Pt laying in stretcher with HOB elevated eating dinner with negative complications  Daughter at bedside   Rubie Romberg, RN  09/14/22 1164

## 2022-09-14 NOTE — ED PROVIDER NOTES
History  Chief Complaint   Patient presents with    Chest Pain     Pt c/o chest pain and dizziness since Thursday  States that since Friday she has had pressure in the back of her head  Significant chest pain, and dizziness  Pt c/o sob that increases with exertion  Chest pain is reproducible and tender to touch         This is a 65-year-old female with a relevant past medical history of hypertension, hyperlipidemia, a strong family history for heart disease, presented to the ED today for complaint of chest pain  Her chest pain has been present for the past 1 week, with significant worsening over the past 1 day  Patient states that she has exertional dyspnea, falls exertional chest pain, relieved by rest   She has some nausea, without vomiting  She does not have any other significantly associated symptoms aside from some lightheadedness which she experiences with her chest pain  Patient had an echocardiogram as an outpatient 2 days ago per her PCP, which was for the most part unremarkable  Prior to Admission Medications   Prescriptions Last Dose Informant Patient Reported? Taking?    Acetaminophen 500 MG   No No   Sig: TAKE 2 CAPSULES TWICE A DAY   Patient not taking: No sig reported   Nattokinase 100 MG CAPS  Self Yes No   Sig: Take by mouth As needed   Probiotic Product (MVW COMPLETE PROBIOTIC PO)  Self Yes No   Sig: Take by mouth   albuterol (PROVENTIL HFA,VENTOLIN HFA) 90 mcg/act inhaler   No No   Sig: Inhale 2 puffs every 6 (six) hours as needed for wheezing or shortness of breath   Patient not taking: Reported on 9/9/2022   losartan-hydrochlorothiazide (HYZAAR) 100-25 MG per tablet   No No   Sig: Take 1 tablet by mouth daily   saccharomyces boulardii (FLORASTOR) 250 mg capsule  Self Yes No   Sig: Take 250 mg by mouth 2 (two) times a day   saccharomyces boulardii (FLORASTOR) 250 mg capsule   Yes No   Sig: Take 250 mg by mouth   traMADol (ULTRAM) 50 mg tablet   Yes No   Sig: Take 50 mg by mouth every 6 (six) hours as needed   Patient not taking: No sig reported   traMADol (Ultram) 50 mg tablet   No No   Sig: Take 1 tablet (50 mg total) by mouth every 8 (eight) hours as needed for moderate pain May take 1/2 tablet as needed for pain relief   Patient not taking: Reported on 9/9/2022   zolpidem (AMBIEN) 10 mg tablet   No No   Sig: Take 1/2 tab at hs prn   Patient not taking: Reported on 9/9/2022      Facility-Administered Medications: None       Past Medical History:   Diagnosis Date    Arthritis     Carpal tunnel syndrome     GERD (gastroesophageal reflux disease)     Hiatal hernia     Hypertension     Insomnia     Pneumonia     PONV (postoperative nausea and vomiting)     Renal calculi     Sleep deprivation     chronically    Vitamin D deficiency     Vitamin D toxicity 2011    with leaky gut syndrome       Past Surgical History:   Procedure Laterality Date    BUNIONECTOMY Bilateral     CARPAL TUNNEL RELEASE Right 2015    COLONOSCOPY      CYSTOSCOPY N/A 6/6/2019    Procedure: CYSTOSCOPY;  Surgeon: Christofer López MD;  [facility];  Service: Gynecology Oncology    ESOPHAGOGASTRODUODENOSCOPY N/A 5/5/2016    Procedure: ESOPHAGOGASTRODUODENOSCOPY (EGD); Surgeon: Cesar Aguayo MD;  [facility]; Service:     FRACTURE SURGERY      L arm     NERVE BLOCK       R Knee    OOPHORECTOMY Left     OH COLONOSCOPY FLX DX W/COLLJ SPEC WHEN PFRMD N/A 6/28/2016    Procedure: COLONOSCOPY;  Surgeon: Cesar Aguayo MD;  [facility]; Service: Gastroenterology    OH ESOPHAGOGASTRODUODENOSCOPY TRANSORAL DIAGNOSTIC N/A 10/19/2018    Procedure: ESOPHAGOGASTRODUODENOSCOPY (EGD); Surgeon: Cesar Aguayo MD;  [facility];   Service: Gastroenterology    OH LAPAROSCOPY W TOT HYSTERECTUTERUS <=250 Particia Notice TUBE/OVARY N/A 6/6/2019    Procedure: ROBOTIC TOTAL LAPAROSCOPIC HYSTERECTOMY, RIGHT SALPINGO-OOPHORECTOMY, EXTENSIVE ADHESIOLYSIS, APPROXIMATELY 39 MIN;  Surgeon: Christofer López MD; [facility];  Service: Gynecology Oncology    ROTATOR CUFF REPAIR Bilateral     TOE SURGERY Left     left 5th toe shaved down due to repeated fx    TOTAL KNEE ARTHROPLASTY Left        Family History   Problem Relation Age of Onset    Heart failure Mother     Other Mother         respiratory failure    Heart failure Father         respiratory failure    Hypertension Daughter     Breast cancer Sister     Kidney failure Brother     Alcohol abuse Brother     No Known Problems Sister     No Known Problems Brother     Diabetes Brother     Arrhythmia Neg Hx     Clotting disorder Neg Hx     Fainting Neg Hx     Anuerysm Neg Hx     Stroke Neg Hx     Hyperlipidemia Neg Hx     Asthma Neg Hx      I have reviewed and agree with the history as documented  E-Cigarette/Vaping    E-Cigarette Use Never User      E-Cigarette/Vaping Substances     Social History     Tobacco Use    Smoking status: Former Smoker    Smokeless tobacco: Never Used    Tobacco comment: Quit > 30 years ago    Vaping Use    Vaping Use: Never used   Substance Use Topics    Alcohol use: No    Drug use: No       Review of Systems   Constitutional: Negative for activity change, chills and fever  HENT: Negative for congestion and rhinorrhea  Eyes: Negative for photophobia and visual disturbance  Respiratory: Positive for shortness of breath  Negative for cough and chest tightness  Cardiovascular: Positive for chest pain  Negative for leg swelling  Gastrointestinal: Negative for abdominal distention, nausea and vomiting  Genitourinary: Negative for dysuria and frequency  Musculoskeletal: Negative for back pain and neck stiffness  Skin: Negative for rash and wound  Neurological: Positive for light-headedness  Negative for dizziness and weakness  Psychiatric/Behavioral: Negative for agitation and suicidal ideas  Physical Exam  Physical Exam  Vitals and nursing note reviewed     Constitutional: General: She is not in acute distress  Appearance: Normal appearance  She is obese  She is not ill-appearing or toxic-appearing  HENT:      Head: Normocephalic and atraumatic  Right Ear: External ear normal       Left Ear: External ear normal       Nose: Nose normal  No congestion or rhinorrhea  Mouth/Throat:      Mouth: Mucous membranes are moist       Pharynx: Oropharynx is clear  No oropharyngeal exudate  Eyes:      General: No scleral icterus  Conjunctiva/sclera: Conjunctivae normal    Cardiovascular:      Rate and Rhythm: Normal rate and regular rhythm  Pulses: Normal pulses  Heart sounds: Normal heart sounds  No murmur heard  No gallop  Pulmonary:      Effort: Pulmonary effort is normal  No respiratory distress  Breath sounds: Normal breath sounds  No stridor  No decreased breath sounds, wheezing, rhonchi or rales  Chest:      Chest wall: Tenderness present  Abdominal:      General: Abdomen is flat  Bowel sounds are normal  There is no distension  Palpations: Abdomen is soft  There is no mass  Tenderness: There is no abdominal tenderness  Musculoskeletal:         General: No swelling or tenderness  Normal range of motion  Cervical back: Normal range of motion and neck supple  No tenderness  Right lower leg: No edema  Left lower leg: No edema  Skin:     General: Skin is warm and dry  Capillary Refill: Capillary refill takes less than 2 seconds  Coloration: Skin is not jaundiced  Findings: No bruising  Neurological:      General: No focal deficit present  Mental Status: She is alert and oriented to person, place, and time  Mental status is at baseline  Sensory: No sensory deficit  Motor: No weakness  Psychiatric:         Mood and Affect: Mood normal          Behavior: Behavior normal          Thought Content:  Thought content normal          Judgment: Judgment normal          Vital Signs  ED Triage Vitals [09/14/22 1119]   Temperature Pulse Respirations Blood Pressure SpO2   98 7 °F (37 1 °C) 89 20 153/85 100 %      Temp src Heart Rate Source Patient Position - Orthostatic VS BP Location FiO2 (%)   -- -- -- -- --      Pain Score       --           Vitals:    09/14/22 1119   BP: 153/85   Pulse: 89         Visual Acuity      ED Medications  Medications - No data to display    Diagnostic Studies  Results Reviewed     Procedure Component Value Units Date/Time    HS Troponin I 4hr [[id number]]     Lab Status: No result Specimen: Blood     HS Troponin 0hr (reflex protocol) [[id number]]  (Normal) Collected: 09/14/22 1229    Lab Status: Final result Specimen: Blood from Arm, Right Updated: 09/14/22 1324     hs TnI 0hr 2 ng/L     HS Troponin I 2hr [[id number]]     Lab Status: No result Specimen: Blood     Comprehensive metabolic panel [[id number]] Collected: 09/14/22 1229    Lab Status: Final result Specimen: Blood from Arm, Right Updated: 09/14/22 1302     Sodium 135 mmol/L      Potassium 4 3 mmol/L      Chloride 103 mmol/L      CO2 22 mmol/L      ANION GAP 10 mmol/L      BUN 17 mg/dL      Creatinine 0 71 mg/dL      Glucose 93 mg/dL      Calcium 9 7 mg/dL      AST 22 U/L      ALT 15 U/L      Alkaline Phosphatase 77 U/L      Total Protein 8 0 g/dL      Albumin 4 5 g/dL      Total Bilirubin 0 61 mg/dL      eGFR 82 ml/min/1 73sq m     Narrative:      Delfin guidelines for Chronic Kidney Disease (CKD):     Stage 1 with normal or high GFR (GFR > 90 mL/min/1 73 square meters)    Stage 2 Mild CKD (GFR = 60-89 mL/min/1 73 square meters)    Stage 3A Moderate CKD (GFR = 45-59 mL/min/1 73 square meters)    Stage 3B Moderate CKD (GFR = 30-44 mL/min/1 73 square meters)    Stage 4 Severe CKD (GFR = 15-29 mL/min/1 73 square meters)    Stage 5 End Stage CKD (GFR <15 mL/min/1 73 square meters)  Note: GFR calculation is accurate only with a steady state creatinine    CBC and differential [[id number]]     Lab Status: No result Specimen: Blood                  XR chest 1 view portable    (Results Pending)              Procedures  Procedures         ED Course                                             MDM  Number of Diagnoses or Management Options  Diagnosis management comments: This is a 69-year-old female presented to the ED today for complaint of chest pain  She has associated shortness of breath, which is exertional, as well as some lightheadedness  She otherwise denies any significantly related symptoms  Her physical exam is remarkable for tenderness to palpation her chest   She otherwise does not have any significant abnormalities  Her differential diagnosis includes:  Unstable angina versus costochondritis versus anxiety versus pneumonia versus other  Patient has a chest x-ray, EKG, CBC, metabolic panel, troponin x2 which are unremarkable  Patient has a heart score 7  Patient requested to be hospitalized by the hospitalist provider whom accepted without any further orders requested  Disposition  Final diagnoses:   None     ED Disposition     None      Follow-up Information    None         Patient's Medications   Discharge Prescriptions    No medications on file       No discharge procedures on file      PDMP Review       Value Time User    PDMP Reviewed  Yes 8/26/2022 11:43 AM Jyotsna Torres MD          ED Provider  Electronically Signed by           Dori Minaya MD  09/14/22 4639

## 2022-09-15 ENCOUNTER — APPOINTMENT (OUTPATIENT)
Dept: NUCLEAR MEDICINE | Facility: HOSPITAL | Age: 77
DRG: 305 | End: 2022-09-15
Payer: MEDICARE

## 2022-09-15 ENCOUNTER — TELEPHONE (OUTPATIENT)
Dept: OBGYN CLINIC | Facility: CLINIC | Age: 77
End: 2022-09-15

## 2022-09-15 LAB
ALBUMIN SERPL BCP-MCNC: 4.1 G/DL (ref 3.5–5)
ALP SERPL-CCNC: 68 U/L (ref 34–104)
ALT SERPL W P-5'-P-CCNC: 16 U/L (ref 7–52)
ANION GAP SERPL CALCULATED.3IONS-SCNC: 7 MMOL/L (ref 4–13)
AST SERPL W P-5'-P-CCNC: 16 U/L (ref 13–39)
BASOPHILS # BLD AUTO: 0.03 THOUSANDS/ΜL (ref 0–0.1)
BASOPHILS NFR BLD AUTO: 0 % (ref 0–1)
BILIRUB SERPL-MCNC: 0.78 MG/DL (ref 0.2–1)
BUN SERPL-MCNC: 27 MG/DL (ref 5–25)
CALCIUM SERPL-MCNC: 9.2 MG/DL (ref 8.4–10.2)
CHLORIDE SERPL-SCNC: 102 MMOL/L (ref 96–108)
CO2 SERPL-SCNC: 25 MMOL/L (ref 21–32)
CREAT SERPL-MCNC: 0.81 MG/DL (ref 0.6–1.3)
EOSINOPHIL # BLD AUTO: 0.21 THOUSAND/ΜL (ref 0–0.61)
EOSINOPHIL NFR BLD AUTO: 3 % (ref 0–6)
ERYTHROCYTE [DISTWIDTH] IN BLOOD BY AUTOMATED COUNT: 13 % (ref 11.6–15.1)
GFR SERPL CREATININE-BSD FRML MDRD: 70 ML/MIN/1.73SQ M
GLUCOSE P FAST SERPL-MCNC: 99 MG/DL (ref 65–99)
GLUCOSE SERPL-MCNC: 99 MG/DL (ref 65–140)
HCT VFR BLD AUTO: 36.7 % (ref 34.8–46.1)
HGB BLD-MCNC: 12 G/DL (ref 11.5–15.4)
IMM GRANULOCYTES # BLD AUTO: 0.02 THOUSAND/UL (ref 0–0.2)
IMM GRANULOCYTES NFR BLD AUTO: 0 % (ref 0–2)
LYMPHOCYTES # BLD AUTO: 1.71 THOUSANDS/ΜL (ref 0.6–4.47)
LYMPHOCYTES NFR BLD AUTO: 23 % (ref 14–44)
MCH RBC QN AUTO: 30 PG (ref 26.8–34.3)
MCHC RBC AUTO-ENTMCNC: 32.7 G/DL (ref 31.4–37.4)
MCV RBC AUTO: 92 FL (ref 82–98)
MONOCYTES # BLD AUTO: 0.63 THOUSAND/ΜL (ref 0.17–1.22)
MONOCYTES NFR BLD AUTO: 9 % (ref 4–12)
NEUTROPHILS # BLD AUTO: 4.83 THOUSANDS/ΜL (ref 1.85–7.62)
NEUTS SEG NFR BLD AUTO: 65 % (ref 43–75)
NRBC BLD AUTO-RTO: 0 /100 WBCS
PLATELET # BLD AUTO: 256 THOUSANDS/UL (ref 149–390)
PMV BLD AUTO: 11.2 FL (ref 8.9–12.7)
POTASSIUM SERPL-SCNC: 3.8 MMOL/L (ref 3.5–5.3)
PROT SERPL-MCNC: 7.1 G/DL (ref 6.4–8.4)
RBC # BLD AUTO: 4 MILLION/UL (ref 3.81–5.12)
SL CV REST NUCLEAR ISOTOPE DOSE: 10.7 MCI
SODIUM SERPL-SCNC: 134 MMOL/L (ref 135–147)
WBC # BLD AUTO: 7.43 THOUSAND/UL (ref 4.31–10.16)

## 2022-09-15 PROCEDURE — 78451 HT MUSCLE IMAGE SPECT SING: CPT

## 2022-09-15 PROCEDURE — A9502 TC99M TETROFOSMIN: HCPCS

## 2022-09-15 PROCEDURE — 78452 HT MUSCLE IMAGE SPECT MULT: CPT | Performed by: INTERNAL MEDICINE

## 2022-09-15 PROCEDURE — G1004 CDSM NDSC: HCPCS

## 2022-09-15 PROCEDURE — 97116 GAIT TRAINING THERAPY: CPT

## 2022-09-15 PROCEDURE — 85025 COMPLETE CBC W/AUTO DIFF WBC: CPT | Performed by: INTERNAL MEDICINE

## 2022-09-15 PROCEDURE — 99232 SBSQ HOSP IP/OBS MODERATE 35: CPT | Performed by: INTERNAL MEDICINE

## 2022-09-15 PROCEDURE — 36415 COLL VENOUS BLD VENIPUNCTURE: CPT | Performed by: INTERNAL MEDICINE

## 2022-09-15 PROCEDURE — 97163 PT EVAL HIGH COMPLEX 45 MIN: CPT

## 2022-09-15 PROCEDURE — 80053 COMPREHEN METABOLIC PANEL: CPT | Performed by: INTERNAL MEDICINE

## 2022-09-15 RX ADMIN — ACETAMINOPHEN 975 MG: 325 TABLET, FILM COATED ORAL at 11:06

## 2022-09-15 RX ADMIN — ENOXAPARIN SODIUM 40 MG: 40 INJECTION SUBCUTANEOUS at 09:30

## 2022-09-15 RX ADMIN — CYCLOBENZAPRINE HYDROCHLORIDE 5 MG: 10 TABLET, FILM COATED ORAL at 17:24

## 2022-09-15 RX ADMIN — CYCLOBENZAPRINE HYDROCHLORIDE 5 MG: 10 TABLET, FILM COATED ORAL at 09:30

## 2022-09-15 RX ADMIN — HYDROCHLOROTHIAZIDE: 25 TABLET ORAL at 09:54

## 2022-09-15 RX ADMIN — METOPROLOL TARTRATE 25 MG: 25 TABLET, FILM COATED ORAL at 21:29

## 2022-09-15 RX ADMIN — CYCLOBENZAPRINE HYDROCHLORIDE 5 MG: 10 TABLET, FILM COATED ORAL at 21:29

## 2022-09-15 RX ADMIN — METOPROLOL TARTRATE 25 MG: 25 TABLET, FILM COATED ORAL at 09:30

## 2022-09-15 RX ADMIN — Medication 3 MG: at 21:29

## 2022-09-15 RX ADMIN — ACETAMINOPHEN 975 MG: 325 TABLET, FILM COATED ORAL at 05:35

## 2022-09-15 RX ADMIN — ATORVASTATIN CALCIUM 40 MG: 40 TABLET, FILM COATED ORAL at 17:24

## 2022-09-15 RX ADMIN — ACETAMINOPHEN 975 MG: 325 TABLET, FILM COATED ORAL at 17:24

## 2022-09-15 NOTE — PHYSICAL THERAPY NOTE
PHYSICAL THERAPY EVALUATION/TREATMENT     09/15/22 1345   Note Type   Note type Evaluation   Pain Assessment   Pain Assessment Tool 0-10   Pain Score 5   Pain Location/Orientation Location: Head  (posterior head;2/10 chest pain - dull)   Hospital Pain Intervention(s) Repositioned; Emotional support   Restrictions/Precautions   Other Precautions Pain; Fall Risk;Bed Alarm; Chair Alarm   Home Living   Type of [address] One level  (no JONELLE)   Home Equipment Cane;Walker   Prior Function   Level of [place] Independent with ADLs and functional mobility   Lives With Alone   Receives Help From Family   ADL Assistance Independent   IADLs Independent   Comments Pt amb w/out an AD PTA   General   Additional Pertinent History Pt is adm with fatigue, lightheadedness, SOB, chest pain and nausea x 1 wk  Family/Caregiver Present Yes  (pt's dtr)   Cognition   Overall Cognitive Status WFL   Arousal/Participation Cooperative   Orientation Level Oriented X4   Following Commands Follows all commands and directions without difficulty   Subjective   Subjective Pt reports pain to posterior head and dull chest pain  OK with RN for PT    RLE Assessment   RLE Assessment WFL  (3 to 3+/5)   LLE Assessment   LLE Assessment WFL  (3 to 3+/5)   Bed Mobility   Supine to Sit 4  Minimal assistance   Additional items Assist x 1;Verbal cues; Increased time required   Transfers   Sit to Stand 4  Minimal assistance   Additional items Assist x 1;Verbal cues; Increased time required   Stand to Sit 4  Minimal assistance   Additional items Assist x 1;Verbal cues   Toilet transfer 4  Minimal assistance   Additional items Assist x 1;Verbal cues  (pt is min A at sink to wash her hands;independent with hygiene after urination)   Ambulation/Elevation   Gait pattern   (guarded;hesitant;minimally unsteady)   Gait Assistance 4  Minimal assist   Additional items Assist x 1;Verbal cues; Tactile cues   Assistive Device None  (minimal hand hold assist)   Distance 30 feet with change in direction with 2 brief standing rest breaks due to mild SOB   Balance   Static Sitting Fair +   Static Standing Fair -   Dynamic Standing Poor +   Ambulatory Poor +   Activity Tolerance   Activity Tolerance Patient limited by fatigue;Patient limited by pain;Treatment limited secondary to medical complications (Comment)  (mild SOB)   Assessment   Problem List Decreased strength;Decreased range of motion;Decreased endurance; Impaired balance;Decreased mobility; Decreased safety awareness;Pain   Assessment Patient seen for Physical Therapy evaluation  Patient admitted with Chest pain  Comorbidities affecting patient's physical performance include: HTN, osteoporosis, HLD, arthritis of knee, bilateral shoulder pain, CTS R wrist, cont opioid dependence, cervical radiculitis  Personal factors affecting patient at time of initial evaluation include: lives in one story house, inability to navigate community distances, inability to navigate level surfaces without external assistance, inability to perform dynamic tasks in community and limited home support  Prior to admission, patient was independent with functional mobility without assistive device, independent with ADLS, independent with IADLS, living alone in one story home with no steps to enter, ambulating household distance and ambulating community distances  Please find objective findings from Physical Therapy assessment regarding body systems outlined above with impairments and limitations including weakness, decreased ROM, impaired balance, decreased endurance, gait deviations, pain, decreased activity tolerance, decreased functional mobility tolerance, decreased safety awareness, fall risk and SOB upon exertion  The Barthel Index was used as a functional outcome tool presenting with a score of Barthel Index Score: 50 today indicating marked limitations of functional mobility and ADLS    Patient's clinical presentation is currently unstable/unpredictable as seen in patient's presentation of vital sign response, changing level of pain, increased fall risk, new onset of impairment of functional mobility, decreased endurance and new onset of weakness  Pt would benefit from continued Physical Therapy treatment to address deficits as defined above and maximize level of functional mobility  As demonstrated by objective findings, the assigned level of complexity for this evaluation is high  The patient's [facility] Basic Mobility Inpatient Short Form Raw Score is 17  A Raw score of greater than 16 suggests the patient may benefit from discharge to home  Please also refer to the recommendation of the Physical Therapist for safe discharge planning  Despite ampac score, pt is appropriate for post acute rehab services when medically stable for dc  Pt lives alone and is currently min A for ambulation using a RW  Pt normally amb without an AD  Goals   Patient Goals less pain   STG Expiration Date 09/22/22   Short Term Goal #1 bed mob and trans - S   Short Term Goal #2 pt will amb with a RW functional household distances - S; balance with the RW will be at least F/F+ for safe gait and mobility   LTG Expiration Date 09/29/22   Long Term Goal #1 bed mob and trans - I; pt will amb w/wout a cane functional household and community distances - I   Long Term Goal #2 balance w/wout a cane will be F+/G for safe gait and mobility and to decrease fall risk; strength LEs - 3+ to 4-/5   Plan   Treatment/Interventions ADL retraining;Functional transfer training;LE strengthening/ROM; Therapeutic exercise; Endurance training;Equipment eval/education; Bed mobility; Patient/family training;Gait training; Compensatory technique education   PT Frequency 3-5x/wk   Recommendation   PT Discharge Recommendation Post acute rehabilitation services   Equipment Recommended   (pt has a cane and walker)   [facility] Basic Mobility Inpatient   Turning in Bed Without Bedrails 3   Lying on Back to Sitting on Edge of Flat Bed 3   Moving Bed to Chair 3   Standing Up From Chair 3   Walk in Room 3   Climb 3-5 Stairs 2   Basic Mobility Inpatient Raw Score 17   Basic Mobility Standardized Score 39 67   Highest Level Of Mobility   -[facility] Goal 5: Stand one or more mins   -HLM Achieved 7: Walk 25 feet or more   Barthel Index   Feeding 10   Bathing 0   Grooming Score 0   Dressing Score 5   Bladder Score 10   Bowels Score 10   Toilet Use Score 5   Transfers (Bed/Chair) Score 10   Mobility (Level Surface) Score 0   Stairs Score 0   Barthel Index Score 50   Additional Treatment Session   Start Time 1345   End Time 1355   Treatment Assessment S: "It feels good to walk" O: Pt trans sit to stand with min A/close S and amb with a RW 40 feet with change in direction with min A/close S  Pt trans stand to sit with close S  Gait balance and endurance much improved with the RW vs no AD  Pt will benefit from cont skilled PT services to increase pt's strength, gait and mobility, balance and endurance to return pt to her PLOF  At this time, pt will benefit from cont amb with a RW  Pt will benefit from post acute rehab services when medically stable for dc home  End of Consult   Patient Position at End of Consult Bedside chair; All needs within reach   [employer] Number  Carolina Jimenez, [id number]

## 2022-09-15 NOTE — H&P
[facility]  H&P- Alec Pelayo 1945, 67 y o  female MRN: [id number]  Unit/Bed#: ED-20 Encounter: [id number]  Primary Care Provider: Tramaine Avalos MD   Date and time admitted to hospital: 9/14/2022  3:17 PM    Chest pain  Assessment & Plan  Her chest pain is constant and is not relieved or exacerbated by anything, and it has been associated with new fatigue, lightheadedness, occasional shortness of breath, and nausea this morning     It was able to be elicited by left arm movement and palpation on the left chest   EKG was unchanged from prior  Troponins were negative  No electrolyte abnormalities  Chest x-ray was not read by the radiologist at the time of writing, however appeared the without cardiopulmonary abnormalities to my eye  Overall low suspicion for acute coronary syndrome or unstable angina, more likely to be musculoskeletal     She had an echocardiogram as an outpatient yesterday (9/14/22) with an ejection fraction of 60%, also showing mild diastolic dysfunction and mild mitral stenosis  Plan:  -holding aspirin for reported aspirin allergy  -nuclear stress test ordered for tomorrow morning (SPECT)  -on telemetry for 24 hours  -hypertension management as below  -orthostatics vital signs were ordered for reported of lightheadedness  -pain control:  Tylenol q6h and cyclobenzaprine TID  -nausea control:  Zofran p r n   -follow-up CMP tomorrow  Hypertension  Assessment & Plan  Patient was hypertensive at 184/78 while in the emergency department  She takes losartan potassium-hydrochlorothiazide (100/25) at home  She also complained of rapid heart rate yesterday evening, this was not present in the ED      Plan:  -continue to monitor blood pressure routinely   -continue home losartan potassium-hydrochlorothiazide (100/25)  -started metoprolol tartrate 25 mg q12h    HLD (hyperlipidemia)  Assessment & Plan  Plan:  Started atorvastatin, patient is not on a statin at home     Sleep difficulties  Assessment & Plan  Patient reports difficulty sleeping at home, prefers not to take her prescribed Ambien  Plan:  -initiated melatonin qHS  Osteoporosis  Assessment & Plan  Vitamin-D level found to be 26 2  Plan:  -initiate vitamin-D supplementation  VTE Pharmacologic Prophylaxis: VTE Score: 5 Moderate Risk (Score 3-4) - Pharmacological DVT Prophylaxis Ordered: enoxaparin (Lovenox)  Code Status: Level 3 - DNAR and DNI   Discussion with family: Updated  (son) at bedside  Anticipated Length of Stay: Patient will be admitted on an observation basis with an anticipated length of stay of less than 2 midnights secondary to Chest pain, likely musculoskeletal     Chief Complaint:  Having chest pain and exhaustion for about a week  History of Present Illness:  Sridhar Pal is a 67 y o  female with a PMH of hypertension, hyperlipidemia, GERD, significant family history of heart disease, and multiple orthopedic surgeries including rotator cuff repairs and left knee arthroplasty, who presents with a one-week history of exhaustion, constant left-sided chest pain, lightheadedness, and pain in the back of her head  She had been lifting and rearranging her furniture at home when this began  She had difficulty describing how the pain felt  Pain is not pleuritic, does not radiate, is not worsened or improved by exercise or any other factors, and she has not felt this pain before  At the same time she developed an occipital headache, which is also a constant pain  She additionally has had exhaustion, which she states is very unlike her  She usually feels full of energy, and would usually be able to walk more than a mile, however now she is not able to walk to the mailbox without feeling very tired  She had some shortness of breath, particularly last night and this morning, and her shortness of breath last night was accompanied by her heart racing    She also had nausea this morning, but no vomiting  She denies any recent long car rides or flights, hemoptysis, the recent illness, recent sick contacts, vomiting, diarrhea, swelling, or new acute neurologic deficits  Review of Systems:  Review of Systems   Constitutional: Positive for activity change and fatigue  Negative for chills and fever  HENT: Negative for congestion, ear pain, hearing loss, rhinorrhea, sneezing and sore throat  Occipital headache  Eyes: Negative for pain and visual disturbance  Respiratory: Positive for shortness of breath  Negative for cough, chest tightness and wheezing  Cardiovascular: Positive for chest pain  Negative for palpitations and leg swelling  Gastrointestinal: Positive for nausea  Negative for abdominal pain, constipation, diarrhea and vomiting  Genitourinary: Negative for dysuria, enuresis and hematuria  Musculoskeletal: Negative for arthralgias and back pain  Skin: Negative for color change and rash  Neurological: Positive for headaches  Negative for seizures and syncope  Psychiatric/Behavioral: Negative for behavioral problems and confusion  All other systems reviewed and are negative        Past Medical and Surgical History:   Past Medical History:   Diagnosis Date    Arthritis     Carpal tunnel syndrome     GERD (gastroesophageal reflux disease)     Hiatal hernia     Hypertension     Insomnia     Pneumonia     PONV (postoperative nausea and vomiting)     Renal calculi     Sleep deprivation     chronically    Vitamin D deficiency     Vitamin D toxicity 2011    with leaky gut syndrome       Past Surgical History:   Procedure Laterality Date    BUNIONECTOMY Bilateral     CARPAL TUNNEL RELEASE Right 2015    COLONOSCOPY      CYSTOSCOPY N/A 6/6/2019    Procedure: CYSTOSCOPY;  Surgeon: Minh Jarrett MD;  [facility];  Service: Gynecology Oncology    ESOPHAGOGASTRODUODENOSCOPY N/A 5/5/2016    Procedure: ESOPHAGOGASTRODUODENOSCOPY (EGD); Surgeon: Lucinda Stark MD;  [facility]; Service:     FRACTURE SURGERY      L arm     NERVE BLOCK       R Knee    OOPHORECTOMY Left     TN COLONOSCOPY FLX DX W/COLLJ SPEC WHEN PFRMD N/A 6/28/2016    Procedure: COLONOSCOPY;  Surgeon: Lucinda Stark MD;  [facility]; Service: Gastroenterology    TN ESOPHAGOGASTRODUODENOSCOPY TRANSORAL DIAGNOSTIC N/A 10/19/2018    Procedure: ESOPHAGOGASTRODUODENOSCOPY (EGD); Surgeon: Lucinda Stark MD;  [facility]; Service: Gastroenterology    TN LAPAROSCOPY W TOT HYSTERECTUTERUS <=[address] TUBE/OVARY N/A 6/6/2019    Procedure: ROBOTIC TOTAL LAPAROSCOPIC HYSTERECTOMY, RIGHT SALPINGO-OOPHORECTOMY, EXTENSIVE ADHESIOLYSIS, APPROXIMATELY 39 MIN;  Surgeon: Elva Landaverde MD;  [facility];  Service: Gynecology Oncology    ROTATOR CUFF REPAIR Bilateral     TOE SURGERY Left     left 5th toe shaved down due to repeated fx    TOTAL KNEE ARTHROPLASTY Left        Meds/Allergies:  Prior to Admission medications    Medication Sig Start Date End Date Taking?  Authorizing Provider   Acetaminophen 500 MG TAKE 2 CAPSULES TWICE A DAY  Patient taking differently: Take 500 mg by mouth daily as needed TAKE 2 CAPSULES TWICE A DAY 11/18/19  Yes Best Diallo MD   losartan-hydrochlorothiazide [facility]) 100-25 MG per tablet Take 1 tablet by mouth daily 8/30/22  Yes Maye Lyn DO   Nattokinase 100 MG CAPS Take by mouth As needed   Yes Historical Provider, MD   saccharomyces boulardii (FLORASTOR) 250 mg capsule Take 250 mg by mouth 2 (two) times a day   Yes Historical Provider, MD   albuterol (PROVENTIL HFA,VENTOLIN HFA) 90 mcg/act inhaler Inhale 2 puffs every 6 (six) hours as needed for wheezing or shortness of breath  Patient not taking: No sig reported 8/7/20   Antoni Mckinley MD   Probiotic Product (MVW COMPLETE PROBIOTIC PO) Take by mouth    Historical Provider, MD   saccharomyces boulardii (FLORASTOR) 250 mg capsule Take 250 mg by mouth Historical Provider, MD   traMADol (ULTRAM) 50 mg tablet Take 50 mg by mouth every 6 (six) hours as needed  Patient not taking: No sig reported    Historical Provider, MD   traMADol (Ultram) 50 mg tablet Take 1 tablet (50 mg total) by mouth every 8 (eight) hours as needed for moderate pain May take 1/2 tablet as needed for pain relief  Patient not taking: Reported on 9/9/2022 8/26/22   Abdirashid Leonard MD   zolpidem (AMBIEN) 10 mg tablet Take 1/2 tab at hs prn  Patient not taking: Reported on 9/9/2022 8/7/20   Jacqueline Sherman MD     I have reviewed home medications with patient personally  Allergies: Allergies   Allergen Reactions    Ancef [Cefazolin] Hives and GI Intolerance    Aspirin GI Intolerance     Even low dose     Penicillins Hives    Prednisone Hives and GI Intolerance    Vancomycin Hives and GI Intolerance    Adhesive [Medical Tape]     Ciprofloxacin Hives    Cortisone Syncope    Other      STEROIDS- GI INTOLERANCE    Oxycodone Rash and Vomiting       Social History:  Marital Status: Single   Occupation:  Retired, volunteers regularly  Patient Pre-hospital Living Situation: Home  Patient Pre-hospital Level of Mobility: walks, though she does have a cane and walker available    Patient Pre-hospital Diet Restrictions:  Gluten free  Substance Use History:   Social History     Substance and Sexual Activity   Alcohol Use No     Social History     Tobacco Use   Smoking Status Former Smoker   Smokeless Tobacco Never Used   Tobacco Comment    Quit > 30 years ago      Social History     Substance and Sexual Activity   Drug Use No       Family History:  Family History   Problem Relation Age of Onset    Heart failure Mother     Other Mother         respiratory failure    Heart attack Mother     Heart failure Father         respiratory failure    Heart attack Father     Breast cancer Sister     No Known Problems Sister     Kidney failure Brother     Alcohol abuse Brother     No Known Problems Brother     Diabetes Brother     Hypertension Daughter     Arrhythmia Neg Hx     Clotting disorder Neg Hx     Fainting Neg Hx     Anuerysm Neg Hx     Stroke Neg Hx     Hyperlipidemia Neg Hx     Asthma Neg Hx        Physical Exam:     Vitals:   Blood Pressure: 146/80 (09/14/22 2030)  Pulse: 81 (09/14/22 2030)  Temperature: 98 7 °F (37 1 °C) (09/14/22 1119)  Respirations: 18 (09/14/22 2000)  SpO2: 94 % (09/14/22 2000)    Physical Exam  HENT:      Head: Normocephalic and atraumatic  Comments: No pain over the occiput on palpation  Right Ear: External ear normal       Left Ear: External ear normal       Nose: Nose normal       Mouth/Throat:      Mouth: Mucous membranes are moist       Pharynx: No oropharyngeal exudate or posterior oropharyngeal erythema  Eyes:      General: No scleral icterus  Conjunctiva/sclera: Conjunctivae normal       Pupils: Pupils are equal, round, and reactive to light  Cardiovascular:      Rate and Rhythm: Normal rate and regular rhythm  Pulses: Normal pulses  Heart sounds: Normal heart sounds  Comments: A chest wall tenderness when raising left arm palpating lateral to the sternum on the left side  Pulmonary:      Breath sounds: Normal breath sounds  No wheezing, rhonchi or rales  Abdominal:      General: Bowel sounds are normal  There is no distension  Palpations: Abdomen is soft  Tenderness: There is no abdominal tenderness  Musculoskeletal:         General: No swelling or deformity  Cervical back: Neck supple  Right lower leg: No edema  Left lower leg: No edema  Skin:     General: Skin is warm and dry  Neurological:      General: No focal deficit present  Mental Status: She is alert and oriented to person, place, and time     Psychiatric:         Mood and Affect: Mood normal          Behavior: Behavior normal           Additional Data:     Lab Results:  Results from last 7 days   Lab Units 09/14/22  2028 WBC Thousand/uL 8 58   HEMOGLOBIN g/dL 12 6   HEMATOCRIT % 37 8   PLATELETS Thousands/uL 253   NEUTROS PCT % 69   LYMPHS PCT % 22   MONOS PCT % 7   EOS PCT % 2     Results from last 7 days   Lab Units 09/14/22  1229   SODIUM mmol/L 135   POTASSIUM mmol/L 4 3   CHLORIDE mmol/L 103   CO2 mmol/L 22   BUN mg/dL 17   CREATININE mg/dL 0 71   ANION GAP mmol/L 10   CALCIUM mg/dL 9 7   ALBUMIN g/dL 4 5   TOTAL BILIRUBIN mg/dL 0 61   ALK PHOS U/L 77   ALT U/L 15   AST U/L 22   GLUCOSE RANDOM mg/dL 93                       Imaging: Personally reviewed the following imaging: chest xray  XR chest 1 view portable    (Results Pending)         ** Please Note: This note has been constructed using a voice recognition system   **

## 2022-09-15 NOTE — PLAN OF CARE
Problem: PHYSICAL THERAPY ADULT  Goal: Performs mobility at highest level of function for planned discharge setting  See evaluation for individualized goals  Note:    Problem List: Decreased strength, Decreased range of motion, Decreased endurance, Impaired balance, Decreased mobility, Decreased safety awareness, Pain  Assessment: Patient seen for Physical Therapy evaluation  Patient admitted with Chest pain  Comorbidities affecting patient's physical performance include: HTN, osteoporosis, HLD, arthritis of knee, bilateral shoulder pain, CTS R wrist, cont opioid dependence, cervical radiculitis  Personal factors affecting patient at time of initial evaluation include: lives in one story house, inability to navigate community distances, inability to navigate level surfaces without external assistance, inability to perform dynamic tasks in community and limited home support  Prior to admission, patient was independent with functional mobility without assistive device, independent with ADLS, independent with IADLS, living alone in one story home with no steps to enter, ambulating household distance and ambulating community distances  Please find objective findings from Physical Therapy assessment regarding body systems outlined above with impairments and limitations including weakness, decreased ROM, impaired balance, decreased endurance, gait deviations, pain, decreased activity tolerance, decreased functional mobility tolerance, decreased safety awareness, fall risk and SOB upon exertion  The Barthel Index was used as a functional outcome tool presenting with a score of Barthel Index Score: 50 today indicating marked limitations of functional mobility and ADLS    Patient's clinical presentation is currently unstable/unpredictable as seen in patient's presentation of vital sign response, changing level of pain, increased fall risk, new onset of impairment of functional mobility, decreased endurance and new onset of weakness  Pt would benefit from continued Physical Therapy treatment to address deficits as defined above and maximize level of functional mobility  As demonstrated by objective findings, the assigned level of complexity for this evaluation is high  The patient's AM-PAC Basic Mobility Inpatient Short Form Raw Score is 17  A Raw score of greater than 16 suggests the patient may benefit from discharge to home  Please also refer to the recommendation of the Physical Therapist for safe discharge planning  Despite ampac score, pt is appropriate for post acute rehab services when medically stable for dc  Pt lives alone and is currently min A for ambulation using a RW  Pt normally amb without an AD  PT Discharge Recommendation: Post acute rehabilitation services    See flowsheet documentation for full assessment

## 2022-09-15 NOTE — ASSESSMENT & PLAN NOTE
Dull left-sided chest pain began 1 week ago after lifting heavy furniture and radiates to left back  Lightheadedness, shortness of breath present at rest but exacerbated by movement  Palpitations  EKG/troponins/cxr unremarkable  Recent echo showed EF 60% with mild diastolic dysfunction and mild mitral stenosis  Clinically, chest pain is reproducible on palpation raising likelihood of msk etiology  Patient does not show signs of HF or volume overload  Plan:  -nuclear stress test ordered for tomorrow morning (SPECT)  -on telemetry for 24 hours   -Continue muscle relaxer   -pain control:  Tylenol q6h and cyclobenzaprine TID  -nausea control:  Zofran p r n   -follow-up CMP tomorrow

## 2022-09-15 NOTE — ED NOTES
Pt sleeping on stretcher with HOB elevated in negative distress  Respirations regular and non-labored  VSS  Will continue to monitor       Earl Ewing RN  09/14/22 [id number]

## 2022-09-15 NOTE — PLAN OF CARE
Problem: MOBILITY - ADULT  Goal: Maintain or return to baseline ADL function  Description: INTERVENTIONS:  -  Assess patient's ability to carry out ADLs; assess patient's baseline for ADL function and identify physical deficits which impact ability to perform ADLs (bathing, care of mouth/teeth, toileting, grooming, dressing, etc )  - Assess/evaluate cause of self-care deficits   - Assess range of motion  - Assess patient's mobility; develop plan if impaired  - Assess patient's need for assistive devices and provide as appropriate  - Encourage maximum independence but intervene and supervise when necessary  - Involve family in performance of ADLs  - Assess for home care needs following discharge   - Consider OT consult to assist with ADL evaluation and planning for discharge  - Provide patient education as appropriate  Outcome: Progressing  Goal: Maintains/Returns to pre admission functional level  Description: INTERVENTIONS:  - Perform BMAT or MOVE assessment daily    - Set and communicate daily mobility goal to care team and patient/family/caregiver  - Collaborate with rehabilitation services on mobility goals if consulted  - Perform Range of Motion 3 times a day  - Reposition patient every 2 hours    - Dangle patient 3 times a day  - Stand patient 3 times a day  - Ambulate patient 3 times a day  - Out of bed to chair 3 times a day   - Out of bed for meals 3 times a day  - Out of bed for toileting  - Record patient progress and toleration of activity level   Outcome: Progressing     Problem: Potential for Falls  Goal: Patient will remain free of falls  Description: INTERVENTIONS:  - Educate patient/family on patient safety including physical limitations  - Instruct patient to call for assistance with activity   - Consult OT/PT to assist with strengthening/mobility   - Keep Call bell within reach  - Keep bed low and locked with side rails adjusted as appropriate  - Keep care items and personal belongings within reach  - Initiate and maintain comfort rounds  - Make Fall Risk Sign visible to staff  - Offer Toileting every 2 Hours, in advance of need  - Initiate/Maintain alarm  - Obtain necessary fall risk management equipment:   - Apply yellow socks and bracelet for high fall risk patients  - Consider moving patient to room near nurses station  Outcome: Progressing

## 2022-09-15 NOTE — ED NOTES
Per PRADEEP Edwards received verbal order via tiger text to upgrade patient to regular diet as nuclear stress test will not be completed today       April Deandra Trinh RN  09/15/22 [id number]

## 2022-09-15 NOTE — ED NOTES
PT returned from Nuclear med, IV infiltrated and test was not completed  SLIM team 2 made aware via tiger text  IV pulled and warm compress placed on infiltrated site  RN currently at bedside with ultra sound attempting another IV       April Nuzhat Rogers RN  09/15/22 [id number]

## 2022-09-15 NOTE — TELEPHONE ENCOUNTER
Dr Santos  Re: USG Injection  #: [phone]    Patient is currently admitted to the hospital and needs to reschedule her USG injection that is on 9/16/22        Please call her back

## 2022-09-15 NOTE — ED NOTES
Patient transported to nuclear [facility]     April Gilda Daigle RN  09/15/22 [id number]

## 2022-09-15 NOTE — PROGRESS NOTES
[facility]  Progress Note - Griffin Alexis 1945, 67 y o  female MRN: [id number]  Unit/Bed#: ED-20 Encounter: [id number]  Primary Care Provider: Gill Kenny MD   Date and time admitted to hospital: 9/14/2022  3:17 PM    * Chest pain  Assessment & Plan  Dull left-sided chest pain began 1 week ago after lifting heavy furniture and radiates to left back  Lightheadedness, shortness of breath present at rest but exacerbated by movement  Palpitations  EKG/troponins/cxr unremarkable  Recent echo showed EF 60% with mild diastolic dysfunction and mild mitral stenosis  Clinically, chest pain is reproducible on palpation raising likelihood of msk etiology  Patient does not show signs of HF or volume overload  Plan:  -nuclear stress test ordered for tomorrow morning (SPECT)  -on telemetry for 24 hours   -Continue muscle relaxer   -pain control:  Tylenol q6h and cyclobenzaprine TID  -nausea control:  Zofran p r n   -follow-up CMP tomorrow  Hypertension  Assessment & Plan  Patient was hypertensive at 184/78 while in the emergency department  She takes losartan potassium-hydrochlorothiazide (100/25) at home  She also complained of rapid heart rate yesterday evening, this was not present in the ED nor on my exam today  Pressures today well controlled     Plan:  -continue to monitor blood pressure routinely   -continue home losartan potassium-hydrochlorothiazide (100/25)    Sleep difficulties  Assessment & Plan  Patient reports difficulty sleeping at home, prefers not to take her prescribed Ambien  Plan:  -initiated melatonin qHS  Osteoporosis  Assessment & Plan  Vitamin-D level found to be 26 2  Plan:  -initiate vitamin-D supplementation  VTE Pharmacologic Prophylaxis: VTE Score: 5 High Risk (Score >/= 5) - Pharmacological DVT Prophylaxis Ordered: enoxaparin (Lovenox)  Sequential Compression Devices Ordered      Patient Centered Rounds: I performed bedside rounds with nursing staff today  Discussions with Specialists or Other Care Team Provider:  Attending physician    Education and Discussions with Family / Patient: Updated  (daughter) at bedside  Current Length of Stay: 0 day(s)  Current Patient Status: Observation   Discharge Plan: Anticipate discharge in 24-48 hrs to home  Code Status: Level 3 - DNAR and DNI    Subjective:   Patient reports improvement of symptoms today with decrease in chest pain now 6/10 yesterday was 9/10  Pain still radiates to back  Patient reports having GERD but takes no medication and controls it with her diet  Endorses muscular pain of posterior neck and head  Admits moving heavy furniture 2 weeks ago prior to onset of chest pain  Patient reports improvement of status since admission  In no acute distress on exam      Objective:     Vitals:   Temp (24hrs), Av 6 °F (36 4 °C), Min:97 6 °F (36 4 °C), Max:97 6 °F (36 4 °C)    Temp:  [97 6 °F (36 4 °C)] 97 6 °F (36 4 °C)  HR:  [62-83] 63  Resp:  [16-19] 19  BP: (120-202)/(56-81) 141/62  SpO2:  [94 %-99 %] 97 %  There is no height or weight on file to calculate BMI  Input and Output Summary (last 24 hours):   No intake or output data in the 24 hours ending 09/15/22 1500    Physical Exam:   Physical Exam  Vitals and nursing note reviewed  Constitutional:       General: She is not in acute distress  Appearance: Normal appearance  She is not ill-appearing  Cardiovascular:      Rate and Rhythm: Normal rate and regular rhythm  Pulmonary:      Effort: Pulmonary effort is normal       Breath sounds: Normal breath sounds  Abdominal:      General: Bowel sounds are normal       Palpations: Abdomen is soft  Musculoskeletal:      Cervical back: Neck supple  Skin:     General: Skin is warm  Capillary Refill: Capillary refill takes less than 2 seconds  Neurological:      General: No focal deficit present        Mental Status: She is alert and oriented to person, place, and time     Psychiatric:         Mood and Affect: Mood normal           Additional Data:     Labs:  Results from last 7 days   Lab Units 09/15/22  0442   WBC Thousand/uL 7 43   HEMOGLOBIN g/dL 12 0   HEMATOCRIT % 36 7   PLATELETS Thousands/uL 256   NEUTROS PCT % 65   LYMPHS PCT % 23   MONOS PCT % 9   EOS PCT % 3     Results from last 7 days   Lab Units 09/15/22  0442   SODIUM mmol/L 134*   POTASSIUM mmol/L 3 8   CHLORIDE mmol/L 102   CO2 mmol/L 25   BUN mg/dL 27*   CREATININE mg/dL 0 81   ANION GAP mmol/L 7   CALCIUM mg/dL 9 2   ALBUMIN g/dL 4 1   TOTAL BILIRUBIN mg/dL 0 78   ALK PHOS U/L 68   ALT U/L 16   AST U/L 16   GLUCOSE RANDOM mg/dL 99                       Lines/Drains:  Invasive Devices  Report    Peripheral Intravenous Line  Duration           Peripheral IV 09/15/22 Right Forearm <1 day                  Telemetry:  Telemetry Orders (From admission, onward)             48 Hour Telemetry Monitoring  Continuous x 48 hours        References:    Telemetry Guidelines   Question:  Reason for 48 Hour Telemetry  Answer:  Acute MI, chest pain - R/O MI, or unstable angina                 Telemetry Reviewed: Normal Sinus Rhythm  Indication for Continued Telemetry Use: Acute MI/Unstable Angina/Rule out ACS             Imaging: Reviewed radiology reports from this admission including: chest xray    Recent Cultures (last 7 days):         Last 24 Hours Medication List:   Current Facility-Administered Medications   Medication Dose Route Frequency Provider Last Rate    acetaminophen  975 mg Oral Q6H Trever Thornton MD      atorvastatin  40 mg Oral QPM Deborah Eugene MD      cyclobenzaprine  5 mg Oral TID Deborah Eugene MD      enoxaparin  40 mg Subcutaneous Daily Deborah Eugene MD      ergocalciferol  50,000 Units Oral Weekly Deborah Eugene MD      losartan potassium-hydrochlorothiazide (HYZAAR 100/25) combo dose   Oral Daily Deborah Eugene MD      melatonin  3 mg Oral HS MD Mayra Salas metoprolol tartrate  25 mg Oral Q12H Trever Thornton MD      ondansetron  4 mg Intravenous Q6H PRN Nancy Ayala MD          Today, Patient Was Seen By: Betty Sanchez MD    **Please Note: This note may have been constructed using a voice recognition system  **

## 2022-09-15 NOTE — ED NOTES
Patient ambulated to bathroom with assist x1  No immediate signs of distress, no other complaints       April Chele Hyatt RN  09/15/22 2038

## 2022-09-15 NOTE — ASSESSMENT & PLAN NOTE
Patient was hypertensive at 184/78 while in the emergency department  She takes losartan potassium-hydrochlorothiazide (100/25) at home  She also complained of rapid heart rate yesterday evening, this was not present in the ED nor on my exam today     Pressures today well controlled     Plan:  -continue to monitor blood pressure routinely   -continue home losartan potassium-hydrochlorothiazide (100/25)

## 2022-09-15 NOTE — ASSESSMENT & PLAN NOTE
Patient was hypertensive at 184/78 while in the emergency department  She takes losartan potassium-hydrochlorothiazide (100/25) at home  She also complained of rapid heart rate yesterday evening, this was not present in the ED      Plan:  -continue to monitor blood pressure routinely   -continue home losartan potassium-hydrochlorothiazide (100/25)  -started metoprolol tartrate 25 mg q12h

## 2022-09-16 ENCOUNTER — RA CDI HCC (OUTPATIENT)
Dept: OTHER | Facility: HOSPITAL | Age: 77
End: 2022-09-16

## 2022-09-16 ENCOUNTER — APPOINTMENT (INPATIENT)
Dept: NON INVASIVE DIAGNOSTICS | Facility: HOSPITAL | Age: 77
DRG: 305 | End: 2022-09-16
Payer: MEDICARE

## 2022-09-16 ENCOUNTER — APPOINTMENT (OUTPATIENT)
Dept: NUCLEAR MEDICINE | Facility: HOSPITAL | Age: 77
DRG: 305 | End: 2022-09-16
Payer: MEDICARE

## 2022-09-16 LAB
ANION GAP SERPL CALCULATED.3IONS-SCNC: 8 MMOL/L (ref 4–13)
BASELINE ST DEPRESSION: 0 MM
BUN SERPL-MCNC: 32 MG/DL (ref 5–25)
CALCIUM SERPL-MCNC: 9 MG/DL (ref 8.4–10.2)
CHEST PAIN STATEMENT: NORMAL
CHLORIDE SERPL-SCNC: 103 MMOL/L (ref 96–108)
CO2 SERPL-SCNC: 24 MMOL/L (ref 21–32)
CREAT SERPL-MCNC: 0.81 MG/DL (ref 0.6–1.3)
GFR SERPL CREATININE-BSD FRML MDRD: 70 ML/MIN/1.73SQ M
GLUCOSE SERPL-MCNC: 107 MG/DL (ref 65–140)
MAX DIASTOLIC BP: 67 MMHG
MAX HEART RATE: 96 BPM
MAX HR PERCENT: 67 %
MAX HR: 96 BPM
MAX PREDICTED HEART RATE: 143 BPM
MAX. SYSTOLIC BP: 149 MMHG
NUC STRESS EJECTION FRACTION: 81 %
POTASSIUM SERPL-SCNC: 3.9 MMOL/L (ref 3.5–5.3)
PROTOCOL NAME: NORMAL
RATE PRESSURE PRODUCT: NORMAL
REASON FOR TERMINATION: NORMAL
SL CV REST NUCLEAR ISOTOPE DOSE: 11 MCI
SL CV STRESS NUCLEAR ISOTOPE DOSE: 33 MCI
SL CV STRESS RECOVERY BP: NORMAL MMHG
SL CV STRESS RECOVERY HR: 85 BPM
SL CV STRESS RECOVERY O2 SAT: 97 %
SODIUM SERPL-SCNC: 135 MMOL/L (ref 135–147)
STRESS BASELINE BP: NORMAL MMHG
STRESS BASELINE HR: 66 BPM
STRESS O2 SAT REST: 97 %
STRESS PEAK HR: 96 BPM
STRESS POST O2 SAT PEAK: 96 %
STRESS POST PEAK BP: 127 MMHG
STRESS ST DEPRESSION: 0 MM
STRESS/REST PERFUSION RATIO: 1.16
TARGET HR FORMULA: NORMAL
TEST INDICATION: NORMAL
TIME IN EXERCISE PHASE: NORMAL

## 2022-09-16 PROCEDURE — 93016 CV STRESS TEST SUPVJ ONLY: CPT | Performed by: INTERNAL MEDICINE

## 2022-09-16 PROCEDURE — 78452 HT MUSCLE IMAGE SPECT MULT: CPT

## 2022-09-16 PROCEDURE — 80048 BASIC METABOLIC PNL TOTAL CA: CPT

## 2022-09-16 PROCEDURE — 93018 CV STRESS TEST I&R ONLY: CPT | Performed by: INTERNAL MEDICINE

## 2022-09-16 PROCEDURE — 93017 CV STRESS TEST TRACING ONLY: CPT

## 2022-09-16 PROCEDURE — 99232 SBSQ HOSP IP/OBS MODERATE 35: CPT | Performed by: INTERNAL MEDICINE

## 2022-09-16 PROCEDURE — 78452 HT MUSCLE IMAGE SPECT MULT: CPT | Performed by: INTERNAL MEDICINE

## 2022-09-16 PROCEDURE — A9502 TC99M TETROFOSMIN: HCPCS

## 2022-09-16 RX ORDER — CYCLOBENZAPRINE HCL 5 MG
5 TABLET ORAL 3 TIMES DAILY
Qty: 21 TABLET | Refills: 0 | Status: CANCELLED | OUTPATIENT
Start: 2022-09-16 | End: 2022-09-23

## 2022-09-16 RX ORDER — ERGOCALCIFEROL 1.25 MG/1
50000 CAPSULE ORAL WEEKLY
Qty: 4 CAPSULE | Refills: 0 | Status: CANCELLED | OUTPATIENT
Start: 2022-09-21 | End: 2022-10-21

## 2022-09-16 RX ADMIN — ACETAMINOPHEN 975 MG: 325 TABLET, FILM COATED ORAL at 23:15

## 2022-09-16 RX ADMIN — REGADENOSON 0.4 MG: 0.08 INJECTION, SOLUTION INTRAVENOUS at 08:43

## 2022-09-16 RX ADMIN — ACETAMINOPHEN 975 MG: 325 TABLET, FILM COATED ORAL at 05:26

## 2022-09-16 RX ADMIN — CYCLOBENZAPRINE HYDROCHLORIDE 5 MG: 10 TABLET, FILM COATED ORAL at 16:53

## 2022-09-16 RX ADMIN — ACETAMINOPHEN 975 MG: 325 TABLET, FILM COATED ORAL at 12:27

## 2022-09-16 RX ADMIN — HYDROCHLOROTHIAZIDE: 25 TABLET ORAL at 10:15

## 2022-09-16 RX ADMIN — Medication 3 MG: at 23:15

## 2022-09-16 RX ADMIN — ENOXAPARIN SODIUM 40 MG: 40 INJECTION SUBCUTANEOUS at 10:17

## 2022-09-16 RX ADMIN — CYCLOBENZAPRINE HYDROCHLORIDE 5 MG: 10 TABLET, FILM COATED ORAL at 10:15

## 2022-09-16 RX ADMIN — ATORVASTATIN CALCIUM 40 MG: 40 TABLET, FILM COATED ORAL at 16:53

## 2022-09-16 RX ADMIN — ACETAMINOPHEN 975 MG: 325 TABLET, FILM COATED ORAL at 16:53

## 2022-09-16 RX ADMIN — METOPROLOL TARTRATE 25 MG: 25 TABLET, FILM COATED ORAL at 10:16

## 2022-09-16 NOTE — CASE MANAGEMENT
Case Management Assessment & Discharge Planning Note    Patient name Sher Denney  Location S /S -66 MRN [id number]  : 1945 Date 2022       Current Admission Date: 2022  Current Admission Diagnosis:Chest pain   Patient Active Problem List    Diagnosis Date Noted    Sleep difficulties 2022    Chest pain 2022    HLD (hyperlipidemia) 2022    Decreased energy 2022    Continuous opioid dependence ([address] ) 2022    Chronic pain syndrome - Right 2019    Status post hysterectomy 2019    Breast cancer screening 2019    Carpal tunnel syndrome of right wrist     Cervical radiculitis 10/24/2018    Left shoulder pain 2018    Right shoulder pain 2018    Tendinopathy of left rotator cuff 2018    Tendinopathy of right rotator cuff 2018    Globus sensation 2018    RUQ pain 2018    Gastroesophageal reflux disease without esophagitis 2018    Arthritis of knee 2018    Lower back pain 2018    Primary osteoarthritis of right knee 2017    Chest pressure 2016    GERD (gastroesophageal reflux disease) 2016    Sleep deprivation 2016    Hypertension 2016    Left knee pain 2015    Osteoporosis 2012      LOS (days): 1  Geometric Mean LOS (GMLOS) (days): 2 20  Days to GMLOS:1 2     OBJECTIVE:    Risk of Unplanned Readmission Score: 12 52         Current admission status: Inpatient       Preferred Pharmacy:   [address] [place] [address] #[id number] [address]  Phone: [phone] Fax: [phone]    Primary Care Provider: Belle Basurto MD    Primary Insurance: MEDICARE  Secondary Insurance: COMMERCIAL MISCELLANEOUS    ASSESSMENT:  Active Health Care Proxies    There are no active Health Care Proxies on file                   Readmission Root Cause  30 Day Readmission: No    Patient Information  Admitted from[de-identified] Home  Mental Status: Alert  During Assessment patient was accompanied by: Not accompanied during assessment  Assessment information provided by[de-identified] Daughter  Primary Caregiver: Self  Support Systems: Self, Children  Home entry access options   Select all that apply : No steps to enter home  Type of Current Residence: Apartment  Floor Level: 1  Upon entering residence, is there a bedroom on the main floor (no further steps)?: Yes  Upon entering residence, is there a bathroom on the main floor (no further steps)?: Yes  In the last 12 months, was there a time when you were not able to pay the mortgage or rent on time?: No  In the last 12 months, was there a time when you did not have a steady place to sleep or slept in a shelter (including now)?: No  Homeless/housing insecurity resource given?: N/A  Living Arrangements: Lives Alone    Activities of Daily Living Prior to Admission  Functional Status: Independent  Completes ADLs independently?: Yes  Ambulates independently?: Yes  Does patient use assisted devices?: Yes  Assisted Devices (DME) used: [employer]  Does patient currently own DME?: Yes  What DME does the patient currently own?: [employer]  Does patient have a history of Outpatient Therapy (PT/OT)?: Yes  Does the patient have a history of Short-Term Rehab?: Yes  Does patient have a history of HHC?: No  Does patient currently have [place] [address]?: No         Patient Information Continued  Income Source: Pension/MCFP  Does patient have prescription coverage?: Yes  Within the past 12 months, you worried that your food would run out before you got the money to buy more : Never true  Within the past 12 months, the food you bought just didn't last and you didn't have money to get more : Never true  Food insecurity resource given?: N/A  Does patient receive dialysis treatments?: No  Does patient have a history of substance abuse?: No  Does patient have a history of Mental Health Diagnosis?: No         Means of Transportation  Means of Transport to Appts[de-identified] Family transport  In the past 12 months, has lack of transportation kept you from medical appointments or from getting medications?: No  In the past 12 months, has lack of transportation kept you from meetings, work, or from getting things needed for daily living?: No  Was application for public transport provided?: N/A        DISCHARGE DETAILS:    Discharge planning discussed with[de-identified] patient and daughter  Freedom of Choice: Yes  Comments - Freedom of Choice: Patient and daughter aware that patient is being recommended for inpatient rehab currently Cm informed that patient would prefer bed at [facility]  If not there then [facility] and then [facility]  Cm to send referrals    CM contacted family/caregiver?: Yes  Were Treatment Team discharge recommendations reviewed with patient/caregiver?: Yes  Did patient/caregiver verbalize understanding of patient care needs?: Yes  Were patient/caregiver advised of the risks associated with not following Treatment Team discharge recommendations?: Yes         Requested [address]         Is the patient interested in [facility] at discharge?: No    DME Referral Provided  Referral made for DME?: No    Other Referral/Resources/Interventions Provided:  Referral Comments: [place] referrals sent to [facility], [facility] and [facility] at family request          Treatment Team Recommendation: Short Term Rehab  Discharge Destination Plan[de-identified] Short Term Rehab  Transport at Discharge : [facility] [place]

## 2022-09-16 NOTE — PROGRESS NOTES
[facility]  Progress Note - Reji Quintero 1945, 67 y o  female MRN: [id number]  Unit/Bed#: S -01 Encounter: [id number]  Primary Care Provider: Manish Rachel MD   Date and time admitted to hospital: 9/14/2022  3:17 PM    * Chest pain  Assessment & Plan  Dull left-sided chest pain began 1 week ago after lifting heavy furniture and radiates to left back  Lightheadedness, shortness of breath present at rest but exacerbated by movement  Palpitations  EKG/troponins/cxr unremarkable  Recent echo showed EF 60% with mild diastolic dysfunction and mild mitral stenosis  Clinically, chest pain is reproducible on palpation raising likelihood of msk etiology  Patient does not show signs of HF or volume overload  Nuclear stress test unremarkable   Tele barry'randy AGUILAR working on [facility]     Plan:  -Continue muscle relaxer   -pain control:  Tylenol q6h and cyclobenzaprine TID  -nausea control:  Zofran p r n  Hypertension  Assessment & Plan  Patient was hypertensive at 184/78 while in the emergency department  She takes losartan potassium-hydrochlorothiazide (100/25) at home  Pressures today well controlled     Plan:  -continue home losartan potassium-hydrochlorothiazide (100/25)    HLD (hyperlipidemia)  Assessment & Plan  Plan:  Started atorvastatin, patient is not on a statin at home  Sleep difficulties  Assessment & Plan  Patient reports difficulty sleeping at home, prefers not to take her prescribed Ambien  Plan:  -initiated melatonin qHS  Osteoporosis  Assessment & Plan  Vitamin-D level found to be 26 2  Plan:  -Continue vitamin-D supplementation  VTE Pharmacologic Prophylaxis: VTE Score: 5 High Risk (Score >/= 5) - Pharmacological DVT Prophylaxis Ordered: enoxaparin (Lovenox)  Sequential Compression Devices Ordered  Patient Centered Rounds: I performed bedside rounds with nursing staff today    Discussions with Specialists or Other Care Team Provider:  Attending physician    Education and Discussions with Family / Patient: Updated  (daughter) at bedside  Current Length of Stay: 1 day(s)  Current Patient Status: Inpatient   Discharge Plan: Anticipate discharge in 24-48 hrs to rehab facility  Code Status: Level 3 - DNAR and DNI    Subjective:   Patient denies any complaints today on exam with improvement in chest pain as well as pain in the back of her head that is MSK related  Reports improvement in pain on muscle relaxant  Tolerating diet well with appetite  No acute distress    Objective:     Vitals:   Temp (24hrs), Av 9 °F (36 6 °C), Min:97 3 °F (36 3 °C), Max:99 °F (37 2 °C)    Temp:  [97 3 °F (36 3 °C)-99 °F (37 2 °C)] 97 5 °F (36 4 °C)  HR:  [68-85] 68  Resp:  [16-18] 18  BP: (107-135)/(61-85) 125/65  SpO2:  [95 %-97 %] 95 %  Body mass index is 30 09 kg/m²  Input and Output Summary (last 24 hours):   No intake or output data in the 24 hours ending 22 1428    Physical Exam:   Physical Exam  Constitutional:       General: She is not in acute distress  Appearance: Normal appearance  She is not ill-appearing  Cardiovascular:      Rate and Rhythm: Regular rhythm  Pulses: Normal pulses  Heart sounds: Normal heart sounds  Pulmonary:      Effort: Pulmonary effort is normal       Breath sounds: Normal breath sounds  Abdominal:      Palpations: Abdomen is soft  Neurological:      General: No focal deficit present  Mental Status: She is alert and oriented to person, place, and time     Psychiatric:         Mood and Affect: Mood normal           Additional Data:     Labs:  Results from last 7 days   Lab Units 09/15/22  0442   WBC Thousand/uL 7 43   HEMOGLOBIN g/dL 12 0   HEMATOCRIT % 36 7   PLATELETS Thousands/uL 256   NEUTROS PCT % 65   LYMPHS PCT % 23   MONOS PCT % 9   EOS PCT % 3     Results from last 7 days   Lab Units 22  0557 09/15/22  0442   SODIUM mmol/L 135 134*   POTASSIUM mmol/L 3 9 3 8   CHLORIDE mmol/L 103 102   CO2 mmol/L 24 25   BUN mg/dL 32* 27*   CREATININE mg/dL 0 81 0 81   ANION GAP mmol/L 8 7   CALCIUM mg/dL 9 0 9 2   ALBUMIN g/dL  --  4 1   TOTAL BILIRUBIN mg/dL  --  0 78   ALK PHOS U/L  --  68   ALT U/L  --  16   AST U/L  --  16   GLUCOSE RANDOM mg/dL 107 99                       Lines/Drains:  Invasive Devices  Report    Peripheral Intravenous Line  Duration           Peripheral IV 09/15/22 Right Forearm 1 day                      Imaging: Reviewed radiology reports from this admission including: nuclear stress test     Recent Cultures (last 7 days):         Last 24 Hours Medication List:   Current Facility-Administered Medications   Medication Dose Route Frequency Provider Last Rate    acetaminophen  975 mg Oral Q6H Lane Oropeza MD      atorvastatin  40 mg Oral QPM Abel Glass MD      cyclobenzaprine  5 mg Oral TID Abel Glass MD      enoxaparin  40 mg Subcutaneous Daily Abel Glass MD      ergocalciferol  50,000 Units Oral Weekly Abel Glass MD      losartan potassium-hydrochlorothiazide (HYZAAR 100/25) combo dose   Oral Daily Abel Glass MD      melatonin  3 mg Oral HS Abel Glass MD      ondansetron  4 mg Intravenous Q6H PRN Abel Glass MD          Today, Patient Was Seen By: Jorge A Enriquez MD    **Please Note: This note may have been constructed using a voice recognition system  **

## 2022-09-16 NOTE — ASSESSMENT & PLAN NOTE
Dull left-sided chest pain began 1 week ago after lifting heavy furniture and radiates to left back  Lightheadedness, shortness of breath present at rest but exacerbated by movement  Palpitations  EKG/troponins/cxr unremarkable  Recent echo showed EF 60% with mild diastolic dysfunction and mild mitral stenosis  Clinically, chest pain is reproducible on palpation raising likelihood of msk etiology  Patient does not show signs of HF or volume overload  Nuclear stress test unremarkable   Tele barry'randy AGUILAR working on [employer]     Plan:  -Continue muscle relaxer   -pain control:  Tylenol q6h and cyclobenzaprine TID  -nausea control:  Zofran p r n

## 2022-09-16 NOTE — ASSESSMENT & PLAN NOTE
Patient was hypertensive at 184/78 while in the emergency department  She takes losartan potassium-hydrochlorothiazide (100/25) at home      Pressures today well controlled     Plan:  -continue home losartan potassium-hydrochlorothiazide (100/25)

## 2022-09-17 PROBLEM — R31.9 URINARY TRACT INFECTION WITH HEMATURIA: Status: ACTIVE | Noted: 2022-09-17

## 2022-09-17 PROBLEM — N39.0 URINARY TRACT INFECTION WITH HEMATURIA: Status: ACTIVE | Noted: 2022-09-17

## 2022-09-17 LAB
ANION GAP SERPL CALCULATED.3IONS-SCNC: 11 MMOL/L (ref 4–13)
APTT PPP: 39 SECONDS (ref 23–37)
BACTERIA UR QL AUTO: ABNORMAL /HPF
BILIRUB UR QL STRIP: NEGATIVE
BUN SERPL-MCNC: 23 MG/DL (ref 5–25)
CALCIUM SERPL-MCNC: 9.2 MG/DL (ref 8.4–10.2)
CHLORIDE SERPL-SCNC: 100 MMOL/L (ref 96–108)
CLARITY UR: CLEAR
CO2 SERPL-SCNC: 21 MMOL/L (ref 21–32)
COLOR UR: COLORLESS
CREAT SERPL-MCNC: 0.76 MG/DL (ref 0.6–1.3)
ERYTHROCYTE [DISTWIDTH] IN BLOOD BY AUTOMATED COUNT: 12.8 % (ref 11.6–15.1)
GFR SERPL CREATININE-BSD FRML MDRD: 75 ML/MIN/1.73SQ M
GLUCOSE SERPL-MCNC: 133 MG/DL (ref 65–140)
GLUCOSE UR STRIP-MCNC: NEGATIVE MG/DL
HCT VFR BLD AUTO: 40 % (ref 34.8–46.1)
HGB BLD-MCNC: 13.2 G/DL (ref 11.5–15.4)
HGB UR QL STRIP.AUTO: ABNORMAL
INR PPP: 0.93 (ref 0.84–1.19)
KETONES UR STRIP-MCNC: NEGATIVE MG/DL
LEUKOCYTE ESTERASE UR QL STRIP: ABNORMAL
MCH RBC QN AUTO: 30.3 PG (ref 26.8–34.3)
MCHC RBC AUTO-ENTMCNC: 33 G/DL (ref 31.4–37.4)
MCV RBC AUTO: 92 FL (ref 82–98)
NITRITE UR QL STRIP: NEGATIVE
NON-SQ EPI CELLS URNS QL MICRO: ABNORMAL /HPF
PH UR STRIP.AUTO: 5.5 [PH]
PLATELET # BLD AUTO: 240 THOUSANDS/UL (ref 149–390)
PMV BLD AUTO: 11.9 FL (ref 8.9–12.7)
POTASSIUM SERPL-SCNC: 3.5 MMOL/L (ref 3.5–5.3)
PROT UR STRIP-MCNC: NEGATIVE MG/DL
PROTHROMBIN TIME: 12.7 SECONDS (ref 11.6–14.5)
RBC # BLD AUTO: 4.36 MILLION/UL (ref 3.81–5.12)
RBC #/AREA URNS AUTO: ABNORMAL /HPF
SODIUM SERPL-SCNC: 132 MMOL/L (ref 135–147)
SP GR UR STRIP.AUTO: 1.01 (ref 1–1.03)
UROBILINOGEN UR STRIP-ACNC: <2 MG/DL
WBC # BLD AUTO: 8.29 THOUSAND/UL (ref 4.31–10.16)
WBC #/AREA URNS AUTO: ABNORMAL /HPF

## 2022-09-17 PROCEDURE — 85027 COMPLETE CBC AUTOMATED: CPT | Performed by: INTERNAL MEDICINE

## 2022-09-17 PROCEDURE — 99232 SBSQ HOSP IP/OBS MODERATE 35: CPT | Performed by: INTERNAL MEDICINE

## 2022-09-17 PROCEDURE — 87086 URINE CULTURE/COLONY COUNT: CPT

## 2022-09-17 PROCEDURE — 81001 URINALYSIS AUTO W/SCOPE: CPT

## 2022-09-17 PROCEDURE — 85730 THROMBOPLASTIN TIME PARTIAL: CPT | Performed by: INTERNAL MEDICINE

## 2022-09-17 PROCEDURE — 80048 BASIC METABOLIC PNL TOTAL CA: CPT | Performed by: INTERNAL MEDICINE

## 2022-09-17 PROCEDURE — 85610 PROTHROMBIN TIME: CPT | Performed by: INTERNAL MEDICINE

## 2022-09-17 RX ORDER — ACETAMINOPHEN 325 MG/1
975 TABLET ORAL EVERY 8 HOURS SCHEDULED
Status: DISCONTINUED | OUTPATIENT
Start: 2022-09-17 | End: 2022-09-18 | Stop reason: HOSPADM

## 2022-09-17 RX ORDER — SULFAMETHOXAZOLE AND TRIMETHOPRIM 800; 160 MG/1; MG/1
1 TABLET ORAL EVERY 12 HOURS SCHEDULED
Status: DISCONTINUED | OUTPATIENT
Start: 2022-09-17 | End: 2022-09-18 | Stop reason: HOSPADM

## 2022-09-17 RX ORDER — CEFTRIAXONE 1 G/50ML
1000 INJECTION, SOLUTION INTRAVENOUS EVERY 24 HOURS
Status: DISCONTINUED | OUTPATIENT
Start: 2022-09-17 | End: 2022-09-17

## 2022-09-17 RX ADMIN — HYDROCHLOROTHIAZIDE: 25 TABLET ORAL at 08:00

## 2022-09-17 RX ADMIN — SULFAMETHOXAZOLE AND TRIMETHOPRIM 1 TABLET: 800; 160 TABLET ORAL at 21:04

## 2022-09-17 RX ADMIN — ACETAMINOPHEN 975 MG: 325 TABLET, FILM COATED ORAL at 12:07

## 2022-09-17 RX ADMIN — ACETAMINOPHEN 975 MG: 325 TABLET, FILM COATED ORAL at 05:27

## 2022-09-17 RX ADMIN — Medication 3 MG: at 21:04

## 2022-09-17 RX ADMIN — ATORVASTATIN CALCIUM 40 MG: 40 TABLET, FILM COATED ORAL at 18:08

## 2022-09-17 RX ADMIN — ENOXAPARIN SODIUM 40 MG: 40 INJECTION SUBCUTANEOUS at 07:59

## 2022-09-17 RX ADMIN — ACETAMINOPHEN 975 MG: 325 TABLET, FILM COATED ORAL at 21:04

## 2022-09-17 NOTE — CASE MANAGEMENT
Case Management Progress Note    Patient name Esther Trinh  Location S /S -46 MRN [id number]  : 1945 Date 2022       LOS (days): 2  Geometric Mean LOS (GMLOS) (days): 2 20  Days to GMLOS:0 4        OBJECTIVE:        Current admission status: Inpatient  Preferred Pharmacy:   [address] [place] [address] [id number] [facility] [address] [address]  Phone: [phone] Fax: [phone]    Primary Care Provider: Karly Chong MD    Primary Insurance: MEDICARE  Secondary Insurance: COMMERCIAL MISCELLANEOUS    PROGRESS NOTE:      Cm sent updated request for information in regards to any availability at facilities, [facility] and [address] [place] [address] awaiting response from facilities   Cm provided update to medical team

## 2022-09-17 NOTE — PROGRESS NOTES
[facility]  Progress Note - Roselyn December 1945, 67 y o  female MRN: [id number]  Unit/Bed#: S -01 Encounter: [id number]  Primary Care Provider: Merlin Emperor, MD   Date and time admitted to hospital: 9/14/2022  3:17 PM    * Chest pain  Assessment & Plan  Dull left-sided chest pain began 1 week ago after lifting heavy furniture and radiates to left back  Lightheadedness, shortness of breath present at rest but exacerbated by movement  Palpitations  Now resolved  EKG/troponins/cxr unremarkable  Recent echo showed EF 60% with mild diastolic dysfunction and mild mitral stenosis  Clinically, chest pain is reproducible on palpation raising likelihood of msk etiology  Patient does not show signs of HF or volume overload  Nuclear stress test unremarkable   Tele dc'randy  CM working on [address], likely tomorrow after PT     Plan:  -Continue muscle relaxer   -pain control:  Tylenol q6h and cyclobenzaprine TID  -nausea control:  Zofran p r n  Hypertension  Assessment & Plan  Patient was hypertensive at 184/78 while in the emergency department  She takes losartan potassium-hydrochlorothiazide (100/25) at home  Pressures today well controlled     Plan:  -continue home losartan potassium-hydrochlorothiazide (100/25)    Urinary tract infection with hematuria  Assessment & Plan  This morning, patient reports 3x hematuria/dyrusia with small volume of urine  Denies fever, chills, nausea, vomit  Admits 4x cups of water per day while in hospital with regular appetite    Plan  F/u UA w reflex cx  Treat infection if positive  Holding Lovenox  PTT, PT/INR  Urinary retention protocol   Monitor Hb with cbc  BMP    HLD (hyperlipidemia)  Assessment & Plan  Plan:  Started atorvastatin, patient is not on a statin at home  Sleep difficulties  Assessment & Plan  Patient reports difficulty sleeping at home, prefers not to take her prescribed Ambien      Plan:  -initiated melatonin qHS     Osteoporosis  Assessment & Plan  Vitamin-D level found to be 26 2  Plan:  -Continue vitamin-D supplementation  VTE Pharmacologic Prophylaxis: VTE Score: 5 High Risk (Score >/= 5) - Pharmacological DVT Prophylaxis Ordered: heparin  Sequential Compression Devices Ordered  Patient Centered Rounds: I performed bedside rounds with nursing staff today  Discussions with Specialists or Other Care Team Provider: attending physician     Education and Discussions with Family / Patient: Updated  (daughter) via phone  Current Length of Stay: 2 day(s)  Current Patient Status: Inpatient   Discharge Plan: Anticipate discharge in 24-48 hrs to rehab facility  Code Status: Level 3 - DNAR and DNI    Subjective:   Patient reports feeling well however this morning had 3 episodes of hematuria/dysuria with small volume of urine  Denies fever, chills, nausea, vomit  Has been tolerating diet well and drinking 4x cups of water daily  Objective:     Vitals:   Temp (24hrs), Av 6 °F (36 4 °C), Min:97 6 °F (36 4 °C), Max:97 6 °F (36 4 °C)    Temp:  [97 6 °F (36 4 °C)] 97 6 °F (36 4 °C)  Resp:  [18] 18  BP: (140-143)/(68-76) 140/76  Body mass index is 30 09 kg/m²  Input and Output Summary (last 24 hours): Intake/Output Summary (Last 24 hours) at 2022 1311  Last data filed at 2022 0951  Gross per 24 hour   Intake 240 ml   Output --   Net 240 ml       Physical Exam:   Physical Exam  Vitals and nursing note reviewed  Constitutional:       General: She is not in acute distress  Appearance: Normal appearance  She is not ill-appearing  Cardiovascular:      Rate and Rhythm: Normal rate and regular rhythm  Pulses: Normal pulses  Heart sounds: Normal heart sounds  Pulmonary:      Effort: Pulmonary effort is normal       Breath sounds: Normal breath sounds  Abdominal:      Palpations: Abdomen is soft  Skin:     General: Skin is warm     Neurological:      General: No focal deficit present  Mental Status: She is alert and oriented to person, place, and time  Additional Data:     Labs:  Results from last 7 days   Lab Units 09/15/22  0442   WBC Thousand/uL 7 43   HEMOGLOBIN g/dL 12 0   HEMATOCRIT % 36 7   PLATELETS Thousands/uL 256   NEUTROS PCT % 65   LYMPHS PCT % 23   MONOS PCT % 9   EOS PCT % 3     Results from last 7 days   Lab Units 09/16/22  0557 09/15/22  0442   SODIUM mmol/L 135 134*   POTASSIUM mmol/L 3 9 3 8   CHLORIDE mmol/L 103 102   CO2 mmol/L 24 25   BUN mg/dL 32* 27*   CREATININE mg/dL 0 81 0 81   ANION GAP mmol/L 8 7   CALCIUM mg/dL 9 0 9 2   ALBUMIN g/dL  --  4 1   TOTAL BILIRUBIN mg/dL  --  0 78   ALK PHOS U/L  --  68   ALT U/L  --  16   AST U/L  --  16   GLUCOSE RANDOM mg/dL 107 99                       Lines/Drains:  Invasive Devices  Report    Peripheral Intravenous Line  Duration           Peripheral IV 09/15/22 Right Forearm 2 days                      Imaging: No pertinent imaging reviewed  Recent Cultures (last 7 days):         Last 24 Hours Medication List:   Current Facility-Administered Medications   Medication Dose Route Frequency Provider Last Rate    acetaminophen  975 mg Oral Q6H Darren Harris MD      atorvastatin  40 mg Oral QPM Carmen Tilley MD      ergocalciferol  50,000 Units Oral Weekly Carmen Tilley MD      losartan potassium-hydrochlorothiazide (HYZAAR 100/25) combo dose   Oral Daily Carmen Tilley MD      melatonin  3 mg Oral HS Carmen Tilley MD      ondansetron  4 mg Intravenous Q6H PRN Carmen Tilley MD          Today, Patient Was Seen By: Lashay Aguirre MD    **Please Note: This note may have been constructed using a voice recognition system  **

## 2022-09-17 NOTE — ASSESSMENT & PLAN NOTE
Dull left-sided chest pain began 1 week ago after lifting heavy furniture and radiates to left back  Lightheadedness, shortness of breath present at rest but exacerbated by movement  Palpitations  Now resolved  EKG/troponins/cxr unremarkable  Recent echo showed EF 60% with mild diastolic dysfunction and mild mitral stenosis  Clinically, chest pain is reproducible on palpation raising likelihood of msk etiology  Patient does not show signs of HF or volume overload  Nuclear stress test unremarkable   Tele barry'randy AGUILAR working on [facility], likely tomorrow after PT     Plan:  -Continue muscle relaxer   -pain control:  Tylenol q6h and cyclobenzaprine TID  -nausea control:  Zofran p r n

## 2022-09-17 NOTE — ASSESSMENT & PLAN NOTE
This morning, patient reports 3x hematuria/dyrusia with small volume of urine  Denies fever, chills, nausea, vomit  Admits 4x cups of water per day while in hospital with regular appetite    Plan  · On day 2/3 of Bactrim  · Reports no further hematuria or dysuria  · Afebrile with no leukocytosis  · F/u UC  · Urinary retention protocol

## 2022-09-17 NOTE — ASSESSMENT & PLAN NOTE
This morning, patient reports 3x hematuria/dyrusia with small volume of urine  Denies fever, chills, nausea, vomit  Admits 4x cups of water per day while in hospital with regular appetite    Plan  F/u UA w reflex cx  Treat infection if positive     Monitor Hb

## 2022-09-17 NOTE — ASSESSMENT & PLAN NOTE
POA Dull left-sided chest pain began 1 week ago after lifting heavy furniture and radiates to left back  Lightheadedness, shortness of breath present at rest but exacerbated by movement  Palpitations  Now resolved  EKG/troponins/cxr unremarkable  Recent echo showed EF 60% with mild diastolic dysfunction and mild mitral stenosis  Clinically, chest pain is reproducible on palpation raising likelihood of msk etiology  Patient does not show signs of HF or volume overload  Nuclear stress test unremarkable     Plan:  -Continue muscle relaxer   -pain control:  Tylenol q6h and cyclobenzaprine TID  -nausea control:  Zofran p r n    Pending rehab placement

## 2022-09-17 NOTE — ASSESSMENT & PLAN NOTE
Patient reports difficulty sleeping at home, prefers not to take her prescribed Ambien  Plan:  -melatonin qHS

## 2022-09-18 ENCOUNTER — HOME HEALTH ADMISSION (OUTPATIENT)
Dept: HOME HEALTH SERVICES | Facility: HOME HEALTHCARE | Age: 77
End: 2022-09-18
Payer: MEDICARE

## 2022-09-18 VITALS
TEMPERATURE: 97.8 F | HEART RATE: 80 BPM | DIASTOLIC BLOOD PRESSURE: 65 MMHG | HEIGHT: 59 IN | BODY MASS INDEX: 30.04 KG/M2 | OXYGEN SATURATION: 97 % | WEIGHT: 149 LBS | SYSTOLIC BLOOD PRESSURE: 129 MMHG | RESPIRATION RATE: 16 BRPM

## 2022-09-18 LAB
ANION GAP SERPL CALCULATED.3IONS-SCNC: 8 MMOL/L (ref 4–13)
BACTERIA UR CULT: NORMAL
BUN SERPL-MCNC: 19 MG/DL (ref 5–25)
CALCIUM SERPL-MCNC: 8.6 MG/DL (ref 8.4–10.2)
CHLORIDE SERPL-SCNC: 103 MMOL/L (ref 96–108)
CO2 SERPL-SCNC: 24 MMOL/L (ref 21–32)
CREAT SERPL-MCNC: 0.75 MG/DL (ref 0.6–1.3)
ERYTHROCYTE [DISTWIDTH] IN BLOOD BY AUTOMATED COUNT: 12.9 % (ref 11.6–15.1)
GFR SERPL CREATININE-BSD FRML MDRD: 77 ML/MIN/1.73SQ M
GLUCOSE SERPL-MCNC: 102 MG/DL (ref 65–140)
HCT VFR BLD AUTO: 35.5 % (ref 34.8–46.1)
HGB BLD-MCNC: 11.6 G/DL (ref 11.5–15.4)
MCH RBC QN AUTO: 29.6 PG (ref 26.8–34.3)
MCHC RBC AUTO-ENTMCNC: 32.7 G/DL (ref 31.4–37.4)
MCV RBC AUTO: 91 FL (ref 82–98)
PLATELET # BLD AUTO: 214 THOUSANDS/UL (ref 149–390)
PMV BLD AUTO: 11.7 FL (ref 8.9–12.7)
POTASSIUM SERPL-SCNC: 3.5 MMOL/L (ref 3.5–5.3)
RBC # BLD AUTO: 3.92 MILLION/UL (ref 3.81–5.12)
SODIUM SERPL-SCNC: 135 MMOL/L (ref 135–147)
WBC # BLD AUTO: 6.54 THOUSAND/UL (ref 4.31–10.16)

## 2022-09-18 PROCEDURE — 85027 COMPLETE CBC AUTOMATED: CPT

## 2022-09-18 PROCEDURE — 97116 GAIT TRAINING THERAPY: CPT

## 2022-09-18 PROCEDURE — 99239 HOSP IP/OBS DSCHRG MGMT >30: CPT | Performed by: INTERNAL MEDICINE

## 2022-09-18 PROCEDURE — 80048 BASIC METABOLIC PNL TOTAL CA: CPT

## 2022-09-18 RX ORDER — ENOXAPARIN SODIUM 100 MG/ML
40 INJECTION SUBCUTANEOUS DAILY
Status: DISCONTINUED | OUTPATIENT
Start: 2022-09-18 | End: 2022-09-18 | Stop reason: HOSPADM

## 2022-09-18 RX ORDER — SACCHAROMYCES BOULARDII 250 MG
250 CAPSULE ORAL 2 TIMES DAILY
Status: DISCONTINUED | OUTPATIENT
Start: 2022-09-18 | End: 2022-09-18 | Stop reason: HOSPADM

## 2022-09-18 RX ORDER — ATORVASTATIN CALCIUM 40 MG/1
40 TABLET, FILM COATED ORAL EVERY EVENING
Qty: 30 TABLET | Refills: 0 | Status: SHIPPED | OUTPATIENT
Start: 2022-09-18 | End: 2022-10-18

## 2022-09-18 RX ORDER — SULFAMETHOXAZOLE AND TRIMETHOPRIM 800; 160 MG/1; MG/1
1 TABLET ORAL EVERY 12 HOURS SCHEDULED
Qty: 4 TABLET | Refills: 0 | Status: SHIPPED | OUTPATIENT
Start: 2022-09-18 | End: 2022-09-20

## 2022-09-18 RX ADMIN — Medication 250 MG: at 09:45

## 2022-09-18 RX ADMIN — SULFAMETHOXAZOLE AND TRIMETHOPRIM 1 TABLET: 800; 160 TABLET ORAL at 09:45

## 2022-09-18 RX ADMIN — HYDROCHLOROTHIAZIDE: 25 TABLET ORAL at 09:45

## 2022-09-18 RX ADMIN — ACETAMINOPHEN 975 MG: 325 TABLET, FILM COATED ORAL at 05:11

## 2022-09-18 NOTE — ASSESSMENT & PLAN NOTE
POA Dull left-sided chest pain began 1 week ago after lifting heavy furniture and radiates to left back  Lightheadedness, shortness of breath present at rest but exacerbated by movement  Palpitations  Now resolved  EKG/troponins/cxr unremarkable  Recent echo showed EF 60% with mild diastolic dysfunction and mild mitral stenosis  Clinically, chest pain is reproducible on palpation raising likelihood of msk etiology     Nuclear stress test unremarkable     Plan:  · Chest pain resolved  · Stable for discharge to home today  · Follow-up with PCP

## 2022-09-18 NOTE — PLAN OF CARE
Problem: MOBILITY - ADULT  Goal: Maintain or return to baseline ADL function  Description: INTERVENTIONS:  -  Assess patient's ability to carry out ADLs; assess patient's baseline for ADL function and identify physical deficits which impact ability to perform ADLs (bathing, care of mouth/teeth, toileting, grooming, dressing, etc )  - Assess/evaluate cause of self-care deficits   - Assess range of motion  - Assess patient's mobility; develop plan if impaired  - Assess patient's need for assistive devices and provide as appropriate  - Encourage maximum independence but intervene and supervise when necessary  - Involve family in performance of ADLs  - Assess for home care needs following discharge   - Consider OT consult to assist with ADL evaluation and planning for discharge  - Provide patient education as appropriate  9/18/2022 1207 by Cassidy Flynn RN  Outcome: Adequate for Discharge  9/18/2022 1207 by Cassidy Flynn RN  Outcome: Adequate for Discharge  Goal: Maintains/Returns to pre admission functional level  Description: INTERVENTIONS:  - Perform BMAT or MOVE assessment daily    - Set and communicate daily mobility goal to care team and patient/family/caregiver     - Collaborate with rehabilitation services on mobility goals if consulted    - Out of bed for toileting  - Record patient progress and toleration of activity level   9/18/2022 1207 by Cassidy Flynn RN  Outcome: Adequate for Discharge  9/18/2022 1207 by Cassidy Flynn RN  Outcome: Adequate for Discharge     Problem: Potential for Falls  Goal: Patient will remain free of falls  Description: INTERVENTIONS:  - Educate patient/family on patient safety including physical limitations  - Instruct patient to call for assistance with activity   - Consult OT/PT to assist with strengthening/mobility   - Keep Call bell within reach  - Keep bed low and locked with side rails adjusted as appropriate  - Keep care items and personal belongings within reach  - Initiate and maintain comfort rounds  - Make Fall Risk Sign visible to staff    - Apply yellow socks and bracelet for high fall risk patients  - Consider moving patient to room near nurses station  9/18/2022 1207 by Jordyn Richardson RN  Outcome: Adequate for Discharge  9/18/2022 1207 by Jordyn Richardson RN  Outcome: Adequate for Discharge

## 2022-09-18 NOTE — CASE MANAGEMENT
Case Management Progress Note    Patient name Betzaida Tate  Location S /S -29 MRN [id number]  : 1945 Date 2022       LOS (days): 3  Geometric Mean LOS (GMLOS) (days): 2 20  Days to GMLOS:-0 7        OBJECTIVE:        Current admission status: Inpatient  Preferred Pharmacy:   [address] [place] [address] [id number] [facility] [address] [address]  Phone: [phone] Fax: [phone]    Primary Care Provider: Eleonora Ramires MD    Primary Insurance: MEDICARE  Secondary Insurance: COMMERCIAL MISCELLANEOUS    PROGRESS NOTE:    Cm cofnrimed that [address] [place] [address]  will be able to provide [facility] services for patient at home  Cm informed patient and family member in room  Patient remains in agreement with [facility]  All parties aware and in agreement

## 2022-09-18 NOTE — CASE MANAGEMENT
Case Management Discharge Planning Note    Patient name Abhi Rodriguez S /S -49 MRN [id number]  : 1945 Date 2022       Current Admission Date: 2022  Current Admission Diagnosis:Chest pain   Patient Active Problem List    Diagnosis Date Noted    Urinary tract infection with hematuria 2022    Sleep difficulties 2022    Chest pain 2022    HLD (hyperlipidemia) 2022    Decreased energy 2022    Continuous opioid dependence ([address] ) 2022    Chronic pain syndrome - Right 2019    Status post hysterectomy 2019    Breast cancer screening 2019    Carpal tunnel syndrome of right wrist     Cervical radiculitis 10/24/2018    Left shoulder pain 2018    Right shoulder pain 2018    Tendinopathy of left rotator cuff 2018    Tendinopathy of right rotator cuff 2018    Globus sensation 2018    RUQ pain 2018    Gastroesophageal reflux disease without esophagitis 2018    Arthritis of knee 2018    Lower back pain 2018    Primary osteoarthritis of right knee 2017    Chest pressure 2016    GERD (gastroesophageal reflux disease) 2016    Sleep deprivation 2016    Hypertension 2016    Left knee pain 2015    Osteoporosis 2012      LOS (days): 3  Geometric Mean LOS (GMLOS) (days): 2 20  Days to GMLOS:-0 7     OBJECTIVE:  Risk of Unplanned Readmission Score: 11 48         Current admission status: Inpatient   Preferred Pharmacy:   [address] [place] [address] [id number] [address]  Phone: [phone] Fax: [phone]    Primary Care Provider: Jeferson Deleon MD    Primary Insurance: MEDICARE  Secondary Insurance: COMMERCIAL MISCELLANEOUS    DISCHARGE DETAILS:    Discharge planning discussed with[de-identified] patient  Freedom of Choice: Yes  Comments - Freedom of Choice: Cm informed by therapy that patient is now cleared to discharge home with [facility] services  Cm called and spoke with arnaldo' who is in agreement with dc back home with [facility] services  Per patient she has no preference in [facility] agency  [address]ed [address]         Is the patient interested in [facility] at discharge?: Yes  [address] requested[de-identified] Nursing, Occupational Therapy, Physical [address] Name[de-identified] [address] [place] [address] Provider[de-identified] PCP  Home Health Services Needed[de-identified] Evaluate Functional Status and Safety, Gait/ADL Training, Strengthening/Theraputic Exercises to Improve Function  Homebound Criteria Met[de-identified] Uses an Assist Device (i e  cane, walker, etc), Requires the Assistance of Another Person for Safe Ambulation or to Leave the Home  Supporting Clincal Findings[de-identified] Limited Endurance, Fatigues Easliy in Short Distances    DME Referral Provided  Referral made for DME?: No    Other Referral/Resources/Interventions Provided:  Referral Comments: Per therapy on Sunday, patient is know cleared to dc home with [facility] services  Patient in agreement with return home with [facility] services           Treatment Team Recommendation: Short Term Rehab  Discharge Destination Plan[de-identified] Short Term Rehab  Transport at Discharge : Family

## 2022-09-18 NOTE — DISCHARGE SUMMARY
[facility]  Discharge- Roselyn December 1945, 67 y o  female MRN: [id number]  Unit/Bed#: S -01 Encounter: [id number]  Primary Care Provider: Merlin Emperor, MD   Date and time admitted to hospital: 9/14/2022  3:17 PM    * Chest pain  Assessment & Plan  POA Dull left-sided chest pain began 1 week ago after lifting heavy furniture and radiates to left back  Lightheadedness, shortness of breath present at rest but exacerbated by movement  Palpitations  Now resolved  EKG/troponins/cxr unremarkable  Recent echo showed EF 60% with mild diastolic dysfunction and mild mitral stenosis  Clinically, chest pain is reproducible on palpation raising likelihood of msk etiology     Nuclear stress test unremarkable     Plan:  · Chest pain resolved  · Stable for discharge to home today  · Follow-up with PCP    Urinary tract infection with hematuria  Assessment & Plan  · This morning, patient reports 3x hematuria/dyrusia with small volume of urine  · UA positive for UTI  · Received Bactrim while hospitalized, will discharge on 2 more days of oral Bactrim, for 3 days total  · Reports no further hematuria or dysuria, Afebrile with no leukocytosis    HLD (hyperlipidemia)  Assessment & Plan  Continue atorvastatin    Osteoporosis  Assessment & Plan  Recommended to take over-the-counter vitamin-D supplementation  Follow-up with PCP    Hypertension  Assessment & Plan  Continue home medication        Medical Problems             Resolved Problems  Date Reviewed: 9/18/2022   None               Discharging Physician / Practitioner: Betty Nye MD  PCP: Merlin Emperor, MD  Admission Date:   Admission Orders (From admission, onward)     Ordered        09/15/22 1539  Inpatient Admission  Once            09/14/22 1716  Place in Observation  Once                      Discharge Date: 09/18/22    Consultations During Hospital Stay:  · None    Procedures Performed:   · Nuclear medicine stress test    Significant Findings / Test Results:   · See below    Incidental Findings:   · None    Test Results Pending at Discharge (will require follow up): · None     Outpatient Tests Requested:  · None    Complications:  None    Reason for Admission:  Chest pain    Hospital Course:   Jesse James is a 67 y o  female patient with with a PMH of hypertension, hyperlipidemia, and GERD who originally presented to the hospital on 9/14/2022 due to left-sided chest pain  She had been lifting and rearranging her furniture at home when this began  She also reported increased fatigue and weakness for the last few days prior to admission  In the ED, EKG was unchanged from prior and Troponins were negative  Echocardiogram done as an outpatient on 9/14/22 was unremarkable and revealed EF of 43%, mild diastolic dysfunction and mild mitral stenosis  Given her significant family history of heart disease, she underwent nuclear medicine stress test this admission  Cardiac stress test was normal with no signs of ischemia  Chest pain was thought to be more likely musculoskeletal and improved with muscle relaxers  During admission, she also reported hematuria and dysuria  UA was positive for UTI and she was started on treatment with Bactrim, with improvement in symptoms  Patient was seen by PT and initially recommended for rehab however when seen again, deemed to be stable enough to return home with home health care  She is stable for discharge to home today  Please see above list of diagnoses and related plan for additional information  Condition at Discharge: stable    Discharge Day Visit / Exam:   * Please refer to separate progress note for these details *    Discussion with Family: Attempted to update  (son) via phone  Left voicemail  Discharge instructions/Information to patient and family:   See after visit summary for information provided to patient and family        Provisions for Follow-Up Care:  See after visit summary for information related to follow-up care and any pertinent home health orders  Disposition:   Home with [facility] (Reminder: Complete face to face encounter)    Planned Readmission:  None     Discharge Statement:  I spent 30 minutes discharging the patient  This time was spent on the day of discharge  I had direct contact with the patient on the day of discharge  Greater than 50% of the total time was spent examining patient, answering all patient questions, arranging and discussing plan of care with patient as well as directly providing post-discharge instructions  Additional time then spent on discharge activities  Discharge Medications:  See after visit summary for reconciled discharge medications provided to patient and/or family        **Please Note: This note may have been constructed using a voice recognition system**

## 2022-09-18 NOTE — PROGRESS NOTES
[facility]  Progress Note - Eliceo Tong 1945, 67 y o  female MRN: [id number]  Unit/Bed#: S -01 Encounter: [id number]  Primary Care Provider: Yesica Altamirano MD   Date and time admitted to hospital: 9/14/2022  3:17 PM    * Chest pain  Assessment & Plan  POA Dull left-sided chest pain began 1 week ago after lifting heavy furniture and radiates to left back  Lightheadedness, shortness of breath present at rest but exacerbated by movement  Palpitations  Now resolved  EKG/troponins/cxr unremarkable  Recent echo showed EF 60% with mild diastolic dysfunction and mild mitral stenosis  Clinically, chest pain is reproducible on palpation raising likelihood of msk etiology  Patient does not show signs of HF or volume overload  Nuclear stress test unremarkable     Plan:  -Continue muscle relaxer   -pain control:  Tylenol q6h and cyclobenzaprine TID  -nausea control:  Zofran p r n  Pending rehab placement    Urinary tract infection with hematuria  Assessment & Plan  This morning, patient reports 3x hematuria/dyrusia with small volume of urine  Denies fever, chills, nausea, vomit  Admits 4x cups of water per day while in hospital with regular appetite    Plan  · On day 2/3 of Bactrim  · Reports no further hematuria or dysuria  · Afebrile with no leukocytosis  · F/u UC  · Urinary retention protocol     HLD (hyperlipidemia)  Assessment & Plan  Plan:  Started atorvastatin, patient is not on a statin at home  Sleep difficulties  Assessment & Plan  Patient reports difficulty sleeping at home, prefers not to take her prescribed Ambien  Plan:  -melatonin qHS  Osteoporosis  Assessment & Plan  Vitamin-D level found to be 26 2  Plan:  -Continue vitamin-D supplementation  Hypertension  Assessment & Plan  Patient was hypertensive at 184/78 while in the emergency department  She takes losartan potassium-hydrochlorothiazide (100/25) at home      Pressures today well controlled Plan:  -continue home losartan potassium-hydrochlorothiazide (100/25)          VTE Pharmacologic Prophylaxis: VTE Score: 5 High Risk (Score >/= 5) - Pharmacological DVT Prophylaxis Ordered: enoxaparin (Lovenox)  Sequential Compression Devices Ordered  Patient Centered Rounds: I performed bedside rounds with nursing staff today  Discussions with Specialists or Other Care Team Provider:      Education and Discussions with Family / Patient: Will call and update family  Time Spent for Care: 30 minutes  More than 50% of total time spent on counseling and coordination of care as described above  Current Length of Stay: 3 day(s)  Current Patient Status: Inpatient   Certification Statement: The patient will continue to require additional inpatient hospital stay due to Pending rehab placement  Discharge Plan: Anticipate discharge tomorrow to rehab facility  Code Status: Level 3 - DNAR and DNI    Subjective:   Patient seen this morning in no acute distress  She reports feeling well  No further hematuria or dysuria  Reports that her chest pain is much better  Felt slightly nauseous this morning but had not eaten breakfast yet  Objective:     Vitals:   Temp (24hrs), Av 1 °F (36 2 °C), Min:96 7 °F (35 9 °C), Max:97 5 °F (36 4 °C)    Temp:  [96 7 °F (35 9 °C)-97 5 °F (36 4 °C)] 96 7 °F (35 9 °C)  HR:  [80-82] 80  Resp:  [18] 18  BP: (118-145)/(62-98) 145/67  SpO2:  [95 %-97 %] 97 %  Body mass index is 30 09 kg/m²  Input and Output Summary (last 24 hours):   No intake or output data in the 24 hours ending 22 1000    Physical Exam:   Physical Exam  Vitals reviewed  Constitutional:       General: She is not in acute distress  Appearance: She is not toxic-appearing  HENT:      Head: Normocephalic and atraumatic  Mouth/Throat:      Mouth: Mucous membranes are moist       Pharynx: Oropharynx is clear  Eyes:      Extraocular Movements: Extraocular movements intact  Conjunctiva/sclera: Conjunctivae normal    Cardiovascular:      Rate and Rhythm: Normal rate and regular rhythm  Pulses: Normal pulses  Pulmonary:      Effort: Pulmonary effort is normal  No respiratory distress  Breath sounds: Normal breath sounds  Abdominal:      General: Bowel sounds are normal  There is no distension  Palpations: Abdomen is soft  Tenderness: There is no abdominal tenderness  Musculoskeletal:         General: No tenderness  Normal range of motion  Cervical back: Normal range of motion and neck supple  Comments: Left-sided chest wall mildly tender to palpation, much improved     Trace bilateral lower extremity edema   Skin:     General: Skin is warm and dry  Neurological:      Mental Status: She is alert and oriented to person, place, and time  Psychiatric:         Mood and Affect: Mood normal          Behavior: Behavior normal          Thought Content: Thought content normal          Judgment: Judgment normal           Additional Data:     Labs:  Results from last 7 days   Lab Units 09/18/22 0437 09/17/22  1903 09/15/22  0442   WBC Thousand/uL 6 54   < > 7 43   HEMOGLOBIN g/dL 11 6   < > 12 0   HEMATOCRIT % 35 5   < > 36 7   PLATELETS Thousands/uL 214   < > 256   NEUTROS PCT %  --   --  65   LYMPHS PCT %  --   --  23   MONOS PCT %  --   --  9   EOS PCT %  --   --  3    < > = values in this interval not displayed  Results from last 7 days   Lab Units 09/18/22  0437 09/16/22  0557 09/15/22  0442   SODIUM mmol/L 135   < > 134*   POTASSIUM mmol/L 3 5   < > 3 8   CHLORIDE mmol/L 103   < > 102   CO2 mmol/L 24   < > 25   BUN mg/dL 19   < > 27*   CREATININE mg/dL 0 75   < > 0 81   ANION GAP mmol/L 8   < > 7   CALCIUM mg/dL 8 6   < > 9 2   ALBUMIN g/dL  --   --  4 1   TOTAL BILIRUBIN mg/dL  --   --  0 78   ALK PHOS U/L  --   --  68   ALT U/L  --   --  16   AST U/L  --   --  16   GLUCOSE RANDOM mg/dL 102   < > 99    < > = values in this interval not displayed  Results from last 7 days   Lab Units 09/17/22  1903   INR  0 93                   Lines/Drains:  Invasive Devices  Report    Peripheral Intravenous Line  Duration           Peripheral IV 09/15/22 Right Forearm 3 days                      Imaging: No pertinent imaging reviewed  Recent Cultures (last 7 days):         Last 24 Hours Medication List:   Current Facility-Administered Medications   Medication Dose Route Frequency Provider Last Rate    acetaminophen  975 mg Oral Q8H Dea Case DO      atorvastatin  40 mg Oral QPM Deanna Gil MD      enoxaparin  40 mg Subcutaneous Daily Joanie Quiroz MD      ergocalciferol  50,000 Units Oral Weekly Deanna Gil MD      losartan potassium-hydrochlorothiazide (HYZAAR 100/25) combo dose   Oral Daily Deanna Gil MD      melatonin  3 mg Oral HS Deanna Gil MD      ondansetron  4 mg Intravenous Q6H PRN Deanan Gil MD      saccharomyces boulardii  250 mg Oral BID Allred Mass, DO      sulfamethoxazole-trimethoprim  1 tablet Oral Q12H Dea Case DO          Today, Patient Was Seen By: Reynold Oviedo MD    **Please Note: This note may have been constructed using a voice recognition system  **

## 2022-09-18 NOTE — ASSESSMENT & PLAN NOTE
· This morning, patient reports 3x hematuria/dyrusia with small volume of urine  · UA positive for UTI  · Received Bactrim while hospitalized, will discharge on 2 more days of oral Bactrim, for 3 days total  · Reports no further hematuria or dysuria, Afebrile with no leukocytosis

## 2022-09-18 NOTE — DISCHARGE INSTR - AVS FIRST PAGE
Dear Santo Gutiérrez,     It was our pleasure to care for you here at [facility]  It is our hope that we were always able to exceed the expected standards for your care during your stay  You were hospitalized due to chest wall pain and UTI  You were cared for on the 2nd floor by Anastasiia Weathers MD under the service of Dianna Piedra, DO with the [facility] who covers for your primary care physician (PCP), Delmar Brito MD, while you were hospitalized  If you have any questions or concerns related to this hospitalization, you may contact us at [phone]  For follow up as well as any medication refills, we recommend that you follow up with your primary care physician  A registered nurse will reach out to you by phone within a few days after your discharge to answer any additional questions that you may have after going home  However, at this time we provide for you here, the most important instructions / recommendations at discharge:     Notable Medication Adjustments -   Please take Bactrim 1 tablet twice a day for 2 more days  Take atorvastatin 40 mg daily  Continue other home medications as usual  Important follow up information -   Please follow-up with your primary care physician within 1-2 weeks  You will need to take over-the-counter vitamin D supplementation  Other Instructions -   If you experience any recurrent severe chest pain, abdominal pain, shortness of breath, lightheadedness or syncope, please report to the ER  Please review this entire after visit summary as additional general instructions including medication list, appointments, activity, diet, any pertinent wound care, and other additional recommendations from your care team that may be provided for you        Sincerely,     Anastasiia Weathers MD

## 2022-09-18 NOTE — PLAN OF CARE
Problem: PHYSICAL THERAPY ADULT  Goal: Performs mobility at highest level of function for planned discharge setting  See evaluation for individualized goals  Description: Treatment/Interventions: Functional transfer training, LE strengthening/ROM, Therapeutic exercise, Equipment eval/education, Bed mobility, Gait training, Patient/family training, Endurance training, Spoke to nursing, Spoke to case management  Equipment Recommended: Abner Almaguer (Has both a home)       See flowsheet documentation for full assessment, interventions and recommendations  9/18/2022 1222 by Mahesh Damico PT  Outcome: Adequate for Discharge  Note: Prognosis: Good  Problem List: Decreased endurance, Decreased strength, Impaired balance, Decreased mobility, Obesity, Pain (Gait deviation)  Assessment: Patient appears to have made substantial mobility progress since initial evaluation  Patient no longer requires any physical assistance for bed mobility, transfers, nor gait  She has initiating ambulating without any DME although she does prefer using a rolling walker> cane and does have both devices at home  She is walking with slow gait speed (household ambulation pace) which may put her at risk for falls, but reports feeling more confident using the walker and walking at a slower pace  She did score at the cutoff for the timed up and go balance test indicating that she is progressing to less risk for falls  Care coordination with patient, and extended family present in room regarding recommendations for home with home health PT  All in agreement  Message Case Management regarding the same  If patient is not discharged continue to work with patient to progress to more independent levels of function with lesser restrictive AD  Barriers to Discharge: Decreased caregiver support     PT Discharge Recommendation: Home with home health rehabilitation    See flowsheet documentation for full assessment

## 2022-09-18 NOTE — PHYSICAL THERAPY NOTE
PHYSICAL THERAPY TREATMENT NOTE  NAME:  Cass Tripathi  DATE: 09/18/22    Length Of Stay: 3  Performed at least 2 patient identifiers during session: Name and Birthday    TREATMENT:    09/18/22 1150   PT Last Visit   PT Visit Date 09/18/22   Note Type   Note Type Treatment   Pain Assessment   Pain Assessment Tool 0-10   Pain Score No Pain  (@ rest, but does report intermittent pain including lasting)   Pain Location/Orientation Location: Head  (Posterior, occipital, and sagittal)   Effect of Pain on Daily Activities Patient does have increased pain with cervical flexion, "pulling sensation"   Patient's Stated Pain Goal No pain   Hospital Pain Intervention(s) Emotional support; Ambulation/increased activity;Repositioned  (Recommended cold, staff Ernestina unit clerk to obtain)   Restrictions/Precautions   Weight Bearing Precautions Per Order No   Other Precautions Fall Risk;Pain   General   Chart Reviewed Yes   Response to Previous Treatment Patient unable to report, no changes reported from family or staff   Family/Caregiver Present   ("Granddaughter in-law" Feliberto Kimball)   Cognition   Overall Cognitive Status WFL   Arousal/Participation Alert   Attention Within functional limits   Following Commands Follows one step commands with increased time or repetition   Subjective   Subjective Patient reports that she was able to shower this morning, performing most of the activities herself  RN confirm that patient has been ambulating within the room using a rolling walker independently  Bed Mobility   Supine to Sit 6  Modified independent   Additional items Increased time required   Sit to Supine 6  Modified independent   Additional items Increased time required   Additional Comments Verbal instructions provided for alternate methods of performing supine<> sit activity including but not limited to log rolling on to side in using elbow support   Transfers   Sit to Stand 6  Modified independent   Additional items Armrests; Increased time required   Stand to Sit 6  Modified independent   Additional items Increased time required;Armrests   Ambulation/Elevation   Gait pattern Excessively slow  (No uncorrected losses of balance)   Gait Assistance 6  Modified independent   Assistive Device Rolling walker   Distance 160'  (Comfortable gait speed at 0 34 m/sec)   Stair Management Assistance Not tested  (Patient stating she has no stairs to enter nor steps inside her apartment)   Ambulation/Elevation Additional Comments Additional gait of patient ambulating 20 ft x 2 without any DME with supervision, but guarded   Balance   Static Sitting Good   Static Standing Fair +   Ambulatory Fair +   Activity Tolerance   Activity Tolerance Patient limited by pain  (Denies any pain)   Exercises   Neuro re-ed Timed up and go without DME 13 5 seconds   Assessment   Prognosis Good   Problem List Decreased endurance;Decreased strength; Impaired balance;Decreased mobility;Obesity;Pain  (Gait deviation)   Assessment Patient appears to have made substantial mobility progress since initial evaluation  Patient no longer requires any physical assistance for bed mobility, transfers, nor gait  She has initiating ambulating without any DME although she does prefer using a rolling walker> cane and does have both devices at home  She is walking with slow gait speed (household ambulation pace) which may put her at risk for falls, but reports feeling more confident using the walker and walking at a slower pace  She did score at the cutoff for the timed up and go balance test indicating that she is progressing to less risk for falls  Care coordination with patient, and extended family present in room regarding recommendations for home with home health PT  All in agreement  Messaged Case Management regarding the same  If patient is not discharged continue to work with patient to progress to more independent levels of function with lesser restrictive AD     Barriers to Discharge Decreased caregiver support   Goals   Patient Goals I want to go home   STG Expiration Date 09/22/22   Plan   Treatment/Interventions Functional transfer training;LE strengthening/ROM; Therapeutic exercise;Equipment eval/education; Bed mobility;Gait training;Patient/family training; Endurance training;Spoke to nursing;Spoke to case management   Progress Improving as expected   PT Frequency 3-5x/wk   Recommendation   PT Discharge Recommendation Home with home health rehabilitation   Equipment Recommended Walker;Cane  (Has both a home)   AM-PAC Basic Mobility Inpatient   Turning in Bed Without Bedrails 4   Lying on Back to Sitting on Edge of Flat Bed 4   Moving Bed to Chair 4   Standing Up From Chair 4   Walk in Room 3   Climb 3-5 Stairs 2   Basic Mobility Inpatient Raw Score 21   Basic Mobility Standardized Score 45 55   Highest Level Of Mobility   JH-HLM Goal 6: Walk 10 steps or more   JH-HLM Achieved 7: Walk 25 feet or more   End of Consult   Patient Position at End of Consult All needs within reach; Bedside chair   CUT OFF SCORES TUG:   Cut-Off Scores indicating risk of falls by population   Population Cut-Off score Author   Community dwelling adults > 13 5* Falguni et al, 2000   * Time in seconds   NightAgenda   [email or web address]  [email or web address]? [email or web address]  Accessed 11/29/2016  Ludmila Larson A , FE Em , Pilar Cruz , Ana Paula Cope , & Donte MIRAMONTES  (2014)  A Survey of Physiotherapists Experience Using Outcome Measures in Total Hip and Knee Arthroplasty  Tr [address] 96, 66(4), 931-042  https://doi org/10 3138/ptc 2013-34  The patient's AM-PAC Basic Mobility Inpatient Short Form Raw Score is 21, Standardized Score is 45 55  A Raw Score of greater than or equal to 16 suggests the patient may benefit from discharge to home, which DOES coincide with CURRENT above PT recommendations   However please refer to therapist recommendation for discharge planning given other factors that may influence destination  Adapted from [employer] [facility] 6-Clicks Basic Mobility and Daily Activity Scores With Discharge Destination  Physical Therapy, 2021;101:1-9   DOI: [email or web address] [email or web address]    Britney Leyva PT, DPT

## 2022-09-19 ENCOUNTER — TRANSITIONAL CARE MANAGEMENT (OUTPATIENT)
Dept: FAMILY MEDICINE CLINIC | Facility: CLINIC | Age: 77
End: 2022-09-19

## 2022-09-19 ENCOUNTER — HOME CARE VISIT (OUTPATIENT)
Dept: HOME HEALTH SERVICES | Facility: HOME HEALTHCARE | Age: 77
End: 2022-09-19
Payer: MEDICARE

## 2022-09-19 VITALS
SYSTOLIC BLOOD PRESSURE: 120 MMHG | TEMPERATURE: 98.5 F | RESPIRATION RATE: 18 BRPM | BODY MASS INDEX: 29.84 KG/M2 | HEART RATE: 95 BPM | OXYGEN SATURATION: 98 % | HEIGHT: 59 IN | WEIGHT: 148 LBS | DIASTOLIC BLOOD PRESSURE: 72 MMHG

## 2022-09-19 LAB
CHEST PAIN STATEMENT: NORMAL
MAX DIASTOLIC BP: 67 MMHG
MAX HEART RATE: 96 BPM
MAX PREDICTED HEART RATE: 143 BPM
MAX. SYSTOLIC BP: 149 MMHG
PROTOCOL NAME: NORMAL
REASON FOR TERMINATION: NORMAL
TARGET HR FORMULA: NORMAL
TEST INDICATION: NORMAL
TIME IN EXERCISE PHASE: NORMAL

## 2022-09-19 PROCEDURE — 400013 VN SOC

## 2022-09-19 PROCEDURE — 10330081 VN NO-PAY CLAIM PROCEDURE

## 2022-09-19 PROCEDURE — G0299 HHS/HOSPICE OF RN EA 15 MIN: HCPCS

## 2022-09-20 NOTE — CASE COMMUNICATION
[facility] has admitted your patient to [address] [place] [address] service with the following disciplines:      SN, PT and OT  This report is informational only, no responses is needed  Primary focus of home health care-   Patient stated goals of care-   Anticipated visit pattern and next visit date- 9/23/2022 2w2 3w1  See medication list - meds in home differ from AVS- Pt is taking zolpidem tartrate for insomnia and tramadol for knee arthritis  Significant clinical findings- Pt is not taking saccharomyces boulardii   Potential barriers to goal achievement- improve mobility and improve sleep  Other pertinent information    Thank you for allowing us to participate in the care of your patient

## 2022-09-21 ENCOUNTER — HOME CARE VISIT (OUTPATIENT)
Dept: HOME HEALTH SERVICES | Facility: HOME HEALTHCARE | Age: 77
End: 2022-09-21
Payer: MEDICARE

## 2022-09-21 VITALS — OXYGEN SATURATION: 99 % | DIASTOLIC BLOOD PRESSURE: 60 MMHG | HEART RATE: 90 BPM | SYSTOLIC BLOOD PRESSURE: 124 MMHG

## 2022-09-21 PROCEDURE — G0151 HHCP-SERV OF PT,EA 15 MIN: HCPCS

## 2022-09-21 NOTE — CASE COMMUNICATION
PT Tara completed  Patient with postural dysfunction and reduced mobility with 6/10 pain level in neck/back of head which appears to be soft tissue related  [place] 19/28 indicates she is at elevated risk of falling and demo guarding with gait and transfers  Geraldo Whiteside PT chandan to begin 2 x next week for 2 weeks  Will work to reduce mm imbalances and reduce pain and improve functional mobility  Anticipate OPT on discharge

## 2022-09-22 ENCOUNTER — HOME CARE VISIT (OUTPATIENT)
Dept: HOME HEALTH SERVICES | Facility: HOME HEALTHCARE | Age: 77
End: 2022-09-22
Payer: MEDICARE

## 2022-09-22 VITALS
TEMPERATURE: 97.2 F | HEART RATE: 82 BPM | SYSTOLIC BLOOD PRESSURE: 120 MMHG | OXYGEN SATURATION: 98 % | RESPIRATION RATE: 20 BRPM | DIASTOLIC BLOOD PRESSURE: 70 MMHG

## 2022-09-22 PROCEDURE — G0299 HHS/HOSPICE OF RN EA 15 MIN: HCPCS

## 2022-09-22 NOTE — CASE COMMUNICATION
Late Entry: No skilled OT need identified during [facility] PT Eval   Recommend cancel OT order with PT to address mobility issues present

## 2022-09-23 ENCOUNTER — APPOINTMENT (OUTPATIENT)
Dept: RADIOLOGY | Facility: MEDICAL CENTER | Age: 77
End: 2022-09-23
Payer: MEDICARE

## 2022-09-23 ENCOUNTER — OFFICE VISIT (OUTPATIENT)
Dept: FAMILY MEDICINE CLINIC | Facility: CLINIC | Age: 77
End: 2022-09-23
Payer: MEDICARE

## 2022-09-23 VITALS
SYSTOLIC BLOOD PRESSURE: 132 MMHG | BODY MASS INDEX: 30.24 KG/M2 | DIASTOLIC BLOOD PRESSURE: 66 MMHG | TEMPERATURE: 97.3 F | OXYGEN SATURATION: 96 % | RESPIRATION RATE: 16 BRPM | HEIGHT: 59 IN | HEART RATE: 83 BPM | WEIGHT: 150 LBS

## 2022-09-23 DIAGNOSIS — I10 PRIMARY HYPERTENSION: ICD-10-CM

## 2022-09-23 DIAGNOSIS — M54.2 NECK PAIN: ICD-10-CM

## 2022-09-23 DIAGNOSIS — J45.20 ASTHMA IN ADULT, MILD INTERMITTENT, UNCOMPLICATED: ICD-10-CM

## 2022-09-23 DIAGNOSIS — M54.2 NECK PAIN: Primary | ICD-10-CM

## 2022-09-23 DIAGNOSIS — R07.9 CHEST PAIN, UNSPECIFIED TYPE: ICD-10-CM

## 2022-09-23 PROCEDURE — 72050 X-RAY EXAM NECK SPINE 4/5VWS: CPT

## 2022-09-23 PROCEDURE — 99495 TRANSJ CARE MGMT MOD F2F 14D: CPT | Performed by: FAMILY MEDICINE

## 2022-09-23 RX ORDER — ALBUTEROL SULFATE 90 UG/1
2 AEROSOL, METERED RESPIRATORY (INHALATION) EVERY 6 HOURS PRN
Qty: 6.7 G | Refills: 2 | Status: SHIPPED | OUTPATIENT
Start: 2022-09-23

## 2022-09-23 RX ORDER — CYCLOBENZAPRINE HCL 5 MG
2.5 TABLET ORAL
Qty: 20 TABLET | Refills: 0 | Status: SHIPPED | OUTPATIENT
Start: 2022-09-23

## 2022-09-23 NOTE — PROGRESS NOTES
Name: Neeru Hernandez      : 1945      MRN: [id number]  Encounter Provider: Liam Henriquez MD  Encounter Date: 2022   Encounter department: [address] [place] [address]  Neck pain  Assessment & Plan:  Neck pain likely secondary to pot stroke versus muscle tension  Patient does have history of lumbar spine osteoarthritis  Will obtain x-ray for evaluation arthritis involving the cervical spine  Patient may use Flexeril 2 5 mg at night for symptomatic relief  Please take Tylenol 500 mg 2 tablets every 8 hours for the next week  Icy hot and warm compress may help with symptoms  Continue exercise as directed by physical therapist/chiropractor    Orders:  -     XR spine cervical complete 4 or 5 vw non injury; Future; Expected date: 2022  -     cyclobenzaprine (FLEXERIL) 5 mg tablet; Take 0 5 tablets (2 5 mg total) by mouth daily at bedtime as needed for muscle spasms    2  Asthma in adult, mild intermittent, uncomplicated  Assessment & Plan:  Patient requesting refill on albuterol  She does not require medication on regular basis  However her current inhaler is     Orders:  -     albuterol (PROVENTIL HFA,VENTOLIN HFA) 90 mcg/act inhaler; Inhale 2 puffs every 6 (six) hours as needed for wheezing or shortness of breath    3  Primary hypertension  Assessment & Plan:  Patient's blood pressure is 132/66 at goal  Continue current blood pressure medication      4   Chest pain, unspecified type  Assessment & Plan:  Cardiovascular evaluation does not reveal any significant abnormality  Please continue Lipitor, patient may trial half a tablet to monitor side effect  If there is worsening chest pain especially with exertion please return for evaluation  Will involve cardiovascular specialist at that time        TCM Call     Date and time call was made  2022 10:13 AM    Hospital care reviewed  Records reviewed    Patient was hospitialized at  [address] [place] [address]    Date of Admission  09/14/22    Date of discharge  09/18/22    Diagnosis  Chest pain    Disposition  Home    Were the patients medications reviewed and updated  Yes    Current Symptoms  None      TCM Call     Post hospital issues  None    Should patient be enrolled in anticoag monitoring? No    Scheduled for follow up? Yes    Did you obtain your prescribed medications  Yes    Do you need help managing your prescriptions or medications  No    Is transportation to your appointment needed  No    I have advised the patient to call PCP with any new or worsening symptoms  Zeinab Murdock MA    Counseling  Patient    Counseling topics  Importance of RX compliance               Subjective      HPI     25-year-old female patient presents for transitional care  Patient is accompanied by her son today  Patient was admitted to the [address] [place] [address] between 09/14/2022 and 09/18/2022  Patient is presenting concern was regarding chest pain on the left side  Patient underwent extensive cardiovascular evaluation in the hospital, troponin negative x3, EKG unchanged from prior, patient had nuclear medicine stool stress test which does not show any abnormality  Chest pain was deemed to be secondary to muscle skeletal pain and improved with muscle relaxant  According to patient's son, she did move furniture is a day before her symptoms started  Patient reports no similar chest pain since discharge however she does have some pain on the back of her neck which was worse last night  Patient reports pain to be up to 7 5/10  Does improve with Tylenol  Of concern patient stated the pain did wake her up from sleep last night  Review of Systems   Constitutional: Negative for chills and fever  HENT: Negative for congestion, rhinorrhea and sore throat  Respiratory: Negative for chest tightness and shortness of breath  Cardiovascular: Negative for chest pain  Gastrointestinal: Positive for nausea (From pain)  Negative for abdominal pain, constipation, diarrhea and vomiting  Musculoskeletal: Positive for neck pain  Neurological: Positive for light-headedness and headaches  Negative for dizziness  Psychiatric/Behavioral: Positive for sleep disturbance  Current Outpatient Medications on File Prior to Visit   Medication Sig    Acetaminophen 500 MG TAKE 2 CAPSULES TWICE A DAY (Patient taking differently: Take 500 mg by mouth daily as needed TAKE 2 CAPSULES TWICE A DAY)    atorvastatin (LIPITOR) 40 mg tablet Take 1 tablet (40 mg total) by mouth every evening    losartan-hydrochlorothiazide (HYZAAR) 100-25 MG per tablet Take 1 tablet by mouth daily    MELATONIN PO Take by mouth    Nattokinase 100 MG CAPS Take by mouth As needed    Probiotic Product (MVW COMPLETE PROBIOTIC PO) Take by mouth    saccharomyces boulardii (FLORASTOR) 250 mg capsule Take 250 mg by mouth    [DISCONTINUED] albuterol (PROVENTIL HFA,VENTOLIN HFA) 90 mcg/act inhaler Inhale 2 puffs every 6 (six) hours as needed for wheezing or shortness of breath    saccharomyces boulardii (FLORASTOR) 250 mg capsule Take 250 mg by mouth 2 (two) times a day (Patient not taking: Reported on 9/23/2022)    traMADol (ULTRAM) 50 mg tablet Take 50 mg by mouth every 8 (eight) hours as needed for severe pain  Indications: Pain (Patient not taking: No sig reported)    zolpidem (AMBIEN) 10 mg tablet Take 5 mg by mouth daily at bedtime as needed for sleep  Indications: Trouble Sleeping (Patient not taking: No sig reported)       Objective     /66 (BP Location: Left arm, Patient Position: Sitting, Cuff Size: Standard)   Pulse 83   Temp (!) 97 3 °F (36 3 °C) (Temporal)   Resp 16   Ht 4' 11" (1 499 m)   Wt 68 kg (150 lb)   SpO2 96%   BMI 30 30 kg/m²     Physical Exam  Vitals reviewed  Constitutional:       General: She is not in acute distress  Appearance: Normal appearance  She is obese  She is not ill-appearing, toxic-appearing or diaphoretic  Cardiovascular:      Rate and Rhythm: Normal rate and regular rhythm  Pulses: Normal pulses  Heart sounds: Normal heart sounds  No murmur heard  Pulmonary:      Effort: Pulmonary effort is normal  No respiratory distress  Breath sounds: Normal breath sounds  Abdominal:      General: Abdomen is flat  Bowel sounds are normal  There is no distension  Palpations: Abdomen is soft  Musculoskeletal:         General: No swelling  Normal range of motion  Comments: Tenderness over the trapezius, left greater than right  Muscle spasm can be appreciated on the trapezius on the left   Skin:     General: Skin is warm and dry  Capillary Refill: Capillary refill takes less than 2 seconds  Coloration: Skin is not jaundiced  Neurological:      General: No focal deficit present  Mental Status: She is alert and oriented to person, place, and time     Psychiatric:         Mood and Affect: Mood normal             Sarah Roberson MD

## 2022-09-23 NOTE — ASSESSMENT & PLAN NOTE
Cardiovascular evaluation does not reveal any significant abnormality  Please continue Lipitor, patient may trial half a tablet to monitor side effect  If there is worsening chest pain especially with exertion please return for evaluation  Will involve cardiovascular specialist at that time

## 2022-09-23 NOTE — ASSESSMENT & PLAN NOTE
Patient requesting refill on albuterol  She does not require medication on regular basis  However her current inhaler is

## 2022-09-23 NOTE — ASSESSMENT & PLAN NOTE
Neck pain likely secondary to pot stroke versus muscle tension  Patient does have history of lumbar spine osteoarthritis  Will obtain x-ray for evaluation arthritis involving the cervical spine  Patient may use Flexeril 2 5 mg at night for symptomatic relief  Please take Tylenol 500 mg 2 tablets every 8 hours for the next week  Icy hot and warm compress may help with symptoms  Continue exercise as directed by physical therapist/chiropractor

## 2022-09-27 ENCOUNTER — HOME CARE VISIT (OUTPATIENT)
Dept: HOME HEALTH SERVICES | Facility: HOME HEALTHCARE | Age: 77
End: 2022-09-27
Payer: MEDICARE

## 2022-09-27 PROCEDURE — G0180 MD CERTIFICATION HHA PATIENT: HCPCS | Performed by: INTERNAL MEDICINE

## 2022-09-27 PROCEDURE — G0151 HHCP-SERV OF PT,EA 15 MIN: HCPCS

## 2022-09-28 ENCOUNTER — HOME CARE VISIT (OUTPATIENT)
Dept: HOME HEALTH SERVICES | Facility: HOME HEALTHCARE | Age: 77
End: 2022-09-28
Payer: MEDICARE

## 2022-09-28 VITALS
SYSTOLIC BLOOD PRESSURE: 110 MMHG | HEART RATE: 82 BPM | OXYGEN SATURATION: 97 % | TEMPERATURE: 97 F | DIASTOLIC BLOOD PRESSURE: 60 MMHG | RESPIRATION RATE: 20 BRPM

## 2022-09-28 PROCEDURE — G0299 HHS/HOSPICE OF RN EA 15 MIN: HCPCS

## 2022-09-29 ENCOUNTER — HOME CARE VISIT (OUTPATIENT)
Dept: HOME HEALTH SERVICES | Facility: HOME HEALTHCARE | Age: 77
End: 2022-09-29
Payer: MEDICARE

## 2022-09-29 PROCEDURE — G0151 HHCP-SERV OF PT,EA 15 MIN: HCPCS

## 2022-10-03 ENCOUNTER — HOME CARE VISIT (OUTPATIENT)
Dept: HOME HEALTH SERVICES | Facility: HOME HEALTHCARE | Age: 77
End: 2022-10-03
Payer: MEDICARE

## 2022-10-03 VITALS
HEART RATE: 88 BPM | SYSTOLIC BLOOD PRESSURE: 122 MMHG | TEMPERATURE: 97.3 F | OXYGEN SATURATION: 98 % | DIASTOLIC BLOOD PRESSURE: 70 MMHG | RESPIRATION RATE: 19 BRPM

## 2022-10-03 PROCEDURE — G0299 HHS/HOSPICE OF RN EA 15 MIN: HCPCS

## 2022-10-04 ENCOUNTER — HOME CARE VISIT (OUTPATIENT)
Dept: HOME HEALTH SERVICES | Facility: HOME HEALTHCARE | Age: 77
End: 2022-10-04
Payer: MEDICARE

## 2022-10-04 PROCEDURE — G0151 HHCP-SERV OF PT,EA 15 MIN: HCPCS

## 2022-10-05 ENCOUNTER — TELEPHONE (OUTPATIENT)
Dept: FAMILY MEDICINE CLINIC | Facility: CLINIC | Age: 77
End: 2022-10-05

## 2022-10-05 DIAGNOSIS — M54.12 CERVICAL RADICULITIS: Primary | ICD-10-CM

## 2022-10-05 NOTE — TELEPHONE ENCOUNTER
MRI is not required at this time  If patient continues to have significant symptoms, will send to physical therapy for evaluation and treatment  Order for PT placed

## 2022-10-07 ENCOUNTER — HOME CARE VISIT (OUTPATIENT)
Dept: HOME HEALTH SERVICES | Facility: HOME HEALTHCARE | Age: 77
End: 2022-10-07
Payer: MEDICARE

## 2022-10-07 PROCEDURE — G0151 HHCP-SERV OF PT,EA 15 MIN: HCPCS

## 2022-10-13 ENCOUNTER — EVALUATION (OUTPATIENT)
Dept: PHYSICAL THERAPY | Facility: MEDICAL CENTER | Age: 77
End: 2022-10-13
Payer: MEDICARE

## 2022-10-13 DIAGNOSIS — M54.12 CERVICAL RADICULITIS: ICD-10-CM

## 2022-10-13 PROCEDURE — 97140 MANUAL THERAPY 1/> REGIONS: CPT | Performed by: PHYSICAL THERAPIST

## 2022-10-13 PROCEDURE — 97161 PT EVAL LOW COMPLEX 20 MIN: CPT | Performed by: PHYSICAL THERAPIST

## 2022-10-13 NOTE — PROGRESS NOTES
PT Evaluation     Today's date: 10/13/2022  Patient name: Cass Tripathi  : 1945  MRN: [id number]  Referring provider: Pavel Barrera MD  Dx:   Encounter Diagnosis     ICD-10-CM    1  Cervical radiculitis  M54 12 Ambulatory Referral to Physical Therapy                  Assessment  Assessment details: Cass Tripathi is a 65 y o  female who presents with Cervical radiculitis  Patient presents alert and oriented with the above impairments  Pearletha Expose will benefit from PT to addres deficits in order to maximize and return to prior level of function  No further referral appears necessary at this time based upon examination results  Prognosis is good given HEP compliance  Please contact me if you have any questions or recommendations  Impairments: abnormal or restricted ROM, abnormal movement, activity intolerance, impaired physical strength, lacks appropriate home exercise program and pain with function  Understanding of Dx/Px/POC: good   Prognosis: good    Goals  Short Term Goals:   1  Pain decreased 2 ratings in 4 weeks  2  ROM increased 10* in 4 weeks  3  Strength increased 1/2 grade in 4 weeks    Long Term Goals:   1  ADLS/IADLS in related activities improved to maximal level in 8 weeks  2  Reaching is improved to maximal level in 8 weeks  3  Recreational activities are improved to maximal level in 8 weeks  4   [place] with HEP in 8 weeks      Plan  Patient would benefit from: skilled physical therapy  Planned modality interventions: thermotherapy: hydrocollator packs and cryotherapy  Planned therapy interventions: flexibility, functional ROM exercises, therapeutic exercise, strengthening, stretching, patient education, manual therapy, neuromuscular re-education, postural training and home exercise program  Frequency: 2x week  Duration in weeks: 8  Treatment plan discussed with: patient        Subjective Evaluation    History of Present Illness  Mechanism of injury: Pt reports moving furniture about a month ago  Afterwards she experienced exhaustion along w/ pain in her chest and the back of her head  She was admitted for 1 week and underwent testing to rule out cardiac issue  Upon d/c home she had care including nursing care for hypertension and PT for cervical stretching  She recently f/u w/ PCP and was referred to PT  Cervical xray revealed DDD  She presents today w/ reports of constant pain in upper cervical region and occipital region w/ temporal headache  Light headedness will occur when pain increases  At times she does feel unsteady w/ ambulation, but has not experienced this now for a few days  She has been able to continue and resume her cooking  She c/o increased pain w/ lack of sleep  She has been limiting her walking  No reports of UE radiculopathy  Tylenol does take the edge off of her pain  She also performs self stretches; cervical side bending and chin tucks  Heat also provides some relief     Pain  At best pain ratin  At worst pain ratin    Patient Goals  Patient goals for therapy: decreased pain, increased motion, increased strength, independence with ADLs/IADLs and return to sport/leisure activities          Objective     Palpation     Additional Palpation Details  Palpable tightness over B rhomboid, UT, levator and sub-occipitals, R moreso than L    Active Range of Motion   Cervical/Thoracic Spine       Cervical    Flexion: 25 degrees   Extension: 25 degrees      Left lateral flexion: 11 degrees      Right lateral flexion: 9 degrees      Left rotation: 42 degrees  Right rotation: 35 degrees             Strength/Myotome Testing     Left Shoulder     Planes of Motion   Flexion: 4-   Abduction: 3+   External rotation at 90°: 4   Internal rotation at 90°: 4+     Right Shoulder     Planes of Motion   Flexion: 3+   Abduction: 3   External rotation at 90°: 3+   Internal rotation at 90°: 4-              Precautions: none      Manuals 10/13            TPR B rhomboid, UT, sub-occipitals, SOR 15 min                                                   Neuro Re-Ed                                                                                                        Ther Ex 10/13            B cervical SB and rotation 10"x5 HEP                                                                                                       Ther Activity                                       Gait Training                                       Modalities

## 2022-10-18 ENCOUNTER — OFFICE VISIT (OUTPATIENT)
Dept: PHYSICAL THERAPY | Facility: MEDICAL CENTER | Age: 77
End: 2022-10-18
Payer: MEDICARE

## 2022-10-18 DIAGNOSIS — M54.12 CERVICAL RADICULITIS: Primary | ICD-10-CM

## 2022-10-18 PROCEDURE — 97140 MANUAL THERAPY 1/> REGIONS: CPT | Performed by: PHYSICAL THERAPIST

## 2022-10-18 NOTE — PROGRESS NOTES
Daily Note     Today's date: 10/18/2022  Patient name: Cass Tripathi  : 1945  MRN: [id number]  Referring provider: Pavel Barrera MD  Dx:   Encounter Diagnosis     ICD-10-CM    1  Cervical radiculitis  M54 12                   Subjective: Pt reports decrease in pain following last visit for the remainder of the day  This past  she did experience pain radiating into her R shoulder and upper arm  Today she c/o R sided cervical pain w/ headache  Objective: See treatment diary below      Assessment: Tolerated treatment well  Patient demonstrated fatigue post treatment and would benefit from continued PT  Decrease in radicular symptoms post treatment  Plan: Continue per plan of care        Precautions: none      Manuals 10/13 10/18           TPR B rhomboid, UT, sub-occipitals, SOR 15 min 25 min                                                  Neuro Re-Ed                                                                                                        Ther Ex 10/13            B cervical SB and rotation 10"x5 HEP                                                                                                       Ther Activity                                       Gait Training                                       Modalities  10/18           MH  10 min post

## 2022-10-21 ENCOUNTER — OFFICE VISIT (OUTPATIENT)
Dept: PHYSICAL THERAPY | Facility: MEDICAL CENTER | Age: 77
End: 2022-10-21
Payer: MEDICARE

## 2022-10-21 DIAGNOSIS — M54.12 CERVICAL RADICULITIS: Primary | ICD-10-CM

## 2022-10-21 PROCEDURE — 97140 MANUAL THERAPY 1/> REGIONS: CPT | Performed by: PHYSICAL THERAPIST

## 2022-10-21 NOTE — PROGRESS NOTES
Daily Note     Today's date: 10/21/2022  Patient name: Alec Pelayo  : 1945  MRN: [id number]  Referring provider: Ancelmo Stock MD  Dx:   Encounter Diagnosis     ICD-10-CM    1  Cervical radiculitis  M54 12                   Subjective: Pt no longer c/o dizziness  Also sig reduction in radiating shoulder pain  Primary complaint now of headache  Continues to use heat at home  Objective: See treatment diary below      Assessment: Tolerated treatment well  Patient demonstrated fatigue post treatment and would benefit from continued PT  Most tightness noted over sub-occipital region  She is responding very well to treatment  Plan: Continue per plan of care        Precautions: none      Manuals 10/13 10/18 10/21          TPR B rhomboid, UT, sub-occipitals, SOR 15 min 25 min 25 min                                                 Neuro Re-Ed                                                                                                        Ther Ex 10/13            B cervical SB and rotation 10"x5 HEP                                                                                                       Ther Activity                                       Gait Training                                       Modalities  10/18 10/21          MH  10 min post 10 min post

## 2022-10-24 ENCOUNTER — OFFICE VISIT (OUTPATIENT)
Dept: PHYSICAL THERAPY | Facility: MEDICAL CENTER | Age: 77
End: 2022-10-24
Payer: MEDICARE

## 2022-10-24 DIAGNOSIS — M54.12 CERVICAL RADICULITIS: Primary | ICD-10-CM

## 2022-10-24 PROCEDURE — 97140 MANUAL THERAPY 1/> REGIONS: CPT | Performed by: PHYSICAL THERAPIST

## 2022-10-24 NOTE — PROGRESS NOTES
Daily Note     Today's date: 10/24/2022  Patient name: Rajani Helm  : 1945  MRN: [id number]  Referring provider: Leeanna Egan MD  Dx:   Encounter Diagnosis     ICD-10-CM    1  Cervical radiculitis  M54 12                   Subjective: Pt reports continued gradual improvement  Headache reported prior to treatment  Objective: See treatment diary below      Assessment: Tolerated treatment well  Patient demonstrated fatigue post treatment and would benefit from continued PT  Again reports relief post treatment  Plan: Continue per plan of care        Precautions: none      Manuals 10/13 10/18 10/21 10/24         TPR B rhomboid, UT, sub-occipitals, SOR 15 min 25 min 25 min 25 min                                                Neuro Re-Ed                                                                                                        Ther Ex 10/13            B cervical SB and rotation 10"x5 HEP                                                                                                       Ther Activity                                       Gait Training                                       Modalities  10/18 10/21          MH  10 min post 10 min post

## 2022-10-26 ENCOUNTER — OFFICE VISIT (OUTPATIENT)
Dept: PHYSICAL THERAPY | Facility: MEDICAL CENTER | Age: 77
End: 2022-10-26
Payer: MEDICARE

## 2022-10-26 DIAGNOSIS — M54.12 CERVICAL RADICULITIS: Primary | ICD-10-CM

## 2022-10-26 PROCEDURE — 97140 MANUAL THERAPY 1/> REGIONS: CPT | Performed by: PHYSICAL THERAPIST

## 2022-10-26 NOTE — PROGRESS NOTES
Daily Note     Today's date: 10/26/2022  Patient name: Rodolfo Grullon  : 1945  MRN: [id number]  Referring provider: Vane Naqvi MD  Dx:   Encounter Diagnosis     ICD-10-CM    1  Cervical radiculitis  M54 12                   Subjective: Pt w/ continued improvement  States that neck pain and headache are decreasing  Objective: See treatment diary below      Assessment: Tolerated treatment well  Patient demonstrated fatigue post treatment and would benefit from continued PT  Most tightness noted over L sub-occipitals  Plan: Continue per plan of care        Precautions: none      Manuals 10/13 10/18 10/21 10/24 10/26        TPR B rhomboid, UT, sub-occipitals, SOR 15 min 25 min 25 min 25 min 25 min                                               Neuro Re-Ed                                                                                                        Ther Ex 10/13            B cervical SB and rotation 10"x5 HEP                                                                                                       Ther Activity                                       Gait Training                                       Modalities  10/18 10/21          MH  10 min post 10 min post

## 2022-10-31 ENCOUNTER — OFFICE VISIT (OUTPATIENT)
Dept: PHYSICAL THERAPY | Facility: MEDICAL CENTER | Age: 77
End: 2022-10-31

## 2022-10-31 DIAGNOSIS — M54.12 CERVICAL RADICULITIS: Primary | ICD-10-CM

## 2022-10-31 NOTE — PROGRESS NOTES
Daily Note     Today's date: 10/31/2022  Patient name: Sumeet Ryan  : 1945  MRN: [id number]  Referring provider: Jasen Morgan MD  Dx:   Encounter Diagnosis     ICD-10-CM    1  Cervical radiculitis  M54 12                   Subjective: Pt no longer c/o dizziness  Has not had a headache since Friday  Objective: See treatment diary below      Assessment: Tolerated treatment well  Patient demonstrated fatigue post treatment and would benefit from continued PT  Most tightness persisting over L sub-occipitals      Plan: Continue per plan of care        Precautions: none      Manuals 10/13 10/18 10/21 10/24 10/26 10/31       TPR B rhomboid, UT, sub-occipitals, SOR 15 min 25 min 25 min 25 min 25 min 20 min                                              Neuro Re-Ed                                                                                                        Ther Ex 10/13     10/31       B cervical SB and rotation 10"x5 HEP     10" x5       scap squeeze      5"x10                                                                                     Ther Activity                                       Gait Training                                       Modalities  10/18 10/21          MH  10 min post 10 min post

## 2022-11-01 DIAGNOSIS — I10 ESSENTIAL HYPERTENSION: ICD-10-CM

## 2022-11-01 RX ORDER — LOSARTAN POTASSIUM AND HYDROCHLOROTHIAZIDE 25; 100 MG/1; MG/1
TABLET ORAL
Qty: 30 TABLET | Refills: 1 | Status: SHIPPED | OUTPATIENT
Start: 2022-11-01

## 2022-11-03 ENCOUNTER — OFFICE VISIT (OUTPATIENT)
Dept: PHYSICAL THERAPY | Facility: MEDICAL CENTER | Age: 77
End: 2022-11-03

## 2022-11-03 DIAGNOSIS — M54.12 CERVICAL RADICULITIS: Primary | ICD-10-CM

## 2022-11-03 NOTE — PROGRESS NOTES
Daily Note     Today's date: 11/3/2022  Patient name: Josy Mas  : 1945  MRN: [id number]  Referring provider: Kayleen Michaels MD  Dx:   Encounter Diagnosis     ICD-10-CM    1  Cervical radiculitis  M54 12                   Subjective: Pt reports significant improvement  No longer c/o dizziness or headache  Neck pain mild      Objective: See treatment diary below      Assessment: Tolerated treatment well  Patient demonstrated fatigue post treatment and would benefit from continued PT  Mild tightness only over sub-occipitals      Plan: Continue per plan of care        Precautions: none      Manuals 10/13 10/18 10/21 10/24 10/26 10/31 11/3      TPR B rhomboid, UT, sub-occipitals, SOR 15 min 25 min 25 min 25 min 25 min 20 min 20 min                                             Neuro Re-Ed                                                                                                        Ther Ex 10/13     10/31 11/3      B cervical SB and rotation 10"x5 HEP     10" x5 10"X5      scap squeeze      5"x10 5"x10                                                                                    Ther Activity                                       Gait Training                                       Modalities  10/18 10/21          MH  10 min post 10 min post

## 2022-11-16 ENCOUNTER — EVALUATION (OUTPATIENT)
Dept: PHYSICAL THERAPY | Facility: MEDICAL CENTER | Age: 77
End: 2022-11-16

## 2022-11-16 DIAGNOSIS — M54.12 CERVICAL RADICULITIS: Primary | ICD-10-CM

## 2022-11-16 NOTE — PROGRESS NOTES
PT Re-Evaluation  and PT Discharge    Today's date: 2022  Patient name: Griffin Alexis  : 1945  MRN: [id number]  Referring provider: Shanon Berman MD  Dx:   Encounter Diagnosis     ICD-10-CM    1  Cervical radiculitis  M54 12 PT plan of care cert/re-cert          Start Time: 3947  Stop Time: 1150  Total time in clinic (min): 35 minutes    Assessment  Assessment details: Griffin Alexis has attended 8 visits since initiating PT and has demonstrated overall improvement  Mobility and strength has improved with decreased reports of pain  Griffin Alexis has demonstrated an improvement in function as well  Currently, she has made steady progress towards her goals, and has resumed daily activity  At this time, d/c w/ independent HEP  Impairments: abnormal or restricted ROM, abnormal movement, activity intolerance, impaired physical strength and pain with function  Understanding of Dx/Px/POC: good   Prognosis: good    Goals  Short Term Goals:   1  Pain decreased 2 ratings in 4 weeks MET  2   ROM increased 10* in 4 weeks MET  3  Strength increased 1/2 grade in 4 weeks PARTIALLY MET    Long Term Goals:   1  ADLS/IADLS in related activities improved to maximal level in 8 weeks MET  2  Reaching is improved to maximal level in 8 weeks PARTIALLY MET  3  Recreational activities are improved to maximal level in 8 weeks  PARTIALLY MET  4  [place] with HEP in 8 weeks   MET    Plan  Patient would benefit from: skilled physical therapy  Planned modality interventions: thermotherapy: hydrocollator packs and cryotherapy  Planned therapy interventions: flexibility, functional ROM exercises, therapeutic exercise, strengthening, stretching, patient education, manual therapy, neuromuscular re-education, postural training and home exercise program  Frequency: 2x week  Duration in weeks: 1  Treatment plan discussed with: patient        Subjective Evaluation    History of Present Illness  Mechanism of injury: Pt reports significant improvement in neck pain reporting mild intermittent ache only  No longer experience dizziness  Mild headache at times due to her eyes  She has been able to resume household chores       Pain  At best pain ratin  At worst pain ratin    Patient Goals  Patient goals for therapy: decreased pain, increased motion, increased strength, independence with ADLs/IADLs and return to sport/leisure activities          Objective     Palpation     Additional Palpation Details  Mild tightness noted over R rhomboid, UT, B sub-occipitals    Active Range of Motion   Cervical/Thoracic Spine       Cervical    Flexion: 25 degrees   Extension: 25 degrees      Left lateral flexion: 16 degrees      Right lateral flexion: 20 degrees      Left rotation: 62 degrees  Right rotation: 65 degrees             Strength/Myotome Testing     Left Shoulder     Planes of Motion   Flexion: 4+   Abduction: 4+   External rotation at 90°: 4+   Internal rotation at 90°: 4+     Right Shoulder     Planes of Motion   Flexion: 3+   Abduction: 3+   External rotation at 90°: 4   Internal rotation at 90°: 4+              Precautions: none      Manuals             TPR B rhomboid, UT, sub-occipitals, SOR 25 min                                                   Neuro Re-Ed                                                                                                        Ther Ex             B cervical SB and rotation 10"x5 HEP                                                                                                       Ther Activity                                       Gait Training                                       Modalities

## 2022-11-17 PROBLEM — N39.0 URINARY TRACT INFECTION WITH HEMATURIA: Status: RESOLVED | Noted: 2022-09-17 | Resolved: 2022-11-17

## 2022-11-17 PROBLEM — R31.9 URINARY TRACT INFECTION WITH HEMATURIA: Status: RESOLVED | Noted: 2022-09-17 | Resolved: 2022-11-17

## 2022-11-23 ENCOUNTER — OFFICE VISIT (OUTPATIENT)
Dept: URGENT CARE | Facility: MEDICAL CENTER | Age: 77
End: 2022-11-23

## 2022-11-23 VITALS
DIASTOLIC BLOOD PRESSURE: 78 MMHG | OXYGEN SATURATION: 97 % | BODY MASS INDEX: 30.52 KG/M2 | HEART RATE: 92 BPM | RESPIRATION RATE: 18 BRPM | SYSTOLIC BLOOD PRESSURE: 181 MMHG | TEMPERATURE: 97.6 F | HEIGHT: 59 IN | WEIGHT: 151.4 LBS

## 2022-11-23 DIAGNOSIS — J01.00 ACUTE NON-RECURRENT MAXILLARY SINUSITIS: Primary | ICD-10-CM

## 2022-11-23 RX ORDER — AMLODIPINE BESYLATE 2.5 MG/1
5 TABLET ORAL
COMMUNITY

## 2022-11-23 RX ORDER — AZITHROMYCIN 250 MG/1
TABLET, FILM COATED ORAL
Qty: 6 TABLET | Refills: 0 | Status: SHIPPED | OUTPATIENT
Start: 2022-11-23 | End: 2022-11-27

## 2022-11-23 RX ORDER — HYDROCHLOROTHIAZIDE 25 MG/1
12.5 TABLET ORAL
COMMUNITY

## 2022-11-23 NOTE — PROGRESS NOTES
[facility]        NAME: Mariam Faustin is a 65 y o  female  : 1945    MRN: [id number]  DATE: 2022  TIME: 11:51 AM    Assessment and Plan   Acute non-recurrent maxillary sinusitis [J01 00]  1  Acute non-recurrent maxillary sinusitis  azithromycin (ZITHROMAX) 250 mg tablet            Patient Instructions     1  Over-the-counter acetaminophen as needed for pain or fever  2  Oxymetazoline nasal spray 2 sprays in each nostril every 12 hours for no more than the next 5 days as needed for nasal congestion  3  Over-the-counter guaifenesin as needed for mucus relief  4  Gargle salt water as needed for sore throat relief  5  Increase oral fluid consumption  6  Follow-up with primary care provider in 5-7 days for any persistent symptoms  Chief Complaint     Chief Complaint   Patient presents with   • Cold Like Symptoms     Pt having sinus congestion, green mucus x1 week  Pt had negative covid test at home  History of Present Illness       76-year-old female with a 7-10 day history of worsening nasal congestion, purulent nasal drainage, postnasal discharge and mild cough due to the same  Patient denies fever chills  She does have increasing facial pressure and discomfort  No shortness of breath or chest pain  Moderate sore throat which is waxing and waning  Review of Systems   Review of Systems   Constitutional: Negative for fever  HENT: Positive for congestion, postnasal drip, rhinorrhea, sinus pressure and sore throat  Negative for facial swelling and sinus pain  Respiratory: Positive for cough  Cardiovascular: Negative for chest pain  Gastrointestinal: Negative for abdominal pain  Musculoskeletal: Negative for myalgias  Skin: Negative for rash           Current Medications       Current Outpatient Medications:   •  Acetaminophen 500 MG, TAKE 2 CAPSULES TWICE A DAY (Patient taking differently: Take 500 mg by mouth daily as needed TAKE 2 CAPSULES TWICE A DAY), Disp: 120 capsule, Rfl: 3  •  albuterol (PROVENTIL HFA,VENTOLIN HFA) 90 mcg/act inhaler, Inhale 2 puffs every 6 (six) hours as needed for wheezing or shortness of breath, Disp: 6 7 g, Rfl: 2  •  amLODIPine (NORVASC) 2 5 mg tablet, Take 5 mg by mouth, Disp: , Rfl:   •  azithromycin (ZITHROMAX) 250 mg tablet, Take 2 tablets today then 1 tablet daily x 4 days, Disp: 6 tablet, Rfl: 0  •  cyclobenzaprine (FLEXERIL) 5 mg tablet, Take 0 5 tablets (2 5 mg total) by mouth daily at bedtime as needed for muscle spasms, Disp: 20 tablet, Rfl: 0  •  losartan-hydrochlorothiazide (HYZAAR) 100-25 MG per tablet, take 1 tablet by mouth once daily, Disp: 30 tablet, Rfl: 1  •  MELATONIN PO, Take by mouth, Disp: , Rfl:   •  Nattokinase 100 MG CAPS, Take by mouth As needed, Disp: , Rfl:   •  Probiotic Product (MVW COMPLETE PROBIOTIC PO), Take by mouth, Disp: , Rfl:   •  saccharomyces boulardii (FLORASTOR) 250 mg capsule, Take 250 mg by mouth, Disp: , Rfl:   •  atorvastatin (LIPITOR) 40 mg tablet, Take 1 tablet (40 mg total) by mouth every evening, Disp: 30 tablet, Rfl: 0  •  hydrochlorothiazide (HYDRODIURIL) 25 mg tablet, Take 12 5 mg by mouth (Patient not taking: Reported on 11/23/2022), Disp: , Rfl:   •  saccharomyces boulardii (FLORASTOR) 250 mg capsule, Take 250 mg by mouth 2 (two) times a day (Patient not taking: Reported on 9/23/2022), Disp: , Rfl:   •  traMADol (ULTRAM) 50 mg tablet, Take 50 mg by mouth every 8 (eight) hours as needed for severe pain  Indications: Pain (Patient not taking: Reported on 9/23/2022), Disp: , Rfl:   •  zolpidem (AMBIEN) 10 mg tablet, Take 5 mg by mouth daily at bedtime as needed for sleep   Indications: Trouble Sleeping (Patient not taking: Reported on 9/23/2022), Disp: , Rfl:     Current Allergies     Allergies as of 11/23/2022 - Reviewed 11/23/2022   Allergen Reaction Noted   • Cefazolin Hives, GI Intolerance, and Swelling 05/03/2016   • Ciprofloxacin Hives 05/03/2016   • Penicillins Hives and Rash 05/03/2016   • Prednisone Hives, GI Intolerance, Swelling, and Vomiting 05/03/2016   • Vancomycin Hives, GI Intolerance, Diarrhea, and Rash 05/03/2016   • Aspirin GI Intolerance and Hives 05/03/2016   • Adhesive [medical tape]  08/07/2019   • Beef allergy - food allergy Diarrhea 03/23/2021   • Cortisone Syncope 03/25/2019   • Gluten meal - food allergy GI Intolerance 03/23/2021   • Lactose - food allergy Diarrhea 03/23/2021   • Other  07/02/2017   • Oxycodone Rash and Vomiting 03/25/2019            The following portions of the patient's history were reviewed and updated as appropriate: allergies, current medications, past family history, past medical history, past social history, past surgical history and problem list      Past Medical History:   Diagnosis Date   • Arthritis    • Carpal tunnel syndrome    • GERD (gastroesophageal reflux disease)    • Hiatal hernia    • Hypertension    • Insomnia    • Pneumonia    • PONV (postoperative nausea and vomiting)    • Renal calculi    • Sleep deprivation     chronically   • Vitamin D deficiency    • Vitamin D toxicity 2011    with leaky gut syndrome       Past Surgical History:   Procedure Laterality Date   • BUNIONECTOMY Bilateral    • CARPAL TUNNEL RELEASE Right 2015   • COLONOSCOPY     • CYSTOSCOPY N/A 6/6/2019    Procedure: CYSTOSCOPY;  Surgeon: Annel Aviles MD;  [facility];  Service: Gynecology Oncology   • ESOPHAGOGASTRODUODENOSCOPY N/A 5/5/2016    Procedure: ESOPHAGOGASTRODUODENOSCOPY (EGD); Surgeon: Dale Mejia MD;  [facility]; Service:    • FRACTURE SURGERY      L arm    • NERVE BLOCK       R Knee   • OOPHORECTOMY Left    • OH COLONOSCOPY FLX DX W/COLLJ SPEC WHEN PFRMD N/A 6/28/2016    Procedure: COLONOSCOPY;  Surgeon: Dale Mejia MD;  [facility]; Service: Gastroenterology   • OH ESOPHAGOGASTRODUODENOSCOPY TRANSORAL DIAGNOSTIC N/A 10/19/2018    Procedure: ESOPHAGOGASTRODUODENOSCOPY (EGD);   Surgeon: Dale Mejia MD; [facility]; Service: Gastroenterology   • IA LAPAROSCOPY W TOT HYSTERECTUTERUS <=250 GRAM  W TUBE/OVARY N/A 6/6/2019    Procedure: ROBOTIC TOTAL LAPAROSCOPIC HYSTERECTOMY, RIGHT SALPINGO-OOPHORECTOMY, EXTENSIVE ADHESIOLYSIS, APPROXIMATELY 39 MIN;  Surgeon: Ester Chavez MD;  [facility];  Service: Gynecology Oncology   • ROTATOR CUFF REPAIR Bilateral    • TOE SURGERY Left     left 5th toe shaved down due to repeated fx   • TOTAL KNEE ARTHROPLASTY Left        Family History   Problem Relation Age of Onset   • Heart failure Mother    • Other Mother         respiratory failure   • Heart attack Mother    • Heart failure Father         respiratory failure   • Heart attack Father    • Breast cancer Sister    • No Known Problems Sister    • Kidney failure Brother    • Alcohol abuse Brother    • No Known Problems Brother    • Diabetes Brother    • Hypertension Daughter    • Arrhythmia Neg Hx    • Clotting disorder Neg Hx    • Fainting Neg Hx    • Anuerysm Neg Hx    • Stroke Neg Hx    • Hyperlipidemia Neg Hx    • Asthma Neg Hx          Medications have been verified  Objective   BP (!) 181/78 (BP Location: Right arm)   Pulse 92   Temp 97 6 °F (36 4 °C) (Temporal)   Resp 18   Ht 4' 11" (1 499 m)   Wt 68 7 kg (151 lb 6 4 oz)   SpO2 97%   BMI 30 58 kg/m²        Physical Exam     Physical Exam  Vitals and nursing note reviewed  Constitutional:       General: She is not in acute distress  Appearance: Normal appearance  She is not ill-appearing or toxic-appearing  HENT:      Head: Normocephalic  Right Ear: Tympanic membrane normal       Left Ear: Tympanic membrane normal       Nose: Congestion present  No rhinorrhea  Mouth/Throat:      Mouth: Mucous membranes are moist       Pharynx: Oropharynx is clear  No posterior oropharyngeal erythema  Eyes:      Conjunctiva/sclera: Conjunctivae normal       Pupils: Pupils are equal, round, and reactive to light     Cardiovascular: Rate and Rhythm: Normal rate and regular rhythm  Pulses: Normal pulses  Pulmonary:      Effort: Pulmonary effort is normal       Breath sounds: Normal breath sounds  Abdominal:      Tenderness: There is no abdominal tenderness  Musculoskeletal:         General: Normal range of motion  Cervical back: Normal range of motion  Skin:     General: Skin is warm and dry  Capillary Refill: Capillary refill takes less than 2 seconds  Neurological:      General: No focal deficit present  Mental Status: She is alert and oriented to person, place, and time     Psychiatric:         Mood and Affect: Mood normal          Behavior: Behavior normal

## 2022-11-23 NOTE — PATIENT INSTRUCTIONS
1  Over-the-counter acetaminophen as needed for pain or fever  2  Oxymetazoline nasal spray 2 sprays in each nostril every 12 hours for no more than the next 5 days as needed for nasal congestion  3  Over-the-counter guaifenesin as needed for mucus relief  4  Gargle salt water as needed for sore throat relief  5  Increase oral fluid consumption  6  Follow-up with primary care provider in 5-7 days for any persistent symptoms

## 2022-12-02 ENCOUNTER — OFFICE VISIT (OUTPATIENT)
Dept: OBGYN CLINIC | Facility: CLINIC | Age: 77
End: 2022-12-02

## 2022-12-02 VITALS — HEIGHT: 59 IN | BODY MASS INDEX: 30.44 KG/M2 | WEIGHT: 151 LBS

## 2022-12-02 DIAGNOSIS — M25.551 PAIN IN RIGHT HIP: ICD-10-CM

## 2022-12-02 DIAGNOSIS — M46.1 SI (SACROILIAC) JOINT INFLAMMATION (HCC): ICD-10-CM

## 2022-12-02 DIAGNOSIS — S76.312A STRAIN OF LEFT HAMSTRING, INITIAL ENCOUNTER: ICD-10-CM

## 2022-12-02 DIAGNOSIS — M17.11 PRIMARY OSTEOARTHRITIS OF RIGHT KNEE: Primary | ICD-10-CM

## 2022-12-02 NOTE — PROGRESS NOTES
Assessment:   Diagnosis ICD-10-CM Associated Orders   1  Primary osteoarthritis of right knee  M17 11       2  Pain in right hip  M25 551 Ambulatory Referral to Physical Therapy      3  SI (sacroiliac) joint inflammation (HCC)  M46 1 Ambulatory Referral to Physical Therapy      4  Strain of left hamstring, initial encounter  [phone] Ambulatory Referral to Physical Therapy          Plan:  · On exam, most of the patient has he pain in the right SI joint, glute area and left hamstring strain  · Patient was provided glute, hamstring strengthening to do at home  A PT script was provided to the aptient if she wishes to use it  To do next visit:  Return in about 3 months (around 3/2/2023)  The above stated was discussed in layman's terms and the patient expressed understanding  All questions were answered to the patient's satisfaction  Scribe Attestation    I,:  Dar Carey am acting as a scribe while in the presence of the attending physician :       I,:  Flakita Goddard MD personally performed the services described in this documentation    as scribed in my presence :             Subjective:   Jessi Mata is a 65 y o  female who presents today for her right hip and right knee pain  She states that she is having pain in the buttock after vacuuming approx  1 month ago  After last visit on 8/26/2022, she was seen for her left hip OA and left knee total knee revision  She states that she had been in the hospital and could not have the intra- articular left hip injection  She does have a reaction to cortisone  This injection would be a Toradol  Left knee due to revision total knee arthroplasty and March 2021 performed with the outside provider [place]  Review of systems negative unless otherwise specified in HPI  Review of Systems   Constitutional: Negative for chills, fever and unexpected weight change  HENT: Negative for hearing loss, nosebleeds and sore throat      Eyes: Negative for pain, redness and visual disturbance  Respiratory: Negative for cough, shortness of breath and wheezing  Cardiovascular: Negative for chest pain, palpitations and leg swelling  Gastrointestinal: Negative for abdominal pain and nausea  Genitourinary: Negative for dyspareunia, dysuria and frequency  Musculoskeletal: Positive for arthralgias  Skin: Negative for rash and wound  Neurological: Negative for dizziness, numbness and headaches  Psychiatric/Behavioral: Negative for decreased concentration and suicidal ideas  The patient is not nervous/anxious  Past Medical History:   Diagnosis Date   • Arthritis    • Carpal tunnel syndrome    • GERD (gastroesophageal reflux disease)    • Hiatal hernia    • Hypertension    • Insomnia    • Pneumonia    • PONV (postoperative nausea and vomiting)    • Renal calculi    • Sleep deprivation     chronically   • Vitamin D deficiency    • Vitamin D toxicity 2011    with leaky gut syndrome       Past Surgical History:   Procedure Laterality Date   • BUNIONECTOMY Bilateral    • CARPAL TUNNEL RELEASE Right 2015   • COLONOSCOPY     • CYSTOSCOPY N/A 6/6/2019    Procedure: CYSTOSCOPY;  Surgeon: Veronika Wray MD;  [facility];  Service: Gynecology Oncology   • ESOPHAGOGASTRODUODENOSCOPY N/A 5/5/2016    Procedure: ESOPHAGOGASTRODUODENOSCOPY (EGD); Surgeon: Lou David MD;  [facility]; Service:    • FRACTURE SURGERY      L arm    • NERVE BLOCK       R Knee   • OOPHORECTOMY Left    • IN COLONOSCOPY FLX DX W/COLLJ SPEC WHEN PFRMD N/A 6/28/2016    Procedure: COLONOSCOPY;  Surgeon: Lou David MD;  [facility]; Service: Gastroenterology   • IN ESOPHAGOGASTRODUODENOSCOPY TRANSORAL DIAGNOSTIC N/A 10/19/2018    Procedure: ESOPHAGOGASTRODUODENOSCOPY (EGD); Surgeon: Lou David MD;  [facility];   Service: Gastroenterology   • IN LAPAROSCOPY W TOT HYSTERECTUTERUS <=250 Tomma Rocher TUBE/OVARY N/A 6/6/2019    Procedure: ROBOTIC TOTAL LAPAROSCOPIC HYSTERECTOMY, RIGHT SALPINGO-OOPHORECTOMY, EXTENSIVE ADHESIOLYSIS, APPROXIMATELY 39 MIN;  Surgeon: Mary Reddy MD;  [facility];  Service: Gynecology Oncology   • ROTATOR CUFF REPAIR Bilateral    • TOE SURGERY Left     left 5th toe shaved down due to repeated fx   • TOTAL KNEE ARTHROPLASTY Left        Family History   Problem Relation Age of Onset   • Heart failure Mother    • Other Mother         respiratory failure   • Heart attack Mother    • Heart failure Father         respiratory failure   • Heart attack Father    • Breast cancer Sister    • No Known Problems Sister    • Kidney failure Brother    • Alcohol abuse Brother    • No Known Problems Brother    • Diabetes Brother    • Hypertension Daughter    • Arrhythmia Neg Hx    • Clotting disorder Neg Hx    • Fainting Neg Hx    • Anuerysm Neg Hx    • Stroke Neg Hx    • Hyperlipidemia Neg Hx    • Asthma Neg Hx        Social History     Occupational History   • Not on file   Tobacco Use   • Smoking status: Former     Packs/day: 1 00     Years: 6 00     Pack years: 6 00     Types: Cigarettes   • Smokeless tobacco: Never   • Tobacco comments:     Quit > 30 years ago    Vaping Use   • Vaping Use: Never used   Substance and Sexual Activity   • Alcohol use: No   • Drug use: No   • Sexual activity: Not on file     Comment: no partner         Current Outpatient Medications:   •  Acetaminophen 500 MG, TAKE 2 CAPSULES TWICE A DAY (Patient taking differently: Take 500 mg by mouth daily as needed TAKE 2 CAPSULES TWICE A DAY), Disp: 120 capsule, Rfl: 3  •  albuterol (PROVENTIL HFA,VENTOLIN HFA) 90 mcg/act inhaler, Inhale 2 puffs every 6 (six) hours as needed for wheezing or shortness of breath, Disp: 6 7 g, Rfl: 2  •  amLODIPine (NORVASC) 2 5 mg tablet, Take 5 mg by mouth, Disp: , Rfl:   •  atorvastatin (LIPITOR) 40 mg tablet, Take 1 tablet (40 mg total) by mouth every evening, Disp: 30 tablet, Rfl: 0  •  cyclobenzaprine (FLEXERIL) 5 mg tablet, Take 0 5 tablets (2 5 mg total) by mouth daily at bedtime as needed for muscle spasms, Disp: 20 tablet, Rfl: 0  •  hydrochlorothiazide (HYDRODIURIL) 25 mg tablet, Take 12 5 mg by mouth (Patient not taking: Reported on 11/23/2022), Disp: , Rfl:   •  losartan-hydrochlorothiazide (HYZAAR) 100-25 MG per tablet, take 1 tablet by mouth once daily, Disp: 30 tablet, Rfl: 1  •  MELATONIN PO, Take by mouth, Disp: , Rfl:   •  Nattokinase 100 MG CAPS, Take by mouth As needed, Disp: , Rfl:   •  Probiotic Product (MVW COMPLETE PROBIOTIC PO), Take by mouth, Disp: , Rfl:   •  saccharomyces boulardii (FLORASTOR) 250 mg capsule, Take 250 mg by mouth 2 (two) times a day (Patient not taking: Reported on 9/23/2022), Disp: , Rfl:   •  saccharomyces boulardii (FLORASTOR) 250 mg capsule, Take 250 mg by mouth, Disp: , Rfl:   •  traMADol (ULTRAM) 50 mg tablet, Take 50 mg by mouth every 8 (eight) hours as needed for severe pain  Indications: Pain (Patient not taking: Reported on 9/23/2022), Disp: , Rfl:   •  zolpidem (AMBIEN) 10 mg tablet, Take 5 mg by mouth daily at bedtime as needed for sleep  Indications: Trouble Sleeping (Patient not taking: Reported on 9/23/2022), Disp: , Rfl:     Allergies   Allergen Reactions   • Cefazolin Hives, GI Intolerance and Swelling   • Ciprofloxacin Hives   • Penicillins Hives and Rash   • Prednisone Hives, GI Intolerance, Swelling and Vomiting   • Vancomycin Hives, GI Intolerance, Diarrhea and Rash   • Aspirin GI Intolerance and Hives     Even low dose    • Adhesive [Medical Tape]    • Beef Allergy - Food Allergy Diarrhea   • Cortisone Syncope   • Gluten Meal - Food Allergy GI Intolerance   • Lactose - Food Allergy Diarrhea   • Other      STEROIDS- GI INTOLERANCE   • Oxycodone Rash and Vomiting          There were no vitals filed for this visit      Objective:    General:  No apparent distress   CV:  S1-S2  Chest:  No audible wheezing   Abdomen: Soft                    Right Hip Exam     Tenderness   The patient is experiencing tenderness in the posterior  Range of Motion   Flexion: 90   External rotation: 40   Internal rotation: 20     Muscle Strength   Abduction: 4/5   Adduction: 4/5   Flexion: 4/5     Other   Erythema: absent  Sensation: normal  Pulse: present    Comments:  Calf is soft and non tender      Left Hip Exam     Tenderness   Left hip tenderness location: groin, lateral hamstring  Range of Motion   Flexion: 90 (with pain)   External rotation: 30   Internal rotation: 10     Muscle Strength   Abduction: 4/5   Adduction: 4/5   Flexion: 4/5     Other   Erythema: absent  Sensation: normal  Pulse: present            Diagnostics, reviewed and taken today if performed as documented:    None performed      The attending physician has personally reviewed the pertinent films in PACS and interpretation is as follows:      Procedures, if performed today:    Procedures    None performed      Portions of the record may have been created with voice recognition software  Occasional wrong word or "sound a like" substitutions may have occurred due to the inherent limitations of voice recognition software  Read the chart carefully and recognize, using context, where substitutions have occurred

## 2022-12-02 NOTE — PATIENT INSTRUCTIONS
Progression:   Standing position or adding a Theraband for resistance requires your body to use more stabilizing muscles to maintain good form while performing the exercises below:         Hip/Glute Strengthening:   *Maintaining a Neutral pelvis while performing hip/glute strengthening is Important to function & ability to perform the exercises effectively *              Key: Slow & Intentional  Two ways to perform:  Start with 10 reps on each side maintaining neutral pelvis  *   Hold position for a 3-5 count  When form and exercise because less challenging; increase the REPITITIONS or DURATION your holding  Progression to Strengthening Hip & Glute muscles:  Standing position requires your body to use more stabilizing muscles to maintain good form while performing the exercises below:               Progression:   Add resistance! Adding even the lightest theraband can make a simple exercise more challenging  Key: Slow & Intentional  Two ways to perform:  Start with 10 reps on each side maintaining neutral pelvis  *   Hold position for a 3-5 count  When form and exercise because less challenging; increase the REPITITIONS or DURATION your holding

## 2022-12-14 DIAGNOSIS — I10 ESSENTIAL HYPERTENSION: ICD-10-CM

## 2022-12-14 RX ORDER — LOSARTAN POTASSIUM AND HYDROCHLOROTHIAZIDE 25; 100 MG/1; MG/1
TABLET ORAL
Qty: 90 TABLET | Refills: 0 | Status: SHIPPED | OUTPATIENT
Start: 2022-12-14

## 2022-12-16 ENCOUNTER — RA CDI HCC (OUTPATIENT)
Dept: OTHER | Facility: HOSPITAL | Age: 77
End: 2022-12-16

## 2022-12-23 ENCOUNTER — OFFICE VISIT (OUTPATIENT)
Dept: FAMILY MEDICINE CLINIC | Facility: CLINIC | Age: 77
End: 2022-12-23

## 2022-12-23 VITALS
BODY MASS INDEX: 30.24 KG/M2 | OXYGEN SATURATION: 97 % | HEIGHT: 59 IN | WEIGHT: 150 LBS | RESPIRATION RATE: 16 BRPM | DIASTOLIC BLOOD PRESSURE: 76 MMHG | TEMPERATURE: 97.6 F | HEART RATE: 86 BPM | SYSTOLIC BLOOD PRESSURE: 108 MMHG

## 2022-12-23 DIAGNOSIS — R13.10 DYSPHAGIA, UNSPECIFIED TYPE: ICD-10-CM

## 2022-12-23 DIAGNOSIS — M81.0 OSTEOPOROSIS, UNSPECIFIED OSTEOPOROSIS TYPE, UNSPECIFIED PATHOLOGICAL FRACTURE PRESENCE: Primary | ICD-10-CM

## 2022-12-23 DIAGNOSIS — Z72.820 SLEEP DEPRIVATION: Chronic | ICD-10-CM

## 2022-12-23 RX ORDER — ZOLPIDEM TARTRATE 10 MG/1
5 TABLET ORAL
Qty: 30 TABLET | Refills: 0 | Status: SHIPPED | OUTPATIENT
Start: 2022-12-23

## 2022-12-23 NOTE — PROGRESS NOTES
Name: Mari Whitfield      : 1945      MRN: [id number]  Encounter Provider: Mick Ely MD  Encounter Date: 2022   Encounter department: [address] [place] [address]     1  Osteoporosis, unspecified osteoporosis type, unspecified pathological fracture presence  -     DXA bone density spine hip and pelvis; Future; Expected date: 2022    2  Sleep deprivation  -     zolpidem (AMBIEN) 10 mg tablet; Take 0 5 tablets (5 mg total) by mouth daily at bedtime as needed for sleep    3  Dysphagia, unspecified type  -     Ambulatory Referral to Speech Therapy; Future      Patient does have chronic pain involving her spine, cervical spine pain improved after physical therapy  Lumbar back pain continues to be an issue  Will recommend patient discuss treatment option with physical therapy and learn different exercises that may help with her lumbar spine and SI joint pain  Patient may use Tylenol scheduled for the next 2 weeks, 500 mg 2 tablets every 8 hours  Warm compress on affected joints  Patient reports rare episodes of choking sensation with liquid  No problem with solid food, no significant nausea vomiting  Patient has a distant history of tobacco use  Will refer patient to speech and swallow evaluation consider possible barium swallow evaluation         Subjective      HPI     21-year-old female patient presents for follow-up regarding her chronic medical conditions  Patient was last seen on 2022 for neck pain, mild intermittent asthma, hypertension and chest pain  Patient reports after starting physical therapy, her neck pain has improved  The big source of her pain is her lower back  Patient does see orthopedic for back pain, was found to have significant pain of the right SI joint as well as gluteal region  She was recommended for physical therapy for her lumbar spine hip    Denies any chest pain, however does have some radiating pain from her right shoulder  Review of Systems   Constitutional: Negative for chills and fever  HENT: Positive for trouble swallowing (rare choking sensation with water or saliva)  Negative for congestion, rhinorrhea and sore throat  Respiratory: Negative for shortness of breath  Cardiovascular: Negative for chest pain  Gastrointestinal: Negative for abdominal pain  Musculoskeletal: Positive for arthralgias  Neurological: Negative for headaches  Psychiatric/Behavioral: Negative for sleep disturbance  Current Outpatient Medications on File Prior to Visit   Medication Sig   • Acetaminophen 500 MG TAKE 2 CAPSULES TWICE A DAY (Patient taking differently: Take 500 mg by mouth daily as needed TAKE 2 CAPSULES TWICE A DAY)   • albuterol (PROVENTIL HFA,VENTOLIN HFA) 90 mcg/act inhaler Inhale 2 puffs every 6 (six) hours as needed for wheezing or shortness of breath   • cyclobenzaprine (FLEXERIL) 5 mg tablet Take 0 5 tablets (2 5 mg total) by mouth daily at bedtime as needed for muscle spasms   • losartan-hydrochlorothiazide (HYZAAR) 100-25 MG per tablet take 1 tablet by mouth once daily   • MELATONIN PO Take by mouth   • Nattokinase 100 MG CAPS Take by mouth in the morning   • Probiotic Product (MVW COMPLETE PROBIOTIC PO) Take by mouth   • [DISCONTINUED] zolpidem (AMBIEN) 10 mg tablet Take 5 mg by mouth daily at bedtime as needed for sleep   • amLODIPine (NORVASC) 2 5 mg tablet Take 5 mg by mouth (Patient not taking: Reported on 12/23/2022)   • atorvastatin (LIPITOR) 40 mg tablet Take 1 tablet (40 mg total) by mouth every evening (Patient not taking: Reported on 12/23/2022)   • saccharomyces boulardii (FLORASTOR) 250 mg capsule Take 250 mg by mouth 2 (two) times a day   • saccharomyces boulardii (FLORASTOR) 250 mg capsule Take 250 mg by mouth   • traMADol (ULTRAM) 50 mg tablet Take 50 mg by mouth every 8 (eight) hours as needed for severe pain   Indications: Pain (Patient not taking: Reported on 9/23/2022)   • [DISCONTINUED] hydrochlorothiazide (HYDRODIURIL) 25 mg tablet Take 12 5 mg by mouth       Objective     /76 (BP Location: Right arm, Patient Position: Sitting, Cuff Size: Large)   Pulse 86   Temp 97 6 °F (36 4 °C) (Temporal)   Resp 16   Ht 4' 11" (1 499 m)   Wt 68 kg (150 lb)   SpO2 97%   BMI 30 30 kg/m²     Physical Exam  Vitals reviewed  Constitutional:       General: She is not in acute distress  Appearance: Normal appearance  She is obese  She is not ill-appearing, toxic-appearing or diaphoretic  Cardiovascular:      Rate and Rhythm: Normal rate  Pulses: Normal pulses  Heart sounds: Normal heart sounds  Pulmonary:      Effort: Pulmonary effort is normal    Abdominal:      General: Abdomen is flat  Musculoskeletal:         General: No swelling  Normal range of motion  Skin:     General: Skin is warm and dry  Capillary Refill: Capillary refill takes less than 2 seconds  Coloration: Skin is not jaundiced  Neurological:      General: No focal deficit present  Mental Status: She is alert     Psychiatric:         Mood and Affect: Mood normal             Autumn Chris MD

## 2023-03-01 ENCOUNTER — HOSPITAL ENCOUNTER (OUTPATIENT)
Dept: RADIOLOGY | Facility: MEDICAL CENTER | Age: 78
Discharge: HOME | End: 2023-03-01

## 2023-03-01 DIAGNOSIS — M81.0 OSTEOPOROSIS, UNSPECIFIED OSTEOPOROSIS TYPE, UNSPECIFIED PATHOLOGICAL FRACTURE PRESENCE: ICD-10-CM

## 2023-03-01 DIAGNOSIS — Z13.820 SCREENING FOR OSTEOPOROSIS: ICD-10-CM

## 2023-03-17 ENCOUNTER — RA CDI HCC (OUTPATIENT)
Dept: OTHER | Facility: HOSPITAL | Age: 78
End: 2023-03-17

## 2023-03-23 DIAGNOSIS — I10 ESSENTIAL HYPERTENSION: ICD-10-CM

## 2023-03-23 RX ORDER — LOSARTAN POTASSIUM AND HYDROCHLOROTHIAZIDE 25; 100 MG/1; MG/1
TABLET ORAL
Qty: 90 TABLET | Refills: 0 | Status: SHIPPED | OUTPATIENT
Start: 2023-03-23 | End: 2023-03-24 | Stop reason: SDUPTHER

## 2023-03-24 ENCOUNTER — OFFICE VISIT (OUTPATIENT)
Dept: FAMILY MEDICINE CLINIC | Facility: CLINIC | Age: 78
End: 2023-03-24

## 2023-03-24 VITALS
TEMPERATURE: 97.4 F | HEART RATE: 86 BPM | BODY MASS INDEX: 30.84 KG/M2 | OXYGEN SATURATION: 97 % | SYSTOLIC BLOOD PRESSURE: 142 MMHG | DIASTOLIC BLOOD PRESSURE: 78 MMHG | WEIGHT: 153 LBS | HEIGHT: 59 IN

## 2023-03-24 DIAGNOSIS — I10 ESSENTIAL HYPERTENSION: ICD-10-CM

## 2023-03-24 DIAGNOSIS — M81.0 OSTEOPOROSIS, UNSPECIFIED OSTEOPOROSIS TYPE, UNSPECIFIED PATHOLOGICAL FRACTURE PRESENCE: Primary | ICD-10-CM

## 2023-03-24 DIAGNOSIS — I10 PRIMARY HYPERTENSION: ICD-10-CM

## 2023-03-24 DIAGNOSIS — G89.4 CHRONIC PAIN SYNDROME: ICD-10-CM

## 2023-03-24 DIAGNOSIS — J30.2 SEASONAL ALLERGIES: ICD-10-CM

## 2023-03-24 RX ORDER — LOSARTAN POTASSIUM AND HYDROCHLOROTHIAZIDE 25; 100 MG/1; MG/1
1 TABLET ORAL DAILY
Qty: 90 TABLET | Refills: 1 | Status: SHIPPED | OUTPATIENT
Start: 2023-03-24

## 2023-03-24 RX ORDER — CHOLECALCIFEROL (VITAMIN D3) 10(400)/ML
800 DROPS ORAL DAILY
Qty: 180 ML | Refills: 1 | Status: SHIPPED | OUTPATIENT
Start: 2023-03-24 | End: 2023-06-22

## 2023-03-24 RX ORDER — CETIRIZINE HYDROCHLORIDE 10 MG/1
10 TABLET ORAL DAILY
COMMUNITY

## 2023-03-24 NOTE — PROGRESS NOTES
Name: Janene Husain      : 1945      MRN: [id number]  Encounter Provider: Kena Wright MD  Encounter Date: 3/24/2023   Encounter department: [address] [place] [address]     1  Osteoporosis, unspecified osteoporosis type, unspecified pathological fracture presence  -     cholecalciferol (VITAMIN D) 400 units/1 mL; Take 2 mL (800 Units total) by mouth daily    2  Primary hypertension    3  Seasonal allergies    4  Essential hypertension  -     losartan-hydrochlorothiazide (HYZAAR) 100-25 MG per tablet; Take 1 tablet by mouth daily    5  Chronic pain syndrome - Right      Continue Tylenol 500 mg 2 tablets every 12 hours as needed for pain control  Do not take tramadol unless you need to  Continue blood pressure medication, refilled Hyzaar at this time  Patient may continue using cetirizine for allergies, may consider half a tablet of Benadryl at night to help with sleep and help with allergy symptoms  We will contact speech medicine for patient to be evaluated    BMI Counseling: Body mass index is 30 9 kg/m²  The BMI is above normal  Nutrition recommendations include reducing portion sizes, 3-5 servings of fruits/vegetables daily and consuming healthier snacks  Exercise recommendations include moderate aerobic physical activity for 150 minutes/week  Subjective     HPI     62-year-old female patient presents for follow-up regarding her chronic medical conditions  Patient was last seen in 2022  At that time we discussed her arthritis pain, insomnia, osteoporosis, dysphagia  Patient reports the Tylenol regimen has helped with her arthritis and she is happy about pain control  She has completed DEXA scan  Result of radiographic evaluation discussed with patient, there is evidence of osteopenia  Patient is requesting liquid formulary for vitamin D as she is having difficulty with pills    Patient does use Ambien once in a while for sleep which has helped with her symptoms  Not been able to make appointment with sleep therapy, still have occasional rare episodes of choking sensation with liquid and saliva  Patient reports this may be due to her allergy symptoms, started taking cetirizine  Review of Systems   Constitutional: Negative for chills and fever  HENT: Positive for trouble swallowing (rare chocking sensation with fluids)  Negative for congestion, rhinorrhea and sore throat  Respiratory: Positive for cough (spitting up green mucous) and wheezing (at night (pt believe from allergies))  Cardiovascular: Negative for chest pain  Gastrointestinal: Negative for abdominal pain  Neurological: Negative for headaches  Psychiatric/Behavioral: Negative for sleep disturbance  Past Medical History:   Diagnosis Date   • Arthritis    • Carpal tunnel syndrome    • GERD (gastroesophageal reflux disease)    • Hiatal hernia    • Hypertension    • Insomnia    • Pneumonia    • PONV (postoperative nausea and vomiting)    • Renal calculi    • Sleep deprivation     chronically   • Vitamin D deficiency    • Vitamin D toxicity 2011    with leaky gut syndrome     Past Surgical History:   Procedure Laterality Date   • BUNIONECTOMY Bilateral    • CARPAL TUNNEL RELEASE Right 2015   • COLONOSCOPY     • CYSTOSCOPY N/A 6/6/2019    Procedure: CYSTOSCOPY;  Surgeon: Aries Ritchie MD;  [facility]: [facility];  Service: Gynecology Oncology   • ESOPHAGOGASTRODUODENOSCOPY N/A 5/5/2016    Procedure: ESOPHAGOGASTRODUODENOSCOPY (EGD); Surgeon: Charmaine Edwards MD;  Location: [facility]; Service:    • FRACTURE SURGERY      L arm    • NERVE BLOCK       R Knee   • OOPHORECTOMY Left    • OH COLONOSCOPY FLX DX W/COLLJ SPEC WHEN PFRMD N/A 6/28/2016    Procedure: COLONOSCOPY;  Surgeon: Charmaine Edwards MD;  [facility]: [facility]; Service: Gastroenterology   • OH ESOPHAGOGASTRODUODENOSCOPY TRANSORAL DIAGNOSTIC N/A 10/19/2018    Procedure: ESOPHAGOGASTRODUODENOSCOPY (EGD);   Surgeon: Charmaine Edwards MD;  [facility];   Service: Gastroenterology   • UT LAPS TOTAL HYSTERECT 250 GM/< W/RMVL TUBE/OVARY N/A 2019    Procedure: ROBOTIC TOTAL LAPAROSCOPIC HYSTERECTOMY, RIGHT SALPINGO-OOPHORECTOMY, EXTENSIVE ADHESIOLYSIS, APPROXIMATELY 39 MIN;  Surgeon: Usha Ashley MD;  Location:  MAIN OR;  Service: Gynecology Oncology   • ROTATOR CUFF REPAIR Bilateral    • TOE SURGERY Left     left 5th toe shaved down due to repeated fx   • TOTAL KNEE ARTHROPLASTY Left      Family History   Problem Relation Age of Onset   • Heart failure Mother    • Other Mother         respiratory failure   • Heart attack Mother    • Heart failure Father         respiratory failure   • Heart attack Father    • Breast cancer Sister    • No Known Problems Sister    • Kidney failure Brother    • Alcohol abuse Brother    • No Known Problems Brother    • Diabetes Brother    • Hypertension Daughter    • Arrhythmia Neg Hx    • Clotting disorder Neg Hx    • Fainting Neg Hx    • Anuerysm Neg Hx    • Stroke Neg Hx    • Hyperlipidemia Neg Hx    • Asthma Neg Hx      Social History     Socioeconomic History   • Marital status: Single     Spouse name: Not on file   • Number of children: Not on file   • Years of education: Not on file   • Highest education level: Not on file   Occupational History   • Not on file   Tobacco Use   • Smoking status: Former     Packs/day: 1 00     Years: 13 00     Pack years: 13 00     Types: Cigarettes     Start date:      Quit date:      Years since quittin 2   • Smokeless tobacco: Never   • Tobacco comments:     Quit > 30 years ago    Vaping Use   • Vaping Use: Never used   Substance and Sexual Activity   • Alcohol use: No   • Drug use: No   • Sexual activity: Not on file     Comment: no partner   Other Topics Concern   • Not on file   Social History Narrative   • Not on file     Social Determinants of Health     Financial Resource Strain: Not on file   Food Insecurity: No Food Insecurity   • Worried About [facility] in the Last Year: Never true   • Ran Out of Food in the Last Year: Never true   Transportation Needs: No Transportation Needs   • Lack of Transportation (Medical): No   • Lack of Transportation (Non-Medical):  No   Physical Activity: Not on file   Stress: Not on file   Social Connections: Not on file   Intimate Partner Violence: Not on file   Housing Stability: Unknown   • Unable to Pay for Housing in the Last Year: No   • Number of Places Lived in the Last Year: Not on file   • Unstable Housing in the Last Year: No     Current Outpatient Medications on File Prior to Visit   Medication Sig   • albuterol (PROVENTIL HFA,VENTOLIN HFA) 90 mcg/act inhaler Inhale 2 puffs every 6 (six) hours as needed for wheezing or shortness of breath   • cetirizine (ZyrTEC) 10 mg tablet Take 10 mg by mouth daily   • cyclobenzaprine (FLEXERIL) 5 mg tablet Take 0 5 tablets (2 5 mg total) by mouth daily at bedtime as needed for muscle spasms   • MELATONIN PO Take by mouth   • Nattokinase 100 MG CAPS Take by mouth in the morning   • Probiotic Product (MVW COMPLETE PROBIOTIC PO) Take by mouth   • saccharomyces boulardii (FLORASTOR) 250 mg capsule Take 250 mg by mouth 2 (two) times a day   • saccharomyces boulardii (FLORASTOR) 250 mg capsule Take 250 mg by mouth   • zolpidem (AMBIEN) 10 mg tablet Take 0 5 tablets (5 mg total) by mouth daily at bedtime as needed for sleep   • [DISCONTINUED] losartan-hydrochlorothiazide (HYZAAR) 100-25 MG per tablet take 1 tablet by mouth once daily   • Acetaminophen 500 MG TAKE 2 CAPSULES TWICE A DAY (Patient not taking: Reported on 3/24/2023)   • amLODIPine (NORVASC) 2 5 mg tablet Take 5 mg by mouth (Patient not taking: Reported on 12/23/2022)   • atorvastatin (LIPITOR) 40 mg tablet Take 1 tablet (40 mg total) by mouth every evening (Patient not taking: Reported on 12/23/2022)   • traMADol (ULTRAM) 50 mg tablet Take 50 mg by mouth every 8 (eight) hours as needed for severe pain  Indications: Pain (Patient not taking: Reported on 9/23/2022)     Allergies   Allergen Reactions   • Cefazolin Hives, GI Intolerance and Swelling   • Ciprofloxacin Hives   • Penicillins Hives and Rash   • Prednisone Hives, GI Intolerance, Swelling and Vomiting   • Vancomycin Hives, GI Intolerance, Diarrhea and Rash   • Aspirin GI Intolerance and Hives     Even low dose    • Adhesive [Medical Tape]    • Beef Allergy - Food Allergy Diarrhea   • Cortisone Syncope   • Gluten Meal - Food Allergy GI Intolerance   • Lactose - Food Allergy Diarrhea   • Other      STEROIDS- GI INTOLERANCE   • Pataday [Olopatadine Hcl] Eye Swelling   • Oxycodone Rash and Vomiting     Immunization History   Administered Date(s) Administered   • COVID-19 J&J (EXTRABANCA) vaccine 0 5 mL 05/27/2021, 02/15/2022   • Tdap 11/25/2013, 02/08/2020       Objective     /78 (BP Location: Left arm, Patient Position: Sitting, Cuff Size: Standard)   Pulse 86   Temp (!) 97 4 °F (36 3 °C)   Ht 4' 11" (1 499 m)   Wt 69 4 kg (153 lb)   SpO2 97%   BMI 30 90 kg/m²     Physical Exam  Vitals reviewed  Constitutional:       General: She is not in acute distress  Appearance: Normal appearance  She is obese  She is not ill-appearing, toxic-appearing or diaphoretic  HENT:      Right Ear: Tympanic membrane, ear canal and external ear normal  There is no impacted cerumen  Left Ear: Tympanic membrane, ear canal and external ear normal  There is no impacted cerumen  Nose: No rhinorrhea  Mouth/Throat:      Comments: Nasal drip noted  Cardiovascular:      Rate and Rhythm: Normal rate and regular rhythm  Pulses: Normal pulses  Heart sounds: Normal heart sounds  No murmur heard  Pulmonary:      Effort: Pulmonary effort is normal  No respiratory distress  Breath sounds: Normal breath sounds  Abdominal:      General: Abdomen is flat  Musculoskeletal:         General: No swelling, tenderness, deformity or signs of injury  Normal range of motion  Skin:     General: Skin is warm and dry  Capillary Refill: Capillary refill takes less than 2 seconds  Coloration: Skin is not jaundiced  Neurological:      General: No focal deficit present  Mental Status: She is alert     Psychiatric:         Mood and Affect: Mood normal        Doc MD [place]

## 2023-03-29 NOTE — PROGRESS NOTES
"Speech-Language Pathology Initial Evaluation    Today's date: 2023   Patient’s name: Evelyn Izquierdo  : 1945  MRN: [id number]  Safety measures: GERD, hypertension, asthma, chronic pain, osteoporosis  Referring provider: Nataly Lora MD    Encounter Diagnosis     ICD-10-CM    1  Pharyngoesophageal dysphagia  R13 14 FL barium swallow video w speech      2  Dysphagia, unspecified type  R13 10 Ambulatory Referral to Speech Therapy     FL barium swallow video w speech      3  Gastroesophageal reflux disease without esophagitis  K21 9 FL barium swallow video w speech        Visit tracking:  -Referring provider: Epic  -Billing guidelines: CMS  -Visit #1/10  -Insurance: Medicare, [employer] Senior Supplement  -RE due: 2023    Subjective comments: Patient reports swallowing difficulties  Patient reports choking on saliva intermittently and this started around 1 year ago  Patient reports it is suspected to be from her allergies and GERD  Patient has no history of completing a swallow study  Currently, patient reports the most difficulties with water at night  Patient utilizes compensatory strategies to aid with choking such as slowing the rate of bolus intake  Patient reports no recent unintended weight loss, no URI, fevers, and no difficulties with pill intake  How did the patient hear about us? Physician    Patient's goal(s): \"unsure, due to doctor referral\"    Reason for referral: Difficulty swallowing solids or liquids  Prior functional status: Swallowing WNL  Clinically complex situations: N/A    History: Patient is a 65 y o  female who was referred to outpatient skilled Speech Therapy services for a dysphagia evaluation  Per chart reviewed on 2022, patient was referred by family medicine provider for s/sx of dysphagia  Family medicine provider reports, \"no problems with solid food, no significant nauesa/vomiting   Patient was positive for \"trouble swallowing (rare choking sensation with water " "or saliva)\" and arthralgias  Patient has a h/o of chronic pain, osteoporisis, joint pain, intermittent asthma, GERD, hypertension, left knee joint replacement, and chest/shoulder pain  Hearing: [facility] for testing  Vision: [facility] for testing    Home environment/lifestyle: lives by herself  Highest level of education: GED  Vocational status: teller at a bank, currently retired    Mental status: Alert  Behavior status: Cooperative  Patient reported symptoms of: SYMPTOMS: n/a  Communication modalities: Verbal  Rehabilitation prognosis: Good rehab potential to reach the established goals    Assessments  DYSPHAGIA EVALUATION:    -Reason for referral: Signs/symptoms of dysphagia    -Subjective report of swallowing difficulty: Poor secretion management , Coughing, Choking and Globus sensation     -Eating Assessment Tool (EAT-10) Swallowing Screening Tool is a patient self-assessment tool that rates the percieved impairment a swallowing disorder has on the Functional, Physical, and Emotional aspects of one's life  EAT-10 score: 2/40    -Difficulty swallowing: Liquids    -Current diet (solids): Regular  -Current diet (liquids): Thin  -Current pill intake method: pill with liquid wash  -Alternative Feeding Method?: n/a    -Facial appearance Symmetrical   -Mandible function Adequate ROM   -Dentition Adequate   -Labial function WFL   -Lingual function Decreased ROM (left side) and Decreased strength (left side)   -Velar function Symmetrical   -Oral apraxia? Absent   -Vocal quality slightly hoarse   -Volitional cough Strong/productive   -Respiration WFL   -Drooling? No   -Tremor/involuntary movement?  Not present     LIQUID CONSISTENCY TESTING:   Item(s) tested: (Thin) - water     Administered by: Cup, Spoon, Self-fed and Fed by examiner    Clinical findings:  -Oral phase impairments: poor secretion management  -Pharyngeal phase impairments: delayed swallow initiation, reduced hyolaryngeal elevation, reduced hyolaryngeal excursion " and overt s/x of penetration/aspiration (Throat clear)    Strategies, attempts, and responses: small sips and other: slow rate      SOLID CONSISTENCY TESTING:  Item(s) tested: (Regular, Mixed and Puree) -  Gluten free granola bar, diced peaches in thin liquid juice, and applesauce/pudding  Patient reviewed ingredients of foods with clinician in order for them to comply with gluten allergy  Patient reported the solids were     Administered by: Cup, Self-fed and Fed by examiner    Clinical findings:  -Oral phase impairments: poor secretion management  -Pharyngeal phase impairments: delayed swallow initiation, reduced hyolaryngeal elevation, reduced hyolaryngeal excursion and overt s/x of penetration/aspiration (Throat clear and globus sensation)    Strategies, attempts, and responses: small bites and liquid wash     Treatment  Patient and caregiver were provided educational handouts that addressed swallowing anatomy, GERD diet strategies, aspiration precautions, and video swallow education/methods of how to schedule an appointment  Recommended to patient to f/u with GI to determine any esophogeal abnormalities      Goals    Short-term goals:   Patient will report safe food/liquids trials without overt s/sx penetration and/or aspiration to consume the safest, least restrictive diet (to be achieved in 4-6 weeks)       Patient will perform compensatory strategies (e g , upright positioning, small bites/sips, slow rate, alternation of consistencies, multiple swallows, effortful swallow, chin tuck, head turn, etc ) with 90% accuracy to eliminate overt s/sx penetration/aspiration of least restrictive food/liquid consistencies (to be achieved in 4-6 weeks)       Long-term goals:      Patient will maintain adequate hydration and nutrition with optimum safety and efficacy of swallowing function on P O  intake without overt s/sx of penetration or aspiration (to be achieved by discharge)      Patient will independently utilize compensatory strategies with optimum safety and efficacy of swallowing function on P O  intake without overt s/sx of penetration or aspiration (to be achieved by discharge)       Impressions/Recommendations  Impressions:   -Patient presents with mild pharygeal dysphagia with overt s/sx of aspiration and penetration c/b  intermittent throat clearing, coughing, and poor secretion management  Patient trialed thin liquids with overt s/sx of throat clearing, delayed swallowing initiation, poor secretion management, and reduced HLE  Patient reports she intentionally delays swallowing initiation to manage her secretions and bolus (post-nasal drip/mucuous)  Patient trialed solids (puree, mixed consistencies, soft mechanical) with overt s/sx of aspiration/penetration of throat clearing, globus sensation, and reduced HLE  Patient utilized liquid wash to aid with globus sensation in pharynx  Noted liquid wash aided with reducing globus sensation in pharynx  It is suspected that there is an esophegeal component to patient's s/sx of dysphagia and GERD  Patient was provided an educational pamphlet for swallowing anatomy, GERD diet strategies, aspiration precautions, video swallow education/scheduling, and GI recommendations from GERD history  Recommended patient to schedule a VFSS to identify any deficits in the anatomy and physiology of the swallowing mechanism that is contributing to dysphagia symptoms  From patient's history of GERD and current symptoms, recommended patient to schedule an appointment with GI to determine any esophogeal abnormalities  Patient will be put on hold and clinician will follow-up in a few weeks to patient if any worsening or persistent dysphagia symptoms occur and if VFSS indicates swallowing abnormalities that can be addressed in outpatient speech therapy services      Recommend  -Factors affecting performance: None    -Safety concerns: Risk for choking    -Risk factors: Complex medical history    Recommendations:  -Patient would benefit from outpatient skilled Speech Therapy services: Dysphagia therapy (until VFSS is completed)    -Frequency: As needed (hold until VFSS is completed)  -Duration: 4-6 weeks    -Intervention certification from: 6/4/1517  -Intervention certification to: 42/35/8094    -Intervention comments:   Swallowing Evaluation: 35 mins  Swallowing Interventions: 25 mins (swallowing anatomy education, GERD diet strategies, aspiration precautions, video swallow education, GI recommendations)    SWALLOWING SAFETY PRECAUTIONS:  -Recommended solids: Regular    -Recommended liquids:  Thin    -Recommended medication form: pill with liquid wash    -Frequent/thorough oral care     -Aspiration precautions  *Monitor for signs/symptoms concerning for aspiration (e g , low grade fever, increase in WBC, change in chest x-ray, increased congestion, increased coughing with P O  intake)    -Reflux precautions    -Strategies: Small sips and bites when eating and Slow rate, swallow between bites    -Positioning: Upright position during meals    -Compensatory strategies: Multiple swallows, Throat clear and Other:liquid wash     -Supervision: Independent     -Referrals: Videofluroscopic Swallow Study and Gastroenterology

## 2023-04-05 ENCOUNTER — EVALUATION (OUTPATIENT)
Dept: SPEECH THERAPY | Facility: CLINIC | Age: 78
End: 2023-04-05

## 2023-04-05 DIAGNOSIS — R13.14 PHARYNGOESOPHAGEAL DYSPHAGIA: Primary | ICD-10-CM

## 2023-04-05 DIAGNOSIS — R13.10 DYSPHAGIA, UNSPECIFIED TYPE: ICD-10-CM

## 2023-04-05 DIAGNOSIS — K21.9 GASTROESOPHAGEAL REFLUX DISEASE WITHOUT ESOPHAGITIS: ICD-10-CM

## 2023-05-19 ENCOUNTER — EVALUATION (OUTPATIENT)
Dept: PHYSICAL THERAPY | Facility: MEDICAL CENTER | Age: 78
End: 2023-05-19

## 2023-05-19 DIAGNOSIS — M54.50 CHRONIC BILATERAL LOW BACK PAIN WITHOUT SCIATICA: ICD-10-CM

## 2023-05-19 DIAGNOSIS — G89.29 CHRONIC BILATERAL LOW BACK PAIN WITHOUT SCIATICA: ICD-10-CM

## 2023-05-19 DIAGNOSIS — M54.2 NECK PAIN: Primary | ICD-10-CM

## 2023-05-19 NOTE — PROGRESS NOTES
PT Evaluation     Today's date: 2023  Patient name: Latanya Oliver  : 1945  MRN: [id number]  Referring provider: Thor Burciaga MD  Dx:   Encounter Diagnosis     ICD-10-CM    1  Neck pain  M54 2       2  Chronic bilateral low back pain without sciatica  M54 50     G89 29                      Assessment  Assessment details: Latanya Oliver is a 65 y o  female who presents with Neck pain  (primary encounter diagnosis)  Chronic bilateral low back pain without sciatica  Patient presents alert and oriented with the above impairments  Carly Murray will benefit from PT to addres deficits in order to maximize and return to prior level of function  No further referral appears necessary at this time based upon examination results  Prognosis is good given HEP compliance  Please contact me if you have any questions or recommendations  Impairments: abnormal muscle tone, abnormal or restricted ROM, abnormal movement, activity intolerance, impaired physical strength, lacks appropriate home exercise program and pain with function  Understanding of Dx/Px/POC: good   Prognosis: good    Goals  Short Term Goals:   1  Pain decreased 2 ratings in 4 weeks  2  ROM increased 10* in 4 weeks  3  Strength increased 1/2 grade in 4 weeks    Long Term Goals:   1  ADLS/IADLS in related activities improved to maximal level in 8 weeks  2  Recreational activities are improved to maximal level in 8 weeks  3   [place] with HEP in 8 weeks      Plan  Patient would benefit from: skilled physical therapy  Planned therapy interventions: abdominal trunk stabilization, manual therapy, neuromuscular re-education, home exercise program, functional ROM exercises, flexibility, therapeutic exercise, stretching, strengthening, patient education and postural training  Frequency: 2x week  Duration in weeks: 8  Treatment plan discussed with: patient        Subjective Evaluation    History of Present Illness  Mechanism of injury: Pt reports ongoing back and neck pain  She does perform exercises a few days / week consisting of SLR flex and abd, heel raises, standing hip abduction  Pain has improved since a few weeks ago w/ use of heat, ice and exercises  Neck pain is intermittent in nature and most noted w/ excessive activity w/ cleaning  She is also c/o headaches that occur 3-4x/weekly  Denies UE radicular symptoms  Neck pain at best 0/10, at worst 5/10  Low back pain has been present for several months; mostly on the right side  She does also have some left sided pain  She c/o intermittent cramping in legs  Back pain increased w/ vacuuming, bending    Pain  At best pain ratin  At worst pain ratin  Location: low back    Patient Goals  Patient goals for therapy: decreased pain, increased motion, increased strength, independence with ADLs/IADLs and return to sport/leisure activities          Objective     Palpation     Additional Palpation Details  Tightness over B rhomboid   Tenderness over R glute med    Active Range of Motion   Cervical/Thoracic Spine       Cervical    Flexion: 35 degrees   Extension: 45 degrees      Left lateral flexion: 13 degrees      Right lateral flexion: 23 degrees      Left rotation: 45 degrees  Right rotation: 52 degrees           Lumbar   Flexion: 70 degrees   Extension: 15 degrees     Strength/Myotome Testing     Left Shoulder     Planes of Motion   Flexion: 3+   Abduction: 3+   External rotation at 90°: 4   Internal rotation at 90°: 4     Right Shoulder     Planes of Motion   Flexion: 3+   Abduction: 3+   External rotation at 90°: 4-   Internal rotation at 90°: 4     Left Hip   Planes of Motion   Flexion: 4+  Extension: 3+  Abduction: 4    Right Hip   Planes of Motion   Flexion: 5  Extension: 3+  Abduction: 4+             Precautions: hypertension      Manuals             TPR/Jorge R glute med nv            TPR/jorge B rhomboid nv                                      Neuro Re-Ed  ppt nv            bridges nv                         TB rows nv            TB Ext nv                                      Ther Ex 5/19            UT stretch nv            Levator stretch nv            Piriformis stretch nv                                                                             Ther Activity                                       Gait Training                                       Modalities

## 2023-06-05 ENCOUNTER — OFFICE VISIT (OUTPATIENT)
Dept: PHYSICAL THERAPY | Facility: MEDICAL CENTER | Age: 78
End: 2023-06-05
Payer: MEDICARE

## 2023-06-05 DIAGNOSIS — M54.2 NECK PAIN: Primary | ICD-10-CM

## 2023-06-05 DIAGNOSIS — G89.29 CHRONIC BILATERAL LOW BACK PAIN WITHOUT SCIATICA: ICD-10-CM

## 2023-06-05 DIAGNOSIS — M54.50 CHRONIC BILATERAL LOW BACK PAIN WITHOUT SCIATICA: ICD-10-CM

## 2023-06-05 PROCEDURE — 97140 MANUAL THERAPY 1/> REGIONS: CPT | Performed by: PHYSICAL THERAPIST

## 2023-06-05 PROCEDURE — 97110 THERAPEUTIC EXERCISES: CPT | Performed by: PHYSICAL THERAPIST

## 2023-06-05 NOTE — PROGRESS NOTES
"Daily Note     Today's date: 2023  Patient name: Andres Fraga  : 1945  MRN: [id number]  Referring provider: Concetta Art MD  Dx:   Encounter Diagnosis     ICD-10-CM    1  Neck pain  M54 2       2  Chronic bilateral low back pain without sciatica  M54 50     G89 29           Start Time: 1305  Stop Time: 1345  Total time in clinic (min): 40 minutes    Subjective: Pt reports episode of LUE radicular symptoms the other day that lasted for a few hours  Today she c/o L sided cervical pain and tightness  Also c/o R sided hip pain  She began seeing chiro and reports some relief  Objective: See treatment diary below      Assessment: Tolerated treatment well  Patient demonstrated fatigue post treatment, exhibited good technique with therapeutic exercises and would benefit from continued PT   Petechiae present over L rhobmoid and R glute med  Provided w/ HEP      Plan: Continue per plan of care        Precautions: hypertension      Manuals            TPR/Graston R glute med nv 10 min           TPR/graston B rhomboid nv 10 min L side                                     Neuro Re-Ed                         bridges nv nv                        TB rows nv nv           TB Ext nv nv                                     Ther Ex  6           UT stretch L nv 30\"x3           Levator stretch L nv 30\"x3           Piriformis stretch B nv 30\"x3           ppt  5\"X10                                                               Ther Activity                                       Gait Training                                       Modalities                                            "

## 2023-06-19 ENCOUNTER — OFFICE VISIT (OUTPATIENT)
Dept: PHYSICAL THERAPY | Facility: MEDICAL CENTER | Age: 78
End: 2023-06-19
Payer: MEDICARE

## 2023-06-19 DIAGNOSIS — M54.2 NECK PAIN: Primary | ICD-10-CM

## 2023-06-19 DIAGNOSIS — G89.29 CHRONIC BILATERAL LOW BACK PAIN WITHOUT SCIATICA: ICD-10-CM

## 2023-06-19 DIAGNOSIS — M54.50 CHRONIC BILATERAL LOW BACK PAIN WITHOUT SCIATICA: ICD-10-CM

## 2023-06-19 PROCEDURE — 97110 THERAPEUTIC EXERCISES: CPT | Performed by: PHYSICAL THERAPIST

## 2023-06-19 PROCEDURE — 97140 MANUAL THERAPY 1/> REGIONS: CPT | Performed by: PHYSICAL THERAPIST

## 2023-06-19 PROCEDURE — 97112 NEUROMUSCULAR REEDUCATION: CPT | Performed by: PHYSICAL THERAPIST

## 2023-06-19 NOTE — PROGRESS NOTES
"Daily Note     Today's date: 2023  Patient name: Davion Lott  : 1945  MRN: [id number]  Referring provider: Teddy Lam MD  Dx:   Encounter Diagnosis     ICD-10-CM    1  Neck pain  M54 2       2  Chronic bilateral low back pain without sciatica  M54 50     G89 29                      Subjective: Today patient primarily c/o pain and tightness in L cervical region resulting in sleep disturbance last night  Also c/o weakness in bilateral LEs  Objective: See treatment diary below      Assessment: Tolerated treatment well  Patient demonstrated fatigue post treatment, exhibited good technique with therapeutic exercises and would benefit from continued PT   Most tightness over L UT and teres noted today  Initiated LE strengthening w/ reports of soreness  Plan: Continue per plan of care        Precautions: hypertension      Manuals           TPR/Graston R glute med nv 10 min           TPR/graston B rhomboid nv 10 min L side 15 min L side                                    Neuro Re-Ed           clamshells   5\"x10          bridges nv nv 5\"x10          SLR   x10 ea          TB rows nv nv nv          TB Ext nv nv nv                                    Ther Ex           UT stretch L nv 30\"x3 30\"x3          Levator stretch L nv 30\"x3 30\"X3          Piriformis stretch B nv 30\"x3 30\"X3          ppt  5\"X10 np          scap squeezes   5\"x20                                                 Ther Activity                                       Gait Training                                       Modalities                                            "

## 2023-06-22 ENCOUNTER — OFFICE VISIT (OUTPATIENT)
Dept: PHYSICAL THERAPY | Facility: MEDICAL CENTER | Age: 78
End: 2023-06-22
Payer: MEDICARE

## 2023-06-22 DIAGNOSIS — G89.29 CHRONIC BILATERAL LOW BACK PAIN WITHOUT SCIATICA: ICD-10-CM

## 2023-06-22 DIAGNOSIS — M54.50 CHRONIC BILATERAL LOW BACK PAIN WITHOUT SCIATICA: ICD-10-CM

## 2023-06-22 DIAGNOSIS — M54.2 NECK PAIN: Primary | ICD-10-CM

## 2023-06-22 PROCEDURE — 97140 MANUAL THERAPY 1/> REGIONS: CPT | Performed by: PHYSICAL THERAPIST

## 2023-06-22 PROCEDURE — 97112 NEUROMUSCULAR REEDUCATION: CPT | Performed by: PHYSICAL THERAPIST

## 2023-06-22 NOTE — PROGRESS NOTES
"Daily Note     Today's date: 2023  Patient name: Dalton Oh  : 1945  MRN: [id number]  Referring provider: Chitra Arora MD  Dx:   Encounter Diagnosis     ICD-10-CM    1  Neck pain  M54 2       2  Chronic bilateral low back pain without sciatica  M54 50     G89 29                      Subjective:  Pt reports improvement w/ performance of HEP  More pain today noted in R hip region  Neck pain decreased after last visit  Objective: See treatment diary below      Assessment: Tolerated treatment well  Patient demonstrated fatigue post treatment and exhibited good technique with therapeutic exercises   Decreased cervical tightness noted today  Most restrictions present over R glute med  She was provided w/ updated HEP to continue w/ independently        Plan: d/c w/ updated HEP     Precautions: hypertension      Manuals          TPR/Graston R glute med nv 10 min  15 min         TPR/graston B rhomboid nv 10 min L side 15 min L side 10 min L                                   Neuro Re-Ed          clamshells   5\"x10 5\"X10         bridges nv nv 5\"x10 5\"X10         SLR   x10 ea x10         TB rows nv nv nv          TB Ext nv nv nv                                    Ther Ex          UT stretch L nv 30\"x3 30\"x3          Levator stretch L nv 30\"x3 30\"X3          Piriformis stretch B nv 30\"x3 30\"X3 30\"X3         ppt  5\"X10 np          scap squeezes   5\"x20 5\"x20                                                Ther Activity                                       Gait Training                                       Modalities                                            "

## 2023-07-03 ENCOUNTER — RA CDI HCC (OUTPATIENT)
Dept: OTHER | Facility: HOSPITAL | Age: 78
End: 2023-07-03

## 2023-07-03 NOTE — PROGRESS NOTES
[facility] coding opportunities       Chart reviewed, no opportunity found: CHART REVIEWED, NO OPPORTUNITY FOUND        Patients Insurance     Medicare Insurance: Medicare

## 2023-07-10 ENCOUNTER — OFFICE VISIT (OUTPATIENT)
Dept: FAMILY MEDICINE CLINIC | Facility: CLINIC | Age: 78
End: 2023-07-10
Payer: MEDICARE

## 2023-07-10 VITALS
SYSTOLIC BLOOD PRESSURE: 128 MMHG | TEMPERATURE: 97.6 F | OXYGEN SATURATION: 98 % | DIASTOLIC BLOOD PRESSURE: 68 MMHG | WEIGHT: 151.6 LBS | HEIGHT: 59 IN | HEART RATE: 88 BPM | BODY MASS INDEX: 30.56 KG/M2

## 2023-07-10 DIAGNOSIS — F41.9 ANXIETY: ICD-10-CM

## 2023-07-10 DIAGNOSIS — Z86.2 HISTORY OF ANEMIA: ICD-10-CM

## 2023-07-10 DIAGNOSIS — Z12.11 ENCOUNTER FOR COLORECTAL CANCER SCREENING: ICD-10-CM

## 2023-07-10 DIAGNOSIS — G47.33 MODERATE OBSTRUCTIVE SLEEP APNEA: ICD-10-CM

## 2023-07-10 DIAGNOSIS — Z00.00 MEDICARE ANNUAL WELLNESS VISIT, SUBSEQUENT: Primary | ICD-10-CM

## 2023-07-10 DIAGNOSIS — Z13.1 DIABETES MELLITUS SCREENING: ICD-10-CM

## 2023-07-10 DIAGNOSIS — I10 PRIMARY HYPERTENSION: ICD-10-CM

## 2023-07-10 DIAGNOSIS — Z12.12 ENCOUNTER FOR COLORECTAL CANCER SCREENING: ICD-10-CM

## 2023-07-10 PROCEDURE — 99214 OFFICE O/P EST MOD 30 MIN: CPT | Performed by: FAMILY MEDICINE

## 2023-07-10 PROCEDURE — G0438 PPPS, INITIAL VISIT: HCPCS | Performed by: FAMILY MEDICINE

## 2023-07-10 NOTE — PATIENT INSTRUCTIONS
Medicare Preventive Visit Patient Instructions  Thank you for completing your Welcome to Medicare Visit or Medicare Annual Wellness Visit today. Your next wellness visit will be due in one year (7/10/2024). The screening/preventive services that you may require over the next 5-10 years are detailed below. Some tests may not apply to you based off risk factors and/or age. Screening tests ordered at today's visit but not completed yet may show as past due. Also, please note that scanned in results may not display below. Preventive Screenings:  Service Recommendations Previous Testing/Comments   Colorectal Cancer Screening  * Colonoscopy    * Fecal Occult Blood Test (FOBT)/Fecal Immunochemical Test (FIT)  * Fecal DNA/Cologuard Test  * Flexible Sigmoidoscopy Age: 43-73 years old   Colonoscopy: every 10 years (may be performed more frequently if at higher risk)  OR  FOBT/FIT: every 1 year  OR  Cologuard: every 3 years  OR  Sigmoidoscopy: every 5 years  Screening may be recommended earlier than age 39 if at higher risk for colorectal cancer. Also, an individualized decision between you and your healthcare provider will decide whether screening between the ages of 77-80 would be appropriate. Colonoscopy: 06/28/2016  FOBT/FIT: Not on file  Cologuard: Not on file  Sigmoidoscopy: Not on file          Breast Cancer Screening Age: 36 years old  Frequency: every 1-2 years  Not required if history of left and right mastectomy Mammogram: 04/30/2019        Cervical Cancer Screening Between the ages of 21-29, pap smear recommended once every 3 years. Between the ages of 32-69, can perform pap smear with HPV co-testing every 5 years.    Recommendations may differ for women with a history of total hysterectomy, cervical cancer, or abnormal pap smears in past. Pap Smear: Not on file    Screening Not Indicated   Hepatitis C Screening Once for adults born between [address] [place] [address]  More frequently in patients at high risk for Hepatitis C Hep C Antibody: Not on file        Diabetes Screening 1-2 times per year if you're at risk for diabetes or have pre-diabetes Fasting glucose: 99 mg/dL (9/15/2022)  A1C: 5.6 % (11/8/2019)  Screening Current   Cholesterol Screening Once every 5 years if you don't have a lipid disorder. May order more often based on risk factors. Lipid panel: 07/26/2022    Screening Not Indicated  History Lipid Disorder     Other Preventive Screenings Covered by Medicare:  1. Abdominal Aortic Aneurysm (AAA) Screening: covered once if your at risk. You're considered to be at risk if you have a family history of AAA. 2. Lung Cancer Screening: covers low dose CT scan once per year if you meet all of the following conditions: (1) Age 48-67; (2) No signs or symptoms of lung cancer; (3) Current smoker or have quit smoking within the last 15 years; (4) You have a tobacco smoking history of at least 20 pack years (packs per day multiplied by number of years you smoked); (5) You get a written order from a healthcare provider. 3. Glaucoma Screening: covered annually if you're considered high risk: (1) You have diabetes OR (2) Family history of glaucoma OR (3)  aged 48 and older OR (3)  American aged 72 and older  3. Osteoporosis Screening: covered every 2 years if you meet one of the following conditions: (1) You're estrogen deficient and at risk for osteoporosis based off medical history and other findings; (2) Have a vertebral abnormality; (3) On glucocorticoid therapy for more than 3 months; (4) Have primary hyperparathyroidism; (5) On osteoporosis medications and need to assess response to drug therapy. · Last bone density test (DXA Scan): 03/01/2023.  5. HIV Screening: covered annually if you're between the age of 15-65. Also covered annually if you are younger than 13 and older than 72 with risk factors for HIV infection.  For pregnant patients, it is covered up to 3 times per pregnancy. Immunizations:  Immunization Recommendations   Influenza Vaccine Annual influenza vaccination during flu season is recommended for all persons aged >= 6 months who do not have contraindications   Pneumococcal Vaccine   * Pneumococcal conjugate vaccine = PCV13 (Prevnar 13), PCV15 (Vaxneuvance), PCV20 (Prevnar 20)  * Pneumococcal polysaccharide vaccine = PPSV23 (Pneumovax) Adults 20-63 years old: 1-3 doses may be recommended based on certain risk factors  Adults 72 years old: 1-2 doses may be recommended based off what pneumonia vaccine you previously received   Hepatitis B Vaccine 3 dose series if at intermediate or high risk (ex: diabetes, end stage renal disease, liver disease)   Tetanus (Td) Vaccine - COST NOT COVERED BY MEDICARE PART B Following completion of primary series, a booster dose should be given every 10 years to maintain immunity against tetanus. Td may also be given as tetanus wound prophylaxis. Tdap Vaccine - COST NOT COVERED BY MEDICARE PART B Recommended at least once for all adults. For pregnant patients, recommended with each pregnancy. Shingles Vaccine (Shingrix) - COST NOT COVERED BY MEDICARE PART B  2 shot series recommended in those aged 48 and above     Health Maintenance Due:      Topic Date Due   • Hepatitis C Screening  Never done   • DXA SCAN  03/01/2028   • Colorectal Cancer Screening  Discontinued     Immunizations Due:      Topic Date Due   • Pneumococcal Vaccine: 65+ Years (1 - PCV) Never done   • COVID-19 Vaccine (3 - Booster for Jesus series) 04/12/2022   • Influenza Vaccine (1) 09/01/2023     Advance Directives   What are advance directives? Advance directives are legal documents that state your wishes and plans for medical care. These plans are made ahead of time in case you lose your ability to make decisions for yourself. Advance directives can apply to any medical decision, such as the treatments you want, and if you want to donate organs.    What are the types of advance directives? There are many types of advance directives, and each state has rules about how to use them. You may choose a combination of any of the following:  · Living will: This is a written record of the treatment you want. You can also choose which treatments you do not want, which to limit, and which to stop at a certain time. This includes surgery, medicine, IV fluid, and tube feedings. · Durable power of  for [facility]): This is a written record that states who you want to make healthcare choices for you when you are unable to make them for yourself. This person, called a proxy, is usually a family member or a friend. You may choose more than 1 proxy. · Do not resuscitate (DNR) order:  A DNR order is used in case your heart stops beating or you stop breathing. It is a request not to have certain forms of treatment, such as CPR. A DNR order may be included in other types of advance directives. · Medical directive: This covers the care that you want if you are in a coma, near death, or unable to make decisions for yourself. You can list the treatments you want for each condition. Treatment may include pain medicine, surgery, blood transfusions, dialysis, IV or tube feedings, and a ventilator (breathing machine). · Values history: This document has questions about your views, beliefs, and how you feel and think about life. This information can help others choose the care that you would choose. Why are advance directives important? An advance directive helps you control your care. Although spoken wishes may be used, it is better to have your wishes written down. Spoken wishes can be misunderstood, or not followed. Treatments may be given even if you do not want them. An advance directive may make it easier for your family to make difficult choices about your care.    Weight Management   Why it is important to manage your weight:  Being overweight increases your risk of health conditions such as heart disease, high blood pressure, type 2 diabetes, and certain types of cancer. It can also increase your risk for osteoarthritis, sleep apnea, and other respiratory problems. Aim for a slow, steady weight loss. Even a small amount of weight loss can lower your risk of health problems. How to lose weight safely:  A safe and healthy way to lose weight is to eat fewer calories and get regular exercise. You can lose up about 1 pound a week by decreasing the number of calories you eat by 500 calories each day. Healthy meal plan for weight management:  A healthy meal plan includes a variety of foods, contains fewer calories, and helps you stay healthy. A healthy meal plan includes the following:  · Eat whole-grain foods more often. A healthy meal plan should contain fiber. Fiber is the part of grains, fruits, and vegetables that is not broken down by your body. Whole-grain foods are healthy and provide extra fiber in your diet. Some examples of whole-grain foods are whole-wheat breads and pastas, oatmeal, brown rice, and bulgur. · Eat a variety of vegetables every day. Include dark, leafy greens such as spinach, kale, shaw greens, and mustard greens. Eat yellow and orange vegetables such as carrots, sweet potatoes, and winter squash. · Eat a variety of fruits every day. Choose fresh or canned fruit (canned in its own juice or light syrup) instead of juice. Fruit juice has very little or no fiber. · Eat low-fat dairy foods. Drink fat-free (skim) milk or 1% milk. Eat fat-free yogurt and low-fat cottage cheese. Try low-fat cheeses such as mozzarella and other reduced-fat cheeses. · Choose meat and other protein foods that are low in fat. Choose beans or other legumes such as split peas or lentils. Choose fish, skinless poultry (chicken or turkey), or lean cuts of red meat (beef or pork). Before you cook meat or poultry, cut off any visible fat. · Use less fat and oil.   Try baking foods instead of frying them. Add less fat, such as margarine, sour cream, regular salad dressing and mayonnaise to foods. Eat fewer high-fat foods. Some examples of high-fat foods include french fries, doughnuts, ice cream, and cakes. · Eat fewer sweets. Limit foods and drinks that are high in sugar. This includes candy, cookies, regular soda, and sweetened drinks. Exercise:  Exercise at least 30 minutes per day on most days of the week. Some examples of exercise include walking, biking, dancing, and swimming. You can also fit in more physical activity by taking the stairs instead of the elevator or parking farther away from stores. Ask your healthcare provider about the best exercise plan for you. © Copyright "[employer] 2018 Information is for End User's use only and may not be sold, redistributed or otherwise used for commercial purposes.  All illustrations and images included in CareNotes® are the copyrighted property of [employer] or  [address]

## 2023-07-10 NOTE — PROGRESS NOTES
Assessment and Plan:     Problem List Items Addressed This Visit        Cardiovascular and Mediastinum    Hypertension    Relevant Orders    Comprehensive metabolic panel   Other Visit Diagnoses     Medicare annual wellness visit, subsequent    -  Primary    Moderate obstructive sleep apnea        Relevant Orders    Ambulatory Referral to Sleep Medicine    Encounter for colorectal cancer screening        Relevant Orders    Ambulatory Referral to Gastroenterology    History of anemia        Relevant Orders    CBC and differential    Anxiety        Relevant Orders    TSH, 3rd generation with Free T4 reflex           Preventive health issues were discussed with patient, and age appropriate screening tests were ordered as noted in patient's After Visit Summary. Personalized health advice and appropriate referrals for health education or preventive services given if needed, as noted in patient's After Visit Summary. History of Present Illness:     Patient presents for a Medicare Wellness Visit    HPI     Pt here for AMW. Pt reports "my heart is nervous". Sometimes pt has difficulty sleeping, waking up 3 times a night and waking up at 5:30AM.   reports witnessed apneic episode. Pt does have a history of panic attack. Pt has been participating in PT. Patient Care Team:  Raymundo Jones MD as PCP - General (Family Medicine)  MD Tho Chan MD Marionette Lis, MD Lon Penta, MD as Endoscopist     Review of Systems:     Review of Systems   Constitutional: Positive for fatigue. Negative for chills and fever. HENT: Negative for congestion, rhinorrhea and sore throat. Respiratory: Negative for shortness of breath. Cardiovascular: Negative for chest pain. Gastrointestinal: Negative for abdominal pain. Musculoskeletal: Positive for arthralgias (bilateral knee pain). Neurological: Negative for headaches. Psychiatric/Behavioral: Positive for sleep disturbance.  Negative for self-injury and suicidal ideas. The patient is nervous/anxious. Problem List:     Patient Active Problem List   Diagnosis   • Chest pressure   • GERD (gastroesophageal reflux disease)   • Sleep deprivation   • Hypertension   • Arthritis of knee   • Left knee pain   • Lower back pain   • Primary osteoarthritis of right knee   • Globus sensation   • RUQ pain   • Gastroesophageal reflux disease without esophagitis   • Left shoulder pain   • Right shoulder pain   • Tendinopathy of left rotator cuff   • Tendinopathy of right rotator cuff   • Cervical radiculitis   • Carpal tunnel syndrome of right wrist   • Breast cancer screening   • Status post hysterectomy   • Osteoporosis   • Chronic pain syndrome - Right   • Continuous opioid dependence (HCC)   • Decreased energy   • Sleep difficulties   • Chest pain   • HLD (hyperlipidemia)   • Neck pain   • Asthma in adult, mild intermittent, uncomplicated   • Dysphagia   • Seasonal allergies      Past Medical and Surgical History:     Past Medical History:   Diagnosis Date   • Arthritis    • Carpal tunnel syndrome    • GERD (gastroesophageal reflux disease)    • Hiatal hernia    • Hypertension    • Insomnia    • Pneumonia    • PONV (postoperative nausea and vomiting)    • Renal calculi    • Sleep deprivation     chronically   • Vitamin D deficiency    • Vitamin D toxicity 2011    with leaky gut syndrome     Past Surgical History:   Procedure Laterality Date   • BUNIONECTOMY Bilateral    • CARPAL TUNNEL RELEASE Right 2015   • COLONOSCOPY     • CYSTOSCOPY N/A 6/6/2019    Procedure: CYSTOSCOPY;  Surgeon: Heavenly Ro MD;  [facility]: [facility];  Service: Gynecology Oncology   • ESOPHAGOGASTRODUODENOSCOPY N/A 5/5/2016    Procedure: ESOPHAGOGASTRODUODENOSCOPY (EGD); Surgeon: Jasen Vasquez MD;  Location: [facility];   Service:    • FRACTURE SURGERY      L arm    • NERVE BLOCK       R Knee   • OOPHORECTOMY Left    • SD COLONOSCOPY FLX DX W/COLLJ SPEC WHEN PFRMD N/A 2016    Procedure: COLONOSCOPY;  Surgeon: Robert Guillen MD;  Location: [facility]; Service: Gastroenterology   • CT ESOPHAGOGASTRODUODENOSCOPY TRANSORAL DIAGNOSTIC N/A 10/19/2018    Procedure: ESOPHAGOGASTRODUODENOSCOPY (EGD); Surgeon: Robert Guillen MD;  [facility]: [facility];   Service: Gastroenterology   • CT LAPS TOTAL HYSTERECT 250 GM/< W/RMVL TUBE/OVARY N/A 2019    Procedure: ROBOTIC TOTAL LAPAROSCOPIC HYSTERECTOMY, RIGHT SALPINGO-OOPHORECTOMY, EXTENSIVE ADHESIOLYSIS, APPROXIMATELY 39 MIN;  Surgeon: Tobi Bruce MD;  Location: [facility];  Service: Gynecology Oncology   • ROTATOR CUFF REPAIR Bilateral    • TOE SURGERY Left     left 5th toe shaved down due to repeated fx   • TOTAL KNEE ARTHROPLASTY Left       Family History:     Family History   Problem Relation Age of Onset   • Heart failure Mother    • Other Mother         respiratory failure   • Heart attack Mother    • Heart failure Father         respiratory failure   • Heart attack Father    • Breast cancer Sister    • No Known Problems Sister    • Kidney failure Brother    • Alcohol abuse Brother    • No Known Problems Brother    • Diabetes Brother    • Hypertension Daughter    • Arrhythmia Neg Hx    • Clotting disorder Neg Hx    • Fainting Neg Hx    • Anuerysm Neg Hx    • Stroke Neg Hx    • Hyperlipidemia Neg Hx    • Asthma Neg Hx       Social History:     Social History     Socioeconomic History   • Marital status: Single     Spouse name: None   • Number of children: None   • Years of education: None   • Highest education level: None   Occupational History   • None   Tobacco Use   • Smoking status: Former     Packs/day: 1.00     Years: 13.00     Total pack years: 13.00     Types: Cigarettes     Start date:      Quit date:      Years since quittin.5   • Smokeless tobacco: Never   • Tobacco comments:     Quit > 30 years ago    Vaping Use   • Vaping Use: Never used   Substance and Sexual Activity   • Alcohol use: No   • Drug use: No   • Sexual activity: None     Comment: no partner   Other Topics Concern   • None   Social History Narrative   • None     Social Determinants of Health     Financial Resource Strain: Unknown (7/10/2023)    Overall Financial Resource Strain (CARDIA)    • Difficulty of Paying Living Expenses: Patient refused   Food Insecurity: No Food Insecurity (9/16/2022)    Hunger Vital Sign    • Worried About Running Out of Food in the Last Year: Never true    • Ran Out of Food in the Last Year: Never true   Transportation Needs: No Transportation Needs (7/10/2023)    PRAPARE - Transportation    • Lack of Transportation (Medical): No    • Lack of Transportation (Non-Medical):  No   Physical Activity: Not on file   Stress: Not on file   Social Connections: Not on file   Intimate Partner Violence: Not on file   Housing Stability: Unknown (9/16/2022)    Housing Stability Vital Sign    • Unable to Pay for Housing in the Last Year: No    • Number of Places Lived in the Last Year: Not on file    • Unstable Housing in the Last Year: No      Medications and Allergies:     Current Outpatient Medications   Medication Sig Dispense Refill   • albuterol (PROVENTIL HFA,VENTOLIN HFA) 90 mcg/act inhaler Inhale 2 puffs every 6 (six) hours as needed for wheezing or shortness of breath 6.7 g 2   • cetirizine (ZyrTEC) 10 mg tablet Take 10 mg by mouth daily     • cyclobenzaprine (FLEXERIL) 5 mg tablet Take 0.5 tablets (2.5 mg total) by mouth daily at bedtime as needed for muscle spasms 20 tablet 0   • losartan-hydrochlorothiazide (HYZAAR) 100-25 MG per tablet Take 1 tablet by mouth daily 90 tablet 1   • Nattokinase 100 MG CAPS Take by mouth in the morning     • Probiotic Product (MVW COMPLETE PROBIOTIC PO) Take by mouth     • saccharomyces boulardii (FLORASTOR) 250 mg capsule Take 250 mg by mouth 2 (two) times a day     • saccharomyces boulardii (FLORASTOR) 250 mg capsule Take 250 mg by mouth     • zolpidem (AMBIEN) 10 mg tablet Take 0.5 tablets (5 mg total) by mouth daily at bedtime as needed for sleep 30 tablet 0   • Acetaminophen 500 MG TAKE 2 CAPSULES TWICE A DAY (Patient not taking: Reported on 3/24/2023) 120 capsule 3   • amLODIPine (NORVASC) 2.5 mg tablet Take 5 mg by mouth (Patient not taking: Reported on 12/23/2022)     • atorvastatin (LIPITOR) 40 mg tablet Take 1 tablet (40 mg total) by mouth every evening (Patient not taking: Reported on 12/23/2022) 30 tablet 0   • cholecalciferol (VITAMIN D) 400 units/1 mL Take 2 mL (800 Units total) by mouth daily 180 mL 1   • MELATONIN PO Take by mouth (Patient not taking: Reported on 7/10/2023)     • traMADol (ULTRAM) 50 mg tablet Take 50 mg by mouth every 8 (eight) hours as needed for severe pain. Indications: Pain (Patient not taking: Reported on 9/23/2022)       No current facility-administered medications for this visit.      Allergies   Allergen Reactions   • Androgens Anaphylaxis   • Cefazolin Hives, GI Intolerance and Swelling   • Ciprofloxacin Hives   • Other Other (See Comments)     STEROIDS- GI INTOLERANCE   • Penicillins Hives and Rash   • Prednisone Hives, GI Intolerance, Swelling and Vomiting   • Vancomycin Hives, GI Intolerance, Diarrhea and Rash   • Aspirin GI Intolerance and Hives     Even low dose    • Cortisone Syncope   • Adhesive [Medical Tape] Rash   • Beef Allergy - Food Allergy Diarrhea   • Gluten Meal - Food Allergy GI Intolerance   • Lactose - Food Allergy Diarrhea   • Oxycodone Rash and Vomiting   • Pataday [Olopatadine Hcl] Eye Swelling      Immunizations:     Immunization History   Administered Date(s) Administered   • COVID-19 J&J (Zenefits) vaccine 0.5 mL 05/27/2021, 02/15/2022   • Tdap 11/25/2013, 02/08/2020      Health Maintenance:         Topic Date Due   • Hepatitis C Screening  Never done   • DXA SCAN  03/01/2028   • Colorectal Cancer Screening  Discontinued         Topic Date Due   • Pneumococcal Vaccine: 65+ Years (1 - PCV) Never done   • COVID-19 Vaccine (3 - Booster for Jesus series) 04/12/2022   • Influenza Vaccine (1) 09/01/2023      Medicare Screening Tests and Risk Assessments:     January Greenberg is here for her Subsequent Wellness visit. Health Risk Assessment:   Patient rates overall health as good. Patient feels that their physical health rating is same. Patient is satisfied with their life. Eyesight was rated as same. Hearing was rated as same. Patient feels that their emotional and mental health rating is same. Patients states they are never, rarely angry. Patient states they are never, rarely unusually tired/fatigued. Pain experienced in the last 7 days has been none. Patient states that she has experienced no weight loss or gain in last 6 months. Depression Screening:   PHQ-2 Score: 0      Fall Risk Screening: In the past year, patient has experienced: no history of falling in past year      Urinary Incontinence Screening:   Patient has not leaked urine accidently in the last six months. Home Safety:  Patient does not have trouble with stairs inside or outside of their home. Patient has working smoke alarms and has working carbon monoxide detector. Home safety hazards include: none. Nutrition:   Current diet is No Added Salt. Medications:   Patient is currently taking over-the-counter supplements. OTC medications include: see medication list. Patient is able to manage medications. Activities of Daily Living (ADLs)/Instrumental Activities of Daily Living (IADLs):   Walk and transfer into and out of bed and chair?: Yes  Dress and groom yourself?: Yes    Bathe or shower yourself?: Yes    Feed yourself?  Yes  Do your laundry/housekeeping?: Yes  Manage your money, pay your bills and track your expenses?: Yes  Make your own meals?: Yes    Do your own shopping?: Yes    Previous Hospitalizations:   Any hospitalizations or ED visits within the last 12 months?: No      Advance Care Planning:   Living will: Yes    Advanced directive: Yes      Cognitive Screening: Provider or family/friend/caregiver concerned regarding cognition?: No    PREVENTIVE SCREENINGS      Cardiovascular Screening:    General: Screening Current and Risks and Benefits Discussed    Due for: Lipid Panel      Diabetes Screening:     General: Risks and Benefits Discussed    Due for: Blood Glucose      Colorectal Cancer Screening:     General: Risks and Benefits Discussed    Due for: Colonoscopy - Low Risk      Breast Cancer Screening:     General: Patient Declines      Cervical Cancer Screening:    General: Screening Not Indicated      Osteoporosis Screening:    General: Screening Not Indicated and History Osteoporosis      Abdominal Aortic Aneurysm (AAA) Screening:        General: Screening Not Indicated      Lung Cancer Screening:     General: Screening Not Indicated      Hepatitis C Screening:    General: Patient Declines    Screening, Brief Intervention, and Referral to Treatment (SBIRT)    Screening  Typical number of drinks in a day: 0  Typical number of drinks in a week: 0  Interpretation: Low risk drinking behavior. AUDIT-C Screenin) How often did you have a drink containing alcohol in the past year? never  2) How many drinks did you have on a typical day when you were drinking in the past year? 0  3) How often did you have 6 or more drinks on one occasion in the past year? never    AUDIT-C Score: 0  Interpretation: Score 0-2 (female): Negative screen for alcohol misuse    Single Item Drug Screening:  How often have you used an illegal drug (including marijuana) or a prescription medication for non-medical reasons in the past year? never    Single Item Drug Screen Score: 0  Interpretation: Negative screen for possible drug use disorder    Other Counseling Topics:   Car/seat belt/driving safety, skin self-exam, sunscreen and calcium and vitamin D intake and regular weightbearing exercise. No results found.      Physical Exam:     /68 (BP Location: Left arm, Patient Position: Sitting, Cuff Size: Standard)   Pulse 88   Temp 97.6 °F (36.4 °C)   Ht 4' 11" (1.499 m)   Wt 68.8 kg (151 lb 9.6 oz)   SpO2 98%   BMI 30.62 kg/m²     Physical Exam  Vitals reviewed. Constitutional:       General: She is not in acute distress. Appearance: Normal appearance. She is obese. She is not ill-appearing, toxic-appearing or diaphoretic. Cardiovascular:      Rate and Rhythm: Normal rate and regular rhythm. Pulses: Normal pulses. Heart sounds: Normal heart sounds. No murmur heard. Pulmonary:      Effort: Pulmonary effort is normal. No respiratory distress. Breath sounds: Normal breath sounds. Abdominal:      General: Abdomen is flat. Musculoskeletal:         General: No swelling. Skin:     General: Skin is warm and dry. Capillary Refill: Capillary refill takes less than 2 seconds. Coloration: Skin is not jaundiced. Neurological:      General: No focal deficit present. Mental Status: She is alert.    Psychiatric:         Mood and Affect: Mood normal.          Leonora Rosales MD

## 2023-07-10 NOTE — PROGRESS NOTES
Name: Filiberto Gupta      : 1945      MRN: [id number]  Encounter Provider: Emily Oneill MD  Encounter Date: 7/10/2023   Encounter department: [facility]     1. Medicare annual wellness visit, subsequent    2. Moderate obstructive sleep apnea  -     Ambulatory Referral to Sleep Medicine; Future    3. Encounter for colorectal cancer screening  -     Ambulatory Referral to Gastroenterology; Future    4. History of anemia  -     CBC and differential; Future    5. Primary hypertension  -     Comprehensive metabolic panel; Future    6. Anxiety  -     TSH, 3rd generation with Free T4 reflex; Future      Has had some anxiety symptoms, may be secondary to sleep apnea patient does have a diagnosis of moderate sleep apnea with severe hypoxia back in 2022, recommend patient follow-up with sleep medicine and obtain a CPAP trial at home  Patient's last colonoscopy was completed in , recommended follow-up in 5 to 10 years. Patient had multiple polyps at this time, recommend patient follow-up with gastroenterology to discuss colonoscopy screening  We will obtain TSH MP, CBC for evaluation         Subjective      HPI     Pt reports "my heart is nervous". Sometimes pt has difficulty sleeping, waking up 3 times a night and waking up at 5:30AM.   reports witnessed apneic episode. Pt does have a history of panic attack. Pt has been participating in PT. Constitutional: Positive for fatigue. Negative for chills and fever. HENT: Negative for congestion, rhinorrhea and sore throat. Respiratory: Negative for shortness of breath. Cardiovascular: Negative for chest pain. Gastrointestinal: Negative for abdominal pain. Musculoskeletal: Positive for arthralgias (bilateral knee pain). Neurological: Negative for headaches. Psychiatric/Behavioral: Positive for sleep disturbance. Negative for self-injury and suicidal ideas. The patient is nervous/anxious. Current Outpatient Medications on File Prior to Visit   Medication Sig   • albuterol (PROVENTIL HFA,VENTOLIN HFA) 90 mcg/act inhaler Inhale 2 puffs every 6 (six) hours as needed for wheezing or shortness of breath   • cetirizine (ZyrTEC) 10 mg tablet Take 10 mg by mouth daily   • cyclobenzaprine (FLEXERIL) 5 mg tablet Take 0.5 tablets (2.5 mg total) by mouth daily at bedtime as needed for muscle spasms   • losartan-hydrochlorothiazide (HYZAAR) 100-25 MG per tablet Take 1 tablet by mouth daily   • Nattokinase 100 MG CAPS Take by mouth in the morning   • Probiotic Product (MVW COMPLETE PROBIOTIC PO) Take by mouth   • saccharomyces boulardii (FLORASTOR) 250 mg capsule Take 250 mg by mouth 2 (two) times a day   • saccharomyces boulardii (FLORASTOR) 250 mg capsule Take 250 mg by mouth   • zolpidem (AMBIEN) 10 mg tablet Take 0.5 tablets (5 mg total) by mouth daily at bedtime as needed for sleep   • Acetaminophen 500 MG TAKE 2 CAPSULES TWICE A DAY (Patient not taking: Reported on 3/24/2023)   • amLODIPine (NORVASC) 2.5 mg tablet Take 5 mg by mouth (Patient not taking: Reported on 12/23/2022)   • atorvastatin (LIPITOR) 40 mg tablet Take 1 tablet (40 mg total) by mouth every evening (Patient not taking: Reported on 12/23/2022)   • cholecalciferol (VITAMIN D) 400 units/1 mL Take 2 mL (800 Units total) by mouth daily   • MELATONIN PO Take by mouth (Patient not taking: Reported on 7/10/2023)   • traMADol (ULTRAM) 50 mg tablet Take 50 mg by mouth every 8 (eight) hours as needed for severe pain. Indications: Pain (Patient not taking: Reported on 9/23/2022)       Objective     /68 (BP Location: Left arm, Patient Position: Sitting, Cuff Size: Standard)   Pulse 88   Temp 97.6 °F (36.4 °C)   Ht 4' 11" (1.499 m)   Wt 68.8 kg (151 lb 9.6 oz)   SpO2 98%   BMI 30.62 kg/m²     Physical Exam  Vitals reviewed. Constitutional:       General: She is not in acute distress. Appearance: Normal appearance. She is obese.  She is not ill-appearing, toxic-appearing or diaphoretic. Cardiovascular:      Rate and Rhythm: Normal rate and regular rhythm. Pulses: Normal pulses. Heart sounds: Normal heart sounds. No murmur heard. Pulmonary:      Effort: Pulmonary effort is normal. No respiratory distress. Breath sounds: Normal breath sounds. Abdominal:      General: Abdomen is flat. Musculoskeletal:         General: No swelling. Skin:     General: Skin is warm and dry. Capillary Refill: Capillary refill takes less than 2 seconds. Coloration: Skin is not jaundiced. Neurological:      General: No focal deficit present. Mental Status: She is alert.    Psychiatric:         Mood and Affect: Mood normal.     Ramy Smith MD

## 2023-07-13 ENCOUNTER — APPOINTMENT (OUTPATIENT)
Dept: LAB | Facility: MEDICAL CENTER | Age: 78
End: 2023-07-13
Payer: MEDICARE

## 2023-07-13 DIAGNOSIS — F41.9 ANXIETY: ICD-10-CM

## 2023-07-13 DIAGNOSIS — Z86.2 HISTORY OF ANEMIA: ICD-10-CM

## 2023-07-13 DIAGNOSIS — I10 PRIMARY HYPERTENSION: ICD-10-CM

## 2023-07-13 LAB
ALBUMIN SERPL BCP-MCNC: 3.9 G/DL (ref 3.5–5)
ALP SERPL-CCNC: 88 U/L (ref 46–116)
ALT SERPL W P-5'-P-CCNC: 26 U/L (ref 12–78)
ANION GAP SERPL CALCULATED.3IONS-SCNC: 4 MMOL/L
AST SERPL W P-5'-P-CCNC: 21 U/L (ref 5–45)
BASOPHILS # BLD AUTO: 0.03 THOUSANDS/ÂΜL (ref 0–0.1)
BASOPHILS NFR BLD AUTO: 1 % (ref 0–1)
BILIRUB SERPL-MCNC: 0.85 MG/DL (ref 0.2–1)
BUN SERPL-MCNC: 20 MG/DL (ref 5–25)
CALCIUM SERPL-MCNC: 9.4 MG/DL (ref 8.3–10.1)
CHLORIDE SERPL-SCNC: 106 MMOL/L (ref 96–108)
CO2 SERPL-SCNC: 25 MMOL/L (ref 21–32)
CREAT SERPL-MCNC: 0.87 MG/DL (ref 0.6–1.3)
EOSINOPHIL # BLD AUTO: 0.15 THOUSAND/ÂΜL (ref 0–0.61)
EOSINOPHIL NFR BLD AUTO: 3 % (ref 0–6)
ERYTHROCYTE [DISTWIDTH] IN BLOOD BY AUTOMATED COUNT: 12.9 % (ref 11.6–15.1)
GFR SERPL CREATININE-BSD FRML MDRD: 64 ML/MIN/1.73SQ M
GLUCOSE P FAST SERPL-MCNC: 93 MG/DL (ref 65–99)
HCT VFR BLD AUTO: 39.5 % (ref 34.8–46.1)
HGB BLD-MCNC: 12.7 G/DL (ref 11.5–15.4)
IMM GRANULOCYTES # BLD AUTO: 0.01 THOUSAND/UL (ref 0–0.2)
IMM GRANULOCYTES NFR BLD AUTO: 0 % (ref 0–2)
LYMPHOCYTES # BLD AUTO: 1.38 THOUSANDS/ÂΜL (ref 0.6–4.47)
LYMPHOCYTES NFR BLD AUTO: 23 % (ref 14–44)
MCH RBC QN AUTO: 30.3 PG (ref 26.8–34.3)
MCHC RBC AUTO-ENTMCNC: 32.2 G/DL (ref 31.4–37.4)
MCV RBC AUTO: 94 FL (ref 82–98)
MONOCYTES # BLD AUTO: 0.53 THOUSAND/ÂΜL (ref 0.17–1.22)
MONOCYTES NFR BLD AUTO: 9 % (ref 4–12)
NEUTROPHILS # BLD AUTO: 3.94 THOUSANDS/ÂΜL (ref 1.85–7.62)
NEUTS SEG NFR BLD AUTO: 64 % (ref 43–75)
NRBC BLD AUTO-RTO: 0 /100 WBCS
PLATELET # BLD AUTO: 228 THOUSANDS/UL (ref 149–390)
PMV BLD AUTO: 12.5 FL (ref 8.9–12.7)
POTASSIUM SERPL-SCNC: 3.8 MMOL/L (ref 3.5–5.3)
PROT SERPL-MCNC: 7.8 G/DL (ref 6.4–8.4)
RBC # BLD AUTO: 4.19 MILLION/UL (ref 3.81–5.12)
SODIUM SERPL-SCNC: 135 MMOL/L (ref 135–147)
TSH SERPL DL<=0.05 MIU/L-ACNC: 1.01 UIU/ML (ref 0.45–4.5)
WBC # BLD AUTO: 6.04 THOUSAND/UL (ref 4.31–10.16)

## 2023-07-13 PROCEDURE — 36415 COLL VENOUS BLD VENIPUNCTURE: CPT

## 2023-07-13 PROCEDURE — 84443 ASSAY THYROID STIM HORMONE: CPT

## 2023-07-13 PROCEDURE — 85025 COMPLETE CBC W/AUTO DIFF WBC: CPT

## 2023-07-13 PROCEDURE — 80053 COMPREHEN METABOLIC PANEL: CPT

## 2023-09-19 DIAGNOSIS — Z72.820 SLEEP DEPRIVATION: Chronic | ICD-10-CM

## 2023-09-19 RX ORDER — ZOLPIDEM TARTRATE 10 MG/1
5 TABLET ORAL
Qty: 30 TABLET | Refills: 0 | Status: SHIPPED | OUTPATIENT
Start: 2023-09-19

## 2023-09-19 NOTE — TELEPHONE ENCOUNTER
Reason for call:   [x] Refill   [] Prior Auth  [] Other:     Office:   [x] PCP/Provider -  Neri HUI  [] Speciality/Provider -     Medication: zolpidem    Dose/Frequency: 10mg / 0.5tab hs prn    Quantity: 30    Pharmacy:   [address] [place] [address] - [address] [place] [address]. Does the patient have enough for 3 days?    [x] Yes   [] No - Send as HP to POD

## 2023-09-24 DIAGNOSIS — I10 ESSENTIAL HYPERTENSION: ICD-10-CM

## 2023-09-25 RX ORDER — LOSARTAN POTASSIUM AND HYDROCHLOROTHIAZIDE 25; 100 MG/1; MG/1
1 TABLET ORAL DAILY
Qty: 90 TABLET | Refills: 1 | Status: SHIPPED | OUTPATIENT
Start: 2023-09-25

## 2023-09-29 ENCOUNTER — OFFICE VISIT (OUTPATIENT)
Dept: OBGYN CLINIC | Facility: CLINIC | Age: 78
End: 2023-09-29
Payer: MEDICARE

## 2023-09-29 ENCOUNTER — APPOINTMENT (OUTPATIENT)
Dept: RADIOLOGY | Facility: AMBULARY SURGERY CENTER | Age: 78
End: 2023-09-29
Attending: ORTHOPAEDIC SURGERY
Payer: MEDICARE

## 2023-09-29 VITALS
HEIGHT: 59 IN | SYSTOLIC BLOOD PRESSURE: 162 MMHG | BODY MASS INDEX: 29.64 KG/M2 | DIASTOLIC BLOOD PRESSURE: 76 MMHG | HEART RATE: 89 BPM | WEIGHT: 147 LBS

## 2023-09-29 DIAGNOSIS — M17.11 PRIMARY OSTEOARTHRITIS OF RIGHT KNEE: ICD-10-CM

## 2023-09-29 DIAGNOSIS — Z96.652 HISTORY OF TOTAL KNEE ARTHROPLASTY, LEFT: ICD-10-CM

## 2023-09-29 DIAGNOSIS — M25.562 LEFT KNEE PAIN, UNSPECIFIED CHRONICITY: ICD-10-CM

## 2023-09-29 DIAGNOSIS — Z96.652 HISTORY OF REVISION OF TOTAL REPLACEMENT OF LEFT KNEE JOINT: Primary | ICD-10-CM

## 2023-09-29 PROCEDURE — 73562 X-RAY EXAM OF KNEE 3: CPT

## 2023-09-29 PROCEDURE — 99213 OFFICE O/P EST LOW 20 MIN: CPT | Performed by: ORTHOPAEDIC SURGERY

## 2023-09-29 NOTE — PROGRESS NOTES
Assessment:  1. 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 Left knee pain, unspecified chronicity  XR knee 3 vw left non injury      3. Primary osteoarthritis of right knee  XR knee 3 vw right non injury    Ambulatory referral to Spine & Pain Management    Injection Procedure Prior Authorization      4. History of total knee arthroplasty, left  Ambulatory referral to Spine & Pain Management          Plan:    • Patient has chronic right knee pain due to severe osteoarthritis and hx of reversion left total knee arthroplasty in 2021 by a outside provider in [facility]. • Weightbearing as tolerated   • Provided patient with hamstring stretching exercises for the left knee  • Will be ordering Durolane injection for the right knee  • Will be referring patient pain management to discuss bilateral knee genicular nerve block ( hx of genicular nerve block in 2021 at [facility])  • Will contact patient when injection for the right knee has been approved          The above stated was discussed in layman's terms and the patient expressed understanding. All questions were answered to the patient's satisfaction. Subjective:   Gracia Perez is a 77 y.o. female who presents today for bilateral knee pain. Patient was last seen on 12/2/2022 for her right knee and right hip provided with hamstring strengthening exercises and glutes strengthening exercises to do. Patient did have a right knee steroid injection on 10/28/2020. She states she is having pain over the medial aspect of the right knee. She states her pain is worse with weightbearing increase activities. States she does have trouble sleeping at night due to the pain  She has history of a revision left total knee arthroplasty in 21 by outside provider in [facility]. She states she is having pain in the posterior aspect of the left knee.   She states she does have tightness radiating up to the lateral hip. He does feel her knee is stable. Review of systems negative unless otherwise specified in HPI    Past Medical History:   Diagnosis Date   • Arthritis    • Carpal tunnel syndrome    • GERD (gastroesophageal reflux disease)    • Hiatal hernia    • Hypertension    • Insomnia    • Pneumonia    • PONV (postoperative nausea and vomiting)    • Renal calculi    • Sleep deprivation     chronically   • Vitamin D deficiency    • Vitamin D toxicity 2011    with leaky gut syndrome       Past Surgical History:   Procedure Laterality Date   • BUNIONECTOMY Bilateral    • CARPAL TUNNEL RELEASE Right 2015   • COLONOSCOPY     • CYSTOSCOPY N/A 6/6/2019    Procedure: CYSTOSCOPY;  Surgeon: Corazon Montano MD;  [facility];  Service: Gynecology Oncology   • ESOPHAGOGASTRODUODENOSCOPY N/A 5/5/2016    Procedure: ESOPHAGOGASTRODUODENOSCOPY (EGD); Surgeon: Nicole Moore MD;  [facility]; Service:    • FRACTURE SURGERY      L arm    • NERVE BLOCK       R Knee   • OOPHORECTOMY Left    • IA COLONOSCOPY FLX DX W/COLLJ SPEC WHEN PFRMD N/A 6/28/2016    Procedure: COLONOSCOPY;  Surgeon: Nicole Moore MD;  [facility]; Service: Gastroenterology   • IA ESOPHAGOGASTRODUODENOSCOPY TRANSORAL DIAGNOSTIC N/A 10/19/2018    Procedure: ESOPHAGOGASTRODUODENOSCOPY (EGD); Surgeon: Nicole Moore MD;  [facility];   Service: Gastroenterology   • IA LAPS TOTAL HYSTERECT 250 GM/< W/RMVL TUBE/OVARY N/A 6/6/2019    Procedure: ROBOTIC TOTAL LAPAROSCOPIC HYSTERECTOMY, RIGHT SALPINGO-OOPHORECTOMY, EXTENSIVE ADHESIOLYSIS, APPROXIMATELY 39 MIN;  Surgeon: Corazon Montano MD;  [facility];  Service: Gynecology Oncology   • ROTATOR CUFF REPAIR Bilateral    • TOE SURGERY Left     left 5th toe shaved down due to repeated fx   • TOTAL KNEE ARTHROPLASTY Left        Family History   Problem Relation Age of Onset   • Heart failure Mother    • Other Mother         respiratory failure   • Heart attack Mother • Heart failure Father         respiratory failure   • Heart attack Father    • Breast cancer Sister    • No Known Problems Sister    • Kidney failure Brother    • Alcohol abuse Brother    • No Known Problems Brother    • Diabetes Brother    • Hypertension Daughter    • Arrhythmia Neg Hx    • Clotting disorder Neg Hx    • Fainting Neg Hx    • Anuerysm Neg Hx    • Stroke Neg Hx    • Hyperlipidemia Neg Hx    • Asthma Neg Hx        Social History     Occupational History   • Not on file   Tobacco Use   • Smoking status: Former     Packs/day: 1.00     Years: 13.00     Total pack years: 13.00     Types: Cigarettes     Start date:      Quit date:      Years since quittin.7   • Smokeless tobacco: Never   • Tobacco comments:     Quit > 30 years ago    Vaping Use   • Vaping Use: Never used   Substance and Sexual Activity   • Alcohol use: No   • Drug use: No   • Sexual activity: Not on file     Comment: no partner         Current Outpatient Medications:   •  Acetaminophen 500 MG, TAKE 2 CAPSULES TWICE A DAY (Patient not taking: Reported on 3/24/2023), Disp: 120 capsule, Rfl: 3  •  albuterol (PROVENTIL HFA,VENTOLIN HFA) 90 mcg/act inhaler, Inhale 2 puffs every 6 (six) hours as needed for wheezing or shortness of breath, Disp: 6.7 g, Rfl: 2  •  amLODIPine (NORVASC) 2.5 mg tablet, Take 5 mg by mouth (Patient not taking: Reported on 2022), Disp: , Rfl:   •  atorvastatin (LIPITOR) 40 mg tablet, Take 1 tablet (40 mg total) by mouth every evening (Patient not taking: Reported on 2022), Disp: 30 tablet, Rfl: 0  •  cetirizine (ZyrTEC) 10 mg tablet, Take 10 mg by mouth daily, Disp: , Rfl:   •  cholecalciferol (VITAMIN D) 400 units/1 mL, Take 2 mL (800 Units total) by mouth daily, Disp: 180 mL, Rfl: 1  •  cyclobenzaprine (FLEXERIL) 5 mg tablet, Take 0.5 tablets (2.5 mg total) by mouth daily at bedtime as needed for muscle spasms, Disp: 20 tablet, Rfl: 0  •  losartan-hydrochlorothiazide (HYZAAR) 100-25 MG per tablet, TAKE 1 TABLET BY MOUTH EVERY DAY, Disp: 90 tablet, Rfl: 1  •  MELATONIN PO, Take by mouth (Patient not taking: Reported on 7/10/2023), Disp: , Rfl:   •  Nattokinase 100 MG CAPS, Take by mouth in the morning, Disp: , Rfl:   •  Probiotic Product (MVW COMPLETE PROBIOTIC PO), Take by mouth, Disp: , Rfl:   •  saccharomyces boulardii (FLORASTOR) 250 mg capsule, Take 250 mg by mouth 2 (two) times a day, Disp: , Rfl:   •  saccharomyces boulardii (FLORASTOR) 250 mg capsule, Take 250 mg by mouth, Disp: , Rfl:   •  traMADol (ULTRAM) 50 mg tablet, Take 50 mg by mouth every 8 (eight) hours as needed for severe pain.  Indications: Pain (Patient not taking: Reported on 9/23/2022), Disp: , Rfl:   •  zolpidem (AMBIEN) 10 mg tablet, Take 0.5 tablets (5 mg total) by mouth daily at bedtime as needed for sleep, Disp: 30 tablet, Rfl: 0    Allergies   Allergen Reactions   • Androgens Anaphylaxis   • Cefazolin Hives, GI Intolerance and Swelling   • Ciprofloxacin Hives   • Other Other (See Comments)     STEROIDS- GI INTOLERANCE   • Penicillins Hives and Rash   • Prednisone Hives, GI Intolerance, Swelling and Vomiting   • Vancomycin Hives, GI Intolerance, Diarrhea and Rash   • Aspirin GI Intolerance and Hives     Even low dose    • Cortisone Syncope   • Adhesive [Medical Tape] Rash   • Beef Allergy - Food Allergy Diarrhea   • Gluten Meal - Food Allergy GI Intolerance   • Lactose - Food Allergy Diarrhea   • Oxycodone Rash and Vomiting   • Pataday [Olopatadine Hcl] Eye Swelling            Vitals:    09/29/23 0957   BP: 162/76   Pulse: 89       Objective:            Physical Exam  Physical Exam:      General Appearance:    Alert, cooperative, no distress, appears stated age   Head:    Normocephalic, without obvious abnormality, atraumatic   Eyes:    conjunctiva/corneas clear, both eyes         Nose:   Nares normal, septum midline, no drainage    Throat:   Lips normal; teeth and gums normal   Neck:    symmetrical, trachea midline, ; thyroid:  no enlargement/   Back:     Symmetric, no curvature, ROM normal   Lungs:   No audible wheezing or labored breathing   Chest Wall:    No tenderness or deformity    Heart:    Regular rate and rhythm                         Pulses:   2+ and symmetric all extremities   Skin:   Skin color, texture, turgor normal, no rashes or lesions   Neurologic:   normal strength, sensation and reflexes     throughout                       Ortho Exam  Right knee  Skin intact  No erythema  Tenderness to palpation over the medial joint line  Extension 0  Flexion 115-120  Stable to varus and valgus stress  No effusion  Mild generalized swelling  Neurovascularly Intact Distally     Left knee  Surgical scar  well-healed  No erythema  Pain over the anterior aspect of the knee with flexion of knee against resistance  Neurovascular intact distally    Diagnostics, reviewed and taken today if performed as documented: The attending physician has personally reviewed the pertinent films in PACS and interpretation is as follows: X-ray left knee demonstrates status post revision TKA adequate implant, no sign of loosening  X-ray right knee demonstrates tricompartmental arthritis worse in the medial compartment with osteophyte formation      Procedures, if performed today:    Procedures    None performed      Scribe Attestation    I,:  Angelique Badillo am acting as a scribe while in the presence of the attending physician.:       I,:  Valentín Lewis MD personally performed the services described in this documentation    as scribed in my presence.:             Portions of the record may have been created with voice recognition software. Occasional wrong word or "sound a like" substitutions may have occurred due to the inherent limitations of voice recognition software. Read the chart carefully and recognize, using context, where substitutions have occurred.

## 2023-10-03 ENCOUNTER — TELEPHONE (OUTPATIENT)
Age: 78
End: 2023-10-03

## 2023-10-03 NOTE — TELEPHONE ENCOUNTER
Patient called, her allergies have been really bad lately. Throat is irritated and cough non stop. No temp, no other sxs.  covid test neg. She is using zyrtec and delsym which is helping somewhat but would like to know if there is something else she can take. She does have an appt next week but did not want to wait that long.

## 2023-10-05 NOTE — TELEPHONE ENCOUNTER
Patient called- transferred into office. Spoke to patient and she has appt on Tuesday but struggling with cough from allergies. Please advise. Patient refused same day appt. No

## 2023-10-06 DIAGNOSIS — R05.1 ACUTE COUGH: Primary | ICD-10-CM

## 2023-10-06 RX ORDER — BENZONATATE 100 MG/1
100 CAPSULE ORAL 3 TIMES DAILY PRN
Qty: 20 CAPSULE | Refills: 0 | Status: SHIPPED | OUTPATIENT
Start: 2023-10-06

## 2023-10-09 NOTE — TELEPHONE ENCOUNTER
Spoke with patient. Got the cough meds today, but stated she really needed the resolution last week. Patient will be in tomorrow for appt.

## 2023-10-10 ENCOUNTER — OFFICE VISIT (OUTPATIENT)
Dept: FAMILY MEDICINE CLINIC | Facility: CLINIC | Age: 78
End: 2023-10-10
Payer: MEDICARE

## 2023-10-10 VITALS
SYSTOLIC BLOOD PRESSURE: 120 MMHG | TEMPERATURE: 97.1 F | DIASTOLIC BLOOD PRESSURE: 82 MMHG | HEIGHT: 59 IN | OXYGEN SATURATION: 96 % | BODY MASS INDEX: 29.43 KG/M2 | RESPIRATION RATE: 16 BRPM | HEART RATE: 91 BPM | WEIGHT: 146 LBS

## 2023-10-10 DIAGNOSIS — E78.5 HYPERLIPIDEMIA, UNSPECIFIED HYPERLIPIDEMIA TYPE: ICD-10-CM

## 2023-10-10 DIAGNOSIS — N18.2 CKD STAGE G2/A2, GFR 60-89 AND ALBUMIN CREATININE RATIO 30-299 MG/G: ICD-10-CM

## 2023-10-10 DIAGNOSIS — I10 PRIMARY HYPERTENSION: Primary | ICD-10-CM

## 2023-10-10 DIAGNOSIS — J30.2 SEASONAL ALLERGIES: ICD-10-CM

## 2023-10-10 PROCEDURE — 99214 OFFICE O/P EST MOD 30 MIN: CPT | Performed by: FAMILY MEDICINE

## 2023-10-10 NOTE — PROGRESS NOTES
Name: Maru Wise      : 1945      MRN: [id number]  Encounter Provider: Pedro Pablo Malik MD  Encounter Date: 10/10/2023   Encounter department: [facility]     1. Primary hypertension    2. Hyperlipidemia, unspecified hyperlipidemia type  -     Lipid Panel with Direct LDL reflex; Future    3. Seasonal allergies    4. CKD stage G2/A2, GFR 60-89 and albumin creatinine ratio  mg/g  -     Comprehensive metabolic panel; Future      Discussed treatment of allergies symptoms in the future  Please continue blood pressure medication         Subjective     HPI     78-year-old female patient presents for follow-up regarding her chronic medical conditions. Patient reports overall her symptoms are stable, eating healthier. Lost about 5 pounds since her last evaluation. Patient recently had a bout of upper respiratory symptoms that she attributed to allergies, symptoms are now improving. Pressure today is 120/82. Review of Systems   Constitutional: Negative for chills and fever. HENT: Negative for congestion, rhinorrhea and sore throat. Respiratory: Negative for shortness of breath. Cardiovascular: Negative for chest pain. Gastrointestinal: Negative for abdominal pain. Neurological: Negative for headaches. Psychiatric/Behavioral: Negative for sleep disturbance.        Past Medical History:   Diagnosis Date   • Arthritis    • Carpal tunnel syndrome    • GERD (gastroesophageal reflux disease)    • Hiatal hernia    • Hypertension    • Insomnia    • Pneumonia    • PONV (postoperative nausea and vomiting)    • Renal calculi    • Sleep deprivation     chronically   • Vitamin D deficiency    • Vitamin D toxicity     with leaky gut syndrome     Past Surgical History:   Procedure Laterality Date   • BUNIONECTOMY Bilateral    • CARPAL TUNNEL RELEASE Right    • COLONOSCOPY     • CYSTOSCOPY N/A 2019    Procedure: CYSTOSCOPY;  Surgeon: Ralph Killian MD;  [facility];  Service: Gynecology Oncology   • ESOPHAGOGASTRODUODENOSCOPY N/A 5/5/2016    Procedure: ESOPHAGOGASTRODUODENOSCOPY (EGD); Surgeon: Nicole Moore MD;  [facility]; Service:    • FRACTURE SURGERY      L arm    • NERVE BLOCK       R Knee   • OOPHORECTOMY Left    • VT COLONOSCOPY FLX DX W/COLLJ SPEC WHEN PFRMD N/A 6/28/2016    Procedure: COLONOSCOPY;  Surgeon: Nicole Moore MD;  [facility]; Service: Gastroenterology   • VT ESOPHAGOGASTRODUODENOSCOPY TRANSORAL DIAGNOSTIC N/A 10/19/2018    Procedure: ESOPHAGOGASTRODUODENOSCOPY (EGD); Surgeon: Nicole Moore MD;  [facility];   Service: Gastroenterology   • VT LAPS TOTAL HYSTERECT 250 GM/< W/RMVL TUBE/OVARY N/A 6/6/2019    Procedure: ROBOTIC TOTAL LAPAROSCOPIC HYSTERECTOMY, RIGHT SALPINGO-OOPHORECTOMY, EXTENSIVE ADHESIOLYSIS, APPROXIMATELY 39 MIN;  Surgeon: Corazon Montano MD;  [facility];  Service: Gynecology Oncology   • ROTATOR CUFF REPAIR Bilateral    • TOE SURGERY Left     left 5th toe shaved down due to repeated fx   • TOTAL KNEE ARTHROPLASTY Left      Family History   Problem Relation Age of Onset   • Heart failure Mother    • Other Mother         respiratory failure   • Heart attack Mother    • Heart failure Father         respiratory failure   • Heart attack Father    • Breast cancer Sister    • No Known Problems Sister    • Kidney failure Brother    • Alcohol abuse Brother    • No Known Problems Brother    • Diabetes Brother    • Hypertension Daughter    • Arrhythmia Neg Hx    • Clotting disorder Neg Hx    • Fainting Neg Hx    • Anuerysm Neg Hx    • Stroke Neg Hx    • Hyperlipidemia Neg Hx    • Asthma Neg Hx      Social History     Socioeconomic History   • Marital status: Single     Spouse name: None   • Number of children: None   • Years of education: None   • Highest education level: None   Occupational History   • None   Tobacco Use   • Smoking status: Former     Packs/day: 1.00     Years: 13.00     Total pack years: 13.00     Types: Cigarettes     Start date:      Quit date: [place]     Years since quittin.8   • Smokeless tobacco: Never   • Tobacco comments:     Quit > 30 years ago    Vaping Use   • Vaping Use: Never used   Substance and Sexual Activity   • Alcohol use: No   • Drug use: No   • Sexual activity: None     Comment: no partner   Other Topics Concern   • None   Social History Narrative   • None     Social Determinants of Health     Financial Resource Strain: Unknown (7/10/2023)    Overall Financial Resource Strain (CARDIA)    • Difficulty of Paying Living Expenses: Patient refused   Food Insecurity: No Food Insecurity (2022)    Hunger Vital Sign    • Worried About Running Out of Food in the Last Year: Never true    • Ran Out of Food in the Last Year: Never true   Transportation Needs: No Transportation Needs (7/10/2023)    PRAPARE - Transportation    • Lack of Transportation (Medical): No    • Lack of Transportation (Non-Medical):  No   Physical Activity: Not on file   Stress: Not on file   Social Connections: Not on file   Intimate Partner Violence: Not on file   Housing Stability: Unknown (2022)    Housing Stability Vital Sign    • Unable to Pay for Housing in the Last Year: No    • Number of Places Lived in the Last Year: Not on file    • Unstable Housing in the Last Year: No     Current Outpatient Medications on File Prior to Visit   Medication Sig   • albuterol (PROVENTIL HFA,VENTOLIN HFA) 90 mcg/act inhaler Inhale 2 puffs every 6 (six) hours as needed for wheezing or shortness of breath   • amLODIPine (NORVASC) 2.5 mg tablet Take 5 mg by mouth   • atorvastatin (LIPITOR) 40 mg tablet Take 1 tablet (40 mg total) by mouth every evening   • benzonatate (TESSALON PERLES) 100 mg capsule Take 1 capsule (100 mg total) by mouth 3 (three) times a day as needed for cough   • cetirizine (ZyrTEC) 10 mg tablet Take 10 mg by mouth daily   • cyclobenzaprine (FLEXERIL) 5 mg tablet Take 0.5 tablets (2.5 mg total) by mouth daily at bedtime as needed for muscle spasms   • losartan-hydrochlorothiazide (HYZAAR) 100-25 MG per tablet TAKE 1 TABLET BY MOUTH EVERY DAY   • Nattokinase 100 MG CAPS Take by mouth in the morning   • Probiotic Product (MVW COMPLETE PROBIOTIC PO) Take by mouth   • zolpidem (AMBIEN) 10 mg tablet Take 0.5 tablets (5 mg total) by mouth daily at bedtime as needed for sleep   • Acetaminophen 500 MG TAKE 2 CAPSULES TWICE A DAY (Patient not taking: Reported on 3/24/2023)   • cholecalciferol (VITAMIN D) 400 units/1 mL Take 2 mL (800 Units total) by mouth daily (Patient not taking: Reported on 10/10/2023)   • MELATONIN PO Take by mouth (Patient not taking: Reported on 7/10/2023)   • saccharomyces boulardii (FLORASTOR) 250 mg capsule Take 250 mg by mouth 2 (two) times a day (Patient not taking: Reported on 10/10/2023)   • saccharomyces boulardii (FLORASTOR) 250 mg capsule Take 250 mg by mouth (Patient not taking: Reported on 10/10/2023)   • traMADol (ULTRAM) 50 mg tablet Take 50 mg by mouth every 8 (eight) hours as needed for severe pain     Allergies   Allergen Reactions   • Androgens Anaphylaxis   • Cefazolin Hives, GI Intolerance and Swelling   • Ciprofloxacin Hives   • Other Other (See Comments)     STEROIDS- GI INTOLERANCE   • Penicillins Hives and Rash   • Prednisone Hives, GI Intolerance, Swelling and Vomiting   • Vancomycin Hives, GI Intolerance, Diarrhea and Rash   • Aspirin GI Intolerance and Hives     Even low dose    • Cortisone Syncope   • Adhesive [Medical Tape] Rash   • Beef Allergy - Food Allergy Diarrhea   • Gluten Meal - Food Allergy GI Intolerance   • Lactose - Food Allergy Diarrhea   • Oxycodone Rash and Vomiting   • Pataday [Olopatadine Hcl] Eye Swelling     Immunization History   Administered Date(s) Administered   • COVID-19 J&J ([facility]) vaccine 0.5 mL 05/27/2021, 02/15/2022   • Tdap 11/25/2013, 02/08/2020       Objective     /82 (BP Location: Right arm, Patient Position: Sitting, Cuff Size: Large)   Pulse 91   Temp (!) 97.1 °F (36.2 °C) (Temporal)   Resp 16   Ht 4' 11" (1.499 m)   Wt 66.2 kg (146 lb)   SpO2 96%   BMI 29.49 kg/m²     Physical Exam  Vitals reviewed. Constitutional:       General: She is not in acute distress. Appearance: Normal appearance. She is obese. She is not ill-appearing, toxic-appearing or diaphoretic. HENT:      Right Ear: Tympanic membrane, ear canal and external ear normal. There is no impacted cerumen. Left Ear: Tympanic membrane, ear canal and external ear normal. There is no impacted cerumen. Mouth/Throat:      Comments: Mild post nasal drip noted  Cardiovascular:      Rate and Rhythm: Normal rate and regular rhythm. Pulses: Normal pulses. Heart sounds: Normal heart sounds. Pulmonary:      Effort: Pulmonary effort is normal. No respiratory distress. Breath sounds: Normal breath sounds. Abdominal:      General: Abdomen is flat. Bowel sounds are normal. There is no distension. Palpations: Abdomen is soft. Musculoskeletal:         General: No swelling, tenderness, deformity or signs of injury. Skin:     General: Skin is warm and dry. Capillary Refill: Capillary refill takes less than 2 seconds. Coloration: Skin is not jaundiced. Neurological:      General: No focal deficit present. Mental Status: She is alert and oriented to person, place, and time.    Psychiatric:         Mood and Affect: Mood normal.          Chinyere Christie MD

## 2023-10-19 ENCOUNTER — APPOINTMENT (OUTPATIENT)
Dept: LAB | Facility: MEDICAL CENTER | Age: 78
End: 2023-10-19
Payer: MEDICARE

## 2023-10-19 DIAGNOSIS — E78.5 HYPERLIPIDEMIA, UNSPECIFIED HYPERLIPIDEMIA TYPE: ICD-10-CM

## 2023-10-19 DIAGNOSIS — N18.2 CKD STAGE G2/A2, GFR 60-89 AND ALBUMIN CREATININE RATIO 30-299 MG/G: ICD-10-CM

## 2023-10-19 LAB
ALBUMIN SERPL BCP-MCNC: 4.5 G/DL (ref 3.5–5)
ALP SERPL-CCNC: 72 U/L (ref 34–104)
ALT SERPL W P-5'-P-CCNC: 20 U/L (ref 7–52)
ANION GAP SERPL CALCULATED.3IONS-SCNC: 10 MMOL/L
AST SERPL W P-5'-P-CCNC: 21 U/L (ref 13–39)
BILIRUB SERPL-MCNC: 0.86 MG/DL (ref 0.2–1)
BUN SERPL-MCNC: 17 MG/DL (ref 5–25)
CALCIUM SERPL-MCNC: 10.4 MG/DL (ref 8.4–10.2)
CHLORIDE SERPL-SCNC: 104 MMOL/L (ref 96–108)
CHOLEST SERPL-MCNC: 196 MG/DL
CO2 SERPL-SCNC: 26 MMOL/L (ref 21–32)
CREAT SERPL-MCNC: 0.71 MG/DL (ref 0.6–1.3)
GFR SERPL CREATININE-BSD FRML MDRD: 81 ML/MIN/1.73SQ M
GLUCOSE P FAST SERPL-MCNC: 84 MG/DL (ref 65–99)
HDLC SERPL-MCNC: 53 MG/DL
LDLC SERPL CALC-MCNC: 108 MG/DL (ref 0–100)
POTASSIUM SERPL-SCNC: 4.2 MMOL/L (ref 3.5–5.3)
PROT SERPL-MCNC: 7.2 G/DL (ref 6.4–8.4)
SODIUM SERPL-SCNC: 140 MMOL/L (ref 135–147)
TRIGL SERPL-MCNC: 175 MG/DL

## 2023-10-19 PROCEDURE — 80061 LIPID PANEL: CPT

## 2023-10-19 PROCEDURE — 80053 COMPREHEN METABOLIC PANEL: CPT

## 2023-10-19 PROCEDURE — 36415 COLL VENOUS BLD VENIPUNCTURE: CPT

## 2023-10-23 ENCOUNTER — TELEPHONE (OUTPATIENT)
Dept: GASTROENTEROLOGY | Facility: AMBULARY SURGERY CENTER | Age: 78
End: 2023-10-23

## 2023-11-03 ENCOUNTER — PROCEDURE VISIT (OUTPATIENT)
Dept: OBGYN CLINIC | Facility: CLINIC | Age: 78
End: 2023-11-03
Payer: MEDICARE

## 2023-11-03 VITALS
WEIGHT: 147 LBS | SYSTOLIC BLOOD PRESSURE: 159 MMHG | DIASTOLIC BLOOD PRESSURE: 86 MMHG | HEIGHT: 59 IN | HEART RATE: 74 BPM | BODY MASS INDEX: 29.64 KG/M2

## 2023-11-03 DIAGNOSIS — M17.11 PRIMARY OSTEOARTHRITIS OF RIGHT KNEE: Primary | ICD-10-CM

## 2023-11-03 PROCEDURE — 20610 DRAIN/INJ JOINT/BURSA W/O US: CPT | Performed by: ORTHOPAEDIC SURGERY

## 2023-11-03 NOTE — PROGRESS NOTES
Assessment:   Diagnosis ICD-10-CM Associated Orders   1. Primary osteoarthritis of right knee  M17.11           Plan:  Durolane injection was provided for the right knee, which she tolerated well. Patient advised should they develop any increasing pain, redness, drainage, numbness, tingling or swelling surrounding the injection site, they are to contact our office or present to the emergency department. To do next visit:  Return in about 3 months (around 2/3/2024) for bilateral knees. The above stated was discussed in layman's terms and the patient expressed understanding. All questions were answered to the patient's satisfaction. Scribe Attestation      I,:  Ary Velez am acting as a scribe while in the presence of the attending physician.:       I,:  Stephen Gross MD personally performed the services described in this documentation    as scribed in my presence.:               Subjective:   Kerrie Urrutia is a 77 y.o. female who presents today for her Durolane injection for her right knee pain due to osteoarthritis. She has had cortisone injection that helps with for a short term. She reports that her pain is 5/10 with activities. She does have pain with prolonged standing on the medial aspect of the knee. Review of systems negative unless otherwise specified in HPI  Review of Systems   Constitutional:  Negative for chills, fever and unexpected weight change. HENT:  Negative for hearing loss, nosebleeds and sore throat. Eyes:  Negative for pain, redness and visual disturbance. Respiratory:  Negative for cough, shortness of breath and wheezing. Cardiovascular:  Negative for chest pain, palpitations and leg swelling. Gastrointestinal:  Negative for abdominal pain and nausea. Genitourinary:  Negative for dyspareunia, dysuria and frequency. Musculoskeletal:  Positive for arthralgias. Skin:  Negative for rash and wound.    Neurological:  Negative for dizziness, numbness and headaches. Psychiatric/Behavioral:  Negative for decreased concentration and suicidal ideas. The patient is not nervous/anxious. Past Medical History:   Diagnosis Date    Arthritis     Carpal tunnel syndrome     GERD (gastroesophageal reflux disease)     Hiatal hernia     Hypertension     Insomnia     Pneumonia     PONV (postoperative nausea and vomiting)     Renal calculi     Sleep deprivation     chronically    Vitamin D deficiency     Vitamin D toxicity 2011    with leaky gut syndrome       Past Surgical History:   Procedure Laterality Date    BUNIONECTOMY Bilateral     CARPAL TUNNEL RELEASE Right 2015    COLONOSCOPY      CYSTOSCOPY N/A 6/6/2019    Procedure: CYSTOSCOPY;  Surgeon: Ryan Concepcion MD;  [facility];  Service: Gynecology Oncology    ESOPHAGOGASTRODUODENOSCOPY N/A 5/5/2016    Procedure: ESOPHAGOGASTRODUODENOSCOPY (EGD); Surgeon: Areli Veloz MD;  [facility]; Service:     FRACTURE SURGERY      L arm     NERVE BLOCK       R Knee    OOPHORECTOMY Left     HI COLONOSCOPY FLX DX W/COLLJ SPEC WHEN PFRMD N/A 6/28/2016    Procedure: COLONOSCOPY;  Surgeon: Areli Veloz MD;  [facility]; Service: Gastroenterology    HI ESOPHAGOGASTRODUODENOSCOPY TRANSORAL DIAGNOSTIC N/A 10/19/2018    Procedure: ESOPHAGOGASTRODUODENOSCOPY (EGD); Surgeon: Areli Veloz MD;  [facility];   Service: Gastroenterology    HI LAPS TOTAL HYSTERECT 250 GM/< W/RMVL TUBE/OVARY N/A 6/6/2019    Procedure: ROBOTIC TOTAL LAPAROSCOPIC HYSTERECTOMY, RIGHT SALPINGO-OOPHORECTOMY, EXTENSIVE ADHESIOLYSIS, APPROXIMATELY 39 MIN;  Surgeon: Ryan Concepcion MD;  [facility];  Service: Gynecology Oncology    ROTATOR CUFF REPAIR Bilateral     TOE SURGERY Left     left 5th toe shaved down due to repeated fx    TOTAL KNEE ARTHROPLASTY Left        Family History   Problem Relation Age of Onset    Heart failure Mother     Other Mother         respiratory failure    Heart attack Mother     Heart failure Father         respiratory failure    Heart attack Father     Breast cancer Sister     No Known Problems Sister     Kidney failure Brother     Alcohol abuse Brother     No Known Problems Brother     Diabetes Brother     Hypertension Daughter     Arrhythmia Neg Hx     Clotting disorder Neg Hx     Fainting Neg Hx     Anuerysm Neg Hx     Stroke Neg Hx     Hyperlipidemia Neg Hx     Asthma Neg Hx        Social History     Occupational History    Not on file   Tobacco Use    Smoking status: Former     Packs/day: 1.00     Years: 13.00     Total pack years: 13.00     Types: Cigarettes     Start date:      Quit date:      Years since quittin.8    Smokeless tobacco: Never    Tobacco comments:     Quit > 30 years ago    Vaping Use    Vaping Use: Never used   Substance and Sexual Activity    Alcohol use: No    Drug use: No    Sexual activity: Not on file     Comment: no partner         Current Outpatient Medications:     albuterol (PROVENTIL HFA,VENTOLIN HFA) 90 mcg/act inhaler, Inhale 2 puffs every 6 (six) hours as needed for wheezing or shortness of breath, Disp: 6.7 g, Rfl: 2    amLODIPine (NORVASC) 2.5 mg tablet, Take 5 mg by mouth, Disp: , Rfl:     benzonatate (TESSALON PERLES) 100 mg capsule, Take 1 capsule (100 mg total) by mouth 3 (three) times a day as needed for cough, Disp: 20 capsule, Rfl: 0    cetirizine (ZyrTEC) 10 mg tablet, Take 10 mg by mouth daily, Disp: , Rfl:     cyclobenzaprine (FLEXERIL) 5 mg tablet, Take 0.5 tablets (2.5 mg total) by mouth daily at bedtime as needed for muscle spasms, Disp: 20 tablet, Rfl: 0    losartan-hydrochlorothiazide (HYZAAR) 100-25 MG per tablet, TAKE 1 TABLET BY MOUTH EVERY DAY, Disp: 90 tablet, Rfl: 1    Nattokinase 100 MG CAPS, Take by mouth in the morning, Disp: , Rfl:     Probiotic Product (MVW COMPLETE PROBIOTIC PO), Take by mouth, Disp: , Rfl:     traMADol (ULTRAM) 50 mg tablet, Take 50 mg by mouth every 8 (eight) hours as needed for severe pain, Disp: , Rfl:     zolpidem (AMBIEN) 10 mg tablet, Take 0.5 tablets (5 mg total) by mouth daily at bedtime as needed for sleep, Disp: 30 tablet, Rfl: 0    Acetaminophen 500 MG, TAKE 2 CAPSULES TWICE A DAY (Patient not taking: Reported on 3/24/2023), Disp: 120 capsule, Rfl: 3    atorvastatin (LIPITOR) 40 mg tablet, Take 1 tablet (40 mg total) by mouth every evening, Disp: 30 tablet, Rfl: 0    cholecalciferol (VITAMIN D) 400 units/1 mL, Take 2 mL (800 Units total) by mouth daily (Patient not taking: Reported on 10/10/2023), Disp: 180 mL, Rfl: 1    MELATONIN PO, Take by mouth (Patient not taking: Reported on 7/10/2023), Disp: , Rfl:     saccharomyces boulardii (FLORASTOR) 250 mg capsule, Take 250 mg by mouth 2 (two) times a day (Patient not taking: Reported on 10/10/2023), Disp: , Rfl:     saccharomyces boulardii (FLORASTOR) 250 mg capsule, Take 250 mg by mouth (Patient not taking: Reported on 10/10/2023), Disp: , Rfl:     Allergies   Allergen Reactions    Androgens Anaphylaxis    Cefazolin Hives, GI Intolerance and Swelling    Ciprofloxacin Hives    Other Other (See Comments)     STEROIDS- GI INTOLERANCE    Penicillins Hives and Rash    Prednisone Hives, GI Intolerance, Swelling and Vomiting    Vancomycin Hives, GI Intolerance, Diarrhea and Rash    Aspirin GI Intolerance and Hives     Even low dose     Cortisone Syncope    Adhesive [Medical Tape] Rash    Beef Allergy - Food Allergy Diarrhea    Gluten Meal - Food Allergy GI Intolerance    Lactose - Food Allergy Diarrhea    Oxycodone Rash and Vomiting    Pataday [Olopatadine Hcl] Eye Swelling            Vitals:    11/03/23 1038   BP: 159/86   Pulse: 74       Body mass index is 29.69 kg/m². Wt Readings from Last 3 Encounters:   11/03/23 66.7 kg (147 lb)   10/10/23 66.2 kg (146 lb)   09/29/23 66.7 kg (147 lb)       Objective:                    Right Knee Exam     Tenderness   The patient is experiencing tenderness in the medial joint line.     Range of Motion   Extension:  0 Flexion:  110     Tests   Varus: negative Valgus: negative    Other   Erythema: absent  Sensation: normal  Pulse: present  Swelling: none  Effusion: no effusion present    Comments:  Calf is soft and non tender            Diagnostics, reviewed and taken today if performed as documented:    None performed      The attending physician has personally reviewed the pertinent films in PACS and interpretation is as follows:      Procedures, if performed today:    Large joint arthrocentesis: R knee  Universal Protocol:  Consent: Verbal consent obtained. Risks and benefits: risks, benefits and alternatives were discussed  Consent given by: patient  Time out: Immediately prior to procedure a "time out" was called to verify the correct patient, procedure, equipment, support staff and site/side marked as required. Timeout called at: 11/3/2023 11:25 AM.  Patient understanding: patient states understanding of the procedure being performed  Site marked: the operative site was marked  Patient identity confirmed: verbally with patient  Supporting Documentation  Indications: pain   Procedure Details  Location: knee - R knee  Preparation: Patient was prepped and draped in the usual sterile fashion  Needle size: 22 G  Ultrasound guidance: no  Approach: anterolateral  Medications administered: 3 mL sodium hyaluronate 60 MG/3ML    Patient tolerance: patient tolerated the procedure well with no immediate complications  Dressing:  Sterile dressing applied          Portions of the record may have been created with voice recognition software. Occasional wrong word or "sound a like" substitutions may have occurred due to the inherent limitations of voice recognition software. Read the chart carefully and recognize, using context, where substitutions have occurred.

## 2023-11-20 ENCOUNTER — OFFICE VISIT (OUTPATIENT)
Dept: GASTROENTEROLOGY | Facility: AMBULARY SURGERY CENTER | Age: 78
End: 2023-11-20
Attending: INTERNAL MEDICINE
Payer: MEDICARE

## 2023-11-20 VITALS
HEART RATE: 94 BPM | HEIGHT: 59 IN | WEIGHT: 146.6 LBS | DIASTOLIC BLOOD PRESSURE: 88 MMHG | OXYGEN SATURATION: 97 % | BODY MASS INDEX: 29.56 KG/M2 | SYSTOLIC BLOOD PRESSURE: 138 MMHG

## 2023-11-20 DIAGNOSIS — Z86.010 HISTORY OF COLON POLYPS: Primary | ICD-10-CM

## 2023-11-20 DIAGNOSIS — Z12.11 SCREENING FOR COLON CANCER: ICD-10-CM

## 2023-11-20 PROCEDURE — 99204 OFFICE O/P NEW MOD 45 MIN: CPT | Performed by: PHYSICIAN ASSISTANT

## 2023-11-20 NOTE — PROGRESS NOTES
[facility] - Outpatient Consultation  Kerrie Urrutia 77 y.o. female MRN: [id number]  Encounter: [id number]          ASSESSMENT AND PLAN:      1. History of colon polyps  Patient's last colonoscopy was in 2016, saw the removal of an adenomatous polyp, rule out any new adenomatous or malignancy    -As patient does not appear to have any substantial cardiopulmonary risk factors for colonoscopy, will schedule patient for colonoscopy at this time    -Advised patient that depending on findings on her upcoming exam, it is likely we will not recommend further colonoscopies for screening/surveillance purposes due to her age    -I advised patient regarding risks and benefits of the procedure, risks including but not limited to infection, perforation and bleeding    -Prescription and instructions provided for colonoscopy prep    ______________________________________________________________________    HPI: 67-year-old female with history of hypertension who presents for evaluation, she was last seen by our service over 4 years ago in 2019 for follow-up of GERD with atypical chest pain and chronic cough. Her last EGD in October 2018 only showed grade a esophagitis and some mild gastritis. Her last colonoscopy had been done in 2016, noting the removal of 3 polyps (1 tubular adenoma, 2 hyperplastic), for which she was recommended follow-up colonoscopy in 5 years. At this time, the patient tells me that her bowel habits have generally been regular, she normally moves her bowels after every meal and does not have substantial urgency. She says she had historically had some irregularities with her bowel habits however had been gluten-free for the last 8 to 9 years and has noticed improvement with this strategy, although does not recall being diagnosed with celiac disease specifically at any point.   She says that she has historically had acid reflux as well, last EGD was done in 2018 again showing no evidence of Abbasi's, but she says that she has been able to manage acid reflux without medications quite well, she avoids eating within several hours of bedtime and has minimized her caffeine intake and does not have noticeable acid reflux or heartburn most days. She denies any dysphagia or any loss of appetite/unintentional weight loss. REVIEW OF SYSTEMS:    CONSTITUTIONAL: Denies any fever, chills, rigors, and weight loss. HEENT: No earache or tinnitus. Denies hearing loss or visual disturbances. CARDIOVASCULAR: No chest pain or palpitations. RESPIRATORY: Denies any cough, hemoptysis, shortness of breath or dyspnea on exertion. GASTROINTESTINAL: As noted in the History of Present Illness. GENITOURINARY: No problems with urination. Denies any hematuria or dysuria. NEUROLOGIC: No dizziness or vertigo, denies headaches. MUSCULOSKELETAL: Denies any muscle or joint pain. SKIN: Denies skin rashes or itching. ENDOCRINE: Denies excessive thirst. Denies intolerance to heat or cold. PSYCHOSOCIAL: Denies depression or anxiety. Denies any recent memory loss. Historical Information   Past Medical History:   Diagnosis Date    Arthritis     Carpal tunnel syndrome     GERD (gastroesophageal reflux disease)     Hiatal hernia     Hypertension     Insomnia     Pneumonia     PONV (postoperative nausea and vomiting)     Renal calculi     Sleep deprivation     chronically    Vitamin D deficiency     Vitamin D toxicity 2011    with leaky gut syndrome     Past Surgical History:   Procedure Laterality Date    BUNIONECTOMY Bilateral     CARPAL TUNNEL RELEASE Right 2015    COLONOSCOPY      CYSTOSCOPY N/A 6/6/2019    Procedure: CYSTOSCOPY;  Surgeon: Israel Ratliff MD;  [facility];  Service: Gynecology Oncology    ESOPHAGOGASTRODUODENOSCOPY N/A 5/5/2016    Procedure: ESOPHAGOGASTRODUODENOSCOPY (EGD); Surgeon: Gerard Prado MD;  [facility];   Service:     FRACTURE SURGERY      L arm NERVE BLOCK       R Knee    OOPHORECTOMY Left     NV COLONOSCOPY FLX DX W/COLLJ SPEC WHEN PFRMD N/A 2016    Procedure: COLONOSCOPY;  Surgeon: Luis De La Rosa MD;  [facility]; Service: Gastroenterology    NV ESOPHAGOGASTRODUODENOSCOPY TRANSORAL DIAGNOSTIC N/A 10/19/2018    Procedure: ESOPHAGOGASTRODUODENOSCOPY (EGD); Surgeon: Luis De La Rosa MD;  [facility];   Service: Gastroenterology    NV LAPS TOTAL HYSTERECT 250 GM/< W/RMVL TUBE/OVARY N/A 2019    Procedure: ROBOTIC TOTAL LAPAROSCOPIC HYSTERECTOMY, RIGHT SALPINGO-OOPHORECTOMY, EXTENSIVE ADHESIOLYSIS, APPROXIMATELY 39 MIN;  Surgeon: Braxton Landaverde MD;  [facility];  Service: Gynecology Oncology    ROTATOR CUFF REPAIR Bilateral     TOE SURGERY Left     left 5th toe shaved down due to repeated fx    TOTAL KNEE ARTHROPLASTY Left      Social History   Social History     Substance and Sexual Activity   Alcohol Use No     Social History     Substance and Sexual Activity   Drug Use No     Social History     Tobacco Use   Smoking Status Former    Packs/day: 1.00    Years: 13.00    Total pack years: 13.00    Types: Cigarettes    Start date:     Quit date:     Years since quittin.9   Smokeless Tobacco Never   Tobacco Comments    Quit > 30 years ago      Family History   Problem Relation Age of Onset    Heart failure Mother     Other Mother         respiratory failure    Heart attack Mother     Heart failure Father         respiratory failure    Heart attack Father     Breast cancer Sister     No Known Problems Sister     Kidney failure Brother     Alcohol abuse Brother     No Known Problems Brother     Diabetes Brother     Hypertension Daughter     Arrhythmia Neg Hx     Clotting disorder Neg Hx     Fainting Neg Hx     Anuerysm Neg Hx     Stroke Neg Hx     Hyperlipidemia Neg Hx     Asthma Neg Hx        Meds/Allergies       Current Outpatient Medications:     Acetaminophen 500 MG    albuterol (PROVENTIL HFA,VENTOLIN HFA) 90 mcg/act inhaler    amLODIPine (NORVASC) 2.5 mg tablet    atorvastatin (LIPITOR) 40 mg tablet    benzonatate (TESSALON PERLES) 100 mg capsule    cetirizine (ZyrTEC) 10 mg tablet    cholecalciferol (VITAMIN D) 400 units/1 mL    cyclobenzaprine (FLEXERIL) 5 mg tablet    losartan-hydrochlorothiazide (HYZAAR) 100-25 MG per tablet    MELATONIN PO    Nattokinase 100 MG CAPS    Probiotic Product (MVW COMPLETE PROBIOTIC PO)    saccharomyces boulardii (FLORASTOR) 250 mg capsule    saccharomyces boulardii (FLORASTOR) 250 mg capsule    traMADol (ULTRAM) 50 mg tablet    zolpidem (AMBIEN) 10 mg tablet    Allergies   Allergen Reactions    Androgens Anaphylaxis    Cefazolin Hives, GI Intolerance and Swelling    Ciprofloxacin Hives    Other Other (See Comments)     STEROIDS- GI INTOLERANCE    Penicillins Hives and Rash    Prednisone Hives, GI Intolerance, Swelling and Vomiting    Vancomycin Hives, GI Intolerance, Diarrhea and Rash    Aspirin GI Intolerance and Hives     Even low dose     Cortisone Syncope    Adhesive [Medical Tape] Rash    Beef Allergy - Food Allergy Diarrhea    Gluten Meal - Food Allergy GI Intolerance    Lactose - Food Allergy Diarrhea    Oxycodone Rash and Vomiting    Pataday [Olopatadine Hcl] Eye Swelling           Objective     There were no vitals taken for this visit. There is no height or weight on file to calculate BMI. PHYSICAL EXAM:      General Appearance:   Alert, cooperative, no distress   HEENT:   Normocephalic, atraumatic, anicteric. Neck:  Supple, symmetrical, trachea midline   Lungs:   Clear to auscultation bilaterally; no rales, rhonchi or wheezing; respirations unlabored    Heart[de-identified]   Regular rate and rhythm; no murmur, rub, or gallop.    Abdomen:   Soft, non-tender, non-distended; normal bowel sounds; no masses, no organomegaly    Genitalia:   Deferred    Rectal:   Deferred    Extremities:  No cyanosis, clubbing or edema    Pulses:  2+ and symmetric    Skin:  No jaundice, rashes, or lesions    Lymph nodes:  No palpable cervical lymphadenopathy        Lab Results:   No visits with results within 1 Day(s) from this visit. Latest known visit with results is:   Appointment on 10/19/2023   Component Date Value    Cholesterol 10/19/2023 196     Triglycerides 10/19/2023 175 (H)     HDL, Direct 10/19/2023 53     LDL Calculated 10/19/2023 108 (H)     Sodium 10/19/2023 140     Potassium 10/19/2023 4.2     Chloride 10/19/2023 104     CO2 10/19/2023 26     ANION GAP 10/19/2023 10     BUN 10/19/2023 17     Creatinine 10/19/2023 0.71     Glucose, Fasting 10/19/2023 84     Calcium 10/19/2023 10.4 (H)     AST 10/19/2023 21     ALT 10/19/2023 20     Alkaline Phosphatase 10/19/2023 72     Total Protein 10/19/2023 7.2     Albumin 10/19/2023 4.5     Total Bilirubin 10/19/2023 0.86     eGFR 10/19/2023 81          Radiology Results:   No results found.

## 2023-11-20 NOTE — PATIENT INSTRUCTIONS
Scheduled date of colonoscopy (as of today):1/24/24  Physician performing colonoscopy: Dr. Ely Bergman  Location of colonoscopy: [facility]  Bowel prep reviewed with patient: Miralax/Dulcolax  Instructions reviewed with patient by:kelsi werner  Clearances: n/a

## 2023-12-20 ENCOUNTER — OFFICE VISIT (OUTPATIENT)
Dept: URGENT CARE | Facility: MEDICAL CENTER | Age: 78
End: 2023-12-20
Payer: MEDICARE

## 2023-12-20 VITALS
WEIGHT: 143.6 LBS | HEIGHT: 58 IN | RESPIRATION RATE: 18 BRPM | TEMPERATURE: 98.7 F | SYSTOLIC BLOOD PRESSURE: 131 MMHG | DIASTOLIC BLOOD PRESSURE: 62 MMHG | BODY MASS INDEX: 30.14 KG/M2 | HEART RATE: 87 BPM | OXYGEN SATURATION: 96 %

## 2023-12-20 DIAGNOSIS — H66.91 RIGHT OTITIS MEDIA, UNSPECIFIED OTITIS MEDIA TYPE: ICD-10-CM

## 2023-12-20 DIAGNOSIS — R68.89 FLU-LIKE SYMPTOMS: Primary | ICD-10-CM

## 2023-12-20 PROCEDURE — 87636 SARSCOV2 & INF A&B AMP PRB: CPT | Performed by: PHYSICIAN ASSISTANT

## 2023-12-20 PROCEDURE — G0463 HOSPITAL OUTPT CLINIC VISIT: HCPCS | Performed by: PHYSICIAN ASSISTANT

## 2023-12-20 PROCEDURE — 99213 OFFICE O/P EST LOW 20 MIN: CPT | Performed by: PHYSICIAN ASSISTANT

## 2023-12-20 RX ORDER — OSELTAMIVIR PHOSPHATE 75 MG/1
75 CAPSULE ORAL 2 TIMES DAILY
Qty: 10 CAPSULE | Refills: 0 | Status: SHIPPED | OUTPATIENT
Start: 2023-12-20 | End: 2023-12-25

## 2023-12-20 RX ORDER — AZITHROMYCIN 250 MG/1
TABLET, FILM COATED ORAL
Qty: 6 TABLET | Refills: 0 | Status: SHIPPED | OUTPATIENT
Start: 2023-12-20 | End: 2023-12-24

## 2023-12-20 NOTE — PATIENT INSTRUCTIONS
Flulike symptoms  Tamiflu as directed  Otitis media  Zithromax as directed  Follow up with PCP in 3-5 days.  Proceed to  ER if symptoms worsen.

## 2023-12-20 NOTE — PROGRESS NOTES
[facility]        NAME: Erin Suazo is a 78 y.o. female  : 1945    MRN: [id number]  DATE: 2023  TIME: 10:25 AM    Assessment and Plan   Right otitis media, unspecified otitis media type [H66.91]  1. Right otitis media, unspecified otitis media type  azithromycin (ZITHROMAX) 250 mg tablet      2. Flu-like symptoms  oseltamivir (TAMIFLU) 75 mg capsule            Patient Instructions     Flulike symptoms  Tamiflu as directed  Otitis media  Zithromax as directed  Follow up with PCP in 3-5 days.  Proceed to  ER if symptoms worsen.    Chief Complaint     Chief Complaint   Patient presents with    Cold Like Symptoms     Started Monday with cough, coughing up dark color mucus, headache, nausea, ear pain, chest congestion and fatigue.  Has been taking tylenol, DM cough meds, hot shower for steam.  Did at home covid test and negative.         History of Present Illness       78-year-old female who presents complaining of chills, body aches, congestion, right ear pain.  Patient states that she did COVID-19 test at home which was negative.  Denies chest pain, shortness of breath.  Has been taking over-the-counter medications with temporary relief.        Review of Systems   Review of Systems   Constitutional:  Positive for fever. Negative for activity change, appetite change, chills, diaphoresis and fatigue.   HENT:  Positive for congestion, ear pain and rhinorrhea. Negative for ear discharge, facial swelling, hearing loss, mouth sores, nosebleeds, postnasal drip, sinus pressure, sinus pain, sneezing, sore throat and voice change.    Respiratory:  Positive for cough. Negative for apnea, choking, chest tightness, shortness of breath, wheezing and stridor.    Cardiovascular: Negative.          Current Medications       Current Outpatient Medications:     Acetaminophen 500 MG, TAKE 2 CAPSULES TWICE A DAY, Disp: 120 capsule, Rfl: 3    albuterol (PROVENTIL HFA,VENTOLIN HFA) 90 mcg/act inhaler, Inhale  2 puffs every 6 (six) hours as needed for wheezing or shortness of breath, Disp: 6.7 g, Rfl: 2    azithromycin (ZITHROMAX) 250 mg tablet, Take 2 tablets today then 1 tablet daily x 4 days, Disp: 6 tablet, Rfl: 0    cetirizine (ZyrTEC) 10 mg tablet, Take 10 mg by mouth daily, Disp: , Rfl:     losartan-hydrochlorothiazide (HYZAAR) 100-25 MG per tablet, TAKE 1 TABLET BY MOUTH EVERY DAY, Disp: 90 tablet, Rfl: 1    MELATONIN PO, Take by mouth, Disp: , Rfl:     Nattokinase 100 MG CAPS, Take by mouth in the morning, Disp: , Rfl:     oseltamivir (TAMIFLU) 75 mg capsule, Take 1 capsule (75 mg total) by mouth 2 (two) times a day for 5 days, Disp: 10 capsule, Rfl: 0    Probiotic Product (MVW COMPLETE PROBIOTIC PO), Take by mouth, Disp: , Rfl:     saccharomyces boulardii (FLORASTOR) 250 mg capsule, Take 250 mg by mouth 2 (two) times a day, Disp: , Rfl:     zolpidem (AMBIEN) 10 mg tablet, Take 0.5 tablets (5 mg total) by mouth daily at bedtime as needed for sleep, Disp: 30 tablet, Rfl: 0    amLODIPine (NORVASC) 2.5 mg tablet, Take 5 mg by mouth (Patient not taking: Reported on 11/20/2023), Disp: , Rfl:     atorvastatin (LIPITOR) 40 mg tablet, Take 1 tablet (40 mg total) by mouth every evening (Patient not taking: Reported on 11/20/2023), Disp: 30 tablet, Rfl: 0    benzonatate (TESSALON PERLES) 100 mg capsule, Take 1 capsule (100 mg total) by mouth 3 (three) times a day as needed for cough (Patient not taking: Reported on 11/20/2023), Disp: 20 capsule, Rfl: 0    cholecalciferol (VITAMIN D) 400 units/1 mL, Take 2 mL (800 Units total) by mouth daily (Patient not taking: Reported on 10/10/2023), Disp: 180 mL, Rfl: 1    cyclobenzaprine (FLEXERIL) 5 mg tablet, Take 0.5 tablets (2.5 mg total) by mouth daily at bedtime as needed for muscle spasms (Patient not taking: Reported on 11/20/2023), Disp: 20 tablet, Rfl: 0    saccharomyces boulardii (FLORASTOR) 250 mg capsule, Take 250 mg by mouth (Patient not taking: Reported on 12/20/2023),  Disp: , Rfl:     traMADol (ULTRAM) 50 mg tablet, Take 50 mg by mouth every 8 (eight) hours as needed for severe pain (Patient not taking: Reported on 11/20/2023), Disp: , Rfl:     Current Allergies     Allergies as of 12/20/2023 - Reviewed 12/20/2023   Allergen Reaction Noted    Androgens Anaphylaxis 11/11/2021    Cefazolin Hives, GI Intolerance, and Swelling 05/03/2016    Ciprofloxacin Hives 05/03/2016    Other Other (See Comments) 07/02/2017    Penicillins Hives and Rash 05/03/2016    Prednisone Hives, GI Intolerance, Swelling, and Vomiting 05/03/2016    Vancomycin Hives, GI Intolerance, Diarrhea, and Rash 05/03/2016    Aspirin GI Intolerance and Hives 05/03/2016    Cortisone Syncope 03/25/2019    Adhesive [medical tape] Rash 08/07/2019    Beef allergy - food allergy Diarrhea 03/23/2021    Gluten meal - food allergy GI Intolerance 03/23/2021    Lactose - food allergy Diarrhea 03/23/2021    Oxycodone Rash and Vomiting 03/25/2019    Pataday [olopatadine hcl] Eye Swelling 12/23/2022            The following portions of the patient's history were reviewed and updated as appropriate: allergies, current medications, past family history, past medical history, past social history, past surgical history and problem list.     Past Medical History:   Diagnosis Date    Arthritis     Carpal tunnel syndrome     GERD (gastroesophageal reflux disease)     Hiatal hernia     Hypertension     Insomnia     Pneumonia     PONV (postoperative nausea and vomiting)     Renal calculi     Sleep deprivation     chronically    Vitamin D deficiency     Vitamin D toxicity 2011    with leaky gut syndrome       Past Surgical History:   Procedure Laterality Date    BUNIONECTOMY Bilateral     CARPAL TUNNEL RELEASE Right 2015    COLONOSCOPY      CYSTOSCOPY N/A 6/6/2019    Procedure: CYSTOSCOPY;  Surgeon: Brodie Romero MD;  [facility]: [facility];  Service: Gynecology Oncology    ESOPHAGOGASTRODUODENOSCOPY N/A 5/5/2016    Procedure:  "ESOPHAGOGASTRODUODENOSCOPY (EGD);  Surgeon: Enmanuel Balbuena MD;  Location: [facility];  Service:     FRACTURE SURGERY      L arm     NERVE BLOCK       R Knee    OOPHORECTOMY Left     WY COLONOSCOPY FLX DX W/COLLJ SPEC WHEN PFRMD N/A 6/28/2016    Procedure: COLONOSCOPY;  Surgeon: Enmanuel Balbuena MD;  Location: [facility];  Service: Gastroenterology    WY ESOPHAGOGASTRODUODENOSCOPY TRANSORAL DIAGNOSTIC N/A 10/19/2018    Procedure: ESOPHAGOGASTRODUODENOSCOPY (EGD);  Surgeon: Enmanuel Balbuena MD;  Location: [facility];  Service: Gastroenterology    WY LAPS TOTAL HYSTERECT 250 GM/< W/RMVL TUBE/OVARY N/A 6/6/2019    Procedure: ROBOTIC TOTAL LAPAROSCOPIC HYSTERECTOMY, RIGHT SALPINGO-OOPHORECTOMY, EXTENSIVE ADHESIOLYSIS, APPROXIMATELY 45 MIN;  Surgeon: Brodie Romero MD;  Location: [facility];  Service: Gynecology Oncology    ROTATOR CUFF REPAIR Bilateral     TOE SURGERY Left     left 5th toe shaved down due to repeated fx    TOTAL KNEE ARTHROPLASTY Left        Family History   Problem Relation Age of Onset    Heart failure Mother     Other Mother         respiratory failure    Heart attack Mother     Heart failure Father         respiratory failure    Heart attack Father     Breast cancer Sister     No Known Problems Sister     Kidney failure Brother     Alcohol abuse Brother     No Known Problems Brother     Diabetes Brother     Hypertension Daughter     Arrhythmia Neg Hx     Clotting disorder Neg Hx     Fainting Neg Hx     Anuerysm Neg Hx     Stroke Neg Hx     Hyperlipidemia Neg Hx     Asthma Neg Hx          Medications have been verified.        Objective   /62   Pulse 87   Temp 98.7 °F (37.1 °C) (Temporal)   Resp 18   Ht 4' 9.5\" (1.461 m)   Wt 65.1 kg (143 lb 9.6 oz)   SpO2 96%   BMI 30.54 kg/m²        Physical Exam     Physical Exam  Constitutional:       General: She is not in acute distress.     Appearance: Normal appearance. She is well-developed. She is not diaphoretic.   HENT:      Head: Normocephalic and " atraumatic.      Right Ear: Hearing, tympanic membrane, ear canal and external ear normal.      Left Ear: Hearing, tympanic membrane, ear canal and external ear normal.      Nose: Rhinorrhea present.      Mouth/Throat:      Pharynx: Uvula midline.   Cardiovascular:      Rate and Rhythm: Normal rate and regular rhythm.      Heart sounds: Normal heart sounds.   Pulmonary:      Effort: Pulmonary effort is normal. No respiratory distress.      Breath sounds: Normal breath sounds. No stridor. No wheezing, rhonchi or rales.   Chest:      Chest wall: No tenderness.   Musculoskeletal:      Cervical back: Normal range of motion and neck supple.   Lymphadenopathy:      Cervical: Cervical adenopathy present.   Neurological:      Mental Status: She is alert.

## 2023-12-21 LAB
FLUAV RNA RESP QL NAA+PROBE: POSITIVE
FLUBV RNA RESP QL NAA+PROBE: NEGATIVE
SARS-COV-2 RNA RESP QL NAA+PROBE: NEGATIVE

## 2023-12-22 ENCOUNTER — NURSE TRIAGE (OUTPATIENT)
Dept: OTHER | Facility: OTHER | Age: 78
End: 2023-12-22

## 2023-12-22 ENCOUNTER — TELEPHONE (OUTPATIENT)
Age: 78
End: 2023-12-22

## 2023-12-22 NOTE — TELEPHONE ENCOUNTER
Patient was advised to continue taking her newly prescribed medication for flu and ear infection, to take a Benadryl in a form she can take if needed.

## 2023-12-22 NOTE — TELEPHONE ENCOUNTER
Patient called in requesting to know her Lab Results for the Covid/Flu test that was taken at urgent care. Warm transferred to [facility].

## 2023-12-22 NOTE — TELEPHONE ENCOUNTER
"Reason for Disposition  • Caller has medicine question, adult has minor symptoms, caller declines triage, and triager answers question    Answer Assessment - Initial Assessment Questions  1. NAME of MEDICATION: \"What medicine are you calling about?\"      Benadryl liquid form   2. QUESTION: \"What is your question?\" (e.g., medication refill, side effect)      Patient stated that she uses Benadryl for cough. Pharmacy doesn't have a liquid form. Patient stated that in a past doctor recommended a liquid form.   3. PRESCRIBING HCP: \"Who prescribed it?\" Reason: if prescribed by specialist, call should be referred to that group.      PCP   4. SYMPTOMS: \"Do you have any symptoms?\"      Cough    Protocols used: Medication Question Call-ADULT-[place]    "

## 2023-12-23 ENCOUNTER — TELEPHONE (OUTPATIENT)
Age: 78
End: 2023-12-23

## 2023-12-23 NOTE — TELEPHONE ENCOUNTER
Called and left voicemail requesting the patient call the office to discuss lab results.  Patient tested positive for influenza A

## 2023-12-26 ENCOUNTER — TELEPHONE (OUTPATIENT)
Age: 78
End: 2023-12-26

## 2023-12-26 ENCOUNTER — RA CDI HCC (OUTPATIENT)
Dept: OTHER | Facility: HOSPITAL | Age: 78
End: 2023-12-26

## 2023-12-27 ENCOUNTER — OFFICE VISIT (OUTPATIENT)
Dept: FAMILY MEDICINE CLINIC | Facility: CLINIC | Age: 78
End: 2023-12-27
Payer: MEDICARE

## 2023-12-27 ENCOUNTER — APPOINTMENT (OUTPATIENT)
Dept: RADIOLOGY | Facility: MEDICAL CENTER | Age: 78
End: 2023-12-27
Payer: MEDICARE

## 2023-12-27 VITALS
OXYGEN SATURATION: 98 % | TEMPERATURE: 98 F | SYSTOLIC BLOOD PRESSURE: 130 MMHG | WEIGHT: 143.6 LBS | HEIGHT: 58 IN | HEART RATE: 76 BPM | BODY MASS INDEX: 30.14 KG/M2 | DIASTOLIC BLOOD PRESSURE: 74 MMHG

## 2023-12-27 DIAGNOSIS — R05.1 ACUTE COUGH: ICD-10-CM

## 2023-12-27 DIAGNOSIS — J10.1 INFLUENZA A: Primary | ICD-10-CM

## 2023-12-27 DIAGNOSIS — R06.2 WHEEZING: ICD-10-CM

## 2023-12-27 DIAGNOSIS — J10.1 INFLUENZA A: ICD-10-CM

## 2023-12-27 PROCEDURE — 99214 OFFICE O/P EST MOD 30 MIN: CPT | Performed by: FAMILY MEDICINE

## 2023-12-27 PROCEDURE — 71046 X-RAY EXAM CHEST 2 VIEWS: CPT

## 2023-12-27 RX ORDER — BENZONATATE 100 MG/1
100 CAPSULE ORAL 3 TIMES DAILY PRN
Qty: 45 CAPSULE | Refills: 0 | Status: SHIPPED | OUTPATIENT
Start: 2023-12-27

## 2023-12-27 RX ORDER — IPRATROPIUM BROMIDE 17 UG/1
2 AEROSOL, METERED RESPIRATORY (INHALATION) EVERY 6 HOURS
Qty: 12.9 G | Refills: 0 | Status: SHIPPED | OUTPATIENT
Start: 2023-12-27

## 2023-12-27 NOTE — PROGRESS NOTES
"Outpatient Progress Note    Assessment/Plan:    Problem List Items Addressed This Visit    None  Visit Diagnoses       Influenza A    -  Primary    Relevant Medications    ipratropium (Atrovent HFA) 17 mcg/act inhaler    Other Relevant Orders    XR chest pa & lateral    Acute cough        Relevant Medications    benzonatate (TESSALON PERLES) 100 mg capsule    Wheezing        Relevant Medications    ipratropium (Atrovent HFA) 17 mcg/act inhaler           Treatment option limited due to patient's allergy to steroids  Informed patient regarding her reaction to steroid, patient reports \"this would lead me in the hospital\"  Will treat conservatively, Keily Arthursilverio for cough suppression, encouraged patient to use albuterol inhaler every 6 hours for the next 5 days  We can try ipratropium inhaler for wheezing    Obtain CXR for evaluate for pneumonia   Explained to patient that her cough may last up to 3 weeks    Disposition:     I have spent a total time of 10 minutes on the day of the encounter for this patient including       Encounter provider: Cholo Field MD     Provider located at: [place] [address] [address]  [facility] [address] [phone]     Recent Visits  Date Type Provider Dept   12/26/23 Telephone Cholo Field MD [facility]   Showing recent visits within past 7 days and meeting all other requirements  Today's Visits  Date Type Provider Dept   12/27/23 Office Visit Cholo Field MD [facility]   Showing today's visits and meeting all other requirements  Future Appointments  No visits were found meeting these conditions.  Showing future appointments within next 150 days and meeting all other requirements     Subjective:   Erin Suazo is a 78 y.o. female who is concerned about COVID-19. Patient's symptoms include fatigue, malaise, nasal congestion, rhinorrhea, sore throat, cough, shortness of breath, chest tightness, nausea, " "diarrhea, myalgias and headache. Patient denies fever, chills, anosmia, loss of taste, abdominal pain and vomiting.     - Date of symptom onset: 12/19/2023      COVID-19 vaccination status: Fully vaccinated with booster    Exposure:   Contact with a person who is under investigation (PUI) for or who is positive for COVID-19 within the last 14 days?: No    Hospitalized recently for fever and/or lower respiratory symptoms?: No      Currently a healthcare worker that is involved in direct patient care?: No      Works in a special setting where the risk of COVID-19 transmission may be high? (this may include long-term care, correctional and penitentiary facilities; homeless shelters; assisted-living facilities and group homes.): No      Resident in a special setting where the risk of COVID-19 transmission may be high? (this may include long-term care, correctional and penitentiary facilities; homeless shelters; assisted-living facilities and group homes.): No      Positive for influenza A  She was seen in urgent care on 12/20/2023, was given Tamiflu and Z-Cecilio  Patient's son denies any symptoms    Pt does have a history of \"allergic\" reaction to steroids.   Pt s/p zpak      Lab Results   Component Value Date    SARSCOV2 Negative 12/20/2023    SARSCOV2 Not Detected 07/22/2020       Review of Systems   Constitutional:  Positive for fatigue. Negative for chills and fever.   HENT:  Positive for congestion, rhinorrhea and sore throat.    Respiratory:  Positive for cough, chest tightness and shortness of breath.    Gastrointestinal:  Positive for diarrhea and nausea. Negative for abdominal pain and vomiting.   Musculoskeletal:  Positive for myalgias.   Neurological:  Positive for headaches.     Current Outpatient Medications on File Prior to Visit   Medication Sig    Acetaminophen 500 MG TAKE 2 CAPSULES TWICE A DAY    albuterol (PROVENTIL HFA,VENTOLIN HFA) 90 mcg/act inhaler Inhale 2 puffs every 6 (six) hours as needed for wheezing " "or shortness of breath    cetirizine (ZyrTEC) 10 mg tablet Take 10 mg by mouth daily    losartan-hydrochlorothiazide (HYZAAR) 100-25 MG per tablet TAKE 1 TABLET BY MOUTH EVERY DAY    MELATONIN PO Take by mouth    Nattokinase 100 MG CAPS Take by mouth in the morning    Probiotic Product (MVW COMPLETE PROBIOTIC PO) Take by mouth    saccharomyces boulardii (FLORASTOR) 250 mg capsule Take 250 mg by mouth 2 (two) times a day    zolpidem (AMBIEN) 10 mg tablet Take 0.5 tablets (5 mg total) by mouth daily at bedtime as needed for sleep    amLODIPine (NORVASC) 2.5 mg tablet Take 5 mg by mouth (Patient not taking: Reported on 11/20/2023)    atorvastatin (LIPITOR) 40 mg tablet Take 1 tablet (40 mg total) by mouth every evening (Patient not taking: Reported on 11/20/2023)    cholecalciferol (VITAMIN D) 400 units/1 mL Take 2 mL (800 Units total) by mouth daily (Patient not taking: Reported on 10/10/2023)    cyclobenzaprine (FLEXERIL) 5 mg tablet Take 0.5 tablets (2.5 mg total) by mouth daily at bedtime as needed for muscle spasms (Patient not taking: Reported on 11/20/2023)    saccharomyces boulardii (FLORASTOR) 250 mg capsule Take 250 mg by mouth (Patient not taking: Reported on 12/20/2023)    traMADol (ULTRAM) 50 mg tablet Take 50 mg by mouth every 8 (eight) hours as needed for severe pain (Patient not taking: Reported on 11/20/2023)    [DISCONTINUED] benzonatate (TESSALON PERLES) 100 mg capsule Take 1 capsule (100 mg total) by mouth 3 (three) times a day as needed for cough (Patient not taking: Reported on 11/20/2023)     Objective:    /74 (BP Location: Right arm, Patient Position: Sitting, Cuff Size: Standard)   Pulse 76   Temp 98 °F (36.7 °C) (Temporal)   Ht 4' 9.5\" (1.461 m)   Wt 65.1 kg (143 lb 9.6 oz)   SpO2 98%   BMI 30.54 kg/m²      Physical Exam  Vitals reviewed.   Constitutional:       General: She is not in acute distress.     Appearance: Normal appearance. She is not ill-appearing, toxic-appearing or " diaphoretic.   Cardiovascular:      Rate and Rhythm: Normal rate and regular rhythm.      Pulses: Normal pulses.      Heart sounds: Normal heart sounds. No murmur heard.  Pulmonary:      Effort: Pulmonary effort is normal. No respiratory distress.      Breath sounds: No stridor. Wheezing present. No rhonchi.   Abdominal:      General: Abdomen is flat. Bowel sounds are normal. There is no distension.      Palpations: Abdomen is soft. There is no mass.      Tenderness: There is no abdominal tenderness.      Hernia: No hernia is present.   Musculoskeletal:         General: No swelling, tenderness, deformity or signs of injury.   Skin:     General: Skin is warm and dry.      Capillary Refill: Capillary refill takes less than 2 seconds.   Neurological:      General: No focal deficit present.      Mental Status: She is alert and oriented to person, place, and time.   Psychiatric:         Mood and Affect: Mood normal.             Cholo Field MD

## 2023-12-30 ENCOUNTER — APPOINTMENT (EMERGENCY)
Dept: CT IMAGING | Facility: HOSPITAL | Age: 78
End: 2023-12-30
Payer: MEDICARE

## 2023-12-30 ENCOUNTER — APPOINTMENT (EMERGENCY)
Dept: RADIOLOGY | Facility: HOSPITAL | Age: 78
End: 2023-12-30
Payer: MEDICARE

## 2023-12-30 ENCOUNTER — HOSPITAL ENCOUNTER (EMERGENCY)
Facility: HOSPITAL | Age: 78
Discharge: HOME | End: 2023-12-30
Attending: EMERGENCY MEDICINE | Admitting: EMERGENCY MEDICINE
Payer: MEDICARE

## 2023-12-30 VITALS
TEMPERATURE: 98.3 F | OXYGEN SATURATION: 97 % | SYSTOLIC BLOOD PRESSURE: 136 MMHG | RESPIRATION RATE: 18 BRPM | HEART RATE: 78 BPM | DIASTOLIC BLOOD PRESSURE: 65 MMHG

## 2023-12-30 DIAGNOSIS — J18.9 PNEUMONIA: Primary | ICD-10-CM

## 2023-12-30 LAB
ALBUMIN SERPL BCP-MCNC: 4.3 G/DL (ref 3.5–5)
ALP SERPL-CCNC: 87 U/L (ref 34–104)
ALT SERPL W P-5'-P-CCNC: 18 U/L (ref 7–52)
ANION GAP SERPL CALCULATED.3IONS-SCNC: 9 MMOL/L
AST SERPL W P-5'-P-CCNC: 18 U/L (ref 13–39)
BASOPHILS # BLD AUTO: 0.03 THOUSANDS/ÂΜL (ref 0–0.1)
BASOPHILS NFR BLD AUTO: 0 % (ref 0–1)
BILIRUB SERPL-MCNC: 0.59 MG/DL (ref 0.2–1)
BILIRUB UR QL STRIP: NEGATIVE
BUN SERPL-MCNC: 23 MG/DL (ref 5–25)
CALCIUM SERPL-MCNC: 9.1 MG/DL (ref 8.4–10.2)
CARDIAC TROPONIN I PNL SERPL HS: 3 NG/L
CHLORIDE SERPL-SCNC: 105 MMOL/L (ref 96–108)
CLARITY UR: CLEAR
CO2 SERPL-SCNC: 23 MMOL/L (ref 21–32)
COLOR UR: COLORLESS
CREAT SERPL-MCNC: 0.73 MG/DL (ref 0.6–1.3)
EOSINOPHIL # BLD AUTO: 0.14 THOUSAND/ÂΜL (ref 0–0.61)
EOSINOPHIL NFR BLD AUTO: 2 % (ref 0–6)
ERYTHROCYTE [DISTWIDTH] IN BLOOD BY AUTOMATED COUNT: 13.1 % (ref 11.6–15.1)
GFR SERPL CREATININE-BSD FRML MDRD: 78 ML/MIN/1.73SQ M
GLUCOSE SERPL-MCNC: 86 MG/DL (ref 65–140)
GLUCOSE UR STRIP-MCNC: NEGATIVE MG/DL
HCT VFR BLD AUTO: 37.5 % (ref 34.8–46.1)
HGB BLD-MCNC: 12.5 G/DL (ref 11.5–15.4)
HGB UR QL STRIP.AUTO: NEGATIVE
IMM GRANULOCYTES # BLD AUTO: 0.03 THOUSAND/UL (ref 0–0.2)
IMM GRANULOCYTES NFR BLD AUTO: 0 % (ref 0–2)
KETONES UR STRIP-MCNC: NEGATIVE MG/DL
LEUKOCYTE ESTERASE UR QL STRIP: NEGATIVE
LYMPHOCYTES # BLD AUTO: 1.41 THOUSANDS/ÂΜL (ref 0.6–4.47)
LYMPHOCYTES NFR BLD AUTO: 16 % (ref 14–44)
MCH RBC QN AUTO: 30.9 PG (ref 26.8–34.3)
MCHC RBC AUTO-ENTMCNC: 33.3 G/DL (ref 31.4–37.4)
MCV RBC AUTO: 93 FL (ref 82–98)
MONOCYTES # BLD AUTO: 0.69 THOUSAND/ÂΜL (ref 0.17–1.22)
MONOCYTES NFR BLD AUTO: 8 % (ref 4–12)
NEUTROPHILS # BLD AUTO: 6.42 THOUSANDS/ÂΜL (ref 1.85–7.62)
NEUTS SEG NFR BLD AUTO: 74 % (ref 43–75)
NITRITE UR QL STRIP: NEGATIVE
NRBC BLD AUTO-RTO: 0 /100 WBCS
PH UR STRIP.AUTO: 6 [PH]
PLATELET # BLD AUTO: 266 THOUSANDS/UL (ref 149–390)
PMV BLD AUTO: 11.6 FL (ref 8.9–12.7)
POTASSIUM SERPL-SCNC: 3.7 MMOL/L (ref 3.5–5.3)
PROT SERPL-MCNC: 7.5 G/DL (ref 6.4–8.4)
PROT UR STRIP-MCNC: NEGATIVE MG/DL
RBC # BLD AUTO: 4.04 MILLION/UL (ref 3.81–5.12)
SODIUM SERPL-SCNC: 137 MMOL/L (ref 135–147)
SP GR UR STRIP.AUTO: 1.01 (ref 1–1.03)
UROBILINOGEN UR STRIP-ACNC: <2 MG/DL
WBC # BLD AUTO: 8.72 THOUSAND/UL (ref 4.31–10.16)

## 2023-12-30 PROCEDURE — 81003 URINALYSIS AUTO W/O SCOPE: CPT

## 2023-12-30 PROCEDURE — 99285 EMERGENCY DEPT VISIT HI MDM: CPT | Performed by: EMERGENCY MEDICINE

## 2023-12-30 PROCEDURE — 74177 CT ABD & PELVIS W/CONTRAST: CPT

## 2023-12-30 PROCEDURE — G1004 CDSM NDSC: HCPCS

## 2023-12-30 PROCEDURE — 80053 COMPREHEN METABOLIC PANEL: CPT | Performed by: EMERGENCY MEDICINE

## 2023-12-30 PROCEDURE — 36415 COLL VENOUS BLD VENIPUNCTURE: CPT

## 2023-12-30 PROCEDURE — 99285 EMERGENCY DEPT VISIT HI MDM: CPT

## 2023-12-30 PROCEDURE — 85025 COMPLETE CBC W/AUTO DIFF WBC: CPT | Performed by: EMERGENCY MEDICINE

## 2023-12-30 PROCEDURE — 71045 X-RAY EXAM CHEST 1 VIEW: CPT

## 2023-12-30 PROCEDURE — 93005 ELECTROCARDIOGRAM TRACING: CPT

## 2023-12-30 PROCEDURE — 84484 ASSAY OF TROPONIN QUANT: CPT | Performed by: EMERGENCY MEDICINE

## 2023-12-30 RX ORDER — AZITHROMYCIN 250 MG/1
500 TABLET, FILM COATED ORAL ONCE
Status: COMPLETED | OUTPATIENT
Start: 2023-12-30 | End: 2023-12-30

## 2023-12-30 RX ORDER — ACETAMINOPHEN 325 MG/1
975 TABLET ORAL ONCE
Status: COMPLETED | OUTPATIENT
Start: 2023-12-30 | End: 2023-12-30

## 2023-12-30 RX ORDER — AZITHROMYCIN 250 MG/1
250 TABLET, FILM COATED ORAL DAILY
Qty: 4 TABLET | Refills: 0 | Status: SHIPPED | OUTPATIENT
Start: 2023-12-30 | End: 2024-01-03

## 2023-12-30 RX ADMIN — ACETAMINOPHEN 975 MG: 325 TABLET, FILM COATED ORAL at 14:48

## 2023-12-30 RX ADMIN — IOHEXOL 100 ML: 350 INJECTION, SOLUTION INTRAVENOUS at 15:31

## 2023-12-30 RX ADMIN — AZITHROMYCIN 500 MG: 250 TABLET, FILM COATED ORAL at 17:53

## 2023-12-30 NOTE — ED PROVIDER NOTES
History  Chief Complaint   Patient presents with    Chest Pain     Reports CP onset this morning, SOB ongoing since Thursday, lightheaded, chills, nausea, back pain, left arm pain, dx with flu 12/20     78-year-old female presenting due to worsening shortness of breath, cough, chest pain.  Patient states that she has been recently getting over the flu which she was diagnosed a couple weeks ago that has never completely gone away and about 2 days ago started having worsening cough, congestion, chills, nausea and lightheadedness.  Today patient states she also started having chest pain with pain radiating to her left arm.  Patient denies any fever but states she never gets a fever when she is sick.  Decreased appetite.  Slight diarrhea with antibiotics which last dose was 12/24 for bronchitis but recently denies any dysuria, hematuria, diarrhea.  Cough is productive of whitish sputum.  Patient states that she also has a headache and neck pain but denies any neck stiffness.        Prior to Admission Medications   Prescriptions Last Dose Informant Patient Reported? Taking?   Acetaminophen 500 MG  Self No No   Sig: TAKE 2 CAPSULES TWICE A DAY   MELATONIN PO  Self Yes No   Sig: Take by mouth   Nattokinase 100 MG CAPS  Self Yes No   Sig: Take by mouth in the morning   Probiotic Product (MVW COMPLETE PROBIOTIC PO)  Self Yes No   Sig: Take by mouth   albuterol (PROVENTIL HFA,VENTOLIN HFA) 90 mcg/act inhaler  Self No No   Sig: Inhale 2 puffs every 6 (six) hours as needed for wheezing or shortness of breath   amLODIPine (NORVASC) 2.5 mg tablet  Self Yes No   Sig: Take 5 mg by mouth   Patient not taking: Reported on 11/20/2023   atorvastatin (LIPITOR) 40 mg tablet  Self No No   Sig: Take 1 tablet (40 mg total) by mouth every evening   Patient not taking: Reported on 11/20/2023   benzonatate (TESSALON PERLES) 100 mg capsule   No No   Sig: Take 1 capsule (100 mg total) by mouth 3 (three) times a day as needed for cough    cetirizine (ZyrTEC) 10 mg tablet  Self Yes No   Sig: Take 10 mg by mouth daily   cholecalciferol (VITAMIN D) 400 units/1 mL   No No   Sig: Take 2 mL (800 Units total) by mouth daily   Patient not taking: Reported on 10/10/2023   cyclobenzaprine (FLEXERIL) 5 mg tablet  Self No No   Sig: Take 0.5 tablets (2.5 mg total) by mouth daily at bedtime as needed for muscle spasms   Patient not taking: Reported on 11/20/2023   ipratropium (Atrovent HFA) 17 mcg/act inhaler   No No   Sig: Inhale 2 puffs every 6 (six) hours   losartan-hydrochlorothiazide (HYZAAR) 100-25 MG per tablet  Self No No   Sig: TAKE 1 TABLET BY MOUTH EVERY DAY   saccharomyces boulardii (FLORASTOR) 250 mg capsule  Self Yes No   Sig: Take 250 mg by mouth 2 (two) times a day   saccharomyces boulardii (FLORASTOR) 250 mg capsule  Self Yes No   Sig: Take 250 mg by mouth   Patient not taking: Reported on 12/20/2023   traMADol (ULTRAM) 50 mg tablet  Self Yes No   Sig: Take 50 mg by mouth every 8 (eight) hours as needed for severe pain   Patient not taking: Reported on 11/20/2023   zolpidem (AMBIEN) 10 mg tablet  Self No No   Sig: Take 0.5 tablets (5 mg total) by mouth daily at bedtime as needed for sleep      Facility-Administered Medications: None       Past Medical History:   Diagnosis Date    Arthritis     Carpal tunnel syndrome     GERD (gastroesophageal reflux disease)     Hiatal hernia     Hypertension     Insomnia     Pneumonia     PONV (postoperative nausea and vomiting)     Renal calculi     Sleep deprivation     chronically    Vitamin D deficiency     Vitamin D toxicity 2011    with leaky gut syndrome       Past Surgical History:   Procedure Laterality Date    BUNIONECTOMY Bilateral     CARPAL TUNNEL RELEASE Right 2015    COLONOSCOPY      CYSTOSCOPY N/A 6/6/2019    Procedure: CYSTOSCOPY;  Surgeon: Brodie Romero MD;  [facility]: [facility];  Service: Gynecology Oncology    ESOPHAGOGASTRODUODENOSCOPY N/A 5/5/2016    Procedure:  ESOPHAGOGASTRODUODENOSCOPY (EGD);  Surgeon: Enmanuel Balbuena MD;  Location: [facility];  Service:     FRACTURE SURGERY      L arm     NERVE BLOCK       R Knee    OOPHORECTOMY Left     WI COLONOSCOPY FLX DX W/COLLJ SPEC WHEN PFRMD N/A 2016    Procedure: COLONOSCOPY;  Surgeon: Enmanuel Balbuena MD;  [facility]: [facility];  Service: Gastroenterology    WI ESOPHAGOGASTRODUODENOSCOPY TRANSORAL DIAGNOSTIC N/A 10/19/2018    Procedure: ESOPHAGOGASTRODUODENOSCOPY (EGD);  Surgeon: Enmanuel Balbuena MD;  [facility]: [facility];  Service: Gastroenterology    WI LAPS TOTAL HYSTERECT 250 GM/< W/RMVL TUBE/OVARY N/A 2019    Procedure: ROBOTIC TOTAL LAPAROSCOPIC HYSTERECTOMY, RIGHT SALPINGO-OOPHORECTOMY, EXTENSIVE ADHESIOLYSIS, APPROXIMATELY 45 MIN;  Surgeon: Brodie Romero MD;  Location: [facility];  Service: Gynecology Oncology    ROTATOR CUFF REPAIR Bilateral     TOE SURGERY Left     left 5th toe shaved down due to repeated fx    TOTAL KNEE ARTHROPLASTY Left        Family History   Problem Relation Age of Onset    Heart failure Mother     Other Mother         respiratory failure    Heart attack Mother     Heart failure Father         respiratory failure    Heart attack Father     Breast cancer Sister     No Known Problems Sister     Kidney failure Brother     Alcohol abuse Brother     No Known Problems Brother     Diabetes Brother     Hypertension Daughter     Arrhythmia Neg Hx     Clotting disorder Neg Hx     Fainting Neg Hx     Anuerysm Neg Hx     Stroke Neg Hx     Hyperlipidemia Neg Hx     Asthma Neg Hx      I have reviewed and agree with the history as documented.    E-Cigarette/Vaping    E-Cigarette Use Never User      E-Cigarette/Vaping Substances     Social History     Tobacco Use    Smoking status: Former     Current packs/day: 0.00     Average packs/day: 1 pack/day for 13.0 years (13.0 ttl pk-yrs)     Types: Cigarettes     Start date:      Quit date:      Years since quittin.0    Smokeless tobacco: Never     Tobacco comments:     Quit > 30 years ago    Vaping Use    Vaping status: Never Used   Substance Use Topics    Alcohol use: No    Drug use: No        Review of Systems   Constitutional:  Positive for chills. Negative for fever.   HENT:  Negative for ear pain and sore throat.    Eyes:  Negative for pain and visual disturbance.   Respiratory:  Positive for cough and shortness of breath.    Cardiovascular:  Positive for chest pain. Negative for palpitations.   Gastrointestinal:  Positive for abdominal pain and nausea. Negative for blood in stool, constipation, diarrhea and vomiting.   Genitourinary:  Negative for dysuria and hematuria.   Musculoskeletal:  Positive for neck pain. Negative for arthralgias, back pain and neck stiffness.   Skin:  Negative for color change and rash.   Neurological:  Positive for weakness, light-headedness and headaches. Negative for dizziness, seizures, syncope, facial asymmetry and numbness.   All other systems reviewed and are negative.      Physical Exam  ED Triage Vitals   Temperature Pulse Respirations Blood Pressure SpO2   12/30/23 1305 12/30/23 1305 12/30/23 1305 12/30/23 1305 12/30/23 1305   98.3 °F (36.8 °C) 83 20 151/67 97 %      Temp Source Heart Rate Source Patient Position - Orthostatic VS BP Location FiO2 (%)   12/30/23 1305 12/30/23 1305 12/30/23 1719 12/30/23 1719 --   Oral Monitor Lying Right arm       Pain Score       12/30/23 1305       6             Orthostatic Vital Signs  Vitals:    12/30/23 1305 12/30/23 1719   BP: 151/67 136/65   Pulse: 83 78   Patient Position - Orthostatic VS:  Lying       Physical Exam  Vitals and nursing note reviewed.   Constitutional:       General: She is not in acute distress.     Appearance: She is well-developed.   HENT:      Head: Normocephalic and atraumatic.      Nose: Congestion present.      Mouth/Throat:      Mouth: Mucous membranes are moist.      Pharynx: Oropharynx is clear.   Eyes:      Extraocular Movements: Extraocular movements  intact.      Conjunctiva/sclera: Conjunctivae normal.      Pupils: Pupils are equal, round, and reactive to light.   Cardiovascular:      Rate and Rhythm: Normal rate and regular rhythm.      Pulses: Normal pulses.      Heart sounds: Normal heart sounds. No murmur heard.  Pulmonary:      Effort: Pulmonary effort is normal. No respiratory distress.      Breath sounds: Normal breath sounds. No wheezing, rhonchi or rales.   Chest:      Chest wall: No tenderness.   Abdominal:      General: Abdomen is flat. Bowel sounds are normal.      Palpations: Abdomen is soft.      Tenderness: There is abdominal tenderness. There is left CVA tenderness. There is no right CVA tenderness.      Comments: Right lower quadrant tenderness, no external signs of trauma.   Musculoskeletal:         General: No deformity or signs of injury. Normal range of motion.      Cervical back: Normal range of motion and neck supple. Tenderness present. No rigidity.      Right lower leg: No edema.      Left lower leg: No edema.   Skin:     General: Skin is warm and dry.      Findings: No bruising, lesion or rash.   Neurological:      General: No focal deficit present.      Mental Status: She is alert.      Cranial Nerves: No cranial nerve deficit.      Sensory: No sensory deficit.         ED Medications  Medications   acetaminophen (TYLENOL) tablet 975 mg (975 mg Oral Given 12/30/23 1448)   iohexol (OMNIPAQUE) 350 MG/ML injection (MULTI-DOSE) 100 mL (100 mL Intravenous Given 12/30/23 1531)   azithromycin (ZITHROMAX) tablet 500 mg (500 mg Oral Given 12/30/23 1753)       Diagnostic Studies  Results Reviewed       Procedure Component Value Units Date/Time    UA w Reflex to Microscopic w Reflex to Culture [[id number]] Collected: 12/30/23 1438    Lab Status: Final result Specimen: Urine, Clean Catch Updated: 12/30/23 1446     Color, UA Colorless     Clarity, UA Clear     Specific Gravity, UA 1.009     pH, UA 6.0     Leukocytes, UA Negative     Nitrite, UA  Negative     Protein, UA Negative mg/dl      Glucose, UA Negative mg/dl      Ketones, UA Negative mg/dl      Urobilinogen, UA <2.0 mg/dl      Bilirubin, UA Negative     Occult Blood, UA Negative    HS Troponin 0hr (reflex protocol) [[id number]]  (Normal) Collected: 12/30/23 1312    Lab Status: Final result Specimen: Blood from Arm, Right Updated: 12/30/23 1344     hs TnI 0hr 3 ng/L     Comprehensive metabolic panel [[id number]] Collected: 12/30/23 1312    Lab Status: Final result Specimen: Blood from Arm, Right Updated: 12/30/23 1340     Sodium 137 mmol/L      Potassium 3.7 mmol/L      Chloride 105 mmol/L      CO2 23 mmol/L      ANION GAP 9 mmol/L      BUN 23 mg/dL      Creatinine 0.73 mg/dL      Glucose 86 mg/dL      Calcium 9.1 mg/dL      AST 18 U/L      ALT 18 U/L      Alkaline Phosphatase 87 U/L      Total Protein 7.5 g/dL      Albumin 4.3 g/dL      Total Bilirubin 0.59 mg/dL      eGFR 78 ml/min/1.73sq m     Narrative:      National Kid[employer] guidelines for Chronic Kidney Disease (CKD):     Stage 1 with normal or high GFR (GFR > 90 mL/min/1.73 square meters)    Stage 2 Mild CKD (GFR = 60-89 mL/min/1.73 square meters)    Stage 3A Moderate CKD (GFR = 45-59 mL/min/1.73 square meters)    Stage 3B Moderate CKD (GFR = 30-44 mL/min/1.73 square meters)    Stage 4 Severe CKD (GFR = 15-29 mL/min/1.73 square meters)    Stage 5 End Stage CKD (GFR <15 mL/min/1.73 square meters)  Note: GFR calculation is accurate only with a steady state creatinine    CBC and differential [[id number]] Collected: 12/30/23 1312    Lab Status: Final result Specimen: Blood from Arm, Right Updated: 12/30/23 1323     WBC 8.72 Thousand/uL      RBC 4.04 Million/uL      Hemoglobin 12.5 g/dL      Hematocrit 37.5 %      MCV 93 fL      MCH 30.9 pg      MCHC 33.3 g/dL      RDW 13.1 %      MPV 11.6 fL      Platelets 266 Thousands/uL      nRBC 0 /100 WBCs      Neutrophils Relative 74 %      Immat GRANS % 0 %      Lymphocytes Relative 16 %       Monocytes Relative 8 %      Eosinophils Relative 2 %      Basophils Relative 0 %      Neutrophils Absolute 6.42 Thousands/µL      Immature Grans Absolute 0.03 Thousand/uL      Lymphocytes Absolute 1.41 Thousands/µL      Monocytes Absolute 0.69 Thousand/µL      Eosinophils Absolute 0.14 Thousand/µL      Basophils Absolute 0.03 Thousands/µL                    CT abdomen pelvis with contrast   Final Result by Migdalia Balderas MD (12/30 1652)      No acute abdominopelvic process.      Focus of nondependent gas within the urinary bladder. Recommend clinical correlation for any recent instrumentation.            Workstation performed: [id number]         XR chest 1 view portable   ED Interpretation by Jarret Ellison MD (12/30 1436)   My personal interpretation-small area of consolidation and left upper lobe concerning for pneumonia.      Final Result by Jerry De León MD (12/31 8826)      Nonspecific interstitial prominence of the lungs could represent a viral syndrome.                  Workstation performed: [id number]               Procedures  ECG 12 Lead Documentation Only    Date/Time: 12/31/2023 5:03 PM    Performed by: Jarret Ellison MD  Authorized by: Jarret Ellison MD    Patient location:  ED  Interpretation:     Interpretation: normal    Rate:     ECG rate:  85    ECG rate assessment: normal    Rhythm:     Rhythm: sinus rhythm    Ectopy:     Ectopy: none    QRS:     QRS axis:  Normal    QRS intervals:  Normal  Conduction:     Conduction: normal    ST segments:     ST segments:  Normal  T waves:     T waves: normal          ED Course  ED Course as of 12/31/23 1705   Sat Dec 30, 2023   1430 78 yoF presenting due to worsening cough, SOB, CP.  Patient recently had flu which she has been getting over but states that symptoms started getting worse about 2 days ago.  Concern at this time for continuation of viral illness, pneumonia, ACS, intra-abdominal process due to patient's tenderness and right lower quadrant  and left CVA tenderness.   1431 Vitals reviewed, HTN but otherwise WNL   1431 HS Troponin 0hr (reflex protocol)  WNL   1431 Comprehensive metabolic panel  WNL   1431 CBC and differential  WNL   1436 XR chest 1 view portable  My personal interpretation-small area of consolidation and left upper lobe concerning for pneumonia.   1455 UA w Reflex to Microscopic w Reflex to Culture  Not suggestive of infection   1711 CT abdomen pelvis with contrast  No acute abdominopelvic process.     Focus of nondependent gas within the urinary bladder. Recommend clinical correlation for any recent instrumentation.     1711 At this time workup suggestive of left upper lobe pneumonia, will start patient on antibiotics.  First dose of azithromycin given in the emergency department and prescription sent to patient's pharmacy.  Vitals remained stable throughout emergency department visit.  Patient in no acute distress at this time.  Patient is agreeable and appropriate for discharge.  Return precautions discussed.  Recommend following up with family doctor for reassessment.             HEART Risk Score      Flowsheet Row Most Recent Value   Heart Score Risk Calculator    History 1 Filed at: 12/31/2023 1704   ECG 0 Filed at: 12/31/2023 1704   Age 2 Filed at: 12/31/2023 1704   Risk Factors 1 Filed at: 12/31/2023 1704   Troponin 0 Filed at: 12/31/2023 1704   HEART Score 4 Filed at: 12/31/2023 1704                        SBIRT 20yo+      Flowsheet Row Most Recent Value   Initial Alcohol Screen: US AUDIT-C     1. How often do you have a drink containing alcohol? 0 Filed at: 12/30/2023 1410   2. How many drinks containing alcohol do you have on a typical day you are drinking?  0 Filed at: 12/30/2023 1410   3a. Male UNDER 65: How often do you have five or more drinks on one occasion? 0 Filed at: 12/30/2023 1410   3b. FEMALE Any Age, or MALE 65+: How often do you have 4 or more drinks on one occassion? 0 Filed at: 12/30/2023 1410   Audit-C Score 0  Filed at: 12/30/2023 1410   RAYNE: How many times in the past year have you...    Used an illegal drug or used a prescription medication for non-medical reasons? Never Filed at: 12/30/2023 1410                  Medical Decision Making  Amount and/or Complexity of Data Reviewed  Labs: ordered. Decision-making details documented in ED Course.  Radiology: ordered and independent interpretation performed. Decision-making details documented in ED Course.    Risk  OTC drugs.  Prescription drug management.          Disposition  Final diagnoses:   Pneumonia     Time reflects when diagnosis was documented in both MDM as applicable and the Disposition within this note       Time User Action Codes Description Comment    12/30/2023  5:48 PM Scot, Jarret DELATORRE Add [J18.9] Pneumonia           ED Disposition       ED Disposition   Discharge    Condition   Stable    Date/Time   Sat Dec 30, 2023 4074    Comment   Erin Suazo discharge to home/self care.                   Follow-up Information       Follow up With Specialties Details Why Contact Info Additional Information    Cholo Field MD Family Medicine   [address] [place] [id number]  [phone] [place] [address] Emergency Department Emergency Medicine   [address] [place], [place] [id number] [place] [address] Emergency Department, [address] [place] [address], [facility]            Discharge Medication List as of 12/30/2023  6:03 PM        START taking these medications    Details   azithromycin (ZITHROMAX) 250 mg tablet Take 1 tablet (250 mg total) by mouth daily for 4 days, Starting Sat 12/30/2023, Until Wed 1/3/2024, Normal           CONTINUE these medications which have NOT CHANGED    Details   Acetaminophen 500 MG TAKE 2 CAPSULES TWICE A DAY, Normal      albuterol (PROVENTIL HFA,VENTOLIN HFA) 90 mcg/act inhaler Inhale 2 puffs every 6 (six) hours as needed for wheezing or  shortness of breath, Starting Fri 9/23/2022, Normal      amLODIPine (NORVASC) 2.5 mg tablet Take 5 mg by mouth, Historical Med      atorvastatin (LIPITOR) 40 mg tablet Take 1 tablet (40 mg total) by mouth every evening, Starting Sun 9/18/2022, Until Mon 11/20/2023, Normal      benzonatate (TESSALON PERLES) 100 mg capsule Take 1 capsule (100 mg total) by mouth 3 (three) times a day as needed for cough, Starting Wed 12/27/2023, Normal      cetirizine (ZyrTEC) 10 mg tablet Take 10 mg by mouth daily, Historical Med      cholecalciferol (VITAMIN D) 400 units/1 mL Take 2 mL (800 Units total) by mouth daily, Starting Fri 3/24/2023, Until Thu 6/22/2023, Normal      cyclobenzaprine (FLEXERIL) 5 mg tablet Take 0.5 tablets (2.5 mg total) by mouth daily at bedtime as needed for muscle spasms, Starting Fri 9/23/2022, Normal      ipratropium (Atrovent HFA) 17 mcg/act inhaler Inhale 2 puffs every 6 (six) hours, Starting Wed 12/27/2023, Normal      losartan-hydrochlorothiazide (HYZAAR) 100-25 MG per tablet TAKE 1 TABLET BY MOUTH EVERY DAY, Starting Mon 9/25/2023, Normal      MELATONIN PO Take by mouth, Historical Med      Nattokinase 100 MG CAPS Take by mouth in the morning, Historical Med      Probiotic Product (MVW COMPLETE PROBIOTIC PO) Take by mouth, Historical Med      !! saccharomyces boulardii (FLORASTOR) 250 mg capsule Take 250 mg by mouth 2 (two) times a day, Historical Med      !! saccharomyces boulardii (FLORASTOR) 250 mg capsule Take 250 mg by mouth, Historical Med      traMADol (ULTRAM) 50 mg tablet Take 50 mg by mouth every 8 (eight) hours as needed for severe pain, Historical Med      zolpidem (AMBIEN) 10 mg tablet Take 0.5 tablets (5 mg total) by mouth daily at bedtime as needed for sleep, Starting Tue 9/19/2023, Normal       !! - Potential duplicate medications found. Please discuss with provider.        No discharge procedures on file.    PDMP Review         Value Time User    PDMP Reviewed  Yes 9/19/2023  3:25 PM  Cholo Field MD             ED Provider  Attending physically available and evaluated Erin Suazo. I managed the patient along with the ED Attending.    Electronically Signed by           Jarret Ellison MD  12/31/23 5698

## 2023-12-30 NOTE — DISCHARGE INSTRUCTIONS
Please take full course of antibiotics as prescribed.    You may use Tylenol and ibuprofen for symptomatic treatment for pain.    Thank you for allowing us to take part in your care.

## 2023-12-31 LAB
ATRIAL RATE: 81 BPM
ATRIAL RATE: 85 BPM
P AXIS: 48 DEGREES
P AXIS: 50 DEGREES
PR INTERVAL: 174 MS
PR INTERVAL: 184 MS
QRS AXIS: -2 DEGREES
QRS AXIS: 3 DEGREES
QRSD INTERVAL: 78 MS
QRSD INTERVAL: 80 MS
QT INTERVAL: 394 MS
QT INTERVAL: 406 MS
QTC INTERVAL: 468 MS
QTC INTERVAL: 471 MS
T WAVE AXIS: 23 DEGREES
T WAVE AXIS: 29 DEGREES
VENTRICULAR RATE: 81 BPM
VENTRICULAR RATE: 85 BPM

## 2024-01-02 NOTE — ED ATTENDING ATTESTATION
12/30/2023  I, Priscilla Salinas MD, saw and evaluated the patient. I have discussed the patient with the resident/non-physician practitioner and agree with the resident's/non-physician practitioner's findings, Plan of Care, and MDM as documented in the resident's/non-physician practitioner's note, except where noted. All available labs and Radiology studies were reviewed.  I was present for key portions of any procedure(s) performed by the resident/non-physician practitioner and I was immediately available to provide assistance.       At this point I agree with the current assessment done in the Emergency Department.  I have conducted an independent evaluation of this patient a history and physical is as follows:    ED Course  ED Course as of 01/02/24 1114   Sat Dec 30, 2023   1402 Erin is a 78-year-old female presenting to the emergency department with chest pain.  Vital signs on arrival are remarkable for hypertension.  She reports worsening SOB, cough, chest pain over the past couple of weeks.  Acute worsening over the past 2 days.  (+) flu diagnosis for several weeks ago.  Denies current fever, palpitations, nausea, vomiting, abdominal pain, urinary symptoms, changes in stool, leg pain/swelling.  No sick contacts, no recent travel.    1403 Blood Pressure: 151/67   1403 Triage lab workup was drawn and obtained prior to my evaluation including CBC, CMP, initial troponin.  CBC, CMP, initial troponin are grossly unremarkable.   1403 hs TnI 0hr: 3   1405 ECG 12 lead  Normal sinus rhythm 85, QRS 78, QTc 468, no ST elevation or depression, no ectopy, no terminal R.  Overall impression: No STEMI   1412 XR chest 1 view portable  (+) new airspace opacity left upper lung   1720 CT abdomen pelvis with contrast  No acute abdominopelvic process.     Focus of nondependent gas within the urinary bladder. Recommend clinical correlation for any recent instrumentation.     1809 Upon re-assessment, patient feels improved.  Patient  remained hemodynamically and clinically stable in the ED.  Strict return precautions given.  Patient verbalized understanding and will follow up as outpatient with PMD.  Upon discharge, patient was AO4, GCS15.             Critical Care Time  Procedures

## 2024-01-10 ENCOUNTER — ANESTHESIA EVENT (OUTPATIENT)
Dept: ANESTHESIOLOGY | Facility: HOSPITAL | Age: 79
End: 2024-01-10

## 2024-01-10 ENCOUNTER — OFFICE VISIT (OUTPATIENT)
Dept: FAMILY MEDICINE CLINIC | Facility: CLINIC | Age: 79
End: 2024-01-10
Payer: MEDICARE

## 2024-01-10 ENCOUNTER — ANESTHESIA (OUTPATIENT)
Dept: ANESTHESIOLOGY | Facility: HOSPITAL | Age: 79
End: 2024-01-10

## 2024-01-10 VITALS
SYSTOLIC BLOOD PRESSURE: 136 MMHG | HEART RATE: 95 BPM | OXYGEN SATURATION: 96 % | WEIGHT: 143 LBS | BODY MASS INDEX: 30.02 KG/M2 | HEIGHT: 58 IN | RESPIRATION RATE: 16 BRPM | DIASTOLIC BLOOD PRESSURE: 66 MMHG

## 2024-01-10 DIAGNOSIS — J18.9 PNEUMONIA OF BOTH LOWER LOBES DUE TO INFECTIOUS ORGANISM: Primary | ICD-10-CM

## 2024-01-10 PROCEDURE — 99214 OFFICE O/P EST MOD 30 MIN: CPT | Performed by: INTERNAL MEDICINE

## 2024-01-10 NOTE — PROGRESS NOTES
Assessment & Plan     1. Pneumonia of both lower lobes due to infectious organism         Subjective     Transitional Care Management Review:   Erin Suazo is a 78 y.o. female here for TCM follow up.     During the TCM phone call patient stated:  TCM Call       Date and time call was made  9/19/2022 10:13 AM    Hospital care reviewed  Records reviewed    Patient was hospitialized at  [facility]    Date of Admission  09/14/22    Date of discharge  09/18/22    Diagnosis  Chest pain    Disposition  Home    Were the patients medications reviewed and updated  Yes    Current Symptoms  None          TCM Call       Post hospital issues  None    Should patient be enrolled in anticoag monitoring?  No    Scheduled for follow up?  Yes    Did you obtain your prescribed medications  Yes    Do you need help managing your prescriptions or medications  No    Is transportation to your appointment needed  No    I have advised the patient to call PCP with any new or worsening symptoms  Glenis Morton MA    Counseling  Patient    Counseling topics  Importance of RX compliance          Patient was seen in this office by Dr. Field on 1227 and diagnosed with the flu. She then went to the emergency room on the 30th complaining of increasing symptoms. There chest x-ray was consistent with probable viral pneumonia bilaterally at the basis and was given his Jaquan. The patient is not fully immunized due to allergies to products in the shots. Patient is slowly been getting better although not fast enough cording to her her son says he sees definite improvement. She is not coughing bringing up phlegm or having fevers. Nor is she short of breath. Her only complaints are some mild weakness      Review of Systems   Constitutional:  Positive for fatigue. Negative for chills and fever.   HENT:  Negative for ear pain and sore throat.    Eyes:  Negative for pain and visual disturbance.   Respiratory:  Negative for cough, chest tightness and  "shortness of breath.    Cardiovascular:  Negative for chest pain, palpitations and leg swelling.   Gastrointestinal:  Negative for abdominal pain and vomiting.   Genitourinary:  Negative for dysuria and hematuria.   Musculoskeletal:  Positive for arthralgias. Negative for back pain.   Skin:  Negative for color change and rash.   Neurological:  Positive for weakness. Negative for seizures and syncope.   All other systems reviewed and are negative.      Objective     /66 (BP Location: Left arm, Patient Position: Sitting, Cuff Size: Standard)   Pulse 95   Resp 16   Ht 4' 9.5\" (1.461 m)   Wt 64.9 kg (143 lb)   SpO2 96%   BMI 30.41 kg/m²      Physical Exam  Vitals and nursing note reviewed.   Constitutional:       General: She is not in acute distress.     Appearance: She is well-developed.   HENT:      Head: Normocephalic and atraumatic.      Nose: Nose normal. No congestion.      Mouth/Throat:      Pharynx: No oropharyngeal exudate.   Eyes:      Conjunctiva/sclera: Conjunctivae normal.      Pupils: Pupils are equal, round, and reactive to light.   Cardiovascular:      Rate and Rhythm: Normal rate and regular rhythm.      Heart sounds: No murmur heard.  Pulmonary:      Effort: Pulmonary effort is normal. No respiratory distress.      Breath sounds: Normal breath sounds. No rhonchi or rales.   Abdominal:      Palpations: Abdomen is soft.      Tenderness: There is no abdominal tenderness.   Musculoskeletal:         General: No swelling.      Cervical back: Neck supple.      Right lower leg: No edema.      Left lower leg: No edema.   Lymphadenopathy:      Cervical: No cervical adenopathy.   Skin:     General: Skin is warm and dry.      Capillary Refill: Capillary refill takes less than 2 seconds.      Findings: No rash.   Neurological:      General: No focal deficit present.      Mental Status: She is alert and oriented to person, place, and time.      Coordination: Coordination normal.      Gait: Gait normal. "   Psychiatric:         Mood and Affect: Mood normal.         Behavior: Behavior normal.       Medications have been reviewed by provider in current encounter    Kelin Ramos MD

## 2024-01-11 ENCOUNTER — TELEPHONE (OUTPATIENT)
Age: 79
End: 2024-01-11

## 2024-01-11 NOTE — TELEPHONE ENCOUNTER
Patients GI provider:  Dr. Balbuena    Number to return call: ([phone]    Reason for call: Pt called to cancel colon as she has been ill with the flu and pneumonia. She will call to reschedule when she is feeling better    Scheduled procedure/appointment date if applicable: Apt/procedure 01/24/24

## 2024-01-12 ENCOUNTER — TELEPHONE (OUTPATIENT)
Age: 79
End: 2024-01-12

## 2024-01-12 NOTE — TELEPHONE ENCOUNTER
Patient calling to reschedule her colonoscopy. Colonoscopy has been rescheduled for 3/6/24 with  at [facility]. Patient is asking if prep instructions can be mailed to her home address

## 2024-01-31 ENCOUNTER — APPOINTMENT (OUTPATIENT)
Dept: RADIOLOGY | Facility: MEDICAL CENTER | Age: 79
End: 2024-01-31
Payer: MEDICARE

## 2024-01-31 ENCOUNTER — OFFICE VISIT (OUTPATIENT)
Dept: FAMILY MEDICINE CLINIC | Facility: CLINIC | Age: 79
End: 2024-01-31
Payer: MEDICARE

## 2024-01-31 VITALS
OXYGEN SATURATION: 98 % | HEART RATE: 94 BPM | SYSTOLIC BLOOD PRESSURE: 130 MMHG | WEIGHT: 144 LBS | DIASTOLIC BLOOD PRESSURE: 78 MMHG | HEIGHT: 58 IN | TEMPERATURE: 97.8 F | BODY MASS INDEX: 30.23 KG/M2

## 2024-01-31 DIAGNOSIS — J18.9 PNEUMONIA OF BOTH LOWER LOBES DUE TO INFECTIOUS ORGANISM: Primary | ICD-10-CM

## 2024-01-31 DIAGNOSIS — J18.9 PNEUMONIA OF BOTH LOWER LOBES DUE TO INFECTIOUS ORGANISM: ICD-10-CM

## 2024-01-31 PROCEDURE — 71046 X-RAY EXAM CHEST 2 VIEWS: CPT

## 2024-01-31 PROCEDURE — 99212 OFFICE O/P EST SF 10 MIN: CPT | Performed by: FAMILY MEDICINE

## 2024-01-31 RX ORDER — AZITHROMYCIN 250 MG/1
TABLET, FILM COATED ORAL DAILY
Qty: 6 TABLET | Refills: 0 | Status: SHIPPED | OUTPATIENT
Start: 2024-01-31 | End: 2024-02-05

## 2024-01-31 NOTE — PROGRESS NOTES
Outpatient Progress Note    Assessment/Plan:    Problem List Items Addressed This Visit          Respiratory    Pneumonia of both lower lobes due to infectious organism - Primary      Patient with recent history of pneumonia, with worsening symptoms over the course the last 3 days, on physical exam there is decreased breath sounds on bilateral lung field, chest x-ray last completed on 12/3/2023 shows nonspecific changes, will repeat chest x-ray at this time  After discussion with patient regarding antibiotic therapy, patient is concerned about taking a new antibiotic such as doxycycline, will repeat azithromycin 500 mg on day 1, 250 on day for 2 through 5 for treatment for presumed community-acquired pneumonia    Disposition:     I have spent a total time of 10 minutes on the day of the encounter for this patient including       Encounter provider: Cholo Field MD     Provider located at: [facility]  [address] [address] [phone]     Recent Visits  No visits were found meeting these conditions.  Showing recent visits within past 7 days and meeting all other requirements  Today's Visits  Date Type Provider Dept   01/31/24 Office Visit Cholo Field MD [facility]   Showing today's visits and meeting all other requirements  Future Appointments  No visits were found meeting these conditions.  Showing future appointments within next 150 days and meeting all other requirements     Subjective:   Erin Suazo is a 78 y.o. female who is concerned about COVID-19. Patient's symptoms include fatigue, malaise, nasal congestion, rhinorrhea, sore throat, cough, chest tightness and headache. Patient denies fever, chills, anosmia, loss of taste, shortness of breath, abdominal pain, nausea, vomiting, diarrhea and myalgias.     - Date of symptom onset: 1/28/2024      COVID-19 vaccination status: Fully vaccinated with booster    Exposure:   Contact with a  person who is under investigation (PUI) for or who is positive for COVID-19 within the last 14 days?: No    Hospitalized recently for fever and/or lower respiratory symptoms?: No      Currently a healthcare worker that is involved in direct patient care?: No      Works in a special setting where the risk of COVID-19 transmission may be high? (this may include long-term care, correctional and [facility] facilities; homeless shelters; assisted-living facilities and group homes.): No      Resident in a special setting where the risk of COVID-19 transmission may be high? (this may include long-term care, correctional and [facility] facilities; homeless shelters; assisted-living facilities and group homes.): No      Wheezing in the chest  Patient with recent history of influenza pneumonia treated in the hospital on 12/30/2020.  Patient reports she was in bed for approximately 3 weeks afterwards, symptoms are slowly improving until 3 days ago when she noted worsening symptoms.  Has chest wheezing and productive cough.  Patient reports pain with deep inspiration.  Continues to have difficulty with oral intake.    Pt unable to take prednisone due to GI intolerance and vomiting, allergic to multiple antibiotics.     Lab Results   Component Value Date    SARSCOV2 Negative 12/20/2023    SARSCOV2 Not Detected 07/22/2020       Review of Systems   Constitutional:  Positive for fatigue. Negative for chills and fever.   HENT:  Positive for congestion, rhinorrhea and sore throat.    Respiratory:  Positive for cough and chest tightness. Negative for shortness of breath.    Gastrointestinal:  Negative for abdominal pain, diarrhea, nausea and vomiting.   Musculoskeletal:  Negative for myalgias.   Neurological:  Positive for headaches.     Current Outpatient Medications on File Prior to Visit   Medication Sig    Acetaminophen 500 MG TAKE 2 CAPSULES TWICE A DAY    benzonatate (TESSALON PERLES) 100 mg capsule Take 1 capsule (100 mg total)  "by mouth 3 (three) times a day as needed for cough    losartan-hydrochlorothiazide (HYZAAR) 100-25 MG per tablet TAKE 1 TABLET BY MOUTH EVERY DAY    Nattokinase 100 MG CAPS Take by mouth in the morning    Probiotic Product (MVW COMPLETE PROBIOTIC PO) Take by mouth    zolpidem (AMBIEN) 10 mg tablet Take 0.5 tablets (5 mg total) by mouth daily at bedtime as needed for sleep    albuterol (PROVENTIL HFA,VENTOLIN HFA) 90 mcg/act inhaler Inhale 2 puffs every 6 (six) hours as needed for wheezing or shortness of breath (Patient not taking: Reported on 1/10/2024)    amLODIPine (NORVASC) 2.5 mg tablet Take 5 mg by mouth (Patient not taking: Reported on 11/20/2023)    atorvastatin (LIPITOR) 40 mg tablet Take 1 tablet (40 mg total) by mouth every evening (Patient not taking: Reported on 11/20/2023)    cholecalciferol (VITAMIN D) 400 units/1 mL Take 2 mL (800 Units total) by mouth daily (Patient not taking: Reported on 10/10/2023)       Objective:    /78 (BP Location: Left arm, Patient Position: Sitting, Cuff Size: Standard)   Pulse 94   Temp 97.8 °F (36.6 °C)   Ht 4' 9.5\" (1.461 m)   Wt 65.3 kg (144 lb)   SpO2 98%   BMI 30.62 kg/m²      Physical Exam  Vitals reviewed.   Constitutional:       General: She is not in acute distress.     Appearance: Normal appearance. She is obese. She is not ill-appearing, toxic-appearing or diaphoretic.   Cardiovascular:      Rate and Rhythm: Normal rate and regular rhythm.      Pulses: Normal pulses.      Heart sounds: No murmur heard.  Pulmonary:      Effort: Pulmonary effort is normal.      Comments: Decreased breath sound on lower lung fields  Shallow breathing  Coughing episodes  Abdominal:      General: Abdomen is flat.   Musculoskeletal:         General: No swelling.      Right lower leg: No edema.      Left lower leg: No edema.   Skin:     General: Skin is warm and dry.      Capillary Refill: Capillary refill takes less than 2 seconds.      Coloration: Skin is not jaundiced. "   Neurological:      General: No focal deficit present.      Mental Status: She is alert and oriented to person, place, and time.   Psychiatric:         Mood and Affect: Mood normal.             Cholo Field MD

## 2024-02-01 ENCOUNTER — TELEPHONE (OUTPATIENT)
Age: 79
End: 2024-02-01

## 2024-02-02 NOTE — TELEPHONE ENCOUNTER
Pt called again to get the status of her X-ray results.    As I was assisting the pt, pt received a call from Dr Field.    A

## 2024-02-20 ENCOUNTER — APPOINTMENT (OUTPATIENT)
Dept: RADIOLOGY | Facility: MEDICAL CENTER | Age: 79
End: 2024-02-20
Payer: MEDICARE

## 2024-02-20 ENCOUNTER — OFFICE VISIT (OUTPATIENT)
Dept: URGENT CARE | Facility: MEDICAL CENTER | Age: 79
End: 2024-02-20
Payer: MEDICARE

## 2024-02-20 VITALS
SYSTOLIC BLOOD PRESSURE: 132 MMHG | TEMPERATURE: 97.9 F | DIASTOLIC BLOOD PRESSURE: 60 MMHG | BODY MASS INDEX: 30.63 KG/M2 | OXYGEN SATURATION: 99 % | HEART RATE: 87 BPM | HEIGHT: 57 IN | WEIGHT: 142 LBS | RESPIRATION RATE: 18 BRPM

## 2024-02-20 DIAGNOSIS — J02.9 PHARYNGITIS WITH VIRAL SYNDROME: Primary | ICD-10-CM

## 2024-02-20 DIAGNOSIS — R05.1 ACUTE COUGH: ICD-10-CM

## 2024-02-20 DIAGNOSIS — B34.9 PHARYNGITIS WITH VIRAL SYNDROME: Primary | ICD-10-CM

## 2024-02-20 PROBLEM — R10.9 RIGHT FLANK PAIN: Status: ACTIVE | Noted: 2017-04-18

## 2024-02-20 PROBLEM — M70.50 PES ANSERINE BURSITIS: Status: ACTIVE | Noted: 2017-08-23

## 2024-02-20 PROBLEM — R39.9 URINARY SYMPTOM OR SIGN: Status: ACTIVE | Noted: 2017-04-18

## 2024-02-20 PROBLEM — T84.023A INSTABILITY OF INTERNAL LEFT KNEE PROSTHESIS (HCC): Status: ACTIVE | Noted: 2021-03-23

## 2024-02-20 PROBLEM — Z86.0100 HISTORY OF COLON POLYPS: Status: ACTIVE | Noted: 2024-02-20

## 2024-02-20 PROBLEM — Z96.652 S/P REVISION OF TOTAL KNEE, LEFT: Status: ACTIVE | Noted: 2021-03-24

## 2024-02-20 PROBLEM — Z86.010 HISTORY OF COLON POLYPS: Status: ACTIVE | Noted: 2024-02-20

## 2024-02-20 LAB
S PYO AG THROAT QL: NEGATIVE
SARS-COV-2 AG UPPER RESP QL IA: NEGATIVE
VALID CONTROL: NORMAL

## 2024-02-20 PROCEDURE — 71046 X-RAY EXAM CHEST 2 VIEWS: CPT

## 2024-02-20 PROCEDURE — G0463 HOSPITAL OUTPT CLINIC VISIT: HCPCS

## 2024-02-20 PROCEDURE — 99213 OFFICE O/P EST LOW 20 MIN: CPT

## 2024-02-20 PROCEDURE — 87811 SARS-COV-2 COVID19 W/OPTIC: CPT

## 2024-02-20 PROCEDURE — 87880 STREP A ASSAY W/OPTIC: CPT

## 2024-02-20 NOTE — PATIENT INSTRUCTIONS
Most colds are caused by virus, which does not respond to antibiotics. Typically viruses are self limiting and will go away own their own. There are both over the counter medicines or prescriptions medicines that can be used to help with symptoms until the virus had a chance to run it's course.     Vitamin D3 2000 IU daily  Vitamin C 1000mg twice per day  Multivitamin daily  Some studies suggest that Zinc 12.5-15mg every 2 hours while awake x 5 days may shorten the duration cold symptoms by 1-2 days.     Encourage fluids  Rest  Nasal saline spray  Over the counter decongestant; Afrin if severe congestion (do not use for more than 3 days)  Tylenol/Ibuprofen for pain/fever  Salt water gargles and chloraseptic spray  Tsp of honey  Warm tea with honey. May use lemon.  Throat lozenges  Throat Coat Tea  Warm compresses over sinuses  Steam treatment (utilize proper safety precautions when in contact with hot water/steam)    Call PCP if symptoms not improving after 10-12 days, for persistent fever (more than 4-5 days total), concerns about breathing, persistent ear pain or signs of dehydration (decreased urine, dry, cracked lips).

## 2024-02-20 NOTE — PROGRESS NOTES
[facility]        NAME: Erin Suazo is a 78 y.o. female  : 1945    MRN: [id number]  DATE: 2024  TIME: 11:48 AM    Assessment and Plan   Pharyngitis with viral syndrome [J02.9, B34.9]  1. Pharyngitis with viral syndrome  POCT rapid ANTIGEN strepA    XR chest pa & lateral    Poct Covid 19 Rapid Antigen Test    Throat culture        POCT rapid strepA: Negative    Will send out for culture. If positive will treat with antibiotics.      POCT COVID 19 Rapid Antigen Test: Negative    XR chest pa & lateral: No acute findings per my read. Pending radiology final read.     Patient Instructions   Most colds are caused by virus, which does not respond to antibiotics. Typically viruses are self limiting and will go away own their own. There are both over the counter medicines or prescriptions medicines that can be used to help with symptoms until the virus had a chance to run it's course.     Vitamin D3 2000 IU daily  Vitamin C 1000mg twice per day  Multivitamin daily  Some studies suggest that Zinc 12.5-15mg every 2 hours while awake x 5 days may shorten the duration cold symptoms by 1-2 days.     Encourage fluids  Rest  Nasal saline spray  Over the counter decongestant; Afrin if severe congestion (do not use for more than 3 days)  Tylenol/Ibuprofen for pain/fever  Salt water gargles and chloraseptic spray  Tsp of honey  Warm tea with honey. May use lemon.  Throat lozenges  Throat Coat Tea  Warm compresses over sinuses  Steam treatment (utilize proper safety precautions when in contact with hot water/steam)    Call PCP if symptoms not improving after 10-12 days, for persistent fever (more than 4-5 days total), concerns about breathing, persistent ear pain or signs of dehydration (decreased urine, dry, cracked lips).    Follow up with PCP in 3-5 days.  Proceed to ER if symptoms worsen.    Chief Complaint     Chief Complaint   Patient presents with    Sore Throat    Cough    Nasal Congestion     Generalized Body Aches     Symptoms started Sunday morning.      History of Present Illness       Cough  This is a new problem. The current episode started in the past 7 days (Sunday). The problem has been gradually worsening. The cough is Productive of sputum (Green). Associated symptoms include headaches, myalgias, nasal congestion, postnasal drip, rhinorrhea and a sore throat. Pertinent negatives include no chest pain, chills, ear congestion, ear pain, fever, shortness of breath or wheezing. Her past medical history is significant for asthma and pneumonia.       Review of Systems   Review of Systems   Constitutional:  Positive for activity change and fatigue. Negative for chills, diaphoresis and fever.   HENT:  Positive for congestion, postnasal drip, rhinorrhea, sinus pressure, sinus pain and sore throat. Negative for ear discharge and ear pain.    Respiratory:  Positive for cough. Negative for shortness of breath and wheezing.    Cardiovascular: Negative.  Negative for chest pain and palpitations.   Gastrointestinal:  Negative for abdominal pain, constipation, diarrhea, nausea and vomiting.   Musculoskeletal:  Positive for myalgias.   Skin: Negative.  Negative for color change and wound.   Neurological:  Positive for light-headedness and headaches.     Current Medications       Current Outpatient Medications:     Acetaminophen 500 MG, TAKE 2 CAPSULES TWICE A DAY, Disp: 120 capsule, Rfl: 3    benzonatate (TESSALON PERLES) 100 mg capsule, Take 1 capsule (100 mg total) by mouth 3 (three) times a day as needed for cough, Disp: 45 capsule, Rfl: 0    losartan-hydrochlorothiazide (HYZAAR) 100-25 MG per tablet, TAKE 1 TABLET BY MOUTH EVERY DAY, Disp: 90 tablet, Rfl: 1    Nattokinase 100 MG CAPS, Take by mouth in the morning, Disp: , Rfl:     Probiotic Product (MVW COMPLETE PROBIOTIC PO), Take by mouth, Disp: , Rfl:     zolpidem (AMBIEN) 10 mg tablet, Take 0.5 tablets (5 mg total) by mouth daily at bedtime as needed for  sleep, Disp: 30 tablet, Rfl: 0    Current Allergies     Allergies as of 02/20/2024 - Reviewed 02/20/2024   Allergen Reaction Noted    Androgens Anaphylaxis 11/11/2021    Cefazolin Hives, GI Intolerance, and Swelling 05/03/2016    Ciprofloxacin Hives 05/03/2016    Other Other (See Comments) 07/02/2017    Penicillins Hives and Rash 05/03/2016    Prednisone Hives, GI Intolerance, Swelling, and Vomiting 05/03/2016    Vancomycin Hives, GI Intolerance, Diarrhea, and Rash 05/03/2016    Aspirin GI Intolerance and Hives 05/03/2016    Cortisone Syncope 03/25/2019    Adhesive [medical tape] Rash 08/07/2019    Beef allergy - food allergy Diarrhea 03/23/2021    Gluten meal - food allergy GI Intolerance 03/23/2021    Lactose - food allergy Diarrhea 03/23/2021    Oxycodone Rash and Vomiting 03/25/2019    Pataday [olopatadine hcl] Eye Swelling 12/23/2022            The following portions of the patient's history were reviewed and updated as appropriate: allergies, current medications, past family history, past medical history, past social history, past surgical history and problem list.     Past Medical History:   Diagnosis Date    Arthritis     Carpal tunnel syndrome     GERD (gastroesophageal reflux disease)     Hiatal hernia     Hypertension     Insomnia     Pneumonia     PONV (postoperative nausea and vomiting)     Renal calculi     Sleep deprivation     chronically    Vitamin D deficiency     Vitamin D toxicity 2011    with leaky gut syndrome       Past Surgical History:   Procedure Laterality Date    BUNIONECTOMY Bilateral     CARPAL TUNNEL RELEASE Right 2015    COLONOSCOPY      CYSTOSCOPY N/A 6/6/2019    Procedure: CYSTOSCOPY;  Surgeon: Brodie Romero MD;  Location: [facility];  Service: Gynecology Oncology    ESOPHAGOGASTRODUODENOSCOPY N/A 5/5/2016    Procedure: ESOPHAGOGASTRODUODENOSCOPY (EGD);  Surgeon: Enmanuel Balbuena MD;  Location: [facility];  Service:     FRACTURE SURGERY      L arm     NERVE BLOCK       R Knee     "OOPHORECTOMY Left     GA COLONOSCOPY FLX DX W/COLLJ SPEC WHEN PFRMD N/A 6/28/2016    Procedure: COLONOSCOPY;  Surgeon: Enmanuel Balbuena MD;  Location: [facility];  Service: Gastroenterology    GA ESOPHAGOGASTRODUODENOSCOPY TRANSORAL DIAGNOSTIC N/A 10/19/2018    Procedure: ESOPHAGOGASTRODUODENOSCOPY (EGD);  Surgeon: Enmanuel Balbuena MD;  Location: [facility];  Service: Gastroenterology    GA LAPS TOTAL HYSTERECT 250 GM/< W/RMVL TUBE/OVARY N/A 6/6/2019    Procedure: ROBOTIC TOTAL LAPAROSCOPIC HYSTERECTOMY, RIGHT SALPINGO-OOPHORECTOMY, EXTENSIVE ADHESIOLYSIS, APPROXIMATELY 45 MIN;  Surgeon: Brodie Romero MD;  Location: [facility];  Service: Gynecology Oncology    ROTATOR CUFF REPAIR Bilateral     TOE SURGERY Left     left 5th toe shaved down due to repeated fx    TOTAL KNEE ARTHROPLASTY Left        Family History   Problem Relation Age of Onset    Heart failure Mother     Other Mother         respiratory failure    Heart attack Mother     Heart failure Father         respiratory failure    Heart attack Father     Breast cancer Sister     No Known Problems Sister     Kidney failure Brother     Alcohol abuse Brother     No Known Problems Brother     Diabetes Brother     Hypertension Daughter     Arrhythmia Neg Hx     Clotting disorder Neg Hx     Fainting Neg Hx     Anuerysm Neg Hx     Stroke Neg Hx     Hyperlipidemia Neg Hx     Asthma Neg Hx          Medications have been verified.        Objective   /60   Pulse 87   Temp 97.9 °F (36.6 °C)   Resp 18   Ht 4' 9\" (1.448 m)   Wt 64.4 kg (142 lb)   SpO2 99%   BMI 30.73 kg/m²        Physical Exam     Physical Exam  Vitals and nursing note reviewed.   Constitutional:       General: She is not in acute distress.     Appearance: Normal appearance. She is not ill-appearing, toxic-appearing or diaphoretic.   HENT:      Head: Normocephalic.      Right Ear: Tympanic membrane, ear canal and external ear normal. There is no impacted cerumen.      Left Ear: Tympanic membrane, " ear canal and external ear normal. There is no impacted cerumen.      Nose: Rhinorrhea present. No congestion.      Mouth/Throat:      Mouth: Mucous membranes are moist.      Pharynx: Posterior oropharyngeal erythema present.   Cardiovascular:      Rate and Rhythm: Normal rate and regular rhythm.      Pulses: Normal pulses.      Heart sounds: Normal heart sounds. No murmur heard.  Pulmonary:      Effort: Pulmonary effort is normal. No respiratory distress.      Breath sounds: Normal breath sounds. No stridor. No wheezing, rhonchi or rales.   Chest:      Chest wall: No tenderness.   Musculoskeletal:         General: Normal range of motion.   Lymphadenopathy:      Head:      Right side of head: Tonsillar adenopathy present.      Left side of head: Tonsillar adenopathy present.      Cervical: No cervical adenopathy.   Skin:     General: Skin is warm.   Neurological:      Mental Status: She is alert.

## 2024-02-21 PROBLEM — Z12.39 BREAST CANCER SCREENING: Status: RESOLVED | Noted: 2019-04-29 | Resolved: 2024-02-21

## 2024-02-21 PROBLEM — J18.9 PNEUMONIA OF BOTH LOWER LOBES DUE TO INFECTIOUS ORGANISM: Status: RESOLVED | Noted: 2024-01-10 | Resolved: 2024-02-21

## 2024-03-13 ENCOUNTER — OFFICE VISIT (OUTPATIENT)
Dept: SLEEP CENTER | Facility: CLINIC | Age: 79
End: 2024-03-13
Payer: MEDICARE

## 2024-03-13 VITALS
WEIGHT: 142 LBS | BODY MASS INDEX: 30.63 KG/M2 | SYSTOLIC BLOOD PRESSURE: 130 MMHG | HEART RATE: 79 BPM | OXYGEN SATURATION: 93 % | HEIGHT: 57 IN | DIASTOLIC BLOOD PRESSURE: 70 MMHG

## 2024-03-13 DIAGNOSIS — G47.33 MODERATE OBSTRUCTIVE SLEEP APNEA: Primary | ICD-10-CM

## 2024-03-13 DIAGNOSIS — J30.2 SEASONAL ALLERGIES: ICD-10-CM

## 2024-03-13 DIAGNOSIS — I10 BENIGN ESSENTIAL HYPERTENSION: ICD-10-CM

## 2024-03-13 DIAGNOSIS — E66.9 OBESITY (BMI 30-39.9): ICD-10-CM

## 2024-03-13 DIAGNOSIS — G47.34 SLEEP RELATED HYPOXIA: ICD-10-CM

## 2024-03-13 DIAGNOSIS — J45.20 ASTHMA IN ADULT, MILD INTERMITTENT, UNCOMPLICATED: ICD-10-CM

## 2024-03-13 DIAGNOSIS — K21.9 GASTROESOPHAGEAL REFLUX DISEASE WITHOUT ESOPHAGITIS: Chronic | ICD-10-CM

## 2024-03-13 DIAGNOSIS — F51.01 PRIMARY INSOMNIA: ICD-10-CM

## 2024-03-13 PROCEDURE — G2211 COMPLEX E/M VISIT ADD ON: HCPCS | Performed by: INTERNAL MEDICINE

## 2024-03-13 PROCEDURE — 99204 OFFICE O/P NEW MOD 45 MIN: CPT | Performed by: INTERNAL MEDICINE

## 2024-03-13 NOTE — PROGRESS NOTES
Consultation - Sleep Center   Erin Suazo  78 y.o. female  :1945  MRN:[id number]  DOS:3/13/2024    Physician Requesting Consult: Cholo Field MD             Reason for Consult : At your kind request I saw Erin Suazo for initial sleep evaluation today.  She is accompanied by her son.  He was diagnosed with obstructive sleep apnea in the past but not treated - she declined CPAP or alternative therapies even while being aware of the risks of untreated obstructive sleep apnea.  Patient's now presents with: Sleep difficulties for which she uses Ambien as needed and is here at the behest of her PCP for reevaluation.    Results of prior studies in 2013: A diagnostic study demonstrated AHI of 21/h with minimum oxygen saturation of 85% and nearly the entire study spent below 90% saturation.  During the subsequent therapeutic study, BiPAP at 21/16 cm H2O reduced AHI to 0/h with minimum oxygen saturation of 90%.     PFSH, Problem List, Medications & Allergies were reviewed in EMR.    Erin  has a past medical history of Arthritis, Carpal tunnel syndrome, GERD (gastroesophageal reflux disease), Hiatal hernia, Hypertension, Insomnia, Pneumonia, PONV (postoperative nausea and vomiting), Renal calculi, Sleep deprivation, Vitamin D deficiency, and Vitamin D toxicity ().      She has a current medication list which includes the following prescription(s): acetaminophen, benzonatate, losartan-hydrochlorothiazide, nattokinase, probiotic product, and zolpidem.      HPI: She sleeps alone and is not aware of snoring.  However, family report snoring but have not noted breathing difficulties during sleep.  Erin is un aware of of the symptoms. Other complaints: A solitary panic attack arousing out of sleep approximately a year ago but no recurrence. Restless Leg Syndrome: reports no suggestive symptoms but reports knee pain..    Parasomnia: no features reported    Sleep Routine (averaged): Typical Bedtime: 9:30 PM.   "Gets OOB: 5:30 AM. TIB:8 hrs.   Sleep latency:<  30 minutes; Sleep Interruptions: 2-, because of nocturia and at times struggles to fall back asleep. Awakens: Spontaneously  Upon awakening: feels refreshed.  She estimates getting hrs sleep.  Daytime Function:Erin denies daytime sleepiness may lay down occasionally \"when I am very tired but do not fall asleep \".. She rated herself at Total score: 0 /24 on the [place] Sleepiness Scale.     Habits:   reports that she quit smoking about 48 years ago. Her smoking use included cigarettes. She started smoking about 61 years ago. She has a 13 pack-year smoking history. She has never used smokeless tobacco.;  reports no history of alcohol use.; Reports no history of drug use.;  E-Cigarette/Vaping  Never User; Caffeine use:limited; Exercise routine: \"not enough\".    Occupation:     Family History: Negative for sleep disturbance.  ROS: Significant for around 8 pounds intentional weight reduction in the past few months.  She has nasal congestion due to environmental allergies for which she uses over-the-counter medications.  She states asthma is controlled and has not been needing inhalers. She reported no other respiratory or cardiac symptoms.. She reports episodic acid reflux.    EXAM:  /70   Pulse 79   Ht 4' 9\" (1.448 m)   Wt 64.4 kg (142 lb)   SpO2 93%   BMI 30.73 kg/m²    General: Well groomed female, well appearing, in no apparent distress.   Neurological: Alert and orientated; cooperative; Cranial nerves intact;    Psychiatric: Speech: Clear and coherent; normal mood, affect & thought   Skin: Warm and dry; Color& Hydration good; no facial rashes or lesions   HEENT:  Craniofacial anatomy: normal Sinuses: Non-tender. TMJ: Normal    Eyes: EOM's intact; conjunctiva/corneas clear   Ears: Appear normal     Nasal Airway: is patent Septum: Slightly deviated; Turbinates: Normal; Rhinorrhea: None  Mouth: Lips: Normal posture; Dentition: normal . Mucosa: Moist; Hard " "Palate:normal    Oropharryx: crowded and AP narrowing Tongue: Mallampati:Class IV and MobileSoft Palate:  redundant  Tonsils: absent  Neck:; neck Circumference: 14.5 \"; supple; no abnormal masses; Thyroid: Normal. Trachea: Central.    Lymph: No cervical or submandibular Lymhadenopathy  Heart: S1,S2 normal; RRR; no gallop; no murmur   Lungs: Respiratory Effort: Normal. Air entry good bilaterally.  No wheezes.  No rales  Abdomen: Obese, soft & non-tender    Extremities: No pedal edema.  No clubbing or cyanosis.    Musculoskeletal:  Motor normal; Gait: Normal.       Last CBC and CMP in December 2023 with essentially normal.    IMPRESSION: Primary/Secondary Sleep Diagnoses (to Medical or Psych conditions) & Comorbidities   1. Moderate obstructive sleep apnea  Ambulatory Referral to Sleep Medicine      2. Sleep related hypoxia        3. Primary insomnia        4. Asthma in adult, mild intermittent, uncomplicated        5. Seasonal allergies        6. Benign essential hypertension        7. Gastroesophageal reflux disease without esophagitis        8. Obesity (BMI 30-39.9)             PLAN:   1. I reviewed results of the Sleep study with the patient.   2. With respect to above conditions, I counseled on pathophysiology, diagnosis, treatment options, risks and benefits; inter-relationship and effects on symptoms and comorbidities; risks of no treatment; costs and insurance aspects.   3. Patient declined adamantly positive airway pressure therapy or desensitization.    4.  I recommended reevaluation in view of her weight reduction and since she states asthma is well-controlled, she may no longer be experiencing severe hypoxia as noted on the prior study and may be suitable candidate for alternative therapy.  Unfortunately, she is also not interested in alternative therapies.  5. Indicates understanding risks of untreated obstructive sleep apnea, including stroke and sudden cardiac death that are dose related and is willing " "to accept.  6. I recommended continuing efforts at weight reduction and positional therapy.  Also ensuring adequate treatment of allergies and asthma.  7.  Multi component Cognitive behavioral therapy for Insomnia undertaken - Sleep Restriction, Stimulus control, Relaxation techniques and Sleep hygiene were discussed.  With these strategies, she should not need Ambien that she states she is using very infrequently.  8. Patient was invited to return if interested in attempting positive airway pressure therapy.    Sincerely,      Authenticated electronically on 03/13/24   Board Certified Specialist     Portions of the record may have been created with voice recognition software. Occasional wrong word or \"sound a like\" substitutions may have occurred due to the inherent limitations of voice recognition software. There may also be notations and random deletions of words or characters from malfunctioning software. Read the chart carefully and recognize, using context, where substitutions/deletions have occurred.     "

## 2024-03-13 NOTE — PATIENT INSTRUCTIONS
What you can do to improve your sleep: (Sleep Hygiene) Basic rules for a good night's sleep  Create a regular sleep schedule. This will help you form a sleep routine. Keep a record of your sleep patterns, and any sleeping problems you have. Bring the record to follow-up visits with healthcare providers.  Avoid prolonged use of light-emitting screens before bedtime or watching TV in bed.  Avoid forcing sleep.  Do not take naps. Naps could make it hard for you to fall asleep at bedtime.  Deal with your worries before bedtime.  Keep your bedroom cool, quiet, and dark. Turn on white noise, such as a fan, to help you relax. Do not use your bed for any activity that will keep you awake. Do not read, exercise, eat, or watch TV in your bedroom.  Get up if you do not fall asleep within 20 minutes. Move to another room and do something relaxing until you become sleepy.  Limit caffeine, alcohol, nicotine and food to earlier in the day. Only drink caffeine in the morning. Do not drink alcohol within 6 hours of bedtime. Do not eat a heavy meal right before you go to bed. Avoid smoking, especially in the evening.  Exercise regularly. Daily exercise will help you sleep better. Do not exercise within 4 hours of bedtime.  Stimulus control therapy rules  1. Go to bed only when sleepy.  2. Do not watch television, read, eat, or worry while in bed. Use bed only for sleep and sex.  3. Get out of bed if unable to fall asleep within 20 minutes and go to another room. Return to bed only when sleepy. Repeat this step as many times as necessary throughout the night.  4. Set an alarm clock to wake up at a fixed time each morning, including weekends.  5. Do not take a nap during the day.  Data from: Steven RR, Spencer ML. Nonpharmacologic treatments of insomnia. J Clin Psychiatry 1992; 53:37.  Go to AASM website for more information: Sleepeducation.org  Recommended Reading: Book by authors Melvin Zazueta   No More sleepless nights    What is  TOPHER?   Obstructive sleep apnea is a common and serious sleep disorder that causes you to stop breathing during sleep. The airway repeatedly becomes blocked, limiting the amount of air that reaches your lungs. When this happens, you may snore loudly or making choking noises as you try to breathe. Your brain and body becomes oxygen deprived and you may wake up. This may happen a few times a night, or in more severe cases, several hundred times a night.   Sleep apnea can make you wake up in the morning feeling tired or unrefreshed even though you have had a full night of sleep. During the day, you may feel fatigued, have difficulty concentrating or you may even unintentionally fall asleep. This is because your body is waking up numerous times throughout the night, even though you might not be conscious of each awakening.  The lack of oxygen your body receives can have negative long-term consequences for your health. This includes:  High blood pressure  Heart disease  Irregular heart rhythms  Stroke  Pre-diabetes and diabetes  Depression  Testing  An objective evaluation of your sleep may be needed before your board certified sleep physician can make a diagnosis. Options include:   In-lab overnight sleep study  This type of sleep study requires you to stay overnight at a sleep center, in a bed that may resemble a hotel room. You will sleep with sensors hooked up to various parts of your body. These sensors record your brain waves, heartbeat, breathing and movement. An overnight sleep study also provides your doctor with the most complete information about your sleep. Learn more about an overnight sleep study.   Home sleep apnea test  Some patients with high risk factors for obstructive sleep apnea and no other medical disorders may be candidates for a home sleep apnea test. The testing equipment differs in that it is less complicated than what is used in an overnight sleep study. As such, does not give all the data an  in-lab will and if negative, may not mean you do not have the problem.  Treatment for sleep apnea includes using a continuous positive airway pressure (CPAP) machine to keep your airway open during sleep. A mask is placed over your nose and mouth, or just your nose. The mask is hooked to the CPAP machine that blows a gentle stream of air into the mask when you breathe. This helps keep your airway open so you can breathe more regularly. Extra oxygen may be given to you through the machine. You may be given a mouth device. It looks like a mouth guard or dental retainer and stops your tongue and mouth tissues from blocking your throat while you sleep. Surgery may be needed to remove extra tissues that block your mouth, throat, or nose.  Manage sleep apnea:   Do not smoke. Nicotine and other chemicals in cigarettes and cigars can cause lung damage. Ask your healthcare provider for information if you currently smoke and need help to quit. E-cigarettes or smokeless tobacco still contain nicotine. Talk to your healthcare provider before you use these products.  Do not drink alcohol or take sedative medicine before you go to sleep. Alcohol and sedatives can relax the muscles and tissues around your throat. This can block the airflow to your lungs.  Maintain a healthy weight. Excess tissue around your throat may restrict your breathing. Ask your healthcare provider for information if you need to lose weight.  Sleep on your side or use pillows designed to prevent sleep apnea. This prevents your tongue or other tissues from blocking your throat. You can also raise the head of your bed.  Driving Safety. Refrain from driving when drowsy.   Follow up with your healthcare provider as directed: Write down your questions so you remember to ask them during your visits.  Go to AASM website for more information: Sleepeducation.org  What is TOPHER?   Obstructive sleep apnea is a common and serious sleep disorder that causes you to stop  breathing during sleep. The airway repeatedly becomes blocked, limiting the amount of air that reaches your lungs. When this happens, you may snore loudly or making choking noises as you try to breathe. Your brain and body becomes oxygen deprived and you may wake up. This may happen a few times a night, or in more severe cases, several hundred times a night.   Sleep apnea can make you wake up in the morning feeling tired or unrefreshed even though you have had a full night of sleep. During the day, you may feel fatigued, have difficulty concentrating or you may even unintentionally fall asleep. This is because your body is waking up numerous times throughout the night, even though you might not be conscious of each awakening.  The lack of oxygen your body receives can have negative long-term consequences for your health. This includes:  High blood pressure  Heart disease  Irregular heart rhythms  Stroke  Pre-diabetes and diabetes  Depression  Testing  An objective evaluation of your sleep may be needed before your board certified sleep physician can make a diagnosis. Options include:   In-lab overnight sleep study  This type of sleep study requires you to stay overnight at a sleep center, in a bed that may resemble a hotel room. You will sleep with sensors hooked up to various parts of your body. These sensors record your brain waves, heartbeat, breathing and movement. An overnight sleep study also provides your doctor with the most complete information about your sleep. Learn more about an overnight sleep study.   Home sleep apnea test  Some patients with high risk factors for obstructive sleep apnea and no other medical disorders may be candidates for a home sleep apnea test. The testing equipment differs in that it is less complicated than what is used in an overnight sleep study. As such, does not give all the data an in-lab will and if negative, may not mean you do not have the problem.  Treatment for sleep  apnea includes using a continuous positive airway pressure (CPAP) machine to keep your airway open during sleep. A mask is placed over your nose and mouth, or just your nose. The mask is hooked to the CPAP machine that blows a gentle stream of air into the mask when you breathe. This helps keep your airway open so you can breathe more regularly. Extra oxygen may be given to you through the machine. You may be given a mouth device. It looks like a mouth guard or dental retainer and stops your tongue and mouth tissues from blocking your throat while you sleep. Surgery may be needed to remove extra tissues that block your mouth, throat, or nose.  Manage sleep apnea:   Do not smoke. Nicotine and other chemicals in cigarettes and cigars can cause lung damage. Ask your healthcare provider for information if you currently smoke and need help to quit. E-cigarettes or smokeless tobacco still contain nicotine. Talk to your healthcare provider before you use these products.  Do not drink alcohol or take sedative medicine before you go to sleep. Alcohol and sedatives can relax the muscles and tissues around your throat. This can block the airflow to your lungs.  Maintain a healthy weight. Excess tissue around your throat may restrict your breathing. Ask your healthcare provider for information if you need to lose weight.  Sleep on your side or use pillows designed to prevent sleep apnea. This prevents your tongue or other tissues from blocking your throat. You can also raise the head of your bed.  Driving Safety. Refrain from driving when drowsy.   Follow up with your healthcare provider as directed: Write down your questions so you remember to ask them during your visits.  Go to AASM website for more information: Sleepeducation.org    Nursing Support:  When: Monday through Friday 7A-5PM except holidays  Where: Our direct line is [phone].    If you are having a true emergency please call 911.  In the event that the line is  busy or it is after hours please leave a voice message and we will return your call.  Please speak clearly, leaving your full name, birth date, best number to reach you and the reason for your call.   Medication refills: We will need the name of the medication, the dosage, the ordering provider, whether you get a 30 or 90 day refill, and the pharmacy name and address.  Medications will be ordered by the provider only.  Nurses cannot call in prescriptions.  Please allow 7 days for medication refills.  Physician requested updates: If your provider requested that you call with an update after starting medication, please be ready to provide us the medication and dosage, what time you take your medication, the time you attempt to fall asleep, time you fall asleep, when you wake up, and what time you get out of bed.  Sleep Study Results: We will contact you with sleep study results and/or next steps after the physician has reviewed your testing.

## 2024-04-03 ENCOUNTER — OFFICE VISIT (OUTPATIENT)
Dept: FAMILY MEDICINE CLINIC | Facility: CLINIC | Age: 79
End: 2024-04-03
Payer: MEDICARE

## 2024-04-03 VITALS
SYSTOLIC BLOOD PRESSURE: 114 MMHG | TEMPERATURE: 97.8 F | BODY MASS INDEX: 30.2 KG/M2 | WEIGHT: 140 LBS | OXYGEN SATURATION: 97 % | HEIGHT: 57 IN | DIASTOLIC BLOOD PRESSURE: 68 MMHG | HEART RATE: 84 BPM

## 2024-04-03 DIAGNOSIS — M25.552 LEFT HIP PAIN: ICD-10-CM

## 2024-04-03 DIAGNOSIS — I10 BENIGN ESSENTIAL HYPERTENSION: Primary | ICD-10-CM

## 2024-04-03 DIAGNOSIS — M25.512 LEFT SHOULDER PAIN, UNSPECIFIED CHRONICITY: ICD-10-CM

## 2024-04-03 DIAGNOSIS — I10 ESSENTIAL HYPERTENSION: ICD-10-CM

## 2024-04-03 DIAGNOSIS — J30.2 SEASONAL ALLERGIES: ICD-10-CM

## 2024-04-03 DIAGNOSIS — G89.4 CHRONIC PAIN SYNDROME: ICD-10-CM

## 2024-04-03 DIAGNOSIS — I10 PRIMARY HYPERTENSION: ICD-10-CM

## 2024-04-03 DIAGNOSIS — Z72.820 SLEEP DEPRIVATION: Chronic | ICD-10-CM

## 2024-04-03 PROBLEM — F11.20 CONTINUOUS OPIOID DEPENDENCE (HCC): Status: RESOLVED | Noted: 2022-07-06 | Resolved: 2024-04-03

## 2024-04-03 PROCEDURE — G2211 COMPLEX E/M VISIT ADD ON: HCPCS | Performed by: FAMILY MEDICINE

## 2024-04-03 PROCEDURE — 99214 OFFICE O/P EST MOD 30 MIN: CPT | Performed by: FAMILY MEDICINE

## 2024-04-03 RX ORDER — LOSARTAN POTASSIUM AND HYDROCHLOROTHIAZIDE 25; 100 MG/1; MG/1
1 TABLET ORAL DAILY
Qty: 90 TABLET | Refills: 1 | Status: SHIPPED | OUTPATIENT
Start: 2024-04-03

## 2024-04-03 RX ORDER — ZOLPIDEM TARTRATE 10 MG/1
5 TABLET ORAL
Qty: 30 TABLET | Refills: 0 | Status: SHIPPED | OUTPATIENT
Start: 2024-04-03

## 2024-04-03 NOTE — PROGRESS NOTES
Name: Erin Suazo      : 1945      MRN: [id number]  Encounter Provider: Cholo Field MD  Encounter Date: 4/3/2024   Encounter department: [facility]    Assessment & Plan     1. Benign essential hypertension    2. Primary hypertension    3. Seasonal allergies    4. Left hip pain    5. Chronic pain syndrome - Right    6. Left shoulder pain, unspecified chronicity    7. Essential hypertension  -     losartan-hydrochlorothiazide (HYZAAR) 100-25 MG per tablet; Take 1 tablet by mouth daily    8. Sleep deprivation  -     zolpidem (AMBIEN) 10 mg tablet; Take 0.5 tablets (5 mg total) by mouth daily at bedtime as needed for sleep      Reviewed patient's medication at this time, her blood pressure today is 114/68, at goal  Denies any recent seasonal allergy symptoms  Please continue Tylenol up to 2 tablets every 8 hours as needed for your left hip pain, if there is severe pain with walking slow down take a break  Follow-up with orthopedic after you return from your trip  Patient does continue to have occasional chronic pain symptoms, relatively stable  Refill Ambien at this time, take 0.5 only when needed         Subjective     HPI    78-year-old female patient presents for follow-up regarding her most recent evaluation.  Patient was seen on 2024 for bilateral lower lobe pneumonia.  She was treated using antibiotics, repeat x-ray completed most recently on 2024 showing no acute cardiopulmonary disease.  Patient reports she is doing well now, denies any respiratory concerns.  Had plan for upcoming road trip followed by a cruise at [place].    Patient continues to have some pain involving the left hip.  Reports she missed stepped on a step and feels like she may have pulled her groin.  At this time the pain is relatively well-controlled using Tylenol.  She has appointment with sports medicine/orthopedic and has been going to chiropractor.    Pain is localized to the inside of the  left groin.  Patient denies any significant pain at a neutral sitting position however prolonged walking does cause significant discomfort.    Review of Systems   Constitutional:  Negative for chills and fever.   HENT:  Negative for congestion, rhinorrhea, sinus pressure and sinus pain.    Respiratory:  Negative for chest tightness and shortness of breath.    Cardiovascular:  Negative for chest pain.   Gastrointestinal:  Negative for abdominal pain, constipation, diarrhea, nausea and vomiting.   Musculoskeletal:  Positive for arthralgias and back pain.        Baseline chronic pain   Allergic/Immunologic: Negative for environmental allergies.   Neurological:  Negative for dizziness, light-headedness and headaches.   Psychiatric/Behavioral:  Positive for sleep disturbance (occasional insomnia due to pain).        Past Medical History:   Diagnosis Date    Arthritis     Carpal tunnel syndrome     GERD (gastroesophageal reflux disease)     Hiatal hernia     Hypertension     Insomnia     Pneumonia     PONV (postoperative nausea and vomiting)     Renal calculi     Sleep deprivation     chronically    Vitamin D deficiency     Vitamin D toxicity 2011    with leaky gut syndrome     Past Surgical History:   Procedure Laterality Date    BUNIONECTOMY Bilateral     CARPAL TUNNEL RELEASE Right 2015    COLONOSCOPY      CYSTOSCOPY N/A 6/6/2019    Procedure: CYSTOSCOPY;  Surgeon: Brodie Romero MD;  [facility];  Service: Gynecology Oncology    ESOPHAGOGASTRODUODENOSCOPY N/A 5/5/2016    Procedure: ESOPHAGOGASTRODUODENOSCOPY (EGD);  Surgeon: Enmanuel Balbuena MD;  [facility];  Service:     FRACTURE SURGERY      L arm     NERVE BLOCK       R Knee    OOPHORECTOMY Left     OH COLONOSCOPY FLX DX W/COLLJ SPEC WHEN PFRMD N/A 6/28/2016    Procedure: COLONOSCOPY;  Surgeon: Enmanuel Balbuena MD;  [facility];  Service: Gastroenterology    OH ESOPHAGOGASTRODUODENOSCOPY TRANSORAL DIAGNOSTIC N/A 10/19/2018    Procedure:  ESOPHAGOGASTRODUODENOSCOPY (EGD);  Surgeon: Enmanuel Balbuena MD;  [facility];  Service: Gastroenterology    WV LAPS TOTAL HYSTERECT 250 GM/< W/RMVL TUBE/OVARY N/A 2019    Procedure: ROBOTIC TOTAL LAPAROSCOPIC HYSTERECTOMY, RIGHT SALPINGO-OOPHORECTOMY, EXTENSIVE ADHESIOLYSIS, APPROXIMATELY 45 MIN;  Surgeon: Brodie Romero MD;  Location:  MAIN OR;  Service: Gynecology Oncology    ROTATOR CUFF REPAIR Bilateral     TOE SURGERY Left     left 5th toe shaved down due to repeated fx    TOTAL KNEE ARTHROPLASTY Left      Family History   Problem Relation Age of Onset    Heart failure Mother     Other Mother         respiratory failure    Heart attack Mother     Heart failure Father         respiratory failure    Heart attack Father     Breast cancer Sister     No Known Problems Sister     Kidney failure Brother     Alcohol abuse Brother     No Known Problems Brother     Diabetes Brother     Hypertension Daughter     Arrhythmia Neg Hx     Clotting disorder Neg Hx     Fainting Neg Hx     Anuerysm Neg Hx     Stroke Neg Hx     Hyperlipidemia Neg Hx     Asthma Neg Hx      Social History     Socioeconomic History    Marital status: Single     Spouse name: None    Number of children: None    Years of education: None    Highest education level: None   Occupational History    None   Tobacco Use    Smoking status: Former     Current packs/day: 0.00     Average packs/day: 1 pack/day for 13.0 years (13.0 ttl pk-yrs)     Types: Cigarettes     Start date:      Quit date:      Years since quittin.2    Smokeless tobacco: Never    Tobacco comments:     Quit > 30 years ago    Vaping Use    Vaping status: Never Used   Substance and Sexual Activity    Alcohol use: No    Drug use: No    Sexual activity: None     Comment: no partner   Other Topics Concern    None   Social History Narrative    None     Social Determinants of Health     Financial Resource Strain: Patient Declined (7/10/2023)    Overall Financial  Resource Strain (CARDIA)     Difficulty of Paying Living Expenses: Patient declined   Food Insecurity: No Food Insecurity (9/16/2022)    Hunger Vital Sign     Worried About Running Out of Food in the Last Year: Never true     Ran Out of Food in the Last Year: Never true   Transportation Needs: No Transportation Needs (7/10/2023)    PRAPARE - Transportation     Lack of Transportation (Medical): No     Lack of Transportation (Non-Medical): No   Physical Activity: Not on file   Stress: Not on file   Social Connections: Not on file   Intimate Partner Violence: Not on file   Housing Stability: Unknown (9/16/2022)    Housing Stability Vital Sign     Unable to Pay for Housing in the Last Year: No     Number of Places Lived in the Last Year: Not on file     Unstable Housing in the Last Year: No     Current Outpatient Medications on File Prior to Visit   Medication Sig    Acetaminophen 500 MG TAKE 2 CAPSULES TWICE A DAY    Nattokinase 100 MG CAPS Take by mouth in the morning    Probiotic Product (MVW COMPLETE PROBIOTIC PO) Take by mouth    [DISCONTINUED] losartan-hydrochlorothiazide (HYZAAR) 100-25 MG per tablet TAKE 1 TABLET BY MOUTH EVERY DAY    [DISCONTINUED] zolpidem (AMBIEN) 10 mg tablet Take 0.5 tablets (5 mg total) by mouth daily at bedtime as needed for sleep    benzonatate (TESSALON PERLES) 100 mg capsule Take 1 capsule (100 mg total) by mouth 3 (three) times a day as needed for cough (Patient not taking: Reported on 4/3/2024)     Allergies   Allergen Reactions    Androgens Anaphylaxis    Cefazolin Hives, GI Intolerance and Swelling    Ciprofloxacin Hives    Other Other (See Comments)     STEROIDS- GI INTOLERANCE    Penicillins Hives and Rash    Prednisone Hives, GI Intolerance, Swelling and Vomiting    Vancomycin Hives, GI Intolerance, Diarrhea and Rash    Aspirin GI Intolerance and Hives     Even low dose     Cortisone Syncope    Adhesive [Medical Tape] Rash    Beef Allergy - Food Allergy Diarrhea    Gluten Meal -  "Food Allergy GI Intolerance    Lactose - Food Allergy Diarrhea    Oxycodone Rash and Vomiting    Pataday [Olopatadine Hcl] Eye Swelling     Immunization History   Administered Date(s) Administered    COVID-19 J&J (ClassOwl) vaccine 0.5 mL 05/27/2021, 02/15/2022    Tdap 11/25/2013, 02/08/2020       Objective     /68 (BP Location: Left arm, Patient Position: Sitting, Cuff Size: Standard)   Pulse 84   Temp 97.8 °F (36.6 °C)   Ht 4' 9\" (1.448 m)   Wt 63.5 kg (140 lb)   SpO2 97%   BMI 30.30 kg/m²     Physical Exam  Vitals reviewed.   Constitutional:       General: She is not in acute distress.     Appearance: Normal appearance. She is well-developed. She is obese. She is not ill-appearing, toxic-appearing or diaphoretic.   HENT:      Head: Normocephalic and atraumatic.      Nose: Nose normal.   Eyes:      Pupils: Pupils are equal, round, and reactive to light.   Cardiovascular:      Rate and Rhythm: Normal rate and regular rhythm.      Pulses: Normal pulses.      Heart sounds: Normal heart sounds. No murmur heard.     No friction rub. No gallop.   Pulmonary:      Effort: Pulmonary effort is normal. No respiratory distress.      Breath sounds: Normal breath sounds.   Abdominal:      General: Bowel sounds are normal. There is no distension.      Palpations: Abdomen is soft.      Tenderness: There is no abdominal tenderness. There is no guarding.   Musculoskeletal:         General: No swelling or deformity.   Skin:     General: Skin is warm and dry.      Capillary Refill: Capillary refill takes less than 2 seconds.      Coloration: Skin is not jaundiced.   Neurological:      General: No focal deficit present.      Mental Status: She is alert and oriented to person, place, and time. Mental status is at baseline.      Cranial Nerves: No cranial nerve deficit.   Psychiatric:         Mood and Affect: Mood normal.       Cholo Field MD    "

## 2024-04-26 ENCOUNTER — TELEPHONE (OUTPATIENT)
Dept: FAMILY MEDICINE CLINIC | Facility: CLINIC | Age: 79
End: 2024-04-26

## 2024-04-26 NOTE — TELEPHONE ENCOUNTER
Called pt to change her 7/3 office visit to her wellness visit after 7/10. Requested callback to make that change.    ----- Message from Fatmata Ireland sent at 4/25/2024  1:36 PM EDT -----  Regarding: AWV  Patient schedule 7/3 for office visit. Please call to move appt and make awv after 7/10

## 2024-05-15 ENCOUNTER — HOSPITAL ENCOUNTER (OUTPATIENT)
Dept: RADIOLOGY | Facility: HOSPITAL | Age: 79
Discharge: HOME | End: 2024-05-15
Attending: ORTHOPAEDIC SURGERY
Payer: MEDICARE

## 2024-05-15 ENCOUNTER — OFFICE VISIT (OUTPATIENT)
Dept: OBGYN CLINIC | Facility: CLINIC | Age: 79
End: 2024-05-15
Payer: MEDICARE

## 2024-05-15 VITALS — HEIGHT: 57 IN | WEIGHT: 142.2 LBS | BODY MASS INDEX: 30.68 KG/M2

## 2024-05-15 DIAGNOSIS — M25.552 LEFT HIP PAIN: ICD-10-CM

## 2024-05-15 DIAGNOSIS — M53.3 SACROILIAC JOINT DYSFUNCTION OF LEFT SIDE: ICD-10-CM

## 2024-05-15 DIAGNOSIS — M16.12 ARTHRITIS OF LEFT HIP: Primary | ICD-10-CM

## 2024-05-15 PROCEDURE — 99214 OFFICE O/P EST MOD 30 MIN: CPT | Performed by: ORTHOPAEDIC SURGERY

## 2024-05-15 PROCEDURE — 73502 X-RAY EXAM HIP UNI 2-3 VIEWS: CPT

## 2024-05-15 NOTE — PROGRESS NOTES
Orthopedics          Erin Suazo 78 y.o. female MRN: [id number]      Chief Complaint:   left hip pain    HPI:   78 y.o.female complaining of left hip pain.  Pain started a few months ago when she stepped wrong and sustained what she thought was a muscle pull to her left groin. Pain has worsened over the past few months, exacerbated by activity better with rest. Hurts her at night and wakes her from sleep. She has pain mostly with walking, trying to flex hip such as tying shoes, getting into and out of car. She has tried tylenol for this issue but has only experienced limited relief, also had mild relief from acupuncture.. She reports inability to take steroids due to allergy, and also reports inability to take NSAID's. She denies numbness tingling or weakness. She is not interested in discussing surgical procedures.         Review Of Systems:   Skin: Normal  Neuro: See HPI  Musculoskeletal: See HPI  All other systems reviewed and are negative    Past Medical History:   Past Medical History:   Diagnosis Date    Arthritis     Carpal tunnel syndrome     GERD (gastroesophageal reflux disease)     Hiatal hernia     Hypertension     Insomnia     Pneumonia     PONV (postoperative nausea and vomiting)     Renal calculi     Sleep deprivation     chronically    Vitamin D deficiency     Vitamin D toxicity 2011    with leaky gut syndrome       Past Surgical History:   Past Surgical History:   Procedure Laterality Date    BUNIONECTOMY Bilateral     CARPAL TUNNEL RELEASE Right 2015    COLONOSCOPY      CYSTOSCOPY N/A 6/6/2019    Procedure: CYSTOSCOPY;  Surgeon: Brodie Romero MD;  [facility];  Service: Gynecology Oncology    ESOPHAGOGASTRODUODENOSCOPY N/A 5/5/2016    Procedure: ESOPHAGOGASTRODUODENOSCOPY (EGD);  Surgeon: Enmanuel Balbuena MD;  [facility];  Service:     FRACTURE SURGERY      L arm     NERVE BLOCK       R Knee    OOPHORECTOMY Left     HI COLONOSCOPY FLX DX W/COLLJ SPEC WHEN PFRMD N/A 6/28/2016     Procedure: COLONOSCOPY;  Surgeon: Enmanuel Balbuena MD;  [facility];  Service: Gastroenterology    SC ESOPHAGOGASTRODUODENOSCOPY TRANSORAL DIAGNOSTIC N/A 10/19/2018    Procedure: ESOPHAGOGASTRODUODENOSCOPY (EGD);  Surgeon: Enmanuel Balbuena MD;  [facility];  Service: Gastroenterology    SC LAPS TOTAL HYSTERECT 250 GM/< W/RMVL TUBE/OVARY N/A 2019    Procedure: ROBOTIC TOTAL LAPAROSCOPIC HYSTERECTOMY, RIGHT SALPINGO-OOPHORECTOMY, EXTENSIVE ADHESIOLYSIS, APPROXIMATELY 45 MIN;  Surgeon: Brodie Romero MD;  [facility];  Service: Gynecology Oncology    ROTATOR CUFF REPAIR Bilateral     TOE SURGERY Left     left 5th toe shaved down due to repeated fx    TOTAL KNEE ARTHROPLASTY Left        Family History:  Family history reviewed and non-contributory  Family History   Problem Relation Age of Onset    Heart failure Mother     Other Mother         respiratory failure    Heart attack Mother     Heart failure Father         respiratory failure    Heart attack Father     Breast cancer Sister     No Known Problems Sister     Kidney failure Brother     Alcohol abuse Brother     No Known Problems Brother     Diabetes Brother     Hypertension Daughter     Arrhythmia Neg Hx     Clotting disorder Neg Hx     Fainting Neg Hx     Anuerysm Neg Hx     Stroke Neg Hx     Hyperlipidemia Neg Hx     Asthma Neg Hx          Social History:  Social History     Socioeconomic History    Marital status: Single     Spouse name: None    Number of children: None    Years of education: None    Highest education level: None   Occupational History    None   Tobacco Use    Smoking status: Former     Current packs/day: 0.00     Average packs/day: 1 pack/day for 13.0 years (13.0 ttl pk-yrs)     Types: Cigarettes     Start date:      Quit date:      Years since quittin.4    Smokeless tobacco: Never    Tobacco comments:     Quit > 30 years ago    Vaping Use    Vaping status: Never Used   Substance and Sexual Activity     Alcohol use: No    Drug use: No    Sexual activity: None     Comment: no partner   Other Topics Concern    None   Social History Narrative    None     Social Determinants of Health     Financial Resource Strain: Patient Declined (7/10/2023)    Overall Financial Resource Strain (CARDIA)     Difficulty of Paying Living Expenses: Patient declined   Food Insecurity: No Food Insecurity (9/16/2022)    Hunger Vital Sign     Worried About Running Out of Food in the Last Year: Never true     Ran Out of Food in the Last Year: Never true   Transportation Needs: No Transportation Needs (7/10/2023)    PRAPARE - Transportation     Lack of Transportation (Medical): No     Lack of Transportation (Non-Medical): No   Physical Activity: Not on file   Stress: Not on file   Social Connections: Not on file   Intimate Partner Violence: Not on file   Housing Stability: Unknown (9/16/2022)    Housing Stability Vital Sign     Unable to Pay for Housing in the Last Year: No     Number of Places Lived in the Last Year: Not on file     Unstable Housing in the Last Year: No       Allergies:   Allergies   Allergen Reactions    Androgens Anaphylaxis    Cefazolin Hives, GI Intolerance and Swelling    Ciprofloxacin Hives    Other Other (See Comments)     STEROIDS- GI INTOLERANCE    Penicillins Hives and Rash    Prednisone Hives, GI Intolerance, Swelling and Vomiting    Vancomycin Hives, GI Intolerance, Diarrhea and Rash    Aspirin GI Intolerance and Hives     Even low dose     Cortisone Syncope    Adhesive [Medical Tape] Rash    Beef Allergy - Food Allergy Diarrhea    Gluten Meal - Food Allergy GI Intolerance    Lactose - Food Allergy Diarrhea    Oxycodone Rash and Vomiting    Pataday [Olopatadine Hcl] Eye Swelling       Labs:  0   Lab Value Date/Time    HCT 37.5 12/30/2023 1312    HCT 39.5 07/13/2023 1010    HCT 35.5 09/18/2022 0437    HCT 42.6 09/18/2015 0803    HCT 42.2 08/11/2014 0902    HCT 40.6 08/30/2013 0714    HGB 12.5 12/30/2023 1312    HGB  12.7 07/13/2023 1010    HGB 11.6 09/18/2022 0437    HGB 14.2 09/18/2015 0803    HGB 13.7 08/11/2014 0902    HGB 13.1 08/30/2013 0714    INR 0.93 09/17/2022 1903    WBC 8.72 12/30/2023 1312    WBC 6.04 07/13/2023 1010    WBC 6.54 09/18/2022 0437    WBC 6.42 09/18/2015 0803    WBC 5.83 08/11/2014 0902    WBC 6.24 08/30/2013 0714    ESR 14 05/03/2016 1932       Meds:    Current Outpatient Medications:     Acetaminophen 500 MG, TAKE 2 CAPSULES TWICE A DAY, Disp: 120 capsule, Rfl: 3    losartan-hydrochlorothiazide (HYZAAR) 100-25 MG per tablet, Take 1 tablet by mouth daily, Disp: 90 tablet, Rfl: 1    Nattokinase 100 MG CAPS, Take by mouth in the morning, Disp: , Rfl:     Probiotic Product (MVW COMPLETE PROBIOTIC PO), Take by mouth, Disp: , Rfl:     zolpidem (AMBIEN) 10 mg tablet, Take 0.5 tablets (5 mg total) by mouth daily at bedtime as needed for sleep, Disp: 30 tablet, Rfl: 0    benzonatate (TESSALON PERLES) 100 mg capsule, Take 1 capsule (100 mg total) by mouth 3 (three) times a day as needed for cough (Patient not taking: Reported on 4/3/2024), Disp: 45 capsule, Rfl: 0    Body mass index is 30.77 kg/m².  Wt Readings from Last 3 Encounters:   05/15/24 64.5 kg (142 lb 3.2 oz)   04/03/24 63.5 kg (140 lb)   03/13/24 64.4 kg (142 lb)     Physical Exam:   There were no vitals filed for this visit.    General Appearance:    Alert, cooperative, no distress, appears stated age   Head:    Normocephalic, without obvious abnormality, atraumatic   Eyes:    conjunctiva/corneas clear, both eyes        Nose:   Nares normal, septum midline, no drainage    Throat:   Lips normal; teeth and gums normal   Neck:    symmetrical, trachea midline, ;     thyroid:  no enlargement/   Back:     Symmetric, no curvature, ROM normal   Lungs:   No audible wheezing or labored breathing   Chest Wall:    No tenderness or deformity    Heart:    Regular rate and rhythm               Pulses:   2+ and symmetric all extremities   Skin:   Skin color,  texture, turgor normal, no rashes or lesions   Neurologic:   normal strength, sensation and reflexes     throughout       Musculoskeletal: left lower extremity  Obligate external rotation to left lower extremity with hip flexion  Left hip Non tender to palpation. TTP over left SI joint  ~0.5cm leg length discrepancy left shorter than right  0 IR, 30 deg ER, 90 flexion to full extension, pain with SUZANNE/FADIR  Positive stinchfield  Positive pain with logroll  Sensation intact to light touch dp/sp/tib/kerwin/saph  Motor intact 5/5 hip flex ext ankle pf df and ehl/fhl  Foot warm perfused    Radiology:   I personally reviewed the films.  X-rays left hip shows moderate to severe left hip osteoarthritis. No fracture or dislocation.    _*_*_*_*_*_*_*_*_*_*_*_*_*_*_*_*_*_*_*_*_*_*_*_*_*_*_*_*_*_*_*_*_*_*_*_*_*_*_*_*_*    Assessment:  78 y.o.female with left hip OA. Will provide script for PT and order for injection to left hip. Of note patient can receive toradol but not steroid because she has significant allergy to steroids. She also has left sided SI joint dysfunction for which spine and pain referral placed.    Plan:   Weight bearing as tolerated  left lower extremity  PT  Injection left hip ordered  Tylenol PRN  Can try OTC voltaren gel as she states she used this previously with some effect despite prior documented aspirin allergy.  Follow up in 3 months or sooner if needed      Roel Kay MD                    .

## 2024-05-21 ENCOUNTER — EVALUATION (OUTPATIENT)
Dept: PHYSICAL THERAPY | Facility: MEDICAL CENTER | Age: 79
End: 2024-05-21
Payer: MEDICARE

## 2024-05-21 DIAGNOSIS — M16.12 ARTHRITIS OF LEFT HIP: ICD-10-CM

## 2024-05-21 PROCEDURE — 97112 NEUROMUSCULAR REEDUCATION: CPT | Performed by: PHYSICAL THERAPIST

## 2024-05-21 PROCEDURE — 97161 PT EVAL LOW COMPLEX 20 MIN: CPT | Performed by: PHYSICAL THERAPIST

## 2024-05-21 NOTE — PROGRESS NOTES
PT Evaluation     Today's date: 2024  Patient name: Erin Suazo  : 1945  MRN: [id number]  Referring provider: Cholo Field MD  Dx:   Encounter Diagnosis     ICD-10-CM    1. Arthritis of left hip  M16.12 Ambulatory Referral to Physical Therapy                     Assessment  Impairments: abnormal gait, abnormal muscle tone, abnormal or restricted ROM, abnormal movement, activity intolerance, impaired physical strength, lacks appropriate home exercise program, pain with function, weight-bearing intolerance and unable to perform ADL    Assessment details: Erin Suazo is a 78 y.o. female who presents with Arthritis of left hip.  Patient presents alert and oriented with the above impairments. . Erin will benefit from PT to addres deficits in order to maximize and return to prior level of function.  No further referral appears necessary at this time based upon examination results.  Prognosis is good given HEP compliance. Please contact me if you have any questions or recommendations.     Understanding of Dx/Px/POC: good     Prognosis: good    Goals  Short Term Goals:   1. Pain decreased 2 ratings in 4 weeks  2.  ROM increased 10* in 4 weeks  3.  Strength increased 1/2 grade in 4 weeks    Long Term Goals:   1.  ADLS/IADLS in related activities improved to maximal level in 8 weeks  2.  Walking is improved to maximal level in 8 weeks  3.  Recreation activity is improved to maximal level in 8 weeks.  4.  [place] with HEP in 8 weeks.     Plan  Patient would benefit from: PT eval and skilled physical therapy    Planned therapy interventions: IASTM, manual therapy, neuromuscular re-education, strengthening, stretching, therapeutic exercise, functional ROM exercises, flexibility, abdominal trunk stabilization and home exercise program    Frequency: 2x week  Duration in weeks: 8  Treatment plan discussed with: patient        Subjective Evaluation    History of Present Illness  Mechanism of injury: Pt  reports onset of L hip pain a few months ago she stepped off curb feeling immediate pain in her groin.  She did see chiro and acupuncture which did provide some relief.  Due to worsening pain she did schedule w/ ortho.  Xrays revealed severe DJD.  She was referred to PT and is scheduled for injection in August.  She has been performing some LE strengthening exercises at home which provide temporary relief.  She presents today w/ reports of constant pain in L buttock that does radiate into her entire LLE.  She reports some burning pain in groin.  Pain is worse at the end of the day and night resulting in sleep disturbance.  Pain also exacerbated w/ walking, car transfers.  She has remained independent w/ household chores which does take longer than usual.  Patient Goals  Patient goals for therapy: decreased pain, increased motion, increased strength, independence with ADLs/IADLs and return to sport/leisure activities    Pain  At best pain ratin  At worst pain rating: 10          Objective     Palpation     Additional Palpation Details  Sig tightness and tenderness over L glute med, piriformis    Active Range of Motion     Lumbar   Flexion: 45 degrees   Extension: 10 degrees   Left Hip   Flexion: 112 degrees   Extension: 0 degrees   Abduction: 32 degrees     Right Hip   Flexion: 120 degrees   Extension: 10 degrees   Abduction: 45 degrees     Strength/Myotome Testing     Left Hip   Planes of Motion   Flexion: 3+  Extension: 3+  Abduction: 4+    Right Hip   Planes of Motion   Flexion: 5  Extension: 3+  Abduction: 5    Tests     Left Hip   Positive long sit.     Additional Tests Details  L posterior torsion      Flowsheet Rows      Flowsheet Row Most Recent Value   PT/OT G-Codes    Current Score 29   Projected Score 54   FOTO information reviewed Yes   Assessment Type Evaluation   G code set Other PT/OT Primary   Other PT Primary Current Status () CL   Other PT Primary Goal Status () CJ              "  Precautions: HYPERTENSION      Manuals 5/21            TPR/graston L piriformis, glute med nv                                                   Neuro Re-Ed 5/21            clamshells 5\"x10            bridge 5\"x10            Ab brace w/ march x10            Ab brace w/ SLR nv                                                   Ther Ex 5/21            Piriformis stretch 30\"x3            Hamstring stretch nv                                                                                          Ther Activity                                       Gait Training                                       Modalities                                          Eval/Re-Eval POC Expires Auth #/ Referral # Total Visits Start Date Expiration Date Extension Info Visits Limitation   5/21 7/21 [facility] / [facility]                                                                   1 2 3 4 5 6   5/21 F        7 8 9 10 11 12           13 14 15 16 17 18           19 20 21 22 23 24           25 26 27 28 29 30                                 "

## 2024-05-28 ENCOUNTER — OFFICE VISIT (OUTPATIENT)
Dept: PHYSICAL THERAPY | Facility: MEDICAL CENTER | Age: 79
End: 2024-05-28
Payer: MEDICARE

## 2024-05-28 DIAGNOSIS — M16.12 ARTHRITIS OF LEFT HIP: Primary | ICD-10-CM

## 2024-05-28 PROCEDURE — 97112 NEUROMUSCULAR REEDUCATION: CPT | Performed by: PHYSICAL THERAPIST

## 2024-05-28 PROCEDURE — 97140 MANUAL THERAPY 1/> REGIONS: CPT | Performed by: PHYSICAL THERAPIST

## 2024-05-28 NOTE — PROGRESS NOTES
"Daily Note     Today's date: 2024  Patient name: Erin Suazo  : 1945  MRN: [id number]  Referring provider: Cholo Field MD  Dx:   Encounter Diagnosis     ICD-10-CM    1. Arthritis of left hip  M16.12                      Subjective: Pt reports no new complaints today      Objective: See treatment diary below      Assessment: Tolerated treatment well. Patient demonstrated fatigue post treatment, exhibited good technique with therapeutic exercises, and would benefit from continued PT  Sig restrictions noted over glute med.  Challenged w/ bridges and SLR      Plan: Continue per plan of care.      Precautions: HYPERTENSION      Manuals            TPR/graston L piriformis, glute med nv 10 min                                                  Neuro Re-Ed            clamshells 5\"x10 5\"x20           bridge 5\"x10 5\"x10           Ab brace w/ march x10 2x10           Ab brace w/ SLR nv x10                                                  Ther Ex            Piriformis stretch 30\"x3 30\"x3           Hamstring stretch nv 30\"x3                                                                                         Ther Activity                                       Gait Training                                       Modalities                                         Eval/Re-Eval POC Expires Auth #/ Referral # Total Visits Start Date Expiration Date Extension Info Visits Limitation    [facility] / [facility]                                                                                     1 2 3 4 5 6    F             7 8 9 10 11 12                 13 14 15 16 17 18                 19 20 21 22 23 24                 25 26 27 28 29 30                      "

## 2024-05-31 ENCOUNTER — OFFICE VISIT (OUTPATIENT)
Dept: PHYSICAL THERAPY | Facility: MEDICAL CENTER | Age: 79
End: 2024-05-31
Payer: MEDICARE

## 2024-05-31 DIAGNOSIS — M16.12 ARTHRITIS OF LEFT HIP: Primary | ICD-10-CM

## 2024-05-31 PROCEDURE — 97112 NEUROMUSCULAR REEDUCATION: CPT | Performed by: PHYSICAL THERAPIST

## 2024-05-31 PROCEDURE — 97140 MANUAL THERAPY 1/> REGIONS: CPT | Performed by: PHYSICAL THERAPIST

## 2024-05-31 NOTE — PROGRESS NOTES
"Daily Note     Today's date: 2024  Patient name: Erin Suazo  : 1945  MRN: [id number]  Referring provider: Cholo Field MD  Dx:   Encounter Diagnosis     ICD-10-CM    1. Arthritis of left hip  M16.12           Start Time: 1000  Stop Time: 1040  Total time in clinic (min): 40 minutes    Subjective: Pt reports some improvement.  She was able to walk for 20 minutes yesterday using walker.  Feels like she is getting stronger.      Objective: See treatment diary below      Assessment: Tolerated treatment well. Patient demonstrated fatigue post treatment, exhibited good technique with therapeutic exercises, and would benefit from continued PT  Most restricted over glute med today.      Plan: Continue per plan of care.      Precautions: HYPERTENSION      Manuals           TPR/graston L piriformis, glute med nv 10 min 10 min                                                 Neuro Re-Ed           clamshells 5\"x10 5\"x20 5\"x20          bridge 5\"x10 5\"x10 5\"x20          Ab brace w/ march x10 2x10 2x10          Ab brace w/ SLR nv x10 x20                                                 Ther Ex           Piriformis stretch 30\"x3 30\"x3 30\"x3          Hamstring stretch nv 30\"x3 30\"x3                                                                                        Ther Activity                                       Gait Training                                       Modalities                                         Eval/Re-Eval POC Expires Auth #/ Referral # Total Visits Start Date Expiration Date Extension Info Visits Limitation    [facility] / [facility]                                                                                     1 2 3 4 5 6    F            7 8 9 10 11 12                 13 14 15 16 17 18                 19 20 21 22 23 24                 25 26 27 28 29 30                      "

## 2024-06-05 ENCOUNTER — OFFICE VISIT (OUTPATIENT)
Dept: PHYSICAL THERAPY | Facility: MEDICAL CENTER | Age: 79
End: 2024-06-05
Payer: MEDICARE

## 2024-06-05 DIAGNOSIS — M16.12 ARTHRITIS OF LEFT HIP: Primary | ICD-10-CM

## 2024-06-05 PROCEDURE — 97112 NEUROMUSCULAR REEDUCATION: CPT | Performed by: PHYSICAL THERAPIST

## 2024-06-05 PROCEDURE — 97110 THERAPEUTIC EXERCISES: CPT | Performed by: PHYSICAL THERAPIST

## 2024-06-05 PROCEDURE — 97140 MANUAL THERAPY 1/> REGIONS: CPT | Performed by: PHYSICAL THERAPIST

## 2024-06-05 NOTE — PROGRESS NOTES
"Daily Note     Today's date: 2024  Patient name: Erin Suazo  : 1945  MRN: [id number]  Referring provider: Cholo Field MD  Dx:   Encounter Diagnosis     ICD-10-CM    1. Arthritis of left hip  M16.12                      Subjective: Pt is able to continue walking daily for 15-20 minutes using walker.  She cancelled injection and scheduled w/ ortho for f/u in August to schedule STACY.  She c/o cramping in calf at night.       Objective: See treatment diary below      Assessment: Tolerated treatment well. Patient demonstrated fatigue post treatment, exhibited good technique with therapeutic exercises, and would benefit from continued PT  Most restrictions persist over glute med.  Added gastroc stretching today.      Plan: Continue per plan of care.      Precautions: HYPERTENSION      Manuals          TPR/graston L piriformis, glute med nv 10 min 10 min 10 min                                                Neuro Re-Ed  6         clamshells 5\"x10 5\"x20 5\"x20 5\"x20         bridge 5\"x10 5\"x10 5\"x20 5\"X20         Ab brace w/ march x10 2x10 2x10 x20         Ab brace w/ SLR nv x10 x20 x20                                                Ther Ex          Piriformis stretch 30\"x3 30\"x3 30\"x3 30\"X3         Hamstring stretch nv 30\"x3 30\"x3 30\"x3         Gastroc strap stretch    30\"X3                                                                          Ther Activity                                       Gait Training                                       Modalities                                         Eval/Re-Eval POC Expires Auth #/ Referral # Total Visits Start Date Expiration Date Extension Info Visits Limitation    [facility] / [facility]                                                                                     1 2 3 4 5 6    F         7 8 9 10 11 12                 13 14 15 16 17 18                 19 20 21 22 23 24            "      25 26 27 28 29 30

## 2024-06-07 ENCOUNTER — OFFICE VISIT (OUTPATIENT)
Dept: PHYSICAL THERAPY | Facility: MEDICAL CENTER | Age: 79
End: 2024-06-07
Payer: MEDICARE

## 2024-06-07 DIAGNOSIS — M16.12 ARTHRITIS OF LEFT HIP: Primary | ICD-10-CM

## 2024-06-07 PROCEDURE — 97140 MANUAL THERAPY 1/> REGIONS: CPT

## 2024-06-07 PROCEDURE — 97112 NEUROMUSCULAR REEDUCATION: CPT

## 2024-06-07 PROCEDURE — 97110 THERAPEUTIC EXERCISES: CPT

## 2024-06-07 NOTE — PROGRESS NOTES
"Daily Note     Today's date: 2024  Patient name: Erin Suazo  : 1945  MRN: [id number]  Referring provider: Cholo Field MD  Dx:   Encounter Diagnosis     ICD-10-CM    1. Arthritis of left hip  M16.12             Start Time: 1006  Stop Time: 1044  Total time in clinic (min): 38 minutes    Subjective: Pt stated that her hip is keeping her up at night because of pain. She stated that she took 2 tylenol before coming here so its not as bad right now.      Objective: See treatment diary below      Assessment: Tolerated treatment well. Patient demonstrated fatigue post treatment, exhibited good technique with therapeutic exercises, and would benefit from continued PT  Patient with good tolerance to exercises today. Patient with discomfort in hip with clamshells, improved throughout reps.       Plan: Continue per plan of care.      Precautions: HYPERTENSION      Manuals  6/        TPR/graston L piriformis, glute med nv 10 min 10 min 10 min 10 min                                               Neuro Re-Ed  6 6/7        clamshells 5\"x10 5\"x20 5\"x20 5\"x20 5\"x20        bridge 5\"x10 5\"x10 5\"x20 5\"X20 5\"x20        Ab brace w/ march x10 2x10 2x10 x20 x20        Ab brace w/ SLR nv x10 x20 x20 x20                                               Ther Ex  6 6/        Piriformis stretch 30\"x3 30\"x3 30\"x3 30\"X3 30\"x3        Hamstring stretch nv 30\"x3 30\"x3 30\"x3 30\"x3        Gastroc strap stretch    30\"X3 30\"x3                                                                         Ther Activity                                       Gait Training                                       Modalities                                         Eval/Re-Eval POC Expires Auth #/ Referral # Total Visits Start Date Expiration Date Extension Info Visits Limitation    [facility] / [facility]                                                                                     1 2 3 4 5 6 "   5/21 F  5/28 5/31 6/5 6/7     7 8 9 10 11 12                 13 14 15 16 17 18                 19 20 21 22 23 24                 25 26 27 28 29 30

## 2024-06-10 ENCOUNTER — OFFICE VISIT (OUTPATIENT)
Dept: PHYSICAL THERAPY | Facility: MEDICAL CENTER | Age: 79
End: 2024-06-10
Payer: MEDICARE

## 2024-06-10 DIAGNOSIS — M16.12 ARTHRITIS OF LEFT HIP: Primary | ICD-10-CM

## 2024-06-10 PROCEDURE — 97110 THERAPEUTIC EXERCISES: CPT | Performed by: PHYSICAL THERAPIST

## 2024-06-10 PROCEDURE — 97140 MANUAL THERAPY 1/> REGIONS: CPT | Performed by: PHYSICAL THERAPIST

## 2024-06-10 PROCEDURE — 97112 NEUROMUSCULAR REEDUCATION: CPT | Performed by: PHYSICAL THERAPIST

## 2024-06-10 NOTE — PROGRESS NOTES
"Daily Note     Today's date: 6/10/2024  Patient name: Erin Suazo  : 1945  MRN: [id number]  Referring provider: Cholo Field MD  Dx:   Encounter Diagnosis     ICD-10-CM    1. Arthritis of left hip  M16.12                        Subjective: Pt did not have to take Tylenol this morning.  She continues to walk daily and was able to do some housework this weekend.    Objective: See treatment diary below      Assessment: Tolerated treatment well. Patient demonstrated fatigue post treatment, exhibited good technique with therapeutic exercises, and would benefit from continued PT  Restrictions are gradually decreasing.         Plan: Continue per plan of care.      Precautions: HYPERTENSION      Manuals 5/21 5/28 5/31 6/5 6/7 6/10       TPR/graston L piriformis, glute med nv 10 min 10 min 10 min 10 min 10 min                                              Neuro Re-Ed 5/21 5/28 5/31 6/5 6/7 6/10       clamshells 5\"x10 5\"x20 5\"x20 5\"x20 5\"x20 5\"x20       bridge 5\"x10 5\"x10 5\"x20 5\"X20 5\"x20 5\"x20       Ab brace w/ march x10 2x10 2x10 x20 x20 x20       Ab brace w/ SLR nv x10 x20 x20 x20 x20                                              Ther Ex 5/21 5/28 5/31 6/5 6/7 6/10       Piriformis stretch 30\"x3 30\"x3 30\"x3 30\"X3 30\"x3 30\"x3       Hamstring stretch nv 30\"x3 30\"x3 30\"x3 30\"x3 30\"x3       Gastroc strap stretch    30\"X3 30\"x3 30\"x3                                                                        Ther Activity                                       Gait Training                                       Modalities                                         Eval/Re-Eval POC Expires Auth #/ Referral # Total Visits Start Date Expiration Date Extension Info Visits Limitation    [facility] / [facility]                                                                                     1 2 3 4 5 6    F  5/28 5/31   6/5  6/7  6/10   7 8 9 10 11 12                 13 14 15 16 17 18                 19 20 21 22 23 24        "          25 26 27 28 29 30

## 2024-06-12 ENCOUNTER — APPOINTMENT (OUTPATIENT)
Dept: PHYSICAL THERAPY | Facility: MEDICAL CENTER | Age: 79
End: 2024-06-12
Payer: MEDICARE

## 2024-06-17 ENCOUNTER — OFFICE VISIT (OUTPATIENT)
Dept: PHYSICAL THERAPY | Facility: MEDICAL CENTER | Age: 79
End: 2024-06-17
Payer: MEDICARE

## 2024-06-17 DIAGNOSIS — M16.12 ARTHRITIS OF LEFT HIP: Primary | ICD-10-CM

## 2024-06-17 PROCEDURE — 97112 NEUROMUSCULAR REEDUCATION: CPT | Performed by: PHYSICAL THERAPIST

## 2024-06-17 PROCEDURE — 97140 MANUAL THERAPY 1/> REGIONS: CPT | Performed by: PHYSICAL THERAPIST

## 2024-06-17 NOTE — PROGRESS NOTES
"Daily Note     Today's date: 2024  Patient name: Erin Suazo  : 1945  MRN: [id number]  Referring provider: Cholo Field MD  Dx:   Encounter Diagnosis     ICD-10-CM    1. Arthritis of left hip  M16.12                        Subjective: Pt reports some gradual improvement.  She continues to walk daily.    Objective: See treatment diary below      Assessment: Tolerated treatment well. Patient demonstrated fatigue post treatment, exhibited good technique with therapeutic exercises, and would benefit from continued PT  Restrictions continue to decrease.  She c/o intermittent weakness t/o treatment.         Plan: Continue per plan of care.      Precautions: HYPERTENSION      Manuals 5/21 5/28 5/31 6/5 6/7 6/10 6/17      TPR/graston L piriformis, glute med nv 10 min 10 min 10 min 10 min 10 min 10 min                                             Neuro Re-Ed 5/21 5/28 5/31 6/5 6/7 6/10 6/17      clamshells 5\"x10 5\"x20 5\"x20 5\"x20 5\"x20 5\"x20 5\"x20      bridge 5\"x10 5\"x10 5\"x20 5\"X20 5\"x20 5\"x20 5\"X20      Ab brace / march x10 2x10 2x10 x20 x20 x20 x20      Ab brace w/ SLR nv x10 x20 x20 x20 x20 x20                                             Ther Ex 5/21 5/28 5/31 6/5 6/7 6/10 6/17      Piriformis stretch 30\"x3 30\"x3 30\"x3 30\"X3 30\"x3 30\"x3 30\"x3      Hamstring stretch nv 30\"x3 30\"x3 30\"x3 30\"x3 30\"x3 30\"X3      Gastroc strap stretch    30\"X3 30\"x3 30\"x3 30\"X3                                                                       Ther Activity                                       Gait Training                                       Modalities                                         Eval/Re-Eval POC Expires Auth #/ Referral # Total Visits Start Date Expiration Date Extension Info Visits Limitation    [facility] / [facility]                                                                                     1 2 3 4 5 6    F  5/28 5/31   6/5  6/7  6/10   7 8 9 10 11 12                 13 14 15 16 17 18 "                 19 20 21 22 23 24                 25 26 27 28 29 30

## 2024-06-19 ENCOUNTER — EVALUATION (OUTPATIENT)
Dept: PHYSICAL THERAPY | Facility: MEDICAL CENTER | Age: 79
End: 2024-06-19
Payer: MEDICARE

## 2024-06-19 DIAGNOSIS — M16.12 ARTHRITIS OF LEFT HIP: Primary | ICD-10-CM

## 2024-06-19 PROCEDURE — 97112 NEUROMUSCULAR REEDUCATION: CPT | Performed by: PHYSICAL THERAPIST

## 2024-06-19 PROCEDURE — 97110 THERAPEUTIC EXERCISES: CPT | Performed by: PHYSICAL THERAPIST

## 2024-06-19 PROCEDURE — 97140 MANUAL THERAPY 1/> REGIONS: CPT | Performed by: PHYSICAL THERAPIST

## 2024-06-19 NOTE — PROGRESS NOTES
PT Re-Evaluation     Today's date: 2024  Patient name: Erin Suazo  : 1945  MRN: [id number]  Referring provider: Cholo Field MD  Dx:   Encounter Diagnosis     ICD-10-CM    1. Arthritis of left hip  M16.12                      Assessment  Impairments: abnormal gait, abnormal muscle tone, abnormal or restricted ROM, abnormal movement, activity intolerance, impaired physical strength, lacks appropriate home exercise program, pain with function, weight-bearing intolerance and unable to perform ADL    Assessment details: Erin Suazo has attended 8 visits since initiating PT and has demonstrated overall improvement.  Mobility and strength has improved with decreased reports of pain. Erin Suazo has demonstrated an improvement in function as well.  Currently, she has made steady progress towards her goals, but continue to remain limited with sleep.     . At this time, continued physical therapy is recommended in order to address their remaining impairments in effort to further improve function; progressing toward d/c.  Understanding of Dx/Px/POC: good     Prognosis: good    Goals  Short Term Goals:   1. Pain decreased 2 ratings in 4 weeks MET  2.  ROM increased 10* in 4 weeks MET  3.  Strength increased 1/2 grade in 4 weeks MET    Long Term Goals:   1.  ADLS/IADLS in related activities improved to maximal level in 8 weeks PARTIALLY MET  2.  Walking is improved to maximal level in 8 weeks PARTIALLY MET  3.  Recreation activity is improved to maximal level in 8 weeks. PARTIALLY MET  4.  [place] with HEP in 8 weeks.  PARTIALLY MET    Plan  Patient would benefit from: PT eval and skilled physical therapy    Planned therapy interventions: IASTM, manual therapy, neuromuscular re-education, strengthening, stretching, therapeutic exercise, functional ROM exercises, flexibility, abdominal trunk stabilization and home exercise program    Frequency: 2x week  Duration in weeks: 6  Treatment plan discussed  "with: patient        Subjective Evaluation    History of Present Illness  Mechanism of injury: Pt is demonstrating improvement.  She continues to walk daily and today did not use walker.  Feeling stronger.  Pain in L buttock does remain constant and is most noted at night when sleeping; resulting in some sleep disturbance.  Burning in groin persisting.         Patient Goals  Patient goals for therapy: decreased pain, increased motion, increased strength, independence with ADLs/IADLs and return to sport/leisure activities    Pain  At best pain ratin  At worst pain ratin          Objective     Palpation     Additional Palpation Details  Restrictions decreased over piriformis and glute med.    Active Range of Motion     Lumbar   Flexion: 45 degrees   Extension: 10 degrees   Left Hip   Flexion: 112 degrees   Extension: 10 degrees   Abduction: 35 degrees     Strength/Myotome Testing     Left Hip   Planes of Motion   Flexion: 4+  Extension: 4+  Abduction: 4+    Right Hip   Planes of Motion   Flexion: 5  Extension: 4+  Abduction: 5    Tests     Left Hip   Negative long sit.       Flowsheet Rows      Flowsheet Row Most Recent Value   PT/OT G-Codes    Current Score 59   Projected Score 54   Assessment Type Re-evaluation   G code set Other PT/OT Primary   Other PT Primary Current Status () CK   Other PT Primary Goal Status () CJ                 Precautions: HYPERTENSION      Manuals             graston L piriformis, glute med 15min                                                   Neuro Re-Ed             clamshells 5\"x20            bridge 5\"x20            Ab brace / march x20            Ab brace / R x20                                                   Ther Ex             Piriformis stretch 30\"x3            Hamstring stretch 30\"x3            Gastroc strap stretch 30\"x3                                                                             Ther Activity                                     "   Gait Training                                       Modalities                                          Eval/Re-Eval POC Expires Auth #/ Referral # Total Visits Start Date Expiration Date Extension Info Visits Limitation   5/21 7/21 [facility] / [facility]                                                                                     1 2 3 4 5 6   5/21 F  5/28 5/31   6/5  6/7  6/10   7 8 9 10 11 12   6/17 6/19 F!           13 14 15 16 17 18                 19 20 21 22 23 24                 25 26 27 28 29 30

## 2024-06-24 ENCOUNTER — OFFICE VISIT (OUTPATIENT)
Dept: PHYSICAL THERAPY | Facility: MEDICAL CENTER | Age: 79
End: 2024-06-24
Payer: MEDICARE

## 2024-06-24 DIAGNOSIS — M16.12 ARTHRITIS OF LEFT HIP: Primary | ICD-10-CM

## 2024-06-24 PROCEDURE — 97112 NEUROMUSCULAR REEDUCATION: CPT | Performed by: PHYSICAL THERAPIST

## 2024-06-24 PROCEDURE — 97140 MANUAL THERAPY 1/> REGIONS: CPT | Performed by: PHYSICAL THERAPIST

## 2024-06-24 NOTE — PROGRESS NOTES
"Daily Note     Today's date: 2024  Patient name: Erin Suazo  : 1945  MRN: [id number]  Referring provider: Cholo Field MD  Dx:   Encounter Diagnosis     ICD-10-CM    1. Arthritis of left hip  M16.12                      Subjective: Pt c/o cramping in leg today.  She did walk for 25 min this morning.      Objective: See treatment diary below      Assessment: Tolerated treatment well. Patient demonstrated fatigue post treatment, exhibited good technique with therapeutic exercises, and would benefit from continued PT  Sig decrease in restrictions.  Some intermittent soreness noted w/ ex today.      Plan: Continue per plan of care.      Precautions: HYPERTENSION      Manuals            graston L piriformis, glute med 15min 10 min                                                  Neuro Re-Ed            clamshells 5\"x20 5\"x20           bridge 5\"x20 5\"x20           Ab brace w/ march x20 x20           Ab brace w/  x20 20                                                  Ther Ex            Piriformis stretch 30\"x3 30\"x3           Hamstring stretch 30\"x3 30\"x3           Gastroc strap stretch 30\"x3 30\"x3                                                                            Ther Activity                                       Gait Training                                       Modalities                                         Eval/Re-Eval POC Expires Auth #/ Referral # Total Visits Start Date Expiration Date Extension Info Visits Limitation    [facility] / [facility]                                                                                     1 2 3 4 5 6    F  5/28 5/31   6/5  6/7  6/10   7 8 9 10 11 12    F!           13 14 15 16 17 18                 19 20 21 22 23 24                 25 26 27 28 29 30                       "

## 2024-06-26 ENCOUNTER — OFFICE VISIT (OUTPATIENT)
Dept: PHYSICAL THERAPY | Facility: MEDICAL CENTER | Age: 79
End: 2024-06-26
Payer: MEDICARE

## 2024-06-26 DIAGNOSIS — M16.12 ARTHRITIS OF LEFT HIP: Primary | ICD-10-CM

## 2024-06-26 PROCEDURE — 97112 NEUROMUSCULAR REEDUCATION: CPT | Performed by: PHYSICAL THERAPIST

## 2024-06-26 PROCEDURE — 97140 MANUAL THERAPY 1/> REGIONS: CPT | Performed by: PHYSICAL THERAPIST

## 2024-06-26 NOTE — PROGRESS NOTES
"Daily Note     Today's date: 2024  Patient name: Erin Suazo  : 1945  MRN: [id number]  Referring provider: Cholo Field MD  Dx:   Encounter Diagnosis     ICD-10-CM    1. Arthritis of left hip  M16.12                      Subjective: Pt c/o cramping in leg today.  She did walk for 25 min this morning.  No longer using walker for ambulation.       Objective: See treatment diary below      Assessment: Tolerated treatment well. Patient demonstrated fatigue post treatment and exhibited good technique with therapeutic exercises  Mild restrictions only.  She demonstrates understanding of ex program.      Plan:  D/C w/ independent HEP     Precautions: HYPERTENSION      Manuals           graston L piriformis, glute med 15min 10 min 10 min                                                 Neuro Re-Ed           clamshells 5\"x20 5\"x20 5\"x20          bridge 5\"x20 5\"x20 5\"X20          Ab brace w/ march x20 x20 x20          Ab brace w/  x20 20 x20                                                 Ther Ex           Piriformis stretch 30\"x3 30\"x3 30\"x3          Hamstring stretch 30\"x3 30\"x3 30\"x3          Gastroc strap stretch 30\"x3 30\"x3 30\"x3                                                                           Ther Activity                                       Gait Training                                       Modalities                                         Eval/Re-Eval POC Expires Auth #/ Referral # Total Visits Start Date Expiration Date Extension Info Visits Limitation    [facility] / [employer]                                                                                     1 2 3 4 5 6    F  5/28 5/31   6/5  6/7  6/10   7 8 9 10 11 12    F!         13 14 15 16 17 18                 19 20 21 22 23 24                 25 26 27 28 29 30                       "

## 2024-07-24 ENCOUNTER — OFFICE VISIT (OUTPATIENT)
Dept: FAMILY MEDICINE CLINIC | Facility: CLINIC | Age: 79
End: 2024-07-24
Payer: MEDICARE

## 2024-07-24 VITALS
BODY MASS INDEX: 31.63 KG/M2 | WEIGHT: 146.6 LBS | HEIGHT: 57 IN | DIASTOLIC BLOOD PRESSURE: 74 MMHG | TEMPERATURE: 97.3 F | SYSTOLIC BLOOD PRESSURE: 124 MMHG | HEART RATE: 92 BPM | OXYGEN SATURATION: 100 %

## 2024-07-24 DIAGNOSIS — E78.5 HYPERLIPIDEMIA, UNSPECIFIED HYPERLIPIDEMIA TYPE: ICD-10-CM

## 2024-07-24 DIAGNOSIS — Z00.00 MEDICARE ANNUAL WELLNESS VISIT, SUBSEQUENT: Primary | ICD-10-CM

## 2024-07-24 PROCEDURE — G0439 PPPS, SUBSEQ VISIT: HCPCS | Performed by: FAMILY MEDICINE

## 2024-07-24 NOTE — PATIENT INSTRUCTIONS
Medicare Preventive Visit Patient Instructions  Thank you for completing your Welcome to Medicare Visit or Medicare Annual Wellness Visit today. Your next wellness visit will be due in one year (7/25/2025).  The screening/preventive services that you may require over the next 5-10 years are detailed below. Some tests may not apply to you based off risk factors and/or age. Screening tests ordered at today's visit but not completed yet may show as past due. Also, please note that scanned in results may not display below.  Preventive Screenings:  Service Recommendations Previous Testing/Comments   Colorectal Cancer Screening  * Colonoscopy    * Fecal Occult Blood Test (FOBT)/Fecal Immunochemical Test (FIT)  * Fecal DNA/Cologuard Test  * Flexible Sigmoidoscopy Age: 45-75 years old   Colonoscopy: every 10 years (may be performed more frequently if at higher risk)  OR  FOBT/FIT: every 1 year  OR  Cologuard: every 3 years  OR  Sigmoidoscopy: every 5 years  Screening may be recommended earlier than age 45 if at higher risk for colorectal cancer. Also, an individualized decision between you and your healthcare provider will decide whether screening between the ages of 76-85 would be appropriate. Colonoscopy: 06/28/2016  FOBT/FIT: Not on file  Cologuard: Not on file  Sigmoidoscopy: Not on file    Screening Not Indicated     Breast Cancer Screening Age: 40+ years old  Frequency: every 1-2 years  Not required if history of left and right mastectomy Mammogram: 04/30/2019        Cervical Cancer Screening Between the ages of 21-29, pap smear recommended once every 3 years.   Between the ages of 30-65, can perform pap smear with HPV co-testing every 5 years.   Recommendations may differ for women with a history of total hysterectomy, cervical cancer, or abnormal pap smears in past. Pap Smear: Not on file    Screening Not Indicated   Hepatitis C Screening Once for adults born between 1945 and 1965  More frequently in patients at  high risk for Hepatitis C Hep C Antibody: Not on file        Diabetes Screening 1-2 times per year if you're at risk for diabetes or have pre-diabetes Fasting glucose: 84 mg/dL (10/19/2023)  A1C: 5.6 % (11/8/2019)  Screening Current   Cholesterol Screening Once every 5 years if you don't have a lipid disorder. May order more often based on risk factors. Lipid panel: 10/19/2023    Screening Not Indicated  History Lipid Disorder     Other Preventive Screenings Covered by Medicare:  Abdominal Aortic Aneurysm (AAA) Screening: covered once if your at risk. You're considered to be at risk if you have a family history of AAA.  Lung Cancer Screening: covers low dose CT scan once per year if you meet all of the following conditions: (1) Age 55-77; (2) No signs or symptoms of lung cancer; (3) Current smoker or have quit smoking within the last 15 years; (4) You have a tobacco smoking history of at least 20 pack years (packs per day multiplied by number of years you smoked); (5) You get a written order from a healthcare provider.  Glaucoma Screening: covered annually if you're considered high risk: (1) You have diabetes OR (2) Family history of glaucoma OR (3)  aged 50 and older OR (4)  American aged 65 and older  Osteoporosis Screening: covered every 2 years if you meet one of the following conditions: (1) You're estrogen deficient and at risk for osteoporosis based off medical history and other findings; (2) Have a vertebral abnormality; (3) On glucocorticoid therapy for more than 3 months; (4) Have primary hyperparathyroidism; (5) On osteoporosis medications and need to assess response to drug therapy.   Last bone density test (DXA Scan): 03/01/2023.  HIV Screening: covered annually if you're between the age of 15-65. Also covered annually if you are younger than 15 and older than 65 with risk factors for HIV infection. For pregnant patients, it is covered up to 3 times per  pregnancy.    Immunizations:  Immunization Recommendations   Influenza Vaccine Annual influenza vaccination during flu season is recommended for all persons aged >= 6 months who do not have contraindications   Pneumococcal Vaccine   * Pneumococcal conjugate vaccine = PCV13 (Prevnar 13), PCV15 (Vaxneuvance), PCV20 (Prevnar 20)  * Pneumococcal polysaccharide vaccine = PPSV23 (Pneumovax) Adults 19-63 yo with certain risk factors or if 65+ yo  If never received any pneumonia vaccine: recommend Prevnar 20 (PCV20)  Give PCV20 if previously received 1 dose of PCV13 or PPSV23   Hepatitis B Vaccine 3 dose series if at intermediate or high risk (ex: diabetes, end stage renal disease, liver disease)   Respiratory syncytial virus (RSV) Vaccine - COVERED BY MEDICARE PART D  * RSVPreF3 (Arexvy) CDC recommends that adults 60 years of age and older may receive a single dose of RSV vaccine using shared clinical decision-making (SCDM)   Tetanus (Td) Vaccine - COST NOT COVERED BY MEDICARE PART B Following completion of primary series, a booster dose should be given every 10 years to maintain immunity against tetanus. Td may also be given as tetanus wound prophylaxis.   Tdap Vaccine - COST NOT COVERED BY MEDICARE PART B Recommended at least once for all adults. For pregnant patients, recommended with each pregnancy.   Shingles Vaccine (Shingrix) - COST NOT COVERED BY MEDICARE PART B  2 shot series recommended in those 19 years and older who have or will have weakened immune systems or those 50 years and older     Health Maintenance Due:      Topic Date Due   • Hepatitis C Screening  Never done   • DXA SCAN  03/01/2028   • Colorectal Cancer Screening  Discontinued     Immunizations Due:      Topic Date Due   • Pneumococcal Vaccine: 65+ Years (1 of 2 - PCV) Never done   • COVID-19 Vaccine (3 - 2023-24 season) 09/01/2023   • Influenza Vaccine (1) 09/01/2024     Advance Directives   What are advance directives?  Advance directives are  legal documents that state your wishes and plans for medical care. These plans are made ahead of time in case you lose your ability to make decisions for yourself. Advance directives can apply to any medical decision, such as the treatments you want, and if you want to donate organs.   What are the types of advance directives?  There are many types of advance directives, and each state has rules about how to use them. You may choose a combination of any of the following:  Living will:  This is a written record of the treatment you want. You can also choose which treatments you do not want, which to limit, and which to stop at a certain time. This includes surgery, medicine, IV fluid, and tube feedings.   Durable power of  for healthcare (DPAHC):  This is a written record that states who you want to make healthcare choices for you when you are unable to make them for yourself. This person, called a proxy, is usually a family member or a friend. You may choose more than 1 proxy.  Do not resuscitate (DNR) order:  A DNR order is used in case your heart stops beating or you stop breathing. It is a request not to have certain forms of treatment, such as CPR. A DNR order may be included in other types of advance directives.  Medical directive:  This covers the care that you want if you are in a coma, near death, or unable to make decisions for yourself. You can list the treatments you want for each condition. Treatment may include pain medicine, surgery, blood transfusions, dialysis, IV or tube feedings, and a ventilator (breathing machine).  Values history:  This document has questions about your views, beliefs, and how you feel and think about life. This information can help others choose the care that you would choose.  Why are advance directives important?  An advance directive helps you control your care. Although spoken wishes may be used, it is better to have your wishes written down. Spoken wishes can be  misunderstood, or not followed. Treatments may be given even if you do not want them. An advance directive may make it easier for your family to make difficult choices about your care.   Weight Management   Why it is important to manage your weight:  Being overweight increases your risk of health conditions such as heart disease, high blood pressure, type 2 diabetes, and certain types of cancer. It can also increase your risk for osteoarthritis, sleep apnea, and other respiratory problems. Aim for a slow, steady weight loss. Even a small amount of weight loss can lower your risk of health problems.  How to lose weight safely:  A safe and healthy way to lose weight is to eat fewer calories and get regular exercise. You can lose up about 1 pound a week by decreasing the number of calories you eat by 500 calories each day.   Healthy meal plan for weight management:  A healthy meal plan includes a variety of foods, contains fewer calories, and helps you stay healthy. A healthy meal plan includes the following:  Eat whole-grain foods more often.  A healthy meal plan should contain fiber. Fiber is the part of grains, fruits, and vegetables that is not broken down by your body. Whole-grain foods are healthy and provide extra fiber in your diet. Some examples of whole-grain foods are whole-wheat breads and pastas, oatmeal, brown rice, and bulgur.  Eat a variety of vegetables every day.  Include dark, leafy greens such as spinach, kale, shaw greens, and mustard greens. Eat yellow and orange vegetables such as carrots, sweet potatoes, and winter squash.   Eat a variety of fruits every day.  Choose fresh or canned fruit (canned in its own juice or light syrup) instead of juice. Fruit juice has very little or no fiber.  Eat low-fat dairy foods.  Drink fat-free (skim) milk or 1% milk. Eat fat-free yogurt and low-fat cottage cheese. Try low-fat cheeses such as mozzarella and other reduced-fat cheeses.  Choose meat and other  protein foods that are low in fat.  Choose beans or other legumes such as split peas or lentils. Choose fish, skinless poultry (chicken or turkey), or lean cuts of red meat (beef or pork). Before you cook meat or poultry, cut off any visible fat.   Use less fat and oil.  Try baking foods instead of frying them. Add less fat, such as margarine, sour cream, regular salad dressing and mayonnaise to foods. Eat fewer high-fat foods. Some examples of high-fat foods include french fries, doughnuts, ice cream, and cakes.  Eat fewer sweets.  Limit foods and drinks that are high in sugar. This includes candy, cookies, regular soda, and sweetened drinks.  Exercise:  Exercise at least 30 minutes per day on most days of the week. Some examples of exercise include walking, biking, dancing, and swimming. You can also fit in more physical activity by taking the stairs instead of the elevator or parking farther away from stores. Ask your healthcare provider about the best exercise plan for you.      © Copyright [employer] 2018 Information is for End User's use only and may not be sold, redistributed or otherwise used for commercial purposes. All illustrations and images included in CareNotes® are the copyrighted property of [employer] Inc[employer] or [employer]

## 2024-07-24 NOTE — PROGRESS NOTES
Ambulatory Visit  Name: Erin Suazo      : 1945      MRN: [id number]  Encounter Provider: Cholo Field MD  Encounter Date: 2024   Encounter department: [facility]    Assessment & Plan   1. Medicare annual wellness visit, subsequent    Will add blood work to patient's future labs before hip replacement surgery   Preventative care otherwise at goal       Preventive health issues were discussed with patient, and age appropriate screening tests were ordered as noted in patient's After Visit Summary. Personalized health advice and appropriate referrals for health education or preventive services given if needed, as noted in patient's After Visit Summary.    History of Present Illness       HPI     75-year-old female patient presents for annual Medicare wellness visit.  Patient does have chronic ongoing hip pain, was found to have severe osteoarthritis involving bilateral hip, patient is considering hip replacement as she is unable to tolerate steroid injection.  Has upcoming appointment with orthopedic on 2024.    Pt has been participating in PT. Doing a mile a day now    Patient Care Team:  Cholo Field MD as PCP - General (Family Medicine)  MD Enmanuel Parish MD Stanley Ikezi, MD Sinan Kutty, MD as Endoscopist    Medical History Reviewed by provider this encounter:       Annual Wellness Visit Questionnaire   Erin is here for her Subsequent Wellness visit.     Health Risk Assessment:   Patient rates overall health as good. Patient feels that their physical health rating is same. Patient is satisfied with their life. Eyesight was rated as same. Hearing was rated as same. Patient feels that their emotional and mental health rating is same. Patients states they are never, rarely angry. Patient states they are sometimes unusually tired/fatigued. Pain experienced in the last 7 days has been some. Patient's pain rating has been 8/10. Patient states that she has  experienced no weight loss or gain in last 6 months. Chronic hip and knee pain   Pt denies any recent fall    Depression Screening:   PHQ-2 Score: 1      Fall Risk Screening:   In the past year, patient has experienced: no history of falling in past year      Urinary Incontinence Screening:   Patient has not leaked urine accidently in the last six months.     Home Safety:  Patient has trouble with stairs inside or outside of their home. Patient has working smoke alarms and has working carbon monoxide detector. Home safety hazards include: none. Difficulty due to chronic knee pain     Nutrition:   Current diet is Regular.     Medications:   Patient is currently taking over-the-counter supplements. OTC medications include: see medication list. Patient is able to manage medications.     Activities of Daily Living (ADLs)/Instrumental Activities of Daily Living (IADLs):   Walk and transfer into and out of bed and chair?: Yes  Dress and groom yourself?: Yes    Bathe or shower yourself?: Yes    Feed yourself? Yes  Do your laundry/housekeeping?: Yes  Manage your money, pay your bills and track your expenses?: Yes  Make your own meals?: Yes    Do your own shopping?: Yes    Previous Hospitalizations:   Any hospitalizations or ED visits within the last 12 months?: No      Advance Care Planning:   Living will: Yes    Durable POA for healthcare: Yes    Advanced directive: Yes      Cognitive Screening:   Provider or family/friend/caregiver concerned regarding cognition?: No    PREVENTIVE SCREENINGS      Cardiovascular Screening:    General: Screening Not Indicated and History Lipid Disorder      Diabetes Screening:     General: Screening Current      Colorectal Cancer Screening:     General: Screening Not Indicated      Breast Cancer Screening:     General: Screening Not Indicated      Cervical Cancer Screening:    General: Screening Not Indicated      Osteoporosis Screening:    General: Screening Not Indicated and History  Osteoporosis      Abdominal Aortic Aneurysm (AAA) Screening:        General: Screening Not Indicated      Lung Cancer Screening:     General: Screening Not Indicated      Hepatitis C Screening:    General: Screening Not Indicated    Screening, Brief Intervention, and Referral to Treatment (SBIRT)    Screening  Typical number of drinks in a day: 0  Typical number of drinks in a week: 0  Interpretation: Low risk drinking behavior.    AUDIT-C Screenin) How often did you have a drink containing alcohol in the past year? never  2) How many drinks did you have on a typical day when you were drinking in the past year? 0  3) How often did you have 6 or more drinks on one occasion in the past year? never    AUDIT-C Score: 0  Interpretation: Score 0-2 (female): Negative screen for alcohol misuse    Single Item Drug Screening:  How often have you used an illegal drug (including marijuana) or a prescription medication for non-medical reasons in the past year? never    Single Item Drug Screen Score: 0  Interpretation: Negative screen for possible drug use disorder    Other Counseling Topics:   Car/seat belt/driving safety, skin self-exam, sunscreen and regular weightbearing exercise and calcium and vitamin D intake.     Social Determinants of Health     Financial Resource Strain: Patient Declined (7/10/2023)    Overall Financial Resource Strain (CARDIA)     Difficulty of Paying Living Expenses: Patient declined   Food Insecurity: No Food Insecurity (2024)    Hunger Vital Sign     Worried About Running Out of Food in the Last Year: Never true     Ran Out of Food in the Last Year: Never true   Transportation Needs: No Transportation Needs (2024)    PRAPARE - Transportation     Lack of Transportation (Medical): No     Lack of Transportation (Non-Medical): No   Housing Stability: Unknown (2024)    Housing Stability Vital Sign     Unable to Pay for Housing in the Last Year: No     Homeless in the Last Year: No  "  Utilities: Not At Risk (7/24/2024)    [facility] Utilities     Threatened with loss of utilities: No     No results found.    Objective     /74 (BP Location: Right arm, Patient Position: Sitting, Cuff Size: Standard)   Pulse 92   Temp (!) 97.3 °F (36.3 °C)   Ht 4' 9\" (1.448 m)   Wt 66.5 kg (146 lb 9.6 oz)   SpO2 100%   BMI 31.72 kg/m²       Cholo Field M.D.  Family Medicine    Please excuse any \"sound-alike\" errors that may have ocurred during the process of dictation. Parts of this note have been dictated and there may be errors present in the transcription process. Thank you.    "

## 2024-08-09 ENCOUNTER — OFFICE VISIT (OUTPATIENT)
Dept: OBGYN CLINIC | Facility: CLINIC | Age: 79
End: 2024-08-09
Payer: MEDICARE

## 2024-08-09 ENCOUNTER — APPOINTMENT (OUTPATIENT)
Dept: RADIOLOGY | Facility: AMBULARY SURGERY CENTER | Age: 79
End: 2024-08-09
Attending: ORTHOPAEDIC SURGERY
Payer: MEDICARE

## 2024-08-09 VITALS
WEIGHT: 144 LBS | HEART RATE: 71 BPM | SYSTOLIC BLOOD PRESSURE: 155 MMHG | HEIGHT: 57 IN | BODY MASS INDEX: 31.07 KG/M2 | DIASTOLIC BLOOD PRESSURE: 79 MMHG

## 2024-08-09 DIAGNOSIS — M25.552 LEFT HIP PAIN: ICD-10-CM

## 2024-08-09 DIAGNOSIS — M16.12 PRIMARY OSTEOARTHRITIS OF ONE HIP, LEFT: Primary | ICD-10-CM

## 2024-08-09 PROCEDURE — 73552 X-RAY EXAM OF FEMUR 2/>: CPT

## 2024-08-09 PROCEDURE — 99214 OFFICE O/P EST MOD 30 MIN: CPT | Performed by: ORTHOPAEDIC SURGERY

## 2024-08-09 RX ORDER — ACETAMINOPHEN 325 MG/1
975 TABLET ORAL ONCE
OUTPATIENT
Start: 2024-08-09 | End: 2024-08-09

## 2024-08-09 RX ORDER — SODIUM CHLORIDE, SODIUM LACTATE, POTASSIUM CHLORIDE, CALCIUM CHLORIDE 600; 310; 30; 20 MG/100ML; MG/100ML; MG/100ML; MG/100ML
125 INJECTION, SOLUTION INTRAVENOUS CONTINUOUS
OUTPATIENT
Start: 2024-08-09

## 2024-08-09 RX ORDER — CHLORHEXIDINE GLUCONATE ORAL RINSE 1.2 MG/ML
15 SOLUTION DENTAL ONCE
OUTPATIENT
Start: 2024-08-09 | End: 2024-08-09

## 2024-08-09 RX ORDER — CHLORHEXIDINE GLUCONATE 40 MG/ML
SOLUTION TOPICAL DAILY PRN
OUTPATIENT
Start: 2024-08-09

## 2024-08-09 NOTE — PROGRESS NOTES
Assessment:  1. Primary osteoarthritis of one hip, left  Case request operating room: ARTHROPLASTY HIP TOTAL ANTERIOR    Comprehensive metabolic panel    Hemoglobin A1C W/EAG Estimation    CBC and differential    Protime-INR    APTT    Type and screen    Ambulatory referral to surgical optimization    Ambulatory referral to Physical Therapy    EKG 12 lead    Case request operating room: ARTHROPLASTY HIP TOTAL ANTERIOR      2. Left hip pain  XR femur 2 vw left          Plan:    Patient has chronic left hip pain due to osteoarthritis   Weightbearing as tolerated   Patient has tried a left hip steroid injection short minimal relief. She did go to physical therapy and helped with strength   Patient has multiple allergies to antibiotics and steroids. She will be contacting the office with the name of the antibiotic she was on for the revision left total knee replacement   Patient has hx of left total knee revision by BURTON Maldonado( [phone]  Discussed with patient surgery for left anterior total hip arthroplasty. She agrees and would like to proceed forward scheduling for surgery  The benefits and purpose of the operation and or procedure have been explained to me in language I understand by Dr. Foster as well the risks, alternatives and complications pertaining to the above procedure/surgery which include but is not limited to : infections, deep vein thrombosis, blood clots to the lungs, wound healing problems, complications, for extensive blood loss, including shock, possible death, leg length discrepancy, limp hip dislocations, fracture, loss of limb, persistent pain, failure of hardware/implant, damage of blood vessels and or nerves, heart attack, stroke and potential need for further surgery/revision surgery.    She will need clearance from her PCP prior to surgery   She will be on Aspirin 81 mg twice daily for DVT prophylaxis postop when discharged from the hospital for 5 weeks   Patient  needs to be transferred to inpatient rehab after surgery   patient declined vitamins to surgery due to having a sensitivity due to having significant sensitivity to them  Patient is to follow-up  PO Left anterior STACY 3 weeks and at that time we will repeat x-ray of the left hip        The above stated was discussed in layman's terms and the patient expressed understanding.  All questions were answered to the patient's satisfaction.         Subjective:   Erin Suazo is a 78 y.o. female who presents today for follow-up for chronic left hip pain to osteoarthritis.  Patient states left hip pain has been getting worse.  She states she did not go for the steroid injection into the left hip joint due to having severe allergy to steroids.  Patient did go to physical therapy and states the therapy did help with her strength and does walk a mile a day.  Is having pain at left groin and left buttock region.  She feels her leg lengths are equal.  She states she does have difficulty with getting in and out of the car, bending forward to put on her shoes and socks and has trouble with going up steps.  Patient is taking Tylenol for pain relief.      Patient has history of a revision left total knee arthroplasty by Dr. Lalito Astorga in [facility].  Patient states she was given an antibiotic prior to the surgery and is currently on antibiotics for dental prophylaxis.          Review of systems negative unless otherwise specified in HPI    Past Medical History:   Diagnosis Date    Arthritis     Carpal tunnel syndrome     GERD (gastroesophageal reflux disease)     Hiatal hernia     Hypertension     Insomnia     Pneumonia     PONV (postoperative nausea and vomiting)     Renal calculi     Sleep deprivation     chronically    Vitamin D deficiency     Vitamin D toxicity 2011    with leaky gut syndrome       Past Surgical History:   Procedure Laterality Date    BUNIONECTOMY Bilateral     CARPAL TUNNEL RELEASE Right 2015     COLONOSCOPY      CYSTOSCOPY N/A 6/6/2019    Procedure: CYSTOSCOPY;  Surgeon: Brodie Romero MD;  [facility];  Service: Gynecology Oncology    ESOPHAGOGASTRODUODENOSCOPY N/A 5/5/2016    Procedure: ESOPHAGOGASTRODUODENOSCOPY (EGD);  Surgeon: Enmanuel Balbuena MD;  [facility];  Service:     FRACTURE SURGERY      L arm     NERVE BLOCK       R Knee    OOPHORECTOMY Left     WA COLONOSCOPY FLX DX W/COLLJ SPEC WHEN PFRMD N/A 6/28/2016    Procedure: COLONOSCOPY;  Surgeon: Enmanuel Balbuena MD;  [facility];  Service: Gastroenterology    WA ESOPHAGOGASTRODUODENOSCOPY TRANSORAL DIAGNOSTIC N/A 10/19/2018    Procedure: ESOPHAGOGASTRODUODENOSCOPY (EGD);  Surgeon: Enmanuel Balbuena MD;  [facility];  Service: Gastroenterology    WA LAPS TOTAL HYSTERECT 250 GM/< W/RMVL TUBE/OVARY N/A 6/6/2019    Procedure: ROBOTIC TOTAL LAPAROSCOPIC HYSTERECTOMY, RIGHT SALPINGO-OOPHORECTOMY, EXTENSIVE ADHESIOLYSIS, APPROXIMATELY 45 MIN;  Surgeon: Brodie Romero MD;  Location[facility];  Service: Gynecology Oncology    ROTATOR CUFF REPAIR Bilateral     TOE SURGERY Left     left 5th toe shaved down due to repeated fx    TOTAL KNEE ARTHROPLASTY Left        Family History   Problem Relation Age of Onset    Heart failure Mother     Other Mother         respiratory failure    Heart attack Mother     Heart failure Father         respiratory failure    Heart attack Father     Breast cancer Sister     No Known Problems Sister     Kidney failure Brother     Alcohol abuse Brother     No Known Problems Brother     Diabetes Brother     Hypertension Daughter     Arrhythmia Neg Hx     Clotting disorder Neg Hx     Fainting Neg Hx     Anuerysm Neg Hx     Stroke Neg Hx     Hyperlipidemia Neg Hx     Asthma Neg Hx        Social History     Occupational History    Not on file   Tobacco Use    Smoking status: Former     Current packs/day: 0.00     Average packs/day: 1 pack/day for 13.0 years (13.0 ttl pk-yrs)     Types: Cigarettes      Start date:      Quit date:      Years since quittin.6    Smokeless tobacco: Never    Tobacco comments:     Quit > 30 years ago    Vaping Use    Vaping status: Never Used   Substance and Sexual Activity    Alcohol use: No    Drug use: No    Sexual activity: Not on file     Comment: no partner         Current Outpatient Medications:     Acetaminophen 500 MG, TAKE 2 CAPSULES TWICE A DAY, Disp: 120 capsule, Rfl: 3    losartan-hydrochlorothiazide (HYZAAR) 100-25 MG per tablet, Take 1 tablet by mouth daily, Disp: 90 tablet, Rfl: 1    Nattokinase 100 MG CAPS, Take by mouth in the morning, Disp: , Rfl:     Probiotic Product (MVW COMPLETE PROBIOTIC PO), Take by mouth, Disp: , Rfl:     zolpidem (AMBIEN) 10 mg tablet, Take 0.5 tablets (5 mg total) by mouth daily at bedtime as needed for sleep, Disp: 30 tablet, Rfl: 0    benzonatate (TESSALON PERLES) 100 mg capsule, Take 1 capsule (100 mg total) by mouth 3 (three) times a day as needed for cough (Patient not taking: Reported on 4/3/2024), Disp: 45 capsule, Rfl: 0    Allergies   Allergen Reactions    Androgens Anaphylaxis    Cefazolin Hives, GI Intolerance and Swelling    Ciprofloxacin Hives    Other Other (See Comments)     STEROIDS- GI INTOLERANCE    Penicillins Hives and Rash    Prednisone Hives, GI Intolerance, Swelling and Vomiting    Vancomycin Hives, GI Intolerance, Diarrhea and Rash    Aspirin GI Intolerance and Hives     Even low dose     Cortisone Syncope    Adhesive [Medical Tape] Rash    Beef Allergy - Food Allergy Diarrhea    Gluten Meal - Food Allergy GI Intolerance    Lactose - Food Allergy Diarrhea    Oxycodone Rash and Vomiting    Pataday [Olopatadine Hcl] Eye Swelling            Vitals:    24 0806   BP: 155/79   Pulse: 71     Body mass index is 31.16 kg/m².  Wt Readings from Last 3 Encounters:   24 65.3 kg (144 lb)   24 66.5 kg (146 lb 9.6 oz)   05/15/24 64.5 kg (142 lb 3.2 oz)       Objective:            Physical Exam  Physical  Exam:      General Appearance:    Alert, cooperative, no distress, appears stated age   Head:    Normocephalic, without obvious abnormality, atraumatic   Eyes:    conjunctiva/corneas clear, both eyes         Nose:   Nares normal, septum midline, no drainage    Throat:   Lips normal; teeth and gums normal   Neck:    symmetrical, trachea midline, ;     thyroid:  no enlargement/   Back:     Symmetric, no curvature, ROM normal   Lungs:   No audible wheezing or labored breathing   Chest Wall:    No tenderness or deformity    Heart:    Regular rate and rhythm                         Pulses:   2+ and symmetric all extremities   Skin:   Skin color, texture, turgor normal, no rashes or lesions   Neurologic:   normal strength, sensation and reflexes     throughout                       Ortho Exam  Left hip   Obligate external rotation to left lower extremity with hip flexion  Left hip Non tender to palpation. TTP over left SI joint  ~0.5cm leg length discrepancy left shorter than right  0 IR, 30 deg ER, 90 flexion to full extension, pain with SUZANNE/FADDIR  Positive stinchfield  Positive pain with logroll  Sensation intact to light touch dp/sp/tib/kerwin/saph  Motor intact 5/5 hip flex ext ankle pf df and ehl/fhl  Foot warm perfused      Diagnostics, reviewed and taken today if performed as documented:    None performed      The attending physician has personally reviewed the pertinent films in PACS and interpretation is as follows:  Radiographs of the left hip and femur show severe osteoarthritic changes with osteophyte formation, subchondral cyst formation.  Patient also with longstanding implant status post revision ipsilateral total knee arthroplasty    Procedures, if performed today:    Procedures    None performed      Scribe Attestation      I,:  Americo Badillo MA am acting as a scribe while in the presence of the attending physician.:       I,:  Nellie Foster MD personally performed the services described in  "this documentation    as scribed in my presence.:               Portions of the record may have been created with voice recognition software.  Occasional wrong word or \"sound a like\" substitutions may have occurred due to the inherent limitations of voice recognition software.  Read the chart carefully and recognize, using context, where substitutions have occurred.  "

## 2024-08-12 ENCOUNTER — TELEPHONE (OUTPATIENT)
Dept: OBGYN CLINIC | Facility: CLINIC | Age: 79
End: 2024-08-12

## 2024-08-12 NOTE — TELEPHONE ENCOUNTER
Patient called regarding a few medication issues she was told to follow up on prior to her upcoming surgery  She was told to follow up on what type of abx she was given when she had her knee revision with Dr. Astorga at [facility]. She was told that he was out of the office today but will get a call back once they know. She did find out that they gave her gabapentin for pain management post op.     Also found out that she is prescribed doxycycline for any dental procedures.     As for vitamins, she was able to obtain a gluten free multivitamin which her stomach can tolerate. She will continue to take for her surgery.

## 2024-08-13 ENCOUNTER — TELEPHONE (OUTPATIENT)
Dept: OBGYN CLINIC | Facility: HOSPITAL | Age: 79
End: 2024-08-13

## 2024-08-13 DIAGNOSIS — M16.12 PRIMARY OSTEOARTHRITIS OF ONE HIP, LEFT: Primary | ICD-10-CM

## 2024-08-13 NOTE — TELEPHONE ENCOUNTER
Preoperative Elective Admission Assessment- spoke to patient.     Living Situation:    Who does pt live with: lives alone  What kind of home: apartment  How do they enter the home: front  How many levels in home: 1 level apartment, no steps.    Sleeping arrangement: first/entry floor  Where is Bathroom: Walk in shower first floor, with bars      First Floor Setup:   Is there a bathroom: Yes  Where would pt sleep: bed     DME: rolling walker and cane- instructed to bring RW DOS.     We discussed clearing pathways in the home and making sure there is accessibly to use the walker, for example, removing throw rugs.      Patient's Current Level of Function: Ambulates: Independently and ADLs: Independent    Post-op Caregiver:   Currently receive any HHC/aides/community supports: No     Post-op Transport: Has rides to and from the hospital from son, but he is unable to help with support/transport.      Outpatient Physical Therapy Site:  Site: [place]  pre and post-op appts scheduled? No     Medication Management: self  Preferred Pharmacy for Post-op Medications: [id number] - [place] - [address] [[id number]]   Blood Management Vitamin Regimen:  Pt taking daily multi- not taking preoperative vitamins due to sensitivity   Post-op anticoagulant: to be determined by surgical team postoperatively- ASA per OV note.      DC Plan: Pt was educated that our goal is to appropriately discharge patient based off their post-op function while striving to maintain maximal independence Educated pt that SNF/ARC DC requires care team recommendations for this level of care that is based off post-op function, facility acceptance and insurance approval.  Pt was educated this is determined postoperative while in the hospital.      Barriers to DC identified preoperatively: caregiver support and transportation- Per OV note, surgeon concerned with DC.     BMI: 31.16    Patient Education:  Pt educated on post-op pain, early mobilization  (POD0), LOS goals, OP PT goals, and preoperative bathing. Patient educated that our goal is to appropriately discharge patient based off their post-op function while striving to maintain maximal independence. The goal is to discharge patient to home and for them to attend outpatient physical therapy.    Assigned to care team? Yes    SW referral: Yes

## 2024-08-16 ENCOUNTER — PATIENT OUTREACH (OUTPATIENT)
Dept: OBGYN CLINIC | Facility: HOSPITAL | Age: 79
End: 2024-08-16

## 2024-08-16 NOTE — PROGRESS NOTES
[facility] received a new referral in regard to pt scheduled for arthroplasty of left hip on 09/16/2024. [facility] reviewed NN notes prior to contacting pt. Pt lives alone in an apartment. Pt ambulates independently and is independent with ADLs. Pt has no caregiver support plan. Pts son will take pt to and from surgery, but unable to help with support or transportation to and from OP PT. Per OV note surgeon is concerned about DC.   JANICE contacted pt today and introduced self and role. Pt states today she has no caregiver support planned. Pt states she has no family / friends to provide caregiver support. Pts daughter is in [place] and works and unable to assist with any caregiver support. Pts son is able to take her to and from surgery, but unable to provide any caregiver support. Pt unable to afford any self pay [facility] support as pt is in a fixed income. Pt shared that after her previous surgeries she was DC to a STR. Per pt today she cannot DC to home after surgery. SWCM did educate STR is planned postoperatively based on medical criteria. Pt again made clear that she will not DC to home. [facility] did offer referral to [facility] and pt is in agreement with same. If pt is accepted for services it can take months for services to begin. This would be more for ongoing assistance. Pt expressed understanding.   Pts son will take pt to and from surgery. Pt has no transportation planned to and from OP PT as pt plans to DC to a STR. JANICE did discuss [facility] with pt and she did write down the phone number to provide to her son and discuss with him. Pt does drive at baseline.   [facility] inquired if pt is interested in Meals on Wheels and pt will discuss same with [facility]. Pt states today her main concern is that she will not DC to home. [facility] will message NN and surgeon will be notified about pt concerns also. [facility] will remain available and continue to support pt.     After call with pt, [facility] contacted [place]  [facility] @ [phone] and made referral for pt.

## 2024-08-19 ENCOUNTER — APPOINTMENT (OUTPATIENT)
Dept: LAB | Facility: MEDICAL CENTER | Age: 79
End: 2024-08-19
Payer: MEDICARE

## 2024-08-19 ENCOUNTER — LAB REQUISITION (OUTPATIENT)
Dept: LAB | Facility: HOSPITAL | Age: 79
End: 2024-08-19
Payer: MEDICARE

## 2024-08-19 DIAGNOSIS — M16.12 PRIMARY OSTEOARTHRITIS OF ONE HIP, LEFT: ICD-10-CM

## 2024-08-19 DIAGNOSIS — M16.12 UNILATERAL PRIMARY OSTEOARTHRITIS, LEFT HIP: ICD-10-CM

## 2024-08-19 DIAGNOSIS — M16.12 PRIMARY OSTEOARTHRITIS OF LEFT HIP: ICD-10-CM

## 2024-08-19 LAB
ABO GROUP BLD: NORMAL
ALBUMIN SERPL BCG-MCNC: 4.5 G/DL (ref 3.5–5)
ALP SERPL-CCNC: 77 U/L (ref 34–104)
ALT SERPL W P-5'-P-CCNC: 14 U/L (ref 7–52)
ANION GAP SERPL CALCULATED.3IONS-SCNC: 11 MMOL/L (ref 4–13)
APTT PPP: 36 SECONDS (ref 23–34)
AST SERPL W P-5'-P-CCNC: 20 U/L (ref 13–39)
BASOPHILS # BLD AUTO: 0.03 THOUSANDS/ÂΜL (ref 0–0.1)
BASOPHILS NFR BLD AUTO: 0 % (ref 0–1)
BILIRUB SERPL-MCNC: 0.57 MG/DL (ref 0.2–1)
BLD GP AB SCN SERPL QL: NEGATIVE
BUN SERPL-MCNC: 19 MG/DL (ref 5–25)
CALCIUM SERPL-MCNC: 9.7 MG/DL (ref 8.4–10.2)
CHLORIDE SERPL-SCNC: 102 MMOL/L (ref 96–108)
CO2 SERPL-SCNC: 26 MMOL/L (ref 21–32)
CREAT SERPL-MCNC: 0.74 MG/DL (ref 0.6–1.3)
EOSINOPHIL # BLD AUTO: 0.12 THOUSAND/ÂΜL (ref 0–0.61)
EOSINOPHIL NFR BLD AUTO: 2 % (ref 0–6)
ERYTHROCYTE [DISTWIDTH] IN BLOOD BY AUTOMATED COUNT: 12.9 % (ref 11.6–15.1)
EST. AVERAGE GLUCOSE BLD GHB EST-MCNC: 126 MG/DL
GFR SERPL CREATININE-BSD FRML MDRD: 77 ML/MIN/1.73SQ M
GLUCOSE P FAST SERPL-MCNC: 99 MG/DL (ref 65–99)
HBA1C MFR BLD: 6 %
HCT VFR BLD AUTO: 39.7 % (ref 34.8–46.1)
HGB BLD-MCNC: 13 G/DL (ref 11.5–15.4)
IMM GRANULOCYTES # BLD AUTO: 0.03 THOUSAND/UL (ref 0–0.2)
IMM GRANULOCYTES NFR BLD AUTO: 0 % (ref 0–2)
INR PPP: 0.94 (ref 0.85–1.19)
LYMPHOCYTES # BLD AUTO: 1.55 THOUSANDS/ÂΜL (ref 0.6–4.47)
LYMPHOCYTES NFR BLD AUTO: 22 % (ref 14–44)
MCH RBC QN AUTO: 31.5 PG (ref 26.8–34.3)
MCHC RBC AUTO-ENTMCNC: 32.7 G/DL (ref 31.4–37.4)
MCV RBC AUTO: 96 FL (ref 82–98)
MONOCYTES # BLD AUTO: 0.41 THOUSAND/ÂΜL (ref 0.17–1.22)
MONOCYTES NFR BLD AUTO: 6 % (ref 4–12)
NEUTROPHILS # BLD AUTO: 4.77 THOUSANDS/ÂΜL (ref 1.85–7.62)
NEUTS SEG NFR BLD AUTO: 70 % (ref 43–75)
NRBC BLD AUTO-RTO: 0 /100 WBCS
PLATELET # BLD AUTO: 232 THOUSANDS/UL (ref 149–390)
PMV BLD AUTO: 12.8 FL (ref 8.9–12.7)
POTASSIUM SERPL-SCNC: 3.5 MMOL/L (ref 3.5–5.3)
PROT SERPL-MCNC: 7.6 G/DL (ref 6.4–8.4)
PROTHROMBIN TIME: 12.9 SECONDS (ref 12.3–15)
RBC # BLD AUTO: 4.13 MILLION/UL (ref 3.81–5.12)
RH BLD: NEGATIVE
SODIUM SERPL-SCNC: 139 MMOL/L (ref 135–147)
SPECIMEN EXPIRATION DATE: NORMAL
WBC # BLD AUTO: 6.91 THOUSAND/UL (ref 4.31–10.16)

## 2024-08-19 PROCEDURE — 86901 BLOOD TYPING SEROLOGIC RH(D): CPT | Performed by: ORTHOPAEDIC SURGERY

## 2024-08-19 PROCEDURE — 85025 COMPLETE CBC W/AUTO DIFF WBC: CPT

## 2024-08-19 PROCEDURE — 85730 THROMBOPLASTIN TIME PARTIAL: CPT

## 2024-08-19 PROCEDURE — 36415 COLL VENOUS BLD VENIPUNCTURE: CPT

## 2024-08-19 PROCEDURE — 86900 BLOOD TYPING SEROLOGIC ABO: CPT | Performed by: ORTHOPAEDIC SURGERY

## 2024-08-19 PROCEDURE — 85610 PROTHROMBIN TIME: CPT

## 2024-08-19 PROCEDURE — 83036 HEMOGLOBIN GLYCOSYLATED A1C: CPT

## 2024-08-19 PROCEDURE — 80053 COMPREHEN METABOLIC PANEL: CPT

## 2024-08-19 PROCEDURE — 86850 RBC ANTIBODY SCREEN: CPT | Performed by: ORTHOPAEDIC SURGERY

## 2024-08-21 ENCOUNTER — APPOINTMENT (OUTPATIENT)
Dept: LAB | Facility: AMBULARY SURGERY CENTER | Age: 79
End: 2024-08-21
Payer: MEDICARE

## 2024-08-21 DIAGNOSIS — M16.12 PRIMARY OSTEOARTHRITIS OF ONE HIP, LEFT: ICD-10-CM

## 2024-08-21 LAB
ATRIAL RATE: 70 BPM
P AXIS: 43 DEGREES
PR INTERVAL: 184 MS
QRS AXIS: -8 DEGREES
QRSD INTERVAL: 76 MS
QT INTERVAL: 426 MS
QTC INTERVAL: 460 MS
T WAVE AXIS: 31 DEGREES
VENTRICULAR RATE: 70 BPM

## 2024-08-21 PROCEDURE — 93010 ELECTROCARDIOGRAM REPORT: CPT | Performed by: INTERNAL MEDICINE

## 2024-08-21 PROCEDURE — 93005 ELECTROCARDIOGRAM TRACING: CPT

## 2024-08-26 ENCOUNTER — ANESTHESIA EVENT (OUTPATIENT)
Dept: PERIOP | Facility: HOSPITAL | Age: 79
DRG: 470 | End: 2024-08-26
Payer: MEDICARE

## 2024-08-26 ENCOUNTER — TELEPHONE (OUTPATIENT)
Dept: OBGYN CLINIC | Facility: CLINIC | Age: 79
End: 2024-08-26

## 2024-08-26 PROBLEM — E66.811 CLASS 1 OBESITY DUE TO EXCESS CALORIES WITHOUT SERIOUS COMORBIDITY WITH BODY MASS INDEX (BMI) OF 31.0 TO 31.9 IN ADULT: Status: ACTIVE | Noted: 2024-08-26

## 2024-08-26 PROBLEM — M17.10 ARTHRITIS OF KNEE: Status: ACTIVE | Noted: 2017-08-23

## 2024-08-26 PROBLEM — E66.09 CLASS 1 OBESITY DUE TO EXCESS CALORIES WITHOUT SERIOUS COMORBIDITY WITH BODY MASS INDEX (BMI) OF 31.0 TO 31.9 IN ADULT: Status: ACTIVE | Noted: 2024-08-26

## 2024-08-26 NOTE — ASSESSMENT & PLAN NOTE
Stable  Monitor post operative BP   Avoid hypotension if at all possible  Refer to PAT instructions regarding medication administration the morning of surgery

## 2024-08-26 NOTE — H&P (VIEW-ONLY)
Internal Medicine Pre-Operative Evaluation:     Reason for Visit: Pre-operative Evaluation for Risk Stratification and Optimization    Patient ID: Erin Suazo is a 78 y.o. female.     Surgery: Arthroplasty of left Hip  Referring Provider: Dr Foster      Recommendations to Proceed withSurgery    Patient is considered to be Low risk for Medium risk procedure.     After evaluation and discussion with patient with emphasis that all surgery has some degree of inherent risk it is acknowledged by patient this risk is Acceptable.    Patient is optimized and may proceed with planned procedure.     Assessment    Pre-operative Medical Evaluation for planned surgery  Recommendations as listed in PLAN section below  Contact surgical nurse  navigator with any questions regarding preoperative plan or schedule.      Problem List Items Addressed This Visit          Cardiovascular and Mediastinum    Hypertension - Primary      Stable  Monitor post operative BP   Avoid hypotension if at all possible  Refer to PAT instructions regarding medication administration the morning of surgery              Musculoskeletal and Integument    Arthritis of left hip     Failed outpatient conservative measures  Electing to undergo arthroplasty                   Plan:     1. Further preoperative workup as follows:   - none no further testing required may proceed with surgery    2. Preoperative Medication Management Review performed by PAT nursing  YES    3. Patient requires further consultation with:   No Consults Required    4. Discharge Planning / Barriers to Discharge  none identified - patients has post discharge therapy plan in place, transportation arranged for discharge day, adequate family support at home to assist with discharge to home.        Subjective:           History of Present Illness:     Erin Suazo is a 78 y.o. female who presents to the office today for a preoperative consultation at the request of surgeon. The patient  "understands this is an elective procedure and not emergent. They are electing to undergo planned procedure with an understanding that all surgery has inherent risk. They have worked with their surgeon and failed conservative treatment measures. Today they present for preoperative risk assessment and recommendations for optimization in preparation for surgery.    Pt seen in surgical optimization center for upcoming proposed surgery. They have failed previous conservative measures and have elected surgical intervention.     Pt meets presurgical lab and BMI optimization goals.    Upon interview questioning patient is able to perform greater than 4 METs workload in daily life without any reported cardio-pulmonary symptoms. Patient walks about a mile a day ( 4 laps around local track) each day.    Pt is unable to DC to home. Her surgeon is aware as well as case management.           ROS: No TIA's or unusual headaches, no dysphagia.  No prolonged cough. No dyspnea or chest pain on exertion.  No abdominal pain, change in bowel habits, black or bloody stools.  No urinary tract or BPH symptoms.  Positive reported pain in arthritic joint. Positive difficulty with gait. No skin rashes or issues.      Objective:    /78   Pulse 101   Ht 4' 10.5\" (1.486 m)   Wt 65.3 kg (144 lb)   SpO2 99%   BMI 29.58 kg/m²       General Appearance: no distress, conversive  HEENT: PERRLA, conjuctiva normal; oropharynx clear; mucous membranes moist;   Neck:  Supple, no lymphadenopathy or thyromegaly  Lungs: breath sounds normal, normal respiratory effort, no retractions, expiratory effort normal  CV: normal heart sounds S1/S2, PMI normal   ABD: soft non tender, no masses , no hepatic or splenomegaly  EXT: DP pulses intact, no lymphadenopathy, no edema  Skin: normal turgor, normal texture, no rash  Psych: affect normal, mood normal  Neuro: AAOx3        The following portions of the patient's history were reviewed and updated as " appropriate: allergies, current medications, past family history, past medical history, past social history, past surgical history and problem list.     Past History:       Past Medical History:   Diagnosis Date    Arthritis     Carpal tunnel syndrome     GERD (gastroesophageal reflux disease)     Hiatal hernia     Hypertension     Insomnia     Pneumonia     PONV (postoperative nausea and vomiting)     Renal calculi     Sleep deprivation     chronically    Vitamin D deficiency     Vitamin D toxicity 2011    with leaky gut syndrome    Past Surgical History:   Procedure Laterality Date    BUNIONECTOMY Bilateral     CARPAL TUNNEL RELEASE Right 2015    COLONOSCOPY      CYSTOSCOPY N/A 6/6/2019    Procedure: CYSTOSCOPY;  Surgeon: Brodie Romero MD;  [facility];  Service: Gynecology Oncology    ESOPHAGOGASTRODUODENOSCOPY N/A 5/5/2016    Procedure: ESOPHAGOGASTRODUODENOSCOPY (EGD);  Surgeon: Enmanuel Balbuena MD;  [facility];  Service:     FRACTURE SURGERY      L arm     NERVE BLOCK       R Knee    OOPHORECTOMY Left     NE COLONOSCOPY FLX DX W/COLLJ SPEC WHEN PFRMD N/A 6/28/2016    Procedure: COLONOSCOPY;  Surgeon: Enmanuel Balbuena MD;  [facility];  Service: Gastroenterology    NE ESOPHAGOGASTRODUODENOSCOPY TRANSORAL DIAGNOSTIC N/A 10/19/2018    Procedure: ESOPHAGOGASTRODUODENOSCOPY (EGD);  Surgeon: Enmanuel Balbuena MD;  [facility];  Service: Gastroenterology    NE LAPS TOTAL HYSTERECT 250 GM/< W/RMVL TUBE/OVARY N/A 6/6/2019    Procedure: ROBOTIC TOTAL LAPAROSCOPIC HYSTERECTOMY, RIGHT SALPINGO-OOPHORECTOMY, EXTENSIVE ADHESIOLYSIS, APPROXIMATELY 45 MIN;  Surgeon: Brodie Romero MD;  [facility];  Service: Gynecology Oncology    ROTATOR CUFF REPAIR Bilateral     TOE SURGERY Left     left 5th toe shaved down due to repeated fx    TOTAL KNEE ARTHROPLASTY Left           Social History     Tobacco Use    Smoking status: Former     Current packs/day: 0.00     Average packs/day: 1  pack/day for 13.0 years (13.0 ttl pk-yrs)     Types: Cigarettes     Start date:      Quit date:      Years since quittin.7    Smokeless tobacco: Never    Tobacco comments:     Quit > 30 years ago    Vaping Use    Vaping status: Never Used   Substance Use Topics    Alcohol use: No    Drug use: No     Family History   Problem Relation Age of Onset    Heart failure Mother     Other Mother         respiratory failure    Heart attack Mother     Heart failure Father         respiratory failure    Heart attack Father     Breast cancer Sister     No Known Problems Sister     Kidney failure Brother     Alcohol abuse Brother     No Known Problems Brother     Diabetes Brother     Hypertension Daughter     Arrhythmia Neg Hx     Clotting disorder Neg Hx     Fainting Neg Hx     Anuerysm Neg Hx     Stroke Neg Hx     Hyperlipidemia Neg Hx     Asthma Neg Hx           Allergies:     Allergies   Allergen Reactions    Androgens Anaphylaxis    Cefazolin Hives, GI Intolerance and Swelling    Ciprofloxacin Hives    Other Other (See Comments)     STEROIDS- GI INTOLERANCE    Penicillins Hives and Rash    Prednisone Hives, GI Intolerance, Swelling and Vomiting    Vancomycin Hives, GI Intolerance, Diarrhea and Rash    Aspirin GI Intolerance and Hives     Even low dose     Cortisone Syncope    Adhesive [Medical Tape] Rash    Beef Allergy - Food Allergy Diarrhea    Gluten Meal - Food Allergy GI Intolerance    Lactose - Food Allergy Diarrhea    Oxycodone Rash and Vomiting    Pataday [Olopatadine Hcl] Eye Swelling        Current Medications:     Current Outpatient Medications   Medication Instructions    Acetaminophen 500 MG TAKE 2 CAPSULES TWICE A DAY    benzonatate (TESSALON PERLES) 100 mg, Oral, 3 times daily PRN    losartan-hydrochlorothiazide (HYZAAR) 100-25 MG per tablet 1 tablet, Oral, Daily    multivitamin-minerals (CENTRUM) tablet 1 tablet, Daily    Nattokinase 100 MG CAPS Oral, Daily    Probiotic Product (MVW COMPLETE  "PROBIOTIC PO) Oral    zolpidem (AMBIEN) 5 mg, Oral, Daily at bedtime PRN           PRE-OP WORKSHEET DATA    Assessment of Pre-Operative Risks     MLJ Quality Hard Stops:    BMI (<40) : Estimated body mass index is 29.58 kg/m² as calculated from the following:    Height as of this encounter: 4' 10.5\" (1.486 m).    Weight as of this encounter: 65.3 kg (144 lb).    Hgb ( >11):   Lab Results   Component Value Date    HGB 13.0 08/19/2024    HGB 12.5 12/30/2023    HGB 12.7 07/13/2023       HbA1c (<7.5) :   Lab Results   Component Value Date    HGBA1C 6.0 (H) 08/19/2024       GFR (>60) (Less then 45 = Nephrology consult):    Lab Results   Component Value Date    EGFR 77 08/19/2024    EGFR 78 12/30/2023    EGFR 81 10/19/2023         Active Decompensated Chronic Conditions which would delay surgery  No acutely decompensated medical issues such as recent CVA, MI, new onset arrhythmia, severe aortic stenosis, CHF, uncontrolled COPD       Functional capacity: CLIMBING 2 FLIGHTS STAIRS, GARDENING                             4 METS          Pick the highest level patient can comfortably perform   (if less the 4 mets consider functional assessment via cardiac stress testing or consultation)    Assessment of intra and post operative respiratory, hemodynamic and thrombotic risks     Prior Anesthesia Reactions: No     Personal history of venous thromboembolic disease? No    History of steroid use > 5 mg for >2 weeks within last year? No      The patient's risk factors for cardiac complications include :  none    Erin Suazo has an IN HOSPITAL cardiac risk of RCI RISK CLASS I (0 risk factors, risk of major cardiac compl. appr. 0.5%) based on RCRI calculator    Cardiac Risk Estimation: per the Revised Cardiac Risk Index (Circ. 100:1043, 1999),        Pre-Op Data Reviewed:       Laboratory Results: I have personally reviewed the pertinent laboratory results/reports     EKG:I have personally reviewed pertinent reports.  . I personally " reviewed and interpreted available tracings in the electronic medical record    Encounter Date: 08/21/24   EKG 12 lead   Result Value    Ventricular Rate 70    Atrial Rate 70    OH Interval 184    QRSD Interval 76    QT Interval 426    QTC Interval 460    P Axis 43    QRS Axis -8    T Wave Axis 31    Narrative    Normal sinus rhythm  Septal infarct , age undetermined  Abnormal ECG    Confirmed by Tessa Caputo ([id number]) on 8/21/2024 4:39:11 PM       OLD RECORDS: reviewed old records in the chart review section if EHR on day of visit.    Previous cardiopulmonary studies within the past year:  Echocardiogram: yes   Lab Results   Component Value Date    LVEF 60 09/13/2022     Cardiac Catheterization: no  Stress Test: 2021 myoview stress negative done at outside facility      Time of visit including pre-visit chart review, visit and post-visit coordination of plan and care , review of pre-surgical lab work, preparation and time spent documenting note in electronic medical record, time spent face-to-face in physical examination answering patient questions by care team 35 minutes             [facility]

## 2024-08-26 NOTE — PROGRESS NOTES
Internal Medicine Pre-Operative Evaluation:     Reason for Visit: Pre-operative Evaluation for Risk Stratification and Optimization    Patient ID: Erin Suazo is a 78 y.o. female.     Surgery: Arthroplasty of left Hip  Referring Provider: Dr Foster      Recommendations to Proceed withSurgery    Patient is considered to be Low risk for Medium risk procedure.     After evaluation and discussion with patient with emphasis that all surgery has some degree of inherent risk it is acknowledged by patient this risk is Acceptable.    Patient is optimized and may proceed with planned procedure.     Assessment    Pre-operative Medical Evaluation for planned surgery  Recommendations as listed in PLAN section below  Contact surgical nurse  navigator with any questions regarding preoperative plan or schedule.      Problem List Items Addressed This Visit          Cardiovascular and Mediastinum    Hypertension - Primary      Stable  Monitor post operative BP   Avoid hypotension if at all possible  Refer to PAT instructions regarding medication administration the morning of surgery              Musculoskeletal and Integument    Arthritis of left hip     Failed outpatient conservative measures  Electing to undergo arthroplasty                   Plan:     1. Further preoperative workup as follows:   - none no further testing required may proceed with surgery    2. Preoperative Medication Management Review performed by PAT nursing  YES    3. Patient requires further consultation with:   No Consults Required    4. Discharge Planning / Barriers to Discharge  none identified - patients has post discharge therapy plan in place, transportation arranged for discharge day, adequate family support at home to assist with discharge to home.        Subjective:           History of Present Illness:     Erin Suazo is a 78 y.o. female who presents to the office today for a preoperative consultation at the request of surgeon. The patient  "understands this is an elective procedure and not emergent. They are electing to undergo planned procedure with an understanding that all surgery has inherent risk. They have worked with their surgeon and failed conservative treatment measures. Today they present for preoperative risk assessment and recommendations for optimization in preparation for surgery.    Pt seen in surgical optimization center for upcoming proposed surgery. They have failed previous conservative measures and have elected surgical intervention.     Pt meets presurgical lab and BMI optimization goals.    Upon interview questioning patient is able to perform greater than 4 METs workload in daily life without any reported cardio-pulmonary symptoms. Patient walks about a mile a day ( 4 laps around local track) each day.    Pt is unable to DC to home. Her surgeon is aware as well as case management.           ROS: No TIA's or unusual headaches, no dysphagia.  No prolonged cough. No dyspnea or chest pain on exertion.  No abdominal pain, change in bowel habits, black or bloody stools.  No urinary tract or BPH symptoms.  Positive reported pain in arthritic joint. Positive difficulty with gait. No skin rashes or issues.      Objective:    /78   Pulse 101   Ht 4' 10.5\" (1.486 m)   Wt 65.3 kg (144 lb)   SpO2 99%   BMI 29.58 kg/m²       General Appearance: no distress, conversive  HEENT: PERRLA, conjuctiva normal; oropharynx clear; mucous membranes moist;   Neck:  Supple, no lymphadenopathy or thyromegaly  Lungs: breath sounds normal, normal respiratory effort, no retractions, expiratory effort normal  CV: normal heart sounds S1/S2, PMI normal   ABD: soft non tender, no masses , no hepatic or splenomegaly  EXT: DP pulses intact, no lymphadenopathy, no edema  Skin: normal turgor, normal texture, no rash  Psych: affect normal, mood normal  Neuro: AAOx3        The following portions of the patient's history were reviewed and updated as " appropriate: allergies, current medications, past family history, past medical history, past social history, past surgical history and problem list.     Past History:       Past Medical History:   Diagnosis Date    Arthritis     Carpal tunnel syndrome     GERD (gastroesophageal reflux disease)     Hiatal hernia     Hypertension     Insomnia     Pneumonia     PONV (postoperative nausea and vomiting)     Renal calculi     Sleep deprivation     chronically    Vitamin D deficiency     Vitamin D toxicity 2011    with leaky gut syndrome    Past Surgical History:   Procedure Laterality Date    BUNIONECTOMY Bilateral     CARPAL TUNNEL RELEASE Right 2015    COLONOSCOPY      CYSTOSCOPY N/A 6/6/2019    Procedure: CYSTOSCOPY;  Surgeon: Brodie Romero MD;  [facility];  Service: Gynecology Oncology    ESOPHAGOGASTRODUODENOSCOPY N/A 5/5/2016    Procedure: ESOPHAGOGASTRODUODENOSCOPY (EGD);  Surgeon: Enmanuel Balbuena MD;  [facility];  Service:     FRACTURE SURGERY      L arm     NERVE BLOCK       R Knee    OOPHORECTOMY Left     NH COLONOSCOPY FLX DX W/COLLJ SPEC WHEN PFRMD N/A 6/28/2016    Procedure: COLONOSCOPY;  Surgeon: Enmanuel Balbuena MD;  [facility];  Service: Gastroenterology    NH ESOPHAGOGASTRODUODENOSCOPY TRANSORAL DIAGNOSTIC N/A 10/19/2018    Procedure: ESOPHAGOGASTRODUODENOSCOPY (EGD);  Surgeon: Enmanuel Balbuena MD;  [facility];  Service: Gastroenterology    NH LAPS TOTAL HYSTERECT 250 GM/< W/RMVL TUBE/OVARY N/A 6/6/2019    Procedure: ROBOTIC TOTAL LAPAROSCOPIC HYSTERECTOMY, RIGHT SALPINGO-OOPHORECTOMY, EXTENSIVE ADHESIOLYSIS, APPROXIMATELY 45 MIN;  Surgeon: Brodie Romero MD;  [facility];  Service: Gynecology Oncology    ROTATOR CUFF REPAIR Bilateral     TOE SURGERY Left     left 5th toe shaved down due to repeated fx    TOTAL KNEE ARTHROPLASTY Left           Social History     Tobacco Use    Smoking status: Former     Current packs/day: 0.00     Average packs/day: 1  pack/day for 13.0 years (13.0 ttl pk-yrs)     Types: Cigarettes     Start date:      Quit date:      Years since quittin.7    Smokeless tobacco: Never    Tobacco comments:     Quit > 30 years ago    Vaping Use    Vaping status: Never Used   Substance Use Topics    Alcohol use: No    Drug use: No     Family History   Problem Relation Age of Onset    Heart failure Mother     Other Mother         respiratory failure    Heart attack Mother     Heart failure Father         respiratory failure    Heart attack Father     Breast cancer Sister     No Known Problems Sister     Kidney failure Brother     Alcohol abuse Brother     No Known Problems Brother     Diabetes Brother     Hypertension Daughter     Arrhythmia Neg Hx     Clotting disorder Neg Hx     Fainting Neg Hx     Anuerysm Neg Hx     Stroke Neg Hx     Hyperlipidemia Neg Hx     Asthma Neg Hx           Allergies:     Allergies   Allergen Reactions    Androgens Anaphylaxis    Cefazolin Hives, GI Intolerance and Swelling    Ciprofloxacin Hives    Other Other (See Comments)     STEROIDS- GI INTOLERANCE    Penicillins Hives and Rash    Prednisone Hives, GI Intolerance, Swelling and Vomiting    Vancomycin Hives, GI Intolerance, Diarrhea and Rash    Aspirin GI Intolerance and Hives     Even low dose     Cortisone Syncope    Adhesive [Medical Tape] Rash    Beef Allergy - Food Allergy Diarrhea    Gluten Meal - Food Allergy GI Intolerance    Lactose - Food Allergy Diarrhea    Oxycodone Rash and Vomiting    Pataday [Olopatadine Hcl] Eye Swelling        Current Medications:     Current Outpatient Medications   Medication Instructions    Acetaminophen 500 MG TAKE 2 CAPSULES TWICE A DAY    benzonatate (TESSALON PERLES) 100 mg, Oral, 3 times daily PRN    losartan-hydrochlorothiazide (HYZAAR) 100-25 MG per tablet 1 tablet, Oral, Daily    multivitamin-minerals (CENTRUM) tablet 1 tablet, Daily    Nattokinase 100 MG CAPS Oral, Daily    Probiotic Product (MVW COMPLETE  "PROBIOTIC PO) Oral    zolpidem (AMBIEN) 5 mg, Oral, Daily at bedtime PRN           PRE-OP WORKSHEET DATA    Assessment of Pre-Operative Risks     MLJ Quality Hard Stops:    BMI (<40) : Estimated body mass index is 29.58 kg/m² as calculated from the following:    Height as of this encounter: 4' 10.5\" (1.486 m).    Weight as of this encounter: 65.3 kg (144 lb).    Hgb ( >11):   Lab Results   Component Value Date    HGB 13.0 08/19/2024    HGB 12.5 12/30/2023    HGB 12.7 07/13/2023       HbA1c (<7.5) :   Lab Results   Component Value Date    HGBA1C 6.0 (H) 08/19/2024       GFR (>60) (Less then 45 = Nephrology consult):    Lab Results   Component Value Date    EGFR 77 08/19/2024    EGFR 78 12/30/2023    EGFR 81 10/19/2023         Active Decompensated Chronic Conditions which would delay surgery  No acutely decompensated medical issues such as recent CVA, MI, new onset arrhythmia, severe aortic stenosis, CHF, uncontrolled COPD       Functional capacity: CLIMBING 2 FLIGHTS STAIRS, GARDENING                             4 METS          Pick the highest level patient can comfortably perform   (if less the 4 mets consider functional assessment via cardiac stress testing or consultation)    Assessment of intra and post operative respiratory, hemodynamic and thrombotic risks     Prior Anesthesia Reactions: No     Personal history of venous thromboembolic disease? No    History of steroid use > 5 mg for >2 weeks within last year? No      The patient's risk factors for cardiac complications include :  none    Erin Suazo has an IN HOSPITAL cardiac risk of RCI RISK CLASS I (0 risk factors, risk of major cardiac compl. appr. 0.5%) based on RCRI calculator    Cardiac Risk Estimation: per the Revised Cardiac Risk Index (Circ. 100:1043, 1999),        Pre-Op Data Reviewed:       Laboratory Results: I have personally reviewed the pertinent laboratory results/reports     EKG:I have personally reviewed pertinent reports.  . I personally " reviewed and interpreted available tracings in the electronic medical record    Encounter Date: 08/21/24   EKG 12 lead   Result Value    Ventricular Rate 70    Atrial Rate 70    IL Interval 184    QRSD Interval 76    QT Interval 426    QTC Interval 460    P Axis 43    QRS Axis -8    T Wave Axis 31    Narrative    Normal sinus rhythm  Septal infarct , age undetermined  Abnormal ECG    Confirmed by Tessa Caputo ([id number]) on 8/21/2024 4:39:11 PM       OLD RECORDS: reviewed old records in the chart review section if EHR on day of visit.    Previous cardiopulmonary studies within the past year:  Echocardiogram: yes   Lab Results   Component Value Date    LVEF 60 09/13/2022     Cardiac Catheterization: no  Stress Test: 2021 myoview stress negative done at outside facility      Time of visit including pre-visit chart review, visit and post-visit coordination of plan and care , review of pre-surgical lab work, preparation and time spent documenting note in electronic medical record, time spent face-to-face in physical examination answering patient questions by care team 35 minutes             [facility]

## 2024-08-26 NOTE — PRE-PROCEDURE INSTRUCTIONS
Pre-Surgery Instructions:   Medication Instructions    Acetaminophen 500 MG Uses PRN- OK to take day of surgery    losartan-hydrochlorothiazide (HYZAAR) 100-25 MG per tablet Hold day of surgery.    multivitamin-minerals (CENTRUM) tablet Instructions provided by MD Burroughskinase 100 MG CAPS Stop taking 7 days prior to surgery.    Probiotic Product (MVW COMPLETE PROBIOTIC PO) Hold day of surgery.    zolpidem (AMBIEN) 10 mg tablet Uses PRN- DO NOT take day of surgery      Medication instructions for day surgery reviewed. Please use only a sip of water to take your instructed medications. Avoid all over the counter vitamins, supplements and NSAIDS for one week prior to surgery per anesthesia guidelines. Tylenol is ok to take as needed.     You will receive a call one business day prior to surgery with an arrival time and hospital directions. If your surgery is scheduled on a Monday, the hospital will be calling you on the Friday prior to your surgery. If you have not heard from anyone by 8pm, please call the hospital supervisor through the hospital  at [phone]. ([facility] [phone] or [facility] [phone]).    Do not eat or drink anything after midnight the night before your surgery, including candy, mints, lifesavers, or chewing gum. Do not drink alcohol 24hrs before your surgery. Try not to smoke at least 24hrs before your surgery.       Follow the pre surgery showering instructions as listed in the “My Surgical Experience Booklet” or otherwise provided by your surgeon's office. Do not use a blade to shave the surgical area 1 week before surgery. It is okay to use a clean electric clippers up to 24 hours before surgery. Do not apply any lotions, creams, including makeup, cologne, deodorant, or perfumes after showering on the day of your surgery. Do not use dry shampoo, hair spray, hair gel, or any type of hair products.     No contact lenses, eye make-up, or artificial eyelashes. Remove nail polish,  including gel polish, and any artificial, gel, or acrylic nails if possible. Remove all jewelry including rings and body piercing jewelry.     Wear causal clothing that is easy to take on and off. Consider your type of surgery.    Keep any valuables, jewelry, piercings at home. Please bring any specially ordered equipment (sling, braces) if indicated.    Arrange for a responsible person to drive you to and from the hospital on the day of your surgery. Please confirm the visitor policy for the day of your procedure when you receive your phone call with an arrival time.     Call the surgeon's office with any new illnesses, exposures, or additional questions prior to surgery.    Please reference your “My Surgical Experience Booklet” for additional information to prepare for your upcoming surgery.

## 2024-08-26 NOTE — TELEPHONE ENCOUNTER
Patient called wanting to let Thais know she found a SNF that is willing to take her after surgery - [facility] in [place]     Deepa from [facility] stated they will have a bed for her day of surgery, the  would just need to call them to arrange transport when she is ready.     Deepa's phone # [phone]    Patient would also like a call back from Thais to make sure she received this message [phone]

## 2024-08-27 ENCOUNTER — PATIENT OUTREACH (OUTPATIENT)
Dept: OBGYN CLINIC | Facility: HOSPITAL | Age: 79
End: 2024-08-27

## 2024-08-27 NOTE — PROGRESS NOTES
JANICECM consulted with NN in regard to pt. Inpatient  mentioned private pay post acute services as an option for pt. ROC will discuss when reaching out to pt today, however, per previous conversations pt unable to afford self pay services due to limited income.

## 2024-08-27 NOTE — PROGRESS NOTES
ROC contacted pt today to follow up caregiver support and transportation. Pt started the conversation stating that she has contacted her insurance and they will pay for STR. ROC again educated pt that STR must be recommended postoperatively based on medical criteria. Pt became frustrated and was stating that I made her discouraged by advising her that STR cannot be guaranteed. ROC spoke to pt about self pay [facility] services, and pt unable to afford same as she is on a fixed income. JANICECM then spoke to pt about private pay Post Acute services and pt again made clear that she is unable to afford self pay services.   Pt sates today she has no plan B and she cannot DC to home. ROC has educated pt numerous times STR cannot be guaranteed. Per pt today her surgeon will not throw her out on the street.   JANICECM inquired who will be taking pt to surgery and per pt her son will take her and then he needs to leave. Pt also did share that she has an appt with [facility] this Thursday 08/29/2024 to see if she is eligible for services.  Per pt she has no caregiver support options and unable to afford any self pay or private pay services. Pt states she is unable to DC to home. NN, surgeon, and inpatient  all made aware. ROC will remain available.

## 2024-08-27 NOTE — PROGRESS NOTES
SWCM sent reminder in basket to assigned NN that per tp she will not DC to home. SWCM will remain and continue to support pt as possible.

## 2024-08-28 ENCOUNTER — LAB REQUISITION (OUTPATIENT)
Dept: LAB | Facility: HOSPITAL | Age: 79
End: 2024-08-28
Payer: MEDICARE

## 2024-08-28 ENCOUNTER — APPOINTMENT (OUTPATIENT)
Dept: LAB | Facility: AMBULARY SURGERY CENTER | Age: 79
DRG: 470 | End: 2024-08-28
Attending: ORTHOPAEDIC SURGERY
Payer: MEDICARE

## 2024-08-28 DIAGNOSIS — M16.12 PRIMARY OSTEOARTHRITIS OF ONE HIP, LEFT: ICD-10-CM

## 2024-08-28 DIAGNOSIS — M16.12 UNILATERAL PRIMARY OSTEOARTHRITIS, LEFT HIP: ICD-10-CM

## 2024-08-28 LAB
ABO GROUP BLD: NORMAL
BLD GP AB SCN SERPL QL: NEGATIVE
RH BLD: NEGATIVE
SPECIMEN EXPIRATION DATE: NORMAL

## 2024-08-28 PROCEDURE — 36415 COLL VENOUS BLD VENIPUNCTURE: CPT

## 2024-08-28 PROCEDURE — 86901 BLOOD TYPING SEROLOGIC RH(D): CPT | Performed by: ORTHOPAEDIC SURGERY

## 2024-08-28 PROCEDURE — 86900 BLOOD TYPING SEROLOGIC ABO: CPT | Performed by: ORTHOPAEDIC SURGERY

## 2024-08-28 PROCEDURE — 86850 RBC ANTIBODY SCREEN: CPT | Performed by: ORTHOPAEDIC SURGERY

## 2024-08-29 ENCOUNTER — APPOINTMENT (OUTPATIENT)
Dept: LAB | Facility: MEDICAL CENTER | Age: 79
End: 2024-08-29
Payer: MEDICARE

## 2024-08-29 DIAGNOSIS — Z00.00 MEDICARE ANNUAL WELLNESS VISIT, SUBSEQUENT: ICD-10-CM

## 2024-08-29 DIAGNOSIS — E78.5 HYPERLIPIDEMIA, UNSPECIFIED HYPERLIPIDEMIA TYPE: ICD-10-CM

## 2024-08-29 LAB
CHOLEST SERPL-MCNC: 173 MG/DL
HDLC SERPL-MCNC: 50 MG/DL
LDLC SERPL CALC-MCNC: 83 MG/DL (ref 0–100)
NONHDLC SERPL-MCNC: 123 MG/DL
TRIGL SERPL-MCNC: 198 MG/DL

## 2024-08-29 PROCEDURE — 80061 LIPID PANEL: CPT

## 2024-08-29 PROCEDURE — 36415 COLL VENOUS BLD VENIPUNCTURE: CPT

## 2024-09-03 ENCOUNTER — CONSULT (OUTPATIENT)
Dept: ANESTHESIOLOGY | Facility: CLINIC | Age: 79
End: 2024-09-03
Payer: MEDICARE

## 2024-09-03 ENCOUNTER — OFFICE VISIT (OUTPATIENT)
Age: 79
End: 2024-09-03
Payer: MEDICARE

## 2024-09-03 VITALS
HEART RATE: 101 BPM | OXYGEN SATURATION: 99 % | SYSTOLIC BLOOD PRESSURE: 142 MMHG | WEIGHT: 144 LBS | BODY MASS INDEX: 29.03 KG/M2 | DIASTOLIC BLOOD PRESSURE: 78 MMHG | HEIGHT: 59 IN

## 2024-09-03 DIAGNOSIS — E66.09 CLASS 1 OBESITY DUE TO EXCESS CALORIES WITHOUT SERIOUS COMORBIDITY WITH BODY MASS INDEX (BMI) OF 31.0 TO 31.9 IN ADULT: ICD-10-CM

## 2024-09-03 DIAGNOSIS — M16.12 ARTHRITIS OF LEFT HIP: ICD-10-CM

## 2024-09-03 DIAGNOSIS — Z01.89 ENCOUNTER FOR GERIATRIC ASSESSMENT: Primary | ICD-10-CM

## 2024-09-03 DIAGNOSIS — M16.12 PRIMARY OSTEOARTHRITIS OF ONE HIP, LEFT: ICD-10-CM

## 2024-09-03 DIAGNOSIS — I10 PRIMARY HYPERTENSION: Primary | ICD-10-CM

## 2024-09-03 PROCEDURE — 99215 OFFICE O/P EST HI 40 MIN: CPT | Performed by: INTERNAL MEDICINE

## 2024-09-03 PROCEDURE — 99213 OFFICE O/P EST LOW 20 MIN: CPT | Performed by: NURSE PRACTITIONER

## 2024-09-03 NOTE — PATIENT INSTRUCTIONS
BEFORE SURGERY    Contact surgical nurse  navigator with any questions regarding preoperative plan or schedule.  Stop all over the counter supplements, herbal, naturopathic  medications for 1 week prior to surgery UNLESS prescribed by your surgeon  Hold NSAIDS (i.e. advil, alleve, motrin, ibuprofen, celebrex) minimum 5 days prior to surgery  Follow presurgical medication instructions provided by preadmission nursing team reviewed during your presurgery phone call  Strategies for optimizing your surgery through breathing exercises, nutrition and physical activity can be found at [email or web address]  Call [phone] with any presurgical concerns or medications questions    AFTER SURGERY    Recommend using Tylenol ( acetaminophen ) 1000 mg every eight hours during the first week post discharge along with icing the area for 20 mins every 3-4 hours while awake can be helpful in reducing your need for post operative opioid use. This opioid sparing plan can be used along side your surgeons pain plan.  Use stool softener over the counter (colace) daily after surgery during the first 1-2 weeks to avoid post operative constipation issues  If no bowel movement within 3 days after surgery then use over the counter Miralax in addition to your stool softener   If cleared by your surgical team for activity then early and often walking is encouraged and can be important in prevention of post surgical blood clots. Additionally spend as much time out of bed as possible and allowed by your surgical team  Use your incentive spirometer twice per hour in the first seven days after surgery to help prevent post surgery lung complications and infections  Call [phone] with any post discharge concerns or medical issues     (4) walks frequently

## 2024-09-03 NOTE — PROGRESS NOTES
"[facility]  CONSULT: GERIATRIC SURGERY    Ambulatory Visit    Name: Erin Suazo      : 1945      MRN: [id number]  Encounter Provider: OLIVIER Abdul  Encounter Date: 9/3/2024   Encounter department: [facility] SURGICAL OPTIMIZATION [place]    Assessment & Plan    78 year old female referred to [facility] for pre-surgery geriatric screening 2.2 age   Other consult concerns include: surgical optimization & BEST program   She is scheduled on   This SOC appointment is strictly for a geriatric assessment 2.2 advanced age.  Patient was previously seen in SOC by Dr. Dunham and his team for medical clearance. Please refer to his note for medical history.  Case: [id number] Date/Time: 24   Procedure: ARTHROPLASTY HIP TOTAL ANTERIOR (Left: Hip)   Anesthesia type: Choice   Diagnosis: Primary osteoarthritis of one hip, left [M16.12]   Pre-op diagnosis: Primary osteoarthritis of one hip, left [M16.12]   Location: [facility] OR ROOM 01 / [facility]   Surgeons: Nellie Foster MD       1. Encounter for geriatric assessment  RECOMMEND INPATIENT GERIATRIC CONSULT POST-OP   CONSULT SHOULD NOT DELAY DC   Seen for for surgery optimization   Seen for geriatric   Received MC   Started BEST   At risk for post-op GALO - NO   At risk for post-op SSI- NO   BEST   Breathing- instructed to exercise lungs prior to surgery via IS  Eat- discussed increasing protein intake prior to surgery   Sleep/stress- encouraged 8-10 sleep @ night, stress reduction, avoid sick contacts and handwashing   Train- encouraged to remain active      2. Primary osteoarthritis of one hip, left  -     Ambulatory referral to surgical optimization      PRE-OP WORKSHEET DATA  Assessment of Pre-Operative Risks     SOC Quality Hard Stops:    SSI Risk:  BMI (>40) : Estimated body mass index is 29.58 kg/m² as calculated from the following:    Height as of an earlier encounter on 9/3/24: 4' 10.5\" (1.486 m).    " Weight as of an earlier encounter on 9/3/24: 65.3 kg (144 lb). (if >than 40 then offer weight management)  -Weight management consult No    Nutrition Assessment: No   -Nutrition Assessment score <11: at risk for malnut    HbA1c (<8.0) :   Lab Results   Component Value Date    HGBA1C 6.0 (H) 08/19/2024    (>8.0 recommend Endocrine consult)   -endocrine consult indicated No    Tobacco use:  No     -lifestyle management consult No    SOC Anemia review:  Hgb ( >11):   Lab Results   Component Value Date    HGB 13.0 08/19/2024    HGB 12.5 12/30/2023    HGB 12.7 07/13/2023      -IV venofer indicated  No    GALO risk:  GFR (>60) (Less then 45 = Nephrology consult):    Lab Results   Component Value Date    EGFR 77 08/19/2024    EGFR 78 12/30/2023    EGFR 81 10/19/2023      -Nephrology consult indicated No    Obstructive Sleep Apnea Screening  -sleep medicine referral needed No     Past History:       Past Medical History:   Diagnosis Date    Arthritis     Carpal tunnel syndrome     GERD (gastroesophageal reflux disease)     Hiatal hernia     Hypertension     Insomnia     Pneumonia     PONV (postoperative nausea and vomiting)     Renal calculi     Sleep deprivation     chronically    Vitamin D deficiency     Vitamin D toxicity 2011    with leaky gut syndrome    Past Surgical History:   Procedure Laterality Date    BUNIONECTOMY Bilateral     CARPAL TUNNEL RELEASE Right 2015    COLONOSCOPY      CYSTOSCOPY N/A 6/6/2019    Procedure: CYSTOSCOPY;  Surgeon: Brodie Romero MD;  Location: [facility] MAIN OR;  Service: Gynecology Oncology    ESOPHAGOGASTRODUODENOSCOPY N/A 5/5/2016    Procedure: ESOPHAGOGASTRODUODENOSCOPY (EGD);  Surgeon: Enmanuel Balbuena MD;  Location: [facility] GI LAB;  Service:     FRACTURE SURGERY      L arm     NERVE BLOCK       R Knee    OOPHORECTOMY Left     CA COLONOSCOPY FLX DX W/COLLJ SPEC WHEN PFRMD N/A 6/28/2016    Procedure: COLONOSCOPY;  Surgeon: Enmanuel Balbuena MD;  Location: [facility];  Service: Gastroenterology    CA  ESOPHAGOGASTRODUODENOSCOPY TRANSORAL DIAGNOSTIC N/A 10/19/2018    Procedure: ESOPHAGOGASTRODUODENOSCOPY (EGD);  Surgeon: Enmanuel Balbuena MD;  Location: [facility]  GI LAB;  Service: Gastroenterology    ND LAPS TOTAL HYSTERECT 250 GM/< W/RMVL TUBE/OVARY N/A 2019    Procedure: ROBOTIC TOTAL LAPAROSCOPIC HYSTERECTOMY, RIGHT SALPINGO-OOPHORECTOMY, EXTENSIVE ADHESIOLYSIS, APPROXIMATELY 45 MIN;  Surgeon: Brodie Romero MD;  Location: [facility] OR;  Service: Gynecology Oncology    ROTATOR CUFF REPAIR Bilateral     TOE SURGERY Left     left 5th toe shaved down due to repeated fx    TOTAL KNEE ARTHROPLASTY Left           Social History     Tobacco Use    Smoking status: Former     Current packs/day: 0.00     Average packs/day: 1 pack/day for 13.0 years (13.0 ttl pk-yrs)     Types: Cigarettes     Start date:      Quit date:      Years since quittin.7    Smokeless tobacco: Never    Tobacco comments:     Quit > 30 years ago    Vaping Use    Vaping status: Never Used   Substance Use Topics    Alcohol use: No    Drug use: No     Family History   Problem Relation Age of Onset    Heart failure Mother     Other Mother         respiratory failure    Heart attack Mother     Heart failure Father         respiratory failure    Heart attack Father     Breast cancer Sister     No Known Problems Sister     Kidney failure Brother     Alcohol abuse Brother     No Known Problems Brother     Diabetes Brother     Hypertension Daughter     Arrhythmia Neg Hx     Clotting disorder Neg Hx     Fainting Neg Hx     Anuerysm Neg Hx     Stroke Neg Hx     Hyperlipidemia Neg Hx     Asthma Neg Hx           Allergies:     Allergies   Allergen Reactions    Androgens Anaphylaxis    Cefazolin Hives, GI Intolerance and Swelling    Ciprofloxacin Hives    Other Other (See Comments)     STEROIDS- GI INTOLERANCE    Penicillins Hives and Rash    Prednisone Hives, GI Intolerance, Swelling and Vomiting    Vancomycin Hives, GI Intolerance, Diarrhea and Rash     Aspirin GI Intolerance and Hives     Even low dose     Cortisone Syncope    Adhesive [Medical Tape] Rash    Beef Allergy - Food Allergy Diarrhea    Gluten Meal - Food Allergy GI Intolerance    Lactose - Food Allergy Diarrhea    Oxycodone Rash and Vomiting    Pataday [Olopatadine Hcl] Eye Swelling        Current Medications:     Current Outpatient Medications   Medication Instructions    Acetaminophen 500 MG TAKE 2 CAPSULES TWICE A DAY    benzonatate (TESSALON PERLES) 100 mg, Oral, 3 times daily PRN    losartan-hydrochlorothiazide (HYZAAR) 100-25 MG per tablet 1 tablet, Oral, Daily    multivitamin-minerals (CENTRUM) tablet 1 tablet, Daily    Nattokinase 100 MG CAPS Oral, Daily    Probiotic Product (MVW COMPLETE PROBIOTIC PO) Oral    zolpidem (AMBIEN) 5 mg, Oral, Daily at bedtime PRN       Encounter Date: 08/21/24   EKG 12 lead   Result Value    Ventricular Rate 70    Atrial Rate 70    IN Interval 184    QRSD Interval 76    QT Interval 426    QTC Interval 460    P Axis 43    QRS Axis -8    T Wave Axis 31    Narrative    Normal sinus rhythm  Septal infarct , age undetermined  Abnormal ECG    Confirmed by Tessa Caputo ([id number]) on 8/21/2024 4:39:11 PM       History of Present Illness     Erin Suazo is a 78 y.o. female who presents to SOC appointment for her geriatric assessment 2.2 advanced age.  Patient was previously seen in SOC by Dr. Dunham and his team for medical clearance. Please refer to his note for medical history.    As always we discussed having your BEST surgery, and BEST recovery.  Surgery goals reviewed today.      Breathing exercises   Patient was encouraged to begin lung exercises today.  This could be accomplished through deep breathing and cough exercises.  Patient was taught how to use an incentive spirometer.  Return demonstration provided.    Eating/nutrition   Encouraged patient to increase oral protein intake prior to surgery.   This can be accomplished by consuming chicken, fish, tuna fish,  cottage cheese, cheese, eggs, Greek yogurt, and protein shakes as needed.  I encouraged use of protein shakes such ENLIVE.  I also recommended making your own protein shakes with protein powder.   Sleep/Stress management  Patient was encouraged to rest their body prior to surgery.  Encouraged attempting to get 8 hours of sleep at night.  Avoid stress.  Avoid sick contacts.  Encouraged to find a relaxing hobby such as reading, meditation, listening to music.  Training exercises  Patient was encouraged to remain active as possible.  Today bilateral lower extremity generic exercises were taught for muscle strengthening and balance.  All exercises to be done sitting down.     Review of Systems   All other systems reviewed and are negative.      Objective     There were no vitals taken for this visit.    Physical Exam  HENT:      Head: Normocephalic.      Nose: Nose normal.      Mouth/Throat:      Mouth: Mucous membranes are moist.   Cardiovascular:      Rate and Rhythm: Normal rate and regular rhythm.   Pulmonary:      Effort: Pulmonary effort is normal.   Musculoskeletal:         General: No swelling. Normal range of motion.      Cervical back: Normal range of motion.   Neurological:      General: No focal deficit present.      Mental Status: She is alert and oriented to person, place, and time. Mental status is at baseline.   Psychiatric:         Mood and Affect: Mood normal.         Behavior: Behavior normal.         Thought Content: Thought content normal.         Judgment: Judgment normal.       Administrative Statements         [facility]  CONSULT       Patient has the ability to take their vital signs at home____  Allergies reviewed today   PMH reviewed today   Medications reviewed today     See Geriatric Assessment below...  Cognitive Assessment:   CAM:   TUG <15 sec:  Falls (last 6 months): no  Hand  score:13.33  -Attempt 1:16  -Attempt 2:12  -Attempt 3:12  Albert Total Score:  22  PHQ- 9 Depression Scale:1  Nutrition Assessment Score:11  METS:5.13  DX SLEEP APNEA; YES  Incentive Spirometry Level: 1000  Health goals:  -What are your overall health goals? Eat healthy    -What brings you strength? Son, friends    -What activities are important to you? Sew,read     As always we discussed having your BEST surgery, and BEST recovery.  Surgery goals reviewed today.      Breathing exercises   Patient was encouraged to begin lung exercises today.  This could be accomplished through deep breathing and cough exercises.  Patient was taught how to use an incentive spirometer.  Return demonstration provided.    Eating/nutrition   Encouraged patient to increase oral protein intake prior to surgery.  This can be accomplished by consuming chicken, fish, tuna fish, cottage cheese, cheese, eggs, Greek yogurt, and protein shakes as needed.  I encouraged use of protein shakes such ENLIVE.  I also recommended making your own protein shakes with protein powder.     Sleep/Stress management  Patient was encouraged to rest their body prior to surgery.  Encouraged attempting to get 8 hours of sleep at night.  Avoid stress.  Avoid sick contacts.  Encouraged to find a relaxing hobby such as reading, meditation, listening to music.  Training exercises  Patient was encouraged to remain active as possible.  Today bilateral lower extremity generic exercises were taught for muscle strengthening and balance.  All exercises to be done sitting down.

## 2024-09-04 ENCOUNTER — PATIENT OUTREACH (OUTPATIENT)
Dept: OBGYN CLINIC | Facility: HOSPITAL | Age: 79
End: 2024-09-04

## 2024-09-04 NOTE — PROGRESS NOTES
JANICECM contacted pt to follow up in regard to appt on 08/29/2024 with [facility]. SWCM able to reach pt and pt shares that she was evaluated, but has not heard back about eligibility yet. The agency is also going to help her apply for food stamps and assist her with fresh vegetables. Pt did share again that she does not have caregiver support and states she is going to DC to a STR because she has insurance. ROC again shared we cannot guarantee pt will DC to STR. NN and surgeon aware of same. JANICECM will remain available and continue to support pt as needed.

## 2024-09-09 ENCOUNTER — EVALUATION (OUTPATIENT)
Dept: PHYSICAL THERAPY | Facility: MEDICAL CENTER | Age: 79
End: 2024-09-09
Payer: MEDICARE

## 2024-09-09 DIAGNOSIS — M16.12 PRIMARY OSTEOARTHRITIS OF ONE HIP, LEFT: ICD-10-CM

## 2024-09-09 PROCEDURE — 97161 PT EVAL LOW COMPLEX 20 MIN: CPT | Performed by: PHYSICAL THERAPIST

## 2024-09-09 NOTE — PROGRESS NOTES
PT Evaluation     Today's date: 2024  Patient name: Erin Suazo  : 1945  MRN: [id number]  Referring provider: Nellie Foster MD  Dx:   Encounter Diagnosis     ICD-10-CM    1. Primary osteoarthritis of one hip, left  M16.12 Ambulatory referral to Physical Therapy                     Assessment  Impairments: abnormal gait, abnormal muscle tone, abnormal or restricted ROM, abnormal movement, activity intolerance, impaired physical strength, pain with function, weight-bearing intolerance and unable to perform ADL    Assessment details: Erin Suazo is a 78 y.o. female who presents with Primary osteoarthritis of one hip, left.  Patient presents alert and oriented with the above impairments. . Erin will benefit from PT to addres deficits in order to maximize and return to prior level of function.  No further referral appears necessary at this time based upon examination results.  Prognosis is good given HEP compliance. Please contact me if you have any questions or recommendations.     Understanding of Dx/Px/POC: good     Prognosis: good    Goals  Short Term Goals:   1. Pain decreased 2 ratings in 4 weeks  2.  ROM increased 10* in 4 weeks  3.  Strength increased 1/2 grade in 4 weeks    Long Term Goals:   1.  ADLS/IADLS in related activities improved to maximal level in 8 weeks  2.  Recreational activities are improved to maximal level in 8 weeks.  3.  [place] with HEP in 8 weeks.     Plan  Patient would benefit from: skilled physical therapy and PT eval    Planned therapy interventions: IASTM, manual therapy, neuromuscular re-education, patient/caregiver education, strengthening, stretching, therapeutic exercise, home exercise program, functional ROM exercises, flexibility, abdominal trunk stabilization and balance    Frequency: 2x week  Duration in weeks: 12  Treatment plan discussed with: patient        Subjective Evaluation    History of Present Illness  Mechanism of injury: Pt reports ongoing L  hip pain and is now scheduled for THR on .  She reports that pain is constant and most severe w/ weight bearing.  Pain is located in groin and buttock.   She has been able to walk w/out AD at this time.  She does report some sleep disturbance.  She states that she is going in to rehab post-operatively due to not having assistance at home.  She does live in single story home.    Patient Goals  Patient goals for therapy: decreased pain, increased motion, increased strength and independence with ADLs/IADLs    Pain  At best pain ratin  At worst pain ratin          Objective     Active Range of Motion   Left Hip   Flexion: 100 degrees   Abduction: 30 degrees     Right Hip   Flexion: 110 degrees   Abduction: 40 degrees     Strength/Myotome Testing     Left Hip   Planes of Motion   Flexion: 4+  Abduction: 4    Right Hip   Planes of Motion   Flexion: 5  Abduction: 5             Precautions: ant hip      Manuals                                                                 Neuro Re-Ed                                                                                                        Ther Ex                                                                                                                     Ther Activity                                       Gait Training                                       Modalities                                          Eval/Re-Eval POC Expires Auth #/ Referral # Total Visits Start Date Expiration Date Extension Info Visits Limitation                                                                       1 2 3 4 5 6           7 8 9 10 11 12           13 14 15 16 17 18           19 20 21 22 23 24           25 26 27 28 29 30

## 2024-09-16 ENCOUNTER — APPOINTMENT (OUTPATIENT)
Dept: RADIOLOGY | Facility: HOSPITAL | Age: 79
DRG: 470 | End: 2024-09-16
Payer: MEDICARE

## 2024-09-16 ENCOUNTER — HOSPITAL ENCOUNTER (INPATIENT)
Facility: HOSPITAL | Age: 79
LOS: 3 days | Discharge: NURSING, REHAB OR LONG-TERM CARE | DRG: 470 | End: 2024-09-20
Attending: ORTHOPAEDIC SURGERY | Admitting: ORTHOPAEDIC SURGERY
Payer: MEDICARE

## 2024-09-16 ENCOUNTER — ANESTHESIA (OUTPATIENT)
Dept: PERIOP | Facility: HOSPITAL | Age: 79
DRG: 470 | End: 2024-09-16
Payer: MEDICARE

## 2024-09-16 DIAGNOSIS — Z96.642 S/P TOTAL LEFT HIP ARTHROPLASTY: Primary | ICD-10-CM

## 2024-09-16 LAB
GLUCOSE SERPL-MCNC: 124 MG/DL (ref 65–140)
GLUCOSE SERPL-MCNC: 98 MG/DL (ref 65–140)

## 2024-09-16 PROCEDURE — C1776 JOINT DEVICE (IMPLANTABLE): HCPCS | Performed by: ORTHOPAEDIC SURGERY

## 2024-09-16 PROCEDURE — C1789 PROSTHESIS, BREAST, IMP: HCPCS | Performed by: ORTHOPAEDIC SURGERY

## 2024-09-16 PROCEDURE — 97163 PT EVAL HIGH COMPLEX 45 MIN: CPT

## 2024-09-16 PROCEDURE — 82948 REAGENT STRIP/BLOOD GLUCOSE: CPT

## 2024-09-16 PROCEDURE — 97167 OT EVAL HIGH COMPLEX 60 MIN: CPT

## 2024-09-16 PROCEDURE — 0054T BONE SRGRY CMPTR FLUOR IMAGE: CPT | Performed by: ORTHOPAEDIC SURGERY

## 2024-09-16 PROCEDURE — 27130 TOTAL HIP ARTHROPLASTY: CPT | Performed by: ORTHOPAEDIC SURGERY

## 2024-09-16 PROCEDURE — 97116 GAIT TRAINING THERAPY: CPT

## 2024-09-16 PROCEDURE — 0SRB02A REPLACEMENT OF LEFT HIP JOINT WITH METAL ON POLYETHYLENE SYNTHETIC SUBSTITUTE, UNCEMENTED, OPEN APPROACH: ICD-10-PCS | Performed by: ORTHOPAEDIC SURGERY

## 2024-09-16 PROCEDURE — 73501 X-RAY EXAM HIP UNI 1 VIEW: CPT

## 2024-09-16 PROCEDURE — 97535 SELF CARE MNGMENT TRAINING: CPT

## 2024-09-16 PROCEDURE — 27130 TOTAL HIP ARTHROPLASTY: CPT | Performed by: PHYSICIAN ASSISTANT

## 2024-09-16 DEVICE — PINNACLE HIP SOLUTIONS ALTRX POLYETHYLENE ACETABULAR LINER NEUTRAL 32MM ID 48MM OD
Type: IMPLANTABLE DEVICE | Site: HIP | Status: FUNCTIONAL
Brand: PINNACLE ALTRX

## 2024-09-16 DEVICE — ACTIS DUOFIX HIP PROSTHESIS (FEMORAL STEM 12/14 TAPER CEMENTLESS SIZE 4 STD COLLAR)  CE
Type: IMPLANTABLE DEVICE | Site: HIP | Status: FUNCTIONAL
Brand: ACTIS

## 2024-09-16 DEVICE — PINNACLE GRIPTION ACETABULAR SHELL SECTOR 48MM OD
Type: IMPLANTABLE DEVICE | Site: HIP | Status: FUNCTIONAL
Brand: PINNACLE GRIPTION

## 2024-09-16 DEVICE — ARTICUL/EZE FEMORAL HEAD DIAMETER 32MM +1 12/14 TAPER
Type: IMPLANTABLE DEVICE | Site: HIP | Status: FUNCTIONAL
Brand: ARTICUL/EZE

## 2024-09-16 RX ORDER — FENTANYL CITRATE/PF 50 MCG/ML
50 SYRINGE (ML) INJECTION
Status: DISCONTINUED | OUTPATIENT
Start: 2024-09-16 | End: 2024-09-16 | Stop reason: SDUPTHER

## 2024-09-16 RX ORDER — CHLORHEXIDINE GLUCONATE 40 MG/ML
SOLUTION TOPICAL DAILY PRN
Status: DISCONTINUED | OUTPATIENT
Start: 2024-09-16 | End: 2024-09-16 | Stop reason: HOSPADM

## 2024-09-16 RX ORDER — METOCLOPRAMIDE HYDROCHLORIDE 5 MG/ML
5 INJECTION INTRAMUSCULAR; INTRAVENOUS ONCE AS NEEDED
Status: DISCONTINUED | OUTPATIENT
Start: 2024-09-16 | End: 2024-09-16 | Stop reason: HOSPADM

## 2024-09-16 RX ORDER — DOCUSATE SODIUM 100 MG/1
100 CAPSULE, LIQUID FILLED ORAL 2 TIMES DAILY
Status: DISCONTINUED | OUTPATIENT
Start: 2024-09-16 | End: 2024-09-20 | Stop reason: HOSPADM

## 2024-09-16 RX ORDER — ONDANSETRON 2 MG/ML
4 INJECTION INTRAMUSCULAR; INTRAVENOUS ONCE AS NEEDED
Status: DISCONTINUED | OUTPATIENT
Start: 2024-09-16 | End: 2024-09-16 | Stop reason: HOSPADM

## 2024-09-16 RX ORDER — MAGNESIUM HYDROXIDE 1200 MG/15ML
LIQUID ORAL AS NEEDED
Status: DISCONTINUED | OUTPATIENT
Start: 2024-09-16 | End: 2024-09-16 | Stop reason: HOSPADM

## 2024-09-16 RX ORDER — CEFAZOLIN SODIUM 2 G/50ML
SOLUTION INTRAVENOUS AS NEEDED
Status: DISCONTINUED | OUTPATIENT
Start: 2024-09-16 | End: 2024-09-16

## 2024-09-16 RX ORDER — ENOXAPARIN SODIUM 100 MG/ML
40 INJECTION SUBCUTANEOUS DAILY
Status: DISCONTINUED | OUTPATIENT
Start: 2024-09-16 | End: 2024-09-17 | Stop reason: SDUPTHER

## 2024-09-16 RX ORDER — OXYCODONE HYDROCHLORIDE 10 MG/1
10 TABLET ORAL EVERY 4 HOURS PRN
Status: DISCONTINUED | OUTPATIENT
Start: 2024-09-16 | End: 2024-09-20 | Stop reason: HOSPADM

## 2024-09-16 RX ORDER — METOPROLOL TARTRATE 1 MG/ML
INJECTION, SOLUTION INTRAVENOUS AS NEEDED
Status: DISCONTINUED | OUTPATIENT
Start: 2024-09-16 | End: 2024-09-16

## 2024-09-16 RX ORDER — HYDROMORPHONE HCL/PF 1 MG/ML
0.5 SYRINGE (ML) INJECTION EVERY 2 HOUR PRN
Status: DISPENSED | OUTPATIENT
Start: 2024-09-16 | End: 2024-09-18

## 2024-09-16 RX ORDER — SENNOSIDES 8.6 MG
650 CAPSULE ORAL EVERY 8 HOURS PRN
Qty: 30 TABLET | Refills: 0 | Status: SHIPPED | OUTPATIENT
Start: 2024-09-16 | End: 2024-10-16

## 2024-09-16 RX ORDER — ENOXAPARIN SODIUM 100 MG/ML
40 INJECTION SUBCUTANEOUS ONCE
Status: COMPLETED | OUTPATIENT
Start: 2024-09-16 | End: 2024-09-16

## 2024-09-16 RX ORDER — PROPOFOL 10 MG/ML
INJECTION, EMULSION INTRAVENOUS AS NEEDED
Status: DISCONTINUED | OUTPATIENT
Start: 2024-09-16 | End: 2024-09-16

## 2024-09-16 RX ORDER — HYDROCODONE BITARTRATE AND ACETAMINOPHEN 5; 325 MG/1; MG/1
1 TABLET ORAL EVERY 6 HOURS PRN
Qty: 28 TABLET | Refills: 0 | Status: SHIPPED | OUTPATIENT
Start: 2024-09-16 | End: 2024-09-20

## 2024-09-16 RX ORDER — CEFAZOLIN SODIUM 1 G/50ML
1000 SOLUTION INTRAVENOUS EVERY 8 HOURS
Status: COMPLETED | OUTPATIENT
Start: 2024-09-16 | End: 2024-09-16

## 2024-09-16 RX ORDER — PROPOFOL 10 MG/ML
INJECTION, EMULSION INTRAVENOUS CONTINUOUS PRN
Status: DISCONTINUED | OUTPATIENT
Start: 2024-09-16 | End: 2024-09-16

## 2024-09-16 RX ORDER — ONDANSETRON 2 MG/ML
4 INJECTION INTRAMUSCULAR; INTRAVENOUS EVERY 6 HOURS PRN
Status: DISCONTINUED | OUTPATIENT
Start: 2024-09-16 | End: 2024-09-20 | Stop reason: HOSPADM

## 2024-09-16 RX ORDER — ONDANSETRON 4 MG/1
4 TABLET, FILM COATED ORAL EVERY 8 HOURS PRN
Qty: 5 TABLET | Refills: 0 | Status: SHIPPED | OUTPATIENT
Start: 2024-09-16

## 2024-09-16 RX ORDER — ACETAMINOPHEN 325 MG/1
975 TABLET ORAL EVERY 8 HOURS
Status: DISCONTINUED | OUTPATIENT
Start: 2024-09-16 | End: 2024-09-20 | Stop reason: HOSPADM

## 2024-09-16 RX ORDER — ACETAMINOPHEN 325 MG/1
975 TABLET ORAL ONCE
Status: COMPLETED | OUTPATIENT
Start: 2024-09-16 | End: 2024-09-16

## 2024-09-16 RX ORDER — DOCUSATE SODIUM 100 MG/1
100 CAPSULE, LIQUID FILLED ORAL 2 TIMES DAILY
Qty: 10 CAPSULE | Refills: 0 | Status: SHIPPED | OUTPATIENT
Start: 2024-09-16

## 2024-09-16 RX ORDER — LIDOCAINE HYDROCHLORIDE AND EPINEPHRINE 10; 10 MG/ML; UG/ML
INJECTION, SOLUTION INFILTRATION; PERINEURAL AS NEEDED
Status: DISCONTINUED | OUTPATIENT
Start: 2024-09-16 | End: 2024-09-16 | Stop reason: HOSPADM

## 2024-09-16 RX ORDER — LABETALOL HYDROCHLORIDE 5 MG/ML
5 INJECTION, SOLUTION INTRAVENOUS
Status: DISCONTINUED | OUTPATIENT
Start: 2024-09-16 | End: 2024-09-16 | Stop reason: HOSPADM

## 2024-09-16 RX ORDER — SODIUM CHLORIDE, SODIUM LACTATE, POTASSIUM CHLORIDE, CALCIUM CHLORIDE 600; 310; 30; 20 MG/100ML; MG/100ML; MG/100ML; MG/100ML
75 INJECTION, SOLUTION INTRAVENOUS CONTINUOUS
Status: DISCONTINUED | OUTPATIENT
Start: 2024-09-16 | End: 2024-09-19

## 2024-09-16 RX ORDER — OXYCODONE HYDROCHLORIDE 5 MG/1
5 TABLET ORAL EVERY 4 HOURS PRN
Status: DISCONTINUED | OUTPATIENT
Start: 2024-09-16 | End: 2024-09-20 | Stop reason: HOSPADM

## 2024-09-16 RX ORDER — HYDROMORPHONE HCL IN WATER/PF 6 MG/30 ML
0.2 PATIENT CONTROLLED ANALGESIA SYRINGE INTRAVENOUS
Status: DISCONTINUED | OUTPATIENT
Start: 2024-09-16 | End: 2024-09-16 | Stop reason: SDUPTHER

## 2024-09-16 RX ORDER — ZOLPIDEM TARTRATE 5 MG/1
5 TABLET ORAL
Status: DISCONTINUED | OUTPATIENT
Start: 2024-09-16 | End: 2024-09-20 | Stop reason: HOSPADM

## 2024-09-16 RX ORDER — HYDROMORPHONE HCL/PF 1 MG/ML
SYRINGE (ML) INJECTION AS NEEDED
Status: DISCONTINUED | OUTPATIENT
Start: 2024-09-16 | End: 2024-09-16

## 2024-09-16 RX ORDER — ONDANSETRON 2 MG/ML
INJECTION INTRAMUSCULAR; INTRAVENOUS AS NEEDED
Status: DISCONTINUED | OUTPATIENT
Start: 2024-09-16 | End: 2024-09-16

## 2024-09-16 RX ORDER — LIDOCAINE HYDROCHLORIDE 10 MG/ML
INJECTION, SOLUTION EPIDURAL; INFILTRATION; INTRACAUDAL; PERINEURAL AS NEEDED
Status: DISCONTINUED | OUTPATIENT
Start: 2024-09-16 | End: 2024-09-16

## 2024-09-16 RX ORDER — CHLORHEXIDINE GLUCONATE ORAL RINSE 1.2 MG/ML
15 SOLUTION DENTAL ONCE
Status: COMPLETED | OUTPATIENT
Start: 2024-09-16 | End: 2024-09-16

## 2024-09-16 RX ORDER — CALCIUM CARBONATE 500 MG/1
1000 TABLET, CHEWABLE ORAL DAILY PRN
Status: DISCONTINUED | OUTPATIENT
Start: 2024-09-16 | End: 2024-09-20 | Stop reason: HOSPADM

## 2024-09-16 RX ORDER — TRANEXAMIC ACID 10 MG/ML
1000 INJECTION, SOLUTION INTRAVENOUS ONCE
Status: COMPLETED | OUTPATIENT
Start: 2024-09-16 | End: 2024-09-16

## 2024-09-16 RX ORDER — ROCURONIUM BROMIDE 10 MG/ML
INJECTION, SOLUTION INTRAVENOUS AS NEEDED
Status: DISCONTINUED | OUTPATIENT
Start: 2024-09-16 | End: 2024-09-16

## 2024-09-16 RX ORDER — FENTANYL CITRATE 50 UG/ML
INJECTION, SOLUTION INTRAMUSCULAR; INTRAVENOUS AS NEEDED
Status: DISCONTINUED | OUTPATIENT
Start: 2024-09-16 | End: 2024-09-16

## 2024-09-16 RX ORDER — SODIUM CHLORIDE, SODIUM LACTATE, POTASSIUM CHLORIDE, CALCIUM CHLORIDE 600; 310; 30; 20 MG/100ML; MG/100ML; MG/100ML; MG/100ML
125 INJECTION, SOLUTION INTRAVENOUS CONTINUOUS
Status: DISCONTINUED | OUTPATIENT
Start: 2024-09-16 | End: 2024-09-16

## 2024-09-16 RX ADMIN — FENTANYL CITRATE 50 MCG: 50 INJECTION INTRAMUSCULAR; INTRAVENOUS at 07:35

## 2024-09-16 RX ADMIN — CEFAZOLIN SODIUM 1000 MG: 1 SOLUTION INTRAVENOUS at 22:42

## 2024-09-16 RX ADMIN — LABETALOL HYDROCHLORIDE 5 MG: 5 INJECTION, SOLUTION INTRAVENOUS at 09:57

## 2024-09-16 RX ADMIN — METOROPROLOL TARTRATE 2.5 MG: 5 INJECTION, SOLUTION INTRAVENOUS at 08:03

## 2024-09-16 RX ADMIN — ENOXAPARIN SODIUM 40 MG: 40 INJECTION SUBCUTANEOUS at 22:42

## 2024-09-16 RX ADMIN — HYDROMORPHONE HYDROCHLORIDE 0.5 MG: 1 INJECTION, SOLUTION INTRAMUSCULAR; INTRAVENOUS; SUBCUTANEOUS at 08:55

## 2024-09-16 RX ADMIN — LABETALOL HYDROCHLORIDE 5 MG: 5 INJECTION, SOLUTION INTRAVENOUS at 10:31

## 2024-09-16 RX ADMIN — PROPOFOL 100 MG: 10 INJECTION, EMULSION INTRAVENOUS at 07:35

## 2024-09-16 RX ADMIN — ROCURONIUM BROMIDE 20 MG: 10 INJECTION INTRAVENOUS at 08:44

## 2024-09-16 RX ADMIN — CHLORHEXIDINE GLUCONATE 15 ML: 1.2 RINSE ORAL at 06:51

## 2024-09-16 RX ADMIN — OXYCODONE HYDROCHLORIDE 5 MG: 5 TABLET ORAL at 17:29

## 2024-09-16 RX ADMIN — SODIUM CHLORIDE, SODIUM LACTATE, POTASSIUM CHLORIDE, AND CALCIUM CHLORIDE 75 ML/HR: .6; .31; .03; .02 INJECTION, SOLUTION INTRAVENOUS at 15:21

## 2024-09-16 RX ADMIN — PROPOFOL 80 MCG/KG/MIN: 10 INJECTION, EMULSION INTRAVENOUS at 07:39

## 2024-09-16 RX ADMIN — FENTANYL CITRATE 50 MCG: 50 INJECTION INTRAMUSCULAR; INTRAVENOUS at 09:47

## 2024-09-16 RX ADMIN — ACETAMINOPHEN 975 MG: 325 TABLET ORAL at 06:51

## 2024-09-16 RX ADMIN — SUGAMMADEX 200 MG: 100 INJECTION, SOLUTION INTRAVENOUS at 09:07

## 2024-09-16 RX ADMIN — ONDANSETRON 4 MG: 2 INJECTION INTRAMUSCULAR; INTRAVENOUS at 08:00

## 2024-09-16 RX ADMIN — ONDANSETRON 4 MG: 2 INJECTION INTRAMUSCULAR; INTRAVENOUS at 13:19

## 2024-09-16 RX ADMIN — CEFAZOLIN SODIUM 2000 MG: 2 SOLUTION INTRAVENOUS at 07:35

## 2024-09-16 RX ADMIN — SODIUM CHLORIDE, SODIUM LACTATE, POTASSIUM CHLORIDE, AND CALCIUM CHLORIDE: .6; .31; .03; .02 INJECTION, SOLUTION INTRAVENOUS at 07:25

## 2024-09-16 RX ADMIN — FENTANYL CITRATE 50 MCG: 50 INJECTION INTRAMUSCULAR; INTRAVENOUS at 07:55

## 2024-09-16 RX ADMIN — DOCUSATE SODIUM 100 MG: 100 CAPSULE, LIQUID FILLED ORAL at 17:29

## 2024-09-16 RX ADMIN — TRANEXAMIC ACID 1000 MG: 10 INJECTION, SOLUTION INTRAVENOUS at 07:43

## 2024-09-16 RX ADMIN — PROPOFOL 30 MG: 10 INJECTION, EMULSION INTRAVENOUS at 08:45

## 2024-09-16 RX ADMIN — OXYCODONE HYDROCHLORIDE 10 MG: 5 TABLET ORAL at 11:42

## 2024-09-16 RX ADMIN — LIDOCAINE HYDROCHLORIDE 5 ML: 10 INJECTION, SOLUTION EPIDURAL; INFILTRATION; INTRACAUDAL; PERINEURAL at 07:35

## 2024-09-16 RX ADMIN — CEFAZOLIN SODIUM 1000 MG: 1 SOLUTION INTRAVENOUS at 15:21

## 2024-09-16 RX ADMIN — ACETAMINOPHEN 975 MG: 325 TABLET ORAL at 15:21

## 2024-09-16 RX ADMIN — ROCURONIUM BROMIDE 50 MG: 10 INJECTION INTRAVENOUS at 07:35

## 2024-09-16 RX ADMIN — ACETAMINOPHEN 975 MG: 325 TABLET ORAL at 22:41

## 2024-09-16 NOTE — PLAN OF CARE
Problem: PHYSICAL THERAPY ADULT  Goal: Performs mobility at highest level of function for planned discharge setting.  See evaluation for individualized goals.  Description: Treatment/Interventions: Functional transfer training, LE strengthening/ROM, Therapeutic exercise, Endurance training, Cognitive reorientation, Equipment eval/education, Patient/family training, Bed mobility, Gait training, Spoke to nursing, Spoke to case management, Family, OT  Equipment Recommended: Walker (short)       See flowsheet documentation for full assessment, interventions and recommendations.  Note: Prognosis: Fair  Problem List: Decreased range of motion, Decreased strength, Decreased endurance, Impaired balance, Decreased mobility, Decreased coordination, Pain, Orthopedic restrictions, Decreased safety awareness, Decreased cognition, Impaired judgement (gait deviations)  Assessment: Pt is a 78 y.o. female seen for PT evaluation s/p admit to [facility] on 9/16/2024 w/ S/P total left hip arthroplasty.  Pt seen POD0 in APU, FWBAT BLE w/ anterior hip precautions.  Order placed for PT.      Prior to admission: Pt lived alone in one floor apt, no JONELLE, did not regularly use DME prior to admission, but has cane and walker. She does have step to perform to get onto her bed and reports she cannot modify the height of bed.  Upon evaluation: Pt needed mod A for bed mobility, min A for transfers and steppage amb w/walker.      Pt's clinical presentation is currently unstable/unpredictable given the functional mobility deficits above, especially (but not limited to) weakness, pain, and decreased functional mobility tolerance, and combined with medical complications including hypertension  and lethargy, nausea/vomiting .  Pt IS NOT at his/her mobility baseline.  She is at risk for falls based on impaired balance, slow gait speed    During this admission, pt would benefit from continued skilled inpatient PT in the acute care setting in  order to address deficits as defined above to maximize function and mobility.      Recommendations:     From a PT standpoint, recommend next several sessions focus on continued mobility trials w/rolling walker, therex, step training vs alternate sleeping arrangements.  Barriers to Discharge: Decreased caregiver support, Inaccessible home environment     Rehab Resource Intensity Level, PT: II (Moderate Resource Intensity)    See flowsheet documentation for full assessment.

## 2024-09-16 NOTE — CASE MANAGEMENT
Case Management Assessment & Discharge Planning Note    Patient name Erin Suazo  Location S /S -01 MRN [id number]  : 1945 Date 2024       Current Admission Date: 2024  Current Admission Diagnosis:S/P total left hip arthroplasty   Patient Active Problem List    Diagnosis Date Noted Date Diagnosed    S/P total left hip arthroplasty 2024     Encounter for geriatric assessment 2024     Class 1 obesity due to excess calories without serious comorbidity with body mass index (BMI) of 31.0 to 31.9 in adult 2024     Sacroiliac joint dysfunction of left side 05/15/2024     Arthritis of left hip 05/15/2024     History of colon polyps 2024     Seasonal allergies 2023     Dysphagia 2022     Asthma in adult, mild intermittent, uncomplicated 2022     Sleep difficulties 2022     HLD (hyperlipidemia) 2022     S/P revision of total knee, left 2021     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     Chronic pain syndrome - Right 2019     Status post hysterectomy 2019     Carpal tunnel syndrome of right wrist      Cervical radiculitis 10/24/2018     Left shoulder pain 2018     Right shoulder pain 2018     Tendinopathy of left rotator cuff 2018     Tendinopathy of right rotator cuff 2018     Globus sensation 2018     Gastroesophageal reflux disease without esophagitis 2018     Lower back pain 2018     Primary osteoarthritis of right knee 2017     Arthritis of knee 2017     Pes anserine bursitis 2017     GERD (gastroesophageal reflux disease) 2016     Sleep deprivation 2016     Hypertension 2016     Left knee pain 2015     Gastroesophageal reflux disease with esophagitis 2012     Osteoporosis 2012     Vitamin D deficiency 2012     Insomnia 2012       LOS (days): 0  Geometric Mean LOS (GMLOS) (days):   Days  to [facility]:     OBJECTIVE:              Current admission status: Outpatient Surgery       Preferred Pharmacy:   [id number] [place] [facility] [address] [place] [address]  Phone: [phone] Fax: [phone]    Primary Care Provider: Cholo Field MD    Primary Insurance: MEDICARE  Secondary Insurance: AETNA    ASSESSMENT:  Active Health Care Proxies    There are no active Health Care Proxies on file.                 Readmission Root Cause  30 Day Readmission: No    Patient Information  Admitted from:: Home  Mental Status: Confused  During Assessment patient was accompanied by: Not accompanied during assessment  Assessment information provided by:: [employer]  Support Systems: [employer]Back  Home entry access options. Select all that apply.: No steps to enter home  Type of Current Residence: Apartment  Floor Level: 1 (basement level)  Upon entering residence, is there a bedroom on the main floor (no further steps)?: Yes  Upon entering residence, is there a bathroom on the main floor (no further steps)?: Yes  Living Arrangements: Lives Alone    Activities of Daily Living Prior to Admission  Functional Status: Independent  Completes ADLs independently?: Yes  Ambulates independently?: Yes  Does patient use assisted devices?: Yes  Assisted Devices (DME) used: Walker, Rollator  Does patient currently own DME?: Yes  What DME does the patient currently own?: Rollator, Walker  Does patient have a history of Outpatient Therapy (PT/OT)?: Yes  Does the patient have a history of Short-Term Rehab?: No  Does patient have a history of HHC?: Yes  Does patient currently have HHC?: No         Patient Information Continued  Income Source: Pension/nursing home  Does patient have prescription coverage?: Yes  Does patient receive dialysis treatments?: No  Does patient have a history of substance abuse?: No  Does patient have a history of Mental Health Diagnosis?: No         Means of Transportation  Means of Transport to [facility]::  Family transport      Social Determinants of Health (SDOH)      Flowsheet Row Most Recent Value   Housing Stability    In the last 12 months, was there a time when you were not able to pay the mortgage or rent on time? N   At any time in the past 12 months, were you homeless or living in a shelter (including now)? N   Transportation Needs    In the past 12 months, has lack of transportation kept you from medical appointments or from getting medications? no   In the past 12 months, has lack of transportation kept you from meetings, work, or from getting things needed for daily living? No   Food Insecurity    Within the past 12 months, you worried that your food would run out before you got the money to buy more. Never true   Within the past 12 months, the food you bought just didn't last and you didn't have money to get more. Never true   Utilities    In the past 12 months has the electric, gas, oil, or water company threatened to shut off services in your home? No            DISCHARGE DETAILS:    Discharge planning discussed with:: Corby  Freedom of Choice: Yes  Comments - Freedom of Choice: CC met with patient's son outside of recovery area to discuss dc plan. CC advised son about the requirements under Medicare to get into rehab. CC also informed son, that conversation was had with patient and [facility] liaison, but that no accomadations were confirmed since patient had conversation prior to surgery. CC informed son that rehab will be pursued but that son and patient would need to be prepared that rehab will not keep patient there unitl independece unless steady progress is made to show the insurance. CC explained that it could be that patient may need senior living services and that level of care is not covered under insurance. CC did confirm that for today, patient will stay but that ongoing and if in rehab, evaluations on her level of functioning will be done. Son also confirmed that patient lives alone in  basement apartment and that she has an elevated bed at home that requires for patient to step up onto. Per son, unclear if bed could be adjusted. CM team will follow for further dc needs.  CM contacted family/caregiver?: Yes  Were Treatment Team discharge recommendations reviewed with patient/caregiver?: Yes  Did patient/caregiver verbalize understanding of patient care needs?: N/A- going to facility  Were patient/caregiver advised of the risks associated with not following Treatment Team discharge recommendations?: Yes    Contacts  Patient Contacts: Corby  Relationship to Patient:: Family  Contact Method: In Person  Reason/Outcome: Emergency Contact, Discharge Planning              Other Referral/Resources/Interventions Provided:  Referral Comments: referrals made-[facility] is patient's first choice, however blanket referral sent

## 2024-09-16 NOTE — OCCUPATIONAL THERAPY NOTE
Occupational Therapy Evaluation + Treatment     Patient Name: Erin Suazo  Today's Date: 9/16/2024  Problem List  Principal Problem:    S/P total left hip arthroplasty    Past Medical History  Past Medical History:   Diagnosis Date    Arthritis     Carpal tunnel syndrome     GERD (gastroesophageal reflux disease)     Hiatal hernia     Hypertension     Insomnia     Pneumonia     PONV (postoperative nausea and vomiting)     Renal calculi     Sleep deprivation     chronically    Vitamin D deficiency     Vitamin D toxicity 2011    with leaky gut syndrome     Past Surgical History  Past Surgical History:   Procedure Laterality Date    BUNIONECTOMY Bilateral     CARPAL TUNNEL RELEASE Right 2015    COLONOSCOPY      CYSTOSCOPY N/A 6/6/2019    Procedure: CYSTOSCOPY;  Surgeon: rBodie Romero MD;  [facility];  Service: Gynecology Oncology    ESOPHAGOGASTRODUODENOSCOPY N/A 5/5/2016    Procedure: ESOPHAGOGASTRODUODENOSCOPY (EGD);  Surgeon: Enmanuel Balbuena MD;  Location: [facility];  Service:     FRACTURE SURGERY      L arm     NERVE BLOCK       R Knee    OOPHORECTOMY Left     MO COLONOSCOPY FLX DX W/COLLJ SPEC WHEN PFRMD N/A 6/28/2016    Procedure: COLONOSCOPY;  Surgeon: Enmanuel Balbuena MD;  [facility];  Service: Gastroenterology    MO ESOPHAGOGASTRODUODENOSCOPY TRANSORAL DIAGNOSTIC N/A 10/19/2018    Procedure: ESOPHAGOGASTRODUODENOSCOPY (EGD);  Surgeon: Enmanuel Balbuena MD;  Location: [facility];  Service: Gastroenterology    MO LAPS TOTAL HYSTERECT 250 GM/< W/RMVL TUBE/OVARY N/A 6/6/2019    Procedure: ROBOTIC TOTAL LAPAROSCOPIC HYSTERECTOMY, RIGHT SALPINGO-OOPHORECTOMY, EXTENSIVE ADHESIOLYSIS, APPROXIMATELY 45 MIN;  Surgeon: Brodie Romero MD;  Location: [facility];  Service: Gynecology Oncology    ROTATOR CUFF REPAIR Bilateral     TOE SURGERY Left     left 5th toe shaved down due to repeated fx    TOTAL KNEE ARTHROPLASTY Left            09/16/24 1235   OT Last Visit   OT Visit Date 09/16/24   Note Type  "  Note type Evaluation   Pain Assessment   Pain Assessment Tool 0-10   Pain Score 7   Pain Location/Orientation Orientation: Left;Location: Hip   Pain Radiating Towards Pt reports pain in anterior thigh - no pain at incision site   Hospital Pain Intervention(s) Repositioned;Ambulation/increased activity;Emotional support   Restrictions/Precautions   Weight Bearing Precautions Per Order Yes   LLE Weight Bearing Per Order (S)  FWB  (s/p L hip STACY - anterior approach)   Other Precautions Cognitive;Chair Alarm;Bed Alarm;Multiple lines;Fall Risk;Pain   Home Living   Type of Home Apartment   Home Layout One level  (no JONELLE)   Bathroom Shower/Tub Walk-in shower   Bathroom Toilet Standard   Bathroom Equipment Grab bars in shower;Commode   Bathroom Accessibility Accessible   Home Equipment Walker;Cane   Additional Comments no use of AD at baseline   Prior Function   Level of [place] Independent with ADLs   Lives With Alone   Receives Help From Family;Friend(s)   IADLs Independent with driving;Independent with meal prep;Independent with medication management   Falls in the last 6 months 0   Vocational Retired   Comments Pt reports elevated bed at home and reports having a platform to step up onto to get into bed   Lifestyle   Autonomy PTA pt living alone in apartment, pt (I) with ADLs and IADLs, (-) falls, (+)drives, no use of AD at baseline   Reciprocal Relationships supportive son- however pt reports limited support on d/c. Reports family will not be able to transport to OP appts   Service to Others retired   General   Additional Pertinent History Admit for elective L STACY. PMH: obesity, asthma, L TKA   Family/Caregiver Present Yes  (son Corby at bedside)   Subjective   Subjective \"I can't do it\"   ADL   Eating Assistance 5  Supervision/Setup   Grooming Assistance 5  Supervision/Setup   Grooming Deficit Increased time to complete;Wash/dry hands   UB Bathing Assistance 4  Minimal Assistance   LB Bathing Assistance 2  " Maximal Assistance   UB Dressing Assistance 4  Minimal Assistance   LB Dressing Assistance 1  Total Assistance   LB Dressing Deficit Don/doff L sock;Don/doff R sock   Toileting Assistance  3  Moderate Assistance   Toileting Deficit Increased time to complete;Supervison/safety;Verbal cueing;Clothing management down;Perineal hygiene;Clothing management up;Bedside commode  (requiring MAX cuing for initiation of hygiene and clothing management)   Bed Mobility   Supine to Sit 3  Moderate assistance   Additional items Assist x 1;HOB elevated;Increased time required;Verbal cues;LE management  (Episode of emesis upon sitting EOB - RN aware)   Transfers   Sit to Stand 3  Moderate assistance   Additional items Assist x 1;Increased time required;Verbal cues   Stand to Sit 4  Minimal assistance   Additional items Assist x 1;Increased time required;Verbal cues   Toilet transfer 4  Minimal assistance   Additional items Assist x 1;Increased time required;Verbal cues;Standard toilet   Additional Comments use of RW   Functional Mobility   Functional Mobility 4  Minimal assistance   Additional Comments Ax1, limited bed > recliner chair. (see treatment note with further functional mobility)   Additional items Rolling walker   Balance   Static Sitting Good   Dynamic Sitting Fair +   Static Standing Poor +   Dynamic Standing Poor +   Ambulatory Poor +   Activity Tolerance   Activity Tolerance Patient limited by pain;Patient limited by fatigue   Medical Staff Made Aware PT Chris   Nurse Made Aware PACU RN present   RUE Assessment   RUE Assessment WFL   LUE Assessment   LUE Assessment WFL   Hand Function   Gross Motor Coordination Functional   Fine Motor Coordination Functional   Vision-Basic Assessment   Current Vision Wears glasses all the time   Cognition   Overall Cognitive Status Impaired   Arousal/Participation Lethargic;Persistent stimuli required   Attention Attends with cues to redirect   Orientation Level Oriented to  person;Oriented to place   Memory Decreased short term memory;Decreased recall of recent events;Decreased recall of precautions   Following Commands Follows one step commands inconsistently   Comments Pt lethargic and requiring extensive repetition of cuing throghout session due to lack of command following   Assessment   Limitation Decreased ADL status;Decreased UE strength;Decreased Safe judgement during ADL;Decreased cognition;Decreased endurance;Decreased self-care trans;Decreased high-level ADLs  (impaired pain, balance, fxnl mobility, act rachell, fxnl reach, standing rachell, strength, attention to task, direction following, safety awareness, insight, pacing, problem solving, learning new tasks, response time, flat affect)   Prognosis Good   Assessment Pt is a 78 y.o. female seen for OT evaluation s/p admission to [facility] on 9/16/2024 due to elective L STACY with Dr. Foster - anterior approach, pt is currently POD #0 and is WBAT. Personal and env factors supporting pt at time of IE include (I) PLOF, accessible home environment, and FFSU. Personal and env factors inhibiting engagement in occupations include limited social support and lives alone and reports no one can stay with her on d/c or provide transportation . Performance deficits that affect the pt’s occupational performance can be seen above. Due to pt's current functional limitations and medical complications pt is functioning below baseline. Pt would benefit from continued skilled OT treatment in order to maximize safety, independence and overall performance with ADLs, functional mobility, functional transfers, and cognition in order to achieve highest level of function.   Goals   Patient Goals to have less pain   LTG Time Frame 10-14   Long Term Goal see goals listed below   Plan   Treatment Interventions ADL retraining;Functional transfer training;Endurance training;Cognitive reorientation;Patient/family training;Equipment evaluation/education;Compensatory  technique education;Energy conservation;Activityengagement   Goal Expiration Date 09/26/24   OT Treatment Day 1   OT Frequency 3-5x/wk   Discharge Recommendation   Rehab Resource Intensity Level, OT II (Moderate Resource Intensity)   AM-PAC Daily Activity Inpatient   Lower Body Dressing 1   Bathing 2   Toileting 2   Upper Body Dressing 3   Grooming 3   Eating 3   Daily Activity Raw Score 14   Daily Activity Standardized Score (Calc for Raw Score >=11) 33.39   AM-PAC Applied Cognition Inpatient   Following a Speech/Presentation 3   Understanding Ordinary Conversation 3   Taking Medications 1   Remembering Where Things Are Placed or Put Away 2   Remembering List of 4-5 Errands 2   Taking Care of Complicated Tasks 2   Applied Cognition Raw Score 13   Applied Cognition Standardized Score 30.46   Additional Treatment Session   Start Time 1455   End Time 1308   Treatment Assessment Pt seen for additional skilled OT treatment session to address toileting and functional mobility. Pt reports urge for bathroom. Due to fatigue and overall pain recliner chair rolled into bathroom door. Pt with improved sit >< stand requiring min A x1 from recliner, and able to complete 2-3 steps to toilet. Requiring consistent VC for hand placement and sequencing of steps. Min A x1 for toilet transfer. Pt lethargic and requiring consistent cuing for continuation of toileting task, encouragement for standing for hygiene due to limited functional reaching due to pain at hip. Pt with episode of emesis upon attempt to walk out of bathroom 3-4 ft. Requiring chair to be brought behind pt. RN made aware. Pt's performance overall limited by pain, lethargy and requiring consistent cuing throughout. Would cont to benefit from skilled OT treatment   Additional Treatment Day 1   End of Consult   Patient Position at End of Consult Bedside chair;All needs within reach     GOALS:      -Patient will perform grooming tasks standing at sink with overall Mod I in  order to increase overall independence    -Patient will be Mod I with UB dressing using AE and AD as needed in order to increase (I) with ADLs    -Patient will be Mod I with UB bathing using AE and AD as needed in order to increase (I) with ADLs    -Patient will be Mod I with LB dressing with use of AE and AD as needed in order to increase (I) with ADLs    -Patient will be Mod I with LB bathing with use of AE and AD as needed in order to increase (I) with ADLs    -Patient will complete toileting w/ Mod I w/ G hygiene/thoroughness in order to reduce caregiver burden    -Patient will demonstrate Mod I with bed mobility for ability to manage own comfort and initiate OOB tasks.     -Patient will perform functional transfers with Mod I to/from all surfaces using DME as needed in order to increase (I) with functional tasks    -Patient will be Mod I with functional mobility to/from bathroom for increased independence with toileting tasks    -Patient will tolerate therapeutic activities for greater than 30 min, in order to increase tolerance for functional activities.     -Patient will complete simulated tub/shower transfer with overall mod I in order to safely participate in bathing tasks upon d/c home        The patient's raw score on the -PAC Daily Activity Inpatient Short Form is 14. A raw score of less than 19 suggests the patient may benefit from discharge to post-acute rehabilitation services. HOWEVER please refer to the recommendation of the Occupational Therapist for safe discharge planning.    This session, pt required and most appropriately benefited from skilled OT/PT co-eval due to decreased activity tolerance and unpredictable medical and/or functional status. OT and PT goals were addressed separately as seen in documentation.     Nina Sweeney MS, OTR/L

## 2024-09-16 NOTE — OP NOTE
[id number]                                                                                                        Erin Suazo                                                                                                        AN OR MAIN    PreOp Dx: left hip DJD    Postop Dx: left hip DJD    Procedure: left anterior total hip arthroplasty    Surgeon: Nellie Foster MD    Assistants: Sahil Nelson PA-C    No qualified resident was available for this case. The physician assistant was needed for all portions of this case including prepping and draping the patient as well as prepping and final implantation of the femoral and acetabular components as well as closure of the operative site.    Fluids:     EBL:     Drains: none    Specimens: none    Complications: none    Condition: stable,Transferred to postanesthesia care unit.    Implants: Depuy      Acetabulum size 48    Acetabular poly 48/32 neutral    Femur Actis, size 4    Femoral head size 32/+1    78 y.o. female , presents at this, time, secondary to failed conservative treatment of left hip DJD for left anterior total hip arthroplasty. Patient with history of revision ipsilateral knee arthroplasty with long stem.    The patient was told of all the pros and cons of operative and nonoperative intervention. Some of the complications of operative intervention include infection, bleeding, scarring, nerve injury, vascular injury,leg length discrepancy, hip dislocation, continued pain, decreased range of motion, DVT, PE death, loss of limb, fracture, need for further surgery, not obtain an results. The patient wished. Patient did consent for operative intervention for this pathology.  Patient was brought to the operating room. Patient was anesthetized as anesthesia team. Patient was prepped and draped in normal sterile fashion. After this was done, we did conduct a time out to make sure correct. Patient was in the room, prepped marked and draped. Implants  were in the room, Rep. For the implants were present, DVT prophylaxis and antibiotics were dressed.  An anterior approach was used. Incision was made 2 cm lateral and 1 cm distal to the ASIS and extended this incision distally. After going through skin, fatty tissue, fascia see was identified and incised. The fascial layer Was incised going towards the ASIS And extending it distally to incorporate our entire incision. We were able to go through the internervous planes until we were able to identify the anterior perforating vessels. Once this was done with able to obtain hemostasis. Able to excise the pre-capsular, fatty tissue. After this was done, we were able to do all releases, which included, the inferior portion of the femoral neck going towards the lesser trochanteric region, the iliocapsularis, and also, the piriformis recess.  We then were able to make our femoral neck cut. Femoral head was removed. Labrum was excised. Fatty tissue within the acetabulum was also excised. At this, time. We did remain to be appropriate theta angle and anteversion. We did place a trial acetabular component, and with the aid of radiographic imaging. It was noted that all was appropriate. At this, time, we were able to place, our true acetabular component. Our theta angle and anteversion were noted to be appropriate. At this time, we are able to place, our true acetabular poly-ethylene.  At this, time. We did release on the medial aspect of the greater trochanteric region. We externally rotated. The femur as well as extending at the hip and adducting the femur to obtain appropriate visualization of the proximal femur. We were able to use our box osteotome, canal finder, and rasp in order to make sure there, we avoided any varus, placement of our implant. We did broach to appropriate size, making sure we had correct version. Calcar planer was then used. Trial femoral neck and head were placed. Patient was placed through  provocative ranges of motion to make sure that stability was obtained. Radiographic imaging confirmed appropriate leg lengths. At this time. We did remove a trial femoral neck and head. After the hip was dislocated. All broach handle was placed within a trial femoral component to make sure that the implant was still stable. Once this was confirmed, we did remove the trial femoral component.  Copious irrigation was used at the operative site. True femoral implant was placed. Trial femoral head was placed once more in order to confirm appropriate leg lengths and appropriate stability. Once as noted to be appropriate, we placed a true femoral head. We did check for stability, leg lengths and final radiographs were taken. Irrigation was used once more at the operative site. Fascial layer was reapproximated with barbed sutures. 2-0 Vicryl sutures were used for the subcutaneous layer.  Monocryl sutures were utilized for the subcuticular closure.  The wounds were cleaned and dried. Exofin sealant was used to augment the closure. Mepilex dressing was placed. Patient was subsequently awakened noted to be in stable condition and transferred to postanesthesia care unit.    The [employer] system was utilized in this case for:  Presurgical planning x-ray  Intraoperative use of the navigation system  Intraoperative use of fluoroscopy and saved images

## 2024-09-16 NOTE — PLAN OF CARE
Problem: OCCUPATIONAL THERAPY ADULT  Goal: Performs self-care activities at highest level of function for planned discharge setting.  See evaluation for individualized goals.  Description: Treatment Interventions: ADL retraining, Functional transfer training, Endurance training, Cognitive reorientation, Patient/family training, Equipment evaluation/education, Compensatory technique education, Energy conservation, Activityengagement          See flowsheet documentation for full assessment, interventions and recommendations.   Note: Limitation: Decreased ADL status, Decreased UE strength, Decreased Safe judgement during ADL, Decreased cognition, Decreased endurance, Decreased self-care trans, Decreased high-level ADLs (impaired pain, balance, fxnl mobility, act rachell, fxnl reach, standing rachell, strength, attention to task, direction following, safety awareness, insight, pacing, problem solving, learning new tasks, response time, flat affect)  Prognosis: Good  Assessment: Pt is a 78 y.o. female seen for OT evaluation s/p admission to [facility] on 9/16/2024 due to elective L STACY with Dr. Foster - anterior approach, pt is currently POD #0 and is WBAT. Personal and env factors supporting pt at time of IE include (I) PLOF, accessible home environment, and FFSU. Personal and env factors inhibiting engagement in occupations include limited social support and lives alone and reports no one can stay with her on d/c or provide transportation . Performance deficits that affect the pt’s occupational performance can be seen above. Due to pt's current functional limitations and medical complications pt is functioning below baseline. Pt would benefit from continued skilled OT treatment in order to maximize safety, independence and overall performance with ADLs, functional mobility, functional transfers, and cognition in order to achieve highest level of function.     Rehab Resource Intensity Level, OT: II (Moderate Resource Intensity)

## 2024-09-16 NOTE — ANESTHESIA POSTPROCEDURE EVALUATION
Post-Op Assessment Note    CV Status:  Stable  Pain Score: 0    Pain management: adequate    Multimodal analgesia used between 6 hours prior to anesthesia start to PACU discharge    Mental Status:  Arousable and sleepy   Hydration Status:  Stable and euvolemic   PONV Controlled:  None   Airway Patency:  Patent  Two or more mitigation strategies used for obstructive sleep apnea   Post Op Vitals Reviewed: Yes    No anethesia notable event occurred.    Staff: CRNA               BP   191/80   Temp   97.3   Pulse  69   Resp   18   SpO2 100

## 2024-09-16 NOTE — PLAN OF CARE
Problem: PHYSICAL THERAPY ADULT  Goal: Performs mobility at highest level of function for planned discharge setting.  See evaluation for individualized goals.  Description: Treatment/Interventions: Functional transfer training, LE strengthening/ROM, Therapeutic exercise, Endurance training, Cognitive reorientation, Equipment eval/education, Patient/family training, Bed mobility, Gait training, Spoke to nursing, Spoke to case management, Family, OT  Equipment Recommended: Walker (short)       See flowsheet documentation for full assessment, interventions and recommendations.  9/16/2024 1512 by Chris Driscoll PT  Note: Prognosis: Fair  Problem List: Decreased range of motion, Decreased strength, Decreased endurance, Impaired balance, Decreased mobility, Decreased coordination, Pain, Orthopedic restrictions, Decreased safety awareness, Decreased cognition, Impaired judgement (gait deviations)  Assessment: Pt is a 78 y.o. female seen for PT evaluation s/p admit to [facility] on 9/16/2024 w/ S/P total left hip arthroplasty.  Pt seen POD0 in APU, FWBAT BLE w/ anterior hip precautions.  Order placed for PT.      Prior to admission: Pt lived alone in one floor apt, no JONELLE, did not regularly use DME prior to admission, but has cane and walker. She does have step to perform to get onto her bed and reports she cannot modify the height of bed.  Upon evaluation: Pt needed mod A for bed mobility, min A for transfers and steppage amb w/walker.      Pt's clinical presentation is currently unstable/unpredictable given the functional mobility deficits above, especially (but not limited to) weakness, pain, and decreased functional mobility tolerance, and combined with medical complications including hypertension  and lethargy, nausea/vomiting .  Pt IS NOT at his/her mobility baseline.  She is at risk for falls based on impaired balance, slow gait speed    During this admission, pt would benefit from continued skilled  inpatient PT in the acute care setting in order to address deficits as defined above to maximize function and mobility.      Recommendations:     From a PT standpoint, recommend next several sessions focus on continued mobility trials w/rolling walker, therex, step training vs alternate sleeping arrangements.  Barriers to Discharge: Decreased caregiver support, Inaccessible home environment     Rehab Resource Intensity Level, PT: II (Moderate Resource Intensity)    See flowsheet documentation for full assessment.

## 2024-09-16 NOTE — PHYSICAL THERAPY NOTE
PHYSICAL THERAPY EVALUATION  NAME:  Erin Suazo  DATE: 09/16/24    AGE:   78 y.o.  Mrn:   [id number]  Principal problem: Principal Problem:    S/P total left hip arthroplasty      Vitals:    09/16/24 1145 09/16/24 1230 09/16/24 1255 09/16/24 1315   BP: 163/72 (!) 177/72 163/72 153/67   BP Location:   Right arm    Pulse: 65 67  75   Resp: 20 20  18   Temp:       TempSrc:       SpO2: 96% 96%  91%       Length Of Stay: 0  Performed at least 2 patient identifiers during session: Name and Birthday  PHYSICAL THERAPY EVALUATION :    09/16/24 1230   PT Last Visit   PT Visit Date 09/16/24   Note Type   Note type Evaluation   Pain Assessment   Pain Assessment Tool 0-10   Pain Score 5   Pain Location/Orientation Orientation: Left;Location: Hip  (anterior)   Effect of Pain on Daily Activities limits speed  and indep of mobility, limits arom   Patient's Stated Pain Goal No pain   Hospital Pain Intervention(s) Repositioned;Ambulation/increased activity;Medication (See MAR)  (premedicated for pain prior to session)   Restrictions/Precautions   Weight Bearing Precautions Per Order Yes   RLE Weight Bearing Per Order FWB   LLE Weight Bearing Per Order FWB   Braces or Orthoses   (anterior hip)   Other Precautions Cognitive;Multiple lines;Fall Risk;Pain;WBS;THR   Home Living   Type of Home Apartment   Home Layout One level  (no JONELLE)   Home Equipment Cane;Walker   Additional Comments no use of AD at baseline   Prior Function   Level of [place] Independent with functional mobility;Independent with ADLs   Lives With (S)  Alone   Receives Help From Family;Friend(s)   Falls in the last 6 months 0   Comments Per orthopedic physician, Dr. Foster: based on recent research, he does not require pt to maintain anterior total hip arthroplasty precautions. He does however, want pt to avoid lying prone until joint is fully healed.   General   Additional Pertinent History Procedure: left anterior total hip arthroplasty, HTN eariler in the  day   Family/Caregiver Present Yes  (son Corby)   Cognition   Overall Cognitive Status Impaired   Arousal/Participation Lethargic   Orientation Level Oriented to person;Oriented to place   Following Commands Follows one step commands with increased time or repetition   Subjective   Subjective Pt lethagic, falling asleep during session and needs frequent stim to attend to tasks   RUE Assessment   RUE Assessment WFL   LUE Assessment   LUE Assessment WFL   Strength RLE   R Hip Flexion 4-/5   R Knee Extension 4/5   R Ankle Dorsiflexion 4/5   Strength LLE   L Hip Flexion 3-/5  (inconsistent demonstration during MMT)   L Hip ABduction 2-/5  (inconsistent demonstration during MMT)   L Knee Extension 3/5  (inconsistent demonstration during MMT)   L Ankle Dorsiflexion 4-/5   Vision-Basic Assessment   Current Vision Wears glasses all the time   Bed Mobility   Supine to Sit 3  Moderate assistance  (Pt prefers egressing from L side of bed (similar to home)--A at trunk and at LE)   Additional items Assist x 1;HOB elevated;Bedrails;Increased time required;Verbal cues;LE management   Sit to Supine   (NT)   Transfers   Sit to Stand 3  Moderate assistance   Additional items Assist x 1;Bedrails;Increased time required;Verbal cues  (anais due to elevated stretcher height compared to pt's short stature; instruction for hand placement, technique)   Stand to Sit 4  Minimal assistance   Additional items Assist x 1;Increased time required;Verbal cues;Armrests  (instruction for hand placement,. sequencing)   Ambulation/Elevation   Gait pattern Antalgic;Excessively slow;Step to;Short stride;Shuffling;Decreased L stance   Gait Assistance 4  Minimal assist   Additional items Verbal cues;Tactile cues  (verbal instruction for at least 50% of gait trial)   Assistive Device Rolling walker   Distance 3'   Stair Management Assistance Not tested   Balance   Static Sitting Good   Static Standing Poor +   Ambulatory Poor +   Endurance Deficit   Endurance  Deficit Yes   Endurance Deficit Description limits amb distance, eyes closed for >25% of session,  increased time for all mobility   Activity Tolerance   Activity Tolerance Patient limited by pain;Patient limited by fatigue   Medical Staff Made Aware care coordination w/ OT, case management   Nurse Made Aware spoke to RN     Assessment:   Pt is a 78 y.o. female seen for PT evaluation [facility] w/ S/P total left hip arthroplasty.  Pt seen POD0 in APU, FWBAT BLE w/ anterior hip precautions.  Order placed for PT.      Prior to admission: Pt lived alone in one floor apt, no JONELLE, did not regularly use DME prior to admission, but has cane and walker. She does have step to perform to get onto her bed and reports she cannot modify the height of bed.  Upon evaluation: Pt needed mod A for bed mobility, min A for transfers and steppage amb w/walker.      Pt's clinical presentation is currently unstable/unpredictable given the functional mobility deficits above, especially (but not limited to) weakness, pain, and decreased functional mobility tolerance, and combined with medical complications including hypertension  and lethargy, nausea/vomiting.  Pt IS NOT at his/her mobility baseline.  She is at risk for falls based on impaired balance, slow gait speed    During this admission, pt would benefit from continued skilled inpatient PT in the acute care setting in order to address deficits as defined above to maximize function and mobility.      Recommendations:    From a PT standpoint, recommend next several sessions focus on continued mobility trials w/rolling walker, therex, step training vs alternate sleeping arrangements.     Prognosis Fair   Problem List Decreased range of motion;Decreased strength;Decreased endurance;Impaired balance;Decreased mobility;Decreased coordination;Pain;Orthopedic restrictions;Decreased safety awareness;Decreased cognition;Impaired judgement  (gait deviations)    Barriers to Discharge Decreased caregiver support;Inaccessible home environment   Goals   Patient Goals to have less pain   STG Expiration Date 09/21/24   Short Term Goal #1 Goals: Pt will: Perform rolling  and supine<>sit bed mobility tasks with modified I to prepare for transfers and reposition in bed. Perform transfers with modified I to promote proper hand placement and approach. Perform ambulation with LRAD for up to 50' with Supervision to increase Indep in home environment and promote proper use of assistive device. Perform 1 short step w/BUE support  w/S to  be able to get into/out of bed vs using alternate arrangements for sleeping . Perform at least 2 HEP w/o physical A to demonstrate compliance w/therex and improve ease of transfers and improve ease of bed mobility.   Perform TUG w/ DME and improve baseline scores to demonstrate less risk for falls.   PT Treatment Day 1   Plan   Treatment/Interventions Functional transfer training;LE strengthening/ROM;Therapeutic exercise;Endurance training;Cognitive reorientation;Equipment eval/education;Patient/family training;Bed mobility;Gait training;Spoke to nursing;Spoke to case management;Family;OT   PT Frequency Twice a day   Discharge Recommendation   Rehab Resource Intensity Level, PT II (Moderate Resource Intensity)   Equipment Recommended Walker  (short)   Additional Comments 2 Personal factors affecting pt at time of IE include: limited home support, advanced age, past experience, behavioral pattern, inability to perform IADLs, inability to perform ADLs, inability to ambulate household distances, inability to navigate community distances, limited insight into impairments, and recent fall(s).   AM-PAC Basic Mobility Inpatient   Turning in Flat Bed Without Bedrails 3   Lying on Back to Sitting on Edge of Flat Bed Without Bedrails 2   Moving Bed to Chair 2   Standing Up From Chair Using Arms 3   Walk in Room 2   Climb 3-5 Stairs With Railing 1   Basic  Mobility Inpatient Raw Score 13   Basic Mobility Standardized Score 33.99   [facility] Highest Level Of Mobility   [facility] Goal 4: Move to chair/commode   -[facility] Achieved 5: Stand (1 or more minutes)   Additional Treatment Session   Start Time 1305   End Time 1325   Treatment Assessment Pt was able to amb slightly further distances w/ walker but still requires physical A for transfers and ambulation as well as education. Based on performance, Pt is not adequate for DC from a PT standpoint to a home alone situation   Equipment Use rolling walker   Additional Treatment Day 1   Exercises   Neuro re-ed Transfers fluctuate between min and mod A of 1, amb w/rolling walker min A of 1 for 3'+6' w/ walker, Verbal instruction for hand placement, sequencing, walker management, technique. Pt vomited during treatment session during second gait trial. Pt needed A for repositioning posteriorly into recliner.   End of Consult   Patient Position at End of Consult All needs within reach;Bedside chair   Pt requires PT /OT co-treat and co-eval due to required skilled interventions of at least 2 clinicians for mobility challenge, medical complexity, limited activity tolerance, and cognitive-behavioral impairments.   PT and OT goals addressed separately.   (Please find full objective findings from PT assessment regarding body systems outlined above).     The patient's AM-[facility] Basic Mobility Inpatient Short Form Raw Score is 13. A Raw score of less than or equal to 16 suggests the patient may benefit from discharge to post-acute rehabilitation services, which DOES coincide with CURRENT above PT recommendations.     However please refer to therapist recommendation for discharge planning given other factors that may influence destination.     Adapted from Jay GARRIDO, Brenda J, Karina J, Macie SHAHID. Association of -PAC “6-Clicks” Basic Mobility and Daily Activity Scores With Discharge Destination. Physical Therapy, 2021;101:1-9. DOI:  "[email or web address]    Portions of the record may have been created with voice recognition software.  Occasional wrong word or \"sound a like\" substitutions may have occurred due to the inherent limitations of voice recognition software.  Read the chart carefully and recognize, using context, where substitutions have occurred    "

## 2024-09-16 NOTE — PLAN OF CARE
Problem: MUSCULOSKELETAL - ADULT  Goal: Maintain or return mobility to safest level of function  Description: INTERVENTIONS:  - Assess patient's ability to carry out ADLs; assess patient's baseline for ADL function and identify physical deficits which impact ability to perform ADLs (bathing, care of mouth/teeth, toileting, grooming, dressing, etc.)  - Assess/evaluate cause of self-care deficits   - Assess range of motion  - Assess patient's mobility  - Assess patient's need for assistive devices and provide as appropriate  - Encourage maximum independence but intervene and supervise when necessary  - Involve family in performance of ADLs  - Assess for home care needs following discharge   - Consider OT consult to assist with ADL evaluation and planning for discharge  - Provide patient education as appropriate  Outcome: Progressing  Goal: Maintain proper alignment of affected body part  Description: INTERVENTIONS:  - Support, maintain and protect limb and body alignment  - Provide patient/ family with appropriate education  Outcome: Progressing     Problem: MUSCULOSKELETAL - ADULT  Goal: Maintain or return mobility to safest level of function  Description: INTERVENTIONS:  - Assess patient's ability to carry out ADLs; assess patient's baseline for ADL function and identify physical deficits which impact ability to perform ADLs (bathing, care of mouth/teeth, toileting, grooming, dressing, etc.)  - Assess/evaluate cause of self-care deficits   - Assess range of motion  - Assess patient's mobility  - Assess patient's need for assistive devices and provide as appropriate  - Encourage maximum independence but intervene and supervise when necessary  - Involve family in performance of ADLs  - Assess for home care needs following discharge   - Consider OT consult to assist with ADL evaluation and planning for discharge  - Provide patient education as appropriate  Outcome: Progressing  Goal: Maintain proper alignment of  affected body part  Description: INTERVENTIONS:  - Support, maintain and protect limb and body alignment  - Provide patient/ family with appropriate education  Outcome: Progressing

## 2024-09-16 NOTE — ANESTHESIA PREPROCEDURE EVALUATION
Procedure:  ARTHROPLASTY HIP TOTAL ANTERIOR (Left: Hip)    Relevant Problems   CARDIO   (+) HLD (hyperlipidemia)   (+) Hypertension      GI/HEPATIC   (+) Dysphagia   (+) GERD (gastroesophageal reflux disease)   (+) Gastroesophageal reflux disease with esophagitis   (+) Gastroesophageal reflux disease without esophagitis      MUSCULOSKELETAL   (+) Arthritis of knee   (+) Arthritis of left hip   (+) Lower back pain   (+) Primary osteoarthritis of right knee      NEURO/PSYCH   (+) Chronic pain syndrome - Right      PULMONARY   (+) Asthma in adult, mild intermittent, uncomplicated        Physical Exam    Airway    Mallampati score: II  TM Distance: >3 FB  Neck ROM: full     Dental       Cardiovascular      Pulmonary      Other Findings  post-pubertal.      Anesthesia Plan  ASA Score- 3     Anesthesia Type- general with ASA Monitors.         Additional Monitors:     Airway Plan:     Comment: Refuses neuraxial. Ancef rash/GI intolerance okay with receiving it at surgeon request..       Plan Factors-Exercise tolerance (METS): >4 METS.    Chart reviewed.                      Induction- intravenous.    Postoperative Plan- Plan for postoperative opioid use. Planned trial extubation    Perioperative Resuscitation Plan - Level 1 - Full Code.       Informed Consent- Anesthetic plan and risks discussed with patient.  I personally reviewed this patient with the CRNA. Discussed and agreed on the Anesthesia Plan with the CRNA..      Anesthesia plan and consent discussed with Erin who expressed understanding and agreement. Risks/benefits and alternatives discussed with patient including possible PONV, sore throat, damage to teeth/lips/gums and possibility of rare anesthetic and surgical emergencies.

## 2024-09-16 NOTE — INTERVAL H&P NOTE
H&P reviewed. After examining the patient I find no changes in the patients condition since the H&P had been written.    Vitals:    09/16/24 0643   BP: 145/66   Pulse: 78   Resp: 18   Temp: 97.6 °F (36.4 °C)   SpO2: 95%   Patient seen and examined  General: No apparent distress  CV: S1-S2  Abdomen: Soft  Chest: No audible wheezing  Left lower extremity: No abrasions open wounds; pain with logrolling; neurologically vascular intact distally  To the operating room for left anterior total hip arthroplasty  Pros and cons of operative and nonoperative intervention discussed with patient  We will follow-up postoperatively

## 2024-09-16 NOTE — DISCHARGE INSTR - AVS FIRST PAGE
Discharge Instructions - Orthopedics  Erin Suazo 78 y.o. female MRN: [id number]  Unit/Bed#: PACU 4    Weight Bearing Status:                                           Weight Bearing as tolerated to the left lower extremity.  Anterior total hip precautions    DVT prophylaxis:  Complete course of Aspirin 81 mg twice daily x 35 days from date of surgery as directed    Pain:  Start Norco (hydrocodone-acetominophen) 5-325 mg every 6 hrs after last dose taken after surgery for 1 day  Transition to Norco (hydrocodone-acetominophen) 5-325 mg every 6 hours as needed    DO NOT EXCEED 3000mg of acetominophen (tylenol) from ALL SOURCES in 24 hours!    Showering Instructions:   May shower 5 days from date of surgery with operative dressing intact  Do not soak your incision(Tubs, Jacuzzi's, pools, etc)    Dressing Instructions:   Keep dressing/incision clean and intact until follow up appointment.    Do not apply any creams or ointments/lotions to your incisions including antibiotic ointment, peroxide    Driving Instructions:  No driving until cleared by Orthopaedic Surgery.    PT/OT:  Continue PT/OT on outpatient basis as directed    Appt Instructions:   If you do not have your appointment, please call the clinic at [phone]  Otherwise followup as scheduled    Contact the office sooner if you experience any increased numbness/tingling in the extremities.

## 2024-09-17 LAB
ANION GAP SERPL CALCULATED.3IONS-SCNC: 9 MMOL/L (ref 4–13)
BUN SERPL-MCNC: 14 MG/DL (ref 5–25)
CALCIUM SERPL-MCNC: 8.7 MG/DL (ref 8.4–10.2)
CHLORIDE SERPL-SCNC: 101 MMOL/L (ref 96–108)
CO2 SERPL-SCNC: 26 MMOL/L (ref 21–32)
CREAT SERPL-MCNC: 0.68 MG/DL (ref 0.6–1.3)
ERYTHROCYTE [DISTWIDTH] IN BLOOD BY AUTOMATED COUNT: 13.3 % (ref 11.6–15.1)
GFR SERPL CREATININE-BSD FRML MDRD: 83 ML/MIN/1.73SQ M
GLUCOSE SERPL-MCNC: 108 MG/DL (ref 65–140)
HCT VFR BLD AUTO: 32.9 % (ref 34.8–46.1)
HGB BLD-MCNC: 10.5 G/DL (ref 11.5–15.4)
MCH RBC QN AUTO: 30.7 PG (ref 26.8–34.3)
MCHC RBC AUTO-ENTMCNC: 31.9 G/DL (ref 31.4–37.4)
MCV RBC AUTO: 96 FL (ref 82–98)
PLATELET # BLD AUTO: 187 THOUSANDS/UL (ref 149–390)
PMV BLD AUTO: 12.8 FL (ref 8.9–12.7)
POTASSIUM SERPL-SCNC: 4 MMOL/L (ref 3.5–5.3)
RBC # BLD AUTO: 3.42 MILLION/UL (ref 3.81–5.12)
SODIUM SERPL-SCNC: 136 MMOL/L (ref 135–147)
WBC # BLD AUTO: 10.33 THOUSAND/UL (ref 4.31–10.16)

## 2024-09-17 PROCEDURE — 97116 GAIT TRAINING THERAPY: CPT

## 2024-09-17 PROCEDURE — 97110 THERAPEUTIC EXERCISES: CPT

## 2024-09-17 PROCEDURE — 97530 THERAPEUTIC ACTIVITIES: CPT

## 2024-09-17 PROCEDURE — 99024 POSTOP FOLLOW-UP VISIT: CPT | Performed by: PHYSICIAN ASSISTANT

## 2024-09-17 PROCEDURE — 85027 COMPLETE CBC AUTOMATED: CPT | Performed by: ORTHOPAEDIC SURGERY

## 2024-09-17 PROCEDURE — 80048 BASIC METABOLIC PNL TOTAL CA: CPT | Performed by: ORTHOPAEDIC SURGERY

## 2024-09-17 PROCEDURE — 97535 SELF CARE MNGMENT TRAINING: CPT

## 2024-09-17 RX ORDER — HYDROCHLOROTHIAZIDE 25 MG/1
25 TABLET ORAL DAILY
Status: DISCONTINUED | OUTPATIENT
Start: 2024-09-18 | End: 2024-09-20 | Stop reason: HOSPADM

## 2024-09-17 RX ORDER — LOSARTAN POTASSIUM AND HYDROCHLOROTHIAZIDE 25; 100 MG/1; MG/1
1 TABLET ORAL DAILY
Status: DISCONTINUED | OUTPATIENT
Start: 2024-09-18 | End: 2024-09-17

## 2024-09-17 RX ORDER — ENOXAPARIN SODIUM 100 MG/ML
40 INJECTION SUBCUTANEOUS DAILY
Status: DISCONTINUED | OUTPATIENT
Start: 2024-09-17 | End: 2024-09-20 | Stop reason: HOSPADM

## 2024-09-17 RX ORDER — LOSARTAN POTASSIUM 50 MG/1
100 TABLET ORAL DAILY
Status: DISCONTINUED | OUTPATIENT
Start: 2024-09-18 | End: 2024-09-20 | Stop reason: HOSPADM

## 2024-09-17 RX ADMIN — ACETAMINOPHEN 975 MG: 325 TABLET ORAL at 22:05

## 2024-09-17 RX ADMIN — DOCUSATE SODIUM 100 MG: 100 CAPSULE, LIQUID FILLED ORAL at 17:23

## 2024-09-17 RX ADMIN — HYDROMORPHONE HYDROCHLORIDE 0.5 MG: 1 INJECTION, SOLUTION INTRAMUSCULAR; INTRAVENOUS; SUBCUTANEOUS at 06:24

## 2024-09-17 RX ADMIN — HYDROMORPHONE HYDROCHLORIDE 0.5 MG: 1 INJECTION, SOLUTION INTRAMUSCULAR; INTRAVENOUS; SUBCUTANEOUS at 19:55

## 2024-09-17 RX ADMIN — OXYCODONE HYDROCHLORIDE 10 MG: 5 TABLET ORAL at 08:56

## 2024-09-17 RX ADMIN — ACETAMINOPHEN 975 MG: 325 TABLET ORAL at 06:26

## 2024-09-17 RX ADMIN — SODIUM CHLORIDE, SODIUM LACTATE, POTASSIUM CHLORIDE, AND CALCIUM CHLORIDE 75 ML/HR: .6; .31; .03; .02 INJECTION, SOLUTION INTRAVENOUS at 03:14

## 2024-09-17 RX ADMIN — ENOXAPARIN SODIUM 40 MG: 40 INJECTION SUBCUTANEOUS at 22:06

## 2024-09-17 RX ADMIN — ONDANSETRON 4 MG: 2 INJECTION INTRAMUSCULAR; INTRAVENOUS at 08:56

## 2024-09-17 RX ADMIN — ACETAMINOPHEN 975 MG: 325 TABLET ORAL at 15:07

## 2024-09-17 RX ADMIN — OXYCODONE HYDROCHLORIDE 10 MG: 5 TABLET ORAL at 12:58

## 2024-09-17 RX ADMIN — SODIUM CHLORIDE, SODIUM LACTATE, POTASSIUM CHLORIDE, AND CALCIUM CHLORIDE 75 ML/HR: .6; .31; .03; .02 INJECTION, SOLUTION INTRAVENOUS at 16:06

## 2024-09-17 RX ADMIN — DOCUSATE SODIUM 100 MG: 100 CAPSULE, LIQUID FILLED ORAL at 11:34

## 2024-09-17 NOTE — PHYSICAL THERAPY NOTE
PHYSICAL THERAPY NOTE          Patient Name: Erin Suazo  Today's Date: 24 1359   PT Last Visit   PT Visit Date 24   Note Type   Note Type BID visit/treatment   Pain Assessment   Pain Assessment Tool 0-10   Pain Score 8   Pain Location/Orientation Orientation: Left;Location: Hip   Pain Radiating Towards anterior aspect of pt thigh   Pain Onset/Description Frequency: Constant/Continuous;Onset: Ongoing   Effect of Pain on Daily Activities limits comfort, activity tolerance, functional mobility   Patient's Stated Pain Goal No pain   Hospital Pain Intervention(s) Repositioned;Ambulation/increased activity;Cold applied;Rest;Elevated;Emotional support   Multiple Pain Sites No   Pain Rating: FLACC (Rest) - Face 1   Pain Rating: FLACC (Rest) - Legs 1   Pain Rating: FLACC (Rest) - Activity 1   Pain Rating: FLACC (Rest) - Cry 1   Pain Rating: FLACC (Rest) - Consolability 1   Score: FLACC (Rest) 5   Restrictions/Precautions   Weight Bearing Precautions Per Order Yes   RLE Weight Bearing Per Order FWB   LLE Weight Bearing Per Order FWB   Braces or Orthoses   (anterior hip)   Other Precautions Cognitive;Chair Alarm;Bed Alarm;Fall Risk;Pain;Multiple lines  (IV pole)   General   Chart Reviewed Yes   Response to Previous Treatment   (pt continues to report severe pain from 7/10 to 10/10 pain constant)   Family/Caregiver Present Yes  (son)   Cognition   Overall Cognitive Status Impaired   Arousal/Participation Alert;Responsive;Cooperative   Attention Attends with cues to redirect   Orientation Level Oriented X4   Memory Decreased recall of precautions   Following Commands Follows one step commands with increased time or repetition   Comments pt ID by name and  on hospital wrist band   Subjective   Subjective pt was agreeable to participate in PT intervention. pt stated 7/10 pain pre tx and 8/10 post tx session of L hip    Bed Mobility   Additional Comments pt standing with YURI Robison upon arrival to pt room and post tx pt in the recliner with call bell, chair alarm activated, legs elevated and all pt needs met   Transfers   Sit to Stand 4  Minimal assistance   Additional items Assist x 1;Armrests;Increased time required;Verbal cues   Stand to Sit 4  Minimal assistance   Additional items Assist x 1;Armrests;Increased time required;Verbal cues   Stand pivot Unable to assess   Ambulation/Elevation   Gait pattern (S)    (pt was unable to ambulate in todays tx session due to increased L hip pain 8/10)   Balance   Static Sitting Good   Dynamic Sitting Fair +   Static Standing Poor +   Dynamic Standing Poor   Ambulatory Poor +   Endurance Deficit   Endurance Deficit Yes   Endurance Deficit Description limited activity tolerance, functional mobility, comfort and ambulation   Activity Tolerance   Activity Tolerance Patient limited by fatigue;Patient limited by pain   Nurse Made Aware Spoke to RN   Exercises   Quad Sets Sitting;10 reps;AAROM;Left  (limited quad contraction)   Hip Abduction Sitting;15 reps;PROM;Left  (long sit position)   Hip Adduction Sitting;15 reps;PROM;Left  (long sit position)   Knee AROM Short Arc Quad Sitting;15 reps;AAROM;Left   Knee AROM Long Arc Quad Sitting;10 reps;AROM;Left  (limited knee ext/flexion)   Ankle Pumps Sitting;20 reps;AROM;Bilateral   Assessment   Prognosis Fair   Problem List Decreased strength;Decreased range of motion;Decreased endurance;Impaired balance;Decreased mobility;Decreased cognition;Impaired judgement;Decreased safety awareness;Pain;Orthopedic restrictions;Decreased coordination   Assessment pt began tx session standing w/ OT Kristen present in the bathroom. pt was agreeable to participate in PT intervention. In comparison to previous tx session this morning pt continues to be limited with activity tolerance, functional mobility, ambulation tolerance, safety w/ all OOB activities due to  increased pain w/ activity. pt remains at an increased risk for falls and injuries w/ all OOB activities at this time. pt was unable to ambulate in this afternoons tx session due to increased pain 8/10 L hip. pt did participate in TE activities while seated in the recliner in order to increase ROM of LLE. pt required PROM for hip abd/add, AAROM for Quad sets, SAQ but was able to complete ankle pumps and LAQ w/ AROM. pt did demonstrate limited knee flx/ext with LAQ due to pain. pt did complete 2 STS from recliner with min Ax1 in order to increase safety and balance w/ funcitonal transfers. Post tx pt in the recliner with legs elevated chair alarm activated and all pt needs met. Continue to recomemdn Dc w/ lveel 2 moderate rehab resource intensity when medically cleared   Goals   Patient Goals to have less pain   STG Expiration Date 09/21/24   PT Treatment Day 3   Plan   Treatment/Interventions Functional transfer training;LE strengthening/ROM;Therapeutic exercise;Endurance training;Patient/family training;Equipment eval/education;Bed mobility;Gait training;Spoke to nursing   Progress No functional improvements   PT Frequency Twice a day   Discharge Recommendation   Rehab Resource Intensity Level, PT II (Moderate Resource Intensity)   Equipment Recommended Walker   AM-PAC Basic Mobility Inpatient   Turning in Flat Bed Without Bedrails 2   Lying on Back to Sitting on Edge of Flat Bed Without Bedrails 2   Moving Bed to Chair 3   Standing Up From Chair Using Arms 3   Walk in Room 3   Climb 3-5 Stairs With Railing 1   Basic Mobility Inpatient Raw Score 14   Basic Mobility Standardized Score 35.55   [facility] Highest Level Of Mobility   -[facility] Goal 4: Move to chair/commode   -[facility] Achieved 4: Move to chair/commode   Education   Education Provided Mobility training;Assistive device   Patient Reinforcement needed   End of Consult   Patient Position at End of Consult Bedside chair;Bed/Chair alarm activated;All needs  within reach   The patient's AM-PAC Basic Mobility Inpatient Short Form Raw Score is 14. A Raw score of less than or equal to 16 suggests the patient may benefit from discharge to post-acute rehabilitation services. Please also refer to the recommendation of the Physical Therapist for safe discharge planning.    Mikal Hutn

## 2024-09-17 NOTE — PLAN OF CARE
Problem: PAIN - ADULT  Goal: Verbalizes/displays adequate comfort level or baseline comfort level  Description: Interventions:  - Encourage patient to monitor pain and request assistance  - Assess pain using appropriate pain scale  - Administer analgesics based on type and severity of pain and evaluate response  - Implement non-pharmacological measures as appropriate and evaluate response  - Consider cultural and social influences on pain and pain management  - Notify physician/advanced practitioner if interventions unsuccessful or patient reports new pain  Outcome: Progressing     Problem: INFECTION - ADULT  Goal: Absence or prevention of progression during hospitalization  Description: INTERVENTIONS:  - Assess and monitor for signs and symptoms of infection  - Monitor lab/diagnostic results  - Monitor all insertion sites, i.e. indwelling lines, tubes, and drains  - Monitor endotracheal if appropriate and nasal secretions for changes in amount and color  - [place] appropriate cooling/warming therapies per order  - Administer medications as ordered  - Instruct and encourage patient and family to use good hand hygiene technique  - Identify and instruct in appropriate isolation precautions for identified infection/condition  Outcome: Progressing     Problem: METABOLIC, FLUID AND ELECTROLYTES - ADULT  Goal: Electrolytes maintained within normal limits  Description: INTERVENTIONS:  - Monitor labs and assess patient for signs and symptoms of electrolyte imbalances  - Administer electrolyte replacement as ordered  - Monitor response to electrolyte replacements, including repeat lab results as appropriate  - Instruct patient on fluid and nutrition as appropriate  Outcome: Progressing     Problem: METABOLIC, FLUID AND ELECTROLYTES - ADULT  Goal: Fluid balance maintained  Description: INTERVENTIONS:  - Monitor labs   - Monitor I/O and WT  - Instruct patient on fluid and nutrition as appropriate  - Assess for signs &  symptoms of volume excess or deficit  Outcome: Progressing     Problem: MUSCULOSKELETAL - ADULT  Goal: Maintain or return mobility to safest level of function  Description: INTERVENTIONS:  - Assess patient's ability to carry out ADLs; assess patient's baseline for ADL function and identify physical deficits which impact ability to perform ADLs (bathing, care of mouth/teeth, toileting, grooming, dressing, etc.)  - Assess/evaluate cause of self-care deficits   - Assess range of motion  - Assess patient's mobility  - Assess patient's need for assistive devices and provide as appropriate  - Encourage maximum independence but intervene and supervise when necessary  - Involve family in performance of ADLs  - Assess for home care needs following discharge   - Consider OT consult to assist with ADL evaluation and planning for discharge  - Provide patient education as appropriate  Outcome: Progressing

## 2024-09-17 NOTE — PLAN OF CARE
Problem: PHYSICAL THERAPY ADULT  Goal: Performs mobility at highest level of function for planned discharge setting.  See evaluation for individualized goals.  Description: Treatment/Interventions: Functional transfer training, LE strengthening/ROM, Therapeutic exercise, Endurance training, Cognitive reorientation, Equipment eval/education, Patient/family training, Bed mobility, Gait training, Spoke to nursing, Spoke to case management, Family, OT  Equipment Recommended: Walker (short)       See flowsheet documentation for full assessment, interventions and recommendations.  9/17/2024 1512 by Mikal Hunt PTA  Outcome: Not Progressing  Note: Prognosis: Fair  Problem List: Decreased strength, Decreased range of motion, Decreased endurance, Impaired balance, Decreased mobility, Decreased cognition, Impaired judgement, Decreased safety awareness, Pain, Orthopedic restrictions, Decreased coordination  Assessment: pt began tx session standing w/ OT Kristen present in the bathroom. pt was agreeable to participate in PT intervention. In comparison to previous tx session this morning pt continues to be limited with activity tolerance, functional mobility, ambulation tolerance, safety w/ all OOB activities due to increased pain w/ activity. pt remains at an increased risk for falls and injuries w/ all OOB activities at this time. pt was unable to ambulate in this afternoons tx session due to increased pain 8/10 L hip. pt did participate in TE activities while seated in the recliner in order to increase ROM of LLE. pt required PROM for hip abd/add, AAROM for Quad sets, SAQ but was able to complete ankle pumps and LAQ w/ AROM. pt did demonstrate limited knee flx/ext with LAQ due to pain. pt did complete 2 STS from recliner with min Ax1 in order to increase safety and balance w/ funcitonal transfers. Post tx pt in the recliner with legs elevated chair alarm activated and all pt needs met. Continue to recomemdn Dc w/ lveel 2  moderate rehab resource intensity when medically cleared  Barriers to Discharge: Inaccessible home environment, Decreased caregiver support     Rehab Resource Intensity Level, PT: II (Moderate Resource Intensity)    See flowsheet documentation for full assessment.     9/17/2024 0946 by Mikal Hunt PTA  Outcome: Progressing  Note: Prognosis: Fair  Problem List: Decreased range of motion, Decreased strength, Decreased endurance, Impaired balance, Decreased mobility, Decreased coordination, Pain, Orthopedic restrictions, Decreased safety awareness, Decreased cognition, Impaired judgement (gait deviations)  Assessment: Pt seen for PT treatment 9/17/2024 with focus on: gait training, therapeutic activity, and therapeutic exercise. Pt presents supine in bed prior to PT intervention, pt is agreeable to participate in session. During session, pt continues to be consistant for requiring mod ax1 for bed mobility and functional transfers to and from RW. Pt required min ax1 for ambulation with RW short distances. Overall pt displays improvements in ambulation distance compared to previous tx sessions , however continues to perform below their baseline level of functional mobility due to pain level, limited functional mobility and ambulation distance . Pt continues to most appropriately benefit from Level II (moderate PT intensity) resources upon d/c from the acute care setting. Continue skilled PT POC as appropriate in order to progress towards therapy goals and maximize functional independence; PT goals remains appropriate. Next session, plan for intervention of bed mobility, functional transfers, ambulation and stair trials as able and appropriate.Post tx pt in recliner with call bell, chair alarm activated, legs elevated and all pt needs met  Barriers to Discharge: Inaccessible home environment, Decreased caregiver support     Rehab Resource Intensity Level, PT: II (Moderate Resource Intensity)    See flowsheet documentation  for full assessment.

## 2024-09-17 NOTE — PLAN OF CARE
Problem: OCCUPATIONAL THERAPY ADULT  Goal: Performs self-care activities at highest level of function for planned discharge setting.  See evaluation for individualized goals.  Description: Treatment Interventions: ADL retraining, Functional transfer training, Endurance training, Cognitive reorientation, Patient/family training, Equipment evaluation/education, Compensatory technique education, Energy conservation, Activityengagement          See flowsheet documentation for full assessment, interventions and recommendations.   Note: Limitation: Decreased ADL status, Decreased UE strength, Decreased Safe judgement during ADL, Decreased cognition, Decreased endurance, Decreased self-care trans, Decreased high-level ADLs (impaired pain, balance, fxnl mobility, act rachell, fxnl reach, standing rachell, strength, attention to task, direction following, safety awareness, insight, pacing, problem solving, learning new tasks, response time, flat affect)  Prognosis: Good  Assessment: Patient seen for OT treatment on 9/17/2024 s/p admission for S/P total left hip arthroplasty Patient agreeable to OT session. Patient participated in fall prevention , energy conservation , self-care transfers, functional mobility, and ADLs with intervention focus on optimizing independence in functional mobility, self-care transfers and ADL tasks. Erin Suazo is showing improvements in transfers and fnxl mobility but is continuing to perform below baseline due to the following deficits: endurance ,  decreased muscular strength , decreased balance , decreased standing tolerance for self care tasks , decreased dynamic balance impacting functional reach, decreased functional reach , decreased activity tolerance , impaired judgement and problem solving , impaired safety awareness , (+) pain , and direction following. Personal factors continuing to impact D/C include: Assistance needed for ADL/IADLs, Assistance needed for ADLs and functional mobility,  High fall risk , decreased insight toward deficits , and decreased recall of precautions  From OT standpoint, patient would benefit from skilled intervention to maximize independence with ADLs and functional mobility. Goals remain appropriate, continue POC. At this time, recommending D/C to: level II (moderate resource intensity).     Rehab Resource Intensity Level, OT: II (Moderate Resource Intensity)     Enriqueta Cespedes MS OTR/L   [place] Licensure# [id number]

## 2024-09-17 NOTE — OCCUPATIONAL THERAPY NOTE
Occupational Therapy Treatment Note     Patient Name: Erin Suazo  Today's Date: 9/17/2024  Problem List  Principal Problem:    S/P total left hip arthroplasty       09/17/24 1344   OT Last Visit   OT Visit Date 09/17/24   Note Type   Note Type Treatment   Pain Assessment   Pain Assessment Tool 0-10   Pain Score 7  (0/10 at rest; 7/10 w/ mobility; 9/10 at the end of the session)   Pain Location/Orientation Orientation: Left;Location: Hip   Pain Radiating Towards anterior aspect of L thigh   Pain Onset/Description Frequency: Constant/Continuous;Descriptor: Discomfort   Effect of Pain on Daily Activities limits comfort, fnxl mobility, ease of ADLs and activity participation   Patient's Stated Pain Goal No pain   Hospital Pain Intervention(s) Repositioned;Ambulation/increased activity;Emotional support   Multiple Pain Sites No   Restrictions/Precautions   Weight Bearing Precautions Per Order Yes   RLE Weight Bearing Per Order FWB   LLE Weight Bearing Per Order FWB  (s/p L hip STACY 9/16/2024 - anterior approach)   Other Precautions Cognitive;Bed Alarm;Chair Alarm;Fall Risk;Pain   Lifestyle   Autonomy PTA pt living alone in apartment, pt (I) with ADLs and IADLs, (-) falls, (+)drives, no use of AD at baseline   Reciprocal Relationships supportive son- however pt reports limited support on d/c. Reports family will not be able to transport to  appts   Service to Others retired   ADL   Where Assessed Sitting at sink   Eating Assistance 7  Independent   Eating Deficit NPO;Beverage management   Grooming Assistance 5  Supervision/Setup   Grooming Deficit Setup;Wash/dry hands;Wash/dry face;Teeth care;Increased time to complete   UB Bathing Assistance 4  Minimal Assistance   UB Bathing Deficit Setup;Verbal cueing;Supervision/safety;Increased time to complete  (Assist to sponge bathe back region)   UB Bathing Comments sponge bathes chest, b/l arms and abdomen sitting at the sink ~setup for use of deoderant   LB Bathing  "Assistance 3  Moderate Assistance   LB Bathing Deficit Setup;Verbal cueing;Supervision/safety;Increased time to complete;Perineal area;Right upper leg;Left upper leg   LB Bathing Comments completed in sitting vs standing. Pt acheive 3/4 standing position w/ b/l UE support on armrests of chair for dependent pericare hygiene.   UB Dressing Assistance 5  Supervision/Setup   UB Dressing Deficit Setup   UB Dressing Comments [facility]   Functional Standing Tolerance   Time ~3 mins   Activity standing at the sink   Comments increased difficulty gaining balance and optimal positioning of BLEs w/ support of BUEs on RW. Pt w/ small LOB towards L side, MODA x 1 person to regain balance and prevent a fall. Significant delay with carry out and follow of verbal / tactile cues for positioning and safety.   Bed Mobility   Additional Comments Pt received OOB in recliner chair at the start of the session.   Transfers   Sit to Stand 4  Minimal assistance   Additional items Assist x 1;Armrests;Increased time required;Verbal cues   Stand to Sit 4  Minimal assistance   Additional items Assist x 1;Armrests;Increased time required;Verbal cues   Additional Comments STS completed x 3 from various self-care surfaces. Increased amount of time w/ heavy use of BUEs on armrests to acheive sit to stand. Continued education on optimal hand placement and body mechanics for safe transfers, pt with fair recall.   Functional Mobility   Functional Mobility 4  Minimal assistance   Additional Comments Ax1, functional household distance to the bathroom + short distance from the bathroom to the recliner chair positioned outside the bathroom door d/t increased pain and decreased activity tolerance. Significant amount of time to complete w/ step by step directions for weight-shifting and RW use.   Additional items Rolling walker   Subjective   Subjective \"mercy, mercy, mercy\"   Cognition   Overall Cognitive Status Impaired "   Arousal/Participation Alert;Responsive;Cooperative   Attention Attends with cues to redirect   Orientation Level Oriented X4   Memory Decreased recall of precautions   Following Commands Follows one step commands with increased time or repetition   Comments Pt ID via wristband, name and . Decreased recall of precautions w/ poor application and carry over throughout the session. Increased time noted for processing.   Activity Tolerance   Activity Tolerance Patient limited by fatigue;Patient limited by pain   Medical Staff Made Aware Spoke with CM, PTA, RN pre/post   Assessment   Assessment Patient seen for OT treatment on 2024 s/p admission for S/P total left hip arthroplasty Patient agreeable to OT session. Patient participated in fall prevention , energy conservation , self-care transfers, functional mobility, and ADLs with intervention focus on optimizing independence in functional mobility, self-care transfers and ADL tasks. Erin Suazo is showing improvements in transfers and fnxl mobility but is continuing to perform below baseline due to the following deficits: endurance ,  decreased muscular strength , decreased balance , decreased standing tolerance for self care tasks , decreased dynamic balance impacting functional reach, decreased functional reach , decreased activity tolerance , impaired judgement and problem solving , impaired safety awareness , (+) pain , and direction following. Personal factors continuing to impact D/C include: Assistance needed for ADL/IADLs, Assistance needed for ADLs and functional mobility, High fall risk , decreased insight toward deficits , and decreased recall of precautions  From OT standpoint, patient would benefit from skilled intervention to maximize independence with ADLs and functional mobility. Goals remain appropriate, continue POC. At this time, recommending D/C to: level II (moderate resource intensity).   Plan   Treatment Interventions ADL  retraining;Functional transfer training;Endurance training;Cognitive reorientation;Patient/family training;Equipment evaluation/education;Compensatory technique education;Energy conservation;Activityengagement   Goal Expiration Date 09/26/24   OT Treatment Day 2   OT Frequency 3-5x/wk   Discharge Recommendation   Rehab Resource Intensity Level, OT II (Moderate Resource Intensity)   Additional Comments  The patient's raw score on the AM-PAC Daily Activity Inpatient Short Form is 16. A raw score of less than 19 suggests the patient may benefit from discharge to post-acute rehabilitation services. Please refer to the recommendation of the Occupational Therapist for safe discharge planning.   AM-PAC Daily Activity Inpatient   Lower Body Dressing 2   Bathing 2   Toileting 2   Upper Body Dressing 3   Grooming 3   Eating 4   Daily Activity Raw Score 16   Daily Activity Standardized Score (Calc for Raw Score >=11) 35.96   AM-PAC Applied Cognition Inpatient   Following a Speech/Presentation 3   Understanding Ordinary Conversation 3   Taking Medications 1   Remembering Where Things Are Placed or Put Away 3   Remembering List of 4-5 Errands 2   Taking Care of Complicated Tasks 2   Applied Cognition Raw Score 14   Applied Cognition Standardized Score 32.02   End of Consult   Education Provided Yes;Family or social support of family present for education by provider  (Son present throughout the session)   Patient Position at End of Consult Bedside chair;All needs within reach;Bed/Chair alarm activated  (care turned over to Mikal BARAJAS)   Nurse Communication Nurse aware of consult   GOALS:      -Patient will perform grooming tasks standing at sink with overall Mod I in order to increase overall independence PROGRESSING      -Patient will be Mod I with UB dressing using AE and AD as needed in order to increase (I) with ADLs PROGRESSING      -Patient will be Mod I with UB bathing using AE and AD as needed in order to increase (I)  with ADLs PROGRESSING      -Patient will be Mod I with LB dressing with use of AE and AD as needed in order to increase (I) with ADLs     -Patient will be Mod I with LB bathing with use of AE and AD as needed in order to increase (I) with ADLs PROGRESSING      -Patient will complete toileting w/ Mod I w/ G hygiene/thoroughness in order to reduce caregiver burden     -Patient will demonstrate Mod I with bed mobility for ability to manage own comfort and initiate OOB tasks.      -Patient will perform functional transfers with Mod I to/from all surfaces using DME as needed in order to increase (I) with functional tasks PROGRESSING      -Patient will be Mod I with functional mobility to/from bathroom for increased independence with toileting tasks PROGRESSING      -Patient will tolerate therapeutic activities for greater than 30 min, in order to increase tolerance for functional activities. PROGRESSING      -Patient will complete simulated tub/shower transfer with overall mod I in order to safely participate in bathing tasks upon d/c home     This session, pt required and most appropriately benefited from skilled OT/PT co-eval due to decreased activity tolerance and unpredictable medical and/or functional status. OT and PT goals were addressed separately as seen in documentation.     Enriqueta Cespedes MS OTR/L   [place] Licensure# [id number]

## 2024-09-17 NOTE — PHYSICAL THERAPY NOTE
PHYSICAL THERAPY NOTE         09/17/24 0907   PT Last Visit   PT Visit Date 09/17/24   Note Type   Note Type Treatment   Pain Assessment   Pain Assessment Tool 0-10   Pain Score 10 - Worst Possible Pain   Pain Location/Orientation Orientation: Left;Location: Hip   Pain Onset/Description Onset: Ongoing   Effect of Pain on Daily Activities limited bed mobility, funcitonal mobility, ambulation distance/tolerance   Patient's Stated Pain Goal No pain   Hospital Pain Intervention(s) Repositioned;Ambulation/increased activity;Cold applied;Elevated;Emotional support;Rest  (spoke with RN in reference to pain medication for pt who is 10/10 post tx session)   Multiple Pain Sites No   Pain Rating: FLACC (Rest) - Face 1   Pain Rating: FLACC (Rest) - Legs 1   Pain Rating: FLACC (Rest) - Activity 1   Pain Rating: FLACC (Rest) - Cry 1   Pain Rating: FLACC (Rest) - Consolability 1   Score: FLACC (Rest) 5   Restrictions/Precautions   Weight Bearing Precautions Per Order Yes   RLE Weight Bearing Per Order FWB   LLE Weight Bearing Per Order FWB   Braces or Orthoses   (anterior hip)   Other Precautions Cognitive;Chair Alarm;Bed Alarm;Fall Risk;Pain   General   Chart Reviewed Yes   Response to Previous Treatment Patient with no complaints from previous session.   Family/Caregiver Present No   Cognition   Overall Cognitive Status Unable to assess   Arousal/Participation Alert;Responsive;Cooperative   Attention Attends with cues to redirect   Orientation Level Oriented X4   Memory Decreased short term memory;Decreased recall of recent events;Decreased recall of precautions   Following Commands Follows one step commands with increased time or repetition   Comments pt continues to require Vc's for hand placement, RW management in order to increase safety and balance with all OOB activities   Subjective   Subjective pt reported 7/10 pain L hip pre tx session and  10/10 L hip pain post tx session   Bed Mobility   Supine to Sit 3  Moderate assistance   Additional items Assist x 1;HOB elevated;Bedrails;Increased time required;Verbal cues;LE management;Other  (HHA)   Sit to Supine Unable to assess   Additional Comments pt seated OOB in chano recliner post tx session with call bell, chair alarm activated, RN present and all pt needs met   Transfers   Sit to Stand 3  Moderate assistance   Additional items Assist x 1;Increased time required;Verbal cues   Stand to Sit 3  Moderate assistance   Additional items Assist x 1;Armrests;Increased time required;Verbal cues   Stand pivot 3  Moderate assistance   Additional items Assist x 1;Armrests;Increased time required;Verbal cues   Toilet transfer 4  Minimal assistance   Additional items Assist x 1;Increased time required;Verbal cues;Raised toilet seat   Additional Comments pt required mod ax1 fro all functional transfesr to and from RW except for STS to and from raised toilet seated min Ax1   Ambulation/Elevation   Gait pattern Decreased foot clearance;Foward flexed;Step to;Excessively slow;Antalgic;Decreased L stance;Decreased hip extension;Decreased heel strike;Decreased toe off   Gait Assistance 4  Minimal assist   Additional items Assist x 1;Verbal cues   Assistive Device Rolling walker   Distance 15'x2 RW   Stair Management Assistance Not tested   Balance   Static Sitting Good   Dynamic Sitting Fair +   Static Standing Poor +   Dynamic Standing Poor +   Ambulatory Poor +  (w/ RW)   Endurance Deficit   Endurance Deficit Yes   Endurance Deficit Description limited ambulation tolerance/distance, bed mobility and functional transfers   Activity Tolerance   Activity Tolerance Patient limited by fatigue;Patient limited by pain   Nurse Made Aware Spoke to RN   Assessment   Assessment Pt seen for PT treatment 9/17/2024 with focus on: gait training, therapeutic activity, and therapeutic exercise. Pt presents supine in bed prior to PT  intervention, pt is agreeable to participate in session. During session, pt continues to be consistant for requiring mod ax1 for bed mobility and functional transfers to and from RW. Pt required min ax1 for ambulation with RW short distances. Overall pt displays improvements in ambulation distance compared to previous tx sessions , however continues to perform below their baseline level of functional mobility due to pain level, limited functional mobility and ambulation distance . Pt continues to most appropriately benefit from Level II (moderate PT intensity) resources upon d/c from the acute care setting. Continue skilled PT POC as appropriate in order to progress towards therapy goals and maximize functional independence; PT goals remains appropriate. Next session, plan for intervention of bed mobility, functional transfers, ambulation and stair trials as able and appropriate.Post tx pt in recliner with call bell, chair alarm activated, legs elevated and all pt needs met   Barriers to Discharge Inaccessible home environment;Decreased caregiver support   Goals   Patient Goals to have less pain   STG Expiration Date 09/21/24   PT Treatment Day 2   Plan   Treatment/Interventions Functional transfer training;LE strengthening/ROM;Therapeutic exercise;Endurance training;Patient/family training;Equipment eval/education;Bed mobility;Gait training;Spoke to nursing   Progress Slow progress, decreased activity tolerance   PT Frequency Twice a day   Discharge Recommendation   Rehab Resource Intensity Level, PT II (Moderate Resource Intensity)   Equipment Recommended Walker   AM-PAC Basic Mobility Inpatient   Turning in Flat Bed Without Bedrails 2   Lying on Back to Sitting on Edge of Flat Bed Without Bedrails 2   Moving Bed to Chair 2   Standing Up From Chair Using Arms 2   Walk in Room 3   Climb 3-5 Stairs With Railing 1   Basic Mobility Inpatient Raw Score 12   Basic Mobility Standardized Score 32.23   [facility]  Level Of Mobility   -HLM Goal 4: Move to chair/commode   J[facility]-HLM Achieved 6: Walk 10 steps or more   Education   Education Provided Mobility training;Assistive device   Patient Reinforcement needed   End of Consult   Patient Position at End of Consult Bedside chair;Bed/Chair alarm activated;All needs within reach   The patient's AM-PAC Basic Mobility Inpatient Short Form Raw Score is 12. A Raw score of less than or equal to 16 suggests the patient may benefit from discharge to post-acute rehabilitation services. Please also refer to the recommendation of the Physical Therapist for safe discharge planning.    Mikal Hunt          Patient Name: Erin Suazo  Today's Date: 9/17/2024

## 2024-09-17 NOTE — PLAN OF CARE
Problem: PHYSICAL THERAPY ADULT  Goal: Performs mobility at highest level of function for planned discharge setting.  See evaluation for individualized goals.  Description: Treatment/Interventions: Functional transfer training, LE strengthening/ROM, Therapeutic exercise, Endurance training, Cognitive reorientation, Equipment eval/education, Patient/family training, Bed mobility, Gait training, Spoke to nursing, Spoke to case management, Family, OT  Equipment Recommended: Walker (short)       See flowsheet documentation for full assessment, interventions and recommendations.  Outcome: Progressing  Note: Prognosis: Fair  Problem List: Decreased range of motion, Decreased strength, Decreased endurance, Impaired balance, Decreased mobility, Decreased coordination, Pain, Orthopedic restrictions, Decreased safety awareness, Decreased cognition, Impaired judgement (gait deviations)  Assessment: Pt seen for PT treatment 9/17/2024 with focus on: gait training, therapeutic activity, and therapeutic exercise. Pt presents supine in bed prior to PT intervention, pt is agreeable to participate in session. During session, pt continues to be consistant for requiring mod ax1 for bed mobility and functional transfers to and from RW. Pt required min ax1 for ambulation with RW short distances. Overall pt displays improvements in ambulation distance compared to previous tx sessions , however continues to perform below their baseline level of functional mobility due to pain level, limited functional mobility and ambulation distance . Pt continues to most appropriately benefit from Level II (moderate PT intensity) resources upon d/c from the acute care setting. Continue skilled PT POC as appropriate in order to progress towards therapy goals and maximize functional independence; PT goals remains appropriate. Next session, plan for intervention of bed mobility, functional transfers, ambulation and stair trials as able and  appropriate.Post tx pt in recliner with call bell, chair alarm activated, legs elevated and all pt needs met  Barriers to Discharge: Inaccessible home environment, Decreased caregiver support     Rehab Resource Intensity Level, PT: II (Moderate Resource Intensity)    See flowsheet documentation for full assessment.

## 2024-09-17 NOTE — ASSESSMENT & PLAN NOTE
S/p left anterior total hip arthroplasty with Dr. Foster 9/16/2024 (POD 1).   Weight bearing as tolerated to the left lower extremity.   Physical therapy. Currently recommended for rehab.   Pain control.   Dvt ppx: lovenox while admitted, plan for aspirin x 5 weeks upon discharge.   Appreciate case management assistance.   Dispo: ortho will follow  Patient to follow up with Dr Foster upon discharge

## 2024-09-17 NOTE — PROGRESS NOTES
Progress Note - Orthopedics   Name: Erin Suazo 78 y.o. female I MRN: [id number]  Unit/Bed#: S -01 I Date of Admission: 9/16/2024   Date of Service: 9/17/2024 I Hospital Day: 0    Assessment & Plan  S/P total left hip arthroplasty  S/p left anterior total hip arthroplasty with Dr. Foster 9/16/2024 (POD 1).   Weight bearing as tolerated to the left lower extremity.   Physical therapy. Currently recommended for rehab.   Pain control.   Dvt ppx: lovenox while admitted, plan for aspirin x 5 weeks upon discharge.   Appreciate case management assistance.   Dispo: ortho will follow  Patient to follow up with Dr Foster upon discharge       Orthopedics service will follow. Please contact the SecureChat role for the Orthopedics service with any questions/concerns.    History of Present Illness   78 y.o.female seen and examined at bedside. Complains of significant right hip pain. Feeling nauseated this morning. States she has been unable to eat breakfast. Is currently working with PT. Notes that she was not pre medicated and feels that this is why her pain is so elevated. Notes that she was able to sleep overnight, with aide of pain medications.  Is urinating without issue. No CP/SOB. Otherwise is feeling ok.     Objective      Temp:  [97.5 °F (36.4 °C)-98.4 °F (36.9 °C)] 98.3 °F (36.8 °C)  HR:  [60-98] 98  Resp:  [12-20] 16  BP: (134-177)/(57-82) 134/67  O2 Device: None (Room air)          I/O         09/15 0701 09/16 0700 09/16 0701  09/17 0700 09/17 0701 09/18 0700    I.V. (mL/kg)  1850 (26.9)     Total Intake(mL/kg)  1850 (26.9)     Urine (mL/kg/hr)  200 (0.1) 300 (1.2)    Blood  100     Total Output  300 300    Net  +1550 -300                 Lines/Drains/Airways       Active Status       None                  Physical Exam Musculoskeletal: leftlower  Surgical dressings c/d/I without strikethrough.  Mild christina incisional ttp.   Thigh is soft and compressible.   Motor intact to +FHL/EHL, +ankle dorsi/plantar  "flexion  Sensation intact to saphenous, sural, tibial, superficial peroneal nerve, and deep peroneal  2+ DP pulse  No calf swelling or tenderness to palpation      Lab Results: I have reviewed the following results: CBC/BMP:   .     09/17/24  0437   WBC 10.33*   HGB 10.5*   HCT 32.9*      SODIUM 136   K 4.0      CO2 26   BUN 14   CREATININE 0.68   GLUC 108    , Creatinine Clearance: Estimated Creatinine Clearance: 59.6 mL/min (by C-G formula based on SCr of 0.68 mg/dL)., LFTs: No new results in last 24 hours.   Recent Labs     09/17/24  0437   WBC 10.33*   HGB 10.5*   HCT 32.9*      BUN 14   CREATININE 0.68     Blood Culture:  No results found for: \"BLOODCX\"  Wound Culture: No results found for: \"WOUNDCULT\"  "

## 2024-09-18 ENCOUNTER — PATIENT OUTREACH (OUTPATIENT)
Dept: OBGYN CLINIC | Facility: HOSPITAL | Age: 79
End: 2024-09-18

## 2024-09-18 LAB
ANION GAP SERPL CALCULATED.3IONS-SCNC: 8 MMOL/L (ref 4–13)
BUN SERPL-MCNC: 14 MG/DL (ref 5–25)
CALCIUM SERPL-MCNC: 8.7 MG/DL (ref 8.4–10.2)
CHLORIDE SERPL-SCNC: 98 MMOL/L (ref 96–108)
CO2 SERPL-SCNC: 26 MMOL/L (ref 21–32)
CREAT SERPL-MCNC: 0.79 MG/DL (ref 0.6–1.3)
ERYTHROCYTE [DISTWIDTH] IN BLOOD BY AUTOMATED COUNT: 13.3 % (ref 11.6–15.1)
GFR SERPL CREATININE-BSD FRML MDRD: 71 ML/MIN/1.73SQ M
GLUCOSE SERPL-MCNC: 107 MG/DL (ref 65–140)
HCT VFR BLD AUTO: 30.4 % (ref 34.8–46.1)
HGB BLD-MCNC: 9.8 G/DL (ref 11.5–15.4)
MCH RBC QN AUTO: 30.7 PG (ref 26.8–34.3)
MCHC RBC AUTO-ENTMCNC: 32.2 G/DL (ref 31.4–37.4)
MCV RBC AUTO: 95 FL (ref 82–98)
PLATELET # BLD AUTO: 162 THOUSANDS/UL (ref 149–390)
PMV BLD AUTO: 13 FL (ref 8.9–12.7)
POTASSIUM SERPL-SCNC: 3.9 MMOL/L (ref 3.5–5.3)
RBC # BLD AUTO: 3.19 MILLION/UL (ref 3.81–5.12)
SODIUM SERPL-SCNC: 132 MMOL/L (ref 135–147)
WBC # BLD AUTO: 13.77 THOUSAND/UL (ref 4.31–10.16)

## 2024-09-18 PROCEDURE — 97110 THERAPEUTIC EXERCISES: CPT

## 2024-09-18 PROCEDURE — 97530 THERAPEUTIC ACTIVITIES: CPT

## 2024-09-18 PROCEDURE — 80048 BASIC METABOLIC PNL TOTAL CA: CPT | Performed by: ORTHOPAEDIC SURGERY

## 2024-09-18 PROCEDURE — 97116 GAIT TRAINING THERAPY: CPT

## 2024-09-18 PROCEDURE — 99024 POSTOP FOLLOW-UP VISIT: CPT | Performed by: PHYSICIAN ASSISTANT

## 2024-09-18 PROCEDURE — 85027 COMPLETE CBC AUTOMATED: CPT | Performed by: ORTHOPAEDIC SURGERY

## 2024-09-18 RX ADMIN — ACETAMINOPHEN 975 MG: 325 TABLET ORAL at 22:50

## 2024-09-18 RX ADMIN — SODIUM CHLORIDE, SODIUM LACTATE, POTASSIUM CHLORIDE, AND CALCIUM CHLORIDE 75 ML/HR: .6; .31; .03; .02 INJECTION, SOLUTION INTRAVENOUS at 18:35

## 2024-09-18 RX ADMIN — HYDROCHLOROTHIAZIDE 25 MG: 25 TABLET ORAL at 09:07

## 2024-09-18 RX ADMIN — DOCUSATE SODIUM 100 MG: 100 CAPSULE, LIQUID FILLED ORAL at 17:07

## 2024-09-18 RX ADMIN — DOCUSATE SODIUM 100 MG: 100 CAPSULE, LIQUID FILLED ORAL at 09:05

## 2024-09-18 RX ADMIN — ACETAMINOPHEN 975 MG: 325 TABLET ORAL at 14:05

## 2024-09-18 RX ADMIN — ACETAMINOPHEN 975 MG: 325 TABLET ORAL at 06:01

## 2024-09-18 RX ADMIN — OXYCODONE HYDROCHLORIDE 5 MG: 5 TABLET ORAL at 13:08

## 2024-09-18 RX ADMIN — SODIUM CHLORIDE, SODIUM LACTATE, POTASSIUM CHLORIDE, AND CALCIUM CHLORIDE 75 ML/HR: .6; .31; .03; .02 INJECTION, SOLUTION INTRAVENOUS at 05:21

## 2024-09-18 RX ADMIN — ENOXAPARIN SODIUM 40 MG: 40 INJECTION SUBCUTANEOUS at 21:46

## 2024-09-18 RX ADMIN — LOSARTAN POTASSIUM 100 MG: 50 TABLET, FILM COATED ORAL at 09:07

## 2024-09-18 RX ADMIN — OXYCODONE HYDROCHLORIDE 10 MG: 5 TABLET ORAL at 20:08

## 2024-09-18 RX ADMIN — ONDANSETRON 4 MG: 2 INJECTION INTRAMUSCULAR; INTRAVENOUS at 05:23

## 2024-09-18 NOTE — PROGRESS NOTES
[facility] reviewed pt chart and pt had arthroplasty of left hip completed on 09/16/2024. Per review of chart treatment team recommendation of STR, pt requested [facility] and referral entered. [facility] attempted to reach pt today and was unable. A message was left to please return [facility] call. [facility] will remain available.

## 2024-09-18 NOTE — CASE MANAGEMENT
Case Management Discharge Planning Note    Patient name Erin Suazo  Location S /S -01 MRN [id number]  : 1945 Date 2024       Current Admission Date: 2024  Current Admission Diagnosis:S/P total left hip arthroplasty   Patient Active Problem List    Diagnosis Date Noted Date Diagnosed    S/P total left hip arthroplasty 2024     Encounter for geriatric assessment 2024     Class 1 obesity due to excess calories without serious comorbidity with body mass index (BMI) of 31.0 to 31.9 in adult 2024     Sacroiliac joint dysfunction of left side 05/15/2024     Arthritis of left hip 05/15/2024     History of colon polyps 2024     Seasonal allergies 2023     Dysphagia 2022     Asthma in adult, mild intermittent, uncomplicated 2022     Sleep difficulties 2022     HLD (hyperlipidemia) 2022     S/P revision of total knee, left 2021     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     Chronic pain syndrome - Right 2019     Status post hysterectomy 2019     Carpal tunnel syndrome of right wrist      Cervical radiculitis 10/24/2018     Left shoulder pain 2018     Right shoulder pain 2018     Tendinopathy of left rotator cuff 2018     Tendinopathy of right rotator cuff 2018     Globus sensation 2018     Gastroesophageal reflux disease without esophagitis 2018     Lower back pain 2018     Primary osteoarthritis of right knee 2017     Arthritis of knee 2017     Pes anserine bursitis 2017     GERD (gastroesophageal reflux disease) 2016     Sleep deprivation 2016     Hypertension 2016     Left knee pain 2015     Gastroesophageal reflux disease with esophagitis 2012     Osteoporosis 2012     Vitamin D deficiency 2012     Insomnia 2012       LOS (days): 1  Geometric Mean LOS (GMLOS) (days):   Days to GMLOS:      OBJECTIVE:  Risk of Unplanned Readmission Score: 9.37         Current admission status: Inpatient   Preferred Pharmacy:   [id number] [place] - [address]  [address] [place] [address]  Phone: [phone] Fax: [phone]    Primary Care Provider: Cholo Field MD    Primary Insurance: MEDICARE  Secondary Insurance: [employer]    DISCHARGE DETAILS:    Discharge planning discussed with:: Patient  Freedom of Choice: Yes  Comments - Freedom of Choice: [facility]     Were Treatment Team discharge recommendations reviewed with patient/caregiver?: Yes  Did patient/caregiver verbalize understanding of patient care needs?: N/A- going to facility  Were patient/caregiver advised of the risks associated with not following Treatment Team discharge recommendations?: Yes    Contacts  Patient Contacts: Erin  Contact Method: In Person  Reason/Outcome: Continuity of Care, Discharge Planning, Referral    Other Referral/Resources/Interventions Provided:  Interventions: Short Term Rehab, Other (Specify)  Referral Comments: CM spoke with pt at bedside to provide STR choice list. Pt reported preference for [facility] [place]. CM reserved facility via [facility]. CM will remain available.    Treatment Team Recommendation: Short Term Rehab  Discharge Destination Plan:: Short Term Rehab

## 2024-09-18 NOTE — PHYSICAL THERAPY NOTE
PHYSICAL THERAPY NOTE          Patient Name: Erin Suazo  Today's Date: 24 0847   PT Last Visit   PT Visit Date 24   Note Type   Note Type BID visit/treatment   Pain Assessment   Pain Assessment Tool 0-10   Pain Score 5   Pain Location/Orientation Orientation: Left;Location: Hip   Pain Radiating Towards anterior aspect of thigh   Pain Onset/Description Onset: Ongoing   Effect of Pain on Daily Activities limits comfort, activity tolerance and ambulation distance   Patient's Stated Pain Goal No pain   Hospital Pain Intervention(s) Repositioned;Ambulation/increased activity;Elevated;Rest   Multiple Pain Sites No   Pain Rating: FLACC (Rest) - Face 1   Pain Rating: FLACC (Rest) - Legs 1   Pain Rating: FLACC (Rest) - Activity 1   Pain Rating: FLACC (Rest) - Cry 1   Pain Rating: FLACC (Rest) - Consolability 1   Score: FLACC (Rest) 5   Restrictions/Precautions   Weight Bearing Precautions Per Order Yes   RLE Weight Bearing Per Order FWB   LLE Weight Bearing Per Order FWB   Braces or Orthoses   (anterior hip)   Other Precautions Cognitive;Chair Alarm;Bed Alarm;Fall Risk;Pain;Multiple lines  (I.V Pole)   General   Chart Reviewed Yes   Response to Previous Treatment Patient with no complaints from previous session.  (pt pain improved in todays tx session 5/10 throughout PT intervention)   Cognition   Overall Cognitive Status Impaired   Arousal/Participation Alert;Responsive;Cooperative   Attention Attends with cues to redirect   Orientation Level Oriented X4   Memory Decreased recall of precautions   Following Commands Follows one step commands with increased time or repetition   Comments pt ID by name and  on hospital wrist band   Subjective   Subjective pt was agreeable to participate in PT intervenmtion and stated 5/10 pain pre/post tx session   Bed Mobility   Supine to Sit 3  Moderate assistance    Additional items Assist x 1;HOB elevated;Bedrails;Increased time required;Verbal cues;LE management;Other  (trunk management)   Sit to Supine Unable to assess   Additional Comments pt seated OOB in the recliner post tx session with call bell, chair alarm activated, legs elevated and all pt needs met   Transfers   Sit to Stand 4  Minimal assistance   Additional items Assist x 1;Increased time required;Verbal cues   Stand to Sit 4  Minimal assistance   Additional items Assist x 1;Armrests;Increased time required;Verbal cues   Stand pivot 4  Minimal assistance   Additional items Assist x 1;Armrests;Increased time required;Verbal cues   Toilet transfer 4  Minimal assistance   Additional items Assist x 1;Increased time required;Raised toilet seat  (R sided grab bar)   Additional Comments pt completed multiple functional transfers to and from Rw with min Ax1, no LOB but required Vc's for hand placement   Ambulation/Elevation   Gait pattern Decreased foot clearance;Antalgic;Short stride;Step to;Excessively slow;Decreased hip extension;Decreased toe off;Decreased heel strike   Gait Assistance (S)  5  Supervision  (close /s, no LOB)   Additional items Assist x 1;Verbal cues  (step length)   Assistive Device Rolling walker   Distance 50'x1 RW 15'x1 RW   Stair Management Assistance Not tested   Balance   Static Sitting Good   Dynamic Sitting Fair +   Static Standing Fair -   Dynamic Standing Poor +   Ambulatory Fair -  (close /s, RW, no LOB)   Endurance Deficit   Endurance Deficit Yes   Endurance Deficit Description limited ambulation distance   Activity Tolerance   Activity Tolerance Patient limited by fatigue;Other (Comment)  (generalized weakness)   Nurse Made Aware Spoke to RN Kelin   Exercises   Quad Sets Sitting;10 reps;AROM;Left  (limited quad contraction)   Hip Abduction Sitting;10 reps;AROM;Bilateral   Hip Adduction Sitting;10 reps;AROM;Bilateral  (pillow squeezes)   Knee AROM Long Arc Quad Sitting;10  reps;AROM;Left  (limited L knee flx/ext)   Ankle Pumps Sitting;20 reps;AROM;Bilateral   Assessment   Prognosis Fair   Problem List Decreased strength;Decreased range of motion;Decreased endurance;Impaired balance;Decreased mobility;Impaired judgement;Decreased safety awareness;Pain;Orthopedic restrictions;Decreased coordination   Assessment Pt seen for PT treatment 9/18/2024 with focus on: gait training, therapeutic activity, and therapeutic exercise. Pt presents supine in the bed and is agreeable to participate in session. During session, pt continues to require mod Ax1 for supine<>sit EOB transfer w/ LE and trunk management. Overall pt displays improvements in functional mobility and ambulation distance as pt ambulated 50'x1 RW 15'x1 RW w/ close /s, no LOB , however continues to perform below their baseline level of functional mobility due to fatigue, generalized weakness and 5/10 L hip pain. pt was able to participate in TE activities while seated EOB w/o LOB and AROM.  Pt continues to most appropriately benefit from Level II (moderate PT intensity) resources upon d/c from the acute care setting. Continue skilled PT POC as appropriate in order to progress towards therapy goals and maximize functional independence; PT goals remains appropriate. Next session, plan for intervention of continued functional transfers, bed mobility, functional mobility, increasing pt activity tolerance and ambulation distance  as able and appropriate.   Goals   Patient Goals to have less pain   STG Expiration Date 09/21/24   PT Treatment Day 4   Plan   Treatment/Interventions Functional transfer training;LE strengthening/ROM;Therapeutic exercise;Endurance training;Patient/family training;Equipment eval/education;Bed mobility;Gait training;Spoke to nursing   Progress Slow progress, decreased activity tolerance   PT Frequency Twice a day   Discharge Recommendation   Rehab Resource Intensity Level, PT II (Moderate Resource Intensity)    Equipment Recommended Walker   AM-PAC Basic Mobility Inpatient   Turning in Flat Bed Without Bedrails 3   Lying on Back to Sitting on Edge of Flat Bed Without Bedrails 2   Moving Bed to Chair 3   Standing Up From Chair Using Arms 3   Walk in Room 3   Climb 3-5 Stairs With Railing 1   Basic Mobility Inpatient Raw Score 15   Basic Mobility Standardized Score 36.97   [facility] Highest Level Of Mobility   -[facility] Goal 4: Move to chair/commode   -HL Achieved 7: Walk 25 feet or more   Education   Education Provided Mobility training;Assistive device   Patient Demonstrates acceptance/verbal understanding   End of Consult   Patient Position at End of Consult Bedside chair;Bed/Chair alarm activated;All needs within reach   The patient's AM-PAC Basic Mobility Inpatient Short Form Raw Score is 15. A Raw score of less than or equal to 16 suggests the patient may benefit from discharge to post-acute rehabilitation services. Please also refer to the recommendation of the Physical Therapist for safe discharge planning.    Mikal Hunt

## 2024-09-18 NOTE — PLAN OF CARE
Problem: PHYSICAL THERAPY ADULT  Goal: Performs mobility at highest level of function for planned discharge setting.  See evaluation for individualized goals.  Description: Treatment/Interventions: Functional transfer training, LE strengthening/ROM, Therapeutic exercise, Endurance training, Cognitive reorientation, Equipment eval/education, Patient/family training, Bed mobility, Gait training, Spoke to nursing, Spoke to case management, Family, OT  Equipment Recommended: Walker (short)       See flowsheet documentation for full assessment, interventions and recommendations.  9/18/2024 1418 by Mikal Hunt PTA  Outcome: Progressing  Note: Prognosis: Fair  Problem List: Decreased strength, Decreased range of motion, Decreased endurance, Impaired balance, Decreased mobility, Impaired judgement, Decreased safety awareness, Decreased coordination, Pain, Orthopedic restrictions  Assessment: pt began tx session seated OOB in the relciner as pt was agreeable to participate in PT intervention with 1 5/10 L hip pain rating, RN made aware. pt continues to remain consistant for requiring min ax1 for all functional transfers to and from RW and ambulation with RW. pt was able to increase activity tolerance and ambulation distance. pt ambulated 120'x1 RW and 15'x1 RW, w/o LOB but pt did require VC's for step length and RW management. pt participate in TE activities while seated in the recliner with AROM, no increases in pain in order to strnegthen LE's and increase pt activity tolerance. Post tx pt in the recliner with call bell, chair alarm activated and all pt needs met. Continue to recommend Dc w/ level 2 moderate rehab resource intensity when medically cleared.  Barriers to Discharge: Inaccessible home environment, Decreased caregiver support     Rehab Resource Intensity Level, PT: II (Moderate Resource Intensity)    See flowsheet documentation for full assessment.     9/18/2024 0947 by Mikal Hunt PTA  Outcome:  Progressing  Note: Prognosis: Fair  Problem List: Decreased strength, Decreased range of motion, Decreased endurance, Impaired balance, Decreased mobility, Impaired judgement, Decreased safety awareness, Pain, Orthopedic restrictions, Decreased coordination  Assessment: Pt seen for PT treatment 9/18/2024 with focus on: gait training, therapeutic activity, and therapeutic exercise. Pt presents supine in the bed and is agreeable to participate in session. During session, pt continues to require mod Ax1 for supine<>sit EOB transfer w/ LE and trunk management. Overall pt displays improvements in functional mobility and ambulation distance as pt ambulated 50'x1 RW 15'x1 RW w/ close /s, no LOB , however continues to perform below their baseline level of functional mobility due to fatigue, generalized weakness and 5/10 L hip pain. pt was able to participate in TE activities while seated EOB w/o LOB and AROM.  Pt continues to most appropriately benefit from Level II (moderate PT intensity) resources upon d/c from the acute care setting. Continue skilled PT POC as appropriate in order to progress towards therapy goals and maximize functional independence; PT goals remains appropriate. Next session, plan for intervention of continued functional transfers, bed mobility, functional mobility, increasing pt activity tolerance and ambulation distance  as able and appropriate.  Barriers to Discharge: Inaccessible home environment, Decreased caregiver support     Rehab Resource Intensity Level, PT: II (Moderate Resource Intensity)    See flowsheet documentation for full assessment.

## 2024-09-18 NOTE — PROGRESS NOTES
Progress Note - Orthopedics   Name: Erin Suazo 78 y.o. female I MRN: [id number]  Unit/Bed#: S -01 I Date of Admission: 9/16/2024   Date of Service: 9/18/2024 I Hospital Day: 1    Assessment & Plan  S/P total left hip arthroplasty  S/p left anterior total hip arthroplasty with Dr. Foster 9/16/2024 (POD 2).   Weight bearing as tolerated to the left lower extremity.   Physical therapy. Currently recommended for rehab.   Pain control.   Discussed importance of oral intake/eating with patient in detail today at bedside. Discussed with nursing.   Dvt ppx: lovenox while admitted, plan for aspirin x 5 weeks upon discharge.   Appreciate case management assistance.   Dispo: ortho will follow  Patient to follow up with Dr Foster upon discharge       Orthopedics service will follow. Please contact the SecureChat role for the Orthopedics service with any questions/concerns.    History of Present Illness   78 y.o.female seen and examined at bedside. Reports that her pain is much better controlled. Has been able to ambulate further with PT today. She is still not eating much, only broth and tea. States that she has poor appetite.  Otherwise no other complaints. Urinating without issue. No CP or SOB.     Objective      Temp:  [98.6 °F (37 °C)-99 °F (37.2 °C)] 99 °F (37.2 °C)  HR:  [] 102  Resp:  [16-18] 18  BP: (103-157)/() 121/59  O2 Device: None (Room air)          I/O         09/16 0701  09/17 0700 09/17 0701  09/18 0700 09/18 0701  09/19 0700    P.O.  300 240    I.V. (mL/kg) 1850 (26.9) 1958.8 (28.1)     Total Intake(mL/kg) 1850 (26.9) 2258.8 (32.4) 240 (3.4)    Urine (mL/kg/hr) 200 (0.1) 300 (0.2)     Blood 100      Total Output 300 300     Net +1550 +1958.8 +240           Unmeasured Urine Occurrence  2 x           Lines/Drains/Airways       Active Status       None                  Physical Exam Musculoskeletal: leftlower  Surgical dressings c/d/I  No significant christina incisional ttp.   Thigh and calf  "soft and compressible.   Motor intact to +FHL/EHL, +ankle dorsi/plantar flexion  Sensation intact to saphenous, sural, tibial, superficial peroneal nerve, and deep peroneal  2+ DP pulse  No calf swelling or tenderness to palpation      Lab Results: I have reviewed the following results: CBC/BMP:   .     09/18/24  0440   WBC 13.77*   HGB 9.8*   HCT 30.4*      SODIUM 132*   K 3.9   CL 98   CO2 26   BUN 14   CREATININE 0.79   GLUC 107      Recent Labs     09/17/24  0437 09/18/24  0440   WBC 10.33* 13.77*   HGB 10.5* 9.8*   HCT 32.9* 30.4*    162   BUN 14 14   CREATININE 0.68 0.79     Blood Culture:  No results found for: \"BLOODCX\"  Wound Culture: No results found for: \"WOUNDCULT\"  "

## 2024-09-18 NOTE — PLAN OF CARE
Problem: PHYSICAL THERAPY ADULT  Goal: Performs mobility at highest level of function for planned discharge setting.  See evaluation for individualized goals.  Description: Treatment/Interventions: Functional transfer training, LE strengthening/ROM, Therapeutic exercise, Endurance training, Cognitive reorientation, Equipment eval/education, Patient/family training, Bed mobility, Gait training, Spoke to nursing, Spoke to case management, Family, OT  Equipment Recommended: Walker (short)       See flowsheet documentation for full assessment, interventions and recommendations.  Outcome: Progressing  Note: Prognosis: Fair  Problem List: Decreased strength, Decreased range of motion, Decreased endurance, Impaired balance, Decreased mobility, Impaired judgement, Decreased safety awareness, Pain, Orthopedic restrictions, Decreased coordination  Assessment: Pt seen for PT treatment 9/18/2024 with focus on: gait training, therapeutic activity, and therapeutic exercise. Pt presents supine in the bed and is agreeable to participate in session. During session, pt continues to require mod Ax1 for supine<>sit EOB transfer w/ LE and trunk management. Overall pt displays improvements in functional mobility and ambulation distance as pt ambulated 50'x1 RW 15'x1 RW w/ close /s, no LOB , however continues to perform below their baseline level of functional mobility due to fatigue, generalized weakness and 5/10 L hip pain. pt was able to participate in TE activities while seated EOB w/o LOB and AROM.  Pt continues to most appropriately benefit from Level II (moderate PT intensity) resources upon d/c from the acute care setting. Continue skilled PT POC as appropriate in order to progress towards therapy goals and maximize functional independence; PT goals remains appropriate. Next session, plan for intervention of continued functional transfers, bed mobility, functional mobility, increasing pt activity tolerance and ambulation  distance  as able and appropriate.  Barriers to Discharge: Inaccessible home environment, Decreased caregiver support     Rehab Resource Intensity Level, PT: II (Moderate Resource Intensity)    See flowsheet documentation for full assessment.

## 2024-09-18 NOTE — PHYSICAL THERAPY NOTE
PHYSICAL THERAPY NOTE          Patient Name: Erin Suazo  Today's Date: 9/18/2024 09/18/24 1345   PT Last Visit   PT Visit Date 09/18/24   Note Type   Note Type BID visit/treatment   Pain Assessment   Pain Assessment Tool 0-10   Pain Score 5   Pain Location/Orientation Orientation: Left;Location: Hip   Pain Radiating Towards anterior aspect of L thigh   Effect of Pain on Daily Activities limites comfort, ambulation distance   Patient's Stated Pain Goal No pain   Hospital Pain Intervention(s) Repositioned;Ambulation/increased activity;Elevated;Rest   Multiple Pain Sites No   Pain Rating: FLACC (Rest) - Face 1   Pain Rating: FLACC (Rest) - Legs 1   Pain Rating: FLACC (Rest) - Activity 1   Pain Rating: FLACC (Rest) - Cry 1   Pain Rating: FLACC (Rest) - Consolability 1   Score: FLACC (Rest) 5   Restrictions/Precautions   Weight Bearing Precautions Per Order Yes   RLE Weight Bearing Per Order FWB   LLE Weight Bearing Per Order FWB   Braces or Orthoses   (anterior hip)   Other Precautions Cognitive;Chair Alarm;Bed Alarm;Fall Risk;Pain;Multiple lines  (IV pole)   General   Chart Reviewed Yes   Response to Previous Treatment Patient with no complaints from previous session.   Family/Caregiver Present Yes  (DIL)   Cognition   Overall Cognitive Status Unable to assess   Arousal/Participation Alert;Responsive;Cooperative   Attention Within functional limits   Orientation Level Oriented X4   Memory Decreased recall of precautions   Following Commands Follows one step commands with increased time or repetition   Comments pt was pleasant and cooperative throughout PT intervention   Subjective   Subjective pt stated 5/10 pain pre/post tx session and was agreeable to participate in PT intervention   Bed Mobility   Additional Comments pt seated OOB in the recliner pre/post tx session   Transfers   Sit to Stand 4  Minimal assistance    Additional items Assist x 1;Armrests;Increased time required;Verbal cues   Stand to Sit 4  Minimal assistance   Additional items Assist x 1;Armrests;Increased time required;Verbal cues   Stand pivot Unable to assess   Toilet transfer 4  Minimal assistance   Additional items Assist x 1;Increased time required;Verbal cues;Raised toilet seat   Additional Comments pt continues to remain consistant fro requiring min ax1 for all functional transfers to and from RW with VC's for hand placement in order to increase safety and balance w/ transfers   Ambulation/Elevation   Gait pattern Decreased foot clearance;Antalgic;Short stride;Step to;Excessively slow;Decreased hip extension;Decreased heel strike;Decreased toe off  (VC's for step length)   Gait Assistance 5  Supervision  (min ax1 for ambulation of 15'x1 RW but close /s for ambulation of 120'x1 RW)   Additional items Assist x 1;Verbal cues   Assistive Device Rolling walker   Distance 120'x1 RW 15'x1 RW   Stair Management Assistance Not tested   Balance   Static Sitting Good   Dynamic Sitting Fair +   Static Standing Fair -   Dynamic Standing Poor +   Ambulatory Fair -  (w/ RW)   Endurance Deficit   Endurance Deficit Yes   Endurance Deficit Description limited ambulation distance   Activity Tolerance   Activity Tolerance Patient limited by pain;Other (Comment)  (generalized weakness)   Nurse Made Aware Spoke to RN   Exercises   Quad Sets Sitting;10 reps;AROM;Bilateral  (limited quad contration)   Hip Abduction Sitting;15 reps;AROM;Bilateral   Hip Adduction Sitting;15 reps;AROM;Bilateral  (pillow squeezes s)   Knee AROM Long Arc Quad Sitting;10 reps;AROM;Bilateral   Ankle Pumps Sitting;20 reps;AROM;Bilateral  (long sit position)   Assessment   Prognosis Fair   Problem List Decreased strength;Decreased range of motion;Decreased endurance;Impaired balance;Decreased mobility;Impaired judgement;Decreased safety awareness;Decreased coordination;Pain;Orthopedic restrictions    Assessment pt began tx session seated OOB in the relciner as pt was agreeable to participate in PT intervention with 1 5/10 L hip pain rating, RN made aware. pt continues to remain consistant for requiring min ax1 for all functional transfers to and from RW and ambulation with RW. pt was able to increase activity tolerance and ambulation distance. pt ambulated 120'x1 RW and 15'x1 RW, w/o LOB but pt did require VC's for step length and RW management. pt participate in TE activities while seated in the recliner with AROM, no increases in pain in order to strnegthen LE's and increase pt activity tolerance. Post tx pt in the recliner with call bell, chair alarm activated and all pt needs met. Continue to recommend Dc w/ level 2 moderate rehab resource intensity when medically cleared.   Goals   Patient Goals none stated   STG Expiration Date 09/21/24   PT Treatment Day 5   Plan   Treatment/Interventions Functional transfer training;LE strengthening/ROM;Therapeutic exercise;Endurance training;Patient/family training;Equipment eval/education;Bed mobility;Gait training;Spoke to nursing   Progress Slow progress, decreased activity tolerance   PT Frequency Twice a day   Discharge Recommendation   Rehab Resource Intensity Level, PT II (Moderate Resource Intensity)   Equipment Recommended Walker   AM-PAC Basic Mobility Inpatient   Turning in Flat Bed Without Bedrails 3   Lying on Back to Sitting on Edge of Flat Bed Without Bedrails 2   Moving Bed to Chair 3   Standing Up From Chair Using Arms 3   Walk in Room 3   Climb 3-5 Stairs With Railing 1   Basic Mobility Inpatient Raw Score 15   Basic Mobility Standardized Score 36.97   [facility] Highest Level Of Mobility   -[facility] Goal 4: Move to chair/commode   -[facility] Achieved 7: Walk 25 feet or more   Education   Education Provided Mobility training;Assistive device   Patient Demonstrates acceptance/verbal understanding   End of Consult   Patient Position at End of Consult  Bedside chair;Bed/Chair alarm activated;All needs within reach   The patient's AM-PAC Basic Mobility Inpatient Short Form Raw Score is 15. A Raw score of less than or equal to 16 suggests the patient may benefit from discharge to post-acute rehabilitation services. Please also refer to the recommendation of the Physical Therapist for safe discharge planning.    Mikal Hunt

## 2024-09-18 NOTE — PLAN OF CARE
Problem: MUSCULOSKELETAL - ADULT  Goal: Maintain or return mobility to safest level of function  Description: INTERVENTIONS:  - Assess patient's ability to carry out ADLs; assess patient's baseline for ADL function and identify physical deficits which impact ability to perform ADLs (bathing, care of mouth/teeth, toileting, grooming, dressing, etc.)  - Assess/evaluate cause of self-care deficits   - Assess range of motion  - Assess patient's mobility  - Assess patient's need for assistive devices and provide as appropriate  - Encourage maximum independence but intervene and supervise when necessary  - Involve family in performance of ADLs  - Assess for home care needs following discharge   - Consider OT consult to assist with ADL evaluation and planning for discharge  - Provide patient education as appropriate  Outcome: Progressing  Goal: Maintain proper alignment of affected body part  Description: INTERVENTIONS:  - Support, maintain and protect limb and body alignment  - Provide patient/ family with appropriate education  Outcome: Progressing

## 2024-09-18 NOTE — ASSESSMENT & PLAN NOTE
S/p left anterior total hip arthroplasty with Dr. Foster 9/16/2024 (POD 2).   Weight bearing as tolerated to the left lower extremity.   Physical therapy. Currently recommended for rehab.   Pain control.   Discussed importance of oral intake/eating with patient in detail today at bedside. Discussed with nursing.   Dvt ppx: lovenox while admitted, plan for aspirin x 5 weeks upon discharge.   Appreciate case management assistance.   Dispo: ortho will follow  Patient to follow up with Dr Foster upon discharge

## 2024-09-19 LAB
ANION GAP SERPL CALCULATED.3IONS-SCNC: 7 MMOL/L (ref 4–13)
BUN SERPL-MCNC: 16 MG/DL (ref 5–25)
CALCIUM SERPL-MCNC: 8.3 MG/DL (ref 8.4–10.2)
CHLORIDE SERPL-SCNC: 101 MMOL/L (ref 96–108)
CO2 SERPL-SCNC: 27 MMOL/L (ref 21–32)
CREAT SERPL-MCNC: 0.76 MG/DL (ref 0.6–1.3)
ERYTHROCYTE [DISTWIDTH] IN BLOOD BY AUTOMATED COUNT: 13.6 % (ref 11.6–15.1)
GFR SERPL CREATININE-BSD FRML MDRD: 74 ML/MIN/1.73SQ M
GLUCOSE SERPL-MCNC: 120 MG/DL (ref 65–140)
HCT VFR BLD AUTO: 24 % (ref 34.8–46.1)
HGB BLD-MCNC: 7.8 G/DL (ref 11.5–15.4)
MCH RBC QN AUTO: 30.6 PG (ref 26.8–34.3)
MCHC RBC AUTO-ENTMCNC: 32.5 G/DL (ref 31.4–37.4)
MCV RBC AUTO: 94 FL (ref 82–98)
PLATELET # BLD AUTO: 153 THOUSANDS/UL (ref 149–390)
PMV BLD AUTO: 12.3 FL (ref 8.9–12.7)
POTASSIUM SERPL-SCNC: 3.6 MMOL/L (ref 3.5–5.3)
RBC # BLD AUTO: 2.55 MILLION/UL (ref 3.81–5.12)
SODIUM SERPL-SCNC: 135 MMOL/L (ref 135–147)
WBC # BLD AUTO: 11.24 THOUSAND/UL (ref 4.31–10.16)

## 2024-09-19 PROCEDURE — 80048 BASIC METABOLIC PNL TOTAL CA: CPT | Performed by: ORTHOPAEDIC SURGERY

## 2024-09-19 PROCEDURE — 85027 COMPLETE CBC AUTOMATED: CPT | Performed by: ORTHOPAEDIC SURGERY

## 2024-09-19 PROCEDURE — 97116 GAIT TRAINING THERAPY: CPT

## 2024-09-19 PROCEDURE — 97530 THERAPEUTIC ACTIVITIES: CPT

## 2024-09-19 PROCEDURE — 97535 SELF CARE MNGMENT TRAINING: CPT

## 2024-09-19 PROCEDURE — 99024 POSTOP FOLLOW-UP VISIT: CPT | Performed by: PHYSICIAN ASSISTANT

## 2024-09-19 PROCEDURE — 97110 THERAPEUTIC EXERCISES: CPT

## 2024-09-19 RX ORDER — POLYETHYLENE GLYCOL 3350 17 G/17G
17 POWDER, FOR SOLUTION ORAL DAILY PRN
Status: DISCONTINUED | OUTPATIENT
Start: 2024-09-19 | End: 2024-09-20 | Stop reason: HOSPADM

## 2024-09-19 RX ADMIN — HYDROCHLOROTHIAZIDE 25 MG: 25 TABLET ORAL at 09:02

## 2024-09-19 RX ADMIN — SODIUM CHLORIDE, SODIUM LACTATE, POTASSIUM CHLORIDE, AND CALCIUM CHLORIDE 75 ML/HR: .6; .31; .03; .02 INJECTION, SOLUTION INTRAVENOUS at 07:38

## 2024-09-19 RX ADMIN — ACETAMINOPHEN 975 MG: 325 TABLET ORAL at 06:27

## 2024-09-19 RX ADMIN — DOCUSATE SODIUM 100 MG: 100 CAPSULE, LIQUID FILLED ORAL at 17:43

## 2024-09-19 RX ADMIN — OXYCODONE HYDROCHLORIDE 10 MG: 5 TABLET ORAL at 20:27

## 2024-09-19 RX ADMIN — LOSARTAN POTASSIUM 100 MG: 50 TABLET, FILM COATED ORAL at 09:02

## 2024-09-19 RX ADMIN — ENOXAPARIN SODIUM 40 MG: 40 INJECTION SUBCUTANEOUS at 21:43

## 2024-09-19 RX ADMIN — DOCUSATE SODIUM 100 MG: 100 CAPSULE, LIQUID FILLED ORAL at 09:02

## 2024-09-19 RX ADMIN — OXYCODONE HYDROCHLORIDE 5 MG: 5 TABLET ORAL at 12:14

## 2024-09-19 RX ADMIN — POLYETHYLENE GLYCOL 3350 17 G: 17 POWDER, FOR SOLUTION ORAL at 21:42

## 2024-09-19 RX ADMIN — ACETAMINOPHEN 975 MG: 325 TABLET ORAL at 14:40

## 2024-09-19 NOTE — PLAN OF CARE
Problem: PHYSICAL THERAPY ADULT  Goal: Performs mobility at highest level of function for planned discharge setting.  See evaluation for individualized goals.  Description: Treatment/Interventions: Functional transfer training, LE strengthening/ROM, Therapeutic exercise, Endurance training, Cognitive reorientation, Equipment eval/education, Patient/family training, Bed mobility, Gait training, Spoke to nursing, Spoke to case management, Family, OT  Equipment Recommended: Walker (short)       See flowsheet documentation for full assessment, interventions and recommendations.  Outcome: Progressing  Note: Prognosis: Fair  Problem List: Decreased strength, Decreased range of motion, Decreased endurance, Impaired balance, Decreased mobility, Impaired judgement, Decreased safety awareness, Decreased coordination, Orthopedic restrictions, Pain  Assessment: pt began tx session seated in standard chair as pt was agreeable to participate in PT intervention. PT to focus on transfer training, posture/balance with gait, stair trials and balance activities. pt continues to remain consistant for requiring min ax1 for all functional transfers to and from RW. pt did demonstrate better recall on hand placement while performing functional transfers in order to increase safety and balance w/ transfers. pt remains consistant for ambulation distance and assistance required as ptr ambulated 120'x1 RW and required close /s as pt had no LOB. pt did require VC's for trying to increase step length. pt educated w/ verbal/visual demonstration on all safe stair trial strategies prior to inititing steps. pt required min Ax1 to complete 2 steps with bilateral hand rails. Additional was not possible due to fatigue and generalized weakness. pt completed TUG in 1:11:03 with RW and close /s, no LOB. Post tx pt in standard chair, call bell in arms reach and all pt needs met. pt would benefit from continued skilled PT intervention as pt is functioning  below her baseline level at this time. Continue to recommend DC w/ level 2 moderate rehab resource intensity when medically cleared  Barriers to Discharge: Inaccessible home environment, Decreased caregiver support     Rehab Resource Intensity Level, PT: II (Moderate Resource Intensity)    See flowsheet documentation for full assessment.

## 2024-09-19 NOTE — ASSESSMENT & PLAN NOTE
S/p left anterior total hip arthroplasty with Dr. Foster 9/16/2024 (POD 3).   Weight bearing as tolerated to the left lower extremity.   Physical therapy. Currently recommended for rehab.   Pain control.   Dvt ppx: lovenox while admitted, plan for aspirin x 5 weeks upon discharge.   Appreciate case management assistance.   Dispo: ortho will follow  Patient to follow up with Dr Foster upon discharge

## 2024-09-19 NOTE — CASE MANAGEMENT
Case Management Discharge Planning Note    Patient name Erin Suazo  Location S /S -01 MRN [id number]  : 1945 Date 2024       Current Admission Date: 2024  Current Admission Diagnosis:S/P total left hip arthroplasty   Patient Active Problem List    Diagnosis Date Noted Date Diagnosed    S/P total left hip arthroplasty 2024     Encounter for geriatric assessment 2024     Class 1 obesity due to excess calories without serious comorbidity with body mass index (BMI) of 31.0 to 31.9 in adult 2024     Sacroiliac joint dysfunction of left side 05/15/2024     Arthritis of left hip 05/15/2024     History of colon polyps 2024     Seasonal allergies 2023     Dysphagia 2022     Asthma in adult, mild intermittent, uncomplicated 2022     Sleep difficulties 2022     HLD (hyperlipidemia) 2022     S/P revision of total knee, left 2021     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     Chronic pain syndrome - Right 2019     Status post hysterectomy 2019     Carpal tunnel syndrome of right wrist      Cervical radiculitis 10/24/2018     Left shoulder pain 2018     Right shoulder pain 2018     Tendinopathy of left rotator cuff 2018     Tendinopathy of right rotator cuff 2018     Globus sensation 2018     Gastroesophageal reflux disease without esophagitis 2018     Lower back pain 2018     Primary osteoarthritis of right knee 2017     Arthritis of knee 2017     Pes anserine bursitis 2017     GERD (gastroesophageal reflux disease) 2016     Sleep deprivation 2016     Hypertension 2016     Left knee pain 2015     Gastroesophageal reflux disease with esophagitis 2012     Osteoporosis 2012     Vitamin D deficiency 2012     Insomnia 2012       LOS (days): 2  Geometric Mean LOS (GMLOS) (days):   Days to GMLOS:      OBJECTIVE:  Risk of Unplanned Readmission Score: 10.36         Current admission status: Inpatient   Preferred Pharmacy:   [id number] [place] - [address]  [address] [place] [address]  Phone: [phone] Fax: [phone]    Primary Care Provider: Cholo Field MD    Primary Insurance: MEDICARE  Secondary Insurance: AET[employer]    DISCHARGE DETAILS:    Discharge planning discussed with:: Patient, daughter  Freedom of Choice: Yes  Comments - Freedom of Choice: [facility]  CM contacted family/caregiver?: Yes (Daughter present at bedside)  Were Treatment Team discharge recommendations reviewed with patient/caregiver?: Yes  Did patient/caregiver verbalize understanding of patient care needs?: N/A- going to facility  Were patient/caregiver advised of the risks associated with not following Treatment Team discharge recommendations?: Yes    Contacts  Patient Contacts: Erin  Contact Method: In Person  Reason/Outcome: Continuity of Care, Referral, Discharge Planning    Requested Home Health Care         Is the patient interested in HHC at discharge?: No    DME Referral Provided  Referral made for DME?: No    Other Referral/Resources/Interventions Provided:  Interventions: Transportation, Short Term Rehab  Referral Comments: CM requested an 11am [facility] transport via Round trip for tomorrow, which was confirmed for 12:30pm. CM notified ortho AP, primary nurse, facility, pt and daughter of same.         Treatment Team Recommendation: Short Term Rehab  Discharge Destination Plan:: Short Term Rehab  Transport at Discharge : Wheelchair van  Dispatcher Contacted: Yes  Number/Name of Dispatcher: [facility] Trip [phone]  Transported by (Company and Unit #): [employer]  ETA of Transport (Date): 09/20/24  ETA of Transport (Time): 1230        Accompanied by: EMS personnel     IMM Given (Date):: 09/19/24  IMM Given to:: Patient  IMM reviewed with patient, patient agrees with discharge determination.      Accepting Facility Name, City & State : [facility]  Receiving Facility/Agency Phone Number: [phone] ext [phone]  Facility/Agency Fax Number: [phone]

## 2024-09-19 NOTE — PLAN OF CARE
Problem: OCCUPATIONAL THERAPY ADULT  Goal: Performs self-care activities at highest level of function for planned discharge setting.  See evaluation for individualized goals.  Description: Treatment Interventions: ADL retraining, Functional transfer training, Endurance training, Cognitive reorientation, Patient/family training, Equipment evaluation/education, Compensatory technique education, Energy conservation, Activityengagement          See flowsheet documentation for full assessment, interventions and recommendations.   Outcome: Progressing  Note: Limitation: Decreased ADL status, Decreased UE strength, Decreased Safe judgement during ADL, Decreased cognition, Decreased endurance, Decreased self-care trans, Decreased high-level ADLs (impaired pain, balance, fxnl mobility, act rachell, fxnl reach, standing rachell, strength, attention to task, direction following, safety awareness, insight, pacing, problem solving, learning new tasks, response time, flat affect)  Prognosis: Good  Assessment: Patient participated in Skilled OT session this date with interventions consisting of ADL re training with the use of correct body mechnaics, Energy Conservation techniques, safety awareness and fall prevention techniques,  therapeutic activities to: increase activity tolerance, increase dynamic sit/ stand balance during functional activity , and increase OOB/ sitting tolerance .Upon arrival patient was found seated OOB to Chair. Pt demonstrated the following tasks: S STS, MIN A toilet transfer, and CGA fnxl mobility with RW. Pt performed grooming and UBD with S, MIN A for LBD, and S for toileting hygiene. Patient continues to be functioning below baseline level, occupational performance remains limited secondary to factors listed above and increased risk for falls and injury.   From OT standpoint, recommendation at time of d/c would be STR.  Patient to benefit from continued Occupational Therapy treatment while in the hospital to  address deficits as defined above and maximize level of functional independence with ADLs and functional mobility. Pt was left after session with all current needs met. The patient's raw score on the AM-PAC Daily Activity Inpatient Short Form is 17. A raw score of less than 19 suggests the patient may benefit from discharge to post-acute rehabilitation services. Please refer to the recommendation of the Occupational Therapist for safe discharge planning.     Rehab Resource Intensity Level, OT: II (Moderate Resource Intensity)

## 2024-09-19 NOTE — PROGRESS NOTES
Progress Note - Orthopedics   Name: Erin Suazo 78 y.o. female I MRN: [id number]  Unit/Bed#: S -01 I Date of Admission: 9/16/2024   Date of Service: 9/19/2024 I Hospital Day: 2    Assessment & Plan  S/P total left hip arthroplasty  S/p left anterior total hip arthroplasty with Dr. Foster 9/16/2024 (POD 3).   Weight bearing as tolerated to the left lower extremity.   Physical therapy. Currently recommended for rehab.   Pain control.   Dvt ppx: lovenox while admitted, plan for aspirin x 5 weeks upon discharge.   Appreciate case management assistance.   Dispo: ortho will follow  Patient to follow up with Dr Foster upon discharge       Orthopedics service will follow. Please contact the SecureChat role for the Orthopedics service with any questions/concerns.    History of Present Illness   78 y.o.female seen and examined at bedside. Nausea has improved. She is tolerating oral intake. Pain is better controlled. She is ambulating well with physical therapy. Eager for discharge to rehab. Discussed with case management, is set to go to [facility] tomorrow.     Objective      Temp:  [98.3 °F (36.8 °C)-99.9 °F (37.7 °C)] 98.3 °F (36.8 °C)  HR:  [] 92  Resp:  [15-18] 17  BP: (101-125)/(56-57) 125/57  O2 Device: None (Room air)          I/O         09/17 0701  09/18 0700 09/18 0701  09/19 0700 09/19 0701  09/20 0700    P.O. 300 351     I.V. (mL/kg) 1958.8 (28.1) 992.5 (13.8)     Total Intake(mL/kg) 2258.8 (32.4) 1343.5 (18.6)     Urine (mL/kg/hr) 300 (0.2)      Blood       Total Output 300      Net +1958.8 +1343.5            Unmeasured Urine Occurrence 2 x 4 x           Lines/Drains/Airways       Active Status       None                  Physical Exam Musculoskeletal: leftlower  Surgical dressings c/d/I without strike through  Mild christina incisional ttp.   Mild swelling in the thigh  Thigh is soft and compressible.   Motor intact to +FHL/EHL, +ankle dorsi/plantar flexion  Sensation intact to saphenous,  "sural, tibial, superficial peroneal nerve, and deep peroneal  2+ DP pulse  No calf swelling or tenderness to palpation      Lab Results: I have reviewed the following results: CBC/BMP:   .     09/19/24  0522   WBC 11.24*   HGB 7.8*   HCT 24.0*      SODIUM 135   K 3.6      CO2 27   BUN 16   CREATININE 0.76   GLUC 120      Recent Labs     09/17/24  0437 09/18/24  0440 09/19/24  0522   WBC 10.33* 13.77* 11.24*   HGB 10.5* 9.8* 7.8*   HCT 32.9* 30.4* 24.0*    162 153   BUN 14 14 16   CREATININE 0.68 0.79 0.76     Blood Culture:  No results found for: \"BLOODCX\"  Wound Culture: No results found for: \"WOUNDCULT\"  "

## 2024-09-19 NOTE — PHYSICAL THERAPY NOTE
"PHYSICAL THERAPY TREATMENT NOTE  NAME:  Erin Suazo  DATE: 09/19/24    Length Of Stay: 2  Performed at least 2 patient identifiers during session: Name and Epic photo    TREATMENT:    09/19/24 1241   PT Last Visit   PT Visit Date 09/19/24   Note Type   Note Type Treatment   Pain Assessment   Pain Assessment Tool 0-10   Pain Score 4   Pain Location/Orientation Orientation: Left;Location: Hip  (more lateral than anterior)   Effect of Pain on Daily Activities limits speed and indep of mobility, limits pt indep performing therex against gravity   Patient's Stated Pain Goal No pain   Hospital Pain Intervention(s) Repositioned;Ambulation/increased activity;Cold applied;Emotional support   Restrictions/Precautions   Weight Bearing Precautions Per Order Yes   RLE Weight Bearing Per Order FWB   LLE Weight Bearing Per Order FWB   Other Precautions Bed Alarm;Chair Alarm;Fall Risk;Pain  (ant hip precautions per Dr Foster)   General   Chart Reviewed Yes   Response to Previous Treatment Patient with no complaints from previous session.   Family/Caregiver Present   (daughter in law (son left prior to session))   Cognition   Overall Cognitive Status WFL   Arousal/Participation Responsive;Cooperative  (However by end of session, patient more lethargic but responsive)   Attention Attends with cues to redirect   Memory Decreased recall of precautions;Decreased recall of recent events   Following Commands Follows one step commands with increased time or repetition   Subjective   Subjective Patient cooperative, agreeable to participate.  Denies lightheadedness.  Reports only ambulating with therapy staff today   Bed Mobility   Supine to Sit   (NT)   Sit to Supine 3  Moderate assistance  (After demonstration: Pt was able to perform 4\"step stool BACKWARD w/ BUE support of walker to ascend step - pt Performed 2 separate trials. However pt needed)   Additional items Assist x 1;HOB elevated;Bedrails;Increased time required;Verbal cues;LE " "management   Additional Comments Pt initially attempted getting into bed on (her) right side, but pt would have more LE displacement to get leg into bed. When entering bed on (her) left side, she needed physical A to get BOTH R and L LE into bed.  Patient requires facilitation and verbal instruction (technique/motor planning) +25% verbal assist for repositioning to the center and towards the head of the bed.  Patient reports reports she \" cannot lift\" her LLE.   Transfers   Sit to Stand 5  Supervision   Additional items Assist x 1;Increased time required;Verbal cues;Armrests  (Patient needs verbal instruction for hand placement less than 50% of time)   Stand to Sit 5  Supervision   Additional items Assist x 1;Increased time required;Verbal cues  (Patient needs verbal instruction for hand placement and completion of approach less than 50% of time)   Other (S)    (Pt unable to perform sit->stand from bed height w/ no rails present WHILE step stool in place (some fear and retreat))   Ambulation/Elevation   Gait pattern Excessively slow;Step through pattern;Inconsistent rafael;Decreased L stance;Antalgic   Gait Assistance 5  Supervision   Additional items Assist x 1;Verbal cues   Assistive Device Rolling walker   Distance 100'+20'   Stair Management Assistance Not tested  (As emphasis on gait training, bed mobility, transfer technique the session)   Balance   Static Sitting Good   Static Standing Fair -   Ambulatory Fair -  (With walker)   Endurance Deficit   Endurance Deficit Yes   Endurance Deficit Description Degradation of gait speed from start to end of session.  Patient also appears more lethargic by end of session with self-reported fatigue   Activity Tolerance   Activity Tolerance Patient limited by fatigue;Patient limited by pain   Medical Staff Made Aware Spoke to Suyapa from PT, case management   Nurse Made Aware Spoke to Alexa THOMPSON including regarding patient's reports of \"itchiness and rash\" at buttocks "   Exercises   Quad Sets Supine;10 reps;AROM;Left  (w/ R hip flexion and foot on bed)   Heelslides Supine;10 reps;AAROM   Glute Sets Supine;10 reps;AROM;Bilateral   Hip Abduction Supine;10 reps;AAROM;AROM;Left  (inconsistent arc ROM)   Hip Adduction Supine;10 reps;Left;AROM  (to neutral)   Ankle Pumps Supine;Bilateral;AROM  (Pt able to demonstrate self performance)   Assessment   Prognosis Fair   Problem List Decreased strength;Decreased range of motion;Decreased endurance;Impaired balance;Obesity;Decreased skin integrity;Pain;Orthopedic restrictions;Decreased mobility;Impaired judgement;Decreased safety awareness  (gait deviations, limited activity tolerance)   Assessment Compared to initial evaluation and even this morning's session, patient continues to make mobility progress.  She requires less consistent physical support for performing sit to stand transfers from a surface that likely establishes a level thigh.  She also has progressed to performing step to mobility using a rolling walker and needs very few verbal instructions for walker management with exception of end of session when she abandon the rolling walker close to the bed.  However, she does appear to need more verbal instruction at end of session especially for motor planning, and for bed mobility.  Skilled PT recommended to progress patient toward treatment goals.  Ideally as stated in the past, she would benefit from having a bed height more adequate for her short stature and that she can establish closer to a level thigh when seated at the edge of the bed with feet on the floor.  She may also benefit from use of a bed railing.   Barriers to Discharge Inaccessible home environment;Decreased caregiver support   Barriers to Discharge Comments Pt/family education performed again this session re: alternative options for bed surface height, potential use of bed rail, trialing step stool BACKWARDs to get into bed and possibly using opposite side of bed  "to exit (to keep stool near bed)   Goals   Patient Goals to go to rehab, then get home   STG Expiration Date 09/21/24   PT Treatment Day 7   Plan   Treatment/Interventions Functional transfer training;LE strengthening/ROM;Therapeutic exercise;Patient/family training;Bed mobility;Gait training;Equipment eval/education;Cognitive reorientation;Endurance training;Elevations;Spoke to nursing;Spoke to case management;Family  (JENN Rolandey)   Progress Slow progress, decreased activity tolerance   PT Frequency Twice a day   Discharge Recommendation   Rehab Resource Intensity Level, PT II (Moderate Resource Intensity)   Equipment Recommended Walker   Walker Package Recommended Wheeled walker   Change/add to Walker Package? Yes, Change Size   Walker Size Gabriel (Ht <5'1\")   AM-PAC Basic Mobility Inpatient   Turning in Flat Bed Without Bedrails 2   Lying on Back to Sitting on Edge of Flat Bed Without Bedrails 2   Moving Bed to Chair 3  (depends of bed height surface)   Standing Up From Chair Using Arms 3   Walk in Room 3   Climb 3-5 Stairs With Railing 2   Basic Mobility Inpatient Raw Score 15   Basic Mobility Standardized Score 36.97   [facility] Highest Level Of Mobility   -[facility] Goal 4: Move to chair/commode   -[facility] Achieved 7: Walk 25 feet or more   Education   Education Provided Mobility training;Home exercise program;Assistive device   Patient Reinforcement needed;Explanation/teachback used   End of Consult   Patient Position at End of Consult Supine;All needs within reach;Bed/Chair alarm activated     Nursing Recommendations:   Mobility Plan as of 09/19/24: Pt is 1 Assist with RW to/from recliner, bathroom, and hallway. Encourage OOB for all meals.     The patient's AM-PAC Basic Mobility Inpatient Short Form Raw Score is 15. A Raw score of less than or equal to 16 suggests the patient may benefit from discharge to post-acute rehabilitation services, which DOES coincide with CURRENT above PT recommendations.     However " please refer to therapist recommendation for discharge planning given other factors that may influence destination.     Adapted from Jay GARRIDO, Brenda J, Karina J, Macie J. [employer] [facility] “6-Clicks” Basic Mobility and Daily Activity Scores With Discharge Destination. Physical Therapy, 2021;101:1-9. DOI: [email or web address]    Chris Driscoll PT, DPT

## 2024-09-19 NOTE — OCCUPATIONAL THERAPY NOTE
Occupational Therapy Progress Note     Patient Name: Erin Suazo  Today's Date: 9/19/2024  Problem List  Principal Problem:    S/P total left hip arthroplasty          09/19/24 1033   OT Last Visit   OT Visit Date 09/19/24   Note Type   Note Type Treatment   Pain Assessment   Pain Assessment Tool 0-10   Pain Score 5   Pain Location/Orientation Orientation: Left;Location: Hip   Hospital Pain Intervention(s) Repositioned;Ambulation/increased activity   Restrictions/Precautions   Weight Bearing Precautions Per Order Yes   LLE Weight Bearing Per Order WBAT   Other Precautions Chair Alarm;Bed Alarm;Fall Risk;Pain  (s/p anterior STACY)   Lifestyle   Autonomy PTA pt living alone in apartment, pt (I) with ADLs and IADLs, (-) falls, (+)drives, no use of AD at baseline   Reciprocal Relationships supportive son- however pt reports limited support on d/c. Reports family will not be able to transport to OP appts   Service to Others retired   Intrinsic Gratification Enjoys visiting friends/family and cooking   ADL   Grooming Assistance 5  Supervision/Setup   Grooming Deficit Brushing hair;Teeth care  (+ washing hair with shampoo cap)   Grooming Comments brushes hair and teeth standing at sink   UB Dressing Assistance 5  Supervision/Setup   UB Dressing Deficit Thread LUE;Thread RUE   LB Dressing Assistance 4  Minimal Assistance   LB Dressing Deficit Don/doff R sock;Don/doff L sock;Use of adaptive equipment   LB Dressing Comments Provided pt with LHAE as she reports using a sock aid at baseline   Toileting Assistance  5  Supervision/Setup   Toileting Deficit Perineal hygiene   Bed Mobility   Supine to Sit Unable to assess   Sit to Supine Unable to assess   Additional Comments Pt seate OOB in chair upon arrival   Transfers   Sit to Stand 5  Supervision   Additional items Increased time required   Stand to Sit 5  Supervision   Additional items Increased time required   Toilet transfer 4  Minimal assistance   Additional items Assist  "x 1;Increased time required   Additional Comments transfers with RW   Functional Mobility   Functional Mobility 4  Minimal assistance  (CGA)   Additional items Rolling walker   Subjective   Subjective \"I keep myself busy\"   Cognition   Overall Cognitive Status WFL   Arousal/Participation Responsive;Cooperative   Attention Attends with cues to redirect   Orientation Level Oriented X4   Memory Decreased recall of precautions   Following Commands Follows one step commands with increased time or repetition   Comments Pt pleasant and cooperative t/o session. Occasionally requires increased time for processing   Activity Tolerance   Activity Tolerance Patient limited by fatigue   Medical Staff Made Aware RN clearance for session   Assessment   Assessment Patient participated in Skilled OT session this date with interventions consisting of ADL re training with the use of correct body mechnaics, Energy Conservation techniques, safety awareness and fall prevention techniques,  therapeutic activities to: increase activity tolerance, increase dynamic sit/ stand balance during functional activity , and increase OOB/ sitting tolerance .Upon arrival patient was found seated OOB to Chair. Pt demonstrated the following tasks: S STS, MIN A toilet transfer, and CGA fnxl mobility with RW. Pt performed grooming and UBD with S, MIN A for LBD, and S for toileting hygiene. Patient continues to be functioning below baseline level, occupational performance remains limited secondary to factors listed above and increased risk for falls and injury.   From OT standpoint, recommendation at time of d/c would be STR.  Patient to benefit from continued Occupational Therapy treatment while in the hospital to address deficits as defined above and maximize level of functional independence with ADLs and functional mobility. Pt was left after session with all current needs met. The patient's raw score on the AM-PAC Daily Activity Inpatient Short Form is " 17. A raw score of less than 19 suggests the patient may benefit from discharge to post-acute rehabilitation services. Please refer to the recommendation of the Occupational Therapist for safe discharge planning.   Plan   Treatment Interventions ADL retraining;Functional transfer training;Endurance training;Equipment evaluation/education;Patient/family training;Compensatory technique education;Continued evaluation;Energy conservation;Activityengagement   Goal Expiration Date 09/26/24   OT Treatment Day 3   OT Frequency 3-5x/wk   Discharge Recommendation   Rehab Resource Intensity Level, OT II (Moderate Resource Intensity)   AM-PAC Daily Activity Inpatient   Lower Body Dressing 2   Bathing 2   Toileting 3   Upper Body Dressing 3   Grooming 3   Eating 4   Daily Activity Raw Score 17   Daily Activity Standardized Score (Calc for Raw Score >=11) 37.26   AM-PAC Applied Cognition Inpatient   Following a Speech/Presentation 3   Understanding Ordinary Conversation 4   Taking Medications 3   Remembering Where Things Are Placed or Put Away 3   Remembering List of 4-5 Errands 2   Taking Care of Complicated Tasks 2   Applied Cognition Raw Score 17   Applied Cognition Standardized Score 36.52     Gemma Fine MS, OTR/L

## 2024-09-19 NOTE — PLAN OF CARE
Problem: MUSCULOSKELETAL - ADULT  Goal: Maintain or return mobility to safest level of function  Description: INTERVENTIONS:  - Assess patient's ability to carry out ADLs; assess patient's baseline for ADL function and identify physical deficits which impact ability to perform ADLs (bathing, care of mouth/teeth, toileting, grooming, dressing, etc.)  - Assess/evaluate cause of self-care deficits   - Assess range of motion  - Assess patient's mobility  - Assess patient's need for assistive devices and provide as appropriate  - Encourage maximum independence but intervene and supervise when necessary  - Involve family in performance of ADLs  - Assess for home care needs following discharge   - Consider OT consult to assist with ADL evaluation and planning for discharge  - Provide patient education as appropriate  Outcome: Progressing  Goal: Maintain proper alignment of affected body part  Description: INTERVENTIONS:  - Support, maintain and protect limb and body alignment  - Provide patient/ family with appropriate education  Outcome: Progressing     Problem: Potential for Falls  Goal: Patient will remain free of falls  Description: INTERVENTIONS:  - Educate patient/family on patient safety including physical limitations  - Instruct patient to call for assistance with activity   - Consult OT/PT to assist with strengthening/mobility   - Keep Call bell within reach  - Keep bed low and locked with side rails adjusted as appropriate  - Keep care items and personal belongings within reach  - Initiate and maintain comfort rounds  - Make Fall Risk Sign visible to staff  - Obtain necessary fall risk management equipment  - Apply yellow socks and bracelet for high fall risk patients  - Consider moving patient to room near nurses station  Outcome: Progressing     Problem: PAIN - ADULT  Goal: Verbalizes/displays adequate comfort level or baseline comfort level  Description: Interventions:  - Encourage patient to monitor pain  and request assistance  - Assess pain using appropriate pain scale  - Administer analgesics based on type and severity of pain and evaluate response  - Implement non-pharmacological measures as appropriate and evaluate response  - Consider cultural and social influences on pain and pain management  - Notify physician/advanced practitioner if interventions unsuccessful or patient reports new pain  Outcome: Progressing     Problem: INFECTION - ADULT  Goal: Absence or prevention of progression during hospitalization  Description: INTERVENTIONS:  - Assess and monitor for signs and symptoms of infection  - Monitor lab/diagnostic results  - Monitor all insertion sites, i.e. indwelling lines, tubes, and drains  - Monitor endotracheal if appropriate and nasal secretions for changes in amount and color  - [place] appropriate cooling/warming therapies per order  - Administer medications as ordered  - Instruct and encourage patient and family to use good hand hygiene technique  - Identify and instruct in appropriate isolation precautions for identified infection/condition  Outcome: Progressing  Goal: Absence of fever/infection during neutropenic period  Description: INTERVENTIONS:  - Monitor WBC    Outcome: Progressing     Problem: SAFETY ADULT  Goal: Patient will remain free of falls  Description: INTERVENTIONS:  - Educate patient/family on patient safety including physical limitations  - Instruct patient to call for assistance with activity   - Consult OT/PT to assist with strengthening/mobility   - Keep Call bell within reach  - Keep bed low and locked with side rails adjusted as appropriate  - Keep care items and personal belongings within reach  - Initiate and maintain comfort rounds  - Make Fall Risk Sign visible to staff  - Obtain necessary fall risk management equipment  - Apply yellow socks and bracelet for high fall risk patients  - Consider moving patient to room near nurses station  Outcome: Progressing  Goal:  Maintain or return to baseline ADL function  Description: INTERVENTIONS:  -  Assess patient's ability to carry out ADLs; assess patient's baseline for ADL function and identify physical deficits which impact ability to perform ADLs (bathing, care of mouth/teeth, toileting, grooming, dressing, etc.)  - Assess/evaluate cause of self-care deficits   - Assess range of motion  - Assess patient's mobility; develop plan if impaired  - Assess patient's need for assistive devices and provide as appropriate  - Encourage maximum independence but intervene and supervise when necessary  - Involve family in performance of ADLs  - Assess for home care needs following discharge   - Consider OT consult to assist with ADL evaluation and planning for discharge  - Provide patient education as appropriate  Outcome: Progressing  Goal: Maintains/Returns to pre admission functional level  Description: INTERVENTIONS:  - Perform AM-PAC 6 Click Basic Mobility/ Daily Activity assessment daily.  - Set and communicate daily mobility goal to care team and patient/family/caregiver.   - Collaborate with rehabilitation services on mobility goals if consulted  - Out of bed for toileting  - Record patient progress and toleration of activity level   Outcome: Progressing     Problem: DISCHARGE PLANNING  Goal: Discharge to home or other facility with appropriate resources  Description: INTERVENTIONS:  - Identify barriers to discharge w/patient and caregiver  - Arrange for needed discharge resources and transportation as appropriate  - Identify discharge learning needs (meds, wound care, etc.)  - Arrange for interpretive services to assist at discharge as needed  - Refer to Case Management Department for coordinating discharge planning if the patient needs post-hospital services based on physician/advanced practitioner order or complex needs related to functional status, cognitive ability, or social support system  Outcome: Progressing     Problem:  Knowledge Deficit  Goal: Patient/family/caregiver demonstrates understanding of disease process, treatment plan, medications, and discharge instructions  Description: Complete learning assessment and assess knowledge base.  Interventions:  - Provide teaching at level of understanding  - Provide teaching via preferred learning methods  Outcome: Progressing     Problem: METABOLIC, FLUID AND ELECTROLYTES - ADULT  Goal: Electrolytes maintained within normal limits  Description: INTERVENTIONS:  - Monitor labs and assess patient for signs and symptoms of electrolyte imbalances  - Administer electrolyte replacement as ordered  - Monitor response to electrolyte replacements, including repeat lab results as appropriate  - Instruct patient on fluid and nutrition as appropriate  Outcome: Progressing  Goal: Fluid balance maintained  Description: INTERVENTIONS:  - Monitor labs   - Monitor I/O and WT  - Instruct patient on fluid and nutrition as appropriate  - Assess for signs & symptoms of volume excess or deficit  Outcome: Progressing     Problem: Prexisting or High Potential for Compromised Skin Integrity  Goal: Skin integrity is maintained or improved  Description: INTERVENTIONS:  - Identify patients at risk for skin breakdown  - Assess and monitor skin integrity  - Assess and monitor nutrition and hydration status  - Monitor labs   - Assess for incontinence   - Turn and reposition patient  - Assist with mobility/ambulation  - Relieve pressure over bony prominences  - Avoid friction and shearing  - Provide appropriate hygiene as needed including keeping skin clean and dry  - Evaluate need for skin moisturizer/barrier cream  - Collaborate with interdisciplinary team   - Patient/family teaching  - Consider wound care consult   Outcome: Progressing

## 2024-09-19 NOTE — PHYSICAL THERAPY NOTE
PHYSICAL THERAPY NOTE          Patient Name: Erin Suazo  Today's Date: 9/19/2024 09/19/24 0853   PT Last Visit   PT Visit Date 09/19/24   Note Type   Note Type Treatment   Pain Assessment   Pain Assessment Tool 0-10   Pain Score 4   Pain Location/Orientation Orientation: Left;Location: Hip   Patient's Stated Pain Goal No pain   Hospital Pain Intervention(s) Repositioned;Ambulation/increased activity;Rest   Multiple Pain Sites No   Pain Rating: FLACC (Rest) - Face 0   Pain Rating: FLACC (Rest) - Legs 0   Pain Rating: FLACC (Rest) - Activity 0   Pain Rating: FLACC (Rest) - Cry 0   Pain Rating: FLACC (Rest) - Consolability 0   Score: FLACC (Rest) 0   Restrictions/Precautions   Weight Bearing Precautions Per Order Yes   RLE Weight Bearing Per Order FWB   LLE Weight Bearing Per Order FWB   Braces or Orthoses   (anterior hip)   Other Precautions Cognitive;Chair Alarm;Bed Alarm;Multiple lines;Fall Risk;Pain   General   Chart Reviewed Yes   Additional Pertinent History left anterior total hip arthroplasty   Response to Previous Treatment Patient with no complaints from previous session.   Family/Caregiver Present No   Cognition   Overall Cognitive Status WFL   Arousal/Participation Alert;Responsive;Cooperative   Attention Within functional limits   Orientation Level Oriented X4   Memory Decreased recall of precautions  (educated on prone position)   Following Commands Follows one step commands without difficulty   Comments pt was pleasant and cooperative throughout PT intervention   Subjective   Subjective pt was agreeable to participate in PT intervention. pt stated 4/10 pain pre/post tx session. pt stated no bowel movements as yet   Timed Up and Go   TUG Trial 1 (Seconds) (S)  1.11 Seconds  (pt completeed TUG w/ RW in 1:11:03)   Bed Mobility   Additional Comments pt seated in standard chair pre/post tx session   Transfers   Sit  to Stand 4  Minimal assistance   Additional items Assist x 1;Armrests;Increased time required;Verbal cues   Stand to Sit 4  Minimal assistance   Additional items Assist x 1;Armrests;Increased time required   Stand pivot Unable to assess   Toilet transfer Unable to assess   Additional Comments pt continues to require min ax1 for all functional transfers to and from RW. pt did demonstrate good recall on hand placement while performing functional transfers   Ambulation/Elevation   Gait pattern Decreased foot clearance;Step to;Short stride;Excessively slow;Decreased hip extension;Decreased toe off;Decreased heel strike;Antalgic  (VC's for stepping length)   Gait Assistance 5  Supervision   Additional items Assist x 1;Verbal cues   Assistive Device Rolling walker   Distance 120'x1 RW   Stair Management Assistance 4  Minimal assist   Additional items Assist x 1;Verbal cues;Increased time required   Stair Management Technique Two rails;Step to pattern;Foreward;Backward   Number of Stairs 2   Ambulation/Elevation Additional Comments pt required bilateral hand rails and min Ax1 to complete 2 steps in todays tx session   Balance   Static Sitting Good   Dynamic Sitting Fair +   Static Standing Fair -   Dynamic Standing Poor +   Ambulatory Fair -  (w/ RW)   Endurance Deficit   Endurance Deficit Yes   Endurance Deficit Description limited ambulation distance and steps completed w/o requiring physical assistance   Activity Tolerance   Activity Tolerance Patient limited by fatigue   Nurse Made Aware Spoke to RN   Assessment   Prognosis Fair   Problem List Decreased strength;Decreased range of motion;Decreased endurance;Impaired balance;Decreased mobility;Impaired judgement;Decreased safety awareness;Decreased coordination;Orthopedic restrictions;Pain   Assessment pt began tx session seated in standard chair as pt was agreeable to participate in PT intervention. PT to focus on transfer training, posture/balance with gait, stair  trials and balance activities. pt continues to remain consistant for requiring min ax1 for all functional transfers to and from RW. pt did demonstrate better recall on hand placement while performing functional transfers in order to increase safety and balance w/ transfers. pt remains consistant for ambulation distance and assistance required as ptr ambulated 120'x1 RW and required close /s as pt had no LOB. pt did require VC's for trying to increase step length. pt educated w/ verbal/visual demonstration on all safe stair trial strategies prior to inititing steps. pt required min Ax1 to complete 2 steps with bilateral hand rails. Additional was not possible due to fatigue and generalized weakness. pt completed TUG in 1:11:03 with RW and close /s, no LOB. Post tx pt in standard chair, call bell in arms reach and all pt needs met. pt would benefit from continued skilled PT intervention as pt is functioning below her baseline level at this time. Continue to recommend DC w/ level 2 moderate rehab resource intensity when medically cleared   Barriers to Discharge Inaccessible home environment;Decreased caregiver support   Goals   Patient Goals none stated   STG Expiration Date 09/21/24   PT Treatment Day 6   Plan   Treatment/Interventions Functional transfer training;LE strengthening/ROM;Elevations;Therapeutic exercise;Endurance training;Patient/family training;Equipment eval/education;Bed mobility;Gait training;Spoke to nursing   Progress Slow progress, decreased activity tolerance   PT Frequency Twice a day   Discharge Recommendation   Rehab Resource Intensity Level, PT II (Moderate Resource Intensity)   Equipment Recommended Walker   AM-PAC Basic Mobility Inpatient   Turning in Flat Bed Without Bedrails 3   Lying on Back to Sitting on Edge of Flat Bed Without Bedrails 2   Moving Bed to Chair 3   Standing Up From Chair Using Arms 3   Walk in Room 3   Climb 3-5 Stairs With Railing 3   Basic Mobility Inpatient Raw Score  17   Basic Mobility Standardized Score 39.67   [facility] Highest Level Of Mobility   -HL Goal 5: Stand one or more mins   -HL Achieved 7: Walk 25 feet or more   Education   Education Provided Mobility training;Assistive device;Other  (stair trials)   Patient Demonstrates acceptance/verbal understanding   End of Consult   Patient Position at End of Consult Other (comment)  (post tx pt in standard chair)   The patient's AM-PAC Basic Mobility Inpatient Short Form Raw Score is 17. A Raw score of greater than 16 suggests the patient may benefit from discharge to home. Please also refer to the recommendation of the Physical Therapist for safe discharge planning.    Pt continues to be functioning below her base line level and would benefit from continued skilled PT intervention as pt continues to require physical assistance for functional transfers and stair trials. Pt also remains limited with ambulation distance due to fatigue.   Mikal Hunt

## 2024-09-19 NOTE — PLAN OF CARE
Problem: PHYSICAL THERAPY ADULT  Goal: Performs mobility at highest level of function for planned discharge setting.  See evaluation for individualized goals.  Description: Treatment/Interventions: Functional transfer training, LE strengthening/ROM, Therapeutic exercise, Endurance training, Cognitive reorientation, Equipment eval/education, Patient/family training, Bed mobility, Gait training, Spoke to nursing, Spoke to case management, Family, OT  Equipment Recommended: Walker (short)       See flowsheet documentation for full assessment, interventions and recommendations.  Outcome: Progressing  Note: Prognosis: Fair  Problem List: Decreased strength, Decreased range of motion, Decreased endurance, Impaired balance, Obesity, Decreased skin integrity, Pain, Orthopedic restrictions, Decreased mobility, Impaired judgement, Decreased safety awareness (gait deviations, limited activity tolerance)  Assessment: Compared to initial evaluation and even this morning's session, patient continues to make mobility progress.  She requires less consistent physical support for performing sit to stand transfers from a surface that likely establishes a level thigh.  She also has progressed to performing step to mobility using a rolling walker and needs very few verbal instructions for walker management with exception of end of session when she abandon the rolling walker close to the bed.  However, she does appear to need more verbal instruction at end of session especially for motor planning, and for bed mobility.  Skilled PT recommended to progress patient toward treatment goals.  Ideally as stated in the past, she would benefit from having a bed height more adequate for her short stature and that she can establish closer to a level thigh when seated at the edge of the bed with feet on the floor.  She may also benefit from use of a bed railing.  Barriers to Discharge: Inaccessible home environment, Decreased caregiver  support  Barriers to Discharge Comments: Pt/family education performed again this session re: alternative options for bed surface height, potential use of bed rail, trialing step stool BACKWARDs to get into bed and possibly using opposite side of bed to exit (to keep stool near bed)  Rehab Resource Intensity Level, PT: II (Moderate Resource Intensity)    See flowsheet documentation for full assessment.

## 2024-09-20 ENCOUNTER — TELEPHONE (OUTPATIENT)
Age: 79
End: 2024-09-20

## 2024-09-20 VITALS
DIASTOLIC BLOOD PRESSURE: 65 MMHG | SYSTOLIC BLOOD PRESSURE: 136 MMHG | RESPIRATION RATE: 20 BRPM | HEART RATE: 92 BPM | TEMPERATURE: 98.3 F | BODY MASS INDEX: 31.93 KG/M2 | WEIGHT: 155.42 LBS | OXYGEN SATURATION: 99 %

## 2024-09-20 DIAGNOSIS — M53.3 SACROILIAC JOINT DYSFUNCTION OF LEFT SIDE: Primary | ICD-10-CM

## 2024-09-20 DIAGNOSIS — Z96.642 S/P TOTAL LEFT HIP ARTHROPLASTY: ICD-10-CM

## 2024-09-20 LAB
ANION GAP SERPL CALCULATED.3IONS-SCNC: 6 MMOL/L (ref 4–13)
BASOPHILS # BLD AUTO: 0.02 THOUSANDS/ΜL (ref 0–0.1)
BASOPHILS NFR BLD AUTO: 0 % (ref 0–1)
BUN SERPL-MCNC: 20 MG/DL (ref 5–25)
CALCIUM SERPL-MCNC: 8.3 MG/DL (ref 8.4–10.2)
CHLORIDE SERPL-SCNC: 100 MMOL/L (ref 96–108)
CO2 SERPL-SCNC: 29 MMOL/L (ref 21–32)
CREAT SERPL-MCNC: 0.81 MG/DL (ref 0.6–1.3)
EOSINOPHIL # BLD AUTO: 0.22 THOUSAND/ΜL (ref 0–0.61)
EOSINOPHIL NFR BLD AUTO: 3 % (ref 0–6)
ERYTHROCYTE [DISTWIDTH] IN BLOOD BY AUTOMATED COUNT: 13.9 % (ref 11.6–15.1)
GFR SERPL CREATININE-BSD FRML MDRD: 69 ML/MIN/1.73SQ M
GLUCOSE SERPL-MCNC: 98 MG/DL (ref 65–140)
HCT VFR BLD AUTO: 23.8 % (ref 34.8–46.1)
HGB BLD-MCNC: 7.7 G/DL (ref 11.5–15.4)
IMM GRANULOCYTES # BLD AUTO: 0.05 THOUSAND/UL (ref 0–0.2)
IMM GRANULOCYTES NFR BLD AUTO: 1 % (ref 0–2)
LYMPHOCYTES # BLD AUTO: 1.23 THOUSANDS/ΜL (ref 0.6–4.47)
LYMPHOCYTES NFR BLD AUTO: 15 % (ref 14–44)
MCH RBC QN AUTO: 30.9 PG (ref 26.8–34.3)
MCHC RBC AUTO-ENTMCNC: 32.4 G/DL (ref 31.4–37.4)
MCV RBC AUTO: 96 FL (ref 82–98)
MONOCYTES # BLD AUTO: 0.93 THOUSAND/ΜL (ref 0.17–1.22)
MONOCYTES NFR BLD AUTO: 11 % (ref 4–12)
NEUTROPHILS # BLD AUTO: 5.96 THOUSANDS/ΜL (ref 1.85–7.62)
NEUTS SEG NFR BLD AUTO: 70 % (ref 43–75)
NRBC BLD AUTO-RTO: 0 /100 WBCS
PLATELET # BLD AUTO: 185 THOUSANDS/UL (ref 149–390)
PMV BLD AUTO: 12.8 FL (ref 8.9–12.7)
POTASSIUM SERPL-SCNC: 3.6 MMOL/L (ref 3.5–5.3)
RBC # BLD AUTO: 2.49 MILLION/UL (ref 3.81–5.12)
SODIUM SERPL-SCNC: 135 MMOL/L (ref 135–147)
WBC # BLD AUTO: 8.41 THOUSAND/UL (ref 4.31–10.16)

## 2024-09-20 PROCEDURE — NC001 PR NO CHARGE: Performed by: PHYSICIAN ASSISTANT

## 2024-09-20 PROCEDURE — 99024 POSTOP FOLLOW-UP VISIT: CPT | Performed by: PHYSICIAN ASSISTANT

## 2024-09-20 PROCEDURE — 80048 BASIC METABOLIC PNL TOTAL CA: CPT | Performed by: ORTHOPAEDIC SURGERY

## 2024-09-20 PROCEDURE — 85025 COMPLETE CBC W/AUTO DIFF WBC: CPT | Performed by: ORTHOPAEDIC SURGERY

## 2024-09-20 RX ORDER — HYDROCODONE BITARTRATE AND ACETAMINOPHEN 5; 325 MG/1; MG/1
1 TABLET ORAL EVERY 6 HOURS PRN
Qty: 28 TABLET | Refills: 0 | Status: SHIPPED | OUTPATIENT
Start: 2024-09-20 | End: 2024-09-30

## 2024-09-20 RX ADMIN — ACETAMINOPHEN 975 MG: 325 TABLET ORAL at 06:42

## 2024-09-20 RX ADMIN — LOSARTAN POTASSIUM 100 MG: 50 TABLET, FILM COATED ORAL at 08:32

## 2024-09-20 RX ADMIN — ACETAMINOPHEN 975 MG: 325 TABLET ORAL at 00:01

## 2024-09-20 RX ADMIN — DOCUSATE SODIUM 100 MG: 100 CAPSULE, LIQUID FILLED ORAL at 08:32

## 2024-09-20 RX ADMIN — HYDROCHLOROTHIAZIDE 25 MG: 25 TABLET ORAL at 08:32

## 2024-09-20 NOTE — PROGRESS NOTES
Progress Note - Orthopedics   Name: Erin Suazo 78 y.o. female I MRN: [id number]  Unit/Bed#: S -01 I Date of Admission: 9/16/2024   Date of Service: 9/20/2024 I Hospital Day: 3    Assessment & Plan  S/P total left hip arthroplasty  S/p left anterior total hip arthroplasty with Dr. Foster 9/16/2024 (POD 4).   Weight bearing as tolerated to the left lower extremity.   Physical therapy. Currently recommended for rehab.   Pain control.   Dvt ppx: lovenox while admitted, plan for aspirin x 5 weeks upon discharge.   Appreciate case management assistance.   Dispo: stable for discharge to rehab today.   Patient to follow up with Dr Foster upon discharge       Ok for discharge from Orthopedics service perspective. Please contact the SecureChat role for the Orthopedics service with any questions/concerns.    History of Present Illness   78 y.o.female seen and examined at bedside. Eager for rehab. Pain currently 5/10. Tolerating PO well. She is urinating without issue. Passing gas. Has not yet had a bowel movement, but feels that this is about to happen.  No other new or different symptoms. Is ambulating with a walker.     Objective      Temp:  [98.3 °F (36.8 °C)-99 °F (37.2 °C)] 98.3 °F (36.8 °C)  HR:  [] 92  Resp:  [17-20] 20  BP: (115-136)/(54-65) 136/65  O2 Device: None (Room air)          I/O         09/18 0701 09/19 0700 09/19 0701 09/20 0700 09/20 0701  09/21 0700    P.O. 351 240     I.V. (mL/kg) 992.5 (13.8)      Total Intake(mL/kg) 1343.5 (18.6) 240 (3.4)     Urine (mL/kg/hr)       Total Output       Net +1343.5 +240            Unmeasured Urine Occurrence 4 x 2 x           Lines/Drains/Airways       Active Status       None                  Physical Exam Musculoskeletal: leftlower  Surgical dressings c/d/I without strike through  Thigh and calf soft and compressible.   Motor intact to +FHL/EHL, +ankle dorsi/plantar flexion  Sensation intact to saphenous, sural, tibial, superficial peroneal nerve,  "and deep peroneal  2+ DP pulse      Lab Results: I have reviewed the following results: CBC/BMP:   .     09/20/24  0700 09/20/24  0802   WBC  --  8.41   HGB  --  7.7*   HCT  --  23.8*   PLT  --  185   SODIUM 135  --    K 3.6  --      --    CO2 29  --    BUN 20  --    CREATININE 0.81  --    GLUC 98  --       Recent Labs     09/18/24  0440 09/19/24  0522 09/20/24  0700 09/20/24  0802   WBC 13.77* 11.24*  --  8.41   HGB 9.8* 7.8*  --  7.7*   HCT 30.4* 24.0*  --  23.8*    153  --  185   BUN 14 16 20  --    CREATININE 0.79 0.76 0.81  --      Blood Culture:  No results found for: \"BLOODCX\"  Wound Culture: No results found for: \"WOUNDCULT\"  "

## 2024-09-20 NOTE — PLAN OF CARE
Problem: MUSCULOSKELETAL - ADULT  Goal: Maintain or return mobility to safest level of function  Description: INTERVENTIONS:  - Assess patient's ability to carry out ADLs; assess patient's baseline for ADL function and identify physical deficits which impact ability to perform ADLs (bathing, care of mouth/teeth, toileting, grooming, dressing, etc.)  - Assess/evaluate cause of self-care deficits   - Assess range of motion  - Assess patient's mobility  - Assess patient's need for assistive devices and provide as appropriate  - Encourage maximum independence but intervene and supervise when necessary  - Involve family in performance of ADLs  - Assess for home care needs following discharge   - Consider OT consult to assist with ADL evaluation and planning for discharge  - Provide patient education as appropriate  Outcome: Progressing  Goal: Maintain proper alignment of affected body part  Description: INTERVENTIONS:  - Support, maintain and protect limb and body alignment  - Provide patient/ family with appropriate education  Outcome: Progressing     Problem: METABOLIC, FLUID AND ELECTROLYTES - ADULT  Goal: Electrolytes maintained within normal limits  Description: INTERVENTIONS:  - Monitor labs and assess patient for signs and symptoms of electrolyte imbalances  - Administer electrolyte replacement as ordered  - Monitor response to electrolyte replacements, including repeat lab results as appropriate  - Instruct patient on fluid and nutrition as appropriate  Outcome: Progressing  Goal: Fluid balance maintained  Description: INTERVENTIONS:  - Monitor labs   - Monitor I/O and WT  - Instruct patient on fluid and nutrition as appropriate  - Assess for signs & symptoms of volume excess or deficit  Outcome: Progressing     Problem: Potential for Falls  Goal: Patient will remain free of falls  Description: INTERVENTIONS:  - Educate patient/family on patient safety including physical limitations  - Instruct patient to call  for assistance with activity   - Consult OT/PT to assist with strengthening/mobility   - Keep Call bell within reach  - Keep bed low and locked with side rails adjusted as appropriate  - Keep care items and personal belongings within reach  - Initiate and maintain comfort rounds  - Make Fall Risk Sign visible to staff  - Offer Toileting every 2 Hours, in advance of need  - Initiate/Maintain bed/chair alarm  - Obtain necessary fall risk management equipment:   - Apply yellow socks and bracelet for high fall risk patients  - Consider moving patient to room near nurses station  Outcome: Progressing     Problem: PAIN - ADULT  Goal: Verbalizes/displays adequate comfort level or baseline comfort level  Description: Interventions:  - Encourage patient to monitor pain and request assistance  - Assess pain using appropriate pain scale  - Administer analgesics based on type and severity of pain and evaluate response  - Implement non-pharmacological measures as appropriate and evaluate response  - Consider cultural and social influences on pain and pain management  - Notify physician/advanced practitioner if interventions unsuccessful or patient reports new pain  Outcome: Progressing     Problem: INFECTION - ADULT  Goal: Absence or prevention of progression during hospitalization  Description: INTERVENTIONS:  - Assess and monitor for signs and symptoms of infection  - Monitor lab/diagnostic results  - Monitor all insertion sites, i.e. indwelling lines, tubes, and drains  - Monitor endotracheal if appropriate and nasal secretions for changes in amount and color  - [place] appropriate cooling/warming therapies per order  - Administer medications as ordered  - Instruct and encourage patient and family to use good hand hygiene technique  - Identify and instruct in appropriate isolation precautions for identified infection/condition  Outcome: Progressing  Goal: Absence of fever/infection during neutropenic period  Description:  INTERVENTIONS:  - Monitor WBC    Outcome: Progressing     Problem: SAFETY ADULT  Goal: Patient will remain free of falls  Description: INTERVENTIONS:  - Educate patient/family on patient safety including physical limitations  - Instruct patient to call for assistance with activity   - Consult OT/PT to assist with strengthening/mobility   - Keep Call bell within reach  - Keep bed low and locked with side rails adjusted as appropriate  - Keep care items and personal belongings within reach  - Initiate and maintain comfort rounds  - Make Fall Risk Sign visible to staff  - Offer Toileting every 2 Hours, in advance of need  - Initiate/Maintain bed/chair alarm  - Obtain necessary fall risk management equipment:   - Apply yellow socks and bracelet for high fall risk patients  - Consider moving patient to room near nurses station  Outcome: Progressing  Goal: Maintain or return to baseline ADL function  Description: INTERVENTIONS:  -  Assess patient's ability to carry out ADLs; assess patient's baseline for ADL function and identify physical deficits which impact ability to perform ADLs (bathing, care of mouth/teeth, toileting, grooming, dressing, etc.)  - Assess/evaluate cause of self-care deficits   - Assess range of motion  - Assess patient's mobility; develop plan if impaired  - Assess patient's need for assistive devices and provide as appropriate  - Encourage maximum independence but intervene and supervise when necessary  - Involve family in performance of ADLs  - Assess for home care needs following discharge   - Consider OT consult to assist with ADL evaluation and planning for discharge  - Provide patient education as appropriate  Outcome: Progressing  Goal: Maintains/Returns to pre admission functional level  Description: INTERVENTIONS:  - Perform AM-PAC 6 Click Basic Mobility/ Daily Activity assessment daily.  - Set and communicate daily mobility goal to care team and patient/family/caregiver.   - Collaborate  with rehabilitation services on mobility goals if consulted  - Perform Range of Motion 3 times a day.  - Reposition patient every 2 hours.  - Dangle patient 3 times a day  - Stand patient 3 times a day  - Ambulate patient 3 times a day  - Out of bed to chair 3 times a day   - Out of bed for meals 3 times a day  - Out of bed for toileting  - Record patient progress and toleration of activity level   Outcome: Progressing     Problem: DISCHARGE PLANNING  Goal: Discharge to home or other facility with appropriate resources  Description: INTERVENTIONS:  - Identify barriers to discharge w/patient and caregiver  - Arrange for needed discharge resources and transportation as appropriate  - Identify discharge learning needs (meds, wound care, etc.)  - Arrange for interpretive services to assist at discharge as needed  - Refer to Case Management Department for coordinating discharge planning if the patient needs post-hospital services based on physician/advanced practitioner order or complex needs related to functional status, cognitive ability, or social support system  Outcome: Progressing     Problem: Knowledge Deficit  Goal: Patient/family/caregiver demonstrates understanding of disease process, treatment plan, medications, and discharge instructions  Description: Complete learning assessment and assess knowledge base.  Interventions:  - Provide teaching at level of understanding  - Provide teaching via preferred learning methods  Outcome: Progressing     Problem: Prexisting or High Potential for Compromised Skin Integrity  Goal: Skin integrity is maintained or improved  Description: INTERVENTIONS:  - Identify patients at risk for skin breakdown  - Assess and monitor skin integrity  - Assess and monitor nutrition and hydration status  - Monitor labs   - Assess for incontinence   - Turn and reposition patient  - Assist with mobility/ambulation  - Relieve pressure over bony prominences  - Avoid friction and shearing  -  Provide appropriate hygiene as needed including keeping skin clean and dry  - Evaluate need for skin moisturizer/barrier cream  - Collaborate with interdisciplinary team   - Patient/family teaching  - Consider wound care consult   Outcome: Progressing

## 2024-09-20 NOTE — DISCHARGE SUMMARY
Discharge Summary - Orthopedics   Name: Erin Suazo 78 y.o. female I MRN: [id number]  Unit/Bed#: S -01 I Date of Admission: 9/16/2024   Date of Service: 9/20/2024 I Hospital Day: 3         Attending Physician: Cristian    Admitting diagnosis: Primary osteoarthritis of one hip, left [M16.12]    Discharge diagnosis: Primary osteoarthritis of one hip, left [M16.12]    Date of admission: 9/16/2024    Date of discharge: 09/20/24    Procedure: left anterior total hip arthroplasty    HPI: 78 y.o. female with a history of left hip degenerative joint disease who has been seen by Dr. Foster in clinic.  Pt has failed previous non operative therapies and was scheduled for left anterior total joint arthroplasty.  Prior to surgery the risks and benefits of surgery were explained and informed consent was obtained.    Hospital course: Pt was taken to the OR on 9/16/2024.  Surgery went without complications and pt was discharged to the PACU in a stable condition and was transferred to the floor.   On discharge date pt was cleared by PT and the medicine team and determined to be safe for discharge.  Daily discussion was had with the patient, nursing staff, orthopaedic team, and family members if present.  All questions were answered to the patients satisifaction.      0   Lab Value Date/Time    HGB 7.7 (L) 09/20/2024 0802    HGB 7.8 (L) 09/19/2024 0522    HGB 9.8 (L) 09/18/2024 0440    HGB 10.5 (L) 09/17/2024 0437    HGB 13.0 08/19/2024 1104    HGB 12.5 12/30/2023 1312    HGB 12.7 07/13/2023 1010    HGB 11.6 09/18/2022 0437    HGB 13.2 09/17/2022 1903    HGB 12.0 09/15/2022 0442    HGB 12.6 09/14/2022 1555    HGB 12.7 07/26/2022 0920    HGB 13.2 11/14/2019 1101    HGB 13.8 11/08/2019 0903    HGB 13.3 05/20/2019 0854    HGB 14.2 02/13/2019 1427    HGB 13.5 11/13/2018 0848    HGB 14.4 10/27/2017 0911    HGB 14.0 09/13/2016 0801    HGB 14.3 05/03/2016 1932    HGB 14.2 09/18/2015 0803    HGB 13.7 08/11/2014 0902    HGB 13.1  08/30/2013 0714       Greater than 2 gram decrease in Hb qualifies for diagnosis of acute blood loss anemia. Vital signs remained stable and pt was resuscitated with IVF as needed.     Discharge Instructions:   Weight bearing as tolerated.   Keep dressings clean and dry at all times   Complete DVT prophylaxis as prescribed   Physical therapy  Body mass index is 31.93 kg/m². mildly obese. Recommend behavior modifications and nutrition.  Follow-up as scheduled, otherwise call for appt.     Discharge Medications:  For the complete list of discharge medications, please refer to the patient's medication reconciliation.

## 2024-09-20 NOTE — ASSESSMENT & PLAN NOTE
S/p left anterior total hip arthroplasty with Dr. Foster 9/16/2024 (POD 4).   Weight bearing as tolerated to the left lower extremity.   Physical therapy. Currently recommended for rehab.   Pain control.   Dvt ppx: lovenox while admitted, plan for aspirin x 5 weeks upon discharge.   Appreciate case management assistance.   Dispo: stable for discharge to rehab today.   Patient to follow up with Dr Foster upon discharge

## 2024-09-20 NOTE — TELEPHONE ENCOUNTER
Caller: Zay     Doctor: Cristian     Reason for call: patient is allergic to aspirin,  can a script for Lovenox be sent?    Please send to [phone] [facility] in [place]    Call back#: [phone]

## 2024-09-23 ENCOUNTER — PATIENT OUTREACH (OUTPATIENT)
Dept: CASE MANAGEMENT | Facility: OTHER | Age: 79
End: 2024-09-23

## 2024-09-23 DIAGNOSIS — Z96.642 S/P TOTAL LEFT HIP ARTHROPLASTY: Primary | ICD-10-CM

## 2024-09-23 NOTE — PROGRESS NOTES
Outpatient care management referral via HRR report 9/23/24. Discharged to [facility] 9/20/24. Email sent to facility to inform them I will be following the patient during their skilled stay.  This Admin Coordinator will continue to monitor via chart review.

## 2024-09-23 NOTE — TELEPHONE ENCOUNTER
Per after hours secure chat from provider on Friday- Eliquis 2.5mg BID.     I spoke to Digna at [facility]- they started Lovenox on Saturday from their provider as they did not hear back.     They need Eliquis order in [facility] faxed to:   [phone]

## 2024-09-25 ENCOUNTER — PATIENT OUTREACH (OUTPATIENT)
Dept: OBGYN CLINIC | Facility: HOSPITAL | Age: 79
End: 2024-09-25

## 2024-09-25 NOTE — PROGRESS NOTES
SWCM reviewed pt chart and pt was DC to [facility] on 09/20/2024. SWCM contacted pt today to follow up postoperatively. Pt reports she is receiving PT and OT at the facility. Pt reports that she is recovering well. Pt reports no other needs or concerns at this time. SWCM will close referral and remain available as needed.

## 2024-10-01 ENCOUNTER — PATIENT OUTREACH (OUTPATIENT)
Dept: CASE MANAGEMENT | Facility: OTHER | Age: 79
End: 2024-10-01

## 2024-10-03 PROBLEM — Z01.89 ENCOUNTER FOR GERIATRIC ASSESSMENT: Status: RESOLVED | Noted: 2024-09-03 | Resolved: 2024-10-03

## 2024-10-04 ENCOUNTER — TELEPHONE (OUTPATIENT)
Dept: OBGYN CLINIC | Facility: HOSPITAL | Age: 79
End: 2024-10-04

## 2024-10-04 NOTE — TELEPHONE ENCOUNTER
Caller: Deborah- [facility]    Doctor: Cristian    Reason for call: Deborah wants to know if Dr. Foster can sign the home care orders.    Call back#: [phone]

## 2024-10-07 ENCOUNTER — TELEPHONE (OUTPATIENT)
Age: 79
End: 2024-10-07

## 2024-10-07 DIAGNOSIS — Z96.642 S/P TOTAL LEFT HIP ARTHROPLASTY: Primary | ICD-10-CM

## 2024-10-07 NOTE — TELEPHONE ENCOUNTER
Caller: Deborah-[facility]    Doctor: Cristian    Reason for call: Deborah would like to know if Dr Foster can sign a home health care order for patients service at home.    Call back#:[phone]

## 2024-10-08 ENCOUNTER — TRANSITIONAL CARE MANAGEMENT (OUTPATIENT)
Dept: FAMILY MEDICINE CLINIC | Facility: CLINIC | Age: 79
End: 2024-10-08

## 2024-10-08 ENCOUNTER — OFFICE VISIT (OUTPATIENT)
Dept: FAMILY MEDICINE CLINIC | Facility: CLINIC | Age: 79
End: 2024-10-08
Payer: MEDICARE

## 2024-10-08 ENCOUNTER — PATIENT OUTREACH (OUTPATIENT)
Dept: CASE MANAGEMENT | Facility: OTHER | Age: 79
End: 2024-10-08

## 2024-10-08 VITALS
SYSTOLIC BLOOD PRESSURE: 124 MMHG | DIASTOLIC BLOOD PRESSURE: 82 MMHG | OXYGEN SATURATION: 98 % | TEMPERATURE: 97.6 F | WEIGHT: 139.6 LBS | HEIGHT: 59 IN | HEART RATE: 90 BPM | BODY MASS INDEX: 28.14 KG/M2

## 2024-10-08 DIAGNOSIS — I10 ESSENTIAL HYPERTENSION: ICD-10-CM

## 2024-10-08 DIAGNOSIS — D50.8 OTHER IRON DEFICIENCY ANEMIA: ICD-10-CM

## 2024-10-08 DIAGNOSIS — Z96.642 S/P TOTAL LEFT HIP ARTHROPLASTY: Primary | ICD-10-CM

## 2024-10-08 PROCEDURE — 99495 TRANSJ CARE MGMT MOD F2F 14D: CPT | Performed by: FAMILY MEDICINE

## 2024-10-08 RX ORDER — LOSARTAN POTASSIUM AND HYDROCHLOROTHIAZIDE 25; 100 MG/1; MG/1
1 TABLET ORAL DAILY
Qty: 100 TABLET | Refills: 1 | Status: SHIPPED | OUTPATIENT
Start: 2024-10-08

## 2024-10-08 NOTE — ASSESSMENT & PLAN NOTE
Patient's status post total left hip replacement, chronic receiving physical therapy, she has a disability parking placard, using walker for ambulation.  Pain is well-controlled  Appointment with orthopedic next at 10/16/2024.

## 2024-10-08 NOTE — PROGRESS NOTES
Chart review completed.  Email sent to facility requesting update on patient.   This Admin Coordinator will continue to monitor via chart review throughout episode.

## 2024-10-08 NOTE — PROGRESS NOTES
Transition of Care Visit  Name: Erin Suazo      : 1945      MRN: [id number]  Encounter Provider: Cholo Field MD  Encounter Date: 10/8/2024   [facility]    Assessment & Plan  S/P total left hip arthroplasty  Patient's status post total left hip replacement, chronic receiving physical therapy, she has a disability parking placard, using walker for ambulation.  Pain is well-controlled  Appointment with orthopedic next at 10/16/2024.  Essential hypertension  Refilled med. Bp at goal  Orders:    losartan-hydrochlorothiazide (HYZAAR) 100-25 MG per tablet; Take 1 tablet by mouth daily    Other iron deficiency anemia  Continue adequate oral iron intake via diet  Green leafy vegetable, some read meat/fish  Recheck in 3 month  Orders:    CBC and differential; Future         History of Present Illness     Transitional Care Management Review:   Erin Suazo is a 79 y.o. female here for TCM follow up.     During the TCM phone call patient stated:  TCM Call       Date and time call was made  10/8/2024  3:05 PM    Hospital care reviewed  Records reviewed    Patient was hospitialized at  Other (comment)    Comment  [facility] rehab    Date of Admission  24    Date of discharge  10/08/24    Diagnosis  S/P total left hip arthroplasty    Disposition  Home    Were the patients medications reviewed and updated  Yes    Current Symptoms  None          TCM Call       Post hospital issues  None    Should patient be enrolled in anticoag monitoring?  No    Scheduled for follow up?  Yes    Patients specialists  Other (comment)    Other specialists names  Orthopedics    Did you obtain your prescribed medications  Yes    Do you need help managing your prescriptions or medications  No    Is transportation to your appointment needed  No    I have advised the patient to call PCP with any new or worsening symptoms  Katy Gao MA    Living Arrangements  Children    Are you  "recieving any outpatient services  No    Are you recieving home care services  Yes    Types of home care services  Home PT    Are you using any community resources  No    Current waiver services  No    Have you fallen in the last 12 months  No    Interperter language line needed  No    Counseling  Patient    Counseling topics  Importance of RX compliance          BONNIE Khan is a 79-year-old female patient presents for transitional care visit after her hospitalization at [facility] between 9/16/2024 until 9/20/2024 at [facility].  Patient recently was discharged from rehab yesterday, 10/7/2024.  Patient underwent planned left anterior total hip replacement by .   Patient reports her pain is well controlled, is starting home PT. No on any opioid for pain management. Will be taking Eliquis until 10/22/24.  Patient's most recent blood work from 9/25/2024 reviewed including CBC and BMP.     Pt was not able to take centrum multivitamin, stopped medication as this was causing some lightheadedness.       Review of Systems   Constitutional:  Negative for appetite change, chills and fever.   HENT:  Negative for congestion, rhinorrhea and sore throat.    Respiratory:  Negative for chest tightness and shortness of breath.    Cardiovascular:  Negative for chest pain.   Gastrointestinal:  Positive for diarrhea (from iron pill). Negative for abdominal pain, blood in stool, constipation, nausea and vomiting.   Musculoskeletal:  Positive for arthralgias and gait problem.   Neurological:  Negative for dizziness, light-headedness and headaches.       /82 (BP Location: Right arm, Patient Position: Sitting, Cuff Size: Standard)   Pulse 90   Temp 97.6 °F (36.4 °C) (Temporal)   Ht 4' 10.5\" (1.486 m)   Wt 63.3 kg (139 lb 9.6 oz)   SpO2 98%   BMI 28.68 kg/m²     Physical Exam  Vitals reviewed.   Constitutional:       General: She is not in acute distress.     Appearance: Normal appearance. " "She is not ill-appearing, toxic-appearing or diaphoretic.   Cardiovascular:      Rate and Rhythm: Normal rate.      Pulses: Normal pulses.   Pulmonary:      Effort: Pulmonary effort is normal.   Abdominal:      General: Abdomen is flat.   Musculoskeletal:      Comments: Using a walker  Clean dressing on the left hip   Skin:     General: Skin is warm and dry.      Capillary Refill: Capillary refill takes less than 2 seconds.      Coloration: Skin is not jaundiced.   Neurological:      General: No focal deficit present.      Mental Status: She is alert and oriented to person, place, and time.   Psychiatric:         Mood and Affect: Mood normal.         Medications have been reviewed by provider in current encounter      Cholo Field M.D.  Family Medicine    Please excuse any \"sound-alike\" errors that may have ocurred during the process of dictation. Parts of this note have been dictated and there may be errors present in the transcription process. Thank you.    "

## 2024-10-09 ENCOUNTER — PATIENT OUTREACH (OUTPATIENT)
Dept: FAMILY MEDICINE CLINIC | Facility: CLINIC | Age: 79
End: 2024-10-09

## 2024-10-09 DIAGNOSIS — Z71.89 COORDINATION OF COMPLEX CARE: Primary | ICD-10-CM

## 2024-10-09 DIAGNOSIS — Z91.89 HIGH RISK FOR READMISSION: ICD-10-CM

## 2024-10-09 NOTE — PROGRESS NOTES
Email received indicating the patient discharged 10/7/24 to Home w/[facility]. I have removed Malena off of the care team, sent the care  an inbasket notifying them of the HRR Referal.  Ambulatory referral placed for complex care management.

## 2024-10-10 ENCOUNTER — PATIENT OUTREACH (OUTPATIENT)
Dept: CASE MANAGEMENT | Facility: OTHER | Age: 79
End: 2024-10-10

## 2024-10-10 NOTE — PROGRESS NOTES
Outpatient Care Management Note:    HRR hand off received. Patient was hospitalized from 9/16-9/20/24 with left total hip. She was discharged to [facility] on 9/20 and discharged to home on 10/7.  Per epic notes, she is to have [facility].  She followed up with her PCP on 10/8 and is scheduled to see orthopedics on 10/16.      Voice mail message left for Erin, with my contact information, introducing myself and requesting a call back.     Call received from Erin. She states that she is staying with her son and his wife. They are assisting her as needed.  Her son is transporting her to all appts.     Erin states that she was seen by [facility] nursing and physical/occupational therapy are scheduled for today.  She is using her walker and denies any issues getting around.     Med review completed. Erin states she is not taking eliquis. She is on lovenox daily unitl 10/22. She is also not taking her multivitamin or ambien. CM will forward to orthopod.     Erin denies any questions or needs. CM will close the referral.

## 2024-10-16 ENCOUNTER — OFFICE VISIT (OUTPATIENT)
Dept: OBGYN CLINIC | Facility: CLINIC | Age: 79
End: 2024-10-16

## 2024-10-16 ENCOUNTER — TELEPHONE (OUTPATIENT)
Dept: OBGYN CLINIC | Facility: CLINIC | Age: 79
End: 2024-10-16

## 2024-10-16 ENCOUNTER — HOSPITAL ENCOUNTER (OUTPATIENT)
Dept: RADIOLOGY | Facility: HOSPITAL | Age: 79
Discharge: HOME | End: 2024-10-16
Attending: ORTHOPAEDIC SURGERY
Payer: MEDICARE

## 2024-10-16 DIAGNOSIS — Z96.642 S/P TOTAL LEFT HIP ARTHROPLASTY: ICD-10-CM

## 2024-10-16 DIAGNOSIS — Z96.642 S/P TOTAL LEFT HIP ARTHROPLASTY: Primary | ICD-10-CM

## 2024-10-16 PROCEDURE — 73502 X-RAY EXAM HIP UNI 2-3 VIEWS: CPT

## 2024-10-16 PROCEDURE — 99024 POSTOP FOLLOW-UP VISIT: CPT | Performed by: ORTHOPAEDIC SURGERY

## 2024-10-16 NOTE — PROGRESS NOTES
Assessment:  1. S/P total left hip arthroplasty  XR hip/pelv 2-3 vws left if performed          Plan:    Patient is 4 weeks status post left anterior total hip arthroplasty performed on 9/16/2024.  Weightbearing as tolerated   XR left hip was reviewed in the office today   Continue with Lovenox for DVT prophylaxis until 10/22  She is to work on hip flexor stretching exercises   Will repeat XR left hip at the next office visit   Follow up in 7 weeks.         The above stated was discussed in layman's terms and the patient expressed understanding.  All questions were answered to the patient's satisfaction.         Subjective:   Erin Suazo is a 79 y.o. female who presents today 4 weeks status post left anterior total hip arthroplasty performed on 9/16/2024. She is doing well. She has been weightbearing as tolerated with use of a walker. She home now from the rehab center. She is on  Lovenox for DVT prophylaxis until 10/22. She is doing outpatient physical therapy. She notes the numbness is getting better over the lateral thigh. She is taking Tylenol for pain relief.       Review of systems negative unless otherwise specified in HPI    Past Medical History:   Diagnosis Date    Arthritis     Carpal tunnel syndrome     GERD (gastroesophageal reflux disease)     Hiatal hernia     Hypertension     Insomnia     Pneumonia     PONV (postoperative nausea and vomiting)     Renal calculi     Sleep deprivation     chronically    Vitamin D deficiency     Vitamin D toxicity 2011    with leaky gut syndrome       Past Surgical History:   Procedure Laterality Date    BUNIONECTOMY Bilateral     CARPAL TUNNEL RELEASE Right 2015    COLONOSCOPY      CYSTOSCOPY N/A 6/6/2019    Procedure: CYSTOSCOPY;  Surgeon: Brodie Romero MD;  [facility];  Service: Gynecology Oncology    ESOPHAGOGASTRODUODENOSCOPY N/A 5/5/2016    Procedure: ESOPHAGOGASTRODUODENOSCOPY (EGD);  Surgeon: Enmanuel Balbuena MD;  Location: [facility];  Service:      FRACTURE SURGERY      L arm     NERVE BLOCK       R Knee    OOPHORECTOMY Left     WY ARTHRP ACETBLR/PROX FEM PROSTC AGRFT/ALGRFT Left 2024    Procedure: ARTHROPLASTY HIP TOTAL ANTERIOR;  Surgeon: Nellie Foster MD;  Location: [facility];  Service: Orthopedics    WY COLONOSCOPY FLX DX W/COLLJ SPEC WHEN PFRMD N/A 2016    Procedure: COLONOSCOPY;  Surgeon: Enmanuel Balbuena MD;  [facility]: [facility];  Service: Gastroenterology    WY ESOPHAGOGASTRODUODENOSCOPY TRANSORAL DIAGNOSTIC N/A 10/19/2018    Procedure: ESOPHAGOGASTRODUODENOSCOPY (EGD);  Surgeon: Enmanuel Balbuena MD;  Location: [facility];  Service: Gastroenterology    WY LAPS TOTAL HYSTERECT 250 GM/< W/RMVL TUBE/OVARY N/A 2019    Procedure: ROBOTIC TOTAL LAPAROSCOPIC HYSTERECTOMY, RIGHT SALPINGO-OOPHORECTOMY, EXTENSIVE ADHESIOLYSIS, APPROXIMATELY 45 MIN;  Surgeon: Brodie Romero MD;  Location: [facility];  Service: Gynecology Oncology    ROTATOR CUFF REPAIR Bilateral     TOE SURGERY Left     left 5th toe shaved down due to repeated fx    TOTAL KNEE ARTHROPLASTY Left        Family History   Problem Relation Age of Onset    Heart failure Mother     Other Mother         respiratory failure    Heart attack Mother     Heart failure Father         respiratory failure    Heart attack Father     Breast cancer Sister     No Known Problems Sister     Kidney failure Brother     Alcohol abuse Brother     No Known Problems Brother     Diabetes Brother     Hypertension Daughter     Arrhythmia Neg Hx     Clotting disorder Neg Hx     Fainting Neg Hx     Anuerysm Neg Hx     Stroke Neg Hx     Hyperlipidemia Neg Hx     Asthma Neg Hx        Social History     Occupational History    Not on file   Tobacco Use    Smoking status: Former     Current packs/day: 0.00     Average packs/day: 1 pack/day for 13.0 years (13.0 ttl pk-yrs)     Types: Cigarettes     Start date:      Quit date:      Years since quittin.8    Smokeless tobacco: Never    Tobacco comments:      Quit > 30 years ago    Vaping Use    Vaping status: Never Used   Substance and Sexual Activity    Alcohol use: No    Drug use: No    Sexual activity: Not on file     Comment: no partner         Current Outpatient Medications:     acetaminophen (TYLENOL) 650 mg CR tablet, Take 1 tablet (650 mg total) by mouth every 8 (eight) hours as needed for mild pain, Disp: 30 tablet, Rfl: 0    apixaban (Eliquis) 2.5 mg, Take 1 tablet (2.5 mg total) by mouth 2 (two) times a day for 28 days Do not start taking medication until after total joint arthroplasty, Disp: 56 tablet, Rfl: 0    losartan-hydrochlorothiazide (HYZAAR) 100-25 MG per tablet, Take 1 tablet by mouth daily, Disp: 100 tablet, Rfl: 1    Nattokinase 100 MG CAPS, Take by mouth in the morning, Disp: , Rfl:     Probiotic Product (MVW COMPLETE PROBIOTIC PO), Take by mouth, Disp: , Rfl:     apixaban (Eliquis) 2.5 mg, Take 1 tablet (2.5 mg total) by mouth 2 (two) times a day for 28 days Do not start taking medication until after total joint arthroplasty (Patient not taking: Reported on 10/8/2024), Disp: 56 tablet, Rfl: 0    multivitamin-minerals (CENTRUM) tablet, Take 1 tablet by mouth daily (Patient not taking: Reported on 10/8/2024), Disp: , Rfl:     ondansetron (ZOFRAN) 4 mg tablet, Take 1 tablet (4 mg total) by mouth every 8 (eight) hours as needed for nausea or vomiting (Patient not taking: Reported on 10/16/2024), Disp: 5 tablet, Rfl: 0    zolpidem (AMBIEN) 10 mg tablet, Take 0.5 tablets (5 mg total) by mouth daily at bedtime as needed for sleep (Patient not taking: Reported on 10/10/2024), Disp: 30 tablet, Rfl: 0    Allergies   Allergen Reactions    Androgens Anaphylaxis    Cefazolin Hives, GI Intolerance and Swelling    Ciprofloxacin Hives    Other Other (See Comments)     STEROIDS- GI INTOLERANCE    Penicillins Hives and Rash    Prednisone Hives, GI Intolerance, Swelling and Vomiting    Vancomycin Hives, GI Intolerance, Diarrhea and Rash    Aspirin GI Intolerance  and Hives     Even low dose     Cortisone Syncope    Adhesive [Medical Tape] Rash    Beef Allergy - Food Allergy Diarrhea    Gluten Meal - Food Allergy GI Intolerance    Lactose - Food Allergy Diarrhea    Oxycodone Rash and Vomiting    Pataday [Olopatadine Hcl] Eye Swelling          There were no vitals filed for this visit.  There is no height or weight on file to calculate BMI.  Wt Readings from Last 3 Encounters:   10/08/24 63.3 kg (139 lb 9.6 oz)   09/20/24 70.5 kg (155 lb 6.8 oz)   09/03/24 65.3 kg (144 lb)       Objective:            Physical Exam  Physical Exam:      General Appearance:    Alert, cooperative, no distress, appears stated age   Head:    Normocephalic, without obvious abnormality, atraumatic   Eyes:    conjunctiva/corneas clear, both eyes         Nose:   Nares normal, septum midline, no drainage    Throat:   Lips normal; teeth and gums normal   Neck:    symmetrical, trachea midline, ;     thyroid:  no enlargement/   Back:     Symmetric, no curvature, ROM normal   Lungs:   No audible wheezing or labored breathing   Chest Wall:    No tenderness or deformity    Heart:    Regular rate and rhythm                         Pulses:   2+ and symmetric all extremities   Skin:   Skin color, texture, turgor normal, no rashes or lesions   Neurologic:   normal strength, sensation and reflexes     throughout                       Ortho Exam  Left hip  Anterior incision well healed   No erythema   Negative Stinchfield   No pain with log rolling   NVID       Diagnostics, reviewed and taken today if performed as documented:    The attending physician has personally reviewed the pertinent films in PACS and interpretation is as follows: XR left hip demonstrates s/p STACY adequate alignment of implant with no sign of loosening       Procedures, if performed today:    Procedures    None performed      Scribe Attestation      I,:  Americo Badillo MA am acting as a scribe while in the presence of the attending  "physician.:       I,:  Nellie Foster MD personally performed the services described in this documentation    as scribed in my presence.:               Portions of the record may have been created with voice recognition software.  Occasional wrong word or \"sound a like\" substitutions may have occurred due to the inherent limitations of voice recognition software.  Read the chart carefully and recognize, using context, where substitutions have occurred.  "

## 2024-10-17 NOTE — PROGRESS NOTES
Spoke to patient, we discussed postoperative Lovenox for 28 days. Today is 31 days. She is going to stop as of today.

## 2024-10-28 ENCOUNTER — EVALUATION (OUTPATIENT)
Dept: PHYSICAL THERAPY | Facility: MEDICAL CENTER | Age: 79
End: 2024-10-28
Payer: MEDICARE

## 2024-10-28 DIAGNOSIS — Z96.642 S/P TOTAL LEFT HIP ARTHROPLASTY: ICD-10-CM

## 2024-10-28 PROCEDURE — 97110 THERAPEUTIC EXERCISES: CPT | Performed by: PHYSICAL THERAPIST

## 2024-10-28 PROCEDURE — 97161 PT EVAL LOW COMPLEX 20 MIN: CPT | Performed by: PHYSICAL THERAPIST

## 2024-10-28 PROCEDURE — 97140 MANUAL THERAPY 1/> REGIONS: CPT | Performed by: PHYSICAL THERAPIST

## 2024-10-28 NOTE — PROGRESS NOTES
PT Evaluation     Today's date: 10/28/2024  Patient name: Erin Suazo  : 1945  MRN: [id number]  Referring provider: Nellie Foster MD  Dx:   Encounter Diagnosis     ICD-10-CM    1. S/P total left hip arthroplasty  Z96.642 Ambulatory referral to Physical Therapy                     Assessment  Impairments: abnormal gait, abnormal muscle tone, abnormal or restricted ROM, abnormal movement, activity intolerance, impaired physical strength, lacks appropriate home exercise program, pain with function, weight-bearing intolerance and unable to perform ADL    Assessment details: Erin Suazo is a 79 y.o. female who presents with S/P total left hip arthroplasty.  Patient presents alert and oriented with the above impairments. . Erin will benefit from PT to addres deficits in order to maximize and return to prior level of function.  No further referral appears necessary at this time based upon examination results.  Prognosis is good given HEP compliance. Please contact me if you have any questions or recommendations.     Understanding of Dx/Px/POC: good     Prognosis: good    Goals  Short Term Goals:   1. Pain decreased 2 ratings in 4 weeks  2.  ROM increased 10* in 4 weeks  3.  Strength increased 1/2 grade in 4 weeks    Long Term Goals:   1.  ADLS/IADLS in related activities improved to maximal level in 8 weeks  2.  Ambulation is improved to maximal level in 8 weeks  3.  Recreational activities are improved to maximal level in 8 weeks.  4.  [place] with HEP in 8 weeks.     Plan  Patient would benefit from: PT eval and skilled physical therapy    Planned therapy interventions: IASTM, manual therapy, neuromuscular re-education, patient/caregiver education, therapeutic exercise, stretching, strengthening, abdominal trunk stabilization, functional ROM exercises, flexibility, gait training, balance and home exercise program    Frequency: 2x week  Duration in weeks: 8  Treatment plan discussed with:  "patient        Subjective Evaluation    History of Present Illness  Mechanism of injury: Pt underwent L STACY on  and was then transferred to rehab for 2.5 weeks.  Upon d/c home she had home PT for 2 weeks.  She presents now for outpatient PT w/ reports of pain in groin and quad.  She has some increase in pain today after over doing it yesterday going to [employer] and spending most of the day at son's house.  She reports pain and difficulty w/ stairs, cleaning, prolonged weight bearing.  She is able to bathe and dress independently.  She does use walker at most times.   She does c/o some sleep disturbance.    Patient Goals  Patient goals for therapy: decreased pain, increased motion, increased strength, independence with ADLs/IADLs and return to sport/leisure activities    Pain  At best pain rating: 3  At worst pain ratin          Objective     Active Range of Motion   Left Hip   Flexion: 50 degrees   Abduction: 18 degrees     Right Hip   Flexion: 110 degrees   Abduction: 35 degrees   Left Knee   Extension: -3 degrees     Passive Range of Motion   Left Hip   Flexion: 85 degrees   Abduction: 25 degrees     Strength/Myotome Testing     Left Hip   Planes of Motion   Flexion: 3+  Abduction: 3+    Right Hip   Planes of Motion   Flexion: 5    Left Knee   Flexion: 3+  Extension: 3+    Right Knee   Flexion: 5  Extension: 5      Flowsheet Rows      Flowsheet Row Most Recent Value   PT/OT G-Codes    Current Score 22   Projected Score 51   FOTO information reviewed Yes   Assessment Type Evaluation   G code set Other PT/OT Primary   Other PT Primary Current Status () CL   Other PT Primary Goal Status () CJ               Precautions: none      Manuals 10/28            PROM L hip ROGELIO                                                   Neuro Re-Ed 10/28            SLR abd x20            LAQ 5\"x20            SAQ nv            Adductor squ nv            clamshells nv            Mini squats nv                                " "                                                          Ther Ex 10/28            Recumbent bike nv            Gastroc strap stretch 30\"x3            Quad sets 5\"x20            Ther Activity                                       Gait Training                                       Modalities                                          Eval/Re-Eval POC Expires Auth #/ Referral # Total Visits Start Date Expiration Date Extension Info Visits Limitation   10/28 12/28 [facility]ONNA SENIOR                                                                   1 2 3 4 5 6   10/28 F        7 8 9 10 11 12           13 14 15 16 17 18           19 20 21 22 23 24           25 26 27 28 29 30                                 "

## 2024-11-01 ENCOUNTER — OFFICE VISIT (OUTPATIENT)
Dept: PHYSICAL THERAPY | Facility: MEDICAL CENTER | Age: 79
End: 2024-11-01
Payer: MEDICARE

## 2024-11-01 DIAGNOSIS — Z96.642 S/P TOTAL LEFT HIP ARTHROPLASTY: Primary | ICD-10-CM

## 2024-11-01 PROCEDURE — 97140 MANUAL THERAPY 1/> REGIONS: CPT | Performed by: PHYSICAL THERAPIST

## 2024-11-01 PROCEDURE — 97112 NEUROMUSCULAR REEDUCATION: CPT | Performed by: PHYSICAL THERAPIST

## 2024-11-01 PROCEDURE — 97110 THERAPEUTIC EXERCISES: CPT | Performed by: PHYSICAL THERAPIST

## 2024-11-01 NOTE — PROGRESS NOTES
"Daily Note     Today's date: 2024  Patient name: Erin Suazo  : 1945  MRN: [id number]  Referring provider: Nellie Foster MD  Dx:   Encounter Diagnosis     ICD-10-CM    1. S/P total left hip arthroplasty  Z96.642                      Subjective: Pt offers no new complaints today.  Presents ambulating w/ SPC today.       Objective: See treatment diary below      Assessment: Tolerated treatment well. Patient demonstrated fatigue post treatment, exhibited good technique with therapeutic exercises, and would benefit from continued PT  Tolerated progressions very well today.     Plan: Continue per plan of care.      Precautions: none      Manuals 10/28 11/1           PROM L hip ROGELIO 10 min                                                  Neuro Re-Ed 10/28 11/1           SLR abd x20 x20           LAQ 5\"x20 5\"x20           SAQ nv 5\"x20           Adductor squ nv 5\"x20           clamshells nv 5\"x20           Mini squats nv                                                                                          Ther Ex 10/28 11/1           Recumbent bike nv 5 min           Gastroc strap stretch 30\"x3 30\"x3           Quad sets 5\"x20 5\"x20           Ther Activity                                       Gait Training                                       Modalities                                            Eval/Re-Eval POC Expires Auth #/ Referral # Total Visits Start Date Expiration Date Extension Info Visits Limitation   10/28 12/28 [facility] SENIOR                                                                                     1 2 3 4 5 6   10/28 F             7 8 9 10 11 12                 13 14 15 16 17 18                 19 20 21 22 23 24                 25 26 27 28 29 30                    "

## 2024-11-04 ENCOUNTER — OFFICE VISIT (OUTPATIENT)
Dept: PHYSICAL THERAPY | Facility: MEDICAL CENTER | Age: 79
End: 2024-11-04
Payer: MEDICARE

## 2024-11-04 DIAGNOSIS — Z96.642 S/P TOTAL LEFT HIP ARTHROPLASTY: Primary | ICD-10-CM

## 2024-11-04 PROCEDURE — 97112 NEUROMUSCULAR REEDUCATION: CPT | Performed by: PHYSICAL THERAPIST

## 2024-11-04 PROCEDURE — 97140 MANUAL THERAPY 1/> REGIONS: CPT | Performed by: PHYSICAL THERAPIST

## 2024-11-04 PROCEDURE — 97110 THERAPEUTIC EXERCISES: CPT | Performed by: PHYSICAL THERAPIST

## 2024-11-04 NOTE — PROGRESS NOTES
"Daily Note     Today's date: 2024  Patient name: Erin Suazo  : 1945  MRN: [id number]  Referring provider: Nellie Foster MD  Dx:   Encounter Diagnosis     ICD-10-CM    1. S/P total left hip arthroplasty  Z96.642                      Subjective: Pt continues to ambulate w/ SPC.  Plans to start walking track as able.  She was able to do some light housework.      Objective: See treatment diary below      Assessment: Tolerated treatment well. Patient demonstrated fatigue post treatment, exhibited good technique with therapeutic exercises, and would benefit from continued PT  Progressing very well.  She does report intermittent soreness t/o treatment.  Reviewed non-reciprocal stair negotiation.    Plan: Continue per plan of care.      Precautions: none      Manuals 10/28 11/1 11/4          PROM L hip ROGELIO 10 min 10 min                                                 Neuro Re-Ed 10/28 11/1 11/4          SLR abd x20 x20 x20          LAQ 5\"x20 5\"x20 5\"x20          SAQ nv 5\"x20 5\"x20          Adductor squ nv 5\"x20 5\"x20          clamshells nv 5\"x20 5\"x20          Mini squats nv                                                                                          Ther Ex 10/28 11/1 11/4          Recumbent bike nv 5 min 7 min          Gastroc strap stretch 30\"x3 30\"x3 30\"x3          Quad sets 5\"x20 5\"x20 5\"x20          Ther Activity                                       Gait Training                                       Modalities                                            Eval/Re-Eval POC Expires Auth #/ Referral # Total Visits Start Date Expiration Date Extension Info Visits Limitation   10/28 12/28 [facility]                                                                                     1 2 3 4 5 6   10/28 F           7 8 9 10 11 12                 13 14 15 16 17 18                 19 20 21 22 23 24                 25 26 27 28 29 30                    "

## 2024-11-08 ENCOUNTER — OFFICE VISIT (OUTPATIENT)
Dept: PHYSICAL THERAPY | Facility: MEDICAL CENTER | Age: 79
End: 2024-11-08
Payer: MEDICARE

## 2024-11-08 DIAGNOSIS — Z96.642 S/P TOTAL LEFT HIP ARTHROPLASTY: Primary | ICD-10-CM

## 2024-11-08 PROCEDURE — 97112 NEUROMUSCULAR REEDUCATION: CPT | Performed by: PHYSICAL THERAPIST

## 2024-11-08 PROCEDURE — 97140 MANUAL THERAPY 1/> REGIONS: CPT | Performed by: PHYSICAL THERAPIST

## 2024-11-08 NOTE — PROGRESS NOTES
"Daily Note     Today's date: 2024  Patient name: Erin Suazo  : 1945  MRN: [id number]  Referring provider: Nellie Foster MD  Dx:   Encounter Diagnosis     ICD-10-CM    1. S/P total left hip arthroplasty  Z96.642                      Subjective: Pt progressing very well.  She did go to the gym this week and used bike and elliptical.      Objective: See treatment diary below      Assessment: Tolerated treatment well. Patient demonstrated fatigue post treatment, exhibited good technique with therapeutic exercises, and would benefit from continued PT  Able to perform SLR flexion w/ difficulty.     Plan: Continue per plan of care.      Precautions: none      Manuals 10/28 11/1 11/4 11/8         PROM L hip ROGELIO 10 min 10 min 10 min                                                Neuro Re-Ed 10/28 11/1 11/4 11/8         SLR abd x20 x20 x20 x20         LAQ 5\"x20 5\"x20 5\"x20 5\"x20         SAQ nv 5\"x20 5\"x20 np         Adductor squ nv 5\"x20 5\"x20 5\"x20         clamshells nv 5\"x20 5\"x20 5\"x20         Mini squats nv            SLR flex    x10                                                                          Ther Ex 10/28 11/1 11/4 11/8         Recumbent bike nv 5 min 7 min 10 min         Gastroc strap stretch 30\"x3 30\"x3 30\"x3 30\"x3         Quad sets 5\"x20 5\"x20 5\"x20          Ther Activity                                       Gait Training                                       Modalities                                            Eval/Re-Eval POC Expires Auth #/ Referral # Total Visits Start Date Expiration Date Extension Info Visits Limitation   10/28 12/28 [facility]                                                                                     1 2 3 4 5 6   10/28 F         7 8 9 10 11 12                 13 14 15 16 17 18                 19 20 21 22 23 24                 25 26 27 28 29 30                    "

## 2024-11-19 ENCOUNTER — OFFICE VISIT (OUTPATIENT)
Dept: PHYSICAL THERAPY | Facility: MEDICAL CENTER | Age: 79
End: 2024-11-19
Payer: MEDICARE

## 2024-11-19 DIAGNOSIS — Z96.642 S/P TOTAL LEFT HIP ARTHROPLASTY: Primary | ICD-10-CM

## 2024-11-19 PROCEDURE — 97112 NEUROMUSCULAR REEDUCATION: CPT | Performed by: PHYSICAL THERAPIST

## 2024-11-19 PROCEDURE — 97140 MANUAL THERAPY 1/> REGIONS: CPT | Performed by: PHYSICAL THERAPIST

## 2024-11-19 NOTE — PROGRESS NOTES
"Daily Note     Today's date: 2024  Patient name: Erin Suazo  : 1945  MRN: [id number]  Referring provider: Nellie Foster MD  Dx:   Encounter Diagnosis     ICD-10-CM    1. S/P total left hip arthroplasty  Z96.642                      Subjective: No complaints offered prior to treatment.   Some muscle soreness reported w/ exercise      Objective: See treatment diary below      Assessment: Tolerated treatment well. Patient demonstrated fatigue post treatment, exhibited good technique with therapeutic exercises, and would benefit from continued PT  Challenged w/ SLR flex and abd    Plan: Continue per plan of care.      Precautions: none      Manuals 10/28 11/1 11/4 11/8 11/19        PROM L hip ROGELIO 10 min 10 min 10 min 10 min                                               Neuro Re-Ed 10/28 11/1 11/4 11/8 11/19        SLR abd x20 x20 x20 x20 x20        LAQ 5\"x20 5\"x20 5\"x20 5\"x20 5\"x20                     Adductor squ nv 5\"x20 5\"x20 5\"x20 5\"x20        clamshells nv 5\"x20 5\"x20 5\"x20 5\"x20        Mini squats nv            SLR flex    x10 x10        bridges     5\"x10        Mini squats     nv        Step ups fwd and lat     nv                                  Ther Ex 10/28 11/1 11/4 11/8 11/19        Recumbent bike nv 5 min 7 min 10 min 10 min        Gastroc strap stretch 30\"x3 30\"x3 30\"x3 30\"x3 30\"x3                                                            Gait Training                                       Modalities                                            Eval/Re-Eval POC Expires Auth #/ Referral # Total Visits Start Date Expiration Date Extension Info Visits Limitation   10/28 12/28 [facility]                                                                                     1 2 3 4 5 6   10/28 F        7 8 9 10 11 12                 13 14 15 16 17 18                 19 20 21 22 23 24                 25 26 27 28 29 30                    "

## 2024-11-22 ENCOUNTER — OFFICE VISIT (OUTPATIENT)
Dept: PHYSICAL THERAPY | Facility: MEDICAL CENTER | Age: 79
End: 2024-11-22
Payer: MEDICARE

## 2024-11-22 DIAGNOSIS — Z96.642 S/P TOTAL LEFT HIP ARTHROPLASTY: Primary | ICD-10-CM

## 2024-11-22 PROCEDURE — 97110 THERAPEUTIC EXERCISES: CPT | Performed by: PHYSICAL THERAPIST

## 2024-11-22 PROCEDURE — 97112 NEUROMUSCULAR REEDUCATION: CPT | Performed by: PHYSICAL THERAPIST

## 2024-11-22 PROCEDURE — 97140 MANUAL THERAPY 1/> REGIONS: CPT | Performed by: PHYSICAL THERAPIST

## 2024-11-22 NOTE — PROGRESS NOTES
"Daily Note     Today's date: 2024  Patient name: Erin Suazo  : 1945  MRN: [id number]  Referring provider: Nellie Foster MD  Dx:   Encounter Diagnosis     ICD-10-CM    1. S/P total left hip arthroplasty  Z96.642                      Subjective: Pt reports some soreness earlier this week which she believes is due to over doing the week prior.  Today she is feeling better.      Objective: See treatment diary below      Assessment: Tolerated treatment well. Patient demonstrated fatigue post treatment, exhibited good technique with therapeutic exercises, and would benefit from continued PT  She is progressing very well.    Plan: Continue per plan of care.      Precautions: none      Manuals 10/28 11/1 11/4 11/8 11/19 11/22       PROM L hip ROGELIO 10 min 10 min 10 min 10 min 10 min                                              Neuro Re-Ed 10/28 11/1 11/4 11/8 11/19 11/22       SLR abd x20 x20 x20 x20 x20 x20       LAQ 5\"x20 5\"x20 5\"x20 5\"x20 5\"x20 5\"x20                    Adductor squ nv 5\"x20 5\"x20 5\"x20 5\"x20 5\"x20       clamshells nv 5\"x20 5\"x20 5\"x20 5\"x20 5\"x20       Mini squats nv            SLR flex    x10 x10 x10       bridges     5\"x10 5\"x20       Mini squats     nv        Step ups fwd and lat     nv                                  Ther Ex 10/28 11/1 11/4 11/8 11/19 11/22       Recumbent bike nv 5 min 7 min 10 min 10 min 10 min       Gastroc strap stretch 30\"x3 30\"x3 30\"x3 30\"x3 30\"x3 30\"x3                                                           Gait Training                                       Modalities                                            Eval/Re-Eval POC Expires Auth #/ Referral # Total Visits Start Date Expiration Date Extension Info Visits Limitation   10/28 12/28 [facility]                                                                                     1 2 3 4 5 6   10/28 F      7 8 9 10 11 12                 13 14 15 16 17 18               "   19 20 21 22 23 24                 25 26 27 28 29 30

## 2024-11-25 ENCOUNTER — OFFICE VISIT (OUTPATIENT)
Dept: PHYSICAL THERAPY | Facility: MEDICAL CENTER | Age: 79
End: 2024-11-25
Payer: MEDICARE

## 2024-11-25 ENCOUNTER — TELEPHONE (OUTPATIENT)
Age: 79
End: 2024-11-25

## 2024-11-25 DIAGNOSIS — Z96.642 S/P TOTAL LEFT HIP ARTHROPLASTY: Primary | ICD-10-CM

## 2024-11-25 PROCEDURE — 97140 MANUAL THERAPY 1/> REGIONS: CPT | Performed by: PHYSICAL THERAPIST

## 2024-11-25 PROCEDURE — 97112 NEUROMUSCULAR REEDUCATION: CPT | Performed by: PHYSICAL THERAPIST

## 2024-11-25 NOTE — PROGRESS NOTES
"Daily Note     Today's date: 2024  Patient name: Erin Suazo  : 1945  MRN: [id number]  Referring provider: Nellie Foster MD  Dx:   Encounter Diagnosis     ICD-10-CM    1. S/P total left hip arthroplasty  Z96.642                        Subjective: Pt reports ongoing soreness and fatigue.  Some pain in groin      Objective: See treatment diary below      Assessment: Tolerated treatment well. Patient demonstrated fatigue post treatment, exhibited good technique with therapeutic exercises, and would benefit from continued PT  Continues to progress very well.    Plan: Continue per plan of care.      Precautions: none      Manuals 10/28 11/1 11/4 11/8 11/19 11/22 11/25      PROM L hip ROGELIO 10 min 10 min 10 min 10 min 10 min 10 min                                             Neuro Re-Ed 10/28 11/1 11/4 11/8 11/19 11/22 11/25      SLR abd x20 x20 x20 x20 x20 x20 x20      LAQ 5\"x20 5\"x20 5\"x20 5\"x20 5\"x20 5\"x20 5\"x20                   Adductor squ nv 5\"x20 5\"x20 5\"x20 5\"x20 5\"x20 5\"x20      clamshells nv 5\"x20 5\"x20 5\"x20 5\"x20 5\"x20 5\"x20      Mini squats nv            SLR flex    x10 x10 x10 x20      bridges     5\"x10 5\"x20 5\"x20      Mini squats     nv        Step ups fwd and lat     nv                                  Ther Ex 10/28 11/1 11/4 11/8 11/19 11/22 11/25      Recumbent bike nv 5 min 7 min 10 min 10 min 10 min 10 min      Gastroc strap stretch 30\"x3 30\"x3 30\"x3 30\"x3 30\"x3 30\"x3 30\"x3                                                          Gait Training                                       Modalities                                            Eval/Re-Eval POC Expires Auth #/ Referral # Total Visits Start Date Expiration Date Extension Info Visits Limitation   10/28 12/28 [facility]                                                                                     1 2 3 4 5 6   10/28 F  /22    7 8 9 10 11 12                 13 14 15 16 17 18               "   19 20 21 22 23 24                 25 26 27 28 29 30

## 2024-11-25 NOTE — TELEPHONE ENCOUNTER
Caller: [facility]    Doctor: Cristian    Reason for call: Requesting a document that states when patient is allowed to have routine dental work and does she need to premed  Please fax to     Call back#: [phone]

## 2024-11-29 ENCOUNTER — OFFICE VISIT (OUTPATIENT)
Dept: PHYSICAL THERAPY | Facility: MEDICAL CENTER | Age: 79
End: 2024-11-29
Payer: MEDICARE

## 2024-11-29 DIAGNOSIS — Z96.642 S/P TOTAL LEFT HIP ARTHROPLASTY: Primary | ICD-10-CM

## 2024-11-29 PROCEDURE — 97110 THERAPEUTIC EXERCISES: CPT | Performed by: PHYSICAL THERAPIST

## 2024-11-29 PROCEDURE — 97112 NEUROMUSCULAR REEDUCATION: CPT | Performed by: PHYSICAL THERAPIST

## 2024-11-29 PROCEDURE — 97140 MANUAL THERAPY 1/> REGIONS: CPT | Performed by: PHYSICAL THERAPIST

## 2024-11-29 NOTE — PROGRESS NOTES
"Daily Note     Today's date: 2024  Patient name: Erin Suazo  : 1945  MRN: [id number]  Referring provider: Nellie Foster MD  Dx:   Encounter Diagnosis     ICD-10-CM    1. S/P total left hip arthroplasty  Z96.642                        Subjective: Pt reports some soreness persisting in quad region      Objective: See treatment diary below      Assessment: Tolerated treatment well. Patient demonstrated fatigue post treatment, exhibited good technique with therapeutic exercises, and would benefit from continued PT  Continues to progress very well.  Added mini squats and steps up w/ very good tolerance.    Plan: Continue per plan of care.      Precautions: none      Manuals 10/28 11/1 11/4 11/8 11/19 11/22 11/25 11/29     PROM L hip ROGELIO 10 min 10 min 10 min 10 min 10 min 10 min 10 min                                            Neuro Re-Ed 10/28 11/1 11/4 11/8 11/19 11/22 11/25 11/29     SLR abd x20 x20 x20 x20 x20 x20 x20 x20     LAQ 5\"x20 5\"x20 5\"x20 5\"x20 5\"x20 5\"x20 5\"x20 5\"x20                  Adductor squ nv 5\"x20 5\"x20 5\"x20 5\"x20 5\"x20 5\"x20 5\"x20     clamshells nv 5\"x20 5\"x20 5\"x20 5\"x20 5\"x20 5\"x20 5\"x20     Mini squats nv            SLR flex    x10 x10 x10 x20 x20     bridges     5\"x10 5\"x20 5\"x20 5\"x20     Mini squats     nv   x20     Step ups fwd and lat     nv   6in fwd x20                               Ther Ex 10/28 11/1 11/4 11/8 11/19 11/22 11/25 11/29     Recumbent bike nv 5 min 7 min 10 min 10 min 10 min 10 min 10 min     Gastroc strap stretch 30\"x3 30\"x3 30\"x3 30\"x3 30\"x3 30\"x3 30\"x3 30\"X3                                                         Gait Training                                       Modalities                                            Eval/Re-Eval POC Expires Auth #/ Referral # Total Visits Start Date Expiration Date Extension Info Visits Limitation   10/28 12/28 [facility]                                                                                     1 2 3 " 4 5 6   10/28 F  11/1 11/4 11/8 11/19 11/22    7 8 9 10 11 12    11/25 11/29            13 14 15 16 17 18                 19 20 21 22 23 24                 25 26 27 28 29 30

## 2024-12-03 ENCOUNTER — OFFICE VISIT (OUTPATIENT)
Dept: PHYSICAL THERAPY | Facility: MEDICAL CENTER | Age: 79
End: 2024-12-03
Payer: MEDICARE

## 2024-12-03 DIAGNOSIS — Z96.642 S/P TOTAL LEFT HIP ARTHROPLASTY: Primary | ICD-10-CM

## 2024-12-03 PROCEDURE — 97112 NEUROMUSCULAR REEDUCATION: CPT | Performed by: PHYSICAL THERAPIST

## 2024-12-03 PROCEDURE — 97140 MANUAL THERAPY 1/> REGIONS: CPT | Performed by: PHYSICAL THERAPIST

## 2024-12-03 NOTE — PROGRESS NOTES
"Daily Note     Today's date: 12/3/2024  Patient name: Erin Suazo  : 1945  MRN: [id number]  Referring provider: Nellie Foster MD  Dx:   Encounter Diagnosis     ICD-10-CM    1. S/P total left hip arthroplasty  Z96.642                        Subjective: No new complaints today      Objective: See treatment diary below      Assessment: Tolerated treatment well. Patient demonstrated fatigue post treatment, exhibited good technique with therapeutic exercises, and would benefit from continued PT  Progressing very well.    Plan: Continue per plan of care.      Precautions: none      Manuals 10/28 11/1 11/4 11/8 11/19 11/22 11/25 11/29 12/3    PROM L hip ROGELIO 10 min 10 min 10 min 10 min 10 min 10 min 10 min 10 min                                           Neuro Re-Ed 10/28 11/1 11/4 11/8 11/19 11/22 11/25 11/29 12/3    SLR abd x20 x20 x20 x20 x20 x20 x20 x20 x20    LAQ 5\"x20 5\"x20 5\"x20 5\"x20 5\"x20 5\"x20 5\"x20 5\"x20 5\"x20                 Adductor squ nv 5\"x20 5\"x20 5\"x20 5\"x20 5\"x20 5\"x20 5\"x20 5\"x20    clamshells nv 5\"x20 5\"x20 5\"x20 5\"x20 5\"x20 5\"x20 5\"x20 5\"x20    Mini squats nv            SLR flex    x10 x10 x10 x20 x20 x20    bridges     5\"x10 5\"x20 5\"x20 5\"x20 5\"x20    Mini squats     nv   x20 x20    Step ups fwd and lat     nv   6in fwd x20 6in x20 fwd                              Ther Ex 10/28 11/1 11/4 11/8 11/19 11/22 11/25 11/29 2/3    Recumbent bike nv 5 min 7 min 10 min 10 min 10 min 10 min 10 min 10 min    Gastroc strap stretch 30\"x3 30\"x3 30\"x3 30\"x3 30\"x3 30\"x3 30\"x3 30\"X3 30\"x3                                                        Gait Training                                       Modalities                                            Eval/Re-Eval POC Expires Auth #/ Referral # Total Visits Start Date Expiration Date Extension Info Visits Limitation   10/28 12/28 [facility]                                                                                     1 2 3 4 5 6   10/28 F    " 11/4 11/8 11/19 11/22    7 8 9 10 11 12    11/25 11/29   12/3         13 14 15 16 17 18                 19 20 21 22 23 24                 25 26 27 28 29 30

## 2024-12-05 ENCOUNTER — OFFICE VISIT (OUTPATIENT)
Dept: PHYSICAL THERAPY | Facility: MEDICAL CENTER | Age: 79
End: 2024-12-05
Payer: MEDICARE

## 2024-12-05 DIAGNOSIS — Z96.642 S/P TOTAL LEFT HIP ARTHROPLASTY: Primary | ICD-10-CM

## 2024-12-05 PROCEDURE — 97110 THERAPEUTIC EXERCISES: CPT | Performed by: PHYSICAL THERAPIST

## 2024-12-05 PROCEDURE — 97112 NEUROMUSCULAR REEDUCATION: CPT | Performed by: PHYSICAL THERAPIST

## 2024-12-05 PROCEDURE — 97140 MANUAL THERAPY 1/> REGIONS: CPT | Performed by: PHYSICAL THERAPIST

## 2024-12-05 NOTE — PROGRESS NOTES
"Daily Note     Today's date: 2024  Patient name: Erin Suazo  : 1945  MRN: [id number]  Referring provider: Nellie Foster MD  Dx:   Encounter Diagnosis     ICD-10-CM    1. S/P total left hip arthroplasty  Z96.642                        Subjective: Pt c/o weakness in her legs and fatigue on days she is more active.      Objective: See treatment diary below      Assessment: Tolerated treatment well. Patient demonstrated fatigue post treatment, exhibited good technique with therapeutic exercises, and would benefit from continued PT  Continues to progress very well.    Plan: Continue per plan of care.      Precautions: none      Manuals 10/28 11/1 11/4 11/8 11/19 11/22 11/25 11/29 12/3 12/5   PROM L hip ROGELIO 10 min 10 min 10 min 10 min 10 min 10 min 10 min 10 min 10 min                                          Neuro Re-Ed 10/28 11/1 11/4 11/8 11/19 11/22 11/25 11/29 12/3 12/5   SLR abd x20 x20 x20 x20 x20 x20 x20 x20 x20 x20   LAQ 5\"x20 5\"x20 5\"x20 5\"x20 5\"x20 5\"x20 5\"x20 5\"x20 5\"x20 5\"x20                Adductor squ nv 5\"x20 5\"x20 5\"x20 5\"x20 5\"x20 5\"x20 5\"x20 5\"x20 5\"x20   clamshells nv 5\"x20 5\"x20 5\"x20 5\"x20 5\"x20 5\"x20 5\"x20 5\"x20 5\"x20   Mini squats nv            SLR flex    x10 x10 x10 x20 x20 x20 x20   bridges     5\"x10 5\"x20 5\"x20 5\"x20 5\"x20 5\"x20   Mini squats     nv   x20 x20 x20   Step ups fwd and lat     nv   6in fwd x20 6in x20 fwd 6in x20 fwd                             Ther Ex 10/28 11/1 11/4 11/8 11/19 11/22 11/25 11/29 12/3 12/5   Recumbent bike nv 5 min 7 min 10 min 10 min 10 min 10 min 10 min 10 min 10 min   Gastroc strap stretch 30\"x3 30\"x3 30\"x3 30\"x3 30\"x3 30\"x3 30\"x3 30\"X3 30\"x3 30\"x3                                                       Gait Training                                       Modalities                                            Eval/Re-Eval POC Expires Auth #/ Referral # Total Visits Start Date Expiration Date Extension Info Visits Limitation   10/28 12/28 [facility] " [facility]                                                                                     1 2 3 4 5 6   10/28 F  11/1 11/4 11/8 11/19 11/22    7 8 9 10 11 12    11/25 11/29   12/3  12/5       13 14 15 16 17 18                 19 20 21 22 23 24                 25 26 27 28 29 30

## 2024-12-06 ENCOUNTER — APPOINTMENT (OUTPATIENT)
Dept: PHYSICAL THERAPY | Facility: MEDICAL CENTER | Age: 79
End: 2024-12-06
Payer: MEDICARE

## 2024-12-09 ENCOUNTER — APPOINTMENT (OUTPATIENT)
Dept: LAB | Facility: MEDICAL CENTER | Age: 79
End: 2024-12-09
Payer: MEDICARE

## 2024-12-09 DIAGNOSIS — D50.8 OTHER IRON DEFICIENCY ANEMIA: ICD-10-CM

## 2024-12-09 LAB
BASOPHILS # BLD AUTO: 0.02 THOUSANDS/ÂΜL (ref 0–0.1)
BASOPHILS NFR BLD AUTO: 0 % (ref 0–1)
EOSINOPHIL # BLD AUTO: 0.11 THOUSAND/ÂΜL (ref 0–0.61)
EOSINOPHIL NFR BLD AUTO: 1 % (ref 0–6)
ERYTHROCYTE [DISTWIDTH] IN BLOOD BY AUTOMATED COUNT: 14.2 % (ref 11.6–15.1)
HCT VFR BLD AUTO: 40.7 % (ref 34.8–46.1)
HGB BLD-MCNC: 12.6 G/DL (ref 11.5–15.4)
IMM GRANULOCYTES # BLD AUTO: 0.04 THOUSAND/UL (ref 0–0.2)
IMM GRANULOCYTES NFR BLD AUTO: 0 % (ref 0–2)
LYMPHOCYTES # BLD AUTO: 1.04 THOUSANDS/ÂΜL (ref 0.6–4.47)
LYMPHOCYTES NFR BLD AUTO: 9 % (ref 14–44)
MCH RBC QN AUTO: 29.6 PG (ref 26.8–34.3)
MCHC RBC AUTO-ENTMCNC: 31 G/DL (ref 31.4–37.4)
MCV RBC AUTO: 96 FL (ref 82–98)
MONOCYTES # BLD AUTO: 0.7 THOUSAND/ÂΜL (ref 0.17–1.22)
MONOCYTES NFR BLD AUTO: 6 % (ref 4–12)
NEUTROPHILS # BLD AUTO: 10.28 THOUSANDS/ÂΜL (ref 1.85–7.62)
NEUTS SEG NFR BLD AUTO: 84 % (ref 43–75)
NRBC BLD AUTO-RTO: 0 /100 WBCS
PLATELET # BLD AUTO: 254 THOUSANDS/UL (ref 149–390)
PMV BLD AUTO: 12.5 FL (ref 8.9–12.7)
RBC # BLD AUTO: 4.26 MILLION/UL (ref 3.81–5.12)
WBC # BLD AUTO: 12.19 THOUSAND/UL (ref 4.31–10.16)

## 2024-12-09 PROCEDURE — 36415 COLL VENOUS BLD VENIPUNCTURE: CPT

## 2024-12-09 PROCEDURE — 85025 COMPLETE CBC W/AUTO DIFF WBC: CPT

## 2024-12-10 ENCOUNTER — OFFICE VISIT (OUTPATIENT)
Dept: PHYSICAL THERAPY | Facility: MEDICAL CENTER | Age: 79
End: 2024-12-10
Payer: MEDICARE

## 2024-12-10 DIAGNOSIS — Z96.642 S/P TOTAL LEFT HIP ARTHROPLASTY: Primary | ICD-10-CM

## 2024-12-10 PROCEDURE — 97140 MANUAL THERAPY 1/> REGIONS: CPT | Performed by: PHYSICAL THERAPIST

## 2024-12-10 PROCEDURE — 97112 NEUROMUSCULAR REEDUCATION: CPT | Performed by: PHYSICAL THERAPIST

## 2024-12-10 PROCEDURE — 97110 THERAPEUTIC EXERCISES: CPT | Performed by: PHYSICAL THERAPIST

## 2024-12-10 NOTE — PROGRESS NOTES
PT Re-Evaluation     Today's date: 12/10/2024  Patient name: Erin Suazo  : 1945  MRN: [id number]  Referring provider: Nellie Foster MD  Dx:   Encounter Diagnosis     ICD-10-CM    1. S/P total left hip arthroplasty  Z96.642                        Assessment  Impairments: abnormal gait, abnormal muscle tone, abnormal or restricted ROM, abnormal movement, activity intolerance, impaired physical strength, lacks appropriate home exercise program, pain with function, weight-bearing intolerance and unable to perform ADL    Assessment details: Erin Suazo has attended 11 visits since initiating PT and has demonstrated overall improvement.  Mobility and strength has improved with decreased reports of pain. Erin Suazo has demonstrated an improvement in function as well.  Currently, she has made steady progress towards her goals, but continue to remain limited with immobility, sleeping    . At this time, continued physical therapy is recommended in order to address their remaining impairments in effort to further improve function.    Understanding of Dx/Px/POC: good     Prognosis: good    Goals  Short Term Goals:   1. Pain decreased 2 ratings in 4 weeks MET  2.  ROM increased 10* in 4 weeks MET  3.  Strength increased 1/2 grade in 4 weeks MET    Long Term Goals:   1.  ADLS/IADLS in related activities improved to maximal level in 8 weeks PARTIALLY MET  2.  Ambulation is improved to maximal level in 8 weeks PARTIALLY MET  3.  Recreational activities are improved to maximal level in 8 weeks. PARTIALLY MET  4.  Jewell with HEP in 8 weeks.  MET    Plan  Patient would benefit from: PT eval and skilled physical therapy    Planned therapy interventions: IASTM, manual therapy, neuromuscular re-education, patient/caregiver education, therapeutic exercise, stretching, strengthening, abdominal trunk stabilization, functional ROM exercises, flexibility, gait training, balance and home exercise program    Frequency:  "2x week  Duration in weeks: 8  Treatment plan discussed with: patient        Subjective Evaluation    History of Present Illness  Mechanism of injury: Pt reports continued improvement.  She reports stiffness in the mornings and pain w/ prolonged sitting.  Most pain occurs during the night resulting in sleep disturbance.  She typically feels better w/ movement.   Improvement w/ stair negotiation.  Able to ascend reciprocally, descends non-reciprocally.  She has resumed some basic daily activity and housework and cooking and laundry.    Patient Goals  Patient goals for therapy: decreased pain, increased motion, increased strength, independence with ADLs/IADLs and return to sport/leisure activities    Pain  At best pain ratin  At worst pain ratin          Objective     Active Range of Motion   Left Hip   Flexion: 70 degrees   Abduction: 30 degrees   Left Knee   Extension: 0 degrees     Passive Range of Motion   Left Hip   Flexion: 100 degrees   Abduction: 40 degrees     Strength/Myotome Testing     Left Hip   Planes of Motion   Flexion: 4  Abduction: 4    Left Knee   Flexion: 4+  Extension: 5               Precautions: none      Manuals 12/10            PROM L hip ROGELIO                                                   Neuro Re-Ed 12/10            SLR flex/abd x20            bridges 5\"x20            clamshells 5\"x20            Adductor squ 5\"x20            LAQ 5\"x20            Mini squats x20            Step ups fwd and lat 6in x20 ea                                                                             Ther Ex 12/10            Recumbent bike 10 min            Gastroc strap stretch 30\"x3                         Ther Activity                                       Gait Training                                       Modalities                                          Eval/Re-Eval POC Expires Auth #/ Referral # Total Visits Start Date Expiration Date Extension Info Visits Limitation   10/28 12/28 [facility] "              12/10  2/10                                                                     1 2 3 4 5 6   10/28 F  11/1 11/4 11/8 11/19 11/22    7 8 9 10 11 12    11/25 11/29   12/3  12/5  12/10 F!     13 14 15 16 17 18                 19 20 21 22 23 24                 25 26 27 28 29 30

## 2024-12-12 ENCOUNTER — OFFICE VISIT (OUTPATIENT)
Dept: FAMILY MEDICINE CLINIC | Facility: CLINIC | Age: 79
End: 2024-12-12
Payer: MEDICARE

## 2024-12-12 ENCOUNTER — OFFICE VISIT (OUTPATIENT)
Dept: PHYSICAL THERAPY | Facility: MEDICAL CENTER | Age: 79
End: 2024-12-12
Payer: MEDICARE

## 2024-12-12 VITALS
DIASTOLIC BLOOD PRESSURE: 72 MMHG | SYSTOLIC BLOOD PRESSURE: 102 MMHG | BODY MASS INDEX: 28.71 KG/M2 | WEIGHT: 142.4 LBS | OXYGEN SATURATION: 97 % | TEMPERATURE: 98 F | HEIGHT: 59 IN | HEART RATE: 88 BPM

## 2024-12-12 DIAGNOSIS — Z96.642 S/P TOTAL LEFT HIP ARTHROPLASTY: Primary | ICD-10-CM

## 2024-12-12 DIAGNOSIS — K21.9 GASTROESOPHAGEAL REFLUX DISEASE, UNSPECIFIED WHETHER ESOPHAGITIS PRESENT: Chronic | ICD-10-CM

## 2024-12-12 DIAGNOSIS — R10.10 PAIN OF UPPER ABDOMEN: Primary | ICD-10-CM

## 2024-12-12 DIAGNOSIS — Z96.642 S/P TOTAL LEFT HIP ARTHROPLASTY: ICD-10-CM

## 2024-12-12 DIAGNOSIS — R10.10 PAIN OF UPPER ABDOMEN: ICD-10-CM

## 2024-12-12 LAB
SL AMB  POCT GLUCOSE, UA: NORMAL
SL AMB LEUKOCYTE ESTERASE,UA: NORMAL
SL AMB POCT BILIRUBIN,UA: NORMAL
SL AMB POCT BLOOD,UA: NORMAL
SL AMB POCT CLARITY,UA: CLEAR
SL AMB POCT COLOR,UA: YELLOW
SL AMB POCT KETONES,UA: NORMAL
SL AMB POCT NITRITE,UA: NORMAL
SL AMB POCT PH,UA: 6
SL AMB POCT SPECIFIC GRAVITY,UA: 1.01
SL AMB POCT URINE PROTEIN: NORMAL
SL AMB POCT UROBILINOGEN: 0.2

## 2024-12-12 PROCEDURE — 81003 URINALYSIS AUTO W/O SCOPE: CPT | Performed by: INTERNAL MEDICINE

## 2024-12-12 PROCEDURE — 97110 THERAPEUTIC EXERCISES: CPT | Performed by: PHYSICAL THERAPIST

## 2024-12-12 PROCEDURE — 97140 MANUAL THERAPY 1/> REGIONS: CPT | Performed by: PHYSICAL THERAPIST

## 2024-12-12 PROCEDURE — 87077 CULTURE AEROBIC IDENTIFY: CPT | Performed by: INTERNAL MEDICINE

## 2024-12-12 PROCEDURE — G2211 COMPLEX E/M VISIT ADD ON: HCPCS | Performed by: INTERNAL MEDICINE

## 2024-12-12 PROCEDURE — 87086 URINE CULTURE/COLONY COUNT: CPT | Performed by: INTERNAL MEDICINE

## 2024-12-12 PROCEDURE — 97112 NEUROMUSCULAR REEDUCATION: CPT | Performed by: PHYSICAL THERAPIST

## 2024-12-12 PROCEDURE — 99213 OFFICE O/P EST LOW 20 MIN: CPT | Performed by: INTERNAL MEDICINE

## 2024-12-12 RX ORDER — ONDANSETRON 4 MG/1
4 TABLET, FILM COATED ORAL EVERY 8 HOURS PRN
Qty: 20 TABLET | Refills: 0 | Status: SHIPPED | OUTPATIENT
Start: 2024-12-12

## 2024-12-12 RX ORDER — ONDANSETRON 4 MG/1
4 TABLET, FILM COATED ORAL EVERY 8 HOURS PRN
Qty: 5 TABLET | Refills: 0 | Status: SHIPPED | OUTPATIENT
Start: 2024-12-12

## 2024-12-12 NOTE — PROGRESS NOTES
Name: Erin Suazo      : 1945      MRN: [id number]  Encounter Provider: Kelin Ramos MD  Encounter Date: 2024   Encounter department: [facility]  :  Assessment & Plan  Pain of upper abdomen    Orders:    ondansetron (ZOFRAN) 4 mg tablet; Take 1 tablet (4 mg total) by mouth every 8 (eight) hours as needed for nausea or vomiting    CBC and differential; Future    Comprehensive metabolic panel; Future    POCT urine dip auto non-scope    Urine culture    S/P total left hip arthroplasty    Orders:    ondansetron (ZOFRAN) 4 mg tablet; Take 1 tablet (4 mg total) by mouth every 8 (eight) hours as needed for nausea or vomiting           History of Present Illness     Pain started with sudden nausea and vomiting after eating a sandwich but then it settled down into some nausea gastric discomfort.  She has has tried prilosec and zofran with partial relief. Did have loose Bms and vomitin but that has improved and she is just c/o pain She thought she had a uti earlier but U/A was unremarkable    Abdominal Pain  This is a new problem. The current episode started in the past 7 days. The onset quality is sudden. The problem occurs daily. The problem has been gradually improving. The pain is located in the epigastric region. The pain is at a severity of 4/10. The quality of the pain is a sensation of fullness, cramping and burning. The abdominal pain does not radiate. Associated symptoms include belching and nausea. Pertinent negatives include no arthralgias, constipation, diarrhea, dysuria, fever, hematuria or vomiting. Nothing aggravates the pain. The pain is relieved by Belching and liquids. She has tried acetaminophen, antacids and proton pump inhibitors for the symptoms. The treatment provided mild relief. Prior workup: no w/u. Her past medical history is significant for GERD.     Review of Systems   Constitutional:  Negative for chills and fever.   HENT:  Negative for ear pain and sore throat.   "  Eyes:  Negative for pain and visual disturbance.   Respiratory:  Negative for cough and shortness of breath.    Cardiovascular:  Negative for chest pain and palpitations.   Gastrointestinal:  Positive for abdominal pain and nausea. Negative for constipation, diarrhea and vomiting.   Genitourinary:  Negative for dysuria and hematuria.   Musculoskeletal:  Negative for arthralgias and back pain.   Skin:  Negative for color change and rash.   Neurological:  Negative for seizures and syncope.   Psychiatric/Behavioral: Negative.     All other systems reviewed and are negative.      Objective   /72 (BP Location: Right arm, Patient Position: Sitting, Cuff Size: Large)   Pulse 88   Temp 98 °F (36.7 °C) (Temporal)   Ht 4' 10.5\" (1.486 m)   Wt 64.6 kg (142 lb 6.4 oz)   SpO2 97%   BMI 29.26 kg/m²      Physical Exam  Vitals and nursing note reviewed.   Constitutional:       General: She is not in acute distress.     Appearance: She is well-developed and normal weight.   HENT:      Head: Normocephalic and atraumatic.   Eyes:      Conjunctiva/sclera: Conjunctivae normal.   Cardiovascular:      Rate and Rhythm: Normal rate and regular rhythm.      Heart sounds: No murmur heard.  Pulmonary:      Effort: Pulmonary effort is normal. No respiratory distress.      Breath sounds: Normal breath sounds.   Abdominal:      General: Bowel sounds are normal. There is no abdominal bruit.      Palpations: Abdomen is soft.      Tenderness: There is abdominal tenderness in the right upper quadrant and epigastric area. There is no right CVA tenderness or left CVA tenderness.      Hernia: A hernia is present.   Musculoskeletal:         General: No swelling.      Cervical back: Neck supple.   Skin:     General: Skin is warm and dry.      Capillary Refill: Capillary refill takes less than 2 seconds.   Neurological:      Mental Status: She is alert.   Psychiatric:         Mood and Affect: Mood normal.         "

## 2024-12-12 NOTE — PROGRESS NOTES
"Daily Note     Today's date: 2024  Patient name: Erin Suazo  : 1945  MRN: [id number]  Referring provider: Nellie Foster MD  Dx:   Encounter Diagnosis     ICD-10-CM    1. S/P total left hip arthroplasty  Z96.642                      Subjective: Pt offers no new complaints.      Objective: See treatment diary below      Assessment: Tolerated treatment well. Patient demonstrated fatigue post treatment, exhibited good technique with therapeutic exercises, and would benefit from continued PT  No issues w/ ex program.     Plan: Continue per plan of care.      Precautions: none      Manuals 12/10 12/12           PROM L hip ROGELIO 10 min                                                  Neuro Re-Ed 12/10 12/12           SLR flex/abd x20 x20           bridges 5\"x20 5\"x20           clamshells 5\"x20 5\"x20           Adductor squ 5\"x20 5\"x20           LAQ 5\"x20 5\"x20           Mini squats x20 x20           Step ups fwd and lat 6in x20 ea 6in x20                                                                            Ther Ex 12/10 12/12           Recumbent bike 10 min 10 min           Gastroc strap stretch 30\"x3 30\"x3                        Ther Activity                                       Gait Training                                       Modalities                                         Eval/Re-Eval POC Expires Auth #/ Referral # Total Visits Start Date Expiration Date Extension Info Visits Limitation   10/28 12/28 [facility]              12/10  2/10                                                                     1 2 3 4 5 6   10/28 F      7 8 9 10 11 12    11/25 11/29   12/3  12/5  12/10 F!     13 14 15 16 17 18                 19 20 21 22 23 24                 25 26 27 28 29 30                    "

## 2024-12-12 NOTE — ASSESSMENT & PLAN NOTE
To have some upper abdominal pain with some indigestion and developed after eating a sandwich that she thought the meat was bad.

## 2024-12-13 ENCOUNTER — RESULTS FOLLOW-UP (OUTPATIENT)
Dept: FAMILY MEDICINE CLINIC | Facility: CLINIC | Age: 79
End: 2024-12-13

## 2024-12-13 DIAGNOSIS — D72.820 LYMPHOCYTOSIS: Primary | ICD-10-CM

## 2024-12-13 DIAGNOSIS — D50.8 OTHER IRON DEFICIENCY ANEMIA: ICD-10-CM

## 2024-12-13 DIAGNOSIS — I10 ESSENTIAL HYPERTENSION: ICD-10-CM

## 2024-12-13 RX ORDER — LOSARTAN POTASSIUM AND HYDROCHLOROTHIAZIDE 25; 100 MG/1; MG/1
1 TABLET ORAL DAILY
Qty: 100 TABLET | Refills: 1 | Status: SHIPPED | OUTPATIENT
Start: 2024-12-13

## 2024-12-14 LAB — BACTERIA UR CULT: ABNORMAL

## 2024-12-16 DIAGNOSIS — R10.10 PAIN OF UPPER ABDOMEN: Primary | ICD-10-CM

## 2024-12-16 RX ORDER — PEDIATRIC MULTIVIT 61/D3/VIT K 1500-800
1 CAPSULE ORAL 2 TIMES DAILY
Qty: 60 CAPSULE | Refills: 1 | Status: SHIPPED | OUTPATIENT
Start: 2024-12-16

## 2024-12-17 ENCOUNTER — OFFICE VISIT (OUTPATIENT)
Dept: PHYSICAL THERAPY | Facility: MEDICAL CENTER | Age: 79
End: 2024-12-17
Payer: MEDICARE

## 2024-12-17 DIAGNOSIS — Z96.642 S/P TOTAL LEFT HIP ARTHROPLASTY: Primary | ICD-10-CM

## 2024-12-17 PROCEDURE — 97140 MANUAL THERAPY 1/> REGIONS: CPT | Performed by: PHYSICAL THERAPIST

## 2024-12-17 PROCEDURE — 97112 NEUROMUSCULAR REEDUCATION: CPT | Performed by: PHYSICAL THERAPIST

## 2024-12-17 NOTE — PROGRESS NOTES
"Daily Note     Today's date: 2024  Patient name: Erin Suazo  : 1945  MRN: [id number]  Referring provider: Nellie Foster MD  Dx:   Encounter Diagnosis     ICD-10-CM    1. S/P total left hip arthroplasty  Z96.642                      Subjective: Pt reports pain occurring in knee today.  Some groin pain persisting.  She id doing much better overall and has resumed most activity      Objective: See treatment diary below      Assessment: Tolerated treatment well. Patient demonstrated fatigue post treatment, exhibited good technique with therapeutic exercises, and would benefit from continued PT      Plan: Continue per plan of care.      Precautions: none      Manuals 12/10 12/12 12/17          PROM L hip ROGELIO 10 min 10 min                                                 Neuro Re-Ed 12/10 12/12 12/17          SLR flex/abd x20 x20 x20          bridges 5\"x20 5\"x20 5\"x20          clamshells 5\"x20 5\"x20 5\"x20          Adductor squ 5\"x20 5\"x20 5\"x20          LAQ 5\"x20 5\"x20 5\"x20          Mini squats x20 x20 x20          Step ups fwd and lat 6in x20 ea 6in x20 6in x20 ea                                                                           Ther Ex 12/10 12/12 12/17          Recumbent bike 10 min 10 min 10 min          Gastroc strap stretch 30\"x3 30\"x3 30\"x3                       Ther Activity                                       Gait Training                                       Modalities                                         Eval/Re-Eval POC Expires Auth #/ Referral # Total Visits Start Date Expiration Date Extension Info Visits Limitation   10/28 12/28 [facility]              12/10  2/10                                                                     1 2 3 4 5 6   10/28 F      7 8 9 10 11 12    11/25 11/29   12/3  12/5  12/10 F!     13 14 15 16 17 18                 19 20 21 22 23 24                 25 26 27 28 29 30                    "

## 2024-12-19 ENCOUNTER — OFFICE VISIT (OUTPATIENT)
Dept: PHYSICAL THERAPY | Facility: MEDICAL CENTER | Age: 79
End: 2024-12-19
Payer: MEDICARE

## 2024-12-19 DIAGNOSIS — Z96.642 S/P TOTAL LEFT HIP ARTHROPLASTY: Primary | ICD-10-CM

## 2024-12-19 PROCEDURE — 97140 MANUAL THERAPY 1/> REGIONS: CPT | Performed by: PHYSICAL THERAPIST

## 2024-12-19 PROCEDURE — 97112 NEUROMUSCULAR REEDUCATION: CPT | Performed by: PHYSICAL THERAPIST

## 2024-12-19 NOTE — PROGRESS NOTES
"Daily Note     Today's date: 2024  Patient name: Erin Suazo  : 1945  MRN: [id number]  Referring provider: Nellie Foster MD  Dx:   Encounter Diagnosis     ICD-10-CM    1. S/P total left hip arthroplasty  Z96.642                      Subjective: Pt only c/o intermittent groin pain.  She is doing more around the house.      Objective: See treatment diary below      Assessment: Tolerated treatment well. Patient demonstrated fatigue post treatment and exhibited good technique with therapeutic exercises  Good understanding of ex program.  She has f/u w/ ortho tomorrow; plan on d/c w/ independent HEP.    Plan:  Tentative d/c      Precautions: none      Manuals 12/10 12/12 12/17 12/19         PROM L hip ROGELIO 10 min 10 min 10 min                                                Neuro Re-Ed 12/10 12/12 12/17 12/19         SLR flex/abd x20 x20 x20 x20         bridges 5\"x20 5\"x20 5\"x20 5\"x20         clamshells 5\"x20 5\"x20 5\"x20 5\"x20         Adductor squ 5\"x20 5\"x20 5\"x20 5\"x20         LAQ 5\"x20 5\"x20 5\"x20 5\"x20         Mini squats x20 x20 x20 x20         Step ups fwd and lat 6in x20 ea 6in x20 6in x20 ea 6in x20 ea                                                                          Ther Ex 12/10 12/12 12/17 12/19         Recumbent bike 10 min 10 min 10 min 10 min         Gastroc strap stretch 30\"x3 30\"x3 30\"x3 30\"x3                      Ther Activity                                       Gait Training                                       Modalities                                         Eval/Re-Eval POC Expires Auth #/ Referral # Total Visits Start Date Expiration Date Extension Info Visits Limitation   10/28 12/28 [facility]              12/10  2/10                                                                     1 2 3 4 5 6   10/28 F      7 8 9 10 11 12    11/25 11/29   12/3  12/5  12/10 F!     13 14 15 16 17 18               19 20 21 22 23 24    "              25 26 27 28 29 30

## 2024-12-20 ENCOUNTER — OFFICE VISIT (OUTPATIENT)
Dept: OBGYN CLINIC | Facility: CLINIC | Age: 79
End: 2024-12-20

## 2024-12-20 ENCOUNTER — APPOINTMENT (OUTPATIENT)
Dept: RADIOLOGY | Facility: AMBULARY SURGERY CENTER | Age: 79
End: 2024-12-20
Attending: ORTHOPAEDIC SURGERY
Payer: MEDICARE

## 2024-12-20 VITALS — BODY MASS INDEX: 28.83 KG/M2 | WEIGHT: 143 LBS | HEIGHT: 59 IN

## 2024-12-20 DIAGNOSIS — Z96.642 S/P TOTAL LEFT HIP ARTHROPLASTY: ICD-10-CM

## 2024-12-20 DIAGNOSIS — Z96.642 S/P TOTAL LEFT HIP ARTHROPLASTY: Primary | ICD-10-CM

## 2024-12-20 PROCEDURE — 99024 POSTOP FOLLOW-UP VISIT: CPT | Performed by: ORTHOPAEDIC SURGERY

## 2024-12-20 PROCEDURE — 73502 X-RAY EXAM HIP UNI 2-3 VIEWS: CPT

## 2024-12-20 NOTE — PROGRESS NOTES
Assessment:   Diagnosis ICD-10-CM Associated Orders   1. S/P total left hip arthroplasty  Z96.642 XR hip/pelv 2-3 vws left if performed     Ambulatory Referral to Physical Therapy          Plan:  Patient is 3 months status post left anterior total hip arthroplasty performed on 9/16/2024.   Continue weightbearing as tolerated   Continue PT with hamstring and hip flexor stretching   Continue at home exercise   Use cane as needed   Follow up in 9 months     To do next visit:  Return in about 9 months (around 9/20/2025).    The above stated was discussed in layman's terms and the patient expressed understanding.  All questions were answered to the patient's satisfaction.       Scribe Attestation      I,:  Tracey Moscoso am acting as a scribe while in the presence of the attending physician.:       I,:  Nellie Foster MD personally performed the services described in this documentation    as scribed in my presence.:               Subjective:   Erin Suazo is a 79 y.o. female who presents 3 months status post left anterior total hip arthroplasty performed on 9/16/2024. She reports that her posterior pain from before surgery has resolved. She is still having some groin pain but states this is getting better with her at home exercise program. She is continuing to take tylenol for pain relief. She has stiffness with prolonged sitting. She is using a cane as needed due to feeling unsteady on her feet.       Review of systems negative unless otherwise specified in HPI  Review of Systems    Past Medical History:   Diagnosis Date    Arthritis     Carpal tunnel syndrome     GERD (gastroesophageal reflux disease)     Hiatal hernia     Hypertension     Insomnia     Pneumonia     PONV (postoperative nausea and vomiting)     Renal calculi     Sleep deprivation     chronically    Vitamin D deficiency     Vitamin D toxicity 2011    with leaky gut syndrome       Past Surgical History:   Procedure Laterality Date    BUNIONECTOMY  Bilateral     CARPAL TUNNEL RELEASE Right 2015    COLONOSCOPY      CYSTOSCOPY N/A 6/6/2019    Procedure: CYSTOSCOPY;  Surgeon: Brodie Romero MD;  [facility]: [facility];  Service: Gynecology Oncology    ESOPHAGOGASTRODUODENOSCOPY N/A 5/5/2016    Procedure: ESOPHAGOGASTRODUODENOSCOPY (EGD);  Surgeon: Enmanuel Balbuena MD;  Location: [facility];  Service:     FRACTURE SURGERY      L arm     NERVE BLOCK       R Knee    OOPHORECTOMY Left     WV ARTHRP ACETBLR/PROX FEM PROSTC AGRFT/ALGRFT Left 9/16/2024    Procedure: ARTHROPLASTY HIP TOTAL ANTERIOR;  Surgeon: Nellie Foster MD;  Location: [facility];  Service: Orthopedics    WV COLONOSCOPY FLX DX W/COLLJ SPEC WHEN PFRMD N/A 6/28/2016    Procedure: COLONOSCOPY;  Surgeon: Enmanuel Balbuena MD;  [facility]: [facility];  Service: Gastroenterology    WV ESOPHAGOGASTRODUODENOSCOPY TRANSORAL DIAGNOSTIC N/A 10/19/2018    Procedure: ESOPHAGOGASTRODUODENOSCOPY (EGD);  Surgeon: Enmanuel Balbuena MD;  Location: [facility];  Service: Gastroenterology    WV LAPS TOTAL HYSTERECT 250 GM/< W/RMVL TUBE/OVARY N/A 6/6/2019    Procedure: ROBOTIC TOTAL LAPAROSCOPIC HYSTERECTOMY, RIGHT SALPINGO-OOPHORECTOMY, EXTENSIVE ADHESIOLYSIS, APPROXIMATELY 45 MIN;  Surgeon: Brodie Romero MD;  Location: [facility];  Service: Gynecology Oncology    ROTATOR CUFF REPAIR Bilateral     TOE SURGERY Left     left 5th toe shaved down due to repeated fx    TOTAL KNEE ARTHROPLASTY Left        Family History   Problem Relation Age of Onset    Heart failure Mother     Other Mother         respiratory failure    Heart attack Mother     Heart failure Father         respiratory failure    Heart attack Father     Breast cancer Sister     No Known Problems Sister     Kidney failure Brother     Alcohol abuse Brother     No Known Problems Brother     Diabetes Brother     Hypertension Daughter     Arrhythmia Neg Hx     Clotting disorder Neg Hx     Fainting Neg Hx     Anuerysm Neg Hx     Stroke Neg Hx     Hyperlipidemia Neg Hx      Asthma Neg Hx        Social History     Occupational History    Not on file   Tobacco Use    Smoking status: Former     Current packs/day: 0.00     Average packs/day: 1 pack/day for 13.0 years (13.0 ttl pk-yrs)     Types: Cigarettes     Start date:      Quit date:      Years since quittin.0    Smokeless tobacco: Never    Tobacco comments:     Quit > 30 years ago    Vaping Use    Vaping status: Never Used   Substance and Sexual Activity    Alcohol use: No    Drug use: No    Sexual activity: Not on file     Comment: no partner         Current Outpatient Medications:     losartan-hydrochlorothiazide (HYZAAR) 100-25 MG per tablet, Take 1 tablet by mouth daily, Disp: 100 tablet, Rfl: 1    Nattokinase 100 MG CAPS, Take by mouth in the morning, Disp: , Rfl:     omeprazole (PriLOSEC) 20 mg delayed release capsule, Take 2 capsules (40 mg total) by mouth daily before breakfast, Disp: 30 capsule, Rfl: 5    ondansetron (ZOFRAN) 4 mg tablet, Take 1 tablet (4 mg total) by mouth every 8 (eight) hours as needed for nausea or vomiting, Disp: 20 tablet, Rfl: 0    ondansetron (ZOFRAN) 4 mg tablet, Take 1 tablet (4 mg total) by mouth every 8 (eight) hours as needed for nausea or vomiting, Disp: 5 tablet, Rfl: 0    Probiotic Product (MVW Complete Probiotic) CPDR, Take 1 capsule by mouth 2 (two) times a day, Disp: 60 capsule, Rfl: 1    Allergies   Allergen Reactions    Androgens Anaphylaxis    Cefazolin Hives, GI Intolerance and Swelling    Ciprofloxacin Hives    Other Other (See Comments)     STEROIDS- GI INTOLERANCE    Penicillins Hives and Rash    Prednisone Hives, GI Intolerance, Swelling and Vomiting    Vancomycin Hives, GI Intolerance, Diarrhea and Rash    Aspirin GI Intolerance and Hives     Even low dose     Cortisone Syncope    Adhesive [Medical Tape] Rash    Beef Allergy - Food Allergy Diarrhea    Gluten Meal - Food Allergy GI Intolerance    Lactose - Food Allergy Diarrhea    Oxycodone Rash and Vomiting    Pataday  "[Olopatadine Hcl] Eye Swelling          There were no vitals filed for this visit.    Body mass index is 29.38 kg/m².  Wt Readings from Last 3 Encounters:   12/20/24 64.9 kg (143 lb)   12/12/24 64.6 kg (142 lb 6.4 oz)   10/08/24 63.3 kg (139 lb 9.6 oz)       Objective:                    Ortho Exam    Left hip  Anterior incision well healed   No erythema   [facility]     Diagnostics, reviewed and taken today if performed as documented:    None performed      The attending physician has personally reviewed the pertinent films in PACS and interpretation is as follows: XR left hip demonstrates s/p STACY adequate alignment of implant with no sign of loosening       Procedures, if performed today:    Procedures    None performed      Portions of the record may have been created with voice recognition software.  Occasional wrong word or \"sound a like\" substitutions may have occurred due to the inherent limitations of voice recognition software.  Read the chart carefully and recognize, using context, where substitutions have occurred.    "

## 2025-01-07 ENCOUNTER — EVALUATION (OUTPATIENT)
Dept: PHYSICAL THERAPY | Facility: MEDICAL CENTER | Age: 80
End: 2025-01-07
Payer: MEDICARE

## 2025-01-07 DIAGNOSIS — Z96.642 S/P TOTAL LEFT HIP ARTHROPLASTY: Primary | ICD-10-CM

## 2025-01-07 PROCEDURE — 97112 NEUROMUSCULAR REEDUCATION: CPT | Performed by: PHYSICAL THERAPIST

## 2025-01-07 PROCEDURE — 97110 THERAPEUTIC EXERCISES: CPT | Performed by: PHYSICAL THERAPIST

## 2025-01-07 NOTE — PROGRESS NOTES
PT Re-Evaluation     Today's date: 2025  Patient name: Erin Suazo  : 1945  MRN: [id number]  Referring provider: Nellie Foster MD  Dx:   Encounter Diagnosis     ICD-10-CM    1. S/P total left hip arthroplasty  Z96.642                        Assessment  Impairments: abnormal gait, abnormal muscle tone, abnormal or restricted ROM, abnormal movement, activity intolerance, impaired physical strength, lacks appropriate home exercise program, pain with function, weight-bearing intolerance and unable to perform ADL    Assessment details: Erin Suazo has attended 15 visits since initiating PT and has demonstrated overall improvement.  Mobility and strength has improved with decreased reports of pain. Erin Suazo has demonstrated an improvement in function as well.  Currently, she has made steady progress towards her goals, but continue to remain limited with immobility, sleeping, descending stairs. She was advised to continue w/ PT for strengthening.     Understanding of Dx/Px/POC: good     Prognosis: good    Goals  Short Term Goals:   1. Pain decreased 2 ratings in 4 weeks MET  2.  ROM increased 10* in 4 weeks MET  3.  Strength increased 1/2 grade in 4 weeks MET    Long Term Goals:   1.  ADLS/IADLS in related activities improved to maximal level in 8 weeks PARTIALLY MET  2.  Ambulation is improved to maximal level in 8 weeks PARTIALLY MET  3.  Recreational activities are improved to maximal level in 8 weeks. PARTIALLY MET  4.  Webb with HEP in 8 weeks.  MET    Plan  Patient would benefit from: PT eval and skilled physical therapy    Planned therapy interventions: IASTM, manual therapy, neuromuscular re-education, patient/caregiver education, therapeutic exercise, stretching, strengthening, abdominal trunk stabilization, functional ROM exercises, flexibility, gait training, balance and home exercise program    Frequency: 2x week  Duration in weeks: 8  Treatment plan discussed with:  "patient        Subjective Evaluation    History of Present Illness  Mechanism of injury: Pt had f/u w/ ortho; advised to continue w/ PT for strengthening.    She reports that groin pain has decreased and is mild and intermittent.  Stiffness does persist first thing in the morning and following prolonged sitting.  No sleep disturbance due to hip.    She tends to feel better w/ movement.  Able to ascend reciprocally, descends non-reciprocally.    She has resumed housework and cooking as well as driving.      Patient Goals  Patient goals for therapy: decreased pain, increased motion, increased strength, independence with ADLs/IADLs and return to sport/leisure activities    Pain  At best pain ratin  At worst pain ratin          Objective     Active Range of Motion   Left Hip   Flexion: 80 degrees   Abduction: 30 degrees   Left Knee   Extension: 0 degrees     Passive Range of Motion   Left Hip   Flexion: 100 degrees   Abduction: 40 degrees     Strength/Myotome Testing     Left Hip   Planes of Motion   Flexion: 4+  Abduction: 4+    Left Knee   Flexion: 5  Extension: 5               Precautions: none      Manuals             PROM L hip assess                                                   Neuro Re-Ed             SLR flex/abd x20            bridges 5\"x20            clamshells 5\"x20            LAQ 5\"x20                         Mini squats x20            Step ups fwd and lat 6in x20 ea                                                                             Ther Ex             Recumbent bike 10 min            Gastroc strap stretch 30\"x3                         Ther Activity                                       Gait Training                                       Modalities                                          Eval/Re-Eval POC Expires Auth #/ Referral # Total Visits Start Date Expiration Date Extension Info Visits Limitation   10/28 12/28 [facility]              12/10  2/10                1/7 " 3/7                                                   1 2 3 4 5 6   10/28 F  11/1 11/4 11/8 11/19 11/22    7 8 9 10 11 12    11/25 11/29   12/3  12/5  12/10 F!  12/12   13 14 15 16 17 18    12/17 12/19 1/7          19 20 21 22 23 24                 25 26 27 28 29 30

## 2025-01-08 ENCOUNTER — APPOINTMENT (OUTPATIENT)
Dept: LAB | Facility: MEDICAL CENTER | Age: 80
End: 2025-01-08
Payer: MEDICARE

## 2025-01-08 ENCOUNTER — OFFICE VISIT (OUTPATIENT)
Dept: FAMILY MEDICINE CLINIC | Facility: CLINIC | Age: 80
End: 2025-01-08
Payer: MEDICARE

## 2025-01-08 VITALS
OXYGEN SATURATION: 98 % | HEART RATE: 80 BPM | WEIGHT: 143 LBS | HEIGHT: 59 IN | SYSTOLIC BLOOD PRESSURE: 124 MMHG | DIASTOLIC BLOOD PRESSURE: 68 MMHG | TEMPERATURE: 97.8 F | BODY MASS INDEX: 28.83 KG/M2

## 2025-01-08 DIAGNOSIS — D50.8 OTHER IRON DEFICIENCY ANEMIA: ICD-10-CM

## 2025-01-08 DIAGNOSIS — Z13.1 DIABETES MELLITUS SCREENING: ICD-10-CM

## 2025-01-08 DIAGNOSIS — F43.21 GRIEF: ICD-10-CM

## 2025-01-08 DIAGNOSIS — N18.2 CKD STAGE G2/A2, GFR 60-89 AND ALBUMIN CREATININE RATIO 30-299 MG/G: ICD-10-CM

## 2025-01-08 DIAGNOSIS — Z72.820 SLEEP DEPRIVATION: ICD-10-CM

## 2025-01-08 DIAGNOSIS — E55.9 VITAMIN D DEFICIENCY: ICD-10-CM

## 2025-01-08 DIAGNOSIS — F41.9 ANXIETY: ICD-10-CM

## 2025-01-08 DIAGNOSIS — R53.83 OTHER FATIGUE: ICD-10-CM

## 2025-01-08 DIAGNOSIS — I10 ESSENTIAL HYPERTENSION: ICD-10-CM

## 2025-01-08 DIAGNOSIS — R10.10 PAIN OF UPPER ABDOMEN: ICD-10-CM

## 2025-01-08 DIAGNOSIS — N18.2 CKD STAGE G2/A2, GFR 60-89 AND ALBUMIN CREATININE RATIO 30-299 MG/G: Primary | ICD-10-CM

## 2025-01-08 DIAGNOSIS — D72.820 LYMPHOCYTOSIS: ICD-10-CM

## 2025-01-08 LAB
25(OH)D3 SERPL-MCNC: 14.4 NG/ML (ref 30–100)
ALBUMIN SERPL BCG-MCNC: 4.4 G/DL (ref 3.5–5)
ALP SERPL-CCNC: 79 U/L (ref 34–104)
ALT SERPL W P-5'-P-CCNC: 12 U/L (ref 7–52)
ANION GAP SERPL CALCULATED.3IONS-SCNC: 11 MMOL/L (ref 4–13)
AST SERPL W P-5'-P-CCNC: 19 U/L (ref 13–39)
BASOPHILS # BLD AUTO: 0.03 THOUSANDS/ΜL (ref 0–0.1)
BASOPHILS NFR BLD AUTO: 0 % (ref 0–1)
BILIRUB SERPL-MCNC: 0.53 MG/DL (ref 0.2–1)
BUN SERPL-MCNC: 20 MG/DL (ref 5–25)
CALCIUM SERPL-MCNC: 9.4 MG/DL (ref 8.4–10.2)
CHLORIDE SERPL-SCNC: 104 MMOL/L (ref 96–108)
CO2 SERPL-SCNC: 26 MMOL/L (ref 21–32)
CREAT SERPL-MCNC: 0.68 MG/DL (ref 0.6–1.3)
EOSINOPHIL # BLD AUTO: 0.15 THOUSAND/ΜL (ref 0–0.61)
EOSINOPHIL NFR BLD AUTO: 2 % (ref 0–6)
ERYTHROCYTE [DISTWIDTH] IN BLOOD BY AUTOMATED COUNT: 14.5 % (ref 11.6–15.1)
GFR SERPL CREATININE-BSD FRML MDRD: 82 ML/MIN/1.73SQ M
GLUCOSE P FAST SERPL-MCNC: 80 MG/DL (ref 65–99)
HCT VFR BLD AUTO: 38.4 % (ref 34.8–46.1)
HGB BLD-MCNC: 12.1 G/DL (ref 11.5–15.4)
IMM GRANULOCYTES # BLD AUTO: 0.03 THOUSAND/UL (ref 0–0.2)
IMM GRANULOCYTES NFR BLD AUTO: 0 % (ref 0–2)
LYMPHOCYTES # BLD AUTO: 1.4 THOUSANDS/ΜL (ref 0.6–4.47)
LYMPHOCYTES NFR BLD AUTO: 18 % (ref 14–44)
MCH RBC QN AUTO: 29.4 PG (ref 26.8–34.3)
MCHC RBC AUTO-ENTMCNC: 31.5 G/DL (ref 31.4–37.4)
MCV RBC AUTO: 93 FL (ref 82–98)
MONOCYTES # BLD AUTO: 0.61 THOUSAND/ΜL (ref 0.17–1.22)
MONOCYTES NFR BLD AUTO: 8 % (ref 4–12)
NEUTROPHILS # BLD AUTO: 5.38 THOUSANDS/ΜL (ref 1.85–7.62)
NEUTS SEG NFR BLD AUTO: 72 % (ref 43–75)
NRBC BLD AUTO-RTO: 0 /100 WBCS
PLATELET # BLD AUTO: 242 THOUSANDS/UL (ref 149–390)
PMV BLD AUTO: 12.5 FL (ref 8.9–12.7)
POTASSIUM SERPL-SCNC: 4 MMOL/L (ref 3.5–5.3)
PROT SERPL-MCNC: 7.3 G/DL (ref 6.4–8.4)
RBC # BLD AUTO: 4.11 MILLION/UL (ref 3.81–5.12)
SODIUM SERPL-SCNC: 141 MMOL/L (ref 135–147)
WBC # BLD AUTO: 7.6 THOUSAND/UL (ref 4.31–10.16)

## 2025-01-08 PROCEDURE — 82306 VITAMIN D 25 HYDROXY: CPT

## 2025-01-08 PROCEDURE — 85025 COMPLETE CBC W/AUTO DIFF WBC: CPT

## 2025-01-08 PROCEDURE — 83036 HEMOGLOBIN GLYCOSYLATED A1C: CPT

## 2025-01-08 PROCEDURE — 36415 COLL VENOUS BLD VENIPUNCTURE: CPT

## 2025-01-08 PROCEDURE — 99214 OFFICE O/P EST MOD 30 MIN: CPT | Performed by: FAMILY MEDICINE

## 2025-01-08 PROCEDURE — 80053 COMPREHEN METABOLIC PANEL: CPT

## 2025-01-08 RX ORDER — ZOLPIDEM TARTRATE 10 MG/1
5-10 TABLET ORAL
Qty: 30 TABLET | Refills: 0 | Status: SHIPPED | OUTPATIENT
Start: 2025-01-08

## 2025-01-08 NOTE — PROGRESS NOTES
Name: Erin Suazo      : 1945      MRN: [id number]  Encounter Provider: Cholo Field MD  Encounter Date: 2025   Encounter department: [facility]    Assessment & Plan  CKD stage G2/A2, GFR 60-89 and albumin creatinine ratio  mg/g  Lab Results   Component Value Date    EGFR 69 2024    EGFR 74 2024    EGFR 71 2024    CREATININE 0.81 2024    CREATININE 0.76 2024    CREATININE 0.79 2024   Patient does have stage III CKD, obtain vitamin D level for evaluation    Orders:    Vitamin D 25 hydroxy; Future    Essential hypertension  Blood pressure 124/64, at goal  Orders:    TSH + Free T4; Future    Anxiety    Orders:    TSH + Free T4; Future    Sleep deprivation  Obtain T4 and TSH for evaluation of fatigue and sleep issues and anxiety  Orders:    TSH + Free T4; Future    Other iron deficiency anemia  CBC for iron deficiency anemia screening       Vitamin D deficiency  Obtain vitamin D level for evaluation, patient does have history of vitamin D deficiency  Orders:    Vitamin D 25 hydroxy; Future    Other fatigue  Fatigue before recent loss, unclear etiology, will obtain hemoglobin A1c for evaluation.  Patient does have history of prediabetes, rule out diabetes as a etiology for her fatigue  Orders:    Hemoglobin A1C; Future    Diabetes mellitus screening      Orders:    Hemoglobin A1C; Future    Grief  Grief from losing a grandson from suicide  Patient is likely experiencing depression and adjustment disorder  Declines SSRI medication at this time  Patient has good support system, is taking good care of herself, maintaining a good appetite  Counseling provided regarding grief    Will follow-up in 6 to 12 weeks         History of Present Illness       HPI    Erin is a 79-year-old female patient presents for follow-up.  Since her last evaluation patient continues to have significant fatigue symptoms.  Patient is interested in blood work to determine  "etiology for her fatigue.  She does have a history of iron deficiency anemia and vitamin D deficiency.  Patient has noticed she has been requiring medication to help with sleep.    \" I just do not feel like myself\"    Recently patient has been [place] large amount of stress due to loss of her grandson from suicide.  She is currently grieving however reports she is still taking care of herself and her good support system discharge.  Not interested in medication to help with depressive symptoms  Patient denies any HI or SI    Patient denies any recent injuries or illness.  No recent COVID or upper respiratory symptoms.    Review of Systems   Constitutional:  Positive for appetite change and fatigue. Negative for chills and fever.        \" I have to make myself eat\"   HENT:  Negative for congestion, rhinorrhea and sore throat.    Respiratory:  Positive for chest tightness. Negative for shortness of breath.    Cardiovascular:  Negative for chest pain.   Gastrointestinal:  Negative for abdominal pain.   Neurological:  Negative for dizziness, light-headedness and headaches.   Psychiatric/Behavioral:  Positive for dysphoric mood and sleep disturbance.      Past Medical History:   Diagnosis Date    Arthritis     Carpal tunnel syndrome     GERD (gastroesophageal reflux disease)     Hiatal hernia     Hypertension     Insomnia     Pneumonia     PONV (postoperative nausea and vomiting)     Renal calculi     Sleep deprivation     chronically    Vitamin D deficiency     Vitamin D toxicity 2011    with leaky gut syndrome     Past Surgical History:   Procedure Laterality Date    BUNIONECTOMY Bilateral     CARPAL TUNNEL RELEASE Right 2015    COLONOSCOPY      CYSTOSCOPY N/A 6/6/2019    Procedure: CYSTOSCOPY;  Surgeon: Brodie Romero MD;  [facility]: [facility];  Service: Gynecology Oncology    ESOPHAGOGASTRODUODENOSCOPY N/A 5/5/2016    Procedure: ESOPHAGOGASTRODUODENOSCOPY (EGD);  Surgeon: Enmanuel Balbuena MD;  [facility]: [facility];  " Service:     FRACTURE SURGERY      L arm     NERVE BLOCK       R Knee    OOPHORECTOMY Left     AL ARTHRP ACETBLR/PROX FEM PROSTC AGRFT/ALGRFT Left 2024    Procedure: ARTHROPLASTY HIP TOTAL ANTERIOR;  Surgeon: Nellie Foster MD;  Location[facility];  Service: Orthopedics    AL COLONOSCOPY FLX DX W/COLLJ SPEC WHEN PFRMD N/A 2016    Procedure: COLONOSCOPY;  Surgeon: Enmanuel Balbuena MD;  [facility];  Service: Gastroenterology    AL ESOPHAGOGASTRODUODENOSCOPY TRANSORAL DIAGNOSTIC N/A 10/19/2018    Procedure: ESOPHAGOGASTRODUODENOSCOPY (EGD);  Surgeon: Enmanuel Balbuena MD;  [facility]: [facility];  Service: Gastroenterology    AL LAPS TOTAL HYSTERECT 250 GM/< W/RMVL TUBE/OVARY N/A 2019    Procedure: ROBOTIC TOTAL LAPAROSCOPIC HYSTERECTOMY, RIGHT SALPINGO-OOPHORECTOMY, EXTENSIVE ADHESIOLYSIS, APPROXIMATELY 45 MIN;  Surgeon: Brodie Romero MD;  Location: [facility];  Service: Gynecology Oncology    ROTATOR CUFF REPAIR Bilateral     TOE SURGERY Left     left 5th toe shaved down due to repeated fx    TOTAL KNEE ARTHROPLASTY Left      Family History   Problem Relation Age of Onset    Heart failure Mother     Other Mother         respiratory failure    Heart attack Mother     Heart failure Father         respiratory failure    Heart attack Father     Breast cancer Sister     No Known Problems Sister     Kidney failure Brother     Alcohol abuse Brother     No Known Problems Brother     Diabetes Brother     Hypertension Daughter     Arrhythmia Neg Hx     Clotting disorder Neg Hx     Fainting Neg Hx     Anuerysm Neg Hx     Stroke Neg Hx     Hyperlipidemia Neg Hx     Asthma Neg Hx      Social History     Tobacco Use    Smoking status: Former     Current packs/day: 0.00     Average packs/day: 1 pack/day for 13.0 years (13.0 ttl pk-yrs)     Types: Cigarettes     Start date:      Quit date:      Years since quittin.0    Smokeless tobacco: Never    Tobacco comments:     Quit > 30 years ago   "  Vaping Use    Vaping status: Never Used   Substance and Sexual Activity    Alcohol use: No    Drug use: No    Sexual activity: Not on file     Comment: no partner     Current Outpatient Medications on File Prior to Visit   Medication Sig    losartan-hydrochlorothiazide (HYZAAR) 100-25 MG per tablet Take 1 tablet by mouth daily    Nattokinase 100 MG CAPS Take by mouth in the morning    omeprazole (PriLOSEC) 20 mg delayed release capsule Take 2 capsules (40 mg total) by mouth daily before breakfast    ondansetron (ZOFRAN) 4 mg tablet Take 1 tablet (4 mg total) by mouth every 8 (eight) hours as needed for nausea or vomiting    ondansetron (ZOFRAN) 4 mg tablet Take 1 tablet (4 mg total) by mouth every 8 (eight) hours as needed for nausea or vomiting    Probiotic Product (MVW Complete Probiotic) CPDR Take 1 capsule by mouth 2 (two) times a day     Allergies   Allergen Reactions    Androgens Anaphylaxis    Cefazolin Hives, GI Intolerance and Swelling    Ciprofloxacin Hives    Other Other (See Comments)     STEROIDS- GI INTOLERANCE    Penicillins Hives and Rash    Prednisone Hives, GI Intolerance, Swelling and Vomiting    Vancomycin Hives, GI Intolerance, Diarrhea and Rash    Aspirin GI Intolerance and Hives     Even low dose     Cortisone Syncope    Adhesive [Medical Tape] Rash    Beef Allergy - Food Allergy Diarrhea    Gluten Meal - Food Allergy GI Intolerance    Lactose - Food Allergy Diarrhea    Oxycodone Rash and Vomiting    Pataday [Olopatadine Hcl] Eye Swelling     Immunization History   Administered Date(s) Administered    COVID-19 J&J (Screenmailer) vaccine 0.5 mL 05/27/2021, 02/15/2022    Pneumococcal Conjugate Vaccine 20-valent (Pcv20), Polysace 09/07/2024    Tdap 11/25/2013, 02/08/2020    Tuberculin Skin Test 09/20/2024, 09/27/2024     Objective   /68 (BP Location: Left arm, Patient Position: Sitting, Cuff Size: Standard)   Pulse 80   Temp 97.8 °F (36.6 °C)   Ht 4' 10.5\" (1.486 m)   Wt 64.9 kg (143 lb)  " " SpO2 98%   Breastfeeding No   BMI 29.38 kg/m²     Physical Exam  Vitals reviewed.   Constitutional:       General: She is not in acute distress.     Appearance: Normal appearance. She is not toxic-appearing.   Cardiovascular:      Rate and Rhythm: Normal rate.      Pulses: Normal pulses.   Pulmonary:      Effort: Pulmonary effort is normal.   Abdominal:      General: Abdomen is flat.      Palpations: Abdomen is soft.   Musculoskeletal:         General: No swelling or deformity.      Comments: Arthritic changes   Skin:     Capillary Refill: Capillary refill takes less than 2 seconds.   Neurological:      General: No focal deficit present.      Mental Status: She is alert.   Psychiatric:         Mood and Affect: Mood normal.             Cholo Field M.D.  Family Medicine    Please excuse any \"sound-alike\" errors that may have ocurred during the process of dictation. Parts of this note have been dictated and there may be errors present in the transcription process. Thank you.    " 37.1

## 2025-01-08 NOTE — ASSESSMENT & PLAN NOTE
Obtain T4 and TSH for evaluation of fatigue and sleep issues and anxiety  Orders:    TSH + Free T4; Future

## 2025-01-08 NOTE — ASSESSMENT & PLAN NOTE
Obtain vitamin D level for evaluation, patient does have history of vitamin D deficiency  Orders:    Vitamin D 25 hydroxy; Future

## 2025-01-09 ENCOUNTER — RESULTS FOLLOW-UP (OUTPATIENT)
Dept: FAMILY MEDICINE CLINIC | Facility: CLINIC | Age: 80
End: 2025-01-09

## 2025-01-09 LAB
EST. AVERAGE GLUCOSE BLD GHB EST-MCNC: 123 MG/DL
HBA1C MFR BLD: 5.9 %

## 2025-01-10 ENCOUNTER — RESULTS FOLLOW-UP (OUTPATIENT)
Dept: FAMILY MEDICINE CLINIC | Facility: CLINIC | Age: 80
End: 2025-01-10

## 2025-01-10 ENCOUNTER — OFFICE VISIT (OUTPATIENT)
Dept: PHYSICAL THERAPY | Facility: MEDICAL CENTER | Age: 80
End: 2025-01-10
Payer: MEDICARE

## 2025-01-10 DIAGNOSIS — E55.9 VITAMIN D DEFICIENCY: Primary | ICD-10-CM

## 2025-01-10 DIAGNOSIS — Z96.642 S/P TOTAL LEFT HIP ARTHROPLASTY: Primary | ICD-10-CM

## 2025-01-10 PROCEDURE — 97112 NEUROMUSCULAR REEDUCATION: CPT | Performed by: PHYSICAL THERAPIST

## 2025-01-10 PROCEDURE — 97110 THERAPEUTIC EXERCISES: CPT | Performed by: PHYSICAL THERAPIST

## 2025-01-10 RX ORDER — ERGOCALCIFEROL 1.25 MG/1
50000 CAPSULE, LIQUID FILLED ORAL WEEKLY
Qty: 12 CAPSULE | Refills: 0 | Status: SHIPPED | OUTPATIENT
Start: 2025-01-10

## 2025-01-10 NOTE — PROGRESS NOTES
"Daily Note     Today's date: 1/10/2025  Patient name: Erin Suazo  : 1945  MRN: [id number]  Referring provider: Nellie Foster MD  Dx:   Encounter Diagnosis     ICD-10-CM    1. S/P total left hip arthroplasty  Z96.642                      Subjective: Pt reports some soreness in hip today; possibly from increased cleaning at home.      Objective: See treatment diary below      Assessment: Tolerated treatment well. Patient demonstrated fatigue post treatment, exhibited good technique with therapeutic exercises, and would benefit from continued PT  Mild intermittent soreness reported throughout treatment.    Plan: Continue per plan of care.      Precautions: none      Manuals             PROM L hip assess                                                   Neuro Re-Ed 1/7 1/10           SLR flex/abd x20 x20           bridges 5\"x20 5\"x20           clamshells 5\"x20 5\"x20           LAQ 5\"x20 5\"x20                        Mini squats x20 x20           Step ups fwd and lat 6in x20 ea 6in x20 ea                                                                            Ther Ex 1/7 1/10           Recumbent bike 10 min 10 min           Gastroc strap stretch 30\"x3 30\"x3                        Ther Activity                                       Gait Training                                       Modalities                                         Eval/Re-Eval POC Expires Auth #/ Referral # Total Visits Start Date Expiration Date Extension Info Visits Limitation   10/28 12/28 [facility]              12/10  2/10                1/7 3/7                                                   1 2 3 4 5 6   10/28 F      7 8 9 10 11 12    11/25 11/29   12/3  12/5  12/10 F!     13 14 15 16 17 18    12/17  12/19 1/7  1/10        19 20 21 22 23 24                 25 26 27 28 29 30                      "

## 2025-01-14 ENCOUNTER — OFFICE VISIT (OUTPATIENT)
Dept: PHYSICAL THERAPY | Facility: MEDICAL CENTER | Age: 80
End: 2025-01-14
Payer: MEDICARE

## 2025-01-14 DIAGNOSIS — Z96.642 S/P TOTAL LEFT HIP ARTHROPLASTY: Primary | ICD-10-CM

## 2025-01-14 PROCEDURE — 97110 THERAPEUTIC EXERCISES: CPT | Performed by: PHYSICAL THERAPIST

## 2025-01-14 PROCEDURE — 97140 MANUAL THERAPY 1/> REGIONS: CPT | Performed by: PHYSICAL THERAPIST

## 2025-01-14 PROCEDURE — 97112 NEUROMUSCULAR REEDUCATION: CPT | Performed by: PHYSICAL THERAPIST

## 2025-01-14 NOTE — PROGRESS NOTES
"Daily Note     Today's date: 2025  Patient name: Erin Suazo  : 1945  MRN: [id number]  Referring provider: Nellie Foster MD  Dx:   Encounter Diagnosis     ICD-10-CM    1. S/P total left hip arthroplasty  Z96.642                        Subjective: Pt c/o cramping in lower leg that occurs during the night. Intermittent mild groin pain persisting.      Objective: See treatment diary below      Assessment: Tolerated treatment well. Patient demonstrated fatigue post treatment, exhibited good technique with therapeutic exercises, and would benefit from continued PT  Initiated passive stretching today; assess response next visit  Advised to perform gastroc stretching prior to bed in effort to reduce cramping.    Plan: Continue per plan of care.      Precautions: none      Manuals           PROM L hip assess            Stretch L hip flex, abd, hamstring, gastroc   10 min                                    Neuro Re-Ed 1/7 1/10 1/14          SLR flex/abd x20 x20 x20          bridges 5\"x20 5\"x20 5\"x20          clamshells 5\"x20 5\"x20 5\"x20          LAQ 5\"x20 5\"x20 5\"x20          Adductor squeeze   5\"x20          Mini squats x20 x20 x20          Step ups fwd and lat 6in x20 ea 6in x20 ea 6in x20 ea                                                                           Ther Ex 1/7 1/10 1/14          Recumbent bike 10 min 10 min 10 min          Gastroc strap stretch 30\"x3 30\"x3 30\"x3                       Ther Activity                                       Gait Training                                       Modalities                                         Eval/Re-Eval POC Expires Auth #/ Referral # Total Visits Start Date Expiration Date Extension Info Visits Limitation   10/28 12/28 [facility]              12/10  2/10                1/7 3/7                                                   1 2 3 4 5 6   10/28 F      7 8 9 10 11 12    11/25 11/29   12/3  12/5  " 12/10 F!  12/12   13 14 15 16 17 18    12/17  12/19 1/7  1/10  1/14      19 20 21 22 23 24                 25 26 27 28 29 30

## 2025-01-16 ENCOUNTER — OFFICE VISIT (OUTPATIENT)
Dept: PHYSICAL THERAPY | Facility: MEDICAL CENTER | Age: 80
End: 2025-01-16
Payer: MEDICARE

## 2025-01-16 DIAGNOSIS — Z96.642 S/P TOTAL LEFT HIP ARTHROPLASTY: Primary | ICD-10-CM

## 2025-01-16 PROCEDURE — 97140 MANUAL THERAPY 1/> REGIONS: CPT | Performed by: PHYSICAL THERAPIST

## 2025-01-16 PROCEDURE — 97112 NEUROMUSCULAR REEDUCATION: CPT | Performed by: PHYSICAL THERAPIST

## 2025-01-16 PROCEDURE — 97110 THERAPEUTIC EXERCISES: CPT | Performed by: PHYSICAL THERAPIST

## 2025-01-16 NOTE — PROGRESS NOTES
"Daily Note     Today's date: 2025  Patient name: Erin Suazo  : 1945  MRN: [id number]  Referring provider: Nellie Foster MD  Dx:   Encounter Diagnosis     ICD-10-CM    1. S/P total left hip arthroplasty  Z96.642                        Subjective: Pt reports improvement w/ cramping after passive stretching last visit.  Went out shopping this week; feels like her leg is getting stronger.      Objective: See treatment diary below      Assessment: Tolerated treatment well. Patient demonstrated fatigue post treatment, exhibited good technique with therapeutic exercises, and would benefit from continued PT  No issues reported throughout treatment.       Plan: Continue per plan of care.      Precautions: none      Manuals          PROM L hip assess            Stretch L hip flex, abd, hamstring, gastroc   10 min 10 min                                   Neuro Re-Ed 1/7 1/10 1/14 1/16         SLR flex/abd x20 x20 x20 x20         bridges 5\"x20 5\"x20 5\"x20 5\"x20         clamshells 5\"x20 5\"x20 5\"x20 5\"x20         LAQ 5\"x20 5\"x20 5\"x20          Adductor squeeze   5\"x20 5\"x20         Mini squats x20 x20 x20 x20         Step ups fwd and lat 6in x20 ea 6in x20 ea 6in x20 ea 6in x20 ea                                                                          Ther Ex 1/7 1/10 1/14 1/16         Recumbent bike 10 min 10 min 10 min 10 min         Gastroc strap stretch 30\"x3 30\"x3 30\"x3 30\"x3                      Ther Activity                                       Gait Training                                       Modalities                                         Eval/Re-Eval POC Expires Auth #/ Referral # Total Visits Start Date Expiration Date Extension Info Visits Limitation   10/28 12/28 [facility]              12/10  2/10                1/7 3/7                                                   1 2 3 4 5 6   10/28 F      7 8 9 10 11 12    11/25 11/29   12/3  12/5  " 12/10 F!  12/12   13 14 15 16 17 18    12/17  12/19 1/7  1/10  1/14   1/16   19 20 21 22 23 24                 25 26 27 28 29 30

## 2025-01-18 DIAGNOSIS — R10.10 PAIN OF UPPER ABDOMEN: ICD-10-CM

## 2025-01-21 ENCOUNTER — OFFICE VISIT (OUTPATIENT)
Dept: PHYSICAL THERAPY | Facility: MEDICAL CENTER | Age: 80
End: 2025-01-21
Payer: MEDICARE

## 2025-01-21 DIAGNOSIS — Z96.642 S/P TOTAL LEFT HIP ARTHROPLASTY: Primary | ICD-10-CM

## 2025-01-21 PROCEDURE — 97110 THERAPEUTIC EXERCISES: CPT | Performed by: PHYSICAL THERAPIST

## 2025-01-21 PROCEDURE — 97112 NEUROMUSCULAR REEDUCATION: CPT | Performed by: PHYSICAL THERAPIST

## 2025-01-21 PROCEDURE — 97140 MANUAL THERAPY 1/> REGIONS: CPT | Performed by: PHYSICAL THERAPIST

## 2025-01-21 NOTE — PROGRESS NOTES
"Daily Note     Today's date: 2025  Patient name: Erin Suazo  : 1945  MRN: [id number]  Referring provider: Nellie Foster MD  Dx:   Encounter Diagnosis     ICD-10-CM    1. S/P total left hip arthroplasty  Z96.642                          Subjective: Pt reports increased soreness and stiffness today.    Objective: See treatment diary below      Assessment: Tolerated treatment well. Patient demonstrated fatigue post treatment, exhibited good technique with therapeutic exercises, and would benefit from continued PT  No increased pain reported w/ treatment.      Plan: Continue per plan of care.      Precautions: none      Manuals         PROM L hip assess            Stretch L hip flex, abd, hamstring, gastroc   10 min 10 min 10 min                                  Neuro Re-Ed 1/7 1/10 1/14 1/16 1/21        SLR flex/abd x20 x20 x20 x20 x20        bridges 5\"x20 5\"x20 5\"x20 5\"x20 5\"x20        clamshells 5\"x20 5\"x20 5\"x20 5\"x20 5\"x20        LAQ 5\"x20 5\"x20 5\"x20  5\"x20        Adductor squeeze   5\"x20 5\"x20 5\"X20        Mini squats x20 x20 x20 x20 20        Step ups fwd and lat 6in x20 ea 6in x20 ea 6in x20 ea 6in x20 ea 6in x20                                                                         Ther Ex 1/7 1/10 1/14 1/16 1/21        Recumbent bike 10 min 10 min 10 min 10 min 10 min        Gastroc strap stretch 30\"x3 30\"x3 30\"x3 30\"x3 30\"x3                     Ther Activity                                       Gait Training                                       Modalities                                         Eval/Re-Eval POC Expires Auth #/ Referral # Total Visits Start Date Expiration Date Extension Info Visits Limitation   10/28 12/28 [facility]              12/10  2/10                1/7 3/7                                                   1 2 3 4 5 6   10/28 F      7 8 9 10 11 12    11/25 11/29   12/3  12/5  12/10 F!     13 14 15 16 17 " 18    12/17  12/19 1/7  1/10  1/14   1/16   19 20 21 22 23 24   1/21              25 26 27 28 29 30

## 2025-01-23 ENCOUNTER — TELEPHONE (OUTPATIENT)
Dept: FAMILY MEDICINE CLINIC | Facility: CLINIC | Age: 80
End: 2025-01-23

## 2025-01-23 ENCOUNTER — OFFICE VISIT (OUTPATIENT)
Dept: FAMILY MEDICINE CLINIC | Facility: CLINIC | Age: 80
End: 2025-01-23
Payer: MEDICARE

## 2025-01-23 ENCOUNTER — OFFICE VISIT (OUTPATIENT)
Dept: PHYSICAL THERAPY | Facility: MEDICAL CENTER | Age: 80
End: 2025-01-23
Payer: MEDICARE

## 2025-01-23 VITALS
HEART RATE: 81 BPM | DIASTOLIC BLOOD PRESSURE: 82 MMHG | WEIGHT: 140.6 LBS | SYSTOLIC BLOOD PRESSURE: 108 MMHG | HEIGHT: 59 IN | OXYGEN SATURATION: 98 % | TEMPERATURE: 97.2 F | BODY MASS INDEX: 28.35 KG/M2

## 2025-01-23 DIAGNOSIS — R07.9 CHEST PAIN, UNSPECIFIED TYPE: ICD-10-CM

## 2025-01-23 DIAGNOSIS — Z96.642 S/P TOTAL LEFT HIP ARTHROPLASTY: Primary | ICD-10-CM

## 2025-01-23 DIAGNOSIS — F43.21 GRIEF: Primary | ICD-10-CM

## 2025-01-23 PROCEDURE — 97110 THERAPEUTIC EXERCISES: CPT | Performed by: PHYSICAL THERAPIST

## 2025-01-23 PROCEDURE — 99214 OFFICE O/P EST MOD 30 MIN: CPT | Performed by: FAMILY MEDICINE

## 2025-01-23 PROCEDURE — 97140 MANUAL THERAPY 1/> REGIONS: CPT | Performed by: PHYSICAL THERAPIST

## 2025-01-23 PROCEDURE — 93000 ELECTROCARDIOGRAM COMPLETE: CPT | Performed by: FAMILY MEDICINE

## 2025-01-23 PROCEDURE — 97112 NEUROMUSCULAR REEDUCATION: CPT | Performed by: PHYSICAL THERAPIST

## 2025-01-23 NOTE — PROGRESS NOTES
Name: Erin Suazo      : 1945      MRN: [id number]  Encounter Provider: Cholo Field MD  Encounter Date: 2025   Encounter department: [facility]    Assessment & Plan  Grief  Counseling provided in regards to recent grief episodes.  Explained to patient that medication may help in this situation however patient declines at this time.  Encourage patient to spend time with her family members and to talk to people about her experience.   Explained pathological grief.        Chest pain, unspecified type  Patient's chest pain likely is from stress and anxiety, no significant respiratory symptoms, EKG does not demonstrate any evidence of myocardial infarction at this time, reassurance provided  Orders:    POCT ECG         History of Present Illness       HPI    79-year-old female patient presents for evaluation regarding chest pain rating to the left arm.  Patient reports she is under large amount of stress due to recently losing her  and her grandson.    Most recent EKG completed on 2024 demonstrates a septal infarct of unknown age.  Blood pressure is 108/82, normal heart rate.    Pt continues to have some chest pain in the office. She had difficulty sleeping last night, only had 2-3 hours of sleep.     Pt did not use ambien last night, states she is trying to stay away from the medication unless she absolute.    Review of Systems   Constitutional:  Negative for chills and fever.   HENT:  Negative for congestion, rhinorrhea and sore throat.    Respiratory:  Negative for chest tightness and shortness of breath.    Cardiovascular:  Positive for chest pain.   Gastrointestinal:  Negative for abdominal pain.   Neurological:  Negative for light-headedness.   Psychiatric/Behavioral:  Positive for dysphoric mood and sleep disturbance. The patient is nervous/anxious.          Past Medical History:   Diagnosis Date    Arthritis     Carpal tunnel syndrome     GERD (gastroesophageal  reflux disease)     Hiatal hernia     Hypertension     Insomnia     Pneumonia     PONV (postoperative nausea and vomiting)     Renal calculi     Sleep deprivation     chronically    Vitamin D deficiency     Vitamin D toxicity 2011    with leaky gut syndrome     Past Surgical History:   Procedure Laterality Date    BUNIONECTOMY Bilateral     CARPAL TUNNEL RELEASE Right 2015    COLONOSCOPY      CYSTOSCOPY N/A 6/6/2019    Procedure: CYSTOSCOPY;  Surgeon: Brodie Romero MD;  [facility];  Service: Gynecology Oncology    ESOPHAGOGASTRODUODENOSCOPY N/A 5/5/2016    Procedure: ESOPHAGOGASTRODUODENOSCOPY (EGD);  Surgeon: Enmanuel Balbuena MD;  [facility];  Service:     FRACTURE SURGERY      L arm     NERVE BLOCK       R Knee    OOPHORECTOMY Left     OH ARTHRP ACETBLR/PROX FEM PROSTC AGRFT/ALGRFT Left 9/16/2024    Procedure: ARTHROPLASTY HIP TOTAL ANTERIOR;  Surgeon: Nellie Foster MD;  [facility];  Service: Orthopedics    OH COLONOSCOPY FLX DX W/COLLJ SPEC WHEN PFRMD N/A 6/28/2016    Procedure: COLONOSCOPY;  Surgeon: Enmanuel Balbuena MD;  [facility];  Service: Gastroenterology    OH ESOPHAGOGASTRODUODENOSCOPY TRANSORAL DIAGNOSTIC N/A 10/19/2018    Procedure: ESOPHAGOGASTRODUODENOSCOPY (EGD);  Surgeon: Enmanuel Balbuena MD;  [facility];  Service: Gastroenterology    OH LAPS TOTAL HYSTERECT 250 GM/< W/RMVL TUBE/OVARY N/A 6/6/2019    Procedure: ROBOTIC TOTAL LAPAROSCOPIC HYSTERECTOMY, RIGHT SALPINGO-OOPHORECTOMY, EXTENSIVE ADHESIOLYSIS, APPROXIMATELY 45 MIN;  Surgeon: Brodie Romero MD;  [facility];  Service: Gynecology Oncology    ROTATOR CUFF REPAIR Bilateral     TOE SURGERY Left     left 5th toe shaved down due to repeated fx    TOTAL KNEE ARTHROPLASTY Left      Family History   Problem Relation Age of Onset    Heart failure Mother     Other Mother         respiratory failure    Heart attack Mother     Heart failure Father         respiratory failure    Heart attack  Father     Breast cancer Sister     No Known Problems Sister     Kidney failure Brother     Alcohol abuse Brother     No Known Problems Brother     Diabetes Brother     Hypertension Daughter     Arrhythmia Neg Hx     Clotting disorder Neg Hx     Fainting Neg Hx     Anuerysm Neg Hx     Stroke Neg Hx     Hyperlipidemia Neg Hx     Asthma Neg Hx      Social History     Tobacco Use    Smoking status: Former     Current packs/day: 0.00     Average packs/day: 1 pack/day for 13.0 years (13.0 ttl pk-yrs)     Types: Cigarettes     Start date:      Quit date:      Years since quittin.0    Smokeless tobacco: Never    Tobacco comments:     Quit > 30 years ago    Vaping Use    Vaping status: Never Used   Substance and Sexual Activity    Alcohol use: No    Drug use: No    Sexual activity: Not on file     Comment: no partner     Current Outpatient Medications on File Prior to Visit   Medication Sig    ergocalciferol (VITAMIN D2) 50,000 units Take 1 capsule (50,000 Units total) by mouth once a week    losartan-hydrochlorothiazide (HYZAAR) 100-25 MG per tablet Take 1 tablet by mouth daily    Nattokinase 100 MG CAPS Take by mouth in the morning    Probiotic Product (MVW Complete Probiotic) CPDR Take 1 capsule by mouth 2 (two) times a day    zolpidem (AMBIEN) 10 mg tablet Take 0.5-1 tablets (5-10 mg total) by mouth daily at bedtime as needed for sleep    omeprazole (PriLOSEC) 20 mg delayed release capsule TAKE 2 CAPSULES (40 MG TOTAL) BY MOUTH DAILY BEFORE BREAKFAST (Patient not taking: Reported on 2025)    ondansetron (ZOFRAN) 4 mg tablet Take 1 tablet (4 mg total) by mouth every 8 (eight) hours as needed for nausea or vomiting (Patient not taking: Reported on 2025)    ondansetron (ZOFRAN) 4 mg tablet Take 1 tablet (4 mg total) by mouth every 8 (eight) hours as needed for nausea or vomiting (Patient not taking: Reported on 2025)     Allergies   Allergen Reactions    Androgens Anaphylaxis    Cefazolin Hives,  "GI Intolerance and Swelling    Ciprofloxacin Hives    Other Other (See Comments)     STEROIDS- GI INTOLERANCE    Penicillins Hives and Rash    Prednisone Hives, GI Intolerance, Swelling and Vomiting    Vancomycin Hives, GI Intolerance, Diarrhea and Rash    Aspirin GI Intolerance and Hives     Even low dose     Cortisone Syncope    Adhesive [Medical Tape] Rash    Beef Allergy - Food Allergy Diarrhea    Gluten Meal - Food Allergy GI Intolerance    Lactose - Food Allergy Diarrhea    Oxycodone Rash and Vomiting    Pataday [Olopatadine Hcl] Eye Swelling     Immunization History   Administered Date(s) Administered    COVID-19 J&J ([employer]) vaccine 0.5 mL 05/27/2021, 02/15/2022    Pneumococcal Conjugate Vaccine 20-valent (Pcv20), Polysace 09/07/2024    Tdap 11/25/2013, 02/08/2020    Tuberculin Skin Test 09/20/2024, 09/27/2024     Objective   /82 (BP Location: Right arm, Patient Position: Sitting, Cuff Size: Large)   Pulse 81   Temp (!) 97.2 °F (36.2 °C) (Temporal)   Ht 4' 10.5\" (1.486 m)   Wt 63.8 kg (140 lb 9.6 oz)   SpO2 98%   BMI 28.89 kg/m²     Physical Exam  Constitutional:       General: She is not in acute distress.     Appearance: Normal appearance. She is not ill-appearing, toxic-appearing or diaphoretic.   Cardiovascular:      Rate and Rhythm: Normal rate and regular rhythm.      Pulses: Normal pulses.      Heart sounds: Normal heart sounds. No murmur heard.     No friction rub. No gallop.   Pulmonary:      Effort: Pulmonary effort is normal. No respiratory distress.      Breath sounds: Normal breath sounds. No stridor. No wheezing or rhonchi.   Abdominal:      General: Abdomen is flat. There is no distension.      Palpations: Abdomen is soft.   Musculoskeletal:         General: No swelling or deformity.   Skin:     General: Skin is warm and dry.      Capillary Refill: Capillary refill takes less than 2 seconds.      Coloration: Skin is not jaundiced.   Neurological:      General: No focal deficit " "present.      Mental Status: She is alert and oriented to person, place, and time.   Psychiatric:         Mood and Affect: Mood normal.             Cholo Field M.D.  Family Medicine    Please excuse any \"sound-alike\" errors that may have ocurred during the process of dictation. Parts of this note have been dictated and there may be errors present in the transcription process. Thank you.    "

## 2025-01-23 NOTE — PROGRESS NOTES
"Daily Note     Today's date: 2025  Patient name: Erin Suazo  : 1945  MRN: [id number]  Referring provider: Nellie Foster MD  Dx:   Encounter Diagnosis     ICD-10-CM    1. S/P total left hip arthroplasty  Z96.642                          Subjective: Pt reports general fatigue.  She reports nerve like pain last night around her knee.    Objective: See treatment diary below      Assessment: Tolerated treatment well. Patient demonstrated fatigue post treatment, exhibited good technique with therapeutic exercises, and would benefit from continued PT  No increased pain reported w/ treatment.      Plan: Continue per plan of care.      Precautions: none      Manuals        PROM L hip assess            Stretch L hip flex, abd, hamstring, gastroc   10 min 10 min 10 min 10 min                                 Neuro Re-Ed 1/7 1/10 1/14 1/16 1/21 1/23       SLR flex/abd x20 x20 x20 x20 x20 x20       bridges 5\"x20 5\"x20 5\"x20 5\"x20 5\"x20 5\"x20       clamshells 5\"x20 5\"x20 5\"x20 5\"x20 5\"x20 5\"x20       LAQ 5\"x20 5\"x20 5\"x20  5\"x20 5\"x20       Adductor squeeze   5\"x20 5\"x20 5\"X20 5\"x20       Mini squats x20 x20 x20 x20 20 x20       Step ups fwd and lat 6in x20 ea 6in x20 ea 6in x20 ea 6in x20 ea 6in x20 6in x20 ea                                                                        Ther Ex 1/7 1/10 1/14 1/16 1/21 1/23       Recumbent bike 10 min 10 min 10 min 10 min 10 min 10 min       Gastroc strap stretch 30\"x3 30\"x3 30\"x3 30\"x3 30\"x3 30\"x3                    Ther Activity                                       Gait Training                                       Modalities                                         Eval/Re-Eval POC Expires Auth #/ Referral # Total Visits Start Date Expiration Date Extension Info Visits Limitation   10/28 12/28 [facility] SENIOR              12/10  2/10                1/7 3/7                                                   1 2 3 4 5 6   10/28 F    " 11/8 11/19 11/22    7 8 9 10 11 12    11/25 11/29   12/3  12/5  12/10 F!  12/12   13 14 15 16 17 18    12/17  12/19 1/7  1/10  1/14   1/16   19 20 21 22 23 24   1/21 1/23           25 26 27 28 29 30

## 2025-01-23 NOTE — TELEPHONE ENCOUNTER
"Called pt to ask about chest pain. Per Dr. Field if chest pain is consistent and intense, like a \"crushing\" feeling and there is difficulty breathing, pt should go to the ER - [facility].  "

## 2025-01-28 ENCOUNTER — OFFICE VISIT (OUTPATIENT)
Dept: PHYSICAL THERAPY | Facility: MEDICAL CENTER | Age: 80
End: 2025-01-28
Payer: MEDICARE

## 2025-01-28 DIAGNOSIS — Z96.642 S/P TOTAL LEFT HIP ARTHROPLASTY: Primary | ICD-10-CM

## 2025-01-28 PROCEDURE — 97112 NEUROMUSCULAR REEDUCATION: CPT | Performed by: PHYSICAL THERAPIST

## 2025-01-28 PROCEDURE — 97110 THERAPEUTIC EXERCISES: CPT | Performed by: PHYSICAL THERAPIST

## 2025-01-28 PROCEDURE — 97140 MANUAL THERAPY 1/> REGIONS: CPT | Performed by: PHYSICAL THERAPIST

## 2025-01-28 NOTE — PROGRESS NOTES
"Daily Note     Today's date: 2025  Patient name: Erin Suazo  : 1945  MRN: [id number]  Referring provider: Nellie Foster MD  Dx:   Encounter Diagnosis     ICD-10-CM    1. S/P total left hip arthroplasty  Z96.642                            Subjective: Pt continues to c/o ongoing constant groin pain rated 4/10.  Otherwise, feeling good.    Objective: See treatment diary below      Assessment: Tolerated treatment well. Patient demonstrated fatigue post treatment, exhibited good technique with therapeutic exercises, and would benefit from continued PT,  No reports of increased pain w/ treatment.        Plan: Continue per plan of care.      Precautions: none      Manuals       PROM L hip assess            Stretch L hip flex, abd, hamstring, gastroc   10 min 10 min 10 min 10 min 10 min                                Neuro Re-Ed 1/7 1/10 1/14 1/16 1/21 1/23 1/28      SLR flex/abd x20 x20 x20 x20 x20 x20 x20      bridges 5\"x20 5\"x20 5\"x20 5\"x20 5\"x20 5\"x20 5\"x20      clamshells 5\"x20 5\"x20 5\"x20 5\"x20 5\"x20 5\"x20 5\"x20      LAQ 5\"x20 5\"x20 5\"x20  5\"x20 5\"x20 5\"x20      Adductor squeeze   5\"x20 5\"x20 5\"X20 5\"x20 5\"x20      Mini squats x20 x20 x20 x20 20 x20 x20      Step ups fwd and lat 6in x20 ea 6in x20 ea 6in x20 ea 6in x20 ea 6in x20 6in x20 ea 6in x20 ea                                                                       Ther Ex 1/7 1/10 1/14 1/16 1/21 1/23 1/28      Recumbent bike 10 min 10 min 10 min 10 min 10 min 10 min 10 min      Gastroc strap stretch 30\"x3 30\"x3 30\"x3 30\"x3 30\"x3 30\"x3 30\"x3                   Ther Activity                                       Gait Training                                       Modalities                                         Eval/Re-Eval POC Expires Auth #/ Referral # Total Visits Start Date Expiration Date Extension Info Visits Limitation   10/28 12/28 [facility] SENIOR              12/10  2/10                1/7 3/7             "                                       1 2 3 4 5 6   10/28 F  11/1 11/4 11/8 11/19 11/22    7 8 9 10 11 12    11/25 11/29   12/3  12/5  12/10 F!  12/12   13 14 15 16 17 18    12/17  12/19 1/7  1/10  1/14   1/16   19 20 21 22 23 24   1/21 1/23 1/28          25 26 27 28 29 30

## 2025-01-30 ENCOUNTER — OFFICE VISIT (OUTPATIENT)
Dept: PHYSICAL THERAPY | Facility: MEDICAL CENTER | Age: 80
End: 2025-01-30
Payer: MEDICARE

## 2025-01-30 DIAGNOSIS — Z96.642 S/P TOTAL LEFT HIP ARTHROPLASTY: Primary | ICD-10-CM

## 2025-01-30 PROCEDURE — 97140 MANUAL THERAPY 1/> REGIONS: CPT | Performed by: PHYSICAL THERAPIST

## 2025-01-30 PROCEDURE — 97112 NEUROMUSCULAR REEDUCATION: CPT | Performed by: PHYSICAL THERAPIST

## 2025-01-30 NOTE — PROGRESS NOTES
"Daily Note and DC    Today's date: 2025  Patient name: Erin Suazo  : 1945  MRN: [id number]  Referring provider: Nellie Foster MD  Dx:   Encounter Diagnosis     ICD-10-CM    1. S/P total left hip arthroplasty  Z96.642                 Start Time: 1050  Stop Time: 1125  Total time in clinic (min): 35 minutes    Subjective: Pt reports some ongoing discomfort in region of incision.  Challenged at times w/ active hip flexion.  No longer experiencing leg cramps at night.    Objective: See treatment diary below      Assessment: Tolerated treatment well. Patient demonstrated fatigue post treatment and exhibited good technique with therapeutic exercises,          Plan:  DC today     Precautions: none      Manuals      PROM L hip assess            Stretch L hip flex, abd, hamstring, gastroc   10 min 10 min 10 min 10 min 10 min 10 min                               Neuro Re-Ed 1/7 1/10 1/14 1/16 1/21 1/23 1/28 1/30     SLR flex/abd x20 x20 x20 x20 x20 x20 x20 x20     bridges 5\"x20 5\"x20 5\"x20 5\"x20 5\"x20 5\"x20 5\"x20 5\"x20     clamshells 5\"x20 5\"x20 5\"x20 5\"x20 5\"x20 5\"x20 5\"x20 5\"x20     LAQ 5\"x20 5\"x20 5\"x20  5\"x20 5\"x20 5\"x20 5\"x20     Adductor squeeze   5\"x20 5\"x20 5\"X20 5\"x20 5\"x20 5\"x20     Mini squats x20 x20 x20 x20 20 x20 x20 x20     Step ups fwd and lat 6in x20 ea 6in x20 ea 6in x20 ea 6in x20 ea 6in x20 6in x20 ea 6in x20 ea 6in x20 ea                                                                      Ther Ex 1/7 1/10 1/14 1/16 1/21 1/23 1/28 1/30     Recumbent bike 10 min 10 min 10 min 10 min 10 min 10 min 10 min 10 min     Gastroc strap stretch 30\"x3 30\"x3 30\"x3 30\"x3 30\"x3 30\"x3 30\"x3 30\"x3                  Ther Activity                                       Gait Training                                       Modalities                                         Eval/Re-Eval POC Expires Auth #/ Referral # Total Visits Start Date Expiration Date Extension Info " Visits Limitation   10/28 12/28 [facility]IOR              12/10  2/10                1/7 3/7                                                   1 2 3 4 5 6   10/28 F  11/1 11/4 11/8 11/19 11/22    7 8 9 10 11 12    11/25 11/29   12/3  12/5  12/10 F!  12/12   13 14 15 16 17 18    12/17  12/19 1/7  1/10  1/14   1/16   19 20 21 22 23 24   1/21 1/23 1/28 1/30        25 26 27 28 29 30

## 2025-04-09 ENCOUNTER — OFFICE VISIT (OUTPATIENT)
Dept: FAMILY MEDICINE CLINIC | Facility: CLINIC | Age: 80
End: 2025-04-09
Payer: MEDICARE

## 2025-04-09 VITALS
HEIGHT: 59 IN | SYSTOLIC BLOOD PRESSURE: 126 MMHG | BODY MASS INDEX: 28.47 KG/M2 | TEMPERATURE: 97.2 F | HEART RATE: 84 BPM | DIASTOLIC BLOOD PRESSURE: 80 MMHG | WEIGHT: 141.2 LBS | OXYGEN SATURATION: 96 %

## 2025-04-09 DIAGNOSIS — F43.21 GRIEF: Primary | ICD-10-CM

## 2025-04-09 DIAGNOSIS — I10 PRIMARY HYPERTENSION: ICD-10-CM

## 2025-04-09 DIAGNOSIS — G89.4 CHRONIC PAIN SYNDROME: ICD-10-CM

## 2025-04-09 DIAGNOSIS — J45.20 ASTHMA IN ADULT, MILD INTERMITTENT, UNCOMPLICATED: ICD-10-CM

## 2025-04-09 DIAGNOSIS — R00.2 PALPITATION: ICD-10-CM

## 2025-04-09 PROCEDURE — 99214 OFFICE O/P EST MOD 30 MIN: CPT | Performed by: FAMILY MEDICINE

## 2025-04-09 PROCEDURE — G2211 COMPLEX E/M VISIT ADD ON: HCPCS | Performed by: FAMILY MEDICINE

## 2025-04-09 RX ORDER — ALBUTEROL SULFATE 90 UG/1
2 INHALANT RESPIRATORY (INHALATION) EVERY 6 HOURS PRN
Qty: 6.7 G | Refills: 1 | Status: SHIPPED | OUTPATIENT
Start: 2025-04-09

## 2025-04-09 RX ORDER — CYCLOBENZAPRINE HCL 5 MG
2.5 TABLET ORAL
Qty: 30 TABLET | Refills: 0 | Status: SHIPPED | OUTPATIENT
Start: 2025-04-09

## 2025-04-09 NOTE — ASSESSMENT & PLAN NOTE
Muscle spasm, will try low dose of flexril   Do not take with ambien   Orders:    cyclobenzaprine (FLEXERIL) 5 mg tablet; Take 0.5 tablets (2.5 mg total) by mouth daily at bedtime as needed for muscle spasms

## 2025-04-09 NOTE — ASSESSMENT & PLAN NOTE
Try inhaler at night  Orders:    albuterol (Proventil HFA) 90 mcg/act inhaler; Inhale 2 puffs every 6 (six) hours as needed for wheezing

## 2025-04-09 NOTE — PROGRESS NOTES
Name: Erin Suazo      : 1945      MRN: [id number]  Encounter Provider: Cholo Field MD  Encounter Date: 2025   Encounter department: [facility]    Assessment & Plan  Grief  Stable symptoms, doing well at this time       Primary hypertension  Bp at goal, 126/80  Continue current medication, hyzaar 100-25       Chronic pain syndrome - Right  Muscle spasm, will try low dose of flexril   Do not take with ambien   Orders:    cyclobenzaprine (FLEXERIL) 5 mg tablet; Take 0.5 tablets (2.5 mg total) by mouth daily at bedtime as needed for muscle spasms    Asthma in adult, mild intermittent, uncomplicated  Try inhaler at night  Orders:    albuterol (Proventil HFA) 90 mcg/act inhaler; Inhale 2 puffs every 6 (six) hours as needed for wheezing    Palpitation              History of Present Illness         HPI    Erin is a 79-year-old female patient presents for follow-up regarding her chronic medical conditions.  Patient was last seen on 2025 after episodes of grief.  Patient reports she still have some depressive symptoms however is doing well.  Recently patient has noticed some worsening chronic pain symptoms, has been following up with a chiropractor, is going to her second appointment and if there is no significant treatment will be seeing a orthopedic doctor.  Pain is on the thoracic spine.  When laying flat, per recommendation by chiropractor, she has pain in the neck and side of the head.     Pt takes allergy medication at night.   Patient reports occasional episode of palpitation, lasting around 5 minutes.  Some chest pain with palpitation.    Review of Systems   Constitutional:  Negative for chills and fever.   HENT:  Positive for postnasal drip. Negative for congestion, rhinorrhea and sore throat.    Respiratory:  Positive for wheezing (only at night). Negative for shortness of breath.    Cardiovascular:  Positive for chest pain and palpitations.        At night    Gastrointestinal:  Negative for abdominal pain, constipation, diarrhea, nausea and vomiting.   Neurological:  Negative for dizziness, light-headedness and headaches.   Psychiatric/Behavioral:  Negative for sleep disturbance.        Past Medical History:   Diagnosis Date    Arthritis     Carpal tunnel syndrome     GERD (gastroesophageal reflux disease)     Hiatal hernia     Hypertension     Insomnia     Pneumonia     PONV (postoperative nausea and vomiting)     Renal calculi     Sleep deprivation     chronically    Vitamin D deficiency     Vitamin D toxicity 2011    with leaky gut syndrome     Past Surgical History:   Procedure Laterality Date    BUNIONECTOMY Bilateral     CARPAL TUNNEL RELEASE Right 2015    COLONOSCOPY      CYSTOSCOPY N/A 6/6/2019    Procedure: CYSTOSCOPY;  Surgeon: Brodie Romero MD;  [facility];  Service: Gynecology Oncology    ESOPHAGOGASTRODUODENOSCOPY N/A 5/5/2016    Procedure: ESOPHAGOGASTRODUODENOSCOPY (EGD);  Surgeon: Enmanuel Balbuena MD;  [facility];  Service:     FRACTURE SURGERY      L arm     NERVE BLOCK       R Knee    OOPHORECTOMY Left     KY ARTHRP ACETBLR/PROX FEM PROSTC AGRFT/ALGRFT Left 9/16/2024    Procedure: ARTHROPLASTY HIP TOTAL ANTERIOR;  Surgeon: Nellie Foster MD;  [facility];  Service: Orthopedics    KY COLONOSCOPY FLX DX W/COLLJ SPEC WHEN PFRMD N/A 6/28/2016    Procedure: COLONOSCOPY;  Surgeon: Enmanuel Balbuena MD;  [facility];  Service: Gastroenterology    KY ESOPHAGOGASTRODUODENOSCOPY TRANSORAL DIAGNOSTIC N/A 10/19/2018    Procedure: ESOPHAGOGASTRODUODENOSCOPY (EGD);  Surgeon: Enmanuel Balbuena MD;  [facility];  Service: Gastroenterology    KY LAPS TOTAL HYSTERECT 250 GM/< W/RMVL TUBE/OVARY N/A 6/6/2019    Procedure: ROBOTIC TOTAL LAPAROSCOPIC HYSTERECTOMY, RIGHT SALPINGO-OOPHORECTOMY, EXTENSIVE ADHESIOLYSIS, APPROXIMATELY 45 MIN;  Surgeon: Brodie Romero MD;  [facility];  Service: Gynecology Oncology     ROTATOR CUFF REPAIR Bilateral     TOE SURGERY Left     left 5th toe shaved down due to repeated fx    TOTAL KNEE ARTHROPLASTY Left      Family History   Problem Relation Age of Onset    Heart failure Mother     Other Mother         respiratory failure    Heart attack Mother     Heart failure Father         respiratory failure    Heart attack Father     Breast cancer Sister     No Known Problems Sister     Kidney failure Brother     Alcohol abuse Brother     No Known Problems Brother     Diabetes Brother     Hypertension Daughter     Arrhythmia Neg Hx     Clotting disorder Neg Hx     Fainting Neg Hx     Anuerysm Neg Hx     Stroke Neg Hx     Hyperlipidemia Neg Hx     Asthma Neg Hx      Social History     Tobacco Use    Smoking status: Former     Current packs/day: 0.00     Average packs/day: 1 pack/day for 13.0 years (13.0 ttl pk-yrs)     Types: Cigarettes     Start date:      Quit date:      Years since quittin.3    Smokeless tobacco: Never    Tobacco comments:     Quit > 30 years ago    Vaping Use    Vaping status: Never Used   Substance and Sexual Activity    Alcohol use: No    Drug use: No    Sexual activity: Not on file     Comment: no partner     Current Outpatient Medications on File Prior to Visit   Medication Sig    ergocalciferol (VITAMIN D2) 50,000 units Take 1 capsule (50,000 Units total) by mouth once a week    losartan-hydrochlorothiazide (HYZAAR) 100-25 MG per tablet Take 1 tablet by mouth daily    Nattokinase 100 MG CAPS Take by mouth in the morning    Probiotic Product (MVW Complete Probiotic) CPDR Take 1 capsule by mouth 2 (two) times a day    zolpidem (AMBIEN) 10 mg tablet Take 0.5-1 tablets (5-10 mg total) by mouth daily at bedtime as needed for sleep    omeprazole (PriLOSEC) 20 mg delayed release capsule TAKE 2 CAPSULES (40 MG TOTAL) BY MOUTH DAILY BEFORE BREAKFAST (Patient not taking: Reported on 2025)    ondansetron (ZOFRAN) 4 mg tablet Take 1 tablet (4 mg total) by mouth every  "8 (eight) hours as needed for nausea or vomiting (Patient not taking: Reported on 4/9/2025)    ondansetron (ZOFRAN) 4 mg tablet Take 1 tablet (4 mg total) by mouth every 8 (eight) hours as needed for nausea or vomiting (Patient not taking: Reported on 4/9/2025)     Allergies   Allergen Reactions    Androgens Anaphylaxis    Cefazolin GI Intolerance, Hives and Swelling    Ciprofloxacin Hives    Other Other (See Comments)     STEROIDS- GI INTOLERANCE    Penicillins Hives and Rash    Prednisone Hives, GI Intolerance, Swelling and Vomiting    Vancomycin Hives, GI Intolerance, Diarrhea and Rash    Aspirin GI Intolerance and Hives     Even low dose    Cortisone Syncope    Adhesive [Medical Tape] Rash    Beef Allergy - Food Allergy Diarrhea    Gluten Meal - Food Allergy GI Intolerance    Lactose - Food Allergy Diarrhea    Oxycodone Rash and Vomiting    Pataday [Olopatadine Hcl] Eye Swelling    Penicillin G Hives     Immunization History   Administered Date(s) Administered    COVID-19 J&J ([employer]) vaccine 0.5 mL 05/27/2021, 02/15/2022    Pneumococcal Conjugate Vaccine 20-valent (Pcv20), Polysace 09/07/2024    Tdap 11/25/2013, 02/08/2020    Tuberculin Skin Test 09/20/2024, 09/27/2024     Objective   /80 (BP Location: Left arm, Patient Position: Sitting, Cuff Size: Standard)   Pulse 84   Temp (!) 97.2 °F (36.2 °C)   Ht 4' 10.5\" (1.486 m)   Wt 64 kg (141 lb 3.2 oz)   SpO2 96%   Breastfeeding No   BMI 29.01 kg/m²     Physical Exam  Vitals reviewed.   Constitutional:       General: She is not in acute distress.     Appearance: Normal appearance. She is not ill-appearing, toxic-appearing or diaphoretic.   Cardiovascular:      Rate and Rhythm: Normal rate.      Pulses: Normal pulses.   Pulmonary:      Effort: Pulmonary effort is normal.   Abdominal:      General: Abdomen is flat.   Musculoskeletal:         General: No swelling or deformity.   Skin:     General: Skin is warm and dry.      Capillary Refill: Capillary " "refill takes less than 2 seconds.      Coloration: Skin is not jaundiced.   Neurological:      General: No focal deficit present.      Mental Status: She is alert.   Psychiatric:         Mood and Affect: Mood normal.           Cholo Field M.D.  Family Medicine    Please excuse any \"sound-alike\" errors that may have ocurred during the process of dictation. Parts of this note have been dictated and there may be errors present in the transcription process. Thank you.    "

## 2025-04-10 ENCOUNTER — TELEPHONE (OUTPATIENT)
Age: 80
End: 2025-04-10

## 2025-04-10 NOTE — TELEPHONE ENCOUNTER
PA Cyclobenzaprine 5MG  APPROVED     Date(s) approved 4/10/25 until further notice    Case # [id number]    Patient advised by          [][employer] Message  []Phone call   []LMOM  []L/M to call office as no active Communication consent on file  []Unable to leave detailed message as VM not approved on Communication consent       Pharmacy advised by    [x]Fax  [x]Phone call  []Secure Chat    Specialty Pharmacy    []

## 2025-04-10 NOTE — TELEPHONE ENCOUNTER
Please have pcp add diagnosis code so PA can be done for cyclobenzaprine 5 mg. Please route back to pod when complete

## 2025-04-10 NOTE — TELEPHONE ENCOUNTER
PA Cyclobenzaprine 5MG SUBMITTED     to [facility]      via    [x]CMM-KEY: [id number]  []Surescripts-Case ID #    []Availity-Auth ID #  NDC #    []Faxed to plan   []Other website    []Phone call Case ID #      []PA sent as URGENT    All office notes, labs and other pertaining documents and studies sent. Clinical questions answered. Awaiting determination from insurance company.     Turnaround time for your insurance to make a decision on your Prior Authorization can take 7-21 business days.

## 2025-04-24 ENCOUNTER — HOSPITAL ENCOUNTER (OUTPATIENT)
Dept: NON INVASIVE DIAGNOSTICS | Facility: HOSPITAL | Age: 80
Discharge: HOME | End: 2025-04-24
Attending: FAMILY MEDICINE
Payer: MEDICARE

## 2025-04-24 DIAGNOSIS — R00.2 PALPITATION: ICD-10-CM

## 2025-04-24 PROCEDURE — 93225 XTRNL ECG REC<48 HRS REC: CPT

## 2025-04-24 PROCEDURE — 93226 XTRNL ECG REC<48 HR SCAN A/R: CPT

## 2025-04-28 ENCOUNTER — APPOINTMENT (EMERGENCY)
Dept: RADIOLOGY | Facility: HOSPITAL | Age: 80
End: 2025-04-28
Payer: MEDICARE

## 2025-04-28 ENCOUNTER — TELEPHONE (OUTPATIENT)
Dept: FAMILY MEDICINE CLINIC | Facility: CLINIC | Age: 80
End: 2025-04-28

## 2025-04-28 ENCOUNTER — TELEPHONE (OUTPATIENT)
Age: 80
End: 2025-04-28

## 2025-04-28 ENCOUNTER — HOSPITAL ENCOUNTER (EMERGENCY)
Facility: HOSPITAL | Age: 80
Discharge: HOME | End: 2025-04-28
Attending: EMERGENCY MEDICINE
Payer: MEDICARE

## 2025-04-28 VITALS
TEMPERATURE: 98 F | RESPIRATION RATE: 20 BRPM | WEIGHT: 144.4 LBS | HEART RATE: 95 BPM | OXYGEN SATURATION: 94 % | BODY MASS INDEX: 29.67 KG/M2 | DIASTOLIC BLOOD PRESSURE: 80 MMHG | SYSTOLIC BLOOD PRESSURE: 179 MMHG

## 2025-04-28 DIAGNOSIS — J45.901 ASTHMA EXACERBATION: Primary | ICD-10-CM

## 2025-04-28 LAB
ALBUMIN SERPL BCG-MCNC: 4.6 G/DL (ref 3.5–5)
ALP SERPL-CCNC: 88 U/L (ref 34–104)
ALT SERPL W P-5'-P-CCNC: 14 U/L (ref 7–52)
ANION GAP SERPL CALCULATED.3IONS-SCNC: 11 MMOL/L (ref 4–13)
AST SERPL W P-5'-P-CCNC: 19 U/L (ref 13–39)
BASOPHILS # BLD AUTO: 0.03 THOUSANDS/ÂΜL (ref 0–0.1)
BASOPHILS NFR BLD AUTO: 0 % (ref 0–1)
BILIRUB SERPL-MCNC: 0.58 MG/DL (ref 0.2–1)
BNP SERPL-MCNC: 17 PG/ML (ref 0–100)
BUN SERPL-MCNC: 17 MG/DL (ref 5–25)
CALCIUM SERPL-MCNC: 9.5 MG/DL (ref 8.4–10.2)
CARDIAC TROPONIN I PNL SERPL HS: 4 NG/L (ref ?–50)
CHLORIDE SERPL-SCNC: 103 MMOL/L (ref 96–108)
CO2 SERPL-SCNC: 26 MMOL/L (ref 21–32)
CREAT SERPL-MCNC: 0.75 MG/DL (ref 0.6–1.3)
EOSINOPHIL # BLD AUTO: 0.17 THOUSAND/ÂΜL (ref 0–0.61)
EOSINOPHIL NFR BLD AUTO: 2 % (ref 0–6)
ERYTHROCYTE [DISTWIDTH] IN BLOOD BY AUTOMATED COUNT: 13.9 % (ref 11.6–15.1)
GFR SERPL CREATININE-BSD FRML MDRD: 75 ML/MIN/1.73SQ M
GLUCOSE SERPL-MCNC: 92 MG/DL (ref 65–140)
HCT VFR BLD AUTO: 36.9 % (ref 34.8–46.1)
HGB BLD-MCNC: 12.4 G/DL (ref 11.5–15.4)
IMM GRANULOCYTES # BLD AUTO: 0.02 THOUSAND/UL (ref 0–0.2)
IMM GRANULOCYTES NFR BLD AUTO: 0 % (ref 0–2)
LYMPHOCYTES # BLD AUTO: 1.49 THOUSANDS/ÂΜL (ref 0.6–4.47)
LYMPHOCYTES NFR BLD AUTO: 20 % (ref 14–44)
MCH RBC QN AUTO: 31.2 PG (ref 26.8–34.3)
MCHC RBC AUTO-ENTMCNC: 33.6 G/DL (ref 31.4–37.4)
MCV RBC AUTO: 93 FL (ref 82–98)
MONOCYTES # BLD AUTO: 0.68 THOUSAND/ÂΜL (ref 0.17–1.22)
MONOCYTES NFR BLD AUTO: 9 % (ref 4–12)
NEUTROPHILS # BLD AUTO: 4.9 THOUSANDS/ÂΜL (ref 1.85–7.62)
NEUTS SEG NFR BLD AUTO: 69 % (ref 43–75)
NRBC BLD AUTO-RTO: 0 /100 WBCS
PLATELET # BLD AUTO: 221 THOUSANDS/UL (ref 149–390)
PMV BLD AUTO: 11.5 FL (ref 8.9–12.7)
POTASSIUM SERPL-SCNC: 3.5 MMOL/L (ref 3.5–5.3)
PROT SERPL-MCNC: 7.8 G/DL (ref 6.4–8.4)
RBC # BLD AUTO: 3.98 MILLION/UL (ref 3.81–5.12)
SODIUM SERPL-SCNC: 140 MMOL/L (ref 135–147)
WBC # BLD AUTO: 7.29 THOUSAND/UL (ref 4.31–10.16)

## 2025-04-28 PROCEDURE — 83880 ASSAY OF NATRIURETIC PEPTIDE: CPT

## 2025-04-28 PROCEDURE — 96365 THER/PROPH/DIAG IV INF INIT: CPT

## 2025-04-28 PROCEDURE — 84484 ASSAY OF TROPONIN QUANT: CPT

## 2025-04-28 PROCEDURE — 94640 AIRWAY INHALATION TREATMENT: CPT

## 2025-04-28 PROCEDURE — 80053 COMPREHEN METABOLIC PANEL: CPT

## 2025-04-28 PROCEDURE — 71046 X-RAY EXAM CHEST 2 VIEWS: CPT

## 2025-04-28 PROCEDURE — 99285 EMERGENCY DEPT VISIT HI MDM: CPT | Performed by: EMERGENCY MEDICINE

## 2025-04-28 PROCEDURE — 36415 COLL VENOUS BLD VENIPUNCTURE: CPT

## 2025-04-28 PROCEDURE — 85025 COMPLETE CBC W/AUTO DIFF WBC: CPT

## 2025-04-28 PROCEDURE — 93005 ELECTROCARDIOGRAM TRACING: CPT

## 2025-04-28 PROCEDURE — 99285 EMERGENCY DEPT VISIT HI MDM: CPT

## 2025-04-28 RX ORDER — MAGNESIUM SULFATE HEPTAHYDRATE 40 MG/ML
2 INJECTION, SOLUTION INTRAVENOUS ONCE
Status: COMPLETED | OUTPATIENT
Start: 2025-04-28 | End: 2025-04-28

## 2025-04-28 RX ORDER — ALBUTEROL SULFATE 0.83 MG/ML
5 SOLUTION RESPIRATORY (INHALATION) ONCE
Status: COMPLETED | OUTPATIENT
Start: 2025-04-28 | End: 2025-04-28

## 2025-04-28 RX ORDER — LEVALBUTEROL INHALATION SOLUTION 0.63 MG/3ML
0.63 SOLUTION RESPIRATORY (INHALATION) ONCE
Status: COMPLETED | OUTPATIENT
Start: 2025-04-28 | End: 2025-04-28

## 2025-04-28 RX ADMIN — LEVALBUTEROL HYDROCHLORIDE 0.63 MG: 0.63 SOLUTION RESPIRATORY (INHALATION) at 15:03

## 2025-04-28 RX ADMIN — ALBUTEROL SULFATE 5 MG: 2.5 SOLUTION RESPIRATORY (INHALATION) at 12:37

## 2025-04-28 RX ADMIN — IPRATROPIUM BROMIDE 0.5 MG: 0.5 SOLUTION RESPIRATORY (INHALATION) at 12:37

## 2025-04-28 RX ADMIN — MAGNESIUM SULFATE HEPTAHYDRATE 2 G: 40 INJECTION, SOLUTION INTRAVENOUS at 15:03

## 2025-04-28 NOTE — TELEPHONE ENCOUNTER
Pt called in about difficulty breathing - wheezing for 2 days, using nebulizer but it's not helping. She said that she had to return her holter monitor today at [facility], advised pt to speak with the doctor about her symptoms as she should likely report to ER for difficulty breathing.

## 2025-04-28 NOTE — TELEPHONE ENCOUNTER
Patient called asking for an appt for wheezing and SOB. States her inhaler is not working and its hard to breathe and continues to cough. Contacted Nurse Line X2 with no answer. Dr. Field office was called and call was warmed transferred to clinical. Patient was notified that if at any moment her SOB worsens, or it becomes very difficult to breathes to please call 911. Patient understood and agreed.

## 2025-04-28 NOTE — ED ATTENDING ATTESTATION
4/28/2025  IMarine MD, saw and evaluated the patient. I have discussed the patient with the resident/non-physician practitioner and agree with the resident's/non-physician practitioner's findings, Plan of Care, and MDM as documented in the resident's/non-physician practitioner's note, except where noted. All available labs and Radiology studies were reviewed.  I was present for key portions of any procedure(s) performed by the resident/non-physician practitioner and I was immediately available to provide assistance.       At this point I agree with the current assessment done in the Emergency Department.  I have conducted an independent evaluation of this patient a history and physical is as follows:    This is an 79-year-old female with a relevant past medical history of asthma, multiple comorbidities, as well as a significant list of allergies presenting to the ED today for complaint of shortness of breath.  Patient has had some significant wheezing.  She denies any fever chills or sweats.  She denies any chest pain pressure or discomfort.  She states that she was out for a walk earlier today, noticed that she was wheezing more than usual.  She tried to use her albuterol inhaler at home, without improvement in her symptoms.  She went to her pulmonologist office, and on the way there started to develop some shortness of breath, decided to come to the ED to be evaluated.  Upon arrival to the ED she did have wheezes throughout her lungs.  Her exam otherwise was unremarkable aside from an elevated blood pressure.  Her differential diagnosis includes: Asthma exacerbation versus pneumonia versus atypical ACS versus other.  Patient had a CBC, metabolic panel, BNP, troponin, EKG, chest x-ray all of which were unremarkable.  Patient received heart neb, with improvement, however not complete resolution.  She then received Xopenex, magnesium.  Patient was resistant to take any steroids, states that it makes  "her have palpitations, makes her throw up.  We did discuss how this is not a true allergy, but patient would like to forego any steroids.  I believe that this would be the best thing for her.  Nonetheless patient requested to be discharged.  The management plan was discussed in detail with the patient at bedside and all questions were answered. Strict ED return instructions were discussed at bedside. Prior to discharge, both verbal and written instructions were provided. We discussed the signs and symptoms that should prompt the patient to return to the ED. All questions were answered and the patient was comfortable with the plan of care and discharged home. The patient agrees to return to the Emergency Department for concerns and/or progression of illness.    Portions of the above record have been created with voice recognition software.  Occasional wrong word or \"sound alike\" substitutions may have occurred due to the inherent limitations of voice recognition software.  Read the chart carefully and recognize, using context, where substitutions may have occurred.      ED Course         Critical Care Time  Procedures      "

## 2025-04-28 NOTE — DISCHARGE INSTRUCTIONS
Use albuterol every 4 hours scheduled until you begin to feel better. Return to the ER with worsening symptoms. Follow up with pulmonology when you are able.

## 2025-04-29 LAB
ATRIAL RATE: 79 BPM
P AXIS: 67 DEGREES
PR INTERVAL: 182 MS
QRS AXIS: 12 DEGREES
QRSD INTERVAL: 76 MS
QT INTERVAL: 398 MS
QTC INTERVAL: 456 MS
T WAVE AXIS: 67 DEGREES
VENTRICULAR RATE: 79 BPM

## 2025-04-29 PROCEDURE — 93010 ELECTROCARDIOGRAM REPORT: CPT | Performed by: INTERNAL MEDICINE

## 2025-04-29 NOTE — ED PROVIDER NOTES
Time reflects when diagnosis was documented in both MDM as applicable and the Disposition within this note       Time User Action Codes Description Comment    4/28/2025  3:47 PM Tracey Tristan Kristin [J45.901] Asthma exacerbation           ED Disposition       ED Disposition   Discharge    Condition   Stable    Date/Time   Mon Apr 28, 2025  3:47 PM    Comment   Erin Suazo discharge to home/self care.                   Assessment & Plan       Medical Decision Making  78 yo F presenting with wheezing, dyspnea. On initial evaluation, she was with some hypertension and otherwise stable vitals. Did not appear to have increased work of breathing or tachypnea. On exam, diffuse wheezing was heard throughout. Initial differential includes asthma exacerbation triggered by infection or seasonal allergies, pneumonia, bronchitis, less likely congestive heart failure or ACS. Labs were drawn and xray ordered, EKG done. EKG is without acute ischemic changes. Chest xray shows no obvious effusion or infiltrate and otherwise labs are reassuring. She was treated with inhaled bronchodilator treatments x2 along with magnesium Patient declined steroids after discussion citing previous adverse reaction. She did experience clinical improvement on reevaluation. Vitals remained stable throughout stay. Ultimately she was discharged in stable condition with referral to pulmonology placed, counseling to use albuterol more frequently at home, and to return to ED should symptoms acutely worsen.     Problems Addressed:  Asthma exacerbation: acute illness or injury    Amount and/or Complexity of Data Reviewed  Labs: ordered.  Radiology: ordered and independent interpretation performed.    Risk  Prescription drug management.             Medications   albuterol inhalation solution 5 mg (5 mg Nebulization Given 4/28/25 1237)   ipratropium (ATROVENT) 0.02 % inhalation solution 0.5 mg (0.5 mg Nebulization Given 4/28/25 1237)   magnesium sulfate 2 g/50 mL  IVPB (premix) 2 g (0 g Intravenous Stopped 4/28/25 1526)   levalbuterol (XOPENEX) inhalation solution 0.63 mg (0.63 mg Nebulization Given 4/28/25 1503)       ED Risk Strat Scores                    No data recorded                            History of Present Illness       Chief Complaint   Patient presents with    Shortness of Breath     Pt reports wheezing and SOB since this weekend, had holter monitor Friday, followed up w/ cardiologist who sent here for eval d/t wheezing       Past Medical History:   Diagnosis Date    Arthritis     Carpal tunnel syndrome     GERD (gastroesophageal reflux disease)     Hiatal hernia     Hypertension     Insomnia     Pneumonia     PONV (postoperative nausea and vomiting)     Renal calculi     Sleep deprivation     chronically    Vitamin D deficiency     Vitamin D toxicity 2011    with leaky gut syndrome      Past Surgical History:   Procedure Laterality Date    BUNIONECTOMY Bilateral     CARPAL TUNNEL RELEASE Right 2015    COLONOSCOPY      CYSTOSCOPY N/A 6/6/2019    Procedure: CYSTOSCOPY;  Surgeon: Brodie Romero MD;  [facility]: [facility];  Service: Gynecology Oncology    ESOPHAGOGASTRODUODENOSCOPY N/A 5/5/2016    Procedure: ESOPHAGOGASTRODUODENOSCOPY (EGD);  Surgeon: Enmanuel Balbuena MD;  Location: [facility];  Service:     FRACTURE SURGERY      L arm     NERVE BLOCK       R Knee    OOPHORECTOMY Left     TX ARTHRP ACETBLR/PROX FEM PROSTC AGRFT/ALGRFT Left 9/16/2024    Procedure: ARTHROPLASTY HIP TOTAL ANTERIOR;  Surgeon: Nellie Foster MD;  Location: [facility];  Service: Orthopedics    TX COLONOSCOPY FLX DX W/COLLJ SPEC WHEN PFRMD N/A 6/28/2016    Procedure: COLONOSCOPY;  Surgeon: Enmanuel Balbuena MD;  [facility];  Service: Gastroenterology    TX ESOPHAGOGASTRODUODENOSCOPY TRANSORAL DIAGNOSTIC N/A 10/19/2018    Procedure: ESOPHAGOGASTRODUODENOSCOPY (EGD);  Surgeon: Enmanuel Balbuena MD;  Location: [facility];  Service: Gastroenterology    TX LAPS TOTAL HYSTERECT 250 GM/<  W/RMVL TUBE/OVARY N/A 2019    Procedure: ROBOTIC TOTAL LAPAROSCOPIC HYSTERECTOMY, RIGHT SALPINGO-OOPHORECTOMY, EXTENSIVE ADHESIOLYSIS, APPROXIMATELY 45 MIN;  Surgeon: Brodie Romero MD;  Location: [facility];  Service: Gynecology Oncology    ROTATOR CUFF REPAIR Bilateral     TOE SURGERY Left     left 5th toe shaved down due to repeated fx    TOTAL KNEE ARTHROPLASTY Left       Family History   Problem Relation Age of Onset    Heart failure Mother     Other Mother         respiratory failure    Heart attack Mother     Heart failure Father         respiratory failure    Heart attack Father     Breast cancer Sister     No Known Problems Sister     Kidney failure Brother     Alcohol abuse Brother     No Known Problems Brother     Diabetes Brother     Hypertension Daughter     Arrhythmia Neg Hx     Clotting disorder Neg Hx     Fainting Neg Hx     Anuerysm Neg Hx     Stroke Neg Hx     Hyperlipidemia Neg Hx     Asthma Neg Hx       Social History     Tobacco Use    Smoking status: Former     Current packs/day: 0.00     Average packs/day: 1 pack/day for 13.0 years (13.0 ttl pk-yrs)     Types: Cigarettes     Start date:      Quit date:      Years since quittin.3    Smokeless tobacco: Never    Tobacco comments:     Quit > 30 years ago    Vaping Use    Vaping status: Never Used   Substance Use Topics    Alcohol use: No    Drug use: No      E-Cigarette/Vaping    E-Cigarette Use Never User       E-Cigarette/Vaping Substances    Nicotine No     THC No     CBD No     Flavoring No     Other No     Unknown No       I have reviewed and agree with the history as documented.     HPI    Patient is an 80 yo F with history of seasonal allergies and asthma presenting with wheezing. She states that over the weekend she began having some nasal congestion typical of allergy symptoms but noted that she was becoming more dyspneic on her daily walks and with chest tightness. She is currently being evaluated with holter monitor  and did present to office today to turn this in when nurse listened to her lungs and was concerned for wheezing so sent her to the ED. She denies fevers, cough productive of mucous, GI symptoms, chest pain. Has albuterol inhaler at home that she used twice prior to arrival since symptoms started. Does not follow with pulmonology. Denies weight gain, leg swelling, or pain in legs.     Review of Systems   HENT:  Positive for congestion.    Respiratory:  Positive for cough, chest tightness, shortness of breath and wheezing.    All other systems reviewed and are negative.          Objective       ED Triage Vitals   Temperature Pulse Blood Pressure Respirations SpO2 Patient Position - Orthostatic VS   04/28/25 1147 04/28/25 1147 04/28/25 1147 04/28/25 1147 04/28/25 1147 04/28/25 1345   98 °F (36.7 °C) 91 (!) 172/78 18 98 % Sitting      Temp Source Heart Rate Source BP Location FiO2 (%) Pain Score    04/28/25 1147 04/28/25 1147 04/28/25 1147 -- --    Oral Monitor Right arm        Vitals      Date and Time Temp Pulse SpO2 Resp BP Pain Score FACES Pain Rating User   04/28/25 1345 -- 95 94 % 20 179/80 -- -- AG   04/28/25 1147 98 °F (36.7 °C) 91 98 % 18 172/78 -- -- KL            Physical Exam  Vitals and nursing note reviewed.   HENT:      Head: Normocephalic and atraumatic.   Eyes:      Conjunctiva/sclera: Conjunctivae normal.   Cardiovascular:      Rate and Rhythm: Normal rate and regular rhythm.      Heart sounds: No murmur heard.  Pulmonary:      Effort: Pulmonary effort is normal. No respiratory distress.      Breath sounds: Wheezing present.   Abdominal:      Palpations: Abdomen is soft.      Tenderness: There is no abdominal tenderness.   Musculoskeletal:         General: No swelling.      Cervical back: Neck supple.      Right lower leg: No edema.      Left lower leg: No edema.   Skin:     General: Skin is warm and dry.      Findings: No rash.   Neurological:      General: No focal deficit present.      Mental  Status: She is alert and oriented to person, place, and time.   Psychiatric:         Mood and Affect: Mood normal.         Results Reviewed       Procedure Component Value Units Date/Time    HS Troponin 0hr (reflex protocol) [[id number]]  (Normal) Collected: 04/28/25 1324    Lab Status: Final result Specimen: Blood from Arm, Left Updated: 04/28/25 1402     hs TnI 0hr 4 ng/L     B-Type Natriuretic Peptide(BNP) [[id number]]  (Normal) Collected: 04/28/25 1324    Lab Status: Final result Specimen: Blood from Arm, Left Updated: 04/28/25 1401     BNP 17 pg/mL     Comprehensive metabolic panel [[id number]] Collected: 04/28/25 1324    Lab Status: Final result Specimen: Blood from Arm, Left Updated: 04/28/25 1354     Sodium 140 mmol/L      Potassium 3.5 mmol/L      Chloride 103 mmol/L      CO2 26 mmol/L      ANION GAP 11 mmol/L      BUN 17 mg/dL      Creatinine 0.75 mg/dL      Glucose 92 mg/dL      Calcium 9.5 mg/dL      AST 19 U/L      ALT 14 U/L      Alkaline Phosphatase 88 U/L      Total Protein 7.8 g/dL      Albumin 4.6 g/dL      Total Bilirubin 0.58 mg/dL      eGFR 75 ml/min/1.73sq m     Narrative:      National Kidney Disease Foundation guidelines for Chronic Kidney Disease (CKD):     Stage 1 with normal or high GFR (GFR > 90 mL/min/1.73 square meters)    Stage 2 Mild CKD (GFR = 60-89 mL/min/1.73 square meters)    Stage 3A Moderate CKD (GFR = 45-59 mL/min/1.73 square meters)    Stage 3B Moderate CKD (GFR = 30-44 mL/min/1.73 square meters)    Stage 4 Severe CKD (GFR = 15-29 mL/min/1.73 square meters)    Stage 5 End Stage CKD (GFR <15 mL/min/1.73 square meters)  Note: GFR calculation is accurate only with a steady state creatinine    CBC and differential [[id number]] Collected: 04/28/25 1324    Lab Status: Final result Specimen: Blood from Arm, Left Updated: 04/28/25 1337     WBC 7.29 Thousand/uL      RBC 3.98 Million/uL      Hemoglobin 12.4 g/dL      Hematocrit 36.9 %      MCV 93 fL      MCH 31.2 pg      MCHC 33.6 g/dL       RDW 13.9 %      MPV 11.5 fL      Platelets 221 Thousands/uL      nRBC 0 /100 WBCs      Segmented % 69 %      Immature Grans % 0 %      Lymphocytes % 20 %      Monocytes % 9 %      Eosinophils Relative 2 %      Basophils Relative 0 %      Absolute Neutrophils 4.90 Thousands/µL      Absolute Immature Grans 0.02 Thousand/uL      Absolute Lymphocytes 1.49 Thousands/µL      Absolute Monocytes 0.68 Thousand/µL      Eosinophils Absolute 0.17 Thousand/µL      Basophils Absolute 0.03 Thousands/µL             XR chest 2 views   ED Interpretation by Tracey Tristan DO (04/28 3489)   No acute cardiopulmonary abnormality      Final Interpretation by Garry Hassan MD (04/28 9599)      No acute cardiopulmonary disease.            Workstation performed: [id number]             ECG 12 Lead Documentation Only    Date/Time: 4/28/2025 4:18 PM    Performed by: Tracey Tristan DO  Authorized by: Tracey Tristan DO    Indications / Diagnosis:  Dyspnea  Patient location:  ED  Interpretation:     Interpretation: normal    Rate:     ECG rate:  78    ECG rate assessment: normal    Rhythm:     Rhythm: sinus rhythm    Ectopy:     Ectopy: none    QRS:     QRS axis:  Normal  Conduction:     Conduction: normal    ST segments:     ST segments:  Normal  T waves:     T waves: normal        ED Medication and Procedure Management   Prior to Admission Medications   Prescriptions Last Dose Informant Patient Reported? Taking?   Nattokinase 100 MG CAPS  Self Yes No   Sig: Take by mouth in the morning   Probiotic Product (MVW Complete Probiotic) CPDR   No No   Sig: Take 1 capsule by mouth 2 (two) times a day   albuterol (Proventil HFA) 90 mcg/act inhaler   No No   Sig: Inhale 2 puffs every 6 (six) hours as needed for wheezing   cyclobenzaprine (FLEXERIL) 5 mg tablet   No No   Sig: Take 0.5 tablets (2.5 mg total) by mouth daily at bedtime as needed for muscle spasms   ergocalciferol (VITAMIN D2) 50,000 units    No No   Sig: Take 1 capsule (50,000 Units total) by mouth once a week   losartan-hydrochlorothiazide (HYZAAR) 100-25 MG per tablet   No No   Sig: Take 1 tablet by mouth daily   omeprazole (PriLOSEC) 20 mg delayed release capsule   No No   Sig: TAKE 2 CAPSULES (40 MG TOTAL) BY MOUTH DAILY BEFORE BREAKFAST   Patient not taking: Reported on 4/9/2025   ondansetron (ZOFRAN) 4 mg tablet   No No   Sig: Take 1 tablet (4 mg total) by mouth every 8 (eight) hours as needed for nausea or vomiting   Patient not taking: Reported on 4/9/2025   ondansetron (ZOFRAN) 4 mg tablet   No No   Sig: Take 1 tablet (4 mg total) by mouth every 8 (eight) hours as needed for nausea or vomiting   Patient not taking: Reported on 4/9/2025   zolpidem (AMBIEN) 10 mg tablet   No No   Sig: Take 0.5-1 tablets (5-10 mg total) by mouth daily at bedtime as needed for sleep      Facility-Administered Medications: None     Discharge Medication List as of 4/28/2025  4:00 PM        CONTINUE these medications which have NOT CHANGED    Details   albuterol (Proventil HFA) 90 mcg/act inhaler Inhale 2 puffs every 6 (six) hours as needed for wheezing, Starting Wed 4/9/2025, Normal      cyclobenzaprine (FLEXERIL) 5 mg tablet Take 0.5 tablets (2.5 mg total) by mouth daily at bedtime as needed for muscle spasms, Starting Wed 4/9/2025, Normal      ergocalciferol (VITAMIN D2) 50,000 units Take 1 capsule (50,000 Units total) by mouth once a week, Starting Fri 1/10/2025, Normal      losartan-hydrochlorothiazide (HYZAAR) 100-25 MG per tablet Take 1 tablet by mouth daily, Starting Fri 12/13/2024, Normal      Nattokinase 100 MG CAPS Take by mouth in the morning, Historical Med      omeprazole (PriLOSEC) 20 mg delayed release capsule TAKE 2 CAPSULES (40 MG TOTAL) BY MOUTH DAILY BEFORE BREAKFAST, Starting Mon 1/20/2025, Until Sun 4/20/2025, Normal      !! ondansetron (ZOFRAN) 4 mg tablet Take 1 tablet (4 mg total) by mouth every 8 (eight) hours as needed for nausea or  vomiting, Starting Thu 12/12/2024, Normal      !! ondansetron (ZOFRAN) 4 mg tablet Take 1 tablet (4 mg total) by mouth every 8 (eight) hours as needed for nausea or vomiting, Starting Thu 12/12/2024, Normal      Probiotic Product (MVW Complete Probiotic) CPDR Take 1 capsule by mouth 2 (two) times a day, Starting Mon 12/16/2024, Normal      zolpidem (AMBIEN) 10 mg tablet Take 0.5-1 tablets (5-10 mg total) by mouth daily at bedtime as needed for sleep, Starting Wed 1/8/2025, Normal       !! - Potential duplicate medications found. Please discuss with provider.          ED SEPSIS DOCUMENTATION   Time reflects when diagnosis was documented in both MDM as applicable and the Disposition within this note       Time User Action Codes Description Comment    4/28/2025  3:47 PM Tracey Tristan [J45.901] Asthma exacerbation                  Tracey Tristan,   04/28/25 2222

## 2025-05-02 ENCOUNTER — RESULTS FOLLOW-UP (OUTPATIENT)
Dept: FAMILY MEDICINE CLINIC | Facility: CLINIC | Age: 80
End: 2025-05-02

## 2025-05-09 ENCOUNTER — OFFICE VISIT (OUTPATIENT)
Dept: FAMILY MEDICINE CLINIC | Facility: CLINIC | Age: 80
End: 2025-05-09
Payer: MEDICARE

## 2025-05-09 VITALS
BODY MASS INDEX: 28.63 KG/M2 | WEIGHT: 142 LBS | DIASTOLIC BLOOD PRESSURE: 78 MMHG | OXYGEN SATURATION: 98 % | SYSTOLIC BLOOD PRESSURE: 118 MMHG | HEIGHT: 59 IN | TEMPERATURE: 97.9 F | HEART RATE: 83 BPM

## 2025-05-09 DIAGNOSIS — R00.2 PALPITATION: ICD-10-CM

## 2025-05-09 DIAGNOSIS — J45.20 ASTHMA IN ADULT, MILD INTERMITTENT, UNCOMPLICATED: Primary | ICD-10-CM

## 2025-05-09 DIAGNOSIS — I10 PRIMARY HYPERTENSION: ICD-10-CM

## 2025-05-09 PROCEDURE — 99214 OFFICE O/P EST MOD 30 MIN: CPT | Performed by: FAMILY MEDICINE

## 2025-05-09 PROCEDURE — G2211 COMPLEX E/M VISIT ADD ON: HCPCS | Performed by: FAMILY MEDICINE

## 2025-05-09 RX ORDER — ALBUTEROL SULFATE 2.5 MG/3ML
2.5 SOLUTION RESPIRATORY (INHALATION) EVERY 6 HOURS PRN
COMMUNITY

## 2025-05-09 NOTE — ASSESSMENT & PLAN NOTE
Possible asthma or other pulmonary etiology  Patient symptom does improve with bronchodilators  Will have patient complete PFT and follow-up with pulmonology      Orders:    Complete PFT with post bronchodilator; Future

## 2025-05-09 NOTE — PROGRESS NOTES
Name: Erin Suazo      : 1945      MRN: [id number]  Encounter Provider: Cholo Field MD  Encounter Date: 2025   Encounter department: [facility]    Assessment & Plan  Asthma in adult, mild intermittent, uncomplicated  Possible asthma or other pulmonary etiology  Patient symptom does improve with bronchodilators  Will have patient complete PFT and follow-up with pulmonology      Orders:    Complete PFT with post bronchodilator; Future    Primary hypertension  Blood pressure 180/78, goal         Palpitation    Reviewed results of patient's holter monitor, heart rate ranging from 58-1 30, ventricular ectopy activity consistent with single PVCs, evidence of SVT consisted of 50 beats.  1 patient continue to monitor for palpitation  Hold beta blocker for now due to possible worsening asthma symptom             History of Present Illness       HPI    Erin is a 79-year-old female patient presents for ER follow-up.  Patient was seen in the emergency department on.  According patient she was returning her Holter monitor when she experienced some wheezing, shortness of breath.  Patient does have baseline asthma however is unable to use steroids.  Patient's symptoms did improve after inhaled bronchodilator treatment x 2.  Patient does have both albuterol nebulizer and inhaler at home.  Patient states the wheezing is better, still mild symptom at night.   Pt has follow up with pulmonology on 25.  No diagnosed COPD  Patient denies any significant chest pain.     Review of Systems   Constitutional:  Negative for chills and fever.   HENT:  Negative for congestion, rhinorrhea and sore throat.    Respiratory:  Positive for wheezing. Negative for chest tightness and shortness of breath.    Cardiovascular:  Negative for chest pain.   Gastrointestinal:  Negative for abdominal pain.   Neurological:  Negative for dizziness, light-headedness and headaches.   Psychiatric/Behavioral:  Negative  for sleep disturbance.        Past Medical History:   Diagnosis Date    Arthritis     Carpal tunnel syndrome     GERD (gastroesophageal reflux disease)     Hiatal hernia     Hypertension     Insomnia     Pneumonia     PONV (postoperative nausea and vomiting)     Renal calculi     Sleep deprivation     chronically    Vitamin D deficiency     Vitamin D toxicity 2011    with leaky gut syndrome     Past Surgical History:   Procedure Laterality Date    BUNIONECTOMY Bilateral     CARPAL TUNNEL RELEASE Right 2015    COLONOSCOPY      CYSTOSCOPY N/A 6/6/2019    Procedure: CYSTOSCOPY;  Surgeon: Brodie Romero MD;  [facility];  Service: Gynecology Oncology    ESOPHAGOGASTRODUODENOSCOPY N/A 5/5/2016    Procedure: ESOPHAGOGASTRODUODENOSCOPY (EGD);  Surgeon: Enmanuel Balbuena MD;  [facility];  Service:     FRACTURE SURGERY      L arm     NERVE BLOCK       R Knee    OOPHORECTOMY Left     NM ARTHRP ACETBLR/PROX FEM PROSTC AGRFT/ALGRFT Left 9/16/2024    Procedure: ARTHROPLASTY HIP TOTAL ANTERIOR;  Surgeon: Nellie Foster MD;  Location: [facility];  Service: Orthopedics    NM COLONOSCOPY FLX DX W/COLLJ SPEC WHEN PFRMD N/A 6/28/2016    Procedure: COLONOSCOPY;  Surgeon: Enmanuel Balbuena MD;  [facility];  Service: Gastroenterology    NM ESOPHAGOGASTRODUODENOSCOPY TRANSORAL DIAGNOSTIC N/A 10/19/2018    Procedure: ESOPHAGOGASTRODUODENOSCOPY (EGD);  Surgeon: Enmanuel Balbuena MD;  [facility]: [facility];  Service: Gastroenterology    NM LAPS TOTAL HYSTERECT 250 GM/< W/RMVL TUBE/OVARY N/A 6/6/2019    Procedure: ROBOTIC TOTAL LAPAROSCOPIC HYSTERECTOMY, RIGHT SALPINGO-OOPHORECTOMY, EXTENSIVE ADHESIOLYSIS, APPROXIMATELY 45 MIN;  Surgeon: Brodie Romero MD;  Location: [facility];  Service: Gynecology Oncology    ROTATOR CUFF REPAIR Bilateral     TOE SURGERY Left     left 5th toe shaved down due to repeated fx    TOTAL KNEE ARTHROPLASTY Left      Family History   Problem Relation Age of Onset    Heart failure Mother      Other Mother         respiratory failure    Heart attack Mother     Heart failure Father         respiratory failure    Heart attack Father     Breast cancer Sister     No Known Problems Sister     Kidney failure Brother     Alcohol abuse Brother     No Known Problems Brother     Diabetes Brother     Hypertension Daughter     Arrhythmia Neg Hx     Clotting disorder Neg Hx     Fainting Neg Hx     Anuerysm Neg Hx     Stroke Neg Hx     Hyperlipidemia Neg Hx     Asthma Neg Hx      Social History     Tobacco Use    Smoking status: Former     Current packs/day: 0.00     Average packs/day: 1 pack/day for 13.0 years (13.0 ttl pk-yrs)     Types: Cigarettes     Start date:      Quit date:      Years since quittin.3    Smokeless tobacco: Never    Tobacco comments:     Quit > 30 years ago    Vaping Use    Vaping status: Never Used   Substance and Sexual Activity    Alcohol use: No    Drug use: No    Sexual activity: Not on file     Comment: no partner     Current Outpatient Medications on File Prior to Visit   Medication Sig    albuterol (Proventil HFA) 90 mcg/act inhaler Inhale 2 puffs every 6 (six) hours as needed for wheezing    Albuterol Sulfate (albuterol, FOR EMS ONLY,) (2.5 mg/3 mL) 0.083 % nebulizer solution Take 2.5 mg by nebulization every 6 (six) hours as needed for wheezing    losartan-hydrochlorothiazide (HYZAAR) 100-25 MG per tablet Take 1 tablet by mouth daily    Nattokinase 100 MG CAPS Take by mouth in the morning    Probiotic Product (MVW Complete Probiotic) CPDR Take 1 capsule by mouth 2 (two) times a day    zolpidem (AMBIEN) 10 mg tablet Take 0.5-1 tablets (5-10 mg total) by mouth daily at bedtime as needed for sleep    cyclobenzaprine (FLEXERIL) 5 mg tablet Take 0.5 tablets (2.5 mg total) by mouth daily at bedtime as needed for muscle spasms (Patient not taking: Reported on 2025)    ergocalciferol (VITAMIN D2) 50,000 units Take 1 capsule (50,000 Units total) by mouth once a week (Patient not  "taking: Reported on 5/9/2025)    omeprazole (PriLOSEC) 20 mg delayed release capsule TAKE 2 CAPSULES (40 MG TOTAL) BY MOUTH DAILY BEFORE BREAKFAST (Patient not taking: Reported on 1/23/2025)    ondansetron (ZOFRAN) 4 mg tablet Take 1 tablet (4 mg total) by mouth every 8 (eight) hours as needed for nausea or vomiting (Patient not taking: Reported on 1/23/2025)    ondansetron (ZOFRAN) 4 mg tablet Take 1 tablet (4 mg total) by mouth every 8 (eight) hours as needed for nausea or vomiting (Patient not taking: Reported on 1/23/2025)     Allergies   Allergen Reactions    Androgens Anaphylaxis    Cefazolin GI Intolerance, Hives and Swelling    Ciprofloxacin Hives    Other Other (See Comments)     STEROIDS- GI INTOLERANCE    Penicillins Hives and Rash    Prednisone Hives, GI Intolerance, Swelling and Vomiting    Vancomycin Hives, GI Intolerance, Diarrhea and Rash    Aspirin GI Intolerance and Hives     Even low dose    Cortisone Syncope    Adhesive [Medical Tape] Rash    Beef Allergy - Food Allergy Diarrhea    Gluten Meal - Food Allergy GI Intolerance    Lactose - Food Allergy Diarrhea    Oxycodone Rash and Vomiting    Pataday [Olopatadine Hcl] Eye Swelling    Penicillin G Hives     Immunization History   Administered Date(s) Administered    COVID-19 J&J ([email or web address]) vaccine 0.5 mL 05/27/2021, 02/15/2022    Pneumococcal Conjugate Vaccine 20-valent (Pcv20), Polysace 09/07/2024    Tdap 11/25/2013, 02/08/2020    Tuberculin Skin Test 09/20/2024, 09/27/2024     Objective   /78 (BP Location: Right arm, Patient Position: Sitting, Cuff Size: Large)   Pulse 83   Temp 97.9 °F (36.6 °C) (Temporal)   Ht 4' 10.5\" (1.486 m)   Wt 64.4 kg (142 lb)   SpO2 98%   BMI 29.17 kg/m²     Physical Exam  Vitals reviewed.   Constitutional:       General: She is not in acute distress.     Appearance: Normal appearance. She is not ill-appearing, toxic-appearing or diaphoretic.   Cardiovascular:      Rate and Rhythm: Normal rate and regular " rhythm.      Pulses: Normal pulses.      Heart sounds: Normal heart sounds. No murmur heard.  Pulmonary:      Effort: Pulmonary effort is normal. No respiratory distress.      Breath sounds: Normal breath sounds. No wheezing.   Abdominal:      General: Abdomen is flat.   Musculoskeletal:         General: No swelling or deformity.   Skin:     General: Skin is warm and dry.      Capillary Refill: Capillary refill takes less than 2 seconds.      Coloration: Skin is not jaundiced.   Neurological:      General: No focal deficit present.      Mental Status: She is alert.   Psychiatric:         Mood and Affect: Mood normal.

## 2025-05-21 ENCOUNTER — CONSULT (OUTPATIENT)
Dept: PULMONOLOGY | Facility: CLINIC | Age: 80
End: 2025-05-21
Payer: MEDICARE

## 2025-05-21 VITALS
BODY MASS INDEX: 29.17 KG/M2 | TEMPERATURE: 97.6 F | DIASTOLIC BLOOD PRESSURE: 70 MMHG | OXYGEN SATURATION: 98 % | WEIGHT: 142 LBS | SYSTOLIC BLOOD PRESSURE: 114 MMHG | HEART RATE: 77 BPM

## 2025-05-21 DIAGNOSIS — J45.20 ASTHMA IN ADULT, MILD INTERMITTENT, UNCOMPLICATED: Primary | ICD-10-CM

## 2025-05-21 PROBLEM — E66.09 CLASS 1 OBESITY DUE TO EXCESS CALORIES WITHOUT SERIOUS COMORBIDITY WITH BODY MASS INDEX (BMI) OF 31.0 TO 31.9 IN ADULT: Status: RESOLVED | Noted: 2024-08-26 | Resolved: 2025-05-21

## 2025-05-21 PROBLEM — E66.811 CLASS 1 OBESITY DUE TO EXCESS CALORIES WITHOUT SERIOUS COMORBIDITY WITH BODY MASS INDEX (BMI) OF 31.0 TO 31.9 IN ADULT: Status: RESOLVED | Noted: 2024-08-26 | Resolved: 2025-05-21

## 2025-05-21 PROBLEM — K21.9 GASTROESOPHAGEAL REFLUX DISEASE WITHOUT ESOPHAGITIS: Status: RESOLVED | Noted: 2018-09-11 | Resolved: 2025-05-21

## 2025-05-21 PROCEDURE — 99203 OFFICE O/P NEW LOW 30 MIN: CPT | Performed by: INTERNAL MEDICINE

## 2025-05-21 NOTE — ASSESSMENT & PLAN NOTE
Asthma like symptoms and allergies intermittently since age 49  Never hospitalized. Usually has a mild exacerbation once a year   3 PNAs in 2023/2024 but clear imaging in 2025  Pfts in 2020: mild restriction, no obstruction    Has very strong reaction to all steroids - ophthalmic and systemic, thinks inhaled as well but doesn't remember which inhaler she tried (nausea, vomiting, swelling)  Discussed risks and benefits for ICS, pt prefers to hold off     - continue OTC allergy pill  - albuterol prn

## 2025-05-21 NOTE — PROGRESS NOTES
Name: Erin Suazo      : 1945      MRN: [id number]  Encounter Provider: Debbi Alvarado MD  Encounter Date: 2025   Encounter department: [facility] P[facility]  :  Assessment & Plan  Asthma in adult, mild intermittent, uncomplicated  Asthma like symptoms and allergies intermittently since age 49  Never hospitalized. Usually has a mild exacerbation once a year   3 PNAs in  but clear imaging in   Pfts in : mild restriction, no obstruction    Has very strong reaction to all steroids - ophthalmic and systemic, thinks inhaled as well but doesn't remember which inhaler she tried (nausea, vomiting, swelling)  Discussed risks and benefits for ICS, pt prefers to hold off     - continue OTC allergy pill  - albuterol prn       Follow iup 6 months    History of Present Illness   HPI  Erin Suazo is a 79 y.o. female with a history of smoking, asthma, GERD, arthritis, who presents for evaluation of asthma.    Former mild smoker quit smoking 50 yrs ago  Allergies and dyspnea started at age 49  Never been diagnosed with COPD     Needed ED visit in  dyspnea triggered by allergies    Takes OTC allergy pill    Trouble tolerating prednisone  nausea, vomiting, diarrhea             Objective   /70 (BP Location: Left arm, Patient Position: Sitting, Cuff Size: Standard)   Pulse 77   Temp 97.6 °F (36.4 °C) (Temporal)   Wt 64.4 kg (142 lb)   SpO2 98%   BMI 29.17 kg/m²        Exam:   Appearance -- NAD, speaking full sentences  Neuro -- A&Ox3, wnl  Heart -- RRR, no murmurs  Lungs -- CTAB  Abdomen -- soft, NTND,  Extremities -- WWP, no edema  Skin -- no rash    Imaging:    CXR 2025: clear lungs    PFTs:    : no obstruction, mild restriction (ERV low, possibly due to obesity), mod-severe gas transfer defect    Other:

## 2025-06-27 ENCOUNTER — APPOINTMENT (OUTPATIENT)
Dept: RADIOLOGY | Facility: AMBULARY SURGERY CENTER | Age: 80
End: 2025-06-27
Attending: ORTHOPAEDIC SURGERY
Payer: MEDICARE

## 2025-06-27 ENCOUNTER — OFFICE VISIT (OUTPATIENT)
Dept: OBGYN CLINIC | Facility: CLINIC | Age: 80
End: 2025-06-27
Payer: MEDICARE

## 2025-06-27 VITALS — WEIGHT: 142 LBS | HEIGHT: 59 IN | BODY MASS INDEX: 28.63 KG/M2

## 2025-06-27 DIAGNOSIS — M25.511 RIGHT SHOULDER PAIN, UNSPECIFIED CHRONICITY: ICD-10-CM

## 2025-06-27 DIAGNOSIS — Z96.642 S/P TOTAL LEFT HIP ARTHROPLASTY: ICD-10-CM

## 2025-06-27 DIAGNOSIS — M62.89 HAMSTRING TIGHTNESS OF LEFT LOWER EXTREMITY: Primary | ICD-10-CM

## 2025-06-27 DIAGNOSIS — M25.562 LEFT KNEE PAIN, UNSPECIFIED CHRONICITY: ICD-10-CM

## 2025-06-27 PROCEDURE — 73562 X-RAY EXAM OF KNEE 3: CPT

## 2025-06-27 PROCEDURE — 73502 X-RAY EXAM HIP UNI 2-3 VIEWS: CPT

## 2025-06-27 PROCEDURE — 99213 OFFICE O/P EST LOW 20 MIN: CPT | Performed by: ORTHOPAEDIC SURGERY

## 2025-06-27 NOTE — ASSESSMENT & PLAN NOTE
Patient is 9 months status post left anterior total hip arthroplasty performed on 9/16/2024  Weight-bear as tolerated  X-ray of the left hip was obtained and reviewed in the office today demonstrating status post STACY adequate implant with no sign of loosening  Orders:    XR hip/pelv 2-3 vws left if performed; Future

## 2025-06-27 NOTE — PROGRESS NOTES
Name: Erin Suazo      : 1945      MRN: [id number]  Encounter Provider: Nellie Foster MD  Encounter Date: 2025   Encounter department: [facility]  :  Assessment & Plan  S/P total left hip arthroplasty  Patient is 9 months status post left anterior total hip arthroplasty performed on 2024  Weight-bear as tolerated  X-ray of the left hip was obtained and reviewed in the office today demonstrating status post STACY adequate implant with no sign of loosening  Orders:    XR hip/pelv 2-3 vws left if performed; Future    Hamstring tightness of left lower extremity  Patient has left hamstring tightness  Weight-bear as tolerated  Discussed with patient and demonstrated to the patient room today doing his stretching exercises to help with the pain  Provided patient with hamstring stretching exercises in her AVS and will be printed out for her at checkout       Left knee pain, unspecified chronicity  Patient with  history revision total knee arthroplasty and 2021 performed with the outside provider [place]  Weight-bear as tolerated  Discussed with patient the numbness she is having on the lateral aspect of the left knee is due to the surgery.  Orders:    XR knee 3 vw left non injury; Future    Right shoulder pain, unspecified chronicity  Patient with right shoulder pain  Will be referring patient to Dr. Lu for her right shoulder  Orders:    Ambulatory Referral to Orthopedic Surgery; Future        Follow up visit :  She has follow up appointment in 2025.         The above stated was discussed in layman's terms and the patient expressed understanding.  All questions were answered to the patient's satisfaction.       Subjective:   Erin Suazo is a 79 y.o. female who presents today 9 months status post left anterior total hip arthroplasty performed on 2024 and left knee pain. Patient has history revision total knee arthroplasty and 2021  performed with the outside provider [place].  Patient states she is having pain in the left posterior thigh radiating down to the knee. She notes she has trouble going up steps and hills.  She notes she is having muscle cramping lower leg at night. She is taking Tylenol for pain relief.       Review of systems negative unless otherwise specified in HPI    Past Medical History[1]    Past Surgical History[2]    Family History[3]    Social History     Occupational History    Not on file   Tobacco Use    Smoking status: Former     Current packs/day: 0.00     Average packs/day: 1 pack/day for 13.0 years (13.0 ttl pk-yrs)     Types: Cigarettes     Start date:      Quit date:      Years since quittin.5    Smokeless tobacco: Never    Tobacco comments:     Quit > 30 years ago    Vaping Use    Vaping status: Never Used   Substance and Sexual Activity    Alcohol use: No    Drug use: No    Sexual activity: Not on file     Comment: no partner       Current Medications[4]    Allergies[5]       There were no vitals filed for this visit.  Body mass index is 29.17 kg/m².  Wt Readings from Last 3 Encounters:   25 64.4 kg (142 lb)   25 64.4 kg (142 lb)   25 64.4 kg (142 lb)       Objective:            Physical Exam  Physical Exam:      General Appearance:    Alert, cooperative, no distress, appears stated age   Head:    Normocephalic, without obvious abnormality, atraumatic   Eyes:    conjunctiva/corneas clear, both eyes         Nose:   Nares normal, septum midline, no drainage    Throat:   Lips normal; teeth and gums normal   Neck:    symmetrical, trachea midline, ;     thyroid:  no enlargement/   Back:     Symmetric, no curvature, ROM normal   Lungs:   No audible wheezing or labored breathing   Chest Wall:    No tenderness or deformity    Heart:    Regular rate and rhythm                         Pulses:   2+ and symmetric all extremities   Skin:   Skin color, texture, turgor normal, no rashes or  "lesions   Neurologic:   normal strength, sensation and reflexes     throughout                       Ortho Exam  Left lower leg   Surgical incisions well-healed  No erythema  Pain over the posterior thigh with straight leg raise  Tenderness to palpation over the posterior knee  Pain with flexion of knee against resistance  Neurovascular intact distally    Imaging   The attending physician has personally reviewed the pertinent films in sectra and interpretation is as follows: X-ray left hip demonstrates status post STACY adequate implant with no sign of loosening    X-ray left knee demonstrates status post TKA revision adequate implant with no sign of loosening      Procedures, if performed today:    Procedures    None performed      Scribe Attestation      I,:  Americo Badillo MA am acting as a scribe while in the presence of the attending physician.:       I,:  Nellie Foster MD personally performed the services described in this documentation    as scribed in my presence.:               Portions of the record may have been created with voice recognition software.  Occasional wrong word or \"sound a like\" substitutions may have occurred due to the inherent limitations of voice recognition software.  Read the chart carefully and recognize, using context, where substitutions have occurred.         [1]   Past Medical History:  Diagnosis Date    Arthritis     Carpal tunnel syndrome     GERD (gastroesophageal reflux disease)     Hiatal hernia     Hypertension     Insomnia     Pneumonia     PONV (postoperative nausea and vomiting)     Renal calculi     Sleep deprivation     chronically    Vitamin D deficiency     Vitamin D toxicity 2011    with leaky gut syndrome   [2]   Past Surgical History:  Procedure Laterality Date    BUNIONECTOMY Bilateral     CARPAL TUNNEL RELEASE Right 2015    COLONOSCOPY      CYSTOSCOPY N/A 6/6/2019    Procedure: CYSTOSCOPY;  Surgeon: Brodie Romero MD;  [facility];  " Service: Gynecology Oncology    ESOPHAGOGASTRODUODENOSCOPY N/A 5/5/2016    Procedure: ESOPHAGOGASTRODUODENOSCOPY (EGD);  Surgeon: Enmanuel Balbuena MD;  [facility];  Service:     FRACTURE SURGERY      L arm     NERVE BLOCK       R Knee    OOPHORECTOMY Left     SD ARTHRP ACETBLR/PROX FEM PROSTC AGRFT/ALGRFT Left 9/16/2024    Procedure: ARTHROPLASTY HIP TOTAL ANTERIOR;  Surgeon: Nellie Foster MD;  [facility];  Service: Orthopedics    SD COLONOSCOPY FLX DX W/COLLJ SPEC WHEN PFRMD N/A 6/28/2016    Procedure: COLONOSCOPY;  Surgeon: Enmanuel Balbuena MD;  [facility];  Service: Gastroenterology    SD ESOPHAGOGASTRODUODENOSCOPY TRANSORAL DIAGNOSTIC N/A 10/19/2018    Procedure: ESOPHAGOGASTRODUODENOSCOPY (EGD);  Surgeon: Enmanuel Balbuena MD;  [facility];  Service: Gastroenterology    SD LAPS TOTAL HYSTERECT 250 GM/< W/RMVL TUBE/OVARY N/A 6/6/2019    Procedure: ROBOTIC TOTAL LAPAROSCOPIC HYSTERECTOMY, RIGHT SALPINGO-OOPHORECTOMY, EXTENSIVE ADHESIOLYSIS, APPROXIMATELY 45 MIN;  Surgeon: Brodie Romero MD;  [facility];  Service: Gynecology Oncology    ROTATOR CUFF REPAIR Bilateral     TOE SURGERY Left     left 5th toe shaved down due to repeated fx    TOTAL KNEE ARTHROPLASTY Left    [3]   Family History  Problem Relation Name Age of Onset    Heart failure Mother      Other Mother          respiratory failure    Heart attack Mother      Heart failure Father          respiratory failure    Heart attack Father      Breast cancer Sister      No Known Problems Sister      Kidney failure Brother      Alcohol abuse Brother      No Known Problems Brother      Diabetes Brother      Hypertension Daughter      Arrhythmia Neg Hx      Clotting disorder Neg Hx      Fainting Neg Hx      Anuerysm Neg Hx      Stroke Neg Hx      Hyperlipidemia Neg Hx      Asthma Neg Hx     [4]   Current Outpatient Medications:     albuterol (Proventil HFA) 90 mcg/act inhaler, Inhale 2 puffs every 6 (six) hours as  needed for wheezing, Disp: 6.7 g, Rfl: 1    Albuterol Sulfate (albuterol, FOR EMS ONLY,) (2.5 mg/3 mL) 0.083 % nebulizer solution, Take 2.5 mg by nebulization every 6 (six) hours as needed for wheezing, Disp: , Rfl:     losartan-hydrochlorothiazide (HYZAAR) 100-25 MG per tablet, Take 1 tablet by mouth daily, Disp: 100 tablet, Rfl: 1    Nattokinase 100 MG CAPS, Take by mouth in the morning, Disp: , Rfl:     Probiotic Product (MVW Complete Probiotic) CPDR, Take 1 capsule by mouth 2 (two) times a day, Disp: 60 capsule, Rfl: 1    zolpidem (AMBIEN) 10 mg tablet, Take 0.5-1 tablets (5-10 mg total) by mouth daily at bedtime as needed for sleep, Disp: 30 tablet, Rfl: 0  [5]   Allergies  Allergen Reactions    Androgens Anaphylaxis    Cefazolin GI Intolerance, Hives and Swelling    Ciprofloxacin Hives    Other Other (See Comments)     STEROIDS- GI INTOLERANCE    Penicillins Hives and Rash    Prednisone Hives, GI Intolerance, Swelling and Vomiting    Vancomycin Hives, GI Intolerance, Diarrhea and Rash    Aspirin GI Intolerance and Hives     Even low dose    Cortisone Syncope    Adhesive [Medical Tape] Rash    Beef Allergy - Food Allergy Diarrhea    Gluten Meal - Food Allergy GI Intolerance    Lactose - Food Allergy Diarrhea    Oxycodone Rash and Vomiting    Pataday [Olopatadine Hcl] Eye Swelling    Penicillin G Hives

## 2025-06-27 NOTE — ASSESSMENT & PLAN NOTE
Patient with  history revision total knee arthroplasty and March 2021 performed with the outside provider [place]  Weight-bear as tolerated  Discussed with patient the numbness she is having on the lateral aspect of the left knee is due to the surgery.  Orders:    XR knee 3 vw left non injury; Future

## 2025-06-27 NOTE — ASSESSMENT & PLAN NOTE
Patient with right shoulder pain  Will be referring patient to Dr. Lu for her right shoulder  Orders:    Ambulatory Referral to Orthopedic Surgery; Future

## 2025-07-03 ENCOUNTER — APPOINTMENT (OUTPATIENT)
Dept: RADIOLOGY | Facility: MEDICAL CENTER | Age: 80
End: 2025-07-03
Attending: STUDENT IN AN ORGANIZED HEALTH CARE EDUCATION/TRAINING PROGRAM
Payer: MEDICARE

## 2025-07-03 ENCOUNTER — OFFICE VISIT (OUTPATIENT)
Dept: OBGYN CLINIC | Facility: MEDICAL CENTER | Age: 80
End: 2025-07-03
Payer: MEDICARE

## 2025-07-03 VITALS — WEIGHT: 142 LBS | BODY MASS INDEX: 29.17 KG/M2

## 2025-07-03 DIAGNOSIS — M12.811 ROTATOR CUFF ARTHROPATHY, RIGHT: ICD-10-CM

## 2025-07-03 DIAGNOSIS — M25.511 RIGHT SHOULDER PAIN, UNSPECIFIED CHRONICITY: ICD-10-CM

## 2025-07-03 PROCEDURE — 99213 OFFICE O/P EST LOW 20 MIN: CPT | Performed by: STUDENT IN AN ORGANIZED HEALTH CARE EDUCATION/TRAINING PROGRAM

## 2025-07-03 PROCEDURE — 73030 X-RAY EXAM OF SHOULDER: CPT

## 2025-07-03 RX ORDER — MAGNESIUM L-LACTATE 84 MG
250 TABLET, EXTENDED RELEASE ORAL DAILY
COMMUNITY

## 2025-07-03 NOTE — PROGRESS NOTES
ASSESSMENT/PLAN:    Assessment & Plan  Rotator cuff arthropathy, right  We discussed the history, exam, and imaging with patient in detail. Presentation continues to be most consistent with right rotator cuff arthropathy. Management options were discussed.  Conservative treatment entails continued use of oral/topical medications, corticosteroid injections to the shoulder, activity modification, and physical therapy all with goal of progressing towards a pain free functional range of motion of the shoulder. The benefit of nonoperative treatment would be avoiding the risks associated with surgery. The downside of nonoperative treatment is the potential progression of arthritis with continued pain and lifestyle impact.   Surgical intervention in the form of right reverse total shoulder arthroplasty. Risks of surgery discussed including bleeding, infection, damage to local surrounding structures, iatrogenic fracture, chronic pain, stiffness, hardware loosening, hardware failure, scapular notching, acromial stress fracture, inability to obtain full range of motion of the arm, DVT, inability to return to all prior activities, and possible need for repeat surgery. Risks of anesthesia include cardiovascular complications or pulmonary complications. Postoperative protocol reviewed including need for sling x 6 weeks, followed by progressive strengthening followed by activity specific rehab with the goal of being cleared for all activities by 6 months. The benefit of surgical intervention is potential resolution of pain and potentially improved function. We did discuss expected limitation with arm elevation and rotation, but that patients should still have a functional range postoperatively.   After thorough discussion, patient elected to proceed forward with nonoperative management.  She feels that she can generally manage and has had a lot of surgery, so would like to avoid things for now.  Patient is not interested in  injections, given she has had reaction to corticosteroid injections in the past.  Did discuss PRP which would be an option, but discussed that it is typically out-of-pocket expense.  Patient is not interested in considering at this time.       Return if symptoms worsen or fail to improve.   _____________________________________________________  CHIEF COMPLAINT:  Chief Complaint   Patient presents with    Right Shoulder - Pain       SUBJECTIVE:  Erin Suazo is a 79 y.o. year old female, LHD, with PMH of HTN, GERD who presents for evaluation of right shoulder pain with referral from Dr. Foster. The issue began 1 year ago. Mechanism of injury: none.  Patient notes that she has a history of rotator cuff repair to the left shoulder around 2009.  She does not remember who performed the surgery.  She notes that for the past year she has had pain to the lateral upper arm with radiation proximally into the lateral shoulder.  She notes she has pain when attempting to lift the arm overhead.  She also notes pain when lying directly on the right shoulder.  She rates the pain 8/10 in severity on average for the past week.  She takes Tylenol and uses a heating pad with mild relief of symptoms.  She denies history of injections, and denies recent physical therapy for the shoulder.  She notes that she is not interested in corticosteroid injections as she had a cortisone injections in the past to different joints and had significant nausea as well as a syncopal event.  She does have history of carpal tunnel release on the right.  She notes that she is retired, but enjoys volunteering, cleaning her house, and going on morning walks.  She lives alone at home.        PAST MEDICAL HISTORY:  Past Medical History[1]    PAST SURGICAL HISTORY:  Past Surgical History[2]    FAMILY HISTORY:  Family History[3]    SOCIAL HISTORY:  Social History[4]    MEDICATIONS:  Current Medications[5]    ALLERGIES:  Allergies[6]    Review of systems:  "  Constitutional: Negative for fatigue, fever or loss of apetite.   HENT: Negative.    Respiratory: Negative for shortness of breath, dyspnea.    Cardiovascular: Negative for chest pain/tightness.   Gastrointestinal: Negative for abdominal pain, N/V.   Endocrine: Negative for cold/heat intolerance, unexplained weight loss/gain.   Genitourinary: Negative for flank pain, dysuria, hematuria.   Musculoskeletal: As in HPI  Skin: Negative for rash.    Neurological: Negative for numbness or tingling  Psychiatric/Behavioral: Negative for agitation.  _____________________________________________________  PHYSICAL EXAMINATION:    Weight 64.4 kg (142 lb), not currently breastfeeding.    General: well developed, well nourished\", alert and oriented times 3, no acute distress  HEENT: Benign, normocephalic, atraumatic  Cardiovascular: regular rate    Pulmonary: No wheezing or stridor  Abdomen: Soft, Nontender  Skin: No open wounds, erythema, rash  Neurovascular: per examination below    MUSCULOSKELETAL EXAMINATION:    Right shoulder exam  No bruising, swelling or deformity  Well healed incision over anterolateral shoulder from open rotator cuff repair  NonTTP cervical spine, clavicle, AC joint, acromion, scapular spine, posterior joint line, deltoid, anterior joint line, bicipital groove  Active ROM  Forward flexion: 90  External rotation in adduction: 35 (70 left)  Internal rotation: thoracolumbar spine  Passive ROM  Forward flexion: 130  External rotation in adduction: 50  Strength Testin/5 with Kate  4/5 ER in adduction  Provocative maneuvers:  +: drop arm, neer impingement  -: external rotation lag, belly press, lift off  SILT C5-T1  2+ Radial pulse, symmetric with contralateral        _____________________________________________________  STUDIES REVIEWED:  I have personally reviewed pertinent films in PACS and my interpretation is:     Xrays of the right shoulder taken on 7/3/2025 demonstrate superior migration of the " humeral head relative to the glenoid with decreased acromiohumeral interval. Moderate glenohumeral joint space narrowing demonstrated with osteophyte formation to the inferior humeral head as well as the glenoid margins. No acute fracture or dislocation demonstrated.    Scribe Attestation      I,:  Edgar Araujo PA-C am acting as a scribe while in the presence of the attending physician.:       I,:  Rigoberto Lu MD personally performed the services described in this documentation    as scribed in my presence.:                    [1]   Past Medical History:  Diagnosis Date    Arthritis     Carpal tunnel syndrome     GERD (gastroesophageal reflux disease)     Hiatal hernia     Hypertension     Insomnia     Pneumonia     PONV (postoperative nausea and vomiting)     Renal calculi     Sleep deprivation     chronically    Vitamin D deficiency     Vitamin D toxicity 2011    with leaky gut syndrome   [2]   Past Surgical History:  Procedure Laterality Date    BUNIONECTOMY Bilateral     CARPAL TUNNEL RELEASE Right 2015    COLONOSCOPY      CYSTOSCOPY N/A 6/6/2019    Procedure: CYSTOSCOPY;  Surgeon: Brodie Romero MD;  [facility]: [facility];  Service: Gynecology Oncology    ESOPHAGOGASTRODUODENOSCOPY N/A 5/5/2016    Procedure: ESOPHAGOGASTRODUODENOSCOPY (EGD);  Surgeon: Enmanuel Balbuena MD;  Location: [facility];  Service:     FRACTURE SURGERY      L arm     NERVE BLOCK       R Knee    OOPHORECTOMY Left     WI ARTHRP ACETBLR/PROX FEM PROSTC AGRFT/ALGRFT Left 9/16/2024    Procedure: ARTHROPLASTY HIP TOTAL ANTERIOR;  Surgeon: Nellie Foster MD;  [facility];  Service: Orthopedics    WI COLONOSCOPY FLX DX W/COLLJ SPEC WHEN PFRMD N/A 6/28/2016    Procedure: COLONOSCOPY;  Surgeon: Enmanuel Balbuena MD;  [facility];  Service: Gastroenterology    WI ESOPHAGOGASTRODUODENOSCOPY TRANSORAL DIAGNOSTIC N/A 10/19/2018    Procedure: ESOPHAGOGASTRODUODENOSCOPY (EGD);  Surgeon: Enmanuel Balbuena MD;  [facility]: [facility];   Service: Gastroenterology    MD LAPS TOTAL HYSTERECT 250 GM/< W/RMVL TUBE/OVARY N/A 2019    Procedure: ROBOTIC TOTAL LAPAROSCOPIC HYSTERECTOMY, RIGHT SALPINGO-OOPHORECTOMY, EXTENSIVE ADHESIOLYSIS, APPROXIMATELY 45 MIN;  Surgeon: Brodie Romero MD;  Location: [facility];  Service: Gynecology Oncology    ROTATOR CUFF REPAIR Bilateral     TOE SURGERY Left     left 5th toe shaved down due to repeated fx    TOTAL KNEE ARTHROPLASTY Left    [3]   Family History  Problem Relation Name Age of Onset    Heart failure Mother      Other Mother          respiratory failure    Heart attack Mother      Heart failure Father          respiratory failure    Heart attack Father      Breast cancer Sister      No Known Problems Sister      Kidney failure Brother      Alcohol abuse Brother      No Known Problems Brother      Diabetes Brother      Hypertension Daughter      Arrhythmia Neg Hx      Clotting disorder Neg Hx      Fainting Neg Hx      Anuerysm Neg Hx      Stroke Neg Hx      Hyperlipidemia Neg Hx      Asthma Neg Hx     [4]   Social History  Tobacco Use    Smoking status: Former     Current packs/day: 0.00     Average packs/day: 1 pack/day for 13.0 years (13.0 ttl pk-yrs)     Types: Cigarettes     Start date:      Quit date:      Years since quittin.5    Smokeless tobacco: Never    Tobacco comments:     Quit > 30 years ago    Vaping Use    Vaping status: Never Used   Substance Use Topics    Alcohol use: No    Drug use: No   [5]   Current Outpatient Medications:     albuterol (Proventil HFA) 90 mcg/act inhaler, Inhale 2 puffs every 6 (six) hours as needed for wheezing, Disp: 6.7 g, Rfl: 1    Albuterol Sulfate (albuterol, FOR EMS ONLY,) (2.5 mg/3 mL) 0.083 % nebulizer solution, Take 2.5 mg by nebulization every 6 (six) hours as needed for wheezing, Disp: , Rfl:     losartan-hydrochlorothiazide (HYZAAR) 100-25 MG per tablet, Take 1 tablet by mouth daily, Disp: 100 tablet, Rfl: 1    magnesium (MAGTAB) 84 MG  (7MEQ) TBCR, Take 250 mg by mouth daily, Disp: , Rfl:     Nattokinase 100 MG CAPS, Take by mouth in the morning, Disp: , Rfl:     Probiotic Product (MVW Complete Probiotic) CPDR, Take 1 capsule by mouth 2 (two) times a day, Disp: 60 capsule, Rfl: 1    zolpidem (AMBIEN) 10 mg tablet, Take 0.5-1 tablets (5-10 mg total) by mouth daily at bedtime as needed for sleep, Disp: 30 tablet, Rfl: 0  [6]   Allergies  Allergen Reactions    Androgens Anaphylaxis    Cefazolin GI Intolerance, Hives and Swelling    Ciprofloxacin Hives    Other Other (See Comments)     STEROIDS- GI INTOLERANCE    Penicillins Hives and Rash    Prednisone Hives, GI Intolerance, Swelling and Vomiting    Vancomycin Hives, GI Intolerance, Diarrhea and Rash    Aspirin GI Intolerance and Hives     Even low dose    Cortisone Syncope    Adhesive [Medical Tape] Rash    Beef Allergy - Food Allergy Diarrhea    Gluten Meal - Food Allergy GI Intolerance    Lactose - Food Allergy Diarrhea    Oxycodone Rash and Vomiting    Pataday [Olopatadine Hcl] Eye Swelling    Penicillin G Hives

## 2025-07-03 NOTE — ASSESSMENT & PLAN NOTE
We discussed the history, exam, and imaging with patient in detail. Presentation continues to be most consistent with right rotator cuff arthropathy. Management options were discussed.  Conservative treatment entails continued use of oral/topical medications, corticosteroid injections to the shoulder, activity modification, and physical therapy all with goal of progressing towards a pain free functional range of motion of the shoulder. The benefit of nonoperative treatment would be avoiding the risks associated with surgery. The downside of nonoperative treatment is the potential progression of arthritis with continued pain and lifestyle impact.   Surgical intervention in the form of right reverse total shoulder arthroplasty. Risks of surgery discussed including bleeding, infection, damage to local surrounding structures, iatrogenic fracture, chronic pain, stiffness, hardware loosening, hardware failure, scapular notching, acromial stress fracture, inability to obtain full range of motion of the arm, DVT, inability to return to all prior activities, and possible need for repeat surgery. Risks of anesthesia include cardiovascular complications or pulmonary complications. Postoperative protocol reviewed including need for sling x 6 weeks, followed by progressive strengthening followed by activity specific rehab with the goal of being cleared for all activities by 6 months. The benefit of surgical intervention is potential resolution of pain and potentially improved function. We did discuss expected limitation with arm elevation and rotation, but that patients should still have a functional range postoperatively.   After thorough discussion, patient elected to proceed forward with nonoperative management.  She feels that she can generally manage and has had a lot of surgery, so would like to avoid things for now.  Patient is not interested in injections, given she has had reaction to corticosteroid injections in the  past.  Did discuss PRP which would be an option, but discussed that it is typically out-of-pocket expense.  Patient is not interested in considering at this time.

## 2025-07-09 ENCOUNTER — HOSPITAL ENCOUNTER (OUTPATIENT)
Dept: PULMONOLOGY | Facility: HOSPITAL | Age: 80
Discharge: HOME | End: 2025-07-09
Attending: FAMILY MEDICINE
Payer: MEDICARE

## 2025-07-09 DIAGNOSIS — J45.20 ASTHMA IN ADULT, MILD INTERMITTENT, UNCOMPLICATED: ICD-10-CM

## 2025-07-09 PROCEDURE — 94729 DIFFUSING CAPACITY: CPT | Performed by: INTERNAL MEDICINE

## 2025-07-09 PROCEDURE — 94060 EVALUATION OF WHEEZING: CPT

## 2025-07-09 PROCEDURE — 94726 PLETHYSMOGRAPHY LUNG VOLUMES: CPT

## 2025-07-09 PROCEDURE — 94729 DIFFUSING CAPACITY: CPT

## 2025-07-09 PROCEDURE — 94760 N-INVAS EAR/PLS OXIMETRY 1: CPT

## 2025-07-09 PROCEDURE — 94726 PLETHYSMOGRAPHY LUNG VOLUMES: CPT | Performed by: INTERNAL MEDICINE

## 2025-07-09 PROCEDURE — 94060 EVALUATION OF WHEEZING: CPT | Performed by: INTERNAL MEDICINE

## 2025-07-09 RX ORDER — ALBUTEROL SULFATE 0.83 MG/ML
2.5 SOLUTION RESPIRATORY (INHALATION) ONCE
Status: COMPLETED | OUTPATIENT
Start: 2025-07-09 | End: 2025-07-09

## 2025-07-09 RX ADMIN — ALBUTEROL SULFATE 2.5 MG: 2.5 SOLUTION RESPIRATORY (INHALATION) at 14:28

## 2025-07-10 ENCOUNTER — EVALUATION (OUTPATIENT)
Dept: PHYSICAL THERAPY | Facility: MEDICAL CENTER | Age: 80
End: 2025-07-10
Payer: MEDICARE

## 2025-07-10 DIAGNOSIS — M12.811 ROTATOR CUFF ARTHROPATHY, RIGHT: ICD-10-CM

## 2025-07-10 PROCEDURE — 97161 PT EVAL LOW COMPLEX 20 MIN: CPT | Performed by: PHYSICAL THERAPIST

## 2025-07-10 PROCEDURE — 97140 MANUAL THERAPY 1/> REGIONS: CPT | Performed by: PHYSICAL THERAPIST

## 2025-07-10 NOTE — PROGRESS NOTES
PT Evaluation     Today's date: 7/10/2025  Patient name: Erin Suazo  : 1945  MRN: [id number]  Referring provider: Edgar Araujo PA-C  Dx:   Encounter Diagnosis     ICD-10-CM    1. Rotator cuff arthropathy, right  M12.811 Ambulatory Referral to Physical Therapy                     Assessment  Impairments: abnormal muscle tone, abnormal or restricted ROM, abnormal movement, activity intolerance, impaired physical strength, lacks appropriate home exercise program, pain with function and unable to perform ADL    Assessment details: Erin Suazo is a 79 y.o. female who presents with Rotator cuff arthropathy, right.  Patient presents alert and oriented with the above impairments. . Erin will benefit from PT to addres deficits in order to maximize and return to prior level of function.  No further referral appears necessary at this time based upon examination results.  Prognosis is good given HEP compliance. Please contact me if you have any questions or recommendations.     Understanding of Dx/Px/POC: good     Prognosis: good    Goals  Short Term Goals:   1. Pain decreased 2 ratings in 4 weeks  2.  ROM increased 10* in 4 weeks  3.  Strength increased 1/2 grade in 4 weeks    Long Term Goals:   1.  ADLS/IADLS in related activities improved to maximal level in 8 weeks  2.  Reaching is  improved to maximal level in 8 weeks  3.  Recreational activities are improved to maximal level in 8 weeks.  4.  [place] with HEP in 8 weeks.     Plan  Patient would benefit from: PT eval and skilled physical therapy    Planned therapy interventions: IASTM, manual therapy, neuromuscular re-education, patient/caregiver education, therapeutic exercise, stretching, strengthening, home exercise program, functional ROM exercises, flexibility and postural training    Frequency: 2x week  Duration in weeks: 8  Treatment plan discussed with: patient        Subjective Evaluation    History of Present Illness  Mechanism of injury: Pt  reports ongoing R shoulder pain for a while that is progressively worsening.  She recently scheduled w/ ortho and was referred to PT.  Imaging did reveal degenerative changes.   She presents today w/ reports of constant in lateral shoulder and axilla.  Pain worsens toward the end of the day, with usage, reaching, and right sidelying.  She does c/o sleep disturbance.  She is left hand dominant.  Patient Goals  Patient goals for therapy: decreased pain, increased motion, increased strength, independence with ADLs/IADLs and return to sport/leisure activities    Pain  At best pain ratin  At worst pain rating: 10          Objective     Palpation     Additional Palpation Details  Palpable tightness at R teres, subscap    Active Range of Motion   Left Shoulder   Flexion: 150 degrees   Abduction: 160 degrees     Right Shoulder   Flexion: 85 degrees   Abduction: 75 degrees     Passive Range of Motion   Left Shoulder   Flexion: 160 degrees   Abduction: 160 degrees   External rotation 45°: 80 degrees   Internal rotation 45°: 60 degrees     Right Shoulder   Flexion: 125 degrees   Abduction: 125 degrees   External rotation 45°: 68 degrees   Internal rotation 45°: 50 degrees     Strength/Myotome Testing     Left Shoulder     Planes of Motion   Flexion: 5   Abduction: 5   External rotation at 90°: 5   Internal rotation at 90°: 5     Right Shoulder     Planes of Motion   Flexion: 2+   Abduction: 2+   External rotation at 90°: 3+   Internal rotation at 90°: 3+       Flowsheet Rows      Flowsheet Row Most Recent Value   PT/OT G-Codes    Current Score 37   Projected Score 54   FOTO information reviewed Yes   Assessment Type Evaluation   G code set Other PT/OT Primary   Other PT Primary Current Status () CK   Other PT Primary Goal Status () CJ               Precautions: na      Manuals 7/10            TPR R teres, subscap ROGELIO                                                   Neuro Re-Ed                                      "                                                                   Ther Ex 7/10            pulleys nv            Table slides flex, scap 10\" x10            Table slides ER nv            Wall slides nv            Supine wand flexion nv                                                   Ther Activity                                       Gait Training                                       Modalities                                          Eval/Re-Eval POC Expires Auth #/ Referral # Total Visits Start Date Expiration Date Extension Info Visits Limitation   7/10 9/10 [facility], [employer] NO AUTH                                                                   1 2 3 4 5 6   7/10 F        7 8 9 10 11 12           13 14 15 16 17 18           19 20 21 22 23 24           25 26 27 28 29 30                               "

## 2025-07-14 ENCOUNTER — OFFICE VISIT (OUTPATIENT)
Dept: PHYSICAL THERAPY | Facility: MEDICAL CENTER | Age: 80
End: 2025-07-14
Payer: MEDICARE

## 2025-07-14 DIAGNOSIS — M12.811 ROTATOR CUFF ARTHROPATHY, RIGHT: Primary | ICD-10-CM

## 2025-07-14 PROCEDURE — 97140 MANUAL THERAPY 1/> REGIONS: CPT | Performed by: PHYSICAL THERAPIST

## 2025-07-14 PROCEDURE — 97110 THERAPEUTIC EXERCISES: CPT | Performed by: PHYSICAL THERAPIST

## 2025-07-14 NOTE — PROGRESS NOTES
"Daily Note     Today's date: 2025  Patient name: Erin Suazo  : 1945  MRN: [id number]  Referring provider: Edgar Araujo PA-C  Dx:   Encounter Diagnosis     ICD-10-CM    1. Rotator cuff arthropathy, right  M12.811                      Subjective: Pt reports persistent pain in shoulder.  She has been complaint w/ HEP      Objective: See treatment diary below      Assessment: Tolerated treatment well. Patient demonstrated fatigue post treatment, exhibited good technique with therapeutic exercises, and would benefit from continued PT.  Intermittent pulling reported in UT region; added stretch to address.  Palpable tightness noted in teres region.      Plan: Continue per plan of care.      Precautions: na      Manuals 7/10 7/14           TPR R teres, subscap ROGELIO 10 min                                                  Neuro Re-Ed                                                                                                        Ther Ex 7/10 7/14           pulleys nv 3 min           Table slides flex, scap 10\" x10 10\" x10           Table slides ER nv 10\" x10           Wall slides nv 10\" x10           Supine wand flexion nv            R UT stretch  30\"X3                                     Ther Activity                                       Gait Training                                       Modalities                                         Eval/Re-Eval POC Expires Auth #/ Referral # Total Visits Start Date Expiration Date Extension Info Visits Limitation   7/10 9/10 [facility], [employer] NO AUTH                                                                                     1 2 3 4 5 6   7/10 F             7 8 9 10 11 12                 13 14 15 16 17 18                 19 20 21 22 23 24                 25 26 27 28 29 30                      "

## 2025-07-17 ENCOUNTER — OFFICE VISIT (OUTPATIENT)
Dept: PHYSICAL THERAPY | Facility: MEDICAL CENTER | Age: 80
End: 2025-07-17
Payer: MEDICARE

## 2025-07-17 DIAGNOSIS — M12.811 ROTATOR CUFF ARTHROPATHY, RIGHT: Primary | ICD-10-CM

## 2025-07-17 PROCEDURE — 97110 THERAPEUTIC EXERCISES: CPT | Performed by: PHYSICAL THERAPIST

## 2025-07-17 PROCEDURE — 97140 MANUAL THERAPY 1/> REGIONS: CPT | Performed by: PHYSICAL THERAPIST

## 2025-07-17 NOTE — PROGRESS NOTES
"Daily Note     Today's date: 2025  Patient name: Erin Suazo  : 1945  MRN: [id number]  Referring provider: Edgar Araujo PA-C  Dx:   Encounter Diagnosis     ICD-10-CM    1. Rotator cuff arthropathy, right  M12.811                      Subjective: Pt reports persistent posterior shoulder pain that does cause sleep disruption.  Also experiencing ongoing neck pain resulting in headache.      Objective: See treatment diary below      Assessment: Tolerated treatment well. Patient demonstrated fatigue post treatment, exhibited good technique with therapeutic exercises, and would benefit from continued PT.        Plan: Continue per plan of care.      Precautions: na      Manuals 7/10 7/14 7/17          TPR R teres, subscap ROGELIO 10 min 10 min w/ UT                                                 Neuro Re-Ed                                                                                                        Ther Ex 7/10 7/14 7/17          pulleys nv 3 min 5 min          Table slides flex, scap 10\" x10 10\" x10 10\"x 10          Table slides ER nv 10\" x10 10\" x10          Wall slides nv 10\" x10 10\" x10          Supine wand flexion nv            R UT stretch  30\"X3 30\"X3                                    Ther Activity                                       Gait Training                                       Modalities                                         Eval/Re-Eval POC Expires Auth #/ Referral # Total Visits Start Date Expiration Date Extension Info Visits Limitation   7/10 9/10 [employer] CATHERINE NO AUTH                                                                                     1 2 3 4 5 6   7/10 F          7 8 9 10 11 12                 13 14 15 16 17 18                 19 20 21 22 23 24                 25 26 27 28 29 30                      "

## 2025-07-22 ENCOUNTER — OFFICE VISIT (OUTPATIENT)
Dept: PHYSICAL THERAPY | Facility: MEDICAL CENTER | Age: 80
End: 2025-07-22
Payer: MEDICARE

## 2025-07-22 DIAGNOSIS — M12.811 ROTATOR CUFF ARTHROPATHY, RIGHT: Primary | ICD-10-CM

## 2025-07-22 PROCEDURE — 97140 MANUAL THERAPY 1/> REGIONS: CPT | Performed by: PHYSICAL THERAPIST

## 2025-07-22 PROCEDURE — 97110 THERAPEUTIC EXERCISES: CPT | Performed by: PHYSICAL THERAPIST

## 2025-07-22 NOTE — PROGRESS NOTES
"Daily Note     Today's date: 2025  Patient name: Erin Suazo  : 1945  MRN: [id number]  Referring provider: Edgar Araujo PA-C  Dx:   Encounter Diagnosis     ICD-10-CM    1. Rotator cuff arthropathy, right  M12.811                      Subjective: Pt reports persisting shoulder and neck pain; she does report that headaches are improving.  She has been compliant w/ HEP and reports feeling challenged w/ wall slides.       Objective: See treatment diary below      Assessment: Tolerated treatment well. Patient demonstrated fatigue post treatment, exhibited good technique with therapeutic exercises, and would benefit from continued PT.    Challenged w/ addition of wand flexion and scaption.    Plan: Continue per plan of care.      Precautions: na      Manuals 7/10 7/14 7/17 7/22         TPR R teres, subscap ROGELIO 10 min 10 min w/ UT W/ gr[place]     10 min w/ UT                                                Neuro Re-Ed                                                                                                        Ther Ex 7/10 7/14 7/17 7/22         pulleys nv 3 min 5 min 5 min         Table slides flex, scap 10\" x10 10\" x10 10\"x 10 10\" x10         Table slides ER nv 10\" x10 10\" x10 10\" x10         Wall slides nv 10\" x10 10\" x10 10\" x 6         Supine wand flexion and scaption nv   10\" x5 ea         R UT stretch  30\"X3 30\"X3 30\"x3                                   Ther Activity                                       Gait Training                                       Modalities                                         Eval/Re-Eval POC Expires Auth #/ Referral # Total Visits Start Date Expiration Date Extension Info Visits Limitation   7/10 9/10 OMARI CATHERINE NO AUTH                                                                                     1 2 3 4 5 6   7/10 F         7 8 9 10 11 12                 13 14 15 16 17 18                 19 20 21 22 23 24                 25 26 27 28 29 30 "

## 2025-07-25 ENCOUNTER — OFFICE VISIT (OUTPATIENT)
Dept: PHYSICAL THERAPY | Facility: MEDICAL CENTER | Age: 80
End: 2025-07-25
Payer: MEDICARE

## 2025-07-25 DIAGNOSIS — M12.811 ROTATOR CUFF ARTHROPATHY, RIGHT: Primary | ICD-10-CM

## 2025-07-25 PROCEDURE — 97110 THERAPEUTIC EXERCISES: CPT | Performed by: PHYSICAL THERAPIST

## 2025-07-25 PROCEDURE — 97140 MANUAL THERAPY 1/> REGIONS: CPT | Performed by: PHYSICAL THERAPIST

## 2025-07-25 NOTE — PROGRESS NOTES
"Daily Note     Today's date: 2025  Patient name: Erin Suazo  : 1945  MRN: [id number]  Referring provider: Edgar Araujo PA-C  Dx:   Encounter Diagnosis     ICD-10-CM    1. Rotator cuff arthropathy, right  M12.811                      Subjective: She did wake today w/ minor headache and neck pain.  Most pain present in UT region  Has been performing HEP regularly.    Objective: See treatment diary below      Assessment: Tolerated treatment well. Patient demonstrated fatigue post treatment, exhibited good technique with therapeutic exercises, and would benefit from continued PT.    Remains most challenged w/ wand exercise.      Plan: Continue per plan of care.      Precautions: na      Manuals 7/10 7/14 7/17 7/22 7/25        TPR R teres, subscap ROGELIO 10 min 10 min w/ UT W/ graston     10 min w/ UT W/ graston UT 10 min                                               Neuro Re-Ed                                                                                                        Ther Ex 7/10 7/14 7/17 7/22 7/25        pulleys nv 3 min 5 min 5 min 5 min        Table slides flex, scap 10\" x10 10\" x10 10\"x 10 10\" x10 10\" x10        Table slides ER nv 10\" x10 10\" x10 10\" x10 10\" x10        Wall slides nv 10\" x10 10\" x10 10\" x 6 10\" x10        Supine wand flexion and scaption nv   10\" x5 ea 10\" X5 ea        R UT stretch  30\"X3 30\"X3 30\"x3 30\"x3                                  Ther Activity                                       Gait Training                                       Modalities                                         Eval/Re-Eval POC Expires Auth #/ Referral # Total Visits Start Date Expiration Date Extension Info Visits Limitation   7/10 9/10 [facility], [employer] NO AUTH                                                                                     1 2 3 4 5 6   7/10 F       7 8 9 10 11 12                 13 14 15 16 17 18                 19 20 21 22 23 24                 25 26 27 " 28 29 30

## 2025-07-28 ENCOUNTER — OFFICE VISIT (OUTPATIENT)
Dept: PHYSICAL THERAPY | Facility: MEDICAL CENTER | Age: 80
End: 2025-07-28
Payer: MEDICARE

## 2025-07-28 DIAGNOSIS — M12.811 ROTATOR CUFF ARTHROPATHY, RIGHT: Primary | ICD-10-CM

## 2025-07-28 PROCEDURE — 97110 THERAPEUTIC EXERCISES: CPT | Performed by: PHYSICAL THERAPIST

## 2025-07-28 PROCEDURE — 97140 MANUAL THERAPY 1/> REGIONS: CPT | Performed by: PHYSICAL THERAPIST

## 2025-07-29 ENCOUNTER — OFFICE VISIT (OUTPATIENT)
Dept: FAMILY MEDICINE CLINIC | Facility: CLINIC | Age: 80
End: 2025-07-29
Payer: MEDICARE

## 2025-07-29 VITALS
DIASTOLIC BLOOD PRESSURE: 90 MMHG | OXYGEN SATURATION: 97 % | WEIGHT: 140.4 LBS | HEART RATE: 81 BPM | BODY MASS INDEX: 30.29 KG/M2 | TEMPERATURE: 97.8 F | HEIGHT: 57 IN | SYSTOLIC BLOOD PRESSURE: 130 MMHG

## 2025-07-29 DIAGNOSIS — M17.11 PRIMARY OSTEOARTHRITIS OF RIGHT KNEE: ICD-10-CM

## 2025-07-29 DIAGNOSIS — Z00.00 MEDICARE ANNUAL WELLNESS VISIT, SUBSEQUENT: Primary | ICD-10-CM

## 2025-07-29 DIAGNOSIS — Z72.820 SLEEP DEPRIVATION: ICD-10-CM

## 2025-07-29 DIAGNOSIS — M25.512 LEFT SHOULDER PAIN, UNSPECIFIED CHRONICITY: ICD-10-CM

## 2025-07-29 DIAGNOSIS — R10.9 ABDOMINAL CRAMPING: ICD-10-CM

## 2025-07-29 DIAGNOSIS — Z12.11 SCREENING FOR COLORECTAL CANCER: ICD-10-CM

## 2025-07-29 DIAGNOSIS — Z12.12 SCREENING FOR COLORECTAL CANCER: ICD-10-CM

## 2025-07-29 DIAGNOSIS — M53.3 SACROILIAC JOINT DYSFUNCTION OF LEFT SIDE: ICD-10-CM

## 2025-07-29 DIAGNOSIS — R51.9 NONINTRACTABLE HEADACHE, UNSPECIFIED CHRONICITY PATTERN, UNSPECIFIED HEADACHE TYPE: ICD-10-CM

## 2025-07-29 DIAGNOSIS — G89.29 OTHER CHRONIC PAIN: ICD-10-CM

## 2025-07-29 PROCEDURE — 99213 OFFICE O/P EST LOW 20 MIN: CPT | Performed by: FAMILY MEDICINE

## 2025-07-29 PROCEDURE — G2211 COMPLEX E/M VISIT ADD ON: HCPCS | Performed by: FAMILY MEDICINE

## 2025-07-29 PROCEDURE — G0439 PPPS, SUBSEQ VISIT: HCPCS | Performed by: FAMILY MEDICINE

## 2025-07-29 RX ORDER — ZOLPIDEM TARTRATE 10 MG/1
5-10 TABLET ORAL
Qty: 30 TABLET | Refills: 0 | Status: SHIPPED | OUTPATIENT
Start: 2025-07-29

## 2025-07-29 RX ORDER — DULOXETIN HYDROCHLORIDE 20 MG/1
20 CAPSULE, DELAYED RELEASE ORAL DAILY
Qty: 30 CAPSULE | Refills: 1 | Status: SHIPPED | OUTPATIENT
Start: 2025-07-29

## 2025-07-31 ENCOUNTER — OFFICE VISIT (OUTPATIENT)
Dept: PHYSICAL THERAPY | Facility: MEDICAL CENTER | Age: 80
End: 2025-07-31
Payer: MEDICARE

## 2025-07-31 DIAGNOSIS — M12.811 ROTATOR CUFF ARTHROPATHY, RIGHT: Primary | ICD-10-CM

## 2025-07-31 PROCEDURE — 97110 THERAPEUTIC EXERCISES: CPT | Performed by: PHYSICAL THERAPIST

## 2025-07-31 PROCEDURE — 97140 MANUAL THERAPY 1/> REGIONS: CPT | Performed by: PHYSICAL THERAPIST

## 2025-08-04 ENCOUNTER — OFFICE VISIT (OUTPATIENT)
Dept: PHYSICAL THERAPY | Facility: MEDICAL CENTER | Age: 80
End: 2025-08-04
Payer: MEDICARE

## 2025-08-04 DIAGNOSIS — M12.811 ROTATOR CUFF ARTHROPATHY, RIGHT: Primary | ICD-10-CM

## 2025-08-04 PROCEDURE — 97110 THERAPEUTIC EXERCISES: CPT | Performed by: PHYSICAL THERAPIST

## 2025-08-04 PROCEDURE — 97140 MANUAL THERAPY 1/> REGIONS: CPT | Performed by: PHYSICAL THERAPIST

## 2025-08-08 ENCOUNTER — OFFICE VISIT (OUTPATIENT)
Dept: PHYSICAL THERAPY | Facility: MEDICAL CENTER | Age: 80
End: 2025-08-08
Payer: MEDICARE

## 2025-08-08 DIAGNOSIS — M12.811 ROTATOR CUFF ARTHROPATHY, RIGHT: Primary | ICD-10-CM

## 2025-08-08 PROCEDURE — 97140 MANUAL THERAPY 1/> REGIONS: CPT | Performed by: PHYSICAL THERAPIST

## 2025-08-08 PROCEDURE — 97110 THERAPEUTIC EXERCISES: CPT | Performed by: PHYSICAL THERAPIST

## 2025-08-11 ENCOUNTER — OFFICE VISIT (OUTPATIENT)
Dept: PHYSICAL THERAPY | Facility: MEDICAL CENTER | Age: 80
End: 2025-08-11
Payer: MEDICARE

## 2025-08-14 ENCOUNTER — OFFICE VISIT (OUTPATIENT)
Dept: PHYSICAL THERAPY | Facility: MEDICAL CENTER | Age: 80
End: 2025-08-14
Payer: MEDICARE

## 2025-08-15 ENCOUNTER — OFFICE VISIT (OUTPATIENT)
Dept: OBGYN CLINIC | Facility: CLINIC | Age: 80
End: 2025-08-15
Payer: MEDICARE

## 2025-08-15 ENCOUNTER — APPOINTMENT (OUTPATIENT)
Dept: RADIOLOGY | Facility: AMBULARY SURGERY CENTER | Age: 80
End: 2025-08-15
Attending: ORTHOPAEDIC SURGERY
Payer: MEDICARE

## 2025-08-19 ENCOUNTER — OFFICE VISIT (OUTPATIENT)
Dept: PHYSICAL THERAPY | Facility: MEDICAL CENTER | Age: 80
End: 2025-08-19
Payer: MEDICARE

## 2025-08-19 DIAGNOSIS — M12.811 ROTATOR CUFF ARTHROPATHY, RIGHT: Primary | ICD-10-CM

## 2025-08-19 PROCEDURE — 97112 NEUROMUSCULAR REEDUCATION: CPT | Performed by: PHYSICAL THERAPIST

## 2025-08-19 PROCEDURE — 97140 MANUAL THERAPY 1/> REGIONS: CPT | Performed by: PHYSICAL THERAPIST

## 2025-08-19 PROCEDURE — 97110 THERAPEUTIC EXERCISES: CPT | Performed by: PHYSICAL THERAPIST

## 2025-08-21 ENCOUNTER — OFFICE VISIT (OUTPATIENT)
Dept: OBGYN CLINIC | Facility: MEDICAL CENTER | Age: 80
End: 2025-08-21
Payer: MEDICARE

## 2025-08-21 ENCOUNTER — OFFICE VISIT (OUTPATIENT)
Dept: PHYSICAL THERAPY | Facility: MEDICAL CENTER | Age: 80
End: 2025-08-21
Payer: MEDICARE

## 2025-08-21 VITALS — WEIGHT: 142.6 LBS | BODY MASS INDEX: 31.41 KG/M2

## 2025-08-21 DIAGNOSIS — M12.811 ROTATOR CUFF ARTHROPATHY, RIGHT: Primary | ICD-10-CM

## 2025-08-21 PROCEDURE — 97110 THERAPEUTIC EXERCISES: CPT | Performed by: PHYSICAL THERAPIST

## 2025-08-21 PROCEDURE — 99213 OFFICE O/P EST LOW 20 MIN: CPT | Performed by: STUDENT IN AN ORGANIZED HEALTH CARE EDUCATION/TRAINING PROGRAM

## 2025-08-21 PROCEDURE — 97140 MANUAL THERAPY 1/> REGIONS: CPT | Performed by: PHYSICAL THERAPIST

## 2025-08-22 DIAGNOSIS — G89.29 OTHER CHRONIC PAIN: ICD-10-CM

## 2025-08-22 DIAGNOSIS — M53.3 SACROILIAC JOINT DYSFUNCTION OF LEFT SIDE: ICD-10-CM

## 2025-08-22 DIAGNOSIS — M25.512 LEFT SHOULDER PAIN, UNSPECIFIED CHRONICITY: ICD-10-CM

## 2025-08-22 DIAGNOSIS — M17.11 PRIMARY OSTEOARTHRITIS OF RIGHT KNEE: ICD-10-CM

## 2025-08-22 RX ORDER — DULOXETIN HYDROCHLORIDE 20 MG/1
20 CAPSULE, DELAYED RELEASE ORAL DAILY
Qty: 90 CAPSULE | Refills: 1 | Status: SHIPPED | OUTPATIENT
Start: 2025-08-22

## (undated) DEVICE — 3000CC GUARDIAN II: Brand: GUARDIAN

## (undated) DEVICE — ADHESIVE SKN CLSR HISTOACRYL FLEX 0.5ML LF

## (undated) DEVICE — 3M™ IOBAN™ 2 ANTIMICROBIAL INCISE DRAPE 6650EZ: Brand: IOBAN™ 2

## (undated) DEVICE — KIT, BETHLEHEM ROBOTIC PROST: Brand: CARDINAL HEALTH

## (undated) DEVICE — COLUMN DRAPE

## (undated) DEVICE — HOOD WITH PEEL AWAY FACE SHIELD: Brand: T7PLUS

## (undated) DEVICE — HANDPIECE SET WITH RETRACTABLE COAXIAL FAN SPRAY TIP AND SUCTION TUBE: Brand: INTERPULSE

## (undated) DEVICE — GLOVE PI ULTRA TOUCH SZ.8.5

## (undated) DEVICE — ELECTRODE BLADE E-Z CLEAN 4IN -0014A

## (undated) DEVICE — SKN PRP WNG SPNGE PVP SCRB STR: Brand: MEDLINE INDUSTRIES, INC.

## (undated) DEVICE — PENCIL ELECTROSURG E-Z CLEAN -0035H

## (undated) DEVICE — HOOD: Brand: T7PLUS

## (undated) DEVICE — SUT STRATAFIX SPIRAL 2-0 CT-1 30 CM SXPP1B410

## (undated) DEVICE — LUBRICANT INST ELECTROLUBE ANTISTK WO PAD

## (undated) DEVICE — 40601 PROLONGED POSITIONING SYSTEM: Brand: 40601 PROLONGED POSITIONING SYSTEM

## (undated) DEVICE — SUT VICRYL 0 CT-1 27 IN J260H

## (undated) DEVICE — TROCAR: Brand: KII FIOS FIRST ENTRY

## (undated) DEVICE — GLOVE INDICATOR PI UNDERGLOVE SZ 7 BLUE

## (undated) DEVICE — GLOVE PI ULTRA TOUCH SZ.7.0

## (undated) DEVICE — INTENDED FOR TISSUE SEPARATION, AND OTHER PROCEDURES THAT REQUIRE A SHARP SURGICAL BLADE TO PUNCTURE OR CUT.: Brand: BARD-PARKER SAFETY BLADES SIZE 15, STERILE

## (undated) DEVICE — PAD GROUNDING DUAL ADULT

## (undated) DEVICE — SUT VICRYL 2-0 CT-1 27 IN J259H

## (undated) DEVICE — GLOVE INDICATOR PI UNDERGLOVE SZ 8.5 BLUE

## (undated) DEVICE — NEEDLE 23 G X 1 SAFETY

## (undated) DEVICE — OSCILLATING TIP SAW CARTRIDGE: Brand: PRECISION FALCON

## (undated) DEVICE — SYRINGE 20ML LL

## (undated) DEVICE — SUT VICRYL 3-0 SH 27 IN J416H

## (undated) DEVICE — EXOFIN PRECISION PEN HIGH VISCOSITY TOPICAL SKIN ADHESIVE: Brand: EXOFIN PRECISION PEN, 1G

## (undated) DEVICE — PREMIUM DRY TRAY LF: Brand: MEDLINE INDUSTRIES, INC.

## (undated) DEVICE — BETHLEHEM UNIV MAJOR ORTHO,KIT: Brand: CARDINAL HEALTH

## (undated) DEVICE — SUT STRATAFIX SPIRAL PDS PLUS 1 CTX 18 IN SXPP1A400

## (undated) DEVICE — HEAVY DUTY TABLE COVER: Brand: CONVERTORS

## (undated) DEVICE — FENESTRATED BIPOLAR FORCEPS: Brand: ENDOWRIST

## (undated) DEVICE — VESSEL SEALER EXTEND: Brand: ENDOWRIST

## (undated) DEVICE — MAYO STAND COVER: Brand: CONVERTORS

## (undated) DEVICE — ASTOUND STANDARD SURGICAL GOWN, XL: Brand: CONVERTORS

## (undated) DEVICE — DRESSING MEPILEX AG BORDER POST-OP 4 X 8 IN

## (undated) DEVICE — DRAPE CAMERA/LASER

## (undated) DEVICE — DISPOSABLE EQUIPMENT COVER: Brand: SMALL TOWEL DRAPE

## (undated) DEVICE — SUT MONOCRYL PLUS 3-0 PS-2 27 IN MCP427H

## (undated) DEVICE — SYRINGE 10ML LL

## (undated) DEVICE — INTENDED FOR TISSUE SEPARATION, AND OTHER PROCEDURES THAT REQUIRE A SHARP SURGICAL BLADE TO PUNCTURE OR CUT.: Brand: BARD-PARKER SAFETY BLADES SIZE 11, STERILE

## (undated) DEVICE — UTERINE MANIPULATOR RUMI 6.7 X 6 CM

## (undated) DEVICE — 3M™ TEGADERM™ TRANSPARENT FILM DRESSING FRAME STYLE, 1628, 6 IN X 8 IN (15 CM X 20 CM), 10/CT 8CT/CASE: Brand: 3M™ TEGADERM™

## (undated) DEVICE — 1820 FOAM BLOCK NEEDLE COUNTER: Brand: DEVON

## (undated) DEVICE — CAPIT HIP MOP - ACTIS

## (undated) DEVICE — CHLORHEXIDINE 4PCT 4 OZ

## (undated) DEVICE — ARTHROSCOPY FLOOR MAT

## (undated) DEVICE — GLOVE INDICATOR PI UNDERGLOVE SZ 6.5 BLUE

## (undated) DEVICE — SYRINGE 50ML LL

## (undated) DEVICE — LARGE NEEDLE DRIVER: Brand: ENDOWRIST

## (undated) DEVICE — GLOVE INDICATOR PI UNDERGLOVE SZ 7.5 BLUE

## (undated) DEVICE — TIP COVER ACCESSORY

## (undated) DEVICE — ARM DRAPE

## (undated) DEVICE — SPECIMEN TRAP: Brand: ARGYLE

## (undated) DEVICE — SUT MONOCRYL PLUS 4-0 PS-2 18 IN MCP496G

## (undated) DEVICE — GLOVE PI ULTRA TOUCH SZ.6.5

## (undated) DEVICE — NEEDLE 25G X 1 1/2

## (undated) DEVICE — CAPIT HIP UPCHRG GRIPTON CUP

## (undated) DEVICE — CHLORAPREP HI-LITE 26ML ORANGE

## (undated) DEVICE — VISUALIZATION SYSTEM: Brand: CLEARIFY

## (undated) DEVICE — OCCLUDER COLPO-PNEUMO

## (undated) DEVICE — DRAPE SHEET THREE QUARTER

## (undated) DEVICE — IMPERVIOUS STOCKINETTE: Brand: DEROYAL

## (undated) DEVICE — GLOVE SRG BIOGEL 8

## (undated) DEVICE — MONOPOLAR CURVED SCISSORS: Brand: ENDOWRIST

## (undated) DEVICE — C-ARM: Brand: UNBRANDED

## (undated) DEVICE — CANNULA SEAL

## (undated) DEVICE — TRAY FOLEY 16FR URIMETER SILICONE SURESTEP